# Patient Record
Sex: FEMALE | Race: BLACK OR AFRICAN AMERICAN | NOT HISPANIC OR LATINO
[De-identification: names, ages, dates, MRNs, and addresses within clinical notes are randomized per-mention and may not be internally consistent; named-entity substitution may affect disease eponyms.]

---

## 2023-01-01 ENCOUNTER — TRANSCRIPTION ENCOUNTER (OUTPATIENT)
Age: 48
End: 2023-01-01

## 2023-01-01 ENCOUNTER — INPATIENT (INPATIENT)
Facility: HOSPITAL | Age: 48
LOS: 40 days | Discharge: EXPIRED | End: 2023-05-04
Attending: SURGERY | Admitting: SURGERY
Payer: MEDICAID

## 2023-01-01 ENCOUNTER — INPATIENT (INPATIENT)
Facility: HOSPITAL | Age: 48
LOS: 1 days | Discharge: ANOTHER HOSPITAL | End: 2023-03-24
Attending: STUDENT IN AN ORGANIZED HEALTH CARE EDUCATION/TRAINING PROGRAM | Admitting: SURGERY
Payer: MEDICAID

## 2023-01-01 VITALS
WEIGHT: 270.07 LBS | HEART RATE: 115 BPM | DIASTOLIC BLOOD PRESSURE: 112 MMHG | OXYGEN SATURATION: 98 % | SYSTOLIC BLOOD PRESSURE: 178 MMHG | TEMPERATURE: 96 F | HEIGHT: 65 IN | RESPIRATION RATE: 20 BRPM

## 2023-01-01 VITALS — HEART RATE: 111 BPM | RESPIRATION RATE: 22 BRPM

## 2023-01-01 VITALS — WEIGHT: 269.4 LBS | TEMPERATURE: 96 F | HEIGHT: 64 IN | HEART RATE: 120 BPM

## 2023-01-01 DIAGNOSIS — E87.20 ACIDOSIS, UNSPECIFIED: ICD-10-CM

## 2023-01-01 DIAGNOSIS — R49.0 DYSPHONIA: ICD-10-CM

## 2023-01-01 DIAGNOSIS — I16.0 HYPERTENSIVE URGENCY: ICD-10-CM

## 2023-01-01 DIAGNOSIS — K85.90 ACUTE PANCREATITIS WITHOUT NECROSIS OR INFECTION, UNSPECIFIED: ICD-10-CM

## 2023-01-01 DIAGNOSIS — J34.89 OTHER SPECIFIED DISORDERS OF NOSE AND NASAL SINUSES: ICD-10-CM

## 2023-01-01 DIAGNOSIS — I10 ESSENTIAL (PRIMARY) HYPERTENSION: ICD-10-CM

## 2023-01-01 DIAGNOSIS — Z90.12 ACQUIRED ABSENCE OF LEFT BREAST AND NIPPLE: Chronic | ICD-10-CM

## 2023-01-01 DIAGNOSIS — A49.9 BACTERIAL INFECTION, UNSPECIFIED: ICD-10-CM

## 2023-01-01 DIAGNOSIS — N17.9 ACUTE KIDNEY FAILURE, UNSPECIFIED: ICD-10-CM

## 2023-01-01 DIAGNOSIS — E87.6 HYPOKALEMIA: ICD-10-CM

## 2023-01-01 DIAGNOSIS — Z98.891 HISTORY OF UTERINE SCAR FROM PREVIOUS SURGERY: Chronic | ICD-10-CM

## 2023-01-01 DIAGNOSIS — K85.91 ACUTE PANCREATITIS WITH UNINFECTED NECROSIS, UNSPECIFIED: ICD-10-CM

## 2023-01-01 DIAGNOSIS — E87.5 HYPERKALEMIA: ICD-10-CM

## 2023-01-01 DIAGNOSIS — D64.9 ANEMIA, UNSPECIFIED: ICD-10-CM

## 2023-01-01 LAB
-  AMIKACIN: SIGNIFICANT CHANGE UP
-  AMOXICILLIN/CLAVULANIC ACID: SIGNIFICANT CHANGE UP
-  AMPICILLIN/SULBACTAM: SIGNIFICANT CHANGE UP
-  AMPICILLIN/SULBACTAM: SIGNIFICANT CHANGE UP
-  AMPICILLIN: SIGNIFICANT CHANGE UP
-  AMPICILLIN: SIGNIFICANT CHANGE UP
-  AZTREONAM: SIGNIFICANT CHANGE UP
-  CEFAZOLIN: SIGNIFICANT CHANGE UP
-  CEFAZOLIN: SIGNIFICANT CHANGE UP
-  CEFEPIME: SIGNIFICANT CHANGE UP
-  CEFOXITIN: SIGNIFICANT CHANGE UP
-  CEFTRIAXONE: SIGNIFICANT CHANGE UP
-  CEFUROXIME: SIGNIFICANT CHANGE UP
-  CIPROFLOXACIN: SIGNIFICANT CHANGE UP
-  CLINDAMYCIN: SIGNIFICANT CHANGE UP
-  COAGULASE NEGATIVE STAPHYLOCOCCUS: SIGNIFICANT CHANGE UP
-  ERTAPENEM: SIGNIFICANT CHANGE UP
-  ERYTHROMYCIN: SIGNIFICANT CHANGE UP
-  GENTAMICIN: SIGNIFICANT CHANGE UP
-  GENTAMICIN: SIGNIFICANT CHANGE UP
-  IMIPENEM: SIGNIFICANT CHANGE UP
-  LEVOFLOXACIN: SIGNIFICANT CHANGE UP
-  MEROPENEM: SIGNIFICANT CHANGE UP
-  NITROFURANTOIN: SIGNIFICANT CHANGE UP
-  OXACILLIN: SIGNIFICANT CHANGE UP
-  PENICILLIN: SIGNIFICANT CHANGE UP
-  PIPERACILLIN/TAZOBACTAM: SIGNIFICANT CHANGE UP
-  RIFAMPIN: SIGNIFICANT CHANGE UP
-  TETRACYCLINE: SIGNIFICANT CHANGE UP
-  TETRACYCLINE: SIGNIFICANT CHANGE UP
-  TOBRAMYCIN: SIGNIFICANT CHANGE UP
-  TRIMETHOPRIM/SULFAMETHOXAZOLE: SIGNIFICANT CHANGE UP
-  TRIMETHOPRIM/SULFAMETHOXAZOLE: SIGNIFICANT CHANGE UP
-  VANCOMYCIN: SIGNIFICANT CHANGE UP
-  VANCOMYCIN: SIGNIFICANT CHANGE UP
A1C WITH ESTIMATED AVERAGE GLUCOSE RESULT: 5.9 % — HIGH (ref 4–5.6)
A1C WITH ESTIMATED AVERAGE GLUCOSE RESULT: 6.1 % — HIGH (ref 4–5.6)
ACANTHOCYTES BLD QL SMEAR: SLIGHT — SIGNIFICANT CHANGE UP
ALBUMIN SERPL ELPH-MCNC: 1.4 G/DL — LOW (ref 3.3–5)
ALBUMIN SERPL ELPH-MCNC: 2.1 G/DL — LOW (ref 3.3–5)
ALBUMIN SERPL ELPH-MCNC: 2.1 G/DL — LOW (ref 3.3–5)
ALBUMIN SERPL ELPH-MCNC: 2.2 G/DL — LOW (ref 3.3–5)
ALBUMIN SERPL ELPH-MCNC: 2.3 G/DL — LOW (ref 3.3–5)
ALBUMIN SERPL ELPH-MCNC: 2.4 G/DL — LOW (ref 3.3–5)
ALBUMIN SERPL ELPH-MCNC: 2.5 G/DL — LOW (ref 3.3–5)
ALBUMIN SERPL ELPH-MCNC: 2.6 G/DL — LOW (ref 3.3–5)
ALBUMIN SERPL ELPH-MCNC: 2.7 G/DL — LOW (ref 3.3–5)
ALBUMIN SERPL ELPH-MCNC: 2.8 G/DL — LOW (ref 3.3–5)
ALBUMIN SERPL ELPH-MCNC: 2.9 G/DL — LOW (ref 3.3–5)
ALBUMIN SERPL ELPH-MCNC: 2.9 G/DL — LOW (ref 3.3–5)
ALBUMIN SERPL ELPH-MCNC: 3 G/DL — LOW (ref 3.3–5)
ALBUMIN SERPL ELPH-MCNC: 3.1 G/DL — LOW (ref 3.3–5)
ALBUMIN SERPL ELPH-MCNC: 3.2 G/DL — LOW (ref 3.3–5)
ALBUMIN SERPL ELPH-MCNC: 3.3 G/DL — SIGNIFICANT CHANGE UP (ref 3.3–5)
ALBUMIN SERPL ELPH-MCNC: 3.3 G/DL — SIGNIFICANT CHANGE UP (ref 3.3–5)
ALBUMIN SERPL ELPH-MCNC: 3.5 G/DL — SIGNIFICANT CHANGE UP (ref 3.3–5)
ALBUMIN SERPL ELPH-MCNC: 3.7 G/DL — SIGNIFICANT CHANGE UP (ref 3.3–5)
ALBUMIN SERPL ELPH-MCNC: SIGNIFICANT CHANGE UP G/DL (ref 3.3–5)
ALP SERPL-CCNC: 100 U/L — SIGNIFICANT CHANGE UP (ref 40–120)
ALP SERPL-CCNC: 109 U/L — SIGNIFICANT CHANGE UP (ref 40–120)
ALP SERPL-CCNC: 112 U/L — SIGNIFICANT CHANGE UP (ref 40–120)
ALP SERPL-CCNC: 120 U/L — SIGNIFICANT CHANGE UP (ref 40–120)
ALP SERPL-CCNC: 121 U/L — HIGH (ref 40–120)
ALP SERPL-CCNC: 121 U/L — HIGH (ref 40–120)
ALP SERPL-CCNC: 132 U/L — HIGH (ref 40–120)
ALP SERPL-CCNC: 133 U/L — HIGH (ref 40–120)
ALP SERPL-CCNC: 133 U/L — HIGH (ref 40–120)
ALP SERPL-CCNC: 139 U/L — HIGH (ref 40–120)
ALP SERPL-CCNC: 139 U/L — HIGH (ref 40–120)
ALP SERPL-CCNC: 140 U/L — HIGH (ref 40–120)
ALP SERPL-CCNC: 146 U/L — HIGH (ref 40–120)
ALP SERPL-CCNC: 152 U/L — HIGH (ref 40–120)
ALP SERPL-CCNC: 153 U/L — HIGH (ref 40–120)
ALP SERPL-CCNC: 156 U/L — HIGH (ref 40–120)
ALP SERPL-CCNC: 158 U/L — HIGH (ref 40–120)
ALP SERPL-CCNC: 158 U/L — HIGH (ref 40–120)
ALP SERPL-CCNC: 160 U/L — HIGH (ref 40–120)
ALP SERPL-CCNC: 160 U/L — HIGH (ref 40–120)
ALP SERPL-CCNC: 163 U/L — HIGH (ref 40–120)
ALP SERPL-CCNC: 164 U/L — HIGH (ref 40–120)
ALP SERPL-CCNC: 170 U/L — HIGH (ref 40–120)
ALP SERPL-CCNC: 172 U/L — HIGH (ref 40–120)
ALP SERPL-CCNC: 172 U/L — HIGH (ref 40–120)
ALP SERPL-CCNC: 173 U/L — HIGH (ref 40–120)
ALP SERPL-CCNC: 175 U/L — HIGH (ref 40–120)
ALP SERPL-CCNC: 177 U/L — HIGH (ref 40–120)
ALP SERPL-CCNC: 178 U/L — HIGH (ref 40–120)
ALP SERPL-CCNC: 184 U/L — HIGH (ref 40–120)
ALP SERPL-CCNC: 185 U/L — HIGH (ref 40–120)
ALP SERPL-CCNC: 187 U/L — HIGH (ref 40–120)
ALP SERPL-CCNC: 194 U/L — HIGH (ref 40–120)
ALP SERPL-CCNC: 196 U/L — HIGH (ref 40–120)
ALP SERPL-CCNC: 198 U/L — HIGH (ref 40–120)
ALP SERPL-CCNC: 201 U/L — HIGH (ref 40–120)
ALP SERPL-CCNC: 201 U/L — HIGH (ref 40–120)
ALP SERPL-CCNC: 202 U/L — HIGH (ref 40–120)
ALP SERPL-CCNC: 202 U/L — HIGH (ref 40–120)
ALP SERPL-CCNC: 206 U/L — HIGH (ref 40–120)
ALP SERPL-CCNC: 208 U/L — HIGH (ref 40–120)
ALP SERPL-CCNC: 208 U/L — HIGH (ref 40–120)
ALP SERPL-CCNC: 210 U/L — HIGH (ref 40–120)
ALP SERPL-CCNC: 211 U/L — HIGH (ref 40–120)
ALP SERPL-CCNC: 212 U/L — HIGH (ref 40–120)
ALP SERPL-CCNC: 214 U/L — HIGH (ref 40–120)
ALP SERPL-CCNC: 215 U/L — HIGH (ref 40–120)
ALP SERPL-CCNC: 215 U/L — HIGH (ref 40–120)
ALP SERPL-CCNC: 216 U/L — HIGH (ref 40–120)
ALP SERPL-CCNC: 218 U/L — HIGH (ref 40–120)
ALP SERPL-CCNC: 219 U/L — HIGH (ref 40–120)
ALP SERPL-CCNC: 224 U/L — HIGH (ref 40–120)
ALP SERPL-CCNC: 224 U/L — HIGH (ref 40–120)
ALP SERPL-CCNC: 225 U/L — HIGH (ref 40–120)
ALP SERPL-CCNC: 232 U/L — HIGH (ref 40–120)
ALP SERPL-CCNC: 234 U/L — HIGH (ref 40–120)
ALP SERPL-CCNC: 235 U/L — HIGH (ref 40–120)
ALP SERPL-CCNC: 235 U/L — HIGH (ref 40–120)
ALP SERPL-CCNC: 238 U/L — HIGH (ref 40–120)
ALP SERPL-CCNC: 240 U/L — HIGH (ref 40–120)
ALP SERPL-CCNC: 242 U/L — HIGH (ref 40–120)
ALP SERPL-CCNC: 244 U/L — HIGH (ref 40–120)
ALP SERPL-CCNC: 246 U/L — HIGH (ref 40–120)
ALP SERPL-CCNC: 246 U/L — HIGH (ref 40–120)
ALP SERPL-CCNC: 249 U/L — HIGH (ref 40–120)
ALP SERPL-CCNC: 251 U/L — HIGH (ref 40–120)
ALP SERPL-CCNC: 262 U/L — HIGH (ref 40–120)
ALP SERPL-CCNC: 264 U/L — HIGH (ref 40–120)
ALP SERPL-CCNC: 273 U/L — HIGH (ref 40–120)
ALP SERPL-CCNC: 275 U/L — HIGH (ref 40–120)
ALP SERPL-CCNC: 279 U/L — HIGH (ref 40–120)
ALP SERPL-CCNC: 289 U/L — HIGH (ref 40–120)
ALP SERPL-CCNC: 311 U/L — HIGH (ref 40–120)
ALP SERPL-CCNC: 341 U/L — HIGH (ref 40–120)
ALP SERPL-CCNC: 356 U/L — HIGH (ref 40–120)
ALP SERPL-CCNC: 360 U/L — HIGH (ref 40–120)
ALP SERPL-CCNC: 363 U/L — HIGH (ref 40–120)
ALP SERPL-CCNC: 368 U/L — HIGH (ref 40–120)
ALP SERPL-CCNC: 404 U/L — HIGH (ref 40–120)
ALP SERPL-CCNC: 411 U/L — HIGH (ref 40–120)
ALP SERPL-CCNC: 46 U/L — SIGNIFICANT CHANGE UP (ref 40–120)
ALP SERPL-CCNC: 70 U/L — SIGNIFICANT CHANGE UP (ref 40–120)
ALP SERPL-CCNC: 75 U/L — SIGNIFICANT CHANGE UP (ref 40–120)
ALP SERPL-CCNC: 82 U/L — SIGNIFICANT CHANGE UP (ref 40–120)
ALP SERPL-CCNC: 84 U/L — SIGNIFICANT CHANGE UP (ref 40–120)
ALP SERPL-CCNC: 95 U/L — SIGNIFICANT CHANGE UP (ref 40–120)
ALP SERPL-CCNC: 97 U/L — SIGNIFICANT CHANGE UP (ref 40–120)
ALP SERPL-CCNC: SIGNIFICANT CHANGE UP U/L (ref 40–120)
ALT FLD-CCNC: 103 U/L — HIGH (ref 4–33)
ALT FLD-CCNC: 1058 U/L — HIGH (ref 4–33)
ALT FLD-CCNC: 1102 U/L — HIGH (ref 12–78)
ALT FLD-CCNC: 111 U/L — HIGH (ref 4–33)
ALT FLD-CCNC: 111 U/L — HIGH (ref 4–33)
ALT FLD-CCNC: 112 U/L — HIGH (ref 4–33)
ALT FLD-CCNC: 113 U/L — HIGH (ref 4–33)
ALT FLD-CCNC: 114 U/L — HIGH (ref 4–33)
ALT FLD-CCNC: 116 U/L — HIGH (ref 4–33)
ALT FLD-CCNC: 118 U/L — HIGH (ref 4–33)
ALT FLD-CCNC: 1189 U/L — HIGH (ref 4–33)
ALT FLD-CCNC: 122 U/L — HIGH (ref 4–33)
ALT FLD-CCNC: 124 U/L — HIGH (ref 4–33)
ALT FLD-CCNC: 126 U/L — HIGH (ref 4–33)
ALT FLD-CCNC: 126 U/L — HIGH (ref 4–33)
ALT FLD-CCNC: 127 U/L — HIGH (ref 4–33)
ALT FLD-CCNC: 127 U/L — HIGH (ref 4–33)
ALT FLD-CCNC: 128 U/L — HIGH (ref 4–33)
ALT FLD-CCNC: 130 U/L — HIGH (ref 4–33)
ALT FLD-CCNC: 1403 U/L — HIGH (ref 4–33)
ALT FLD-CCNC: 141 U/L — HIGH (ref 4–33)
ALT FLD-CCNC: 156 U/L — HIGH (ref 4–33)
ALT FLD-CCNC: 163 U/L — HIGH (ref 4–33)
ALT FLD-CCNC: 17 U/L — SIGNIFICANT CHANGE UP (ref 4–33)
ALT FLD-CCNC: 172 U/L — HIGH (ref 4–33)
ALT FLD-CCNC: 173 U/L — HIGH (ref 4–33)
ALT FLD-CCNC: 1847 U/L — HIGH (ref 4–33)
ALT FLD-CCNC: 187 U/L — HIGH (ref 4–33)
ALT FLD-CCNC: 19 U/L — SIGNIFICANT CHANGE UP (ref 4–33)
ALT FLD-CCNC: 19 U/L — SIGNIFICANT CHANGE UP (ref 4–33)
ALT FLD-CCNC: 194 U/L — HIGH (ref 4–33)
ALT FLD-CCNC: 2086 U/L — HIGH (ref 4–33)
ALT FLD-CCNC: 219 U/L — HIGH (ref 4–33)
ALT FLD-CCNC: 220 U/L — HIGH (ref 4–33)
ALT FLD-CCNC: 2286 U/L — HIGH (ref 4–33)
ALT FLD-CCNC: 23 U/L — SIGNIFICANT CHANGE UP (ref 4–33)
ALT FLD-CCNC: 272 U/L — HIGH (ref 4–33)
ALT FLD-CCNC: 2775 U/L — HIGH (ref 4–33)
ALT FLD-CCNC: 2794 U/L — HIGH (ref 4–33)
ALT FLD-CCNC: 305 U/L — HIGH (ref 4–33)
ALT FLD-CCNC: 32 U/L — SIGNIFICANT CHANGE UP (ref 12–78)
ALT FLD-CCNC: 345 U/L — HIGH (ref 4–33)
ALT FLD-CCNC: 3463 U/L — HIGH (ref 4–33)
ALT FLD-CCNC: 3786 U/L — HIGH (ref 4–33)
ALT FLD-CCNC: 381 U/L — HIGH (ref 4–33)
ALT FLD-CCNC: 392 U/L — HIGH (ref 4–33)
ALT FLD-CCNC: 40 U/L — HIGH (ref 4–33)
ALT FLD-CCNC: 4046 U/L — HIGH (ref 4–33)
ALT FLD-CCNC: 4160 U/L — HIGH (ref 4–33)
ALT FLD-CCNC: 4176 U/L — HIGH (ref 4–33)
ALT FLD-CCNC: 4368 U/L — HIGH (ref 4–33)
ALT FLD-CCNC: 438 U/L — HIGH (ref 4–33)
ALT FLD-CCNC: 4452 U/L — HIGH (ref 4–33)
ALT FLD-CCNC: 4453 U/L — HIGH (ref 4–33)
ALT FLD-CCNC: 4585 U/L — HIGH (ref 4–33)
ALT FLD-CCNC: 46 U/L — HIGH (ref 4–33)
ALT FLD-CCNC: 467 U/L — HIGH (ref 12–78)
ALT FLD-CCNC: 49 U/L — HIGH (ref 4–33)
ALT FLD-CCNC: 491 U/L — HIGH (ref 4–33)
ALT FLD-CCNC: 52 U/L — HIGH (ref 4–33)
ALT FLD-CCNC: 54 U/L — HIGH (ref 4–33)
ALT FLD-CCNC: 540 U/L — HIGH (ref 4–33)
ALT FLD-CCNC: 5534 U/L — HIGH (ref 4–33)
ALT FLD-CCNC: 56 U/L — HIGH (ref 4–33)
ALT FLD-CCNC: 58 U/L — HIGH (ref 4–33)
ALT FLD-CCNC: 592 U/L — HIGH (ref 4–33)
ALT FLD-CCNC: 60 U/L — HIGH (ref 4–33)
ALT FLD-CCNC: 635 U/L — HIGH (ref 4–33)
ALT FLD-CCNC: 64 U/L — HIGH (ref 4–33)
ALT FLD-CCNC: 67 U/L — HIGH (ref 4–33)
ALT FLD-CCNC: 69 U/L — HIGH (ref 4–33)
ALT FLD-CCNC: 71 U/L — HIGH (ref 4–33)
ALT FLD-CCNC: 72 U/L — HIGH (ref 4–33)
ALT FLD-CCNC: 72 U/L — HIGH (ref 4–33)
ALT FLD-CCNC: 73 U/L — HIGH (ref 4–33)
ALT FLD-CCNC: 73 U/L — HIGH (ref 4–33)
ALT FLD-CCNC: 745 U/L — HIGH (ref 12–78)
ALT FLD-CCNC: 746 U/L — HIGH (ref 12–78)
ALT FLD-CCNC: 76 U/L — HIGH (ref 4–33)
ALT FLD-CCNC: 76 U/L — HIGH (ref 4–33)
ALT FLD-CCNC: 778 U/L — HIGH (ref 4–33)
ALT FLD-CCNC: 87 U/L — HIGH (ref 4–33)
ALT FLD-CCNC: 892 U/L — HIGH (ref 4–33)
ALT FLD-CCNC: SIGNIFICANT CHANGE UP U/L (ref 4–33)
ANION GAP SERPL CALC-SCNC: 10 MMOL/L — SIGNIFICANT CHANGE UP (ref 7–14)
ANION GAP SERPL CALC-SCNC: 11 MMOL/L — SIGNIFICANT CHANGE UP (ref 7–14)
ANION GAP SERPL CALC-SCNC: 12 MMOL/L — SIGNIFICANT CHANGE UP (ref 5–17)
ANION GAP SERPL CALC-SCNC: 12 MMOL/L — SIGNIFICANT CHANGE UP (ref 7–14)
ANION GAP SERPL CALC-SCNC: 13 MMOL/L — SIGNIFICANT CHANGE UP (ref 7–14)
ANION GAP SERPL CALC-SCNC: 14 MMOL/L — SIGNIFICANT CHANGE UP (ref 7–14)
ANION GAP SERPL CALC-SCNC: 15 MMOL/L — HIGH (ref 7–14)
ANION GAP SERPL CALC-SCNC: 16 MMOL/L — HIGH (ref 7–14)
ANION GAP SERPL CALC-SCNC: 17 MMOL/L — HIGH (ref 7–14)
ANION GAP SERPL CALC-SCNC: 17 MMOL/L — SIGNIFICANT CHANGE UP (ref 5–17)
ANION GAP SERPL CALC-SCNC: 18 MMOL/L — HIGH (ref 7–14)
ANION GAP SERPL CALC-SCNC: 19 MMOL/L — HIGH (ref 7–14)
ANION GAP SERPL CALC-SCNC: 20 MMOL/L — HIGH (ref 7–14)
ANION GAP SERPL CALC-SCNC: 21 MMOL/L — HIGH (ref 7–14)
ANION GAP SERPL CALC-SCNC: 22 MMOL/L — HIGH (ref 7–14)
ANION GAP SERPL CALC-SCNC: 24 MMOL/L — HIGH (ref 7–14)
ANION GAP SERPL CALC-SCNC: 25 MMOL/L — HIGH (ref 7–14)
ANION GAP SERPL CALC-SCNC: 7 MMOL/L — SIGNIFICANT CHANGE UP (ref 5–17)
ANION GAP SERPL CALC-SCNC: 9 MMOL/L — SIGNIFICANT CHANGE UP (ref 5–17)
ANION GAP SERPL CALC-SCNC: 9 MMOL/L — SIGNIFICANT CHANGE UP (ref 7–14)
ANISOCYTOSIS BLD QL: SLIGHT — SIGNIFICANT CHANGE UP
APPEARANCE UR: CLEAR — SIGNIFICANT CHANGE UP
APPEARANCE UR: CLEAR — SIGNIFICANT CHANGE UP
APTT BLD: 162.9 SEC — CRITICAL HIGH (ref 27.5–35.5)
APTT BLD: 23.7 SEC — LOW (ref 27–36.3)
APTT BLD: 24 SEC — LOW (ref 27–36.3)
APTT BLD: 24.4 SEC — LOW (ref 27–36.3)
APTT BLD: 24.6 SEC — LOW (ref 27–36.3)
APTT BLD: 24.7 SEC — LOW (ref 27–36.3)
APTT BLD: 25.2 SEC — LOW (ref 27–36.3)
APTT BLD: 25.2 SEC — LOW (ref 27–36.3)
APTT BLD: 25.3 SEC — LOW (ref 27–36.3)
APTT BLD: 25.6 SEC — LOW (ref 27–36.3)
APTT BLD: 25.9 SEC — LOW (ref 27–36.3)
APTT BLD: 25.9 SEC — LOW (ref 27–36.3)
APTT BLD: 26 SEC — LOW (ref 27–36.3)
APTT BLD: 26.1 SEC — LOW (ref 27–36.3)
APTT BLD: 26.5 SEC — LOW (ref 27–36.3)
APTT BLD: 26.5 SEC — LOW (ref 27–36.3)
APTT BLD: 26.7 SEC — LOW (ref 27–36.3)
APTT BLD: 26.7 SEC — LOW (ref 27–36.3)
APTT BLD: 26.8 SEC — LOW (ref 27–36.3)
APTT BLD: 26.8 SEC — LOW (ref 27–36.3)
APTT BLD: 26.9 SEC — LOW (ref 27–36.3)
APTT BLD: 27 SEC — SIGNIFICANT CHANGE UP (ref 27–36.3)
APTT BLD: 27 SEC — SIGNIFICANT CHANGE UP (ref 27–36.3)
APTT BLD: 27.1 SEC — SIGNIFICANT CHANGE UP (ref 27–36.3)
APTT BLD: 27.5 SEC — SIGNIFICANT CHANGE UP (ref 27–36.3)
APTT BLD: 27.7 SEC — SIGNIFICANT CHANGE UP (ref 27–36.3)
APTT BLD: 28.1 SEC — SIGNIFICANT CHANGE UP (ref 27–36.3)
APTT BLD: 28.1 SEC — SIGNIFICANT CHANGE UP (ref 27–36.3)
APTT BLD: 28.2 SEC — SIGNIFICANT CHANGE UP (ref 27–36.3)
APTT BLD: 28.2 SEC — SIGNIFICANT CHANGE UP (ref 27–36.3)
APTT BLD: 28.3 SEC — SIGNIFICANT CHANGE UP (ref 27–36.3)
APTT BLD: 28.3 SEC — SIGNIFICANT CHANGE UP (ref 27–36.3)
APTT BLD: 28.4 SEC — SIGNIFICANT CHANGE UP (ref 27–36.3)
APTT BLD: 28.4 SEC — SIGNIFICANT CHANGE UP (ref 27–36.3)
APTT BLD: 28.5 SEC — SIGNIFICANT CHANGE UP (ref 27–36.3)
APTT BLD: 28.5 SEC — SIGNIFICANT CHANGE UP (ref 27–36.3)
APTT BLD: 28.6 SEC — SIGNIFICANT CHANGE UP (ref 27–36.3)
APTT BLD: 28.8 SEC — SIGNIFICANT CHANGE UP (ref 27–36.3)
APTT BLD: 28.9 SEC — SIGNIFICANT CHANGE UP (ref 27–36.3)
APTT BLD: 29 SEC — SIGNIFICANT CHANGE UP (ref 27–36.3)
APTT BLD: 29.1 SEC — SIGNIFICANT CHANGE UP (ref 27–36.3)
APTT BLD: 29.3 SEC — SIGNIFICANT CHANGE UP (ref 27–36.3)
APTT BLD: 29.5 SEC — SIGNIFICANT CHANGE UP (ref 27–36.3)
APTT BLD: 29.9 SEC — SIGNIFICANT CHANGE UP (ref 27–36.3)
APTT BLD: 30.5 SEC — SIGNIFICANT CHANGE UP (ref 27–36.3)
APTT BLD: 30.5 SEC — SIGNIFICANT CHANGE UP (ref 27–36.3)
APTT BLD: 30.7 SEC — SIGNIFICANT CHANGE UP (ref 27–36.3)
APTT BLD: 31.2 SEC — SIGNIFICANT CHANGE UP (ref 27–36.3)
APTT BLD: 31.3 SEC — SIGNIFICANT CHANGE UP (ref 27–36.3)
APTT BLD: 31.5 SEC — SIGNIFICANT CHANGE UP (ref 27–36.3)
APTT BLD: 31.5 SEC — SIGNIFICANT CHANGE UP (ref 27–36.3)
APTT BLD: 32.3 SEC — SIGNIFICANT CHANGE UP (ref 27–36.3)
APTT BLD: 32.3 SEC — SIGNIFICANT CHANGE UP (ref 27–36.3)
APTT BLD: 33.3 SEC — SIGNIFICANT CHANGE UP (ref 27–36.3)
APTT BLD: 34.8 SEC — SIGNIFICANT CHANGE UP (ref 27–36.3)
APTT BLD: 34.8 SEC — SIGNIFICANT CHANGE UP (ref 27–36.3)
APTT BLD: 35.4 SEC — SIGNIFICANT CHANGE UP (ref 27–36.3)
APTT BLD: 36.3 SEC — SIGNIFICANT CHANGE UP (ref 27–36.3)
APTT BLD: 38.2 SEC — SIGNIFICANT CHANGE UP (ref 27–36.3)
APTT BLD: 40.3 SEC — HIGH (ref 27–36.3)
APTT BLD: 41 SEC — HIGH (ref 27–36.3)
APTT BLD: 41.6 SEC — HIGH (ref 27–36.3)
APTT BLD: 45.3 SEC — HIGH (ref 27–36.3)
APTT BLD: 47.5 SEC — HIGH (ref 27–36.3)
APTT BLD: 50.8 SEC — HIGH (ref 27–36.3)
APTT BLD: 84.5 SEC — HIGH (ref 27–36.3)
APTT BLD: 89.1 SEC — HIGH (ref 27.5–35.5)
APTT BLD: >200 SEC — CRITICAL HIGH (ref 27.5–35.5)
APTT BLD: >200 SEC — CRITICAL HIGH (ref 27.5–35.5)
AST SERPL-CCNC: 110 U/L — HIGH (ref 4–32)
AST SERPL-CCNC: 116 U/L — HIGH (ref 4–32)
AST SERPL-CCNC: 117 U/L — HIGH (ref 4–32)
AST SERPL-CCNC: 118 U/L — HIGH (ref 4–32)
AST SERPL-CCNC: 118 U/L — HIGH (ref 4–32)
AST SERPL-CCNC: 1193 U/L — HIGH (ref 15–37)
AST SERPL-CCNC: 1196 U/L — HIGH (ref 15–37)
AST SERPL-CCNC: 121 U/L — HIGH (ref 4–32)
AST SERPL-CCNC: 122 U/L — HIGH (ref 4–32)
AST SERPL-CCNC: 123 U/L — HIGH (ref 4–32)
AST SERPL-CCNC: 124 U/L — HIGH (ref 4–32)
AST SERPL-CCNC: 127 U/L — HIGH (ref 4–32)
AST SERPL-CCNC: 128 U/L — HIGH (ref 4–32)
AST SERPL-CCNC: 129 U/L — HIGH (ref 4–32)
AST SERPL-CCNC: 130 U/L — HIGH (ref 4–32)
AST SERPL-CCNC: 131 U/L — HIGH (ref 4–32)
AST SERPL-CCNC: 132 U/L — HIGH (ref 4–32)
AST SERPL-CCNC: 133 U/L — HIGH (ref 4–32)
AST SERPL-CCNC: 134 U/L — HIGH (ref 4–32)
AST SERPL-CCNC: 135 U/L — HIGH (ref 4–32)
AST SERPL-CCNC: 137 U/L — HIGH (ref 4–32)
AST SERPL-CCNC: 138 U/L — HIGH (ref 4–32)
AST SERPL-CCNC: 138 U/L — HIGH (ref 4–32)
AST SERPL-CCNC: 139 U/L — HIGH (ref 4–32)
AST SERPL-CCNC: 140 U/L — HIGH (ref 4–32)
AST SERPL-CCNC: 141 U/L — HIGH (ref 4–32)
AST SERPL-CCNC: 141 U/L — HIGH (ref 4–32)
AST SERPL-CCNC: 142 U/L — HIGH (ref 4–32)
AST SERPL-CCNC: 142 U/L — HIGH (ref 4–32)
AST SERPL-CCNC: 147 U/L — HIGH (ref 4–32)
AST SERPL-CCNC: 163 U/L — HIGH (ref 4–32)
AST SERPL-CCNC: 163 U/L — HIGH (ref 4–32)
AST SERPL-CCNC: 1701 U/L — HIGH (ref 4–32)
AST SERPL-CCNC: 1737 U/L — HIGH (ref 15–37)
AST SERPL-CCNC: 183 U/L — HIGH (ref 4–32)
AST SERPL-CCNC: 196 U/L — HIGH (ref 4–32)
AST SERPL-CCNC: 225 U/L — HIGH (ref 4–32)
AST SERPL-CCNC: 2310 U/L — HIGH (ref 4–32)
AST SERPL-CCNC: 243 U/L — HIGH (ref 4–32)
AST SERPL-CCNC: 247 U/L — HIGH (ref 4–32)
AST SERPL-CCNC: 268 U/L — HIGH (ref 4–32)
AST SERPL-CCNC: 282 U/L — HIGH (ref 4–32)
AST SERPL-CCNC: 284 U/L — HIGH (ref 4–32)
AST SERPL-CCNC: 29 U/L — SIGNIFICANT CHANGE UP (ref 15–37)
AST SERPL-CCNC: 291 U/L — HIGH (ref 4–32)
AST SERPL-CCNC: 2935 U/L — HIGH (ref 4–32)
AST SERPL-CCNC: 337 U/L — HIGH (ref 4–32)
AST SERPL-CCNC: 34 U/L — HIGH (ref 4–32)
AST SERPL-CCNC: 36 U/L — HIGH (ref 4–32)
AST SERPL-CCNC: 39 U/L — HIGH (ref 4–32)
AST SERPL-CCNC: 3955 U/L — HIGH (ref 4–32)
AST SERPL-CCNC: 422 U/L — HIGH (ref 4–32)
AST SERPL-CCNC: 45 U/L — HIGH (ref 4–32)
AST SERPL-CCNC: 4730 U/L — HIGH (ref 4–32)
AST SERPL-CCNC: 49 U/L — HIGH (ref 4–32)
AST SERPL-CCNC: 5190 U/L — HIGH (ref 4–32)
AST SERPL-CCNC: 550 U/L — HIGH (ref 4–32)
AST SERPL-CCNC: 63 U/L — HIGH (ref 4–32)
AST SERPL-CCNC: 6371 U/L — HIGH (ref 4–32)
AST SERPL-CCNC: 67 U/L — HIGH (ref 4–32)
AST SERPL-CCNC: 67 U/L — HIGH (ref 4–32)
AST SERPL-CCNC: 68 U/L — HIGH (ref 4–32)
AST SERPL-CCNC: 684 U/L — HIGH (ref 4–32)
AST SERPL-CCNC: 690 U/L — HIGH (ref 15–37)
AST SERPL-CCNC: 75 U/L — HIGH (ref 4–32)
AST SERPL-CCNC: 79 U/L — HIGH (ref 4–32)
AST SERPL-CCNC: 79 U/L — HIGH (ref 4–32)
AST SERPL-CCNC: 81 U/L — HIGH (ref 4–32)
AST SERPL-CCNC: 84 U/L — HIGH (ref 4–32)
AST SERPL-CCNC: 85 U/L — HIGH (ref 4–32)
AST SERPL-CCNC: 88 U/L — HIGH (ref 4–32)
AST SERPL-CCNC: 89 U/L — HIGH (ref 4–32)
AST SERPL-CCNC: 91 U/L — HIGH (ref 4–32)
AST SERPL-CCNC: 92 U/L — HIGH (ref 4–32)
AST SERPL-CCNC: 928 U/L — HIGH (ref 4–32)
AST SERPL-CCNC: 94 U/L — HIGH (ref 4–32)
AST SERPL-CCNC: 95 U/L — HIGH (ref 4–32)
AST SERPL-CCNC: >7000 U/L — HIGH (ref 4–32)
AST SERPL-CCNC: SIGNIFICANT CHANGE UP U/L (ref 4–32)
BACTERIA # UR AUTO: ABNORMAL
BACTERIA # UR AUTO: ABNORMAL
BASE EXCESS BLDA CALC-SCNC: -3.1 MMOL/L — LOW (ref -2–3)
BASE EXCESS BLDA CALC-SCNC: -3.8 MMOL/L — LOW (ref -2–3)
BASE EXCESS BLDA CALC-SCNC: -4 MMOL/L — LOW (ref -2–3)
BASE EXCESS BLDA CALC-SCNC: -4.2 MMOL/L — LOW (ref -2–3)
BASE EXCESS BLDA CALC-SCNC: -6.6 MMOL/L — LOW (ref -2–3)
BASE EXCESS BLDA CALC-SCNC: -9 MMOL/L — LOW (ref -2–3)
BASOPHILS # BLD AUTO: 0 K/UL — SIGNIFICANT CHANGE UP (ref 0–0.2)
BASOPHILS # BLD AUTO: 0.03 K/UL — SIGNIFICANT CHANGE UP (ref 0–0.2)
BASOPHILS # BLD AUTO: 0.03 K/UL — SIGNIFICANT CHANGE UP (ref 0–0.2)
BASOPHILS # BLD AUTO: 0.06 K/UL — SIGNIFICANT CHANGE UP (ref 0–0.2)
BASOPHILS # BLD AUTO: 0.06 K/UL — SIGNIFICANT CHANGE UP (ref 0–0.2)
BASOPHILS NFR BLD AUTO: 0 % — SIGNIFICANT CHANGE UP (ref 0–2)
BASOPHILS NFR BLD AUTO: 0.2 % — SIGNIFICANT CHANGE UP (ref 0–2)
BASOPHILS NFR BLD AUTO: 0.2 % — SIGNIFICANT CHANGE UP (ref 0–2)
BASOPHILS NFR BLD AUTO: 0.3 % — SIGNIFICANT CHANGE UP (ref 0–2)
BASOPHILS NFR BLD AUTO: 0.3 % — SIGNIFICANT CHANGE UP (ref 0–2)
BILIRUB DIRECT SERPL-MCNC: 0.2 MG/DL — SIGNIFICANT CHANGE UP (ref 0–0.3)
BILIRUB DIRECT SERPL-MCNC: 0.2 MG/DL — SIGNIFICANT CHANGE UP (ref 0–0.3)
BILIRUB DIRECT SERPL-MCNC: 1.7 MG/DL — HIGH (ref 0–0.3)
BILIRUB DIRECT SERPL-MCNC: 4.3 MG/DL — HIGH (ref 0–0.3)
BILIRUB DIRECT SERPL-MCNC: 6.4 MG/DL — HIGH (ref 0–0.3)
BILIRUB INDIRECT FLD-MCNC: 0.1 MG/DL — LOW (ref 0.2–1)
BILIRUB INDIRECT FLD-MCNC: 0.6 MG/DL — SIGNIFICANT CHANGE UP (ref 0.2–1)
BILIRUB INDIRECT FLD-MCNC: 0.9 MG/DL — SIGNIFICANT CHANGE UP (ref 0–1)
BILIRUB INDIRECT FLD-MCNC: 1.1 MG/DL — HIGH (ref 0–1)
BILIRUB INDIRECT FLD-MCNC: 1.3 MG/DL — HIGH (ref 0–1)
BILIRUB SERPL-MCNC: 0.3 MG/DL — SIGNIFICANT CHANGE UP (ref 0.2–1.2)
BILIRUB SERPL-MCNC: 0.6 MG/DL — SIGNIFICANT CHANGE UP (ref 0.2–1.2)
BILIRUB SERPL-MCNC: 0.8 MG/DL — SIGNIFICANT CHANGE UP (ref 0.2–1.2)
BILIRUB SERPL-MCNC: 0.8 MG/DL — SIGNIFICANT CHANGE UP (ref 0.2–1.2)
BILIRUB SERPL-MCNC: 0.9 MG/DL — SIGNIFICANT CHANGE UP (ref 0.2–1.2)
BILIRUB SERPL-MCNC: 1.2 MG/DL — SIGNIFICANT CHANGE UP (ref 0.2–1.2)
BILIRUB SERPL-MCNC: 1.4 MG/DL — HIGH (ref 0.2–1.2)
BILIRUB SERPL-MCNC: 1.5 MG/DL — HIGH (ref 0.2–1.2)
BILIRUB SERPL-MCNC: 1.9 MG/DL — HIGH (ref 0.2–1.2)
BILIRUB SERPL-MCNC: 10 MG/DL — HIGH (ref 0.2–1.2)
BILIRUB SERPL-MCNC: 10.1 MG/DL — HIGH (ref 0.2–1.2)
BILIRUB SERPL-MCNC: 10.3 MG/DL — HIGH (ref 0.2–1.2)
BILIRUB SERPL-MCNC: 10.5 MG/DL — HIGH (ref 0.2–1.2)
BILIRUB SERPL-MCNC: 11.1 MG/DL — HIGH (ref 0.2–1.2)
BILIRUB SERPL-MCNC: 11.4 MG/DL — HIGH (ref 0.2–1.2)
BILIRUB SERPL-MCNC: 11.6 MG/DL — HIGH (ref 0.2–1.2)
BILIRUB SERPL-MCNC: 12 MG/DL — HIGH (ref 0.2–1.2)
BILIRUB SERPL-MCNC: 2 MG/DL — HIGH (ref 0.2–1.2)
BILIRUB SERPL-MCNC: 2.2 MG/DL — HIGH (ref 0.2–1.2)
BILIRUB SERPL-MCNC: 2.5 MG/DL — HIGH (ref 0.2–1.2)
BILIRUB SERPL-MCNC: 2.6 MG/DL — HIGH (ref 0.2–1.2)
BILIRUB SERPL-MCNC: 2.8 MG/DL — HIGH (ref 0.2–1.2)
BILIRUB SERPL-MCNC: 3 MG/DL — HIGH (ref 0.2–1.2)
BILIRUB SERPL-MCNC: 3 MG/DL — HIGH (ref 0.2–1.2)
BILIRUB SERPL-MCNC: 3.2 MG/DL — HIGH (ref 0.2–1.2)
BILIRUB SERPL-MCNC: 3.2 MG/DL — HIGH (ref 0.2–1.2)
BILIRUB SERPL-MCNC: 3.3 MG/DL — HIGH (ref 0.2–1.2)
BILIRUB SERPL-MCNC: 3.4 MG/DL — HIGH (ref 0.2–1.2)
BILIRUB SERPL-MCNC: 3.5 MG/DL — HIGH (ref 0.2–1.2)
BILIRUB SERPL-MCNC: 3.6 MG/DL — HIGH (ref 0.2–1.2)
BILIRUB SERPL-MCNC: 3.7 MG/DL — HIGH (ref 0.2–1.2)
BILIRUB SERPL-MCNC: 4.2 MG/DL — HIGH (ref 0.2–1.2)
BILIRUB SERPL-MCNC: 4.3 MG/DL — HIGH (ref 0.2–1.2)
BILIRUB SERPL-MCNC: 4.4 MG/DL — HIGH (ref 0.2–1.2)
BILIRUB SERPL-MCNC: 4.7 MG/DL — HIGH (ref 0.2–1.2)
BILIRUB SERPL-MCNC: 4.8 MG/DL — HIGH (ref 0.2–1.2)
BILIRUB SERPL-MCNC: 4.9 MG/DL — HIGH (ref 0.2–1.2)
BILIRUB SERPL-MCNC: 5 MG/DL — HIGH (ref 0.2–1.2)
BILIRUB SERPL-MCNC: 5.2 MG/DL — HIGH (ref 0.2–1.2)
BILIRUB SERPL-MCNC: 5.3 MG/DL — HIGH (ref 0.2–1.2)
BILIRUB SERPL-MCNC: 5.4 MG/DL — HIGH (ref 0.2–1.2)
BILIRUB SERPL-MCNC: 5.5 MG/DL — HIGH (ref 0.2–1.2)
BILIRUB SERPL-MCNC: 5.5 MG/DL — HIGH (ref 0.2–1.2)
BILIRUB SERPL-MCNC: 5.6 MG/DL — HIGH (ref 0.2–1.2)
BILIRUB SERPL-MCNC: 5.7 MG/DL — HIGH (ref 0.2–1.2)
BILIRUB SERPL-MCNC: 5.8 MG/DL — HIGH (ref 0.2–1.2)
BILIRUB SERPL-MCNC: 5.9 MG/DL — HIGH (ref 0.2–1.2)
BILIRUB SERPL-MCNC: 5.9 MG/DL — HIGH (ref 0.2–1.2)
BILIRUB SERPL-MCNC: 6 MG/DL — HIGH (ref 0.2–1.2)
BILIRUB SERPL-MCNC: 6 MG/DL — HIGH (ref 0.2–1.2)
BILIRUB SERPL-MCNC: 6.1 MG/DL — HIGH (ref 0.2–1.2)
BILIRUB SERPL-MCNC: 6.5 MG/DL — HIGH (ref 0.2–1.2)
BILIRUB SERPL-MCNC: 6.6 MG/DL — HIGH (ref 0.2–1.2)
BILIRUB SERPL-MCNC: 6.6 MG/DL — HIGH (ref 0.2–1.2)
BILIRUB SERPL-MCNC: 6.8 MG/DL — HIGH (ref 0.2–1.2)
BILIRUB SERPL-MCNC: 7.2 MG/DL — HIGH (ref 0.2–1.2)
BILIRUB SERPL-MCNC: 7.3 MG/DL — HIGH (ref 0.2–1.2)
BILIRUB SERPL-MCNC: 7.5 MG/DL — HIGH (ref 0.2–1.2)
BILIRUB SERPL-MCNC: 8.5 MG/DL — HIGH (ref 0.2–1.2)
BILIRUB SERPL-MCNC: 9 MG/DL — HIGH (ref 0.2–1.2)
BILIRUB SERPL-MCNC: 9.1 MG/DL — HIGH (ref 0.2–1.2)
BILIRUB SERPL-MCNC: 9.2 MG/DL — HIGH (ref 0.2–1.2)
BILIRUB SERPL-MCNC: 9.2 MG/DL — HIGH (ref 0.2–1.2)
BILIRUB SERPL-MCNC: 9.3 MG/DL — HIGH (ref 0.2–1.2)
BILIRUB SERPL-MCNC: 9.4 MG/DL — HIGH (ref 0.2–1.2)
BILIRUB SERPL-MCNC: 9.9 MG/DL — HIGH (ref 0.2–1.2)
BILIRUB SERPL-MCNC: SIGNIFICANT CHANGE UP MG/DL (ref 0.2–1.2)
BILIRUB UR-MCNC: NEGATIVE — SIGNIFICANT CHANGE UP
BILIRUB UR-MCNC: NEGATIVE — SIGNIFICANT CHANGE UP
BLD GP AB SCN SERPL QL: NEGATIVE — SIGNIFICANT CHANGE UP
BLD GP AB SCN SERPL QL: SIGNIFICANT CHANGE UP
BLOOD GAS ARTERIAL - LYTES,HGB,ICA,LACT RESULT: SIGNIFICANT CHANGE UP
BLOOD GAS ARTERIAL COMPREHENSIVE RESULT: SIGNIFICANT CHANGE UP
BUN SERPL-MCNC: 10 MG/DL — SIGNIFICANT CHANGE UP (ref 7–23)
BUN SERPL-MCNC: 11 MG/DL — SIGNIFICANT CHANGE UP (ref 7–23)
BUN SERPL-MCNC: 12 MG/DL — SIGNIFICANT CHANGE UP (ref 7–23)
BUN SERPL-MCNC: 13 MG/DL — SIGNIFICANT CHANGE UP (ref 7–23)
BUN SERPL-MCNC: 14 MG/DL — SIGNIFICANT CHANGE UP (ref 7–23)
BUN SERPL-MCNC: 14 MG/DL — SIGNIFICANT CHANGE UP (ref 7–23)
BUN SERPL-MCNC: 15 MG/DL — SIGNIFICANT CHANGE UP (ref 7–23)
BUN SERPL-MCNC: 16 MG/DL — SIGNIFICANT CHANGE UP (ref 7–23)
BUN SERPL-MCNC: 17 MG/DL — SIGNIFICANT CHANGE UP (ref 7–23)
BUN SERPL-MCNC: 18 MG/DL — SIGNIFICANT CHANGE UP (ref 7–23)
BUN SERPL-MCNC: 18 MG/DL — SIGNIFICANT CHANGE UP (ref 7–23)
BUN SERPL-MCNC: 19 MG/DL — SIGNIFICANT CHANGE UP (ref 7–23)
BUN SERPL-MCNC: 20 MG/DL — SIGNIFICANT CHANGE UP (ref 7–23)
BUN SERPL-MCNC: 21 MG/DL — SIGNIFICANT CHANGE UP (ref 7–23)
BUN SERPL-MCNC: 22 MG/DL — SIGNIFICANT CHANGE UP (ref 7–23)
BUN SERPL-MCNC: 23 MG/DL — SIGNIFICANT CHANGE UP (ref 7–23)
BUN SERPL-MCNC: 24 MG/DL — HIGH (ref 7–23)
BUN SERPL-MCNC: 24 MG/DL — HIGH (ref 7–23)
BUN SERPL-MCNC: 25 MG/DL — HIGH (ref 7–23)
BUN SERPL-MCNC: 26 MG/DL — HIGH (ref 7–23)
BUN SERPL-MCNC: 27 MG/DL — HIGH (ref 7–23)
BUN SERPL-MCNC: 28 MG/DL — HIGH (ref 7–23)
BUN SERPL-MCNC: 29 MG/DL — HIGH (ref 7–23)
BUN SERPL-MCNC: 29 MG/DL — HIGH (ref 7–23)
BUN SERPL-MCNC: 30 MG/DL — HIGH (ref 7–23)
BUN SERPL-MCNC: 31 MG/DL — HIGH (ref 7–23)
BUN SERPL-MCNC: 32 MG/DL — HIGH (ref 7–23)
BUN SERPL-MCNC: 33 MG/DL — HIGH (ref 7–23)
BUN SERPL-MCNC: 34 MG/DL — HIGH (ref 7–23)
BUN SERPL-MCNC: 35 MG/DL — HIGH (ref 7–23)
BUN SERPL-MCNC: 36 MG/DL — HIGH (ref 7–23)
BUN SERPL-MCNC: 37 MG/DL — HIGH (ref 7–23)
BUN SERPL-MCNC: 40 MG/DL — HIGH (ref 7–23)
BUN SERPL-MCNC: 40 MG/DL — HIGH (ref 7–23)
BUN SERPL-MCNC: 41 MG/DL — HIGH (ref 7–23)
BUN SERPL-MCNC: 43 MG/DL — HIGH (ref 7–23)
BUN SERPL-MCNC: 43 MG/DL — HIGH (ref 7–23)
BUN SERPL-MCNC: 47 MG/DL — HIGH (ref 7–23)
BUN SERPL-MCNC: 48 MG/DL — HIGH (ref 7–23)
BUN SERPL-MCNC: 5 MG/DL — LOW (ref 7–23)
BUN SERPL-MCNC: 50 MG/DL — HIGH (ref 7–23)
BUN SERPL-MCNC: 55 MG/DL — HIGH (ref 7–23)
BUN SERPL-MCNC: 56 MG/DL — HIGH (ref 7–23)
BUN SERPL-MCNC: 6 MG/DL — LOW (ref 7–23)
BUN SERPL-MCNC: 7 MG/DL — SIGNIFICANT CHANGE UP (ref 7–23)
BUN SERPL-MCNC: 8 MG/DL — SIGNIFICANT CHANGE UP (ref 7–23)
BUN SERPL-MCNC: 9 MG/DL — SIGNIFICANT CHANGE UP (ref 7–23)
BUN SERPL-MCNC: 9 MG/DL — SIGNIFICANT CHANGE UP (ref 7–23)
BURR CELLS BLD QL SMEAR: PRESENT — SIGNIFICANT CHANGE UP
C DIFF BY PCR RESULT: SIGNIFICANT CHANGE UP
CA-I BLD-SCNC: 0.9 MMOL/L — LOW (ref 1.15–1.29)
CA-I BLD-SCNC: 0.93 MMOL/L — LOW (ref 1.15–1.29)
CA-I BLD-SCNC: 1.05 MMOL/L — LOW (ref 1.15–1.29)
CA-I BLD-SCNC: 1.08 MMOL/L — LOW (ref 1.15–1.29)
CALCIUM SERPL-MCNC: 6.8 MG/DL — LOW (ref 8.4–10.5)
CALCIUM SERPL-MCNC: 6.8 MG/DL — LOW (ref 8.4–10.5)
CALCIUM SERPL-MCNC: 6.8 MG/DL — LOW (ref 8.5–10.1)
CALCIUM SERPL-MCNC: 6.9 MG/DL — LOW (ref 8.4–10.5)
CALCIUM SERPL-MCNC: 7 MG/DL — LOW (ref 8.4–10.5)
CALCIUM SERPL-MCNC: 7.1 MG/DL — LOW (ref 8.4–10.5)
CALCIUM SERPL-MCNC: 7.2 MG/DL — LOW (ref 8.4–10.5)
CALCIUM SERPL-MCNC: 7.3 MG/DL — LOW (ref 8.4–10.5)
CALCIUM SERPL-MCNC: 7.4 MG/DL — LOW (ref 8.4–10.5)
CALCIUM SERPL-MCNC: 7.5 MG/DL — LOW (ref 8.4–10.5)
CALCIUM SERPL-MCNC: 7.5 MG/DL — LOW (ref 8.4–10.5)
CALCIUM SERPL-MCNC: 7.6 MG/DL — LOW (ref 8.4–10.5)
CALCIUM SERPL-MCNC: 7.6 MG/DL — LOW (ref 8.4–10.5)
CALCIUM SERPL-MCNC: 7.7 MG/DL — LOW (ref 8.4–10.5)
CALCIUM SERPL-MCNC: 7.8 MG/DL — LOW (ref 8.4–10.5)
CALCIUM SERPL-MCNC: 7.8 MG/DL — LOW (ref 8.4–10.5)
CALCIUM SERPL-MCNC: 7.9 MG/DL — LOW (ref 8.4–10.5)
CALCIUM SERPL-MCNC: 7.9 MG/DL — LOW (ref 8.5–10.1)
CALCIUM SERPL-MCNC: 8 MG/DL — LOW (ref 8.4–10.5)
CALCIUM SERPL-MCNC: 8.1 MG/DL — LOW (ref 8.4–10.5)
CALCIUM SERPL-MCNC: 8.2 MG/DL — LOW (ref 8.4–10.5)
CALCIUM SERPL-MCNC: 8.2 MG/DL — LOW (ref 8.4–10.5)
CALCIUM SERPL-MCNC: 8.3 MG/DL — LOW (ref 8.4–10.5)
CALCIUM SERPL-MCNC: 8.4 MG/DL — SIGNIFICANT CHANGE UP (ref 8.4–10.5)
CALCIUM SERPL-MCNC: 8.5 MG/DL — SIGNIFICANT CHANGE UP (ref 8.4–10.5)
CALCIUM SERPL-MCNC: 8.6 MG/DL — SIGNIFICANT CHANGE UP (ref 8.4–10.5)
CALCIUM SERPL-MCNC: 8.7 MG/DL — SIGNIFICANT CHANGE UP (ref 8.4–10.5)
CALCIUM SERPL-MCNC: 8.7 MG/DL — SIGNIFICANT CHANGE UP (ref 8.4–10.5)
CALCIUM SERPL-MCNC: 8.8 MG/DL — SIGNIFICANT CHANGE UP (ref 8.4–10.5)
CALCIUM SERPL-MCNC: 8.8 MG/DL — SIGNIFICANT CHANGE UP (ref 8.5–10.1)
CALCIUM SERPL-MCNC: 8.9 MG/DL — SIGNIFICANT CHANGE UP (ref 8.4–10.5)
CALCIUM SERPL-MCNC: 9 MG/DL — SIGNIFICANT CHANGE UP (ref 8.4–10.5)
CALCIUM SERPL-MCNC: 9.3 MG/DL — SIGNIFICANT CHANGE UP (ref 8.4–10.5)
CALCIUM SERPL-MCNC: <3 MG/DL (ref 8.5–10.1)
CHLORIDE SERPL-SCNC: 100 MMOL/L — SIGNIFICANT CHANGE UP (ref 98–107)
CHLORIDE SERPL-SCNC: 101 MMOL/L — SIGNIFICANT CHANGE UP (ref 98–107)
CHLORIDE SERPL-SCNC: 102 MMOL/L — SIGNIFICANT CHANGE UP (ref 98–107)
CHLORIDE SERPL-SCNC: 103 MMOL/L — SIGNIFICANT CHANGE UP (ref 96–108)
CHLORIDE SERPL-SCNC: 103 MMOL/L — SIGNIFICANT CHANGE UP (ref 98–107)
CHLORIDE SERPL-SCNC: 104 MMOL/L — SIGNIFICANT CHANGE UP (ref 98–107)
CHLORIDE SERPL-SCNC: 105 MMOL/L — SIGNIFICANT CHANGE UP (ref 98–107)
CHLORIDE SERPL-SCNC: 106 MMOL/L — SIGNIFICANT CHANGE UP (ref 96–108)
CHLORIDE SERPL-SCNC: 106 MMOL/L — SIGNIFICANT CHANGE UP (ref 98–107)
CHLORIDE SERPL-SCNC: 108 MMOL/L — SIGNIFICANT CHANGE UP (ref 96–108)
CHLORIDE SERPL-SCNC: 122 MMOL/L — HIGH (ref 96–108)
CHLORIDE SERPL-SCNC: 98 MMOL/L — SIGNIFICANT CHANGE UP (ref 98–107)
CHLORIDE SERPL-SCNC: 99 MMOL/L — SIGNIFICANT CHANGE UP (ref 98–107)
CHOLEST SERPL-MCNC: 150 MG/DL — SIGNIFICANT CHANGE UP
CO2 BLDA-SCNC: 15 MMOL/L — LOW (ref 19–24)
CO2 BLDA-SCNC: 18 MMOL/L — LOW (ref 19–24)
CO2 BLDA-SCNC: 19 MMOL/L — SIGNIFICANT CHANGE UP (ref 19–24)
CO2 BLDA-SCNC: 21 MMOL/L — SIGNIFICANT CHANGE UP (ref 19–24)
CO2 BLDA-SCNC: 22 MMOL/L — SIGNIFICANT CHANGE UP (ref 19–24)
CO2 BLDA-SCNC: 22 MMOL/L — SIGNIFICANT CHANGE UP (ref 19–24)
CO2 SERPL-SCNC: 12 MMOL/L — LOW (ref 22–31)
CO2 SERPL-SCNC: 13 MMOL/L — LOW (ref 22–31)
CO2 SERPL-SCNC: 13 MMOL/L — LOW (ref 22–31)
CO2 SERPL-SCNC: 14 MMOL/L — LOW (ref 22–31)
CO2 SERPL-SCNC: 15 MMOL/L — LOW (ref 22–31)
CO2 SERPL-SCNC: 16 MMOL/L — LOW (ref 22–31)
CO2 SERPL-SCNC: 17 MMOL/L — LOW (ref 22–31)
CO2 SERPL-SCNC: 18 MMOL/L — LOW (ref 22–31)
CO2 SERPL-SCNC: 19 MMOL/L — LOW (ref 22–31)
CO2 SERPL-SCNC: 20 MMOL/L — LOW (ref 22–31)
CO2 SERPL-SCNC: 21 MMOL/L — LOW (ref 22–31)
CO2 SERPL-SCNC: 22 MMOL/L — SIGNIFICANT CHANGE UP (ref 22–31)
CO2 SERPL-SCNC: 23 MMOL/L — SIGNIFICANT CHANGE UP (ref 22–31)
CO2 SERPL-SCNC: 23 MMOL/L — SIGNIFICANT CHANGE UP (ref 22–31)
CO2 SERPL-SCNC: 26 MMOL/L — SIGNIFICANT CHANGE UP (ref 22–31)
CO2 SERPL-SCNC: 8 MMOL/L — CRITICAL LOW (ref 22–31)
COLOR SPEC: YELLOW — SIGNIFICANT CHANGE UP
COLOR SPEC: YELLOW — SIGNIFICANT CHANGE UP
CREAT SERPL-MCNC: 0.55 MG/DL — SIGNIFICANT CHANGE UP (ref 0.5–1.3)
CREAT SERPL-MCNC: 0.6 MG/DL — SIGNIFICANT CHANGE UP (ref 0.5–1.3)
CREAT SERPL-MCNC: 0.6 MG/DL — SIGNIFICANT CHANGE UP (ref 0.5–1.3)
CREAT SERPL-MCNC: 0.62 MG/DL — SIGNIFICANT CHANGE UP (ref 0.5–1.3)
CREAT SERPL-MCNC: 0.63 MG/DL — SIGNIFICANT CHANGE UP (ref 0.5–1.3)
CREAT SERPL-MCNC: 0.67 MG/DL — SIGNIFICANT CHANGE UP (ref 0.5–1.3)
CREAT SERPL-MCNC: 0.67 MG/DL — SIGNIFICANT CHANGE UP (ref 0.5–1.3)
CREAT SERPL-MCNC: 0.7 MG/DL — SIGNIFICANT CHANGE UP (ref 0.5–1.3)
CREAT SERPL-MCNC: 0.71 MG/DL — SIGNIFICANT CHANGE UP (ref 0.5–1.3)
CREAT SERPL-MCNC: 0.72 MG/DL — SIGNIFICANT CHANGE UP (ref 0.5–1.3)
CREAT SERPL-MCNC: 0.73 MG/DL — SIGNIFICANT CHANGE UP (ref 0.5–1.3)
CREAT SERPL-MCNC: 0.77 MG/DL — SIGNIFICANT CHANGE UP (ref 0.5–1.3)
CREAT SERPL-MCNC: 0.78 MG/DL — SIGNIFICANT CHANGE UP (ref 0.5–1.3)
CREAT SERPL-MCNC: 0.8 MG/DL — SIGNIFICANT CHANGE UP (ref 0.5–1.3)
CREAT SERPL-MCNC: 0.8 MG/DL — SIGNIFICANT CHANGE UP (ref 0.5–1.3)
CREAT SERPL-MCNC: 0.82 MG/DL — SIGNIFICANT CHANGE UP (ref 0.5–1.3)
CREAT SERPL-MCNC: 0.84 MG/DL — SIGNIFICANT CHANGE UP (ref 0.5–1.3)
CREAT SERPL-MCNC: 0.87 MG/DL — SIGNIFICANT CHANGE UP (ref 0.5–1.3)
CREAT SERPL-MCNC: 0.88 MG/DL — SIGNIFICANT CHANGE UP (ref 0.5–1.3)
CREAT SERPL-MCNC: 0.93 MG/DL — SIGNIFICANT CHANGE UP (ref 0.5–1.3)
CREAT SERPL-MCNC: 0.99 MG/DL — SIGNIFICANT CHANGE UP (ref 0.5–1.3)
CREAT SERPL-MCNC: 0.99 MG/DL — SIGNIFICANT CHANGE UP (ref 0.5–1.3)
CREAT SERPL-MCNC: 1.02 MG/DL — SIGNIFICANT CHANGE UP (ref 0.5–1.3)
CREAT SERPL-MCNC: 1.05 MG/DL — SIGNIFICANT CHANGE UP (ref 0.5–1.3)
CREAT SERPL-MCNC: 1.07 MG/DL — SIGNIFICANT CHANGE UP (ref 0.5–1.3)
CREAT SERPL-MCNC: 1.08 MG/DL — SIGNIFICANT CHANGE UP (ref 0.5–1.3)
CREAT SERPL-MCNC: 1.1 MG/DL — SIGNIFICANT CHANGE UP (ref 0.5–1.3)
CREAT SERPL-MCNC: 1.22 MG/DL — SIGNIFICANT CHANGE UP (ref 0.5–1.3)
CREAT SERPL-MCNC: 1.24 MG/DL — SIGNIFICANT CHANGE UP (ref 0.5–1.3)
CREAT SERPL-MCNC: 1.28 MG/DL — SIGNIFICANT CHANGE UP (ref 0.5–1.3)
CREAT SERPL-MCNC: 1.31 MG/DL — HIGH (ref 0.5–1.3)
CREAT SERPL-MCNC: 1.31 MG/DL — HIGH (ref 0.5–1.3)
CREAT SERPL-MCNC: 1.35 MG/DL — HIGH (ref 0.5–1.3)
CREAT SERPL-MCNC: 1.38 MG/DL — HIGH (ref 0.5–1.3)
CREAT SERPL-MCNC: 1.39 MG/DL — HIGH (ref 0.5–1.3)
CREAT SERPL-MCNC: 1.41 MG/DL — HIGH (ref 0.5–1.3)
CREAT SERPL-MCNC: 1.41 MG/DL — HIGH (ref 0.5–1.3)
CREAT SERPL-MCNC: 1.42 MG/DL — HIGH (ref 0.5–1.3)
CREAT SERPL-MCNC: 1.44 MG/DL — HIGH (ref 0.5–1.3)
CREAT SERPL-MCNC: 1.45 MG/DL — HIGH (ref 0.5–1.3)
CREAT SERPL-MCNC: 1.5 MG/DL — HIGH (ref 0.5–1.3)
CREAT SERPL-MCNC: 1.51 MG/DL — HIGH (ref 0.5–1.3)
CREAT SERPL-MCNC: 1.51 MG/DL — HIGH (ref 0.5–1.3)
CREAT SERPL-MCNC: 1.55 MG/DL — HIGH (ref 0.5–1.3)
CREAT SERPL-MCNC: 1.55 MG/DL — HIGH (ref 0.5–1.3)
CREAT SERPL-MCNC: 1.65 MG/DL — HIGH (ref 0.5–1.3)
CREAT SERPL-MCNC: 1.67 MG/DL — HIGH (ref 0.5–1.3)
CREAT SERPL-MCNC: 1.71 MG/DL — HIGH (ref 0.5–1.3)
CREAT SERPL-MCNC: 1.73 MG/DL — HIGH (ref 0.5–1.3)
CREAT SERPL-MCNC: 1.75 MG/DL — HIGH (ref 0.5–1.3)
CREAT SERPL-MCNC: 1.79 MG/DL — HIGH (ref 0.5–1.3)
CREAT SERPL-MCNC: 1.8 MG/DL — HIGH (ref 0.5–1.3)
CREAT SERPL-MCNC: 1.89 MG/DL — HIGH (ref 0.5–1.3)
CREAT SERPL-MCNC: 1.89 MG/DL — HIGH (ref 0.5–1.3)
CREAT SERPL-MCNC: 1.91 MG/DL — HIGH (ref 0.5–1.3)
CREAT SERPL-MCNC: 2 MG/DL — HIGH (ref 0.5–1.3)
CREAT SERPL-MCNC: 2.05 MG/DL — HIGH (ref 0.5–1.3)
CREAT SERPL-MCNC: 2.06 MG/DL — HIGH (ref 0.5–1.3)
CREAT SERPL-MCNC: 2.07 MG/DL — HIGH (ref 0.5–1.3)
CREAT SERPL-MCNC: 2.1 MG/DL — HIGH (ref 0.5–1.3)
CREAT SERPL-MCNC: 2.15 MG/DL — HIGH (ref 0.5–1.3)
CREAT SERPL-MCNC: 2.28 MG/DL — HIGH (ref 0.5–1.3)
CREAT SERPL-MCNC: 2.5 MG/DL — HIGH (ref 0.5–1.3)
CREAT SERPL-MCNC: 2.52 MG/DL — HIGH (ref 0.5–1.3)
CREAT SERPL-MCNC: 2.54 MG/DL — HIGH (ref 0.5–1.3)
CREAT SERPL-MCNC: 2.59 MG/DL — HIGH (ref 0.5–1.3)
CREAT SERPL-MCNC: 2.62 MG/DL — HIGH (ref 0.5–1.3)
CREAT SERPL-MCNC: 2.63 MG/DL — HIGH (ref 0.5–1.3)
CREAT SERPL-MCNC: 2.63 MG/DL — HIGH (ref 0.5–1.3)
CREAT SERPL-MCNC: 2.73 MG/DL — HIGH (ref 0.5–1.3)
CREAT SERPL-MCNC: 2.77 MG/DL — HIGH (ref 0.5–1.3)
CREAT SERPL-MCNC: 2.87 MG/DL — HIGH (ref 0.5–1.3)
CREAT SERPL-MCNC: 2.87 MG/DL — HIGH (ref 0.5–1.3)
CREAT SERPL-MCNC: 3 MG/DL — HIGH (ref 0.5–1.3)
CREAT SERPL-MCNC: 3.02 MG/DL — HIGH (ref 0.5–1.3)
CREAT SERPL-MCNC: 3.12 MG/DL — HIGH (ref 0.5–1.3)
CREAT SERPL-MCNC: 3.18 MG/DL — HIGH (ref 0.5–1.3)
CREAT SERPL-MCNC: 3.2 MG/DL — HIGH (ref 0.5–1.3)
CREAT SERPL-MCNC: 3.21 MG/DL — HIGH (ref 0.5–1.3)
CREAT SERPL-MCNC: 3.51 MG/DL — HIGH (ref 0.5–1.3)
CREAT SERPL-MCNC: 3.84 MG/DL — HIGH (ref 0.5–1.3)
CREAT SERPL-MCNC: 3.86 MG/DL — HIGH (ref 0.5–1.3)
CREAT SERPL-MCNC: 3.93 MG/DL — HIGH (ref 0.5–1.3)
CREAT SERPL-MCNC: 4.27 MG/DL — HIGH (ref 0.5–1.3)
CREAT SERPL-MCNC: 4.6 MG/DL — HIGH (ref 0.5–1.3)
CULTURE RESULTS: SIGNIFICANT CHANGE UP
DIALYSIS INSTRUMENT RESULT - HEPATITIS B SURFACE ANTIGEN: NEGATIVE — SIGNIFICANT CHANGE UP
DIFF PNL FLD: ABNORMAL
DIFF PNL FLD: NEGATIVE — SIGNIFICANT CHANGE UP
EGFR: 100 ML/MIN/1.73M2 — SIGNIFICANT CHANGE UP
EGFR: 102 ML/MIN/1.73M2 — SIGNIFICANT CHANGE UP
EGFR: 104 ML/MIN/1.73M2 — SIGNIFICANT CHANGE UP
EGFR: 105 ML/MIN/1.73M2 — SIGNIFICANT CHANGE UP
EGFR: 106 ML/MIN/1.73M2 — SIGNIFICANT CHANGE UP
EGFR: 106 ML/MIN/1.73M2 — SIGNIFICANT CHANGE UP
EGFR: 108 ML/MIN/1.73M2 — SIGNIFICANT CHANGE UP
EGFR: 108 ML/MIN/1.73M2 — SIGNIFICANT CHANGE UP
EGFR: 109 ML/MIN/1.73M2 — SIGNIFICANT CHANGE UP
EGFR: 109 ML/MIN/1.73M2 — SIGNIFICANT CHANGE UP
EGFR: 11 ML/MIN/1.73M2 — LOW
EGFR: 112 ML/MIN/1.73M2 — SIGNIFICANT CHANGE UP
EGFR: 12 ML/MIN/1.73M2 — LOW
EGFR: 13 ML/MIN/1.73M2 — LOW
EGFR: 14 ML/MIN/1.73M2 — LOW
EGFR: 14 ML/MIN/1.73M2 — LOW
EGFR: 15 ML/MIN/1.73M2 — LOW
EGFR: 17 ML/MIN/1.73M2 — LOW
EGFR: 18 ML/MIN/1.73M2 — LOW
EGFR: 19 ML/MIN/1.73M2 — LOW
EGFR: 19 ML/MIN/1.73M2 — LOW
EGFR: 20 ML/MIN/1.73M2 — LOW
EGFR: 21 ML/MIN/1.73M2 — LOW
EGFR: 22 ML/MIN/1.73M2 — LOW
EGFR: 23 ML/MIN/1.73M2 — LOW
EGFR: 23 ML/MIN/1.73M2 — LOW
EGFR: 26 ML/MIN/1.73M2 — LOW
EGFR: 27 ML/MIN/1.73M2 — LOW
EGFR: 28 ML/MIN/1.73M2 — LOW
EGFR: 29 ML/MIN/1.73M2 — LOW
EGFR: 30 ML/MIN/1.73M2 — LOW
EGFR: 32 ML/MIN/1.73M2 — LOW
EGFR: 34 ML/MIN/1.73M2 — LOW
EGFR: 34 ML/MIN/1.73M2 — LOW
EGFR: 35 ML/MIN/1.73M2 — LOW
EGFR: 36 ML/MIN/1.73M2 — LOW
EGFR: 36 ML/MIN/1.73M2 — LOW
EGFR: 37 ML/MIN/1.73M2 — LOW
EGFR: 38 ML/MIN/1.73M2 — LOW
EGFR: 41 ML/MIN/1.73M2 — LOW
EGFR: 41 ML/MIN/1.73M2 — LOW
EGFR: 42 ML/MIN/1.73M2 — LOW
EGFR: 44 ML/MIN/1.73M2 — LOW
EGFR: 44 ML/MIN/1.73M2 — LOW
EGFR: 45 ML/MIN/1.73M2 — LOW
EGFR: 46 ML/MIN/1.73M2 — LOW
EGFR: 47 ML/MIN/1.73M2 — LOW
EGFR: 48 ML/MIN/1.73M2 — LOW
EGFR: 50 ML/MIN/1.73M2 — LOW
EGFR: 50 ML/MIN/1.73M2 — LOW
EGFR: 51 ML/MIN/1.73M2 — LOW
EGFR: 53 ML/MIN/1.73M2 — LOW
EGFR: 54 ML/MIN/1.73M2 — LOW
EGFR: 61 ML/MIN/1.73M2 — SIGNIFICANT CHANGE UP
EGFR: 63 ML/MIN/1.73M2 — SIGNIFICANT CHANGE UP
EGFR: 63 ML/MIN/1.73M2 — SIGNIFICANT CHANGE UP
EGFR: 65 ML/MIN/1.73M2 — SIGNIFICANT CHANGE UP
EGFR: 67 ML/MIN/1.73M2 — SIGNIFICANT CHANGE UP
EGFR: 69 ML/MIN/1.73M2 — SIGNIFICANT CHANGE UP
EGFR: 69 ML/MIN/1.73M2 — SIGNIFICANT CHANGE UP
EGFR: 75 ML/MIN/1.73M2 — SIGNIFICANT CHANGE UP
EGFR: 80 ML/MIN/1.73M2 — SIGNIFICANT CHANGE UP
EGFR: 81 ML/MIN/1.73M2 — SIGNIFICANT CHANGE UP
EGFR: 85 ML/MIN/1.73M2 — SIGNIFICANT CHANGE UP
EGFR: 87 ML/MIN/1.73M2 — SIGNIFICANT CHANGE UP
EGFR: 90 ML/MIN/1.73M2 — SIGNIFICANT CHANGE UP
EGFR: 90 ML/MIN/1.73M2 — SIGNIFICANT CHANGE UP
EGFR: 92 ML/MIN/1.73M2 — SIGNIFICANT CHANGE UP
EGFR: 94 ML/MIN/1.73M2 — SIGNIFICANT CHANGE UP
EOSINOPHIL # BLD AUTO: 0 K/UL — SIGNIFICANT CHANGE UP (ref 0–0.5)
EOSINOPHIL # BLD AUTO: 0.01 K/UL — SIGNIFICANT CHANGE UP (ref 0–0.5)
EOSINOPHIL # BLD AUTO: 0.25 K/UL — SIGNIFICANT CHANGE UP (ref 0–0.5)
EOSINOPHIL NFR BLD AUTO: 0 % — SIGNIFICANT CHANGE UP (ref 0–6)
EOSINOPHIL NFR BLD AUTO: 0.1 % — SIGNIFICANT CHANGE UP (ref 0–6)
EOSINOPHIL NFR BLD AUTO: 1.8 % — SIGNIFICANT CHANGE UP (ref 0–6)
EPI CELLS # UR: ABNORMAL
EPI CELLS # UR: ABNORMAL
ESTIMATED AVERAGE GLUCOSE: 123 — SIGNIFICANT CHANGE UP
ESTIMATED AVERAGE GLUCOSE: 128 MG/DL — HIGH (ref 68–114)
ETHANOL SERPL-MCNC: <10 MG/DL — SIGNIFICANT CHANGE UP (ref 0–10)
FUNGITELL: SIGNIFICANT CHANGE UP PG/ML
GAS PNL BLDA: SIGNIFICANT CHANGE UP
GIANT PLATELETS BLD QL SMEAR: PRESENT — SIGNIFICANT CHANGE UP
GLUCOSE BLDC GLUCOMTR-MCNC: 101 MG/DL — HIGH (ref 70–99)
GLUCOSE BLDC GLUCOMTR-MCNC: 101 MG/DL — HIGH (ref 70–99)
GLUCOSE BLDC GLUCOMTR-MCNC: 103 MG/DL — HIGH (ref 70–99)
GLUCOSE BLDC GLUCOMTR-MCNC: 107 MG/DL — HIGH (ref 70–99)
GLUCOSE BLDC GLUCOMTR-MCNC: 108 MG/DL — HIGH (ref 70–99)
GLUCOSE BLDC GLUCOMTR-MCNC: 110 MG/DL — HIGH (ref 70–99)
GLUCOSE BLDC GLUCOMTR-MCNC: 110 MG/DL — HIGH (ref 70–99)
GLUCOSE BLDC GLUCOMTR-MCNC: 113 MG/DL — HIGH (ref 70–99)
GLUCOSE BLDC GLUCOMTR-MCNC: 114 MG/DL — HIGH (ref 70–99)
GLUCOSE BLDC GLUCOMTR-MCNC: 114 MG/DL — HIGH (ref 70–99)
GLUCOSE BLDC GLUCOMTR-MCNC: 116 MG/DL — HIGH (ref 70–99)
GLUCOSE BLDC GLUCOMTR-MCNC: 116 MG/DL — HIGH (ref 70–99)
GLUCOSE BLDC GLUCOMTR-MCNC: 117 MG/DL — HIGH (ref 70–99)
GLUCOSE BLDC GLUCOMTR-MCNC: 118 MG/DL — HIGH (ref 70–99)
GLUCOSE BLDC GLUCOMTR-MCNC: 118 MG/DL — HIGH (ref 70–99)
GLUCOSE BLDC GLUCOMTR-MCNC: 121 MG/DL — HIGH (ref 70–99)
GLUCOSE BLDC GLUCOMTR-MCNC: 121 MG/DL — HIGH (ref 70–99)
GLUCOSE BLDC GLUCOMTR-MCNC: 122 MG/DL — HIGH (ref 70–99)
GLUCOSE BLDC GLUCOMTR-MCNC: 123 MG/DL — HIGH (ref 70–99)
GLUCOSE BLDC GLUCOMTR-MCNC: 124 MG/DL — HIGH (ref 70–99)
GLUCOSE BLDC GLUCOMTR-MCNC: 124 MG/DL — HIGH (ref 70–99)
GLUCOSE BLDC GLUCOMTR-MCNC: 125 MG/DL — HIGH (ref 70–99)
GLUCOSE BLDC GLUCOMTR-MCNC: 127 MG/DL — HIGH (ref 70–99)
GLUCOSE BLDC GLUCOMTR-MCNC: 127 MG/DL — HIGH (ref 70–99)
GLUCOSE BLDC GLUCOMTR-MCNC: 129 MG/DL — HIGH (ref 70–99)
GLUCOSE BLDC GLUCOMTR-MCNC: 130 MG/DL — HIGH (ref 70–99)
GLUCOSE BLDC GLUCOMTR-MCNC: 131 MG/DL — HIGH (ref 70–99)
GLUCOSE BLDC GLUCOMTR-MCNC: 131 MG/DL — HIGH (ref 70–99)
GLUCOSE BLDC GLUCOMTR-MCNC: 132 MG/DL — HIGH (ref 70–99)
GLUCOSE BLDC GLUCOMTR-MCNC: 132 MG/DL — HIGH (ref 70–99)
GLUCOSE BLDC GLUCOMTR-MCNC: 133 MG/DL — HIGH (ref 70–99)
GLUCOSE BLDC GLUCOMTR-MCNC: 134 MG/DL — HIGH (ref 70–99)
GLUCOSE BLDC GLUCOMTR-MCNC: 134 MG/DL — HIGH (ref 70–99)
GLUCOSE BLDC GLUCOMTR-MCNC: 135 MG/DL — HIGH (ref 70–99)
GLUCOSE BLDC GLUCOMTR-MCNC: 136 MG/DL — HIGH (ref 70–99)
GLUCOSE BLDC GLUCOMTR-MCNC: 136 MG/DL — HIGH (ref 70–99)
GLUCOSE BLDC GLUCOMTR-MCNC: 137 MG/DL — HIGH (ref 70–99)
GLUCOSE BLDC GLUCOMTR-MCNC: 137 MG/DL — HIGH (ref 70–99)
GLUCOSE BLDC GLUCOMTR-MCNC: 139 MG/DL — HIGH (ref 70–99)
GLUCOSE BLDC GLUCOMTR-MCNC: 139 MG/DL — HIGH (ref 70–99)
GLUCOSE BLDC GLUCOMTR-MCNC: 140 MG/DL — HIGH (ref 70–99)
GLUCOSE BLDC GLUCOMTR-MCNC: 140 MG/DL — HIGH (ref 70–99)
GLUCOSE BLDC GLUCOMTR-MCNC: 141 MG/DL — HIGH (ref 70–99)
GLUCOSE BLDC GLUCOMTR-MCNC: 143 MG/DL — HIGH (ref 70–99)
GLUCOSE BLDC GLUCOMTR-MCNC: 143 MG/DL — HIGH (ref 70–99)
GLUCOSE BLDC GLUCOMTR-MCNC: 144 MG/DL — HIGH (ref 70–99)
GLUCOSE BLDC GLUCOMTR-MCNC: 145 MG/DL — HIGH (ref 70–99)
GLUCOSE BLDC GLUCOMTR-MCNC: 146 MG/DL — HIGH (ref 70–99)
GLUCOSE BLDC GLUCOMTR-MCNC: 147 MG/DL — HIGH (ref 70–99)
GLUCOSE BLDC GLUCOMTR-MCNC: 147 MG/DL — HIGH (ref 70–99)
GLUCOSE BLDC GLUCOMTR-MCNC: 149 MG/DL — HIGH (ref 70–99)
GLUCOSE BLDC GLUCOMTR-MCNC: 149 MG/DL — HIGH (ref 70–99)
GLUCOSE BLDC GLUCOMTR-MCNC: 150 MG/DL — HIGH (ref 70–99)
GLUCOSE BLDC GLUCOMTR-MCNC: 151 MG/DL — HIGH (ref 70–99)
GLUCOSE BLDC GLUCOMTR-MCNC: 152 MG/DL — HIGH (ref 70–99)
GLUCOSE BLDC GLUCOMTR-MCNC: 152 MG/DL — HIGH (ref 70–99)
GLUCOSE BLDC GLUCOMTR-MCNC: 153 MG/DL — HIGH (ref 70–99)
GLUCOSE BLDC GLUCOMTR-MCNC: 154 MG/DL — HIGH (ref 70–99)
GLUCOSE BLDC GLUCOMTR-MCNC: 155 MG/DL — HIGH (ref 70–99)
GLUCOSE BLDC GLUCOMTR-MCNC: 159 MG/DL — HIGH (ref 70–99)
GLUCOSE BLDC GLUCOMTR-MCNC: 159 MG/DL — HIGH (ref 70–99)
GLUCOSE BLDC GLUCOMTR-MCNC: 160 MG/DL — HIGH (ref 70–99)
GLUCOSE BLDC GLUCOMTR-MCNC: 160 MG/DL — HIGH (ref 70–99)
GLUCOSE BLDC GLUCOMTR-MCNC: 163 MG/DL — HIGH (ref 70–99)
GLUCOSE BLDC GLUCOMTR-MCNC: 163 MG/DL — HIGH (ref 70–99)
GLUCOSE BLDC GLUCOMTR-MCNC: 165 MG/DL — HIGH (ref 70–99)
GLUCOSE BLDC GLUCOMTR-MCNC: 167 MG/DL — HIGH (ref 70–99)
GLUCOSE BLDC GLUCOMTR-MCNC: 169 MG/DL — HIGH (ref 70–99)
GLUCOSE BLDC GLUCOMTR-MCNC: 170 MG/DL — HIGH (ref 70–99)
GLUCOSE BLDC GLUCOMTR-MCNC: 171 MG/DL — HIGH (ref 70–99)
GLUCOSE BLDC GLUCOMTR-MCNC: 172 MG/DL — HIGH (ref 70–99)
GLUCOSE BLDC GLUCOMTR-MCNC: 175 MG/DL — HIGH (ref 70–99)
GLUCOSE BLDC GLUCOMTR-MCNC: 176 MG/DL — HIGH (ref 70–99)
GLUCOSE BLDC GLUCOMTR-MCNC: 177 MG/DL — HIGH (ref 70–99)
GLUCOSE BLDC GLUCOMTR-MCNC: 178 MG/DL — HIGH (ref 70–99)
GLUCOSE BLDC GLUCOMTR-MCNC: 178 MG/DL — HIGH (ref 70–99)
GLUCOSE BLDC GLUCOMTR-MCNC: 182 MG/DL — HIGH (ref 70–99)
GLUCOSE BLDC GLUCOMTR-MCNC: 186 MG/DL — HIGH (ref 70–99)
GLUCOSE BLDC GLUCOMTR-MCNC: 187 MG/DL — HIGH (ref 70–99)
GLUCOSE BLDC GLUCOMTR-MCNC: 189 MG/DL — HIGH (ref 70–99)
GLUCOSE BLDC GLUCOMTR-MCNC: 190 MG/DL — HIGH (ref 70–99)
GLUCOSE BLDC GLUCOMTR-MCNC: 191 MG/DL — HIGH (ref 70–99)
GLUCOSE BLDC GLUCOMTR-MCNC: 196 MG/DL — HIGH (ref 70–99)
GLUCOSE BLDC GLUCOMTR-MCNC: 197 MG/DL — HIGH (ref 70–99)
GLUCOSE BLDC GLUCOMTR-MCNC: 200 MG/DL — HIGH (ref 70–99)
GLUCOSE BLDC GLUCOMTR-MCNC: 200 MG/DL — HIGH (ref 70–99)
GLUCOSE BLDC GLUCOMTR-MCNC: 201 MG/DL — HIGH (ref 70–99)
GLUCOSE BLDC GLUCOMTR-MCNC: 202 MG/DL — HIGH (ref 70–99)
GLUCOSE BLDC GLUCOMTR-MCNC: 203 MG/DL — HIGH (ref 70–99)
GLUCOSE BLDC GLUCOMTR-MCNC: 203 MG/DL — HIGH (ref 70–99)
GLUCOSE BLDC GLUCOMTR-MCNC: 206 MG/DL — HIGH (ref 70–99)
GLUCOSE BLDC GLUCOMTR-MCNC: 208 MG/DL — HIGH (ref 70–99)
GLUCOSE BLDC GLUCOMTR-MCNC: 212 MG/DL — HIGH (ref 70–99)
GLUCOSE BLDC GLUCOMTR-MCNC: 214 MG/DL — HIGH (ref 70–99)
GLUCOSE BLDC GLUCOMTR-MCNC: 216 MG/DL — HIGH (ref 70–99)
GLUCOSE BLDC GLUCOMTR-MCNC: 217 MG/DL — HIGH (ref 70–99)
GLUCOSE BLDC GLUCOMTR-MCNC: 219 MG/DL — HIGH (ref 70–99)
GLUCOSE BLDC GLUCOMTR-MCNC: 220 MG/DL — HIGH (ref 70–99)
GLUCOSE BLDC GLUCOMTR-MCNC: 222 MG/DL — HIGH (ref 70–99)
GLUCOSE BLDC GLUCOMTR-MCNC: 222 MG/DL — HIGH (ref 70–99)
GLUCOSE BLDC GLUCOMTR-MCNC: 226 MG/DL — HIGH (ref 70–99)
GLUCOSE BLDC GLUCOMTR-MCNC: 227 MG/DL — HIGH (ref 70–99)
GLUCOSE BLDC GLUCOMTR-MCNC: 231 MG/DL — HIGH (ref 70–99)
GLUCOSE BLDC GLUCOMTR-MCNC: 231 MG/DL — HIGH (ref 70–99)
GLUCOSE BLDC GLUCOMTR-MCNC: 236 MG/DL — HIGH (ref 70–99)
GLUCOSE BLDC GLUCOMTR-MCNC: 239 MG/DL — HIGH (ref 70–99)
GLUCOSE BLDC GLUCOMTR-MCNC: 245 MG/DL — HIGH (ref 70–99)
GLUCOSE BLDC GLUCOMTR-MCNC: 248 MG/DL — HIGH (ref 70–99)
GLUCOSE BLDC GLUCOMTR-MCNC: 250 MG/DL — HIGH (ref 70–99)
GLUCOSE BLDC GLUCOMTR-MCNC: 251 MG/DL — HIGH (ref 70–99)
GLUCOSE BLDC GLUCOMTR-MCNC: 257 MG/DL — HIGH (ref 70–99)
GLUCOSE BLDC GLUCOMTR-MCNC: 268 MG/DL — HIGH (ref 70–99)
GLUCOSE BLDC GLUCOMTR-MCNC: 275 MG/DL — HIGH (ref 70–99)
GLUCOSE BLDC GLUCOMTR-MCNC: 293 MG/DL — HIGH (ref 70–99)
GLUCOSE BLDC GLUCOMTR-MCNC: 308 MG/DL — HIGH (ref 70–99)
GLUCOSE BLDC GLUCOMTR-MCNC: 375 MG/DL — HIGH (ref 70–99)
GLUCOSE BLDC GLUCOMTR-MCNC: 384 MG/DL — HIGH (ref 70–99)
GLUCOSE BLDC GLUCOMTR-MCNC: 440 MG/DL — HIGH (ref 70–99)
GLUCOSE BLDC GLUCOMTR-MCNC: 46 MG/DL — CRITICAL LOW (ref 70–99)
GLUCOSE BLDC GLUCOMTR-MCNC: 55 MG/DL — LOW (ref 70–99)
GLUCOSE BLDC GLUCOMTR-MCNC: 56 MG/DL — LOW (ref 70–99)
GLUCOSE BLDC GLUCOMTR-MCNC: 64 MG/DL — LOW (ref 70–99)
GLUCOSE BLDC GLUCOMTR-MCNC: 66 MG/DL — LOW (ref 70–99)
GLUCOSE BLDC GLUCOMTR-MCNC: 68 MG/DL — LOW (ref 70–99)
GLUCOSE BLDC GLUCOMTR-MCNC: 75 MG/DL — SIGNIFICANT CHANGE UP (ref 70–99)
GLUCOSE BLDC GLUCOMTR-MCNC: 79 MG/DL — SIGNIFICANT CHANGE UP (ref 70–99)
GLUCOSE BLDC GLUCOMTR-MCNC: 81 MG/DL — SIGNIFICANT CHANGE UP (ref 70–99)
GLUCOSE BLDC GLUCOMTR-MCNC: 81 MG/DL — SIGNIFICANT CHANGE UP (ref 70–99)
GLUCOSE BLDC GLUCOMTR-MCNC: 84 MG/DL — SIGNIFICANT CHANGE UP (ref 70–99)
GLUCOSE BLDC GLUCOMTR-MCNC: 86 MG/DL — SIGNIFICANT CHANGE UP (ref 70–99)
GLUCOSE BLDC GLUCOMTR-MCNC: 88 MG/DL — SIGNIFICANT CHANGE UP (ref 70–99)
GLUCOSE BLDC GLUCOMTR-MCNC: 89 MG/DL — SIGNIFICANT CHANGE UP (ref 70–99)
GLUCOSE BLDC GLUCOMTR-MCNC: 93 MG/DL — SIGNIFICANT CHANGE UP (ref 70–99)
GLUCOSE BLDC GLUCOMTR-MCNC: 96 MG/DL — SIGNIFICANT CHANGE UP (ref 70–99)
GLUCOSE BLDC GLUCOMTR-MCNC: 97 MG/DL — SIGNIFICANT CHANGE UP (ref 70–99)
GLUCOSE BLDC GLUCOMTR-MCNC: 98 MG/DL — SIGNIFICANT CHANGE UP (ref 70–99)
GLUCOSE BLDC GLUCOMTR-MCNC: 99 MG/DL — SIGNIFICANT CHANGE UP (ref 70–99)
GLUCOSE FLD-MCNC: <5 MG/DL — SIGNIFICANT CHANGE UP
GLUCOSE SERPL-MCNC: 103 MG/DL — HIGH (ref 70–99)
GLUCOSE SERPL-MCNC: 104 MG/DL — HIGH (ref 70–99)
GLUCOSE SERPL-MCNC: 107 MG/DL — HIGH (ref 70–99)
GLUCOSE SERPL-MCNC: 110 MG/DL — HIGH (ref 70–99)
GLUCOSE SERPL-MCNC: 112 MG/DL — HIGH (ref 70–99)
GLUCOSE SERPL-MCNC: 115 MG/DL — HIGH (ref 70–99)
GLUCOSE SERPL-MCNC: 118 MG/DL — HIGH (ref 70–99)
GLUCOSE SERPL-MCNC: 118 MG/DL — HIGH (ref 70–99)
GLUCOSE SERPL-MCNC: 120 MG/DL — HIGH (ref 70–99)
GLUCOSE SERPL-MCNC: 121 MG/DL — HIGH (ref 70–99)
GLUCOSE SERPL-MCNC: 122 MG/DL — HIGH (ref 70–99)
GLUCOSE SERPL-MCNC: 124 MG/DL — HIGH (ref 70–99)
GLUCOSE SERPL-MCNC: 125 MG/DL — HIGH (ref 70–99)
GLUCOSE SERPL-MCNC: 126 MG/DL — HIGH (ref 70–99)
GLUCOSE SERPL-MCNC: 127 MG/DL — HIGH (ref 70–99)
GLUCOSE SERPL-MCNC: 128 MG/DL — HIGH (ref 70–99)
GLUCOSE SERPL-MCNC: 128 MG/DL — HIGH (ref 70–99)
GLUCOSE SERPL-MCNC: 129 MG/DL — HIGH (ref 70–99)
GLUCOSE SERPL-MCNC: 129 MG/DL — HIGH (ref 70–99)
GLUCOSE SERPL-MCNC: 131 MG/DL — HIGH (ref 70–99)
GLUCOSE SERPL-MCNC: 131 MG/DL — HIGH (ref 70–99)
GLUCOSE SERPL-MCNC: 133 MG/DL — HIGH (ref 70–99)
GLUCOSE SERPL-MCNC: 135 MG/DL — HIGH (ref 70–99)
GLUCOSE SERPL-MCNC: 135 MG/DL — HIGH (ref 70–99)
GLUCOSE SERPL-MCNC: 136 MG/DL — HIGH (ref 70–99)
GLUCOSE SERPL-MCNC: 141 MG/DL — HIGH (ref 70–99)
GLUCOSE SERPL-MCNC: 143 MG/DL — HIGH (ref 70–99)
GLUCOSE SERPL-MCNC: 145 MG/DL — HIGH (ref 70–99)
GLUCOSE SERPL-MCNC: 145 MG/DL — HIGH (ref 70–99)
GLUCOSE SERPL-MCNC: 148 MG/DL — HIGH (ref 70–99)
GLUCOSE SERPL-MCNC: 148 MG/DL — HIGH (ref 70–99)
GLUCOSE SERPL-MCNC: 149 MG/DL — HIGH (ref 70–99)
GLUCOSE SERPL-MCNC: 149 MG/DL — HIGH (ref 70–99)
GLUCOSE SERPL-MCNC: 151 MG/DL — HIGH (ref 70–99)
GLUCOSE SERPL-MCNC: 152 MG/DL — HIGH (ref 70–99)
GLUCOSE SERPL-MCNC: 152 MG/DL — HIGH (ref 70–99)
GLUCOSE SERPL-MCNC: 156 MG/DL — HIGH (ref 70–99)
GLUCOSE SERPL-MCNC: 156 MG/DL — HIGH (ref 70–99)
GLUCOSE SERPL-MCNC: 158 MG/DL — HIGH (ref 70–99)
GLUCOSE SERPL-MCNC: 158 MG/DL — HIGH (ref 70–99)
GLUCOSE SERPL-MCNC: 159 MG/DL — HIGH (ref 70–99)
GLUCOSE SERPL-MCNC: 160 MG/DL — HIGH (ref 70–99)
GLUCOSE SERPL-MCNC: 168 MG/DL — HIGH (ref 70–99)
GLUCOSE SERPL-MCNC: 168 MG/DL — HIGH (ref 70–99)
GLUCOSE SERPL-MCNC: 171 MG/DL — HIGH (ref 70–99)
GLUCOSE SERPL-MCNC: 171 MG/DL — HIGH (ref 70–99)
GLUCOSE SERPL-MCNC: 173 MG/DL — HIGH (ref 70–99)
GLUCOSE SERPL-MCNC: 173 MG/DL — HIGH (ref 70–99)
GLUCOSE SERPL-MCNC: 174 MG/DL — HIGH (ref 70–99)
GLUCOSE SERPL-MCNC: 175 MG/DL — HIGH (ref 70–99)
GLUCOSE SERPL-MCNC: 177 MG/DL — HIGH (ref 70–99)
GLUCOSE SERPL-MCNC: 179 MG/DL — HIGH (ref 70–99)
GLUCOSE SERPL-MCNC: 180 MG/DL — HIGH (ref 70–99)
GLUCOSE SERPL-MCNC: 182 MG/DL — HIGH (ref 70–99)
GLUCOSE SERPL-MCNC: 184 MG/DL — HIGH (ref 70–99)
GLUCOSE SERPL-MCNC: 185 MG/DL — HIGH (ref 70–99)
GLUCOSE SERPL-MCNC: 186 MG/DL — HIGH (ref 70–99)
GLUCOSE SERPL-MCNC: 187 MG/DL — HIGH (ref 70–99)
GLUCOSE SERPL-MCNC: 196 MG/DL — HIGH (ref 70–99)
GLUCOSE SERPL-MCNC: 196 MG/DL — HIGH (ref 70–99)
GLUCOSE SERPL-MCNC: 199 MG/DL — HIGH (ref 70–99)
GLUCOSE SERPL-MCNC: 200 MG/DL — HIGH (ref 70–99)
GLUCOSE SERPL-MCNC: 202 MG/DL — HIGH (ref 70–99)
GLUCOSE SERPL-MCNC: 203 MG/DL — HIGH (ref 70–99)
GLUCOSE SERPL-MCNC: 204 MG/DL — HIGH (ref 70–99)
GLUCOSE SERPL-MCNC: 207 MG/DL — HIGH (ref 70–99)
GLUCOSE SERPL-MCNC: 211 MG/DL — HIGH (ref 70–99)
GLUCOSE SERPL-MCNC: 211 MG/DL — HIGH (ref 70–99)
GLUCOSE SERPL-MCNC: 213 MG/DL — HIGH (ref 70–99)
GLUCOSE SERPL-MCNC: 216 MG/DL — HIGH (ref 70–99)
GLUCOSE SERPL-MCNC: 217 MG/DL — HIGH (ref 70–99)
GLUCOSE SERPL-MCNC: 217 MG/DL — HIGH (ref 70–99)
GLUCOSE SERPL-MCNC: 218 MG/DL — HIGH (ref 70–99)
GLUCOSE SERPL-MCNC: 219 MG/DL — HIGH (ref 70–99)
GLUCOSE SERPL-MCNC: 222 MG/DL — HIGH (ref 70–99)
GLUCOSE SERPL-MCNC: 235 MG/DL — HIGH (ref 70–99)
GLUCOSE SERPL-MCNC: 242 MG/DL — HIGH (ref 70–99)
GLUCOSE SERPL-MCNC: 244 MG/DL — HIGH (ref 70–99)
GLUCOSE SERPL-MCNC: 289 MG/DL — HIGH (ref 70–99)
GLUCOSE SERPL-MCNC: 291 MG/DL — HIGH (ref 70–99)
GLUCOSE SERPL-MCNC: 293 MG/DL — HIGH (ref 70–99)
GLUCOSE SERPL-MCNC: 375 MG/DL — HIGH (ref 70–99)
GLUCOSE SERPL-MCNC: 381 MG/DL — HIGH (ref 70–99)
GLUCOSE SERPL-MCNC: 404 MG/DL — HIGH (ref 70–99)
GLUCOSE SERPL-MCNC: 66 MG/DL — LOW (ref 70–99)
GLUCOSE SERPL-MCNC: 84 MG/DL — SIGNIFICANT CHANGE UP (ref 70–99)
GLUCOSE SERPL-MCNC: 85 MG/DL — SIGNIFICANT CHANGE UP (ref 70–99)
GLUCOSE SERPL-MCNC: 98 MG/DL — SIGNIFICANT CHANGE UP (ref 70–99)
GLUCOSE UR QL: 250 MG/DL
GLUCOSE UR QL: 250 MG/DL
GRAM STN FLD: SIGNIFICANT CHANGE UP
HAV IGM SER-ACNC: SIGNIFICANT CHANGE UP
HBV CORE AB SER-ACNC: SIGNIFICANT CHANGE UP
HBV CORE IGM SER-ACNC: SIGNIFICANT CHANGE UP
HBV SURFACE AB SER-ACNC: <3 MIU/ML — LOW
HBV SURFACE AG SER-ACNC: SIGNIFICANT CHANGE UP
HCG SERPL-ACNC: <1 MIU/ML — SIGNIFICANT CHANGE UP
HCO3 BLDA-SCNC: 14 MMOL/L — LOW (ref 21–28)
HCO3 BLDA-SCNC: 17 MMOL/L — LOW (ref 21–28)
HCO3 BLDA-SCNC: 18 MMOL/L — LOW (ref 21–28)
HCO3 BLDA-SCNC: 20 MMOL/L — LOW (ref 21–28)
HCO3 BLDA-SCNC: 21 MMOL/L — SIGNIFICANT CHANGE UP (ref 21–28)
HCO3 BLDA-SCNC: 21 MMOL/L — SIGNIFICANT CHANGE UP (ref 21–28)
HCT VFR BLD CALC: 19.4 % — CRITICAL LOW (ref 34.5–45)
HCT VFR BLD CALC: 20.5 % — CRITICAL LOW (ref 34.5–45)
HCT VFR BLD CALC: 20.8 % — CRITICAL LOW (ref 34.5–45)
HCT VFR BLD CALC: 20.8 % — CRITICAL LOW (ref 34.5–45)
HCT VFR BLD CALC: 20.9 % — CRITICAL LOW (ref 34.5–45)
HCT VFR BLD CALC: 20.9 % — CRITICAL LOW (ref 34.5–45)
HCT VFR BLD CALC: 21.1 % — LOW (ref 34.5–45)
HCT VFR BLD CALC: 21.1 % — LOW (ref 34.5–45)
HCT VFR BLD CALC: 21.3 % — LOW (ref 34.5–45)
HCT VFR BLD CALC: 21.4 % — LOW (ref 34.5–45)
HCT VFR BLD CALC: 21.4 % — LOW (ref 34.5–45)
HCT VFR BLD CALC: 21.6 % — LOW (ref 34.5–45)
HCT VFR BLD CALC: 22 % — LOW (ref 34.5–45)
HCT VFR BLD CALC: 22.1 % — LOW (ref 34.5–45)
HCT VFR BLD CALC: 22.3 % — LOW (ref 34.5–45)
HCT VFR BLD CALC: 22.4 % — LOW (ref 34.5–45)
HCT VFR BLD CALC: 22.6 % — LOW (ref 34.5–45)
HCT VFR BLD CALC: 22.6 % — LOW (ref 34.5–45)
HCT VFR BLD CALC: 22.7 % — LOW (ref 34.5–45)
HCT VFR BLD CALC: 22.8 % — LOW (ref 34.5–45)
HCT VFR BLD CALC: 23 % — LOW (ref 34.5–45)
HCT VFR BLD CALC: 23.1 % — LOW (ref 34.5–45)
HCT VFR BLD CALC: 23.2 % — LOW (ref 34.5–45)
HCT VFR BLD CALC: 23.4 % — LOW (ref 34.5–45)
HCT VFR BLD CALC: 23.5 % — LOW (ref 34.5–45)
HCT VFR BLD CALC: 23.5 % — LOW (ref 34.5–45)
HCT VFR BLD CALC: 23.6 % — LOW (ref 34.5–45)
HCT VFR BLD CALC: 23.7 % — LOW (ref 34.5–45)
HCT VFR BLD CALC: 23.8 % — LOW (ref 34.5–45)
HCT VFR BLD CALC: 23.9 % — LOW (ref 34.5–45)
HCT VFR BLD CALC: 24 % — LOW (ref 34.5–45)
HCT VFR BLD CALC: 24 % — LOW (ref 34.5–45)
HCT VFR BLD CALC: 24.1 % — LOW (ref 34.5–45)
HCT VFR BLD CALC: 24.3 % — LOW (ref 34.5–45)
HCT VFR BLD CALC: 24.5 % — LOW (ref 34.5–45)
HCT VFR BLD CALC: 24.7 % — LOW (ref 34.5–45)
HCT VFR BLD CALC: 24.7 % — LOW (ref 34.5–45)
HCT VFR BLD CALC: 24.8 % — LOW (ref 34.5–45)
HCT VFR BLD CALC: 25 % — LOW (ref 34.5–45)
HCT VFR BLD CALC: 25 % — LOW (ref 34.5–45)
HCT VFR BLD CALC: 25.1 % — LOW (ref 34.5–45)
HCT VFR BLD CALC: 25.1 % — LOW (ref 34.5–45)
HCT VFR BLD CALC: 25.2 % — LOW (ref 34.5–45)
HCT VFR BLD CALC: 25.7 % — LOW (ref 34.5–45)
HCT VFR BLD CALC: 25.8 % — LOW (ref 34.5–45)
HCT VFR BLD CALC: 25.8 % — LOW (ref 34.5–45)
HCT VFR BLD CALC: 25.9 % — LOW (ref 34.5–45)
HCT VFR BLD CALC: 26.2 % — LOW (ref 34.5–45)
HCT VFR BLD CALC: 26.3 % — LOW (ref 34.5–45)
HCT VFR BLD CALC: 26.4 % — LOW (ref 34.5–45)
HCT VFR BLD CALC: 26.5 % — LOW (ref 34.5–45)
HCT VFR BLD CALC: 26.7 % — LOW (ref 34.5–45)
HCT VFR BLD CALC: 26.8 % — LOW (ref 34.5–45)
HCT VFR BLD CALC: 27.1 % — LOW (ref 34.5–45)
HCT VFR BLD CALC: 27.2 % — LOW (ref 34.5–45)
HCT VFR BLD CALC: 27.3 % — LOW (ref 34.5–45)
HCT VFR BLD CALC: 27.4 % — LOW (ref 34.5–45)
HCT VFR BLD CALC: 27.7 % — LOW (ref 34.5–45)
HCT VFR BLD CALC: 28.1 % — LOW (ref 34.5–45)
HCT VFR BLD CALC: 29.3 % — LOW (ref 34.5–45)
HCT VFR BLD CALC: 29.5 % — LOW (ref 34.5–45)
HCT VFR BLD CALC: 29.6 % — LOW (ref 34.5–45)
HCT VFR BLD CALC: 31 % — LOW (ref 34.5–45)
HCT VFR BLD CALC: 31.1 % — LOW (ref 34.5–45)
HCT VFR BLD CALC: 33.9 % — LOW (ref 34.5–45)
HCT VFR BLD CALC: 50.4 % — HIGH (ref 34.5–45)
HCT VFR BLD CALC: 53 % — HIGH (ref 34.5–45)
HCV AB S/CO SERPL IA: 0.05 S/CO — SIGNIFICANT CHANGE UP (ref 0–0.99)
HCV AB S/CO SERPL IA: 0.22 S/CO — SIGNIFICANT CHANGE UP (ref 0–0.99)
HCV AB SERPL-IMP: SIGNIFICANT CHANGE UP
HCV AB SERPL-IMP: SIGNIFICANT CHANGE UP
HDLC SERPL-MCNC: 78 MG/DL — SIGNIFICANT CHANGE UP
HEPARIN-PF4 AB RESULT: 2.1 U/ML — HIGH (ref 0–0.9)
HEPARIN-PF4 AB RESULT: 2.9 U/ML — HIGH (ref 0–0.9)
HEPARIN-PF4 AB RESULT: 3 U/ML — HIGH (ref 0–0.9)
HGB BLD-MCNC: 10.7 G/DL — LOW (ref 11.5–15.5)
HGB BLD-MCNC: 16.3 G/DL — HIGH (ref 11.5–15.5)
HGB BLD-MCNC: 16.8 G/DL — HIGH (ref 11.5–15.5)
HGB BLD-MCNC: 6.2 G/DL — CRITICAL LOW (ref 11.5–15.5)
HGB BLD-MCNC: 6.5 G/DL — CRITICAL LOW (ref 11.5–15.5)
HGB BLD-MCNC: 6.8 G/DL — CRITICAL LOW (ref 11.5–15.5)
HGB BLD-MCNC: 6.9 G/DL — CRITICAL LOW (ref 11.5–15.5)
HGB BLD-MCNC: 7 G/DL — CRITICAL LOW (ref 11.5–15.5)
HGB BLD-MCNC: 7 G/DL — CRITICAL LOW (ref 11.5–15.5)
HGB BLD-MCNC: 7.1 G/DL — LOW (ref 11.5–15.5)
HGB BLD-MCNC: 7.2 G/DL — LOW (ref 11.5–15.5)
HGB BLD-MCNC: 7.3 G/DL — LOW (ref 11.5–15.5)
HGB BLD-MCNC: 7.4 G/DL — LOW (ref 11.5–15.5)
HGB BLD-MCNC: 7.4 G/DL — LOW (ref 11.5–15.5)
HGB BLD-MCNC: 7.5 G/DL — LOW (ref 11.5–15.5)
HGB BLD-MCNC: 7.6 G/DL — LOW (ref 11.5–15.5)
HGB BLD-MCNC: 7.7 G/DL — LOW (ref 11.5–15.5)
HGB BLD-MCNC: 7.8 G/DL — LOW (ref 11.5–15.5)
HGB BLD-MCNC: 7.9 G/DL — LOW (ref 11.5–15.5)
HGB BLD-MCNC: 8 G/DL — LOW (ref 11.5–15.5)
HGB BLD-MCNC: 8.1 G/DL — LOW (ref 11.5–15.5)
HGB BLD-MCNC: 8.2 G/DL — LOW (ref 11.5–15.5)
HGB BLD-MCNC: 8.3 G/DL — LOW (ref 11.5–15.5)
HGB BLD-MCNC: 8.3 G/DL — LOW (ref 11.5–15.5)
HGB BLD-MCNC: 8.4 G/DL — LOW (ref 11.5–15.5)
HGB BLD-MCNC: 8.4 G/DL — LOW (ref 11.5–15.5)
HGB BLD-MCNC: 8.5 G/DL — LOW (ref 11.5–15.5)
HGB BLD-MCNC: 8.6 G/DL — LOW (ref 11.5–15.5)
HGB BLD-MCNC: 8.7 G/DL — LOW (ref 11.5–15.5)
HGB BLD-MCNC: 8.9 G/DL — LOW (ref 11.5–15.5)
HGB BLD-MCNC: 9 G/DL — LOW (ref 11.5–15.5)
HGB BLD-MCNC: 9.1 G/DL — LOW (ref 11.5–15.5)
HGB BLD-MCNC: 9.2 G/DL — LOW (ref 11.5–15.5)
HGB BLD-MCNC: 9.4 G/DL — LOW (ref 11.5–15.5)
HGB BLD-MCNC: 9.6 G/DL — LOW (ref 11.5–15.5)
HGB BLD-MCNC: 9.8 G/DL — LOW (ref 11.5–15.5)
HGB BLD-MCNC: 9.9 G/DL — LOW (ref 11.5–15.5)
HIV 1+2 AB+HIV1 P24 AG SERPL QL IA: SIGNIFICANT CHANGE UP
HOROWITZ INDEX BLDA+IHG-RTO: 30 — SIGNIFICANT CHANGE UP
IANC: 10.67 K/UL — HIGH (ref 1.8–7.4)
IANC: 11.87 K/UL — HIGH (ref 1.8–7.4)
IANC: 11.99 K/UL — HIGH (ref 1.8–7.4)
IANC: 12.53 K/UL — HIGH (ref 1.8–7.4)
IANC: 13.39 K/UL — HIGH (ref 1.8–7.4)
IANC: 9.05 K/UL — HIGH (ref 1.8–7.4)
IANC: 9.06 K/UL — HIGH (ref 1.8–7.4)
IANC: 9.1 K/UL — HIGH (ref 1.8–7.4)
IGG SERPL-MCNC: 825 MG/DL — SIGNIFICANT CHANGE UP (ref 586–1602)
IGG1 SER-MCNC: 395 MG/DL — SIGNIFICANT CHANGE UP (ref 248–810)
IGG2 SER-MCNC: 283 MG/DL — SIGNIFICANT CHANGE UP (ref 130–555)
IGG3 SER-MCNC: 46 MG/DL — SIGNIFICANT CHANGE UP (ref 15–102)
IGG4 SER-MCNC: 21 MG/DL — SIGNIFICANT CHANGE UP (ref 2–96)
IMM GRANULOCYTES NFR BLD AUTO: 0.4 % — SIGNIFICANT CHANGE UP (ref 0–0.9)
IMM GRANULOCYTES NFR BLD AUTO: 1 % — HIGH (ref 0–0.9)
IMM GRANULOCYTES NFR BLD AUTO: 1.5 % — HIGH (ref 0–0.9)
IMM GRANULOCYTES NFR BLD AUTO: 1.9 % — HIGH (ref 0–0.9)
INR BLD: 1.11 RATIO — SIGNIFICANT CHANGE UP (ref 0.88–1.16)
INR BLD: 1.12 RATIO — SIGNIFICANT CHANGE UP (ref 0.88–1.16)
INR BLD: 1.13 RATIO — SIGNIFICANT CHANGE UP (ref 0.88–1.16)
INR BLD: 1.13 RATIO — SIGNIFICANT CHANGE UP (ref 0.88–1.16)
INR BLD: 1.2 RATIO — HIGH (ref 0.88–1.16)
INR BLD: 1.21 RATIO — HIGH (ref 0.88–1.16)
INR BLD: 1.24 RATIO — HIGH (ref 0.88–1.16)
INR BLD: 1.25 RATIO — HIGH (ref 0.88–1.16)
INR BLD: 1.26 RATIO — HIGH (ref 0.88–1.16)
INR BLD: 1.26 RATIO — HIGH (ref 0.88–1.16)
INR BLD: 1.27 RATIO — HIGH (ref 0.88–1.16)
INR BLD: 1.28 RATIO — HIGH (ref 0.88–1.16)
INR BLD: 1.28 RATIO — HIGH (ref 0.88–1.16)
INR BLD: 1.32 RATIO — HIGH (ref 0.88–1.16)
INR BLD: 1.33 RATIO — HIGH (ref 0.88–1.16)
INR BLD: 1.35 RATIO — HIGH (ref 0.88–1.16)
INR BLD: 1.35 RATIO — HIGH (ref 0.88–1.16)
INR BLD: 1.36 RATIO — HIGH (ref 0.88–1.16)
INR BLD: 1.36 RATIO — HIGH (ref 0.88–1.16)
INR BLD: 1.37 RATIO — HIGH (ref 0.88–1.16)
INR BLD: 1.41 RATIO — HIGH (ref 0.88–1.16)
INR BLD: 1.45 RATIO — HIGH (ref 0.88–1.16)
INR BLD: 1.47 RATIO — HIGH (ref 0.88–1.16)
INR BLD: 1.47 RATIO — HIGH (ref 0.88–1.16)
INR BLD: 1.48 RATIO — HIGH (ref 0.88–1.16)
INR BLD: 1.52 RATIO — HIGH (ref 0.88–1.16)
INR BLD: 1.57 RATIO — HIGH (ref 0.88–1.16)
INR BLD: 1.57 RATIO — HIGH (ref 0.88–1.16)
INR BLD: 1.59 RATIO — HIGH (ref 0.88–1.16)
INR BLD: 1.6 RATIO — HIGH (ref 0.88–1.16)
INR BLD: 1.62 RATIO — HIGH (ref 0.88–1.16)
INR BLD: 1.66 RATIO — HIGH (ref 0.88–1.16)
INR BLD: 1.71 RATIO — HIGH (ref 0.88–1.16)
INR BLD: 1.74 RATIO — HIGH (ref 0.88–1.16)
INR BLD: 1.75 RATIO — HIGH (ref 0.88–1.16)
INR BLD: 1.77 RATIO — HIGH (ref 0.88–1.16)
INR BLD: 1.8 RATIO — HIGH (ref 0.88–1.16)
INR BLD: 1.81 RATIO — HIGH (ref 0.88–1.16)
INR BLD: 1.83 RATIO — HIGH (ref 0.88–1.16)
INR BLD: 1.85 RATIO — HIGH (ref 0.88–1.16)
INR BLD: 2 RATIO — HIGH (ref 0.88–1.16)
INR BLD: 2.05 RATIO — HIGH (ref 0.88–1.16)
INR BLD: 2.07 RATIO — HIGH (ref 0.88–1.16)
INR BLD: 2.08 RATIO — SIGNIFICANT CHANGE UP (ref 0.88–1.16)
INR BLD: 2.11 RATIO — HIGH (ref 0.88–1.16)
INR BLD: 2.19 RATIO — HIGH (ref 0.88–1.16)
INR BLD: 2.22 RATIO — HIGH (ref 0.88–1.16)
INR BLD: 2.23 RATIO — HIGH (ref 0.88–1.16)
INR BLD: 2.43 RATIO — HIGH (ref 0.88–1.16)
INR BLD: 2.48 RATIO — HIGH (ref 0.88–1.16)
INR BLD: 2.57 RATIO — HIGH (ref 0.88–1.16)
INR BLD: 2.62 RATIO — HIGH (ref 0.88–1.16)
INR BLD: 2.67 RATIO — HIGH (ref 0.88–1.16)
INR BLD: 2.71 RATIO — HIGH (ref 0.88–1.16)
INR BLD: 2.73 RATIO — HIGH (ref 0.88–1.16)
INR BLD: 2.78 RATIO — HIGH (ref 0.88–1.16)
INR BLD: 2.82 RATIO — HIGH (ref 0.88–1.16)
INR BLD: 2.88 RATIO — HIGH (ref 0.88–1.16)
INR BLD: 2.97 RATIO — HIGH (ref 0.88–1.16)
INR BLD: 3.87 RATIO — HIGH (ref 0.88–1.16)
KETONES UR-MCNC: ABNORMAL
KETONES UR-MCNC: ABNORMAL
LACTATE BLDV-MCNC: 10.6 MMOL/L — CRITICAL HIGH (ref 0.5–2)
LACTATE SERPL-SCNC: 11.4 MMOL/L — CRITICAL HIGH (ref 0.7–2)
LACTATE SERPL-SCNC: 3.3 MMOL/L — HIGH (ref 0.7–2)
LACTATE SERPL-SCNC: 3.7 MMOL/L — HIGH (ref 0.7–2)
LACTATE SERPL-SCNC: 3.8 MMOL/L — HIGH (ref 0.7–2)
LACTATE SERPL-SCNC: 3.9 MMOL/L — HIGH (ref 0.5–2)
LACTATE SERPL-SCNC: 4.1 MMOL/L — CRITICAL HIGH (ref 0.7–2)
LACTATE SERPL-SCNC: 4.2 MMOL/L — CRITICAL HIGH (ref 0.7–2)
LACTATE SERPL-SCNC: 4.9 MMOL/L — CRITICAL HIGH (ref 0.5–2)
LACTATE SERPL-SCNC: 6.5 MMOL/L — CRITICAL HIGH (ref 0.5–2)
LACTATE SERPL-SCNC: 6.7 MMOL/L — CRITICAL HIGH (ref 0.7–2)
LACTATE SERPL-SCNC: 8.2 MMOL/L — CRITICAL HIGH (ref 0.7–2)
LACTATE SERPL-SCNC: 8.7 MMOL/L — CRITICAL HIGH (ref 0.7–2)
LEUKOCYTE ESTERASE UR-ACNC: ABNORMAL
LEUKOCYTE ESTERASE UR-ACNC: NEGATIVE — SIGNIFICANT CHANGE UP
LIDOCAIN IGE QN: 6749 U/L — HIGH (ref 73–393)
LIDOCAIN IGE QN: 6950 U/L — HIGH (ref 7–60)
LIDOCAIN IGE QN: HIGH U/L (ref 73–393)
LIDOCAIN IGE QN: HIGH U/L (ref 73–393)
LIPID PNL WITH DIRECT LDL SERPL: 56 MG/DL — SIGNIFICANT CHANGE UP
LMWH PPP CHRO-ACNC: 0.41 IU/ML — LOW (ref 0.5–1)
LYMPHOCYTES # BLD AUTO: 1.29 K/UL — SIGNIFICANT CHANGE UP (ref 1–3.3)
LYMPHOCYTES # BLD AUTO: 1.36 K/UL — SIGNIFICANT CHANGE UP (ref 1–3.3)
LYMPHOCYTES # BLD AUTO: 18.6 % — SIGNIFICANT CHANGE UP (ref 13–44)
LYMPHOCYTES # BLD AUTO: 20.1 % — SIGNIFICANT CHANGE UP (ref 13–44)
LYMPHOCYTES # BLD AUTO: 20.2 % — SIGNIFICANT CHANGE UP (ref 13–44)
LYMPHOCYTES # BLD AUTO: 22.6 % — SIGNIFICANT CHANGE UP (ref 13–44)
LYMPHOCYTES # BLD AUTO: 22.8 % — SIGNIFICANT CHANGE UP (ref 13–44)
LYMPHOCYTES # BLD AUTO: 28.4 % — SIGNIFICANT CHANGE UP (ref 13–44)
LYMPHOCYTES # BLD AUTO: 29.5 % — SIGNIFICANT CHANGE UP (ref 13–44)
LYMPHOCYTES # BLD AUTO: 3.45 K/UL — HIGH (ref 1–3.3)
LYMPHOCYTES # BLD AUTO: 3.66 K/UL — HIGH (ref 1–3.3)
LYMPHOCYTES # BLD AUTO: 3.71 K/UL — HIGH (ref 1–3.3)
LYMPHOCYTES # BLD AUTO: 3.93 K/UL — HIGH (ref 1–3.3)
LYMPHOCYTES # BLD AUTO: 3.93 K/UL — HIGH (ref 1–3.3)
LYMPHOCYTES # BLD AUTO: 4.12 K/UL — HIGH (ref 1–3.3)
LYMPHOCYTES # BLD AUTO: 4.77 K/UL — HIGH (ref 1–3.3)
LYMPHOCYTES # BLD AUTO: 9.1 % — LOW (ref 13–44)
LYMPHOCYTES # BLD AUTO: 9.3 % — LOW (ref 13–44)
MAGNESIUM SERPL-MCNC: 1.2 MG/DL — LOW (ref 1.6–2.6)
MAGNESIUM SERPL-MCNC: 1.7 MG/DL — SIGNIFICANT CHANGE UP (ref 1.6–2.6)
MAGNESIUM SERPL-MCNC: 1.8 MG/DL — SIGNIFICANT CHANGE UP (ref 1.6–2.6)
MAGNESIUM SERPL-MCNC: 1.9 MG/DL — SIGNIFICANT CHANGE UP (ref 1.6–2.6)
MAGNESIUM SERPL-MCNC: 2 MG/DL — SIGNIFICANT CHANGE UP (ref 1.6–2.6)
MAGNESIUM SERPL-MCNC: 2.1 MG/DL — SIGNIFICANT CHANGE UP (ref 1.6–2.6)
MAGNESIUM SERPL-MCNC: 2.2 MG/DL — SIGNIFICANT CHANGE UP (ref 1.6–2.6)
MAGNESIUM SERPL-MCNC: 2.2 MG/DL — SIGNIFICANT CHANGE UP (ref 1.6–2.6)
MAGNESIUM SERPL-MCNC: 2.3 MG/DL — SIGNIFICANT CHANGE UP (ref 1.6–2.6)
MAGNESIUM SERPL-MCNC: 2.4 MG/DL — SIGNIFICANT CHANGE UP (ref 1.6–2.6)
MAGNESIUM SERPL-MCNC: 2.5 MG/DL — SIGNIFICANT CHANGE UP (ref 1.6–2.6)
MAGNESIUM SERPL-MCNC: 2.6 MG/DL — SIGNIFICANT CHANGE UP (ref 1.6–2.6)
MAGNESIUM SERPL-MCNC: 2.7 MG/DL — HIGH (ref 1.6–2.6)
MAGNESIUM SERPL-MCNC: 2.8 MG/DL — HIGH (ref 1.6–2.6)
MAGNESIUM SERPL-MCNC: 2.9 MG/DL — HIGH (ref 1.6–2.6)
MANUAL SMEAR VERIFICATION: SIGNIFICANT CHANGE UP
MANUAL SMEAR VERIFICATION: SIGNIFICANT CHANGE UP
MCHC RBC-ENTMCNC: 25.8 PG — LOW (ref 27–34)
MCHC RBC-ENTMCNC: 26 PG — LOW (ref 27–34)
MCHC RBC-ENTMCNC: 26.2 PG — LOW (ref 27–34)
MCHC RBC-ENTMCNC: 26.3 PG — LOW (ref 27–34)
MCHC RBC-ENTMCNC: 26.5 PG — LOW (ref 27–34)
MCHC RBC-ENTMCNC: 26.6 PG — LOW (ref 27–34)
MCHC RBC-ENTMCNC: 26.6 PG — LOW (ref 27–34)
MCHC RBC-ENTMCNC: 26.7 PG — LOW (ref 27–34)
MCHC RBC-ENTMCNC: 26.7 PG — LOW (ref 27–34)
MCHC RBC-ENTMCNC: 26.8 PG — LOW (ref 27–34)
MCHC RBC-ENTMCNC: 26.9 PG — LOW (ref 27–34)
MCHC RBC-ENTMCNC: 27 PG — SIGNIFICANT CHANGE UP (ref 27–34)
MCHC RBC-ENTMCNC: 27 PG — SIGNIFICANT CHANGE UP (ref 27–34)
MCHC RBC-ENTMCNC: 27.3 PG — SIGNIFICANT CHANGE UP (ref 27–34)
MCHC RBC-ENTMCNC: 27.4 PG — SIGNIFICANT CHANGE UP (ref 27–34)
MCHC RBC-ENTMCNC: 27.4 PG — SIGNIFICANT CHANGE UP (ref 27–34)
MCHC RBC-ENTMCNC: 27.5 PG — SIGNIFICANT CHANGE UP (ref 27–34)
MCHC RBC-ENTMCNC: 27.5 PG — SIGNIFICANT CHANGE UP (ref 27–34)
MCHC RBC-ENTMCNC: 27.6 PG — SIGNIFICANT CHANGE UP (ref 27–34)
MCHC RBC-ENTMCNC: 27.7 PG — SIGNIFICANT CHANGE UP (ref 27–34)
MCHC RBC-ENTMCNC: 27.8 PG — SIGNIFICANT CHANGE UP (ref 27–34)
MCHC RBC-ENTMCNC: 27.8 PG — SIGNIFICANT CHANGE UP (ref 27–34)
MCHC RBC-ENTMCNC: 27.9 PG — SIGNIFICANT CHANGE UP (ref 27–34)
MCHC RBC-ENTMCNC: 28 PG — SIGNIFICANT CHANGE UP (ref 27–34)
MCHC RBC-ENTMCNC: 28 PG — SIGNIFICANT CHANGE UP (ref 27–34)
MCHC RBC-ENTMCNC: 28.1 PG — SIGNIFICANT CHANGE UP (ref 27–34)
MCHC RBC-ENTMCNC: 28.2 PG — SIGNIFICANT CHANGE UP (ref 27–34)
MCHC RBC-ENTMCNC: 28.2 PG — SIGNIFICANT CHANGE UP (ref 27–34)
MCHC RBC-ENTMCNC: 28.3 PG — SIGNIFICANT CHANGE UP (ref 27–34)
MCHC RBC-ENTMCNC: 28.4 PG — SIGNIFICANT CHANGE UP (ref 27–34)
MCHC RBC-ENTMCNC: 28.5 PG — SIGNIFICANT CHANGE UP (ref 27–34)
MCHC RBC-ENTMCNC: 28.6 PG — SIGNIFICANT CHANGE UP (ref 27–34)
MCHC RBC-ENTMCNC: 28.6 PG — SIGNIFICANT CHANGE UP (ref 27–34)
MCHC RBC-ENTMCNC: 28.7 PG — SIGNIFICANT CHANGE UP (ref 27–34)
MCHC RBC-ENTMCNC: 28.8 PG — SIGNIFICANT CHANGE UP (ref 27–34)
MCHC RBC-ENTMCNC: 28.8 PG — SIGNIFICANT CHANGE UP (ref 27–34)
MCHC RBC-ENTMCNC: 28.9 PG — SIGNIFICANT CHANGE UP (ref 27–34)
MCHC RBC-ENTMCNC: 29 PG — SIGNIFICANT CHANGE UP (ref 27–34)
MCHC RBC-ENTMCNC: 29 PG — SIGNIFICANT CHANGE UP (ref 27–34)
MCHC RBC-ENTMCNC: 29.1 PG — SIGNIFICANT CHANGE UP (ref 27–34)
MCHC RBC-ENTMCNC: 29.2 PG — SIGNIFICANT CHANGE UP (ref 27–34)
MCHC RBC-ENTMCNC: 29.3 PG — SIGNIFICANT CHANGE UP (ref 27–34)
MCHC RBC-ENTMCNC: 29.4 PG — SIGNIFICANT CHANGE UP (ref 27–34)
MCHC RBC-ENTMCNC: 29.4 PG — SIGNIFICANT CHANGE UP (ref 27–34)
MCHC RBC-ENTMCNC: 29.5 PG — SIGNIFICANT CHANGE UP (ref 27–34)
MCHC RBC-ENTMCNC: 29.5 PG — SIGNIFICANT CHANGE UP (ref 27–34)
MCHC RBC-ENTMCNC: 29.7 PG — SIGNIFICANT CHANGE UP (ref 27–34)
MCHC RBC-ENTMCNC: 29.8 PG — SIGNIFICANT CHANGE UP (ref 27–34)
MCHC RBC-ENTMCNC: 29.9 PG — SIGNIFICANT CHANGE UP (ref 27–34)
MCHC RBC-ENTMCNC: 30.6 GM/DL — LOW (ref 32–36)
MCHC RBC-ENTMCNC: 30.7 PG — SIGNIFICANT CHANGE UP (ref 27–34)
MCHC RBC-ENTMCNC: 31 GM/DL — LOW (ref 32–36)
MCHC RBC-ENTMCNC: 31 GM/DL — LOW (ref 32–36)
MCHC RBC-ENTMCNC: 31.1 GM/DL — LOW (ref 32–36)
MCHC RBC-ENTMCNC: 31.3 GM/DL — LOW (ref 32–36)
MCHC RBC-ENTMCNC: 31.3 GM/DL — LOW (ref 32–36)
MCHC RBC-ENTMCNC: 31.4 GM/DL — LOW (ref 32–36)
MCHC RBC-ENTMCNC: 31.4 GM/DL — LOW (ref 32–36)
MCHC RBC-ENTMCNC: 31.5 G/DL — LOW (ref 32–36)
MCHC RBC-ENTMCNC: 31.5 GM/DL — LOW (ref 32–36)
MCHC RBC-ENTMCNC: 31.5 GM/DL — LOW (ref 32–36)
MCHC RBC-ENTMCNC: 31.6 G/DL — LOW (ref 32–36)
MCHC RBC-ENTMCNC: 31.6 PG — SIGNIFICANT CHANGE UP (ref 27–34)
MCHC RBC-ENTMCNC: 31.7 G/DL — LOW (ref 32–36)
MCHC RBC-ENTMCNC: 31.7 GM/DL — LOW (ref 32–36)
MCHC RBC-ENTMCNC: 31.8 GM/DL — LOW (ref 32–36)
MCHC RBC-ENTMCNC: 31.9 GM/DL — LOW (ref 32–36)
MCHC RBC-ENTMCNC: 31.9 GM/DL — LOW (ref 32–36)
MCHC RBC-ENTMCNC: 32 GM/DL — SIGNIFICANT CHANGE UP (ref 32–36)
MCHC RBC-ENTMCNC: 32.1 GM/DL — SIGNIFICANT CHANGE UP (ref 32–36)
MCHC RBC-ENTMCNC: 32.1 PG — SIGNIFICANT CHANGE UP (ref 27–34)
MCHC RBC-ENTMCNC: 32.2 GM/DL — SIGNIFICANT CHANGE UP (ref 32–36)
MCHC RBC-ENTMCNC: 32.3 G/DL — SIGNIFICANT CHANGE UP (ref 32–36)
MCHC RBC-ENTMCNC: 32.4 G/DL — SIGNIFICANT CHANGE UP (ref 32–36)
MCHC RBC-ENTMCNC: 32.4 GM/DL — SIGNIFICANT CHANGE UP (ref 32–36)
MCHC RBC-ENTMCNC: 32.5 GM/DL — SIGNIFICANT CHANGE UP (ref 32–36)
MCHC RBC-ENTMCNC: 32.5 PG — SIGNIFICANT CHANGE UP (ref 27–34)
MCHC RBC-ENTMCNC: 32.6 GM/DL — SIGNIFICANT CHANGE UP (ref 32–36)
MCHC RBC-ENTMCNC: 32.7 GM/DL — SIGNIFICANT CHANGE UP (ref 32–36)
MCHC RBC-ENTMCNC: 32.9 GM/DL — SIGNIFICANT CHANGE UP (ref 32–36)
MCHC RBC-ENTMCNC: 33 GM/DL — SIGNIFICANT CHANGE UP (ref 32–36)
MCHC RBC-ENTMCNC: 33.1 GM/DL — SIGNIFICANT CHANGE UP (ref 32–36)
MCHC RBC-ENTMCNC: 33.2 GM/DL — SIGNIFICANT CHANGE UP (ref 32–36)
MCHC RBC-ENTMCNC: 33.3 GM/DL — SIGNIFICANT CHANGE UP (ref 32–36)
MCHC RBC-ENTMCNC: 33.5 GM/DL — SIGNIFICANT CHANGE UP (ref 32–36)
MCHC RBC-ENTMCNC: 33.6 GM/DL — SIGNIFICANT CHANGE UP (ref 32–36)
MCHC RBC-ENTMCNC: 33.6 PG — SIGNIFICANT CHANGE UP (ref 27–34)
MCHC RBC-ENTMCNC: 33.8 GM/DL — SIGNIFICANT CHANGE UP (ref 32–36)
MCHC RBC-ENTMCNC: 33.9 GM/DL — SIGNIFICANT CHANGE UP (ref 32–36)
MCHC RBC-ENTMCNC: 33.9 PG — SIGNIFICANT CHANGE UP (ref 27–34)
MCHC RBC-ENTMCNC: 34 GM/DL — SIGNIFICANT CHANGE UP (ref 32–36)
MCHC RBC-ENTMCNC: 34 PG — SIGNIFICANT CHANGE UP (ref 27–34)
MCHC RBC-ENTMCNC: 34.1 GM/DL — SIGNIFICANT CHANGE UP (ref 32–36)
MCHC RBC-ENTMCNC: 34.1 GM/DL — SIGNIFICANT CHANGE UP (ref 32–36)
MCHC RBC-ENTMCNC: 34.2 GM/DL — SIGNIFICANT CHANGE UP (ref 32–36)
MCHC RBC-ENTMCNC: 34.2 GM/DL — SIGNIFICANT CHANGE UP (ref 32–36)
MCHC RBC-ENTMCNC: 34.3 GM/DL — SIGNIFICANT CHANGE UP (ref 32–36)
MCHC RBC-ENTMCNC: 34.6 GM/DL — SIGNIFICANT CHANGE UP (ref 32–36)
MCV RBC AUTO: 100 FL — SIGNIFICANT CHANGE UP (ref 80–100)
MCV RBC AUTO: 100.9 FL — HIGH (ref 80–100)
MCV RBC AUTO: 101.6 FL — HIGH (ref 80–100)
MCV RBC AUTO: 80.1 FL — SIGNIFICANT CHANGE UP (ref 80–100)
MCV RBC AUTO: 80.4 FL — SIGNIFICANT CHANGE UP (ref 80–100)
MCV RBC AUTO: 80.7 FL — SIGNIFICANT CHANGE UP (ref 80–100)
MCV RBC AUTO: 81 FL — SIGNIFICANT CHANGE UP (ref 80–100)
MCV RBC AUTO: 81.2 FL — SIGNIFICANT CHANGE UP (ref 80–100)
MCV RBC AUTO: 81.3 FL — SIGNIFICANT CHANGE UP (ref 80–100)
MCV RBC AUTO: 81.3 FL — SIGNIFICANT CHANGE UP (ref 80–100)
MCV RBC AUTO: 81.4 FL — SIGNIFICANT CHANGE UP (ref 80–100)
MCV RBC AUTO: 81.5 FL — SIGNIFICANT CHANGE UP (ref 80–100)
MCV RBC AUTO: 81.8 FL — SIGNIFICANT CHANGE UP (ref 80–100)
MCV RBC AUTO: 81.8 FL — SIGNIFICANT CHANGE UP (ref 80–100)
MCV RBC AUTO: 82 FL — SIGNIFICANT CHANGE UP (ref 80–100)
MCV RBC AUTO: 82.1 FL — SIGNIFICANT CHANGE UP (ref 80–100)
MCV RBC AUTO: 82.2 FL — SIGNIFICANT CHANGE UP (ref 80–100)
MCV RBC AUTO: 82.2 FL — SIGNIFICANT CHANGE UP (ref 80–100)
MCV RBC AUTO: 82.3 FL — SIGNIFICANT CHANGE UP (ref 80–100)
MCV RBC AUTO: 82.6 FL — SIGNIFICANT CHANGE UP (ref 80–100)
MCV RBC AUTO: 82.6 FL — SIGNIFICANT CHANGE UP (ref 80–100)
MCV RBC AUTO: 82.7 FL — SIGNIFICANT CHANGE UP (ref 80–100)
MCV RBC AUTO: 82.7 FL — SIGNIFICANT CHANGE UP (ref 80–100)
MCV RBC AUTO: 83 FL — SIGNIFICANT CHANGE UP (ref 80–100)
MCV RBC AUTO: 83.2 FL — SIGNIFICANT CHANGE UP (ref 80–100)
MCV RBC AUTO: 83.4 FL — SIGNIFICANT CHANGE UP (ref 80–100)
MCV RBC AUTO: 83.4 FL — SIGNIFICANT CHANGE UP (ref 80–100)
MCV RBC AUTO: 83.6 FL — SIGNIFICANT CHANGE UP (ref 80–100)
MCV RBC AUTO: 83.7 FL — SIGNIFICANT CHANGE UP (ref 80–100)
MCV RBC AUTO: 83.8 FL — SIGNIFICANT CHANGE UP (ref 80–100)
MCV RBC AUTO: 84.1 FL — SIGNIFICANT CHANGE UP (ref 80–100)
MCV RBC AUTO: 84.1 FL — SIGNIFICANT CHANGE UP (ref 80–100)
MCV RBC AUTO: 84.3 FL — SIGNIFICANT CHANGE UP (ref 80–100)
MCV RBC AUTO: 84.5 FL — SIGNIFICANT CHANGE UP (ref 80–100)
MCV RBC AUTO: 84.6 FL — SIGNIFICANT CHANGE UP (ref 80–100)
MCV RBC AUTO: 84.7 FL — SIGNIFICANT CHANGE UP (ref 80–100)
MCV RBC AUTO: 84.7 FL — SIGNIFICANT CHANGE UP (ref 80–100)
MCV RBC AUTO: 85 FL — SIGNIFICANT CHANGE UP (ref 80–100)
MCV RBC AUTO: 85.1 FL — SIGNIFICANT CHANGE UP (ref 80–100)
MCV RBC AUTO: 85.2 FL — SIGNIFICANT CHANGE UP (ref 80–100)
MCV RBC AUTO: 85.3 FL — SIGNIFICANT CHANGE UP (ref 80–100)
MCV RBC AUTO: 85.4 FL — SIGNIFICANT CHANGE UP (ref 80–100)
MCV RBC AUTO: 85.5 FL — SIGNIFICANT CHANGE UP (ref 80–100)
MCV RBC AUTO: 85.7 FL — SIGNIFICANT CHANGE UP (ref 80–100)
MCV RBC AUTO: 85.8 FL — SIGNIFICANT CHANGE UP (ref 80–100)
MCV RBC AUTO: 85.9 FL — SIGNIFICANT CHANGE UP (ref 80–100)
MCV RBC AUTO: 86 FL — SIGNIFICANT CHANGE UP (ref 80–100)
MCV RBC AUTO: 86 FL — SIGNIFICANT CHANGE UP (ref 80–100)
MCV RBC AUTO: 86.3 FL — SIGNIFICANT CHANGE UP (ref 80–100)
MCV RBC AUTO: 87 FL — SIGNIFICANT CHANGE UP (ref 80–100)
MCV RBC AUTO: 87.1 FL — SIGNIFICANT CHANGE UP (ref 80–100)
MCV RBC AUTO: 87.4 FL — SIGNIFICANT CHANGE UP (ref 80–100)
MCV RBC AUTO: 87.4 FL — SIGNIFICANT CHANGE UP (ref 80–100)
MCV RBC AUTO: 87.5 FL — SIGNIFICANT CHANGE UP (ref 80–100)
MCV RBC AUTO: 87.6 FL — SIGNIFICANT CHANGE UP (ref 80–100)
MCV RBC AUTO: 88.2 FL — SIGNIFICANT CHANGE UP (ref 80–100)
MCV RBC AUTO: 88.3 FL — SIGNIFICANT CHANGE UP (ref 80–100)
MCV RBC AUTO: 88.8 FL — SIGNIFICANT CHANGE UP (ref 80–100)
MCV RBC AUTO: 89.1 FL — SIGNIFICANT CHANGE UP (ref 80–100)
MCV RBC AUTO: 89.2 FL — SIGNIFICANT CHANGE UP (ref 80–100)
MCV RBC AUTO: 89.4 FL — SIGNIFICANT CHANGE UP (ref 80–100)
MCV RBC AUTO: 89.5 FL — SIGNIFICANT CHANGE UP (ref 80–100)
MCV RBC AUTO: 89.5 FL — SIGNIFICANT CHANGE UP (ref 80–100)
MCV RBC AUTO: 89.6 FL — SIGNIFICANT CHANGE UP (ref 80–100)
MCV RBC AUTO: 89.7 FL — SIGNIFICANT CHANGE UP (ref 80–100)
MCV RBC AUTO: 89.7 FL — SIGNIFICANT CHANGE UP (ref 80–100)
MCV RBC AUTO: 89.8 FL — SIGNIFICANT CHANGE UP (ref 80–100)
MCV RBC AUTO: 89.9 FL — SIGNIFICANT CHANGE UP (ref 80–100)
MCV RBC AUTO: 90 FL — SIGNIFICANT CHANGE UP (ref 80–100)
MCV RBC AUTO: 90.3 FL — SIGNIFICANT CHANGE UP (ref 80–100)
MCV RBC AUTO: 90.4 FL — SIGNIFICANT CHANGE UP (ref 80–100)
MCV RBC AUTO: 90.6 FL — SIGNIFICANT CHANGE UP (ref 80–100)
MCV RBC AUTO: 90.8 FL — SIGNIFICANT CHANGE UP (ref 80–100)
MCV RBC AUTO: 90.8 FL — SIGNIFICANT CHANGE UP (ref 80–100)
MCV RBC AUTO: 91 FL — SIGNIFICANT CHANGE UP (ref 80–100)
MCV RBC AUTO: 91.2 FL — SIGNIFICANT CHANGE UP (ref 80–100)
MCV RBC AUTO: 91.3 FL — SIGNIFICANT CHANGE UP (ref 80–100)
MCV RBC AUTO: 91.5 FL — SIGNIFICANT CHANGE UP (ref 80–100)
MCV RBC AUTO: 91.8 FL — SIGNIFICANT CHANGE UP (ref 80–100)
MCV RBC AUTO: 92.4 FL — SIGNIFICANT CHANGE UP (ref 80–100)
MCV RBC AUTO: 92.6 FL — SIGNIFICANT CHANGE UP (ref 80–100)
MCV RBC AUTO: 94 FL — SIGNIFICANT CHANGE UP (ref 80–100)
MCV RBC AUTO: 95.9 FL — SIGNIFICANT CHANGE UP (ref 80–100)
MCV RBC AUTO: 96 FL — SIGNIFICANT CHANGE UP (ref 80–100)
MCV RBC AUTO: 97.9 FL — SIGNIFICANT CHANGE UP (ref 80–100)
MCV RBC AUTO: 97.9 FL — SIGNIFICANT CHANGE UP (ref 80–100)
METAMYELOCYTES # FLD: 0.9 % — SIGNIFICANT CHANGE UP (ref 0–1)
METAMYELOCYTES # FLD: 1 % — SIGNIFICANT CHANGE UP (ref 0–1)
METHOD TYPE: SIGNIFICANT CHANGE UP
MICROCYTES BLD QL: SLIGHT — SIGNIFICANT CHANGE UP
MONOCYTES # BLD AUTO: 0.47 K/UL — SIGNIFICANT CHANGE UP (ref 0–0.9)
MONOCYTES # BLD AUTO: 0.5 K/UL — SIGNIFICANT CHANGE UP (ref 0–0.9)
MONOCYTES # BLD AUTO: 0.76 K/UL — SIGNIFICANT CHANGE UP (ref 0–0.9)
MONOCYTES # BLD AUTO: 0.78 K/UL — SIGNIFICANT CHANGE UP (ref 0–0.9)
MONOCYTES # BLD AUTO: 0.96 K/UL — HIGH (ref 0–0.9)
MONOCYTES # BLD AUTO: 1.16 K/UL — HIGH (ref 0–0.9)
MONOCYTES # BLD AUTO: 1.21 K/UL — HIGH (ref 0–0.9)
MONOCYTES # BLD AUTO: 1.59 K/UL — HIGH (ref 0–0.9)
MONOCYTES # BLD AUTO: 1.79 K/UL — HIGH (ref 0–0.9)
MONOCYTES NFR BLD AUTO: 2.7 % — SIGNIFICANT CHANGE UP (ref 2–14)
MONOCYTES NFR BLD AUTO: 2.8 % — SIGNIFICANT CHANGE UP (ref 2–14)
MONOCYTES NFR BLD AUTO: 5.6 % — SIGNIFICANT CHANGE UP (ref 2–14)
MONOCYTES NFR BLD AUTO: 5.7 % — SIGNIFICANT CHANGE UP (ref 2–14)
MONOCYTES NFR BLD AUTO: 6.3 % — SIGNIFICANT CHANGE UP (ref 2–14)
MONOCYTES NFR BLD AUTO: 6.4 % — SIGNIFICANT CHANGE UP (ref 2–14)
MONOCYTES NFR BLD AUTO: 7 % — SIGNIFICANT CHANGE UP (ref 2–14)
MONOCYTES NFR BLD AUTO: 8.8 % — SIGNIFICANT CHANGE UP (ref 2–14)
MONOCYTES NFR BLD AUTO: 9.8 % — SIGNIFICANT CHANGE UP (ref 2–14)
MRSA PCR RESULT.: SIGNIFICANT CHANGE UP
MYELOCYTES NFR BLD: 2.8 % — HIGH (ref 0–0)
MYELOCYTES NFR BLD: 3.6 % — HIGH (ref 0–0)
NEUTROPHILS # BLD AUTO: 10.63 K/UL — HIGH (ref 1.8–7.4)
NEUTROPHILS # BLD AUTO: 10.9 K/UL — HIGH (ref 1.8–7.4)
NEUTROPHILS # BLD AUTO: 11.82 K/UL — HIGH (ref 1.8–7.4)
NEUTROPHILS # BLD AUTO: 11.87 K/UL — HIGH (ref 1.8–7.4)
NEUTROPHILS # BLD AUTO: 11.99 K/UL — HIGH (ref 1.8–7.4)
NEUTROPHILS # BLD AUTO: 12.53 K/UL — HIGH (ref 1.8–7.4)
NEUTROPHILS # BLD AUTO: 13.36 K/UL — HIGH (ref 1.8–7.4)
NEUTROPHILS # BLD AUTO: 13.62 K/UL — HIGH (ref 1.8–7.4)
NEUTROPHILS # BLD AUTO: 8.5 K/UL — HIGH (ref 1.8–7.4)
NEUTROPHILS NFR BLD AUTO: 38.5 % — LOW (ref 43–77)
NEUTROPHILS NFR BLD AUTO: 40.7 % — LOW (ref 43–77)
NEUTROPHILS NFR BLD AUTO: 54.3 % — SIGNIFICANT CHANGE UP (ref 43–77)
NEUTROPHILS NFR BLD AUTO: 63 % — SIGNIFICANT CHANGE UP (ref 43–77)
NEUTROPHILS NFR BLD AUTO: 65.7 % — SIGNIFICANT CHANGE UP (ref 43–77)
NEUTROPHILS NFR BLD AUTO: 66.4 % — SIGNIFICANT CHANGE UP (ref 43–77)
NEUTROPHILS NFR BLD AUTO: 68.9 % — SIGNIFICANT CHANGE UP (ref 43–77)
NEUTROPHILS NFR BLD AUTO: 68.9 % — SIGNIFICANT CHANGE UP (ref 43–77)
NEUTROPHILS NFR BLD AUTO: 72.7 % — SIGNIFICANT CHANGE UP (ref 43–77)
NEUTS BAND # BLD: 12.7 % — CRITICAL HIGH (ref 0–6)
NEUTS BAND # BLD: 15.7 % — CRITICAL HIGH (ref 0–6)
NEUTS BAND # BLD: 9.5 % — HIGH (ref 0–6)
NITRITE UR-MCNC: NEGATIVE — SIGNIFICANT CHANGE UP
NITRITE UR-MCNC: POSITIVE
NON HDL CHOLESTEROL: 71 MG/DL — SIGNIFICANT CHANGE UP
NRBC # BLD: 0 /100 WBCS — SIGNIFICANT CHANGE UP (ref 0–0)
NRBC # BLD: 1 /100 WBCS — HIGH (ref 0–0)
NRBC # BLD: 10 /100 WBCS — HIGH (ref 0–0)
NRBC # BLD: 11 /100 WBCS — HIGH (ref 0–0)
NRBC # BLD: 13 /100 — HIGH (ref 0–0)
NRBC # BLD: 17 /100 WBCS — HIGH (ref 0–0)
NRBC # BLD: 2 /100 WBCS — HIGH (ref 0–0)
NRBC # BLD: 3 /100 WBCS — HIGH (ref 0–0)
NRBC # BLD: 33 /100 WBCS — HIGH (ref 0–0)
NRBC # BLD: 4 /100 WBCS — HIGH (ref 0–0)
NRBC # BLD: 4 /100 — HIGH (ref 0–0)
NRBC # BLD: 5 /100 WBCS — HIGH (ref 0–0)
NRBC # BLD: 6 /100 WBCS — HIGH (ref 0–0)
NRBC # BLD: 7 /100 WBCS — HIGH (ref 0–0)
NRBC # BLD: 8 /100 WBCS — HIGH (ref 0–0)
NRBC # BLD: 9 /100 WBCS — HIGH (ref 0–0)
NRBC # FLD: 0 K/UL — SIGNIFICANT CHANGE UP (ref 0–0)
NRBC # FLD: 0.02 K/UL — HIGH (ref 0–0)
NRBC # FLD: 0.02 K/UL — HIGH (ref 0–0)
NRBC # FLD: 0.03 K/UL — HIGH (ref 0–0)
NRBC # FLD: 0.04 K/UL — HIGH (ref 0–0)
NRBC # FLD: 0.05 K/UL — HIGH (ref 0–0)
NRBC # FLD: 0.06 K/UL — HIGH (ref 0–0)
NRBC # FLD: 0.06 K/UL — HIGH (ref 0–0)
NRBC # FLD: 0.07 K/UL — HIGH (ref 0–0)
NRBC # FLD: 0.08 K/UL — HIGH (ref 0–0)
NRBC # FLD: 0.09 K/UL — HIGH (ref 0–0)
NRBC # FLD: 0.09 K/UL — HIGH (ref 0–0)
NRBC # FLD: 0.11 K/UL — HIGH (ref 0–0)
NRBC # FLD: 0.11 K/UL — HIGH (ref 0–0)
NRBC # FLD: 0.12 K/UL — HIGH (ref 0–0)
NRBC # FLD: 0.13 K/UL — HIGH (ref 0–0)
NRBC # FLD: 0.14 K/UL — HIGH (ref 0–0)
NRBC # FLD: 0.15 K/UL — HIGH (ref 0–0)
NRBC # FLD: 0.16 K/UL — HIGH (ref 0–0)
NRBC # FLD: 0.17 K/UL — HIGH (ref 0–0)
NRBC # FLD: 0.17 K/UL — HIGH (ref 0–0)
NRBC # FLD: 0.18 K/UL — HIGH (ref 0–0)
NRBC # FLD: 0.19 K/UL — HIGH (ref 0–0)
NRBC # FLD: 0.27 K/UL — HIGH (ref 0–0)
NRBC # FLD: 0.28 K/UL — HIGH (ref 0–0)
NRBC # FLD: 0.29 K/UL — HIGH (ref 0–0)
NRBC # FLD: 0.33 K/UL — HIGH (ref 0–0)
NRBC # FLD: 0.39 K/UL — HIGH (ref 0–0)
NRBC # FLD: 0.39 K/UL — HIGH (ref 0–0)
NRBC # FLD: 0.44 K/UL — HIGH (ref 0–0)
NRBC # FLD: 0.48 K/UL — HIGH (ref 0–0)
NRBC # FLD: 0.49 K/UL — HIGH (ref 0–0)
NRBC # FLD: 0.64 K/UL — HIGH (ref 0–0)
NRBC # FLD: 0.66 K/UL — HIGH (ref 0–0)
NRBC # FLD: 0.67 K/UL — HIGH (ref 0–0)
NRBC # FLD: 0.68 K/UL — HIGH (ref 0–0)
NRBC # FLD: 0.73 K/UL — HIGH (ref 0–0)
NRBC # FLD: 0.75 K/UL — HIGH (ref 0–0)
NRBC # FLD: 0.76 K/UL — HIGH (ref 0–0)
NRBC # FLD: 0.8 K/UL — HIGH (ref 0–0)
NRBC # FLD: 0.8 K/UL — HIGH (ref 0–0)
NRBC # FLD: 0.81 K/UL — HIGH (ref 0–0)
NRBC # FLD: 0.88 K/UL — HIGH (ref 0–0)
NRBC # FLD: 0.88 K/UL — HIGH (ref 0–0)
NRBC # FLD: 0.92 K/UL — HIGH (ref 0–0)
NRBC # FLD: 0.93 K/UL — HIGH (ref 0–0)
NRBC # FLD: 0.95 K/UL — HIGH (ref 0–0)
NRBC # FLD: 0.96 K/UL — HIGH (ref 0–0)
NRBC # FLD: 1 K/UL — HIGH (ref 0–0)
NRBC # FLD: 1 K/UL — HIGH (ref 0–0)
NRBC # FLD: 1.02 K/UL — HIGH (ref 0–0)
NRBC # FLD: 1.02 K/UL — HIGH (ref 0–0)
NRBC # FLD: 1.05 K/UL — HIGH (ref 0–0)
NRBC # FLD: 1.08 K/UL — HIGH (ref 0–0)
NRBC # FLD: 1.1 K/UL — HIGH (ref 0–0)
NRBC # FLD: 1.1 K/UL — HIGH (ref 0–0)
NRBC # FLD: 1.15 K/UL — HIGH (ref 0–0)
NRBC # FLD: 1.46 K/UL — HIGH (ref 0–0)
NRBC # FLD: 1.67 K/UL — HIGH (ref 0–0)
NRBC # FLD: 1.9 K/UL — HIGH (ref 0–0)
NRBC # FLD: 2 K/UL — HIGH (ref 0–0)
NRBC # FLD: 2.13 K/UL — HIGH (ref 0–0)
NRBC # FLD: 2.16 K/UL — HIGH (ref 0–0)
NRBC # FLD: 2.4 K/UL — HIGH (ref 0–0)
NRBC # FLD: 2.91 K/UL — HIGH (ref 0–0)
NRBC # FLD: 2.95 K/UL — HIGH (ref 0–0)
NRBC # FLD: 3.96 K/UL — HIGH (ref 0–0)
ORGANISM # SPEC MICROSCOPIC CNT: SIGNIFICANT CHANGE UP
OVALOCYTES BLD QL SMEAR: SLIGHT — SIGNIFICANT CHANGE UP
PCO2 BLDA: 25 MMHG — LOW (ref 32–45)
PCO2 BLDA: 26 MMHG — LOW (ref 32–45)
PCO2 BLDA: 29 MMHG — LOW (ref 32–45)
PCO2 BLDA: 32 MMHG — SIGNIFICANT CHANGE UP (ref 32–45)
PCO2 BLDA: 35 MMHG — SIGNIFICANT CHANGE UP (ref 32–45)
PCO2 BLDA: 38 MMHG — SIGNIFICANT CHANGE UP (ref 32–45)
PF4 HEPARIN CMPLX AB SER-ACNC: POSITIVE — SIGNIFICANT CHANGE UP
PH BLDA: 7.35 — SIGNIFICANT CHANGE UP (ref 7.35–7.45)
PH BLDA: 7.37 — SIGNIFICANT CHANGE UP (ref 7.35–7.45)
PH BLDA: 7.38 — SIGNIFICANT CHANGE UP (ref 7.35–7.45)
PH BLDA: 7.39 — SIGNIFICANT CHANGE UP (ref 7.35–7.45)
PH BLDA: 7.4 — SIGNIFICANT CHANGE UP (ref 7.35–7.45)
PH BLDA: 7.46 — HIGH (ref 7.35–7.45)
PH UR: 5 — SIGNIFICANT CHANGE UP (ref 5–8)
PH UR: 6 — SIGNIFICANT CHANGE UP (ref 5–8)
PHOSPHATE SERPL-MCNC: 1.9 MG/DL — LOW (ref 2.5–4.5)
PHOSPHATE SERPL-MCNC: 2.2 MG/DL — LOW (ref 2.5–4.5)
PHOSPHATE SERPL-MCNC: 2.3 MG/DL — LOW (ref 2.5–4.5)
PHOSPHATE SERPL-MCNC: 2.4 MG/DL — LOW (ref 2.5–4.5)
PHOSPHATE SERPL-MCNC: 2.5 MG/DL — SIGNIFICANT CHANGE UP (ref 2.5–4.5)
PHOSPHATE SERPL-MCNC: 2.6 MG/DL — SIGNIFICANT CHANGE UP (ref 2.5–4.5)
PHOSPHATE SERPL-MCNC: 2.7 MG/DL — SIGNIFICANT CHANGE UP (ref 2.5–4.5)
PHOSPHATE SERPL-MCNC: 2.8 MG/DL — SIGNIFICANT CHANGE UP (ref 2.5–4.5)
PHOSPHATE SERPL-MCNC: 2.9 MG/DL — SIGNIFICANT CHANGE UP (ref 2.5–4.5)
PHOSPHATE SERPL-MCNC: 3 MG/DL — SIGNIFICANT CHANGE UP (ref 2.5–4.5)
PHOSPHATE SERPL-MCNC: 3.1 MG/DL — SIGNIFICANT CHANGE UP (ref 2.5–4.5)
PHOSPHATE SERPL-MCNC: 3.2 MG/DL — SIGNIFICANT CHANGE UP (ref 2.5–4.5)
PHOSPHATE SERPL-MCNC: 3.3 MG/DL — SIGNIFICANT CHANGE UP (ref 2.5–4.5)
PHOSPHATE SERPL-MCNC: 3.4 MG/DL — SIGNIFICANT CHANGE UP (ref 2.5–4.5)
PHOSPHATE SERPL-MCNC: 3.5 MG/DL — SIGNIFICANT CHANGE UP (ref 2.5–4.5)
PHOSPHATE SERPL-MCNC: 3.6 MG/DL — SIGNIFICANT CHANGE UP (ref 2.5–4.5)
PHOSPHATE SERPL-MCNC: 3.8 MG/DL — SIGNIFICANT CHANGE UP (ref 2.5–4.5)
PHOSPHATE SERPL-MCNC: 3.9 MG/DL — SIGNIFICANT CHANGE UP (ref 2.5–4.5)
PHOSPHATE SERPL-MCNC: 4 MG/DL — SIGNIFICANT CHANGE UP (ref 2.5–4.5)
PHOSPHATE SERPL-MCNC: 4.1 MG/DL — SIGNIFICANT CHANGE UP (ref 2.5–4.5)
PHOSPHATE SERPL-MCNC: 4.1 MG/DL — SIGNIFICANT CHANGE UP (ref 2.5–4.5)
PHOSPHATE SERPL-MCNC: 4.2 MG/DL — SIGNIFICANT CHANGE UP (ref 2.5–4.5)
PHOSPHATE SERPL-MCNC: 4.3 MG/DL — SIGNIFICANT CHANGE UP (ref 2.5–4.5)
PHOSPHATE SERPL-MCNC: 4.3 MG/DL — SIGNIFICANT CHANGE UP (ref 2.5–4.5)
PHOSPHATE SERPL-MCNC: 4.6 MG/DL — HIGH (ref 2.5–4.5)
PHOSPHATE SERPL-MCNC: 4.7 MG/DL — HIGH (ref 2.5–4.5)
PHOSPHATE SERPL-MCNC: 4.9 MG/DL — HIGH (ref 2.5–4.5)
PHOSPHATE SERPL-MCNC: 5 MG/DL — HIGH (ref 2.5–4.5)
PHOSPHATE SERPL-MCNC: 5 MG/DL — HIGH (ref 2.5–4.5)
PHOSPHATE SERPL-MCNC: 5.2 MG/DL — HIGH (ref 2.5–4.5)
PHOSPHATE SERPL-MCNC: 5.7 MG/DL — HIGH (ref 2.5–4.5)
PLAT MORPH BLD: NORMAL — SIGNIFICANT CHANGE UP
PLATELET # BLD AUTO: 100 K/UL — LOW (ref 150–400)
PLATELET # BLD AUTO: 101 K/UL — LOW (ref 150–400)
PLATELET # BLD AUTO: 101 K/UL — LOW (ref 150–400)
PLATELET # BLD AUTO: 102 K/UL — LOW (ref 150–400)
PLATELET # BLD AUTO: 103 K/UL — LOW (ref 150–400)
PLATELET # BLD AUTO: 104 K/UL — LOW (ref 150–400)
PLATELET # BLD AUTO: 107 K/UL — LOW (ref 150–400)
PLATELET # BLD AUTO: 108 K/UL — LOW (ref 150–400)
PLATELET # BLD AUTO: 109 K/UL — LOW (ref 150–400)
PLATELET # BLD AUTO: 111 K/UL — LOW (ref 150–400)
PLATELET # BLD AUTO: 113 K/UL — LOW (ref 150–400)
PLATELET # BLD AUTO: 116 K/UL — LOW (ref 150–400)
PLATELET # BLD AUTO: 120 K/UL — LOW (ref 150–400)
PLATELET # BLD AUTO: 120 K/UL — LOW (ref 150–400)
PLATELET # BLD AUTO: 121 K/UL — LOW (ref 150–400)
PLATELET # BLD AUTO: 121 K/UL — LOW (ref 150–400)
PLATELET # BLD AUTO: 123 K/UL — LOW (ref 150–400)
PLATELET # BLD AUTO: 126 K/UL — LOW (ref 150–400)
PLATELET # BLD AUTO: 127 K/UL — LOW (ref 150–400)
PLATELET # BLD AUTO: 128 K/UL — LOW (ref 150–400)
PLATELET # BLD AUTO: 129 K/UL — LOW (ref 150–400)
PLATELET # BLD AUTO: 131 K/UL — LOW (ref 150–400)
PLATELET # BLD AUTO: 132 K/UL — LOW (ref 150–400)
PLATELET # BLD AUTO: 135 K/UL — LOW (ref 150–400)
PLATELET # BLD AUTO: 142 K/UL — LOW (ref 150–400)
PLATELET # BLD AUTO: 142 K/UL — LOW (ref 150–400)
PLATELET # BLD AUTO: 153 K/UL — SIGNIFICANT CHANGE UP (ref 150–400)
PLATELET # BLD AUTO: 153 K/UL — SIGNIFICANT CHANGE UP (ref 150–400)
PLATELET # BLD AUTO: 154 K/UL — SIGNIFICANT CHANGE UP (ref 150–400)
PLATELET # BLD AUTO: 155 K/UL — SIGNIFICANT CHANGE UP (ref 150–400)
PLATELET # BLD AUTO: 157 K/UL — SIGNIFICANT CHANGE UP (ref 150–400)
PLATELET # BLD AUTO: 159 K/UL — SIGNIFICANT CHANGE UP (ref 150–400)
PLATELET # BLD AUTO: 165 K/UL — SIGNIFICANT CHANGE UP (ref 150–400)
PLATELET # BLD AUTO: 168 K/UL — SIGNIFICANT CHANGE UP (ref 150–400)
PLATELET # BLD AUTO: 168 K/UL — SIGNIFICANT CHANGE UP (ref 150–400)
PLATELET # BLD AUTO: 182 K/UL — SIGNIFICANT CHANGE UP (ref 150–400)
PLATELET # BLD AUTO: 196 K/UL — SIGNIFICANT CHANGE UP (ref 150–400)
PLATELET # BLD AUTO: 197 K/UL — SIGNIFICANT CHANGE UP (ref 150–400)
PLATELET # BLD AUTO: 199 K/UL — SIGNIFICANT CHANGE UP (ref 150–400)
PLATELET # BLD AUTO: 200 K/UL — SIGNIFICANT CHANGE UP (ref 150–400)
PLATELET # BLD AUTO: 201 K/UL — SIGNIFICANT CHANGE UP (ref 150–400)
PLATELET # BLD AUTO: 207 K/UL — SIGNIFICANT CHANGE UP (ref 150–400)
PLATELET # BLD AUTO: 216 K/UL — SIGNIFICANT CHANGE UP (ref 150–400)
PLATELET # BLD AUTO: 217 K/UL — SIGNIFICANT CHANGE UP (ref 150–400)
PLATELET # BLD AUTO: 221 K/UL — SIGNIFICANT CHANGE UP (ref 150–400)
PLATELET # BLD AUTO: 226 K/UL — SIGNIFICANT CHANGE UP (ref 150–400)
PLATELET # BLD AUTO: 233 K/UL — SIGNIFICANT CHANGE UP (ref 150–400)
PLATELET # BLD AUTO: 235 K/UL — SIGNIFICANT CHANGE UP (ref 150–400)
PLATELET # BLD AUTO: 240 K/UL — SIGNIFICANT CHANGE UP (ref 150–400)
PLATELET # BLD AUTO: 243 K/UL — SIGNIFICANT CHANGE UP (ref 150–400)
PLATELET # BLD AUTO: 248 K/UL — SIGNIFICANT CHANGE UP (ref 150–400)
PLATELET # BLD AUTO: 251 K/UL — SIGNIFICANT CHANGE UP (ref 150–400)
PLATELET # BLD AUTO: 251 K/UL — SIGNIFICANT CHANGE UP (ref 150–400)
PLATELET # BLD AUTO: 259 K/UL — SIGNIFICANT CHANGE UP (ref 150–400)
PLATELET # BLD AUTO: 261 K/UL — SIGNIFICANT CHANGE UP (ref 150–400)
PLATELET # BLD AUTO: 262 K/UL — SIGNIFICANT CHANGE UP (ref 150–400)
PLATELET # BLD AUTO: 268 K/UL — SIGNIFICANT CHANGE UP (ref 150–400)
PLATELET # BLD AUTO: 270 K/UL — SIGNIFICANT CHANGE UP (ref 150–400)
PLATELET # BLD AUTO: 293 K/UL — SIGNIFICANT CHANGE UP (ref 150–400)
PLATELET # BLD AUTO: 294 K/UL — SIGNIFICANT CHANGE UP (ref 150–400)
PLATELET # BLD AUTO: 308 K/UL — SIGNIFICANT CHANGE UP (ref 150–400)
PLATELET # BLD AUTO: 314 K/UL — SIGNIFICANT CHANGE UP (ref 150–400)
PLATELET # BLD AUTO: 330 K/UL — SIGNIFICANT CHANGE UP (ref 150–400)
PLATELET # BLD AUTO: 333 K/UL — SIGNIFICANT CHANGE UP (ref 150–400)
PLATELET # BLD AUTO: 338 K/UL — SIGNIFICANT CHANGE UP (ref 150–400)
PLATELET # BLD AUTO: 352 K/UL — SIGNIFICANT CHANGE UP (ref 150–400)
PLATELET # BLD AUTO: 359 K/UL — SIGNIFICANT CHANGE UP (ref 150–400)
PLATELET # BLD AUTO: 369 K/UL — SIGNIFICANT CHANGE UP (ref 150–400)
PLATELET # BLD AUTO: 374 K/UL — SIGNIFICANT CHANGE UP (ref 150–400)
PLATELET # BLD AUTO: 398 K/UL — SIGNIFICANT CHANGE UP (ref 150–400)
PLATELET # BLD AUTO: 399 K/UL — SIGNIFICANT CHANGE UP (ref 150–400)
PLATELET # BLD AUTO: 400 K/UL — SIGNIFICANT CHANGE UP (ref 150–400)
PLATELET # BLD AUTO: 401 K/UL — HIGH (ref 150–400)
PLATELET # BLD AUTO: 409 K/UL — HIGH (ref 150–400)
PLATELET # BLD AUTO: 426 K/UL — HIGH (ref 150–400)
PLATELET # BLD AUTO: 429 K/UL — HIGH (ref 150–400)
PLATELET # BLD AUTO: 480 K/UL — HIGH (ref 150–400)
PLATELET # BLD AUTO: 74 K/UL — LOW (ref 150–400)
PLATELET # BLD AUTO: 83 K/UL — LOW (ref 150–400)
PLATELET # BLD AUTO: 86 K/UL — LOW (ref 150–400)
PLATELET # BLD AUTO: 86 K/UL — LOW (ref 150–400)
PLATELET # BLD AUTO: 90 K/UL — LOW (ref 150–400)
PLATELET # BLD AUTO: 91 K/UL — LOW (ref 150–400)
PLATELET # BLD AUTO: 93 K/UL — LOW (ref 150–400)
PLATELET # BLD AUTO: 93 K/UL — LOW (ref 150–400)
PLATELET # BLD AUTO: 96 K/UL — LOW (ref 150–400)
PLATELET # BLD AUTO: 97 K/UL — LOW (ref 150–400)
PLATELET # BLD AUTO: 97 K/UL — LOW (ref 150–400)
PLATELET # BLD AUTO: 98 K/UL — LOW (ref 150–400)
PLATELET # BLD AUTO: 99 K/UL — LOW (ref 150–400)
PLATELET COUNT - ESTIMATE: ABNORMAL
PLATELET COUNT - ESTIMATE: ABNORMAL
PLATELET COUNT - ESTIMATE: NORMAL — SIGNIFICANT CHANGE UP
PO2 BLDA: 100 MMHG — SIGNIFICANT CHANGE UP (ref 83–108)
PO2 BLDA: 157 MMHG — HIGH (ref 83–108)
PO2 BLDA: 191 MMHG — HIGH (ref 83–108)
PO2 BLDA: 76 MMHG — LOW (ref 83–108)
PO2 BLDA: 83 MMHG — SIGNIFICANT CHANGE UP (ref 83–108)
PO2 BLDA: 93 MMHG — SIGNIFICANT CHANGE UP (ref 83–108)
POIKILOCYTOSIS BLD QL AUTO: SIGNIFICANT CHANGE UP
POIKILOCYTOSIS BLD QL AUTO: SLIGHT — SIGNIFICANT CHANGE UP
POIKILOCYTOSIS BLD QL AUTO: SLIGHT — SIGNIFICANT CHANGE UP
POLYCHROMASIA BLD QL SMEAR: SLIGHT — SIGNIFICANT CHANGE UP
POTASSIUM SERPL-MCNC: 2.7 MMOL/L — CRITICAL LOW (ref 3.5–5.3)
POTASSIUM SERPL-MCNC: 3.1 MMOL/L — LOW (ref 3.5–5.3)
POTASSIUM SERPL-MCNC: 3.1 MMOL/L — LOW (ref 3.5–5.3)
POTASSIUM SERPL-MCNC: 3.2 MMOL/L — LOW (ref 3.5–5.3)
POTASSIUM SERPL-MCNC: 3.3 MMOL/L — LOW (ref 3.5–5.3)
POTASSIUM SERPL-MCNC: 3.4 MMOL/L — LOW (ref 3.5–5.3)
POTASSIUM SERPL-MCNC: 3.5 MMOL/L — SIGNIFICANT CHANGE UP (ref 3.5–5.3)
POTASSIUM SERPL-MCNC: 3.6 MMOL/L — SIGNIFICANT CHANGE UP (ref 3.5–5.3)
POTASSIUM SERPL-MCNC: 3.7 MMOL/L — SIGNIFICANT CHANGE UP (ref 3.5–5.3)
POTASSIUM SERPL-MCNC: 3.8 MMOL/L — SIGNIFICANT CHANGE UP (ref 3.5–5.3)
POTASSIUM SERPL-MCNC: 3.9 MMOL/L — SIGNIFICANT CHANGE UP (ref 3.5–5.3)
POTASSIUM SERPL-MCNC: 4 MMOL/L — SIGNIFICANT CHANGE UP (ref 3.5–5.3)
POTASSIUM SERPL-MCNC: 4.1 MMOL/L — SIGNIFICANT CHANGE UP (ref 3.5–5.3)
POTASSIUM SERPL-MCNC: 4.2 MMOL/L — SIGNIFICANT CHANGE UP (ref 3.5–5.3)
POTASSIUM SERPL-MCNC: 4.3 MMOL/L — SIGNIFICANT CHANGE UP (ref 3.5–5.3)
POTASSIUM SERPL-MCNC: 4.4 MMOL/L — SIGNIFICANT CHANGE UP (ref 3.5–5.3)
POTASSIUM SERPL-MCNC: 4.5 MMOL/L — SIGNIFICANT CHANGE UP (ref 3.5–5.3)
POTASSIUM SERPL-MCNC: 4.6 MMOL/L — SIGNIFICANT CHANGE UP (ref 3.5–5.3)
POTASSIUM SERPL-MCNC: 4.7 MMOL/L — SIGNIFICANT CHANGE UP (ref 3.5–5.3)
POTASSIUM SERPL-MCNC: 4.8 MMOL/L — SIGNIFICANT CHANGE UP (ref 3.5–5.3)
POTASSIUM SERPL-MCNC: 4.9 MMOL/L — SIGNIFICANT CHANGE UP (ref 3.5–5.3)
POTASSIUM SERPL-MCNC: 5 MMOL/L — SIGNIFICANT CHANGE UP (ref 3.5–5.3)
POTASSIUM SERPL-MCNC: 5.2 MMOL/L — SIGNIFICANT CHANGE UP (ref 3.5–5.3)
POTASSIUM SERPL-MCNC: 5.3 MMOL/L — SIGNIFICANT CHANGE UP (ref 3.5–5.3)
POTASSIUM SERPL-MCNC: 5.5 MMOL/L — HIGH (ref 3.5–5.3)
POTASSIUM SERPL-MCNC: 5.7 MMOL/L — HIGH (ref 3.5–5.3)
POTASSIUM SERPL-MCNC: 6 MMOL/L — HIGH (ref 3.5–5.3)
POTASSIUM SERPL-SCNC: 2.7 MMOL/L — CRITICAL LOW (ref 3.5–5.3)
POTASSIUM SERPL-SCNC: 3.1 MMOL/L — LOW (ref 3.5–5.3)
POTASSIUM SERPL-SCNC: 3.1 MMOL/L — LOW (ref 3.5–5.3)
POTASSIUM SERPL-SCNC: 3.2 MMOL/L — LOW (ref 3.5–5.3)
POTASSIUM SERPL-SCNC: 3.3 MMOL/L — LOW (ref 3.5–5.3)
POTASSIUM SERPL-SCNC: 3.4 MMOL/L — LOW (ref 3.5–5.3)
POTASSIUM SERPL-SCNC: 3.5 MMOL/L — SIGNIFICANT CHANGE UP (ref 3.5–5.3)
POTASSIUM SERPL-SCNC: 3.6 MMOL/L — SIGNIFICANT CHANGE UP (ref 3.5–5.3)
POTASSIUM SERPL-SCNC: 3.7 MMOL/L — SIGNIFICANT CHANGE UP (ref 3.5–5.3)
POTASSIUM SERPL-SCNC: 3.8 MMOL/L — SIGNIFICANT CHANGE UP (ref 3.5–5.3)
POTASSIUM SERPL-SCNC: 3.9 MMOL/L — SIGNIFICANT CHANGE UP (ref 3.5–5.3)
POTASSIUM SERPL-SCNC: 4 MMOL/L — SIGNIFICANT CHANGE UP (ref 3.5–5.3)
POTASSIUM SERPL-SCNC: 4.1 MMOL/L — SIGNIFICANT CHANGE UP (ref 3.5–5.3)
POTASSIUM SERPL-SCNC: 4.2 MMOL/L — SIGNIFICANT CHANGE UP (ref 3.5–5.3)
POTASSIUM SERPL-SCNC: 4.3 MMOL/L — SIGNIFICANT CHANGE UP (ref 3.5–5.3)
POTASSIUM SERPL-SCNC: 4.4 MMOL/L — SIGNIFICANT CHANGE UP (ref 3.5–5.3)
POTASSIUM SERPL-SCNC: 4.5 MMOL/L — SIGNIFICANT CHANGE UP (ref 3.5–5.3)
POTASSIUM SERPL-SCNC: 4.6 MMOL/L — SIGNIFICANT CHANGE UP (ref 3.5–5.3)
POTASSIUM SERPL-SCNC: 4.7 MMOL/L — SIGNIFICANT CHANGE UP (ref 3.5–5.3)
POTASSIUM SERPL-SCNC: 4.8 MMOL/L — SIGNIFICANT CHANGE UP (ref 3.5–5.3)
POTASSIUM SERPL-SCNC: 4.9 MMOL/L — SIGNIFICANT CHANGE UP (ref 3.5–5.3)
POTASSIUM SERPL-SCNC: 5 MMOL/L — SIGNIFICANT CHANGE UP (ref 3.5–5.3)
POTASSIUM SERPL-SCNC: 5.2 MMOL/L — SIGNIFICANT CHANGE UP (ref 3.5–5.3)
POTASSIUM SERPL-SCNC: 5.3 MMOL/L — SIGNIFICANT CHANGE UP (ref 3.5–5.3)
POTASSIUM SERPL-SCNC: 5.5 MMOL/L — HIGH (ref 3.5–5.3)
POTASSIUM SERPL-SCNC: 5.7 MMOL/L — HIGH (ref 3.5–5.3)
POTASSIUM SERPL-SCNC: 6 MMOL/L — HIGH (ref 3.5–5.3)
PROCALCITONIN SERPL-MCNC: 14.39 NG/ML — HIGH (ref 0.02–0.1)
PROCALCITONIN SERPL-MCNC: 17.25 NG/ML — HIGH (ref 0.02–0.1)
PROCALCITONIN SERPL-MCNC: 22.81 NG/ML — HIGH (ref 0.02–0.1)
PROCALCITONIN SERPL-MCNC: 7.65 NG/ML — HIGH (ref 0.02–0.1)
PROT SERPL-MCNC: 3.2 GM/DL — LOW (ref 6–8.3)
PROT SERPL-MCNC: 4.2 G/DL — LOW (ref 6–8.3)
PROT SERPL-MCNC: 4.4 G/DL — LOW (ref 6–8.3)
PROT SERPL-MCNC: 4.5 G/DL — LOW (ref 6–8.3)
PROT SERPL-MCNC: 4.6 G/DL — LOW (ref 6–8.3)
PROT SERPL-MCNC: 4.6 G/DL — LOW (ref 6–8.3)
PROT SERPL-MCNC: 4.7 G/DL — LOW (ref 6–8.3)
PROT SERPL-MCNC: 4.8 G/DL — LOW (ref 6–8.3)
PROT SERPL-MCNC: 4.9 G/DL — LOW (ref 6–8.3)
PROT SERPL-MCNC: 5 G/DL — LOW (ref 6–8.3)
PROT SERPL-MCNC: 5.1 G/DL — LOW (ref 6–8.3)
PROT SERPL-MCNC: 5.1 G/DL — LOW (ref 6–8.3)
PROT SERPL-MCNC: 5.2 G/DL — LOW (ref 6–8.3)
PROT SERPL-MCNC: 5.2 G/DL — LOW (ref 6–8.3)
PROT SERPL-MCNC: 5.3 G/DL — LOW (ref 6–8.3)
PROT SERPL-MCNC: 5.4 G/DL — LOW (ref 6–8.3)
PROT SERPL-MCNC: 5.4 GM/DL — LOW (ref 6–8.3)
PROT SERPL-MCNC: 5.4 GM/DL — LOW (ref 6–8.3)
PROT SERPL-MCNC: 5.5 G/DL — LOW (ref 6–8.3)
PROT SERPL-MCNC: 5.6 G/DL — LOW (ref 6–8.3)
PROT SERPL-MCNC: 5.6 G/DL — LOW (ref 6–8.3)
PROT SERPL-MCNC: 5.7 G/DL — LOW (ref 6–8.3)
PROT SERPL-MCNC: 5.7 G/DL — LOW (ref 6–8.3)
PROT SERPL-MCNC: 5.8 G/DL — LOW (ref 6–8.3)
PROT SERPL-MCNC: 5.9 G/DL — LOW (ref 6–8.3)
PROT SERPL-MCNC: 5.9 G/DL — LOW (ref 6–8.3)
PROT SERPL-MCNC: 6 G/DL — SIGNIFICANT CHANGE UP (ref 6–8.3)
PROT SERPL-MCNC: 6.3 G/DL — SIGNIFICANT CHANGE UP (ref 6–8.3)
PROT SERPL-MCNC: 6.5 G/DL — SIGNIFICANT CHANGE UP (ref 6–8.3)
PROT SERPL-MCNC: 6.5 GM/DL — SIGNIFICANT CHANGE UP (ref 6–8.3)
PROT SERPL-MCNC: 6.6 G/DL — SIGNIFICANT CHANGE UP (ref 6–8.3)
PROT SERPL-MCNC: 6.7 G/DL — SIGNIFICANT CHANGE UP (ref 6–8.3)
PROT SERPL-MCNC: 6.8 G/DL — SIGNIFICANT CHANGE UP (ref 6–8.3)
PROT SERPL-MCNC: 6.9 G/DL — SIGNIFICANT CHANGE UP (ref 6–8.3)
PROT SERPL-MCNC: 7 G/DL — SIGNIFICANT CHANGE UP (ref 6–8.3)
PROT SERPL-MCNC: 7.1 G/DL — SIGNIFICANT CHANGE UP (ref 6–8.3)
PROT SERPL-MCNC: 7.2 G/DL — SIGNIFICANT CHANGE UP (ref 6–8.3)
PROT SERPL-MCNC: 7.3 G/DL — SIGNIFICANT CHANGE UP (ref 6–8.3)
PROT SERPL-MCNC: 7.4 G/DL — SIGNIFICANT CHANGE UP (ref 6–8.3)
PROT SERPL-MCNC: 7.4 G/DL — SIGNIFICANT CHANGE UP (ref 6–8.3)
PROT SERPL-MCNC: 7.5 G/DL — SIGNIFICANT CHANGE UP (ref 6–8.3)
PROT SERPL-MCNC: 7.5 G/DL — SIGNIFICANT CHANGE UP (ref 6–8.3)
PROT SERPL-MCNC: 7.9 G/DL — SIGNIFICANT CHANGE UP (ref 6–8.3)
PROT SERPL-MCNC: 8.1 GM/DL — SIGNIFICANT CHANGE UP (ref 6–8.3)
PROT SERPL-MCNC: SIGNIFICANT CHANGE UP G/DL (ref 6–8.3)
PROT UR-MCNC: 100 MG/DL
PROT UR-MCNC: 30 MG/DL
PROTHROM AB SERPL-ACNC: 12.9 SEC — SIGNIFICANT CHANGE UP (ref 10.5–13.4)
PROTHROM AB SERPL-ACNC: 13 SEC — SIGNIFICANT CHANGE UP (ref 10.5–13.4)
PROTHROM AB SERPL-ACNC: 13.1 SEC — SIGNIFICANT CHANGE UP (ref 10.5–13.4)
PROTHROM AB SERPL-ACNC: 13.1 SEC — SIGNIFICANT CHANGE UP (ref 10.5–13.4)
PROTHROM AB SERPL-ACNC: 14 SEC — HIGH (ref 10.5–13.4)
PROTHROM AB SERPL-ACNC: 14.1 SEC — HIGH (ref 10.5–13.4)
PROTHROM AB SERPL-ACNC: 14.4 SEC — HIGH (ref 10.5–13.4)
PROTHROM AB SERPL-ACNC: 14.5 SEC — HIGH (ref 10.5–13.4)
PROTHROM AB SERPL-ACNC: 14.6 SEC — HIGH (ref 10.5–13.4)
PROTHROM AB SERPL-ACNC: 14.7 SEC — HIGH (ref 10.5–13.4)
PROTHROM AB SERPL-ACNC: 14.8 SEC — HIGH (ref 10.5–13.4)
PROTHROM AB SERPL-ACNC: 14.9 SEC — HIGH (ref 10.5–13.4)
PROTHROM AB SERPL-ACNC: 14.9 SEC — HIGH (ref 10.5–13.4)
PROTHROM AB SERPL-ACNC: 15.3 SEC — HIGH (ref 10.5–13.4)
PROTHROM AB SERPL-ACNC: 15.4 SEC — HIGH (ref 10.5–13.4)
PROTHROM AB SERPL-ACNC: 15.4 SEC — HIGH (ref 10.5–13.4)
PROTHROM AB SERPL-ACNC: 15.5 SEC — HIGH (ref 10.5–13.4)
PROTHROM AB SERPL-ACNC: 15.7 SEC — HIGH (ref 10.5–13.4)
PROTHROM AB SERPL-ACNC: 15.7 SEC — HIGH (ref 10.5–13.4)
PROTHROM AB SERPL-ACNC: 15.8 SEC — HIGH (ref 10.5–13.4)
PROTHROM AB SERPL-ACNC: 15.8 SEC — HIGH (ref 10.5–13.4)
PROTHROM AB SERPL-ACNC: 15.9 SEC — HIGH (ref 10.5–13.4)
PROTHROM AB SERPL-ACNC: 16 SEC — HIGH (ref 10.5–13.4)
PROTHROM AB SERPL-ACNC: 16 SEC — HIGH (ref 10.5–13.4)
PROTHROM AB SERPL-ACNC: 16.4 SEC — HIGH (ref 10.5–13.4)
PROTHROM AB SERPL-ACNC: 16.9 SEC — HIGH (ref 10.5–13.4)
PROTHROM AB SERPL-ACNC: 17.1 SEC — HIGH (ref 10.5–13.4)
PROTHROM AB SERPL-ACNC: 17.1 SEC — HIGH (ref 10.5–13.4)
PROTHROM AB SERPL-ACNC: 17.7 SEC — HIGH (ref 10.5–13.4)
PROTHROM AB SERPL-ACNC: 17.7 SEC — HIGH (ref 10.5–13.4)
PROTHROM AB SERPL-ACNC: 18.3 SEC — HIGH (ref 10.5–13.4)
PROTHROM AB SERPL-ACNC: 18.7 SEC — HIGH (ref 10.5–13.4)
PROTHROM AB SERPL-ACNC: 18.9 SEC — HIGH (ref 10.5–13.4)
PROTHROM AB SERPL-ACNC: 19.2 SEC — HIGH (ref 10.5–13.4)
PROTHROM AB SERPL-ACNC: 19.3 SEC — HIGH (ref 10.5–13.4)
PROTHROM AB SERPL-ACNC: 19.4 SEC — HIGH (ref 10.5–13.4)
PROTHROM AB SERPL-ACNC: 20 SEC — HIGH (ref 10.5–13.4)
PROTHROM AB SERPL-ACNC: 20.3 SEC — HIGH (ref 10.5–13.4)
PROTHROM AB SERPL-ACNC: 20.4 SEC — HIGH (ref 10.5–13.4)
PROTHROM AB SERPL-ACNC: 20.7 SEC — HIGH (ref 10.5–13.4)
PROTHROM AB SERPL-ACNC: 21 SEC — HIGH (ref 10.5–13.4)
PROTHROM AB SERPL-ACNC: 21.1 SEC — HIGH (ref 10.5–13.4)
PROTHROM AB SERPL-ACNC: 21.4 SEC — HIGH (ref 10.5–13.4)
PROTHROM AB SERPL-ACNC: 21.6 SEC — HIGH (ref 10.5–13.4)
PROTHROM AB SERPL-ACNC: 23.4 SEC — HIGH (ref 10.5–13.4)
PROTHROM AB SERPL-ACNC: 24 SEC — HIGH (ref 10.5–13.4)
PROTHROM AB SERPL-ACNC: 24.2 SEC — HIGH (ref 10.5–13.4)
PROTHROM AB SERPL-ACNC: 24.3 SEC — SIGNIFICANT CHANGE UP (ref 10.5–13.4)
PROTHROM AB SERPL-ACNC: 24.7 SEC — HIGH (ref 10.5–13.4)
PROTHROM AB SERPL-ACNC: 25.6 SEC — HIGH (ref 10.5–13.4)
PROTHROM AB SERPL-ACNC: 26 SEC — HIGH (ref 10.5–13.4)
PROTHROM AB SERPL-ACNC: 26.1 SEC — HIGH (ref 10.5–13.4)
PROTHROM AB SERPL-ACNC: 28.5 SEC — HIGH (ref 10.5–13.4)
PROTHROM AB SERPL-ACNC: 29 SEC — HIGH (ref 10.5–13.4)
PROTHROM AB SERPL-ACNC: 30.1 SEC — HIGH (ref 10.5–13.4)
PROTHROM AB SERPL-ACNC: 30.7 SEC — HIGH (ref 10.5–13.4)
PROTHROM AB SERPL-ACNC: 31.3 SEC — HIGH (ref 10.5–13.4)
PROTHROM AB SERPL-ACNC: 31.7 SEC — HIGH (ref 10.5–13.4)
PROTHROM AB SERPL-ACNC: 32 SEC — HIGH (ref 10.5–13.4)
PROTHROM AB SERPL-ACNC: 32.6 SEC — HIGH (ref 10.5–13.4)
PROTHROM AB SERPL-ACNC: 33.1 SEC — HIGH (ref 10.5–13.4)
PROTHROM AB SERPL-ACNC: 33.8 SEC — HIGH (ref 10.5–13.4)
PROTHROM AB SERPL-ACNC: 34.8 SEC — HIGH (ref 10.5–13.4)
PROTHROM AB SERPL-ACNC: 45.5 SEC — HIGH (ref 10.5–13.4)
RAPID RVP RESULT: SIGNIFICANT CHANGE UP
RBC # BLD: 1.91 M/UL — LOW (ref 3.8–5.2)
RBC # BLD: 2.09 M/UL — LOW (ref 3.8–5.2)
RBC # BLD: 2.25 M/UL — LOW (ref 3.8–5.2)
RBC # BLD: 2.31 M/UL — LOW (ref 3.8–5.2)
RBC # BLD: 2.35 M/UL — LOW (ref 3.8–5.2)
RBC # BLD: 2.37 M/UL — LOW (ref 3.8–5.2)
RBC # BLD: 2.38 M/UL — LOW (ref 3.8–5.2)
RBC # BLD: 2.41 M/UL — LOW (ref 3.8–5.2)
RBC # BLD: 2.42 M/UL — LOW (ref 3.8–5.2)
RBC # BLD: 2.42 M/UL — LOW (ref 3.8–5.2)
RBC # BLD: 2.47 M/UL — LOW (ref 3.8–5.2)
RBC # BLD: 2.49 M/UL — LOW (ref 3.8–5.2)
RBC # BLD: 2.49 M/UL — LOW (ref 3.8–5.2)
RBC # BLD: 2.53 M/UL — LOW (ref 3.8–5.2)
RBC # BLD: 2.54 M/UL — LOW (ref 3.8–5.2)
RBC # BLD: 2.56 M/UL — LOW (ref 3.8–5.2)
RBC # BLD: 2.56 M/UL — LOW (ref 3.8–5.2)
RBC # BLD: 2.57 M/UL — LOW (ref 3.8–5.2)
RBC # BLD: 2.57 M/UL — LOW (ref 3.8–5.2)
RBC # BLD: 2.59 M/UL — LOW (ref 3.8–5.2)
RBC # BLD: 2.6 M/UL — LOW (ref 3.8–5.2)
RBC # BLD: 2.62 M/UL — LOW (ref 3.8–5.2)
RBC # BLD: 2.62 M/UL — LOW (ref 3.8–5.2)
RBC # BLD: 2.64 M/UL — LOW (ref 3.8–5.2)
RBC # BLD: 2.64 M/UL — LOW (ref 3.8–5.2)
RBC # BLD: 2.65 M/UL — LOW (ref 3.8–5.2)
RBC # BLD: 2.65 M/UL — LOW (ref 3.8–5.2)
RBC # BLD: 2.66 M/UL — LOW (ref 3.8–5.2)
RBC # BLD: 2.67 M/UL — LOW (ref 3.8–5.2)
RBC # BLD: 2.69 M/UL — LOW (ref 3.8–5.2)
RBC # BLD: 2.7 M/UL — LOW (ref 3.8–5.2)
RBC # BLD: 2.71 M/UL — LOW (ref 3.8–5.2)
RBC # BLD: 2.71 M/UL — LOW (ref 3.8–5.2)
RBC # BLD: 2.72 M/UL — LOW (ref 3.8–5.2)
RBC # BLD: 2.75 M/UL — LOW (ref 3.8–5.2)
RBC # BLD: 2.78 M/UL — LOW (ref 3.8–5.2)
RBC # BLD: 2.79 M/UL — LOW (ref 3.8–5.2)
RBC # BLD: 2.79 M/UL — LOW (ref 3.8–5.2)
RBC # BLD: 2.8 M/UL — LOW (ref 3.8–5.2)
RBC # BLD: 2.82 M/UL — LOW (ref 3.8–5.2)
RBC # BLD: 2.83 M/UL — LOW (ref 3.8–5.2)
RBC # BLD: 2.83 M/UL — LOW (ref 3.8–5.2)
RBC # BLD: 2.84 M/UL — LOW (ref 3.8–5.2)
RBC # BLD: 2.84 M/UL — LOW (ref 3.8–5.2)
RBC # BLD: 2.88 M/UL — LOW (ref 3.8–5.2)
RBC # BLD: 2.9 M/UL — LOW (ref 3.8–5.2)
RBC # BLD: 2.91 M/UL — LOW (ref 3.8–5.2)
RBC # BLD: 2.91 M/UL — LOW (ref 3.8–5.2)
RBC # BLD: 2.93 M/UL — LOW (ref 3.8–5.2)
RBC # BLD: 2.95 M/UL — LOW (ref 3.8–5.2)
RBC # BLD: 2.96 M/UL — LOW (ref 3.8–5.2)
RBC # BLD: 2.96 M/UL — LOW (ref 3.8–5.2)
RBC # BLD: 2.99 M/UL — LOW (ref 3.8–5.2)
RBC # BLD: 3.05 M/UL — LOW (ref 3.8–5.2)
RBC # BLD: 3.06 M/UL — LOW (ref 3.8–5.2)
RBC # BLD: 3.07 M/UL — LOW (ref 3.8–5.2)
RBC # BLD: 3.08 M/UL — LOW (ref 3.8–5.2)
RBC # BLD: 3.08 M/UL — LOW (ref 3.8–5.2)
RBC # BLD: 3.1 M/UL — LOW (ref 3.8–5.2)
RBC # BLD: 3.19 M/UL — LOW (ref 3.8–5.2)
RBC # BLD: 3.24 M/UL — LOW (ref 3.8–5.2)
RBC # BLD: 3.25 M/UL — LOW (ref 3.8–5.2)
RBC # BLD: 3.26 M/UL — LOW (ref 3.8–5.2)
RBC # BLD: 3.28 M/UL — LOW (ref 3.8–5.2)
RBC # BLD: 3.29 M/UL — LOW (ref 3.8–5.2)
RBC # BLD: 3.31 M/UL — LOW (ref 3.8–5.2)
RBC # BLD: 3.37 M/UL — LOW (ref 3.8–5.2)
RBC # BLD: 3.37 M/UL — LOW (ref 3.8–5.2)
RBC # BLD: 3.57 M/UL — LOW (ref 3.8–5.2)
RBC # BLD: 3.58 M/UL — LOW (ref 3.8–5.2)
RBC # BLD: 3.67 M/UL — LOW (ref 3.8–5.2)
RBC # BLD: 3.7 M/UL — LOW (ref 3.8–5.2)
RBC # BLD: 4.03 M/UL — SIGNIFICANT CHANGE UP (ref 3.8–5.2)
RBC # BLD: 6.16 M/UL — HIGH (ref 3.8–5.2)
RBC # BLD: 6.45 M/UL — HIGH (ref 3.8–5.2)
RBC # FLD: 14.7 % — HIGH (ref 10.3–14.5)
RBC # FLD: 15.1 % — HIGH (ref 10.3–14.5)
RBC # FLD: 15.2 % — HIGH (ref 10.3–14.5)
RBC # FLD: 15.3 % — HIGH (ref 10.3–14.5)
RBC # FLD: 15.4 % — HIGH (ref 10.3–14.5)
RBC # FLD: 15.5 % — HIGH (ref 10.3–14.5)
RBC # FLD: 15.6 % — HIGH (ref 10.3–14.5)
RBC # FLD: 15.7 % — HIGH (ref 10.3–14.5)
RBC # FLD: 15.7 % — HIGH (ref 10.3–14.5)
RBC # FLD: 15.8 % — HIGH (ref 10.3–14.5)
RBC # FLD: 15.9 % — HIGH (ref 10.3–14.5)
RBC # FLD: 15.9 % — HIGH (ref 10.3–14.5)
RBC # FLD: 16.3 % — HIGH (ref 10.3–14.5)
RBC # FLD: 16.4 % — HIGH (ref 10.3–14.5)
RBC # FLD: 16.6 % — HIGH (ref 10.3–14.5)
RBC # FLD: 16.8 % — HIGH (ref 10.3–14.5)
RBC # FLD: 16.8 % — HIGH (ref 10.3–14.5)
RBC # FLD: 17 % — HIGH (ref 10.3–14.5)
RBC # FLD: 17.1 % — HIGH (ref 10.3–14.5)
RBC # FLD: 17.1 % — HIGH (ref 10.3–14.5)
RBC # FLD: 17.5 % — HIGH (ref 10.3–14.5)
RBC # FLD: 17.9 % — HIGH (ref 10.3–14.5)
RBC # FLD: 18.7 % — HIGH (ref 10.3–14.5)
RBC # FLD: 19.6 % — HIGH (ref 10.3–14.5)
RBC # FLD: 20.2 % — HIGH (ref 10.3–14.5)
RBC # FLD: 20.3 % — HIGH (ref 10.3–14.5)
RBC # FLD: 20.6 % — HIGH (ref 10.3–14.5)
RBC # FLD: 20.7 % — HIGH (ref 10.3–14.5)
RBC # FLD: 20.7 % — HIGH (ref 10.3–14.5)
RBC # FLD: 20.8 % — HIGH (ref 10.3–14.5)
RBC # FLD: 20.9 % — HIGH (ref 10.3–14.5)
RBC # FLD: 21 % — HIGH (ref 10.3–14.5)
RBC # FLD: 21.1 % — HIGH (ref 10.3–14.5)
RBC # FLD: 21.2 % — HIGH (ref 10.3–14.5)
RBC # FLD: 21.3 % — HIGH (ref 10.3–14.5)
RBC # FLD: 21.4 % — HIGH (ref 10.3–14.5)
RBC # FLD: 21.5 % — HIGH (ref 10.3–14.5)
RBC # FLD: 21.6 % — HIGH (ref 10.3–14.5)
RBC # FLD: 21.7 % — HIGH (ref 10.3–14.5)
RBC # FLD: 21.8 % — HIGH (ref 10.3–14.5)
RBC # FLD: 21.9 % — HIGH (ref 10.3–14.5)
RBC # FLD: 22 % — HIGH (ref 10.3–14.5)
RBC # FLD: 22 % — HIGH (ref 10.3–14.5)
RBC # FLD: 22.2 % — HIGH (ref 10.3–14.5)
RBC # FLD: 22.4 % — HIGH (ref 10.3–14.5)
RBC # FLD: 22.5 % — HIGH (ref 10.3–14.5)
RBC # FLD: 22.6 % — HIGH (ref 10.3–14.5)
RBC # FLD: 22.8 % — HIGH (ref 10.3–14.5)
RBC # FLD: 22.9 % — HIGH (ref 10.3–14.5)
RBC # FLD: 23 % — HIGH (ref 10.3–14.5)
RBC # FLD: 23.2 % — HIGH (ref 10.3–14.5)
RBC # FLD: 23.4 % — HIGH (ref 10.3–14.5)
RBC # FLD: 23.4 % — HIGH (ref 10.3–14.5)
RBC # FLD: 23.5 % — HIGH (ref 10.3–14.5)
RBC # FLD: 23.5 % — HIGH (ref 10.3–14.5)
RBC # FLD: 23.6 % — HIGH (ref 10.3–14.5)
RBC # FLD: 23.8 % — HIGH (ref 10.3–14.5)
RBC # FLD: 23.9 % — HIGH (ref 10.3–14.5)
RBC # FLD: 24.2 % — HIGH (ref 10.3–14.5)
RBC # FLD: 24.6 % — HIGH (ref 10.3–14.5)
RBC # FLD: 25.1 % — HIGH (ref 10.3–14.5)
RBC # FLD: 25.2 % — HIGH (ref 10.3–14.5)
RBC # FLD: 25.3 % — HIGH (ref 10.3–14.5)
RBC # FLD: 25.4 % — HIGH (ref 10.3–14.5)
RBC # FLD: 25.5 % — HIGH (ref 10.3–14.5)
RBC # FLD: 25.6 % — HIGH (ref 10.3–14.5)
RBC BLD AUTO: ABNORMAL
RBC CASTS # UR COMP ASSIST: SIGNIFICANT CHANGE UP /HPF (ref 0–4)
RBC CASTS # UR COMP ASSIST: SIGNIFICANT CHANGE UP /HPF (ref 0–4)
RH IG SCN BLD-IMP: POSITIVE — SIGNIFICANT CHANGE UP
S AUREUS DNA NOSE QL NAA+PROBE: SIGNIFICANT CHANGE UP
SAO2 % BLDA: 100 % — HIGH (ref 94–98)
SAO2 % BLDA: 97 % — SIGNIFICANT CHANGE UP (ref 94–98)
SAO2 % BLDA: 97.1 % — SIGNIFICANT CHANGE UP (ref 94–98)
SAO2 % BLDA: 98.3 % — HIGH (ref 94–98)
SAO2 % BLDA: 99.6 % — HIGH (ref 94–98)
SAO2 % BLDA: 99.7 % — HIGH (ref 94–98)
SARS-COV-2 RNA SPEC QL NAA+PROBE: SIGNIFICANT CHANGE UP
SARS-COV-2 RNA SPEC QL NAA+PROBE: SIGNIFICANT CHANGE UP
SMUDGE CELLS # BLD: PRESENT — SIGNIFICANT CHANGE UP
SODIUM SERPL-SCNC: 131 MMOL/L — LOW (ref 135–145)
SODIUM SERPL-SCNC: 133 MMOL/L — LOW (ref 135–145)
SODIUM SERPL-SCNC: 134 MMOL/L — LOW (ref 135–145)
SODIUM SERPL-SCNC: 135 MMOL/L — SIGNIFICANT CHANGE UP (ref 135–145)
SODIUM SERPL-SCNC: 136 MMOL/L — SIGNIFICANT CHANGE UP (ref 135–145)
SODIUM SERPL-SCNC: 137 MMOL/L — SIGNIFICANT CHANGE UP (ref 135–145)
SODIUM SERPL-SCNC: 138 MMOL/L — SIGNIFICANT CHANGE UP (ref 135–145)
SODIUM SERPL-SCNC: 139 MMOL/L — SIGNIFICANT CHANGE UP (ref 135–145)
SODIUM SERPL-SCNC: 140 MMOL/L — SIGNIFICANT CHANGE UP (ref 135–145)
SODIUM SERPL-SCNC: 141 MMOL/L — SIGNIFICANT CHANGE UP (ref 135–145)
SODIUM SERPL-SCNC: 142 MMOL/L — SIGNIFICANT CHANGE UP (ref 135–145)
SODIUM SERPL-SCNC: 142 MMOL/L — SIGNIFICANT CHANGE UP (ref 135–145)
SODIUM SERPL-SCNC: 147 MMOL/L — HIGH (ref 135–145)
SP GR SPEC: 1.01 — SIGNIFICANT CHANGE UP (ref 1.01–1.02)
SP GR SPEC: 1.02 — SIGNIFICANT CHANGE UP (ref 1.01–1.02)
SPECIMEN SOURCE: SIGNIFICANT CHANGE UP
TARGETS BLD QL SMEAR: SLIGHT — SIGNIFICANT CHANGE UP
TRIGL SERPL-MCNC: 74 MG/DL — SIGNIFICANT CHANGE UP
TRIGL SERPL-MCNC: 76 MG/DL — SIGNIFICANT CHANGE UP
TROPONIN I, HIGH SENSITIVITY RESULT: 14.2 NG/L — SIGNIFICANT CHANGE UP
TROPONIN I, HIGH SENSITIVITY RESULT: 22.9 NG/L — SIGNIFICANT CHANGE UP
UFH PPP CHRO-ACNC: 0.2 IU/ML — LOW (ref 0.3–0.7)
UFH PPP CHRO-ACNC: 0.31 IU/ML — SIGNIFICANT CHANGE UP (ref 0.3–0.7)
UFH PPP CHRO-ACNC: 0.36 IU/ML — SIGNIFICANT CHANGE UP (ref 0.3–0.7)
UFH PPP CHRO-ACNC: 0.38 IU/ML — SIGNIFICANT CHANGE UP (ref 0.3–0.7)
UNFRACTIONATED HEPARIN INTERPRETATION: SIGNIFICANT CHANGE UP
UNFRACTIONATED HEPARIN RESULT: NEGATIVE — SIGNIFICANT CHANGE UP
UNFRACTIONATED HEPARIN-HIGH DOSE: 0 % — SIGNIFICANT CHANGE UP
UNFRACTIONATED HEPARIN-LOW DOSE: 0 % — SIGNIFICANT CHANGE UP
UROBILINOGEN FLD QL: NEGATIVE MG/DL — SIGNIFICANT CHANGE UP
UROBILINOGEN FLD QL: NEGATIVE MG/DL — SIGNIFICANT CHANGE UP
VARIANT LYMPHS # BLD: 0.9 % — SIGNIFICANT CHANGE UP (ref 0–6)
VARIANT LYMPHS # BLD: 1.8 % — SIGNIFICANT CHANGE UP (ref 0–6)
WBC # BLD: 10.09 K/UL — SIGNIFICANT CHANGE UP (ref 3.8–10.5)
WBC # BLD: 10.59 K/UL — HIGH (ref 3.8–10.5)
WBC # BLD: 10.66 K/UL — HIGH (ref 3.8–10.5)
WBC # BLD: 10.77 K/UL — HIGH (ref 3.8–10.5)
WBC # BLD: 10.77 K/UL — HIGH (ref 3.8–10.5)
WBC # BLD: 10.93 K/UL — HIGH (ref 3.8–10.5)
WBC # BLD: 10.97 K/UL — HIGH (ref 3.8–10.5)
WBC # BLD: 11.12 K/UL — HIGH (ref 3.8–10.5)
WBC # BLD: 11.13 K/UL — HIGH (ref 3.8–10.5)
WBC # BLD: 11.24 K/UL — HIGH (ref 3.8–10.5)
WBC # BLD: 11.37 K/UL — HIGH (ref 3.8–10.5)
WBC # BLD: 11.53 K/UL — HIGH (ref 3.8–10.5)
WBC # BLD: 11.62 K/UL — HIGH (ref 3.8–10.5)
WBC # BLD: 11.69 K/UL — HIGH (ref 3.8–10.5)
WBC # BLD: 11.84 K/UL — HIGH (ref 3.8–10.5)
WBC # BLD: 11.96 K/UL — HIGH (ref 3.8–10.5)
WBC # BLD: 12.08 K/UL — HIGH (ref 3.8–10.5)
WBC # BLD: 12.1 K/UL — HIGH (ref 3.8–10.5)
WBC # BLD: 12.12 K/UL — HIGH (ref 3.8–10.5)
WBC # BLD: 12.21 K/UL — HIGH (ref 3.8–10.5)
WBC # BLD: 12.29 K/UL — HIGH (ref 3.8–10.5)
WBC # BLD: 12.56 K/UL — HIGH (ref 3.8–10.5)
WBC # BLD: 12.7 K/UL — HIGH (ref 3.8–10.5)
WBC # BLD: 12.74 K/UL — HIGH (ref 3.8–10.5)
WBC # BLD: 12.75 K/UL — HIGH (ref 3.8–10.5)
WBC # BLD: 12.81 K/UL — HIGH (ref 3.8–10.5)
WBC # BLD: 13.29 K/UL — HIGH (ref 3.8–10.5)
WBC # BLD: 13.31 K/UL — HIGH (ref 3.8–10.5)
WBC # BLD: 13.32 K/UL — HIGH (ref 3.8–10.5)
WBC # BLD: 13.45 K/UL — HIGH (ref 3.8–10.5)
WBC # BLD: 13.57 K/UL — HIGH (ref 3.8–10.5)
WBC # BLD: 13.8 K/UL — HIGH (ref 3.8–10.5)
WBC # BLD: 13.85 K/UL — HIGH (ref 3.8–10.5)
WBC # BLD: 13.96 K/UL — HIGH (ref 3.8–10.5)
WBC # BLD: 14.08 K/UL — HIGH (ref 3.8–10.5)
WBC # BLD: 14.08 K/UL — HIGH (ref 3.8–10.5)
WBC # BLD: 14.17 K/UL — HIGH (ref 3.8–10.5)
WBC # BLD: 14.24 K/UL — HIGH (ref 3.8–10.5)
WBC # BLD: 14.38 K/UL — HIGH (ref 3.8–10.5)
WBC # BLD: 14.52 K/UL — HIGH (ref 3.8–10.5)
WBC # BLD: 14.53 K/UL — HIGH (ref 3.8–10.5)
WBC # BLD: 14.56 K/UL — HIGH (ref 3.8–10.5)
WBC # BLD: 14.61 K/UL — HIGH (ref 3.8–10.5)
WBC # BLD: 14.76 K/UL — HIGH (ref 3.8–10.5)
WBC # BLD: 14.76 K/UL — HIGH (ref 3.8–10.5)
WBC # BLD: 14.78 K/UL — HIGH (ref 3.8–10.5)
WBC # BLD: 14.94 K/UL — HIGH (ref 3.8–10.5)
WBC # BLD: 14.94 K/UL — HIGH (ref 3.8–10.5)
WBC # BLD: 15.03 K/UL — HIGH (ref 3.8–10.5)
WBC # BLD: 15.14 K/UL — HIGH (ref 3.8–10.5)
WBC # BLD: 15.85 K/UL — HIGH (ref 3.8–10.5)
WBC # BLD: 16.35 K/UL — HIGH (ref 3.8–10.5)
WBC # BLD: 16.8 K/UL — HIGH (ref 3.8–10.5)
WBC # BLD: 17.18 K/UL — HIGH (ref 3.8–10.5)
WBC # BLD: 17.22 K/UL — HIGH (ref 3.8–10.5)
WBC # BLD: 17.22 K/UL — HIGH (ref 3.8–10.5)
WBC # BLD: 18.17 K/UL — HIGH (ref 3.8–10.5)
WBC # BLD: 18.23 K/UL — HIGH (ref 3.8–10.5)
WBC # BLD: 18.25 K/UL — HIGH (ref 3.8–10.5)
WBC # BLD: 18.35 K/UL — HIGH (ref 3.8–10.5)
WBC # BLD: 18.37 K/UL — HIGH (ref 3.8–10.5)
WBC # BLD: 18.46 K/UL — HIGH (ref 3.8–10.5)
WBC # BLD: 18.47 K/UL — HIGH (ref 3.8–10.5)
WBC # BLD: 18.54 K/UL — HIGH (ref 3.8–10.5)
WBC # BLD: 18.56 K/UL — HIGH (ref 3.8–10.5)
WBC # BLD: 18.91 K/UL — HIGH (ref 3.8–10.5)
WBC # BLD: 18.97 K/UL — HIGH (ref 3.8–10.5)
WBC # BLD: 22.29 K/UL — HIGH (ref 3.8–10.5)
WBC # BLD: 23.13 K/UL — HIGH (ref 3.8–10.5)
WBC # BLD: 24.7 K/UL — HIGH (ref 3.8–10.5)
WBC # BLD: 28.9 K/UL — HIGH (ref 3.8–10.5)
WBC # BLD: 29.16 K/UL — HIGH (ref 3.8–10.5)
WBC # BLD: 30.23 K/UL — HIGH (ref 3.8–10.5)
WBC # BLD: 31.53 K/UL — HIGH (ref 3.8–10.5)
WBC # BLD: 32.72 K/UL — HIGH (ref 3.8–10.5)
WBC # BLD: 33.33 K/UL — HIGH (ref 3.8–10.5)
WBC # BLD: 33.44 K/UL — HIGH (ref 3.8–10.5)
WBC # BLD: 34.35 K/UL — HIGH (ref 3.8–10.5)
WBC # BLD: 34.52 K/UL — HIGH (ref 3.8–10.5)
WBC # BLD: 35.49 K/UL — HIGH (ref 3.8–10.5)
WBC # BLD: 38.97 K/UL — HIGH (ref 3.8–10.5)
WBC # BLD: 39.33 K/UL — HIGH (ref 3.8–10.5)
WBC # BLD: 41.44 K/UL — CRITICAL HIGH (ref 3.8–10.5)
WBC # BLD: 41.63 K/UL — CRITICAL HIGH (ref 3.8–10.5)
WBC # BLD: 43.1 K/UL — CRITICAL HIGH (ref 3.8–10.5)
WBC # BLD: 44.57 K/UL — CRITICAL HIGH (ref 3.8–10.5)
WBC # BLD: 44.71 K/UL — CRITICAL HIGH (ref 3.8–10.5)
WBC # BLD: 45.31 K/UL — CRITICAL HIGH (ref 3.8–10.5)
WBC # BLD: 9.36 K/UL — SIGNIFICANT CHANGE UP (ref 3.8–10.5)
WBC # BLD: 9.5 K/UL — SIGNIFICANT CHANGE UP (ref 3.8–10.5)
WBC # BLD: 9.65 K/UL — SIGNIFICANT CHANGE UP (ref 3.8–10.5)
WBC # BLD: 9.96 K/UL — SIGNIFICANT CHANGE UP (ref 3.8–10.5)
WBC # FLD AUTO: 10.09 K/UL — SIGNIFICANT CHANGE UP (ref 3.8–10.5)
WBC # FLD AUTO: 10.59 K/UL — HIGH (ref 3.8–10.5)
WBC # FLD AUTO: 10.66 K/UL — HIGH (ref 3.8–10.5)
WBC # FLD AUTO: 10.77 K/UL — HIGH (ref 3.8–10.5)
WBC # FLD AUTO: 10.77 K/UL — HIGH (ref 3.8–10.5)
WBC # FLD AUTO: 10.93 K/UL — HIGH (ref 3.8–10.5)
WBC # FLD AUTO: 10.97 K/UL — HIGH (ref 3.8–10.5)
WBC # FLD AUTO: 11.12 K/UL — HIGH (ref 3.8–10.5)
WBC # FLD AUTO: 11.13 K/UL — HIGH (ref 3.8–10.5)
WBC # FLD AUTO: 11.24 K/UL — HIGH (ref 3.8–10.5)
WBC # FLD AUTO: 11.37 K/UL — HIGH (ref 3.8–10.5)
WBC # FLD AUTO: 11.53 K/UL — HIGH (ref 3.8–10.5)
WBC # FLD AUTO: 11.62 K/UL — HIGH (ref 3.8–10.5)
WBC # FLD AUTO: 11.69 K/UL — HIGH (ref 3.8–10.5)
WBC # FLD AUTO: 11.84 K/UL — HIGH (ref 3.8–10.5)
WBC # FLD AUTO: 11.96 K/UL — HIGH (ref 3.8–10.5)
WBC # FLD AUTO: 12.08 K/UL — HIGH (ref 3.8–10.5)
WBC # FLD AUTO: 12.1 K/UL — HIGH (ref 3.8–10.5)
WBC # FLD AUTO: 12.12 K/UL — HIGH (ref 3.8–10.5)
WBC # FLD AUTO: 12.21 K/UL — HIGH (ref 3.8–10.5)
WBC # FLD AUTO: 12.29 K/UL — HIGH (ref 3.8–10.5)
WBC # FLD AUTO: 12.56 K/UL — HIGH (ref 3.8–10.5)
WBC # FLD AUTO: 12.7 K/UL — HIGH (ref 3.8–10.5)
WBC # FLD AUTO: 12.74 K/UL — HIGH (ref 3.8–10.5)
WBC # FLD AUTO: 12.75 K/UL — HIGH (ref 3.8–10.5)
WBC # FLD AUTO: 12.81 K/UL — HIGH (ref 3.8–10.5)
WBC # FLD AUTO: 13.29 K/UL — HIGH (ref 3.8–10.5)
WBC # FLD AUTO: 13.31 K/UL — HIGH (ref 3.8–10.5)
WBC # FLD AUTO: 13.32 K/UL — HIGH (ref 3.8–10.5)
WBC # FLD AUTO: 13.45 K/UL — HIGH (ref 3.8–10.5)
WBC # FLD AUTO: 13.57 K/UL — HIGH (ref 3.8–10.5)
WBC # FLD AUTO: 13.8 K/UL — HIGH (ref 3.8–10.5)
WBC # FLD AUTO: 13.85 K/UL — HIGH (ref 3.8–10.5)
WBC # FLD AUTO: 13.96 K/UL — HIGH (ref 3.8–10.5)
WBC # FLD AUTO: 14.08 K/UL — HIGH (ref 3.8–10.5)
WBC # FLD AUTO: 14.08 K/UL — HIGH (ref 3.8–10.5)
WBC # FLD AUTO: 14.17 K/UL — HIGH (ref 3.8–10.5)
WBC # FLD AUTO: 14.24 K/UL — HIGH (ref 3.8–10.5)
WBC # FLD AUTO: 14.38 K/UL — HIGH (ref 3.8–10.5)
WBC # FLD AUTO: 14.52 K/UL — HIGH (ref 3.8–10.5)
WBC # FLD AUTO: 14.53 K/UL — HIGH (ref 3.8–10.5)
WBC # FLD AUTO: 14.56 K/UL — HIGH (ref 3.8–10.5)
WBC # FLD AUTO: 14.61 K/UL — HIGH (ref 3.8–10.5)
WBC # FLD AUTO: 14.76 K/UL — HIGH (ref 3.8–10.5)
WBC # FLD AUTO: 14.76 K/UL — HIGH (ref 3.8–10.5)
WBC # FLD AUTO: 14.78 K/UL — HIGH (ref 3.8–10.5)
WBC # FLD AUTO: 14.94 K/UL — HIGH (ref 3.8–10.5)
WBC # FLD AUTO: 14.94 K/UL — HIGH (ref 3.8–10.5)
WBC # FLD AUTO: 15.03 K/UL — HIGH (ref 3.8–10.5)
WBC # FLD AUTO: 15.14 K/UL — HIGH (ref 3.8–10.5)
WBC # FLD AUTO: 15.85 K/UL — HIGH (ref 3.8–10.5)
WBC # FLD AUTO: 16.35 K/UL — HIGH (ref 3.8–10.5)
WBC # FLD AUTO: 16.8 K/UL — HIGH (ref 3.8–10.5)
WBC # FLD AUTO: 17.18 K/UL — HIGH (ref 3.8–10.5)
WBC # FLD AUTO: 17.22 K/UL — HIGH (ref 3.8–10.5)
WBC # FLD AUTO: 17.22 K/UL — HIGH (ref 3.8–10.5)
WBC # FLD AUTO: 18.17 K/UL — HIGH (ref 3.8–10.5)
WBC # FLD AUTO: 18.23 K/UL — HIGH (ref 3.8–10.5)
WBC # FLD AUTO: 18.25 K/UL — HIGH (ref 3.8–10.5)
WBC # FLD AUTO: 18.35 K/UL — HIGH (ref 3.8–10.5)
WBC # FLD AUTO: 18.37 K/UL — HIGH (ref 3.8–10.5)
WBC # FLD AUTO: 18.46 K/UL — HIGH (ref 3.8–10.5)
WBC # FLD AUTO: 18.47 K/UL — HIGH (ref 3.8–10.5)
WBC # FLD AUTO: 18.54 K/UL — HIGH (ref 3.8–10.5)
WBC # FLD AUTO: 18.56 K/UL — HIGH (ref 3.8–10.5)
WBC # FLD AUTO: 18.91 K/UL — HIGH (ref 3.8–10.5)
WBC # FLD AUTO: 18.97 K/UL — HIGH (ref 3.8–10.5)
WBC # FLD AUTO: 22.29 K/UL — HIGH (ref 3.8–10.5)
WBC # FLD AUTO: 23.13 K/UL — HIGH (ref 3.8–10.5)
WBC # FLD AUTO: 24.7 K/UL — HIGH (ref 3.8–10.5)
WBC # FLD AUTO: 28.9 K/UL — HIGH (ref 3.8–10.5)
WBC # FLD AUTO: 29.16 K/UL — HIGH (ref 3.8–10.5)
WBC # FLD AUTO: 30.23 K/UL — HIGH (ref 3.8–10.5)
WBC # FLD AUTO: 31.53 K/UL — HIGH (ref 3.8–10.5)
WBC # FLD AUTO: 32.72 K/UL — HIGH (ref 3.8–10.5)
WBC # FLD AUTO: 33.33 K/UL — HIGH (ref 3.8–10.5)
WBC # FLD AUTO: 33.44 K/UL — HIGH (ref 3.8–10.5)
WBC # FLD AUTO: 34.35 K/UL — HIGH (ref 3.8–10.5)
WBC # FLD AUTO: 34.52 K/UL — HIGH (ref 3.8–10.5)
WBC # FLD AUTO: 35.49 K/UL — HIGH (ref 3.8–10.5)
WBC # FLD AUTO: 38.97 K/UL — HIGH (ref 3.8–10.5)
WBC # FLD AUTO: 39.33 K/UL — HIGH (ref 3.8–10.5)
WBC # FLD AUTO: 41.44 K/UL — CRITICAL HIGH (ref 3.8–10.5)
WBC # FLD AUTO: 41.63 K/UL — CRITICAL HIGH (ref 3.8–10.5)
WBC # FLD AUTO: 43.1 K/UL — CRITICAL HIGH (ref 3.8–10.5)
WBC # FLD AUTO: 44.57 K/UL — CRITICAL HIGH (ref 3.8–10.5)
WBC # FLD AUTO: 44.71 K/UL — CRITICAL HIGH (ref 3.8–10.5)
WBC # FLD AUTO: 45.31 K/UL — CRITICAL HIGH (ref 3.8–10.5)
WBC # FLD AUTO: 9.36 K/UL — SIGNIFICANT CHANGE UP (ref 3.8–10.5)
WBC # FLD AUTO: 9.5 K/UL — SIGNIFICANT CHANGE UP (ref 3.8–10.5)
WBC # FLD AUTO: 9.65 K/UL — SIGNIFICANT CHANGE UP (ref 3.8–10.5)
WBC # FLD AUTO: 9.96 K/UL — SIGNIFICANT CHANGE UP (ref 3.8–10.5)
WBC UR QL: ABNORMAL
WBC UR QL: SIGNIFICANT CHANGE UP

## 2023-01-01 PROCEDURE — 31575 DIAGNOSTIC LARYNGOSCOPY: CPT

## 2023-01-01 PROCEDURE — 74177 CT ABD & PELVIS W/CONTRAST: CPT | Mod: 26

## 2023-01-01 PROCEDURE — 99291 CRITICAL CARE FIRST HOUR: CPT

## 2023-01-01 PROCEDURE — 99291 CRITICAL CARE FIRST HOUR: CPT | Mod: GC

## 2023-01-01 PROCEDURE — 90945 DIALYSIS ONE EVALUATION: CPT | Mod: GC

## 2023-01-01 PROCEDURE — 36556 INSERT NON-TUNNEL CV CATH: CPT

## 2023-01-01 PROCEDURE — 76604 US EXAM CHEST: CPT | Mod: 26

## 2023-01-01 PROCEDURE — 99291 CRITICAL CARE FIRST HOUR: CPT | Mod: 24

## 2023-01-01 PROCEDURE — 99233 SBSQ HOSP IP/OBS HIGH 50: CPT

## 2023-01-01 PROCEDURE — 99292 CRITICAL CARE ADDL 30 MIN: CPT

## 2023-01-01 PROCEDURE — 99233 SBSQ HOSP IP/OBS HIGH 50: CPT | Mod: GC

## 2023-01-01 PROCEDURE — 71045 X-RAY EXAM CHEST 1 VIEW: CPT | Mod: 26

## 2023-01-01 PROCEDURE — 31600 PLANNED TRACHEOSTOMY: CPT

## 2023-01-01 PROCEDURE — 99231 SBSQ HOSP IP/OBS SF/LOW 25: CPT | Mod: 25,GC

## 2023-01-01 PROCEDURE — 99232 SBSQ HOSP IP/OBS MODERATE 35: CPT | Mod: GC

## 2023-01-01 PROCEDURE — 99291 CRITICAL CARE FIRST HOUR: CPT | Mod: 25

## 2023-01-01 PROCEDURE — 31500 INSERT EMERGENCY AIRWAY: CPT

## 2023-01-01 PROCEDURE — 71260 CT THORAX DX C+: CPT | Mod: 26

## 2023-01-01 PROCEDURE — 74177 CT ABD & PELVIS W/CONTRAST: CPT | Mod: 26,MA

## 2023-01-01 PROCEDURE — 99292 CRITICAL CARE ADDL 30 MIN: CPT | Mod: 25

## 2023-01-01 PROCEDURE — 76705 ECHO EXAM OF ABDOMEN: CPT | Mod: 26

## 2023-01-01 PROCEDURE — 76700 US EXAM ABDOM COMPLETE: CPT | Mod: 26

## 2023-01-01 PROCEDURE — 71045 X-RAY EXAM CHEST 1 VIEW: CPT | Mod: 26,77,76

## 2023-01-01 PROCEDURE — 99223 1ST HOSP IP/OBS HIGH 75: CPT

## 2023-01-01 PROCEDURE — 71045 X-RAY EXAM CHEST 1 VIEW: CPT | Mod: 26,76

## 2023-01-01 PROCEDURE — 36556 INSERT NON-TUNNEL CV CATH: CPT | Mod: GC

## 2023-01-01 PROCEDURE — 93010 ELECTROCARDIOGRAM REPORT: CPT

## 2023-01-01 PROCEDURE — 93970 EXTREMITY STUDY: CPT | Mod: 26

## 2023-01-01 PROCEDURE — 74019 RADEX ABDOMEN 2 VIEWS: CPT | Mod: 26

## 2023-01-01 PROCEDURE — 93010 ELECTROCARDIOGRAM REPORT: CPT | Mod: 76

## 2023-01-01 PROCEDURE — 71045 X-RAY EXAM CHEST 1 VIEW: CPT | Mod: 26,77

## 2023-01-01 PROCEDURE — 36558 INSERT TUNNELED CV CATH: CPT

## 2023-01-01 PROCEDURE — 99292 CRITICAL CARE ADDL 30 MIN: CPT | Mod: GC

## 2023-01-01 PROCEDURE — 99223 1ST HOSP IP/OBS HIGH 75: CPT | Mod: GC

## 2023-01-01 PROCEDURE — 99223 1ST HOSP IP/OBS HIGH 75: CPT | Mod: 25,GC

## 2023-01-01 PROCEDURE — 74018 RADEX ABDOMEN 1 VIEW: CPT | Mod: 26

## 2023-01-01 PROCEDURE — 70450 CT HEAD/BRAIN W/O DYE: CPT | Mod: 26

## 2023-01-01 PROCEDURE — 36620 INSERTION CATHETER ARTERY: CPT

## 2023-01-01 PROCEDURE — 99254 IP/OBS CNSLTJ NEW/EST MOD 60: CPT | Mod: 25

## 2023-01-01 PROCEDURE — 76937 US GUIDE VASCULAR ACCESS: CPT | Mod: 26

## 2023-01-01 PROCEDURE — 92950 HEART/LUNG RESUSCITATION CPR: CPT | Mod: GC

## 2023-01-01 PROCEDURE — 76937 US GUIDE VASCULAR ACCESS: CPT | Mod: 26,59

## 2023-01-01 PROCEDURE — 49418 INSERT TUN IP CATH PERC: CPT

## 2023-01-01 PROCEDURE — 99254 IP/OBS CNSLTJ NEW/EST MOD 60: CPT | Mod: GC

## 2023-01-01 PROCEDURE — 74181 MRI ABDOMEN W/O CONTRAST: CPT | Mod: 26

## 2023-01-01 PROCEDURE — 99291 CRITICAL CARE FIRST HOUR: CPT | Mod: 57

## 2023-01-01 PROCEDURE — 36556 INSERT NON-TUNNEL CV CATH: CPT | Mod: 78

## 2023-01-01 PROCEDURE — 49406 IMAGE CATH FLUID PERI/RETRO: CPT

## 2023-01-01 PROCEDURE — 93308 TTE F-UP OR LMTD: CPT | Mod: 26

## 2023-01-01 PROCEDURE — 43752 NASAL/OROGASTRIC W/TUBE PLMT: CPT | Mod: 59

## 2023-01-01 PROCEDURE — 74018 RADEX ABDOMEN 1 VIEW: CPT | Mod: 26,76

## 2023-01-01 DEVICE — SET PERC TRACH 7.5 FLEX BLU RHINO: Type: IMPLANTABLE DEVICE | Status: FUNCTIONAL

## 2023-01-01 DEVICE — TUBE TRACH SZ 6 CUFF FLEX DISP: Type: IMPLANTABLE DEVICE | Status: FUNCTIONAL

## 2023-01-01 RX ORDER — PIPERACILLIN AND TAZOBACTAM 4; .5 G/20ML; G/20ML
3.38 INJECTION, POWDER, LYOPHILIZED, FOR SOLUTION INTRAVENOUS ONCE
Refills: 0 | Status: COMPLETED | OUTPATIENT
Start: 2023-01-01 | End: 2023-01-01

## 2023-01-01 RX ORDER — INSULIN GLARGINE 100 [IU]/ML
9 INJECTION, SOLUTION SUBCUTANEOUS AT BEDTIME
Refills: 0 | Status: DISCONTINUED | OUTPATIENT
Start: 2023-01-01 | End: 2023-01-01

## 2023-01-01 RX ORDER — CHLORHEXIDINE GLUCONATE 213 G/1000ML
15 SOLUTION TOPICAL EVERY 12 HOURS
Refills: 0 | Status: DISCONTINUED | OUTPATIENT
Start: 2023-01-01 | End: 2023-01-01

## 2023-01-01 RX ORDER — HYDROMORPHONE HYDROCHLORIDE 2 MG/ML
0.5 INJECTION INTRAMUSCULAR; INTRAVENOUS; SUBCUTANEOUS ONCE
Refills: 0 | Status: DISCONTINUED | OUTPATIENT
Start: 2023-01-01 | End: 2023-01-01

## 2023-01-01 RX ORDER — DEXTROSE 50 % IN WATER 50 %
25 SYRINGE (ML) INTRAVENOUS ONCE
Refills: 0 | Status: DISCONTINUED | OUTPATIENT
Start: 2023-01-01 | End: 2023-01-01

## 2023-01-01 RX ORDER — SODIUM CHLORIDE 9 MG/ML
1000 INJECTION, SOLUTION INTRAVENOUS
Refills: 0 | Status: DISCONTINUED | OUTPATIENT
Start: 2023-01-01 | End: 2023-01-01

## 2023-01-01 RX ORDER — METOCLOPRAMIDE HCL 10 MG
10 TABLET ORAL EVERY 8 HOURS
Refills: 0 | Status: DISCONTINUED | OUTPATIENT
Start: 2023-01-01 | End: 2023-01-01

## 2023-01-01 RX ORDER — QUETIAPINE FUMARATE 200 MG/1
50 TABLET, FILM COATED ORAL AT BEDTIME
Refills: 0 | Status: DISCONTINUED | OUTPATIENT
Start: 2023-01-01 | End: 2023-01-01

## 2023-01-01 RX ORDER — MORPHINE SULFATE 50 MG/1
2 CAPSULE, EXTENDED RELEASE ORAL EVERY 4 HOURS
Refills: 0 | Status: DISCONTINUED | OUTPATIENT
Start: 2023-01-01 | End: 2023-01-01

## 2023-01-01 RX ORDER — ALBUMIN HUMAN 25 %
250 VIAL (ML) INTRAVENOUS ONCE
Refills: 0 | Status: COMPLETED | OUTPATIENT
Start: 2023-01-01 | End: 2023-01-01

## 2023-01-01 RX ORDER — DEXTROSE 50 % IN WATER 50 %
15 SYRINGE (ML) INTRAVENOUS ONCE
Refills: 0 | Status: DISCONTINUED | OUTPATIENT
Start: 2023-01-01 | End: 2023-01-01

## 2023-01-01 RX ORDER — GLUCAGON INJECTION, SOLUTION 0.5 MG/.1ML
1 INJECTION, SOLUTION SUBCUTANEOUS ONCE
Refills: 0 | Status: DISCONTINUED | OUTPATIENT
Start: 2023-01-01 | End: 2023-01-01

## 2023-01-01 RX ORDER — INSULIN LISPRO 100/ML
VIAL (ML) SUBCUTANEOUS EVERY 6 HOURS
Refills: 0 | Status: DISCONTINUED | OUTPATIENT
Start: 2023-01-01 | End: 2023-01-01

## 2023-01-01 RX ORDER — CASPOFUNGIN ACETATE 7 MG/ML
50 INJECTION, POWDER, LYOPHILIZED, FOR SOLUTION INTRAVENOUS EVERY 24 HOURS
Refills: 0 | Status: DISCONTINUED | OUTPATIENT
Start: 2023-01-01 | End: 2023-01-01

## 2023-01-01 RX ORDER — ETOMIDATE 2 MG/ML
10 INJECTION INTRAVENOUS ONCE
Refills: 0 | Status: COMPLETED | OUTPATIENT
Start: 2023-01-01 | End: 2023-01-01

## 2023-01-01 RX ORDER — HYDROMORPHONE HYDROCHLORIDE 2 MG/ML
0.5 INJECTION INTRAMUSCULAR; INTRAVENOUS; SUBCUTANEOUS EVERY 4 HOURS
Refills: 0 | Status: DISCONTINUED | OUTPATIENT
Start: 2023-01-01 | End: 2023-01-01

## 2023-01-01 RX ORDER — ACETAMINOPHEN 500 MG
1000 TABLET ORAL EVERY 6 HOURS
Refills: 0 | Status: COMPLETED | OUTPATIENT
Start: 2023-01-01 | End: 2023-01-01

## 2023-01-01 RX ORDER — POTASSIUM CHLORIDE 20 MEQ
20 PACKET (EA) ORAL
Refills: 0 | Status: COMPLETED | OUTPATIENT
Start: 2023-01-01 | End: 2023-01-01

## 2023-01-01 RX ORDER — HYDROCORTISONE 20 MG
150 TABLET ORAL ONCE
Refills: 0 | Status: COMPLETED | OUTPATIENT
Start: 2023-01-01 | End: 2023-01-01

## 2023-01-01 RX ORDER — OCTREOTIDE ACETATE 200 UG/ML
50 INJECTION, SOLUTION INTRAVENOUS; SUBCUTANEOUS
Qty: 500 | Refills: 0 | Status: DISCONTINUED | OUTPATIENT
Start: 2023-01-01 | End: 2023-01-01

## 2023-01-01 RX ORDER — MIDODRINE HYDROCHLORIDE 2.5 MG/1
10 TABLET ORAL EVERY 8 HOURS
Refills: 0 | Status: DISCONTINUED | OUTPATIENT
Start: 2023-01-01 | End: 2023-01-01

## 2023-01-01 RX ORDER — CASPOFUNGIN ACETATE 7 MG/ML
70 INJECTION, POWDER, LYOPHILIZED, FOR SOLUTION INTRAVENOUS ONCE
Refills: 0 | Status: COMPLETED | OUTPATIENT
Start: 2023-01-01 | End: 2023-01-01

## 2023-01-01 RX ORDER — OXYCODONE HYDROCHLORIDE 5 MG/1
10 TABLET ORAL EVERY 4 HOURS
Refills: 0 | Status: DISCONTINUED | OUTPATIENT
Start: 2023-01-01 | End: 2023-01-01

## 2023-01-01 RX ORDER — DEXMEDETOMIDINE HYDROCHLORIDE IN 0.9% SODIUM CHLORIDE 4 UG/ML
0.1 INJECTION INTRAVENOUS
Qty: 400 | Refills: 0 | Status: DISCONTINUED | OUTPATIENT
Start: 2023-01-01 | End: 2023-01-01

## 2023-01-01 RX ORDER — FONDAPARINUX SODIUM 2.5 MG/.5ML
1.25 INJECTION, SOLUTION SUBCUTANEOUS DAILY
Refills: 0 | Status: DISCONTINUED | OUTPATIENT
Start: 2023-01-01 | End: 2023-01-01

## 2023-01-01 RX ORDER — KETAMINE HYDROCHLORIDE 100 MG/ML
0 INJECTION INTRAMUSCULAR; INTRAVENOUS
Qty: 0 | Refills: 0 | DISCHARGE
Start: 2023-01-01

## 2023-01-01 RX ORDER — ALBUMIN HUMAN 25 %
500 VIAL (ML) INTRAVENOUS ONCE
Refills: 0 | Status: COMPLETED | OUTPATIENT
Start: 2023-01-01 | End: 2023-01-01

## 2023-01-01 RX ORDER — DEXMEDETOMIDINE HYDROCHLORIDE IN 0.9% SODIUM CHLORIDE 4 UG/ML
0.3 INJECTION INTRAVENOUS
Qty: 400 | Refills: 0 | Status: DISCONTINUED | OUTPATIENT
Start: 2023-01-01 | End: 2023-01-01

## 2023-01-01 RX ORDER — NOREPINEPHRINE BITARTRATE/D5W 8 MG/250ML
0.04 PLASTIC BAG, INJECTION (ML) INTRAVENOUS
Qty: 8 | Refills: 0 | Status: DISCONTINUED | OUTPATIENT
Start: 2023-01-01 | End: 2023-01-01

## 2023-01-01 RX ORDER — HEPARIN SODIUM 5000 [USP'U]/ML
INJECTION INTRAVENOUS; SUBCUTANEOUS
Qty: 25000 | Refills: 0 | Status: DISCONTINUED | OUTPATIENT
Start: 2023-01-01 | End: 2023-01-01

## 2023-01-01 RX ORDER — HYDROMORPHONE HYDROCHLORIDE 2 MG/ML
1 INJECTION INTRAMUSCULAR; INTRAVENOUS; SUBCUTANEOUS ONCE
Refills: 0 | Status: DISCONTINUED | OUTPATIENT
Start: 2023-01-01 | End: 2023-01-01

## 2023-01-01 RX ORDER — CALCIUM GLUCONATE 100 MG/ML
1 VIAL (ML) INTRAVENOUS ONCE
Refills: 0 | Status: COMPLETED | OUTPATIENT
Start: 2023-01-01 | End: 2023-01-01

## 2023-01-01 RX ORDER — ACETAMINOPHEN 500 MG
500 TABLET ORAL EVERY 6 HOURS
Refills: 0 | Status: COMPLETED | OUTPATIENT
Start: 2023-01-01 | End: 2024-03-08

## 2023-01-01 RX ORDER — FENTANYL CITRATE 50 UG/ML
50 INJECTION INTRAVENOUS ONCE
Refills: 0 | Status: DISCONTINUED | OUTPATIENT
Start: 2023-01-01 | End: 2023-01-01

## 2023-01-01 RX ORDER — DEXTROSE 50 % IN WATER 50 %
50 SYRINGE (ML) INTRAVENOUS
Refills: 0 | Status: DISCONTINUED | OUTPATIENT
Start: 2023-01-01 | End: 2023-01-01

## 2023-01-01 RX ORDER — HYDROCORTISONE 20 MG
25 TABLET ORAL EVERY 24 HOURS
Refills: 0 | Status: COMPLETED | OUTPATIENT
Start: 2023-01-01 | End: 2023-01-01

## 2023-01-01 RX ORDER — SODIUM ZIRCONIUM CYCLOSILICATE 10 G/10G
10 POWDER, FOR SUSPENSION ORAL ONCE
Refills: 0 | Status: COMPLETED | OUTPATIENT
Start: 2023-01-01 | End: 2023-01-01

## 2023-01-01 RX ORDER — FENTANYL CITRATE 50 UG/ML
0.34 INJECTION INTRAVENOUS
Qty: 2500 | Refills: 0 | Status: DISCONTINUED | OUTPATIENT
Start: 2023-01-01 | End: 2023-01-01

## 2023-01-01 RX ORDER — MEROPENEM 1 G/30ML
INJECTION INTRAVENOUS
Refills: 0 | Status: COMPLETED | OUTPATIENT
Start: 2023-01-01 | End: 2023-01-01

## 2023-01-01 RX ORDER — MORPHINE SULFATE 50 MG/1
4 CAPSULE, EXTENDED RELEASE ORAL ONCE
Refills: 0 | Status: DISCONTINUED | OUTPATIENT
Start: 2023-01-01 | End: 2023-01-01

## 2023-01-01 RX ORDER — ALBUMIN HUMAN 25 %
50 VIAL (ML) INTRAVENOUS ONCE
Refills: 0 | Status: COMPLETED | OUTPATIENT
Start: 2023-01-01 | End: 2023-01-01

## 2023-01-01 RX ORDER — SODIUM CHLORIDE 9 MG/ML
1000 INJECTION, SOLUTION INTRAVENOUS ONCE
Refills: 0 | Status: COMPLETED | OUTPATIENT
Start: 2023-01-01 | End: 2023-01-01

## 2023-01-01 RX ORDER — SODIUM CHLORIDE 9 MG/ML
2000 INJECTION, SOLUTION INTRAVENOUS ONCE
Refills: 0 | Status: COMPLETED | OUTPATIENT
Start: 2023-01-01 | End: 2023-01-01

## 2023-01-01 RX ORDER — HYDROMORPHONE HYDROCHLORIDE 2 MG/ML
1 INJECTION INTRAMUSCULAR; INTRAVENOUS; SUBCUTANEOUS EVERY 4 HOURS
Refills: 0 | Status: DISCONTINUED | OUTPATIENT
Start: 2023-01-01 | End: 2023-01-01

## 2023-01-01 RX ORDER — POTASSIUM CHLORIDE 20 MEQ
20 PACKET (EA) ORAL ONCE
Refills: 0 | Status: COMPLETED | OUTPATIENT
Start: 2023-01-01 | End: 2023-01-01

## 2023-01-01 RX ORDER — PHENYLEPHRINE HYDROCHLORIDE 10 MG/ML
0.4 INJECTION INTRAVENOUS
Qty: 160 | Refills: 0 | Status: DISCONTINUED | OUTPATIENT
Start: 2023-01-01 | End: 2023-01-01

## 2023-01-01 RX ORDER — LIDOCAINE 4 G/100G
1 CREAM TOPICAL ONCE
Refills: 0 | Status: COMPLETED | OUTPATIENT
Start: 2023-01-01 | End: 2023-01-01

## 2023-01-01 RX ORDER — CALCIUM GLUCONATE 100 MG/ML
2 VIAL (ML) INTRAVENOUS
Refills: 0 | Status: COMPLETED | OUTPATIENT
Start: 2023-01-01 | End: 2023-01-01

## 2023-01-01 RX ORDER — INSULIN GLARGINE 100 [IU]/ML
14 INJECTION, SOLUTION SUBCUTANEOUS AT BEDTIME
Refills: 0 | Status: DISCONTINUED | OUTPATIENT
Start: 2023-01-01 | End: 2023-01-01

## 2023-01-01 RX ORDER — OXYCODONE HYDROCHLORIDE 5 MG/1
5 TABLET ORAL EVERY 4 HOURS
Refills: 0 | Status: DISCONTINUED | OUTPATIENT
Start: 2023-01-01 | End: 2023-01-01

## 2023-01-01 RX ORDER — ALBUTEROL 90 UG/1
2.5 AEROSOL, METERED ORAL ONCE
Refills: 0 | Status: COMPLETED | OUTPATIENT
Start: 2023-01-01 | End: 2023-01-01

## 2023-01-01 RX ORDER — CHLORHEXIDINE GLUCONATE 213 G/1000ML
1 SOLUTION TOPICAL DAILY
Refills: 0 | Status: DISCONTINUED | OUTPATIENT
Start: 2023-01-01 | End: 2023-01-01

## 2023-01-01 RX ORDER — POTASSIUM CHLORIDE 20 MEQ
10 PACKET (EA) ORAL
Refills: 0 | Status: DISCONTINUED | OUTPATIENT
Start: 2023-01-01 | End: 2023-01-01

## 2023-01-01 RX ORDER — ACETAMINOPHEN 500 MG
650 TABLET ORAL EVERY 6 HOURS
Refills: 0 | Status: DISCONTINUED | OUTPATIENT
Start: 2023-01-01 | End: 2023-01-01

## 2023-01-01 RX ORDER — MIDODRINE HYDROCHLORIDE 2.5 MG/1
30 TABLET ORAL EVERY 8 HOURS
Refills: 0 | Status: DISCONTINUED | OUTPATIENT
Start: 2023-01-01 | End: 2023-01-01

## 2023-01-01 RX ORDER — FENTANYL CITRATE 50 UG/ML
0.5 INJECTION INTRAVENOUS
Qty: 2500 | Refills: 0 | Status: DISCONTINUED | OUTPATIENT
Start: 2023-01-01 | End: 2023-01-01

## 2023-01-01 RX ORDER — ADENOSINE 3 MG/ML
6 INJECTION INTRAVENOUS ONCE
Refills: 0 | Status: COMPLETED | OUTPATIENT
Start: 2023-01-01 | End: 2023-01-01

## 2023-01-01 RX ORDER — NOREPINEPHRINE BITARTRATE/D5W 8 MG/250ML
0.05 PLASTIC BAG, INJECTION (ML) INTRAVENOUS
Qty: 16 | Refills: 0 | Status: DISCONTINUED | OUTPATIENT
Start: 2023-01-01 | End: 2023-01-01

## 2023-01-01 RX ORDER — DEXTROSE 50 % IN WATER 50 %
12.5 SYRINGE (ML) INTRAVENOUS ONCE
Refills: 0 | Status: DISCONTINUED | OUTPATIENT
Start: 2023-01-01 | End: 2023-01-01

## 2023-01-01 RX ORDER — CALCIUM GLUCONATE 100 MG/ML
2 VIAL (ML) INTRAVENOUS ONCE
Refills: 0 | Status: COMPLETED | OUTPATIENT
Start: 2023-01-01 | End: 2023-01-01

## 2023-01-01 RX ORDER — VASOPRESSIN 20 [USP'U]/ML
0.03 INJECTION INTRAVENOUS
Qty: 40 | Refills: 0 | Status: DISCONTINUED | OUTPATIENT
Start: 2023-01-01 | End: 2023-01-01

## 2023-01-01 RX ORDER — PHENYLEPHRINE HYDROCHLORIDE 10 MG/ML
0.5 INJECTION INTRAVENOUS
Qty: 40 | Refills: 0 | Status: DISCONTINUED | OUTPATIENT
Start: 2023-01-01 | End: 2023-01-01

## 2023-01-01 RX ORDER — METOCLOPRAMIDE HCL 10 MG
10 TABLET ORAL EVERY 8 HOURS
Refills: 0 | Status: COMPLETED | OUTPATIENT
Start: 2023-01-01 | End: 2023-01-01

## 2023-01-01 RX ORDER — LABETALOL HCL 100 MG
20 TABLET ORAL ONCE
Refills: 0 | Status: COMPLETED | OUTPATIENT
Start: 2023-01-01 | End: 2023-01-01

## 2023-01-01 RX ORDER — ACETAMINOPHEN 500 MG
975 TABLET ORAL EVERY 6 HOURS
Refills: 0 | Status: DISCONTINUED | OUTPATIENT
Start: 2023-01-01 | End: 2023-01-01

## 2023-01-01 RX ORDER — PANTOPRAZOLE SODIUM 20 MG/1
40 TABLET, DELAYED RELEASE ORAL DAILY
Refills: 0 | Status: DISCONTINUED | OUTPATIENT
Start: 2023-01-01 | End: 2023-01-01

## 2023-01-01 RX ORDER — METRONIDAZOLE 500 MG
500 TABLET ORAL EVERY 8 HOURS
Refills: 0 | Status: DISCONTINUED | OUTPATIENT
Start: 2023-01-01 | End: 2023-01-01

## 2023-01-01 RX ORDER — ROCURONIUM BROMIDE 10 MG/ML
50 VIAL (ML) INTRAVENOUS ONCE
Refills: 0 | Status: COMPLETED | OUTPATIENT
Start: 2023-01-01 | End: 2023-01-01

## 2023-01-01 RX ORDER — SODIUM CHLORIDE 9 MG/ML
1000 INJECTION INTRAMUSCULAR; INTRAVENOUS; SUBCUTANEOUS ONCE
Refills: 0 | Status: COMPLETED | OUTPATIENT
Start: 2023-01-01 | End: 2023-01-01

## 2023-01-01 RX ORDER — SODIUM BICARBONATE 1 MEQ/ML
0.2 SYRINGE (ML) INTRAVENOUS
Qty: 150 | Refills: 0 | Status: DISCONTINUED | OUTPATIENT
Start: 2023-01-01 | End: 2023-01-01

## 2023-01-01 RX ORDER — MEROPENEM 1 G/30ML
1000 INJECTION INTRAVENOUS ONCE
Refills: 0 | Status: COMPLETED | OUTPATIENT
Start: 2023-01-01 | End: 2023-01-01

## 2023-01-01 RX ORDER — FENTANYL CITRATE 50 UG/ML
100 INJECTION INTRAVENOUS ONCE
Refills: 0 | Status: DISCONTINUED | OUTPATIENT
Start: 2023-01-01 | End: 2023-01-01

## 2023-01-01 RX ORDER — PHENYLEPHRINE HYDROCHLORIDE 10 MG/ML
0.5 INJECTION INTRAVENOUS
Qty: 160 | Refills: 0 | Status: DISCONTINUED | OUTPATIENT
Start: 2023-01-01 | End: 2023-01-01

## 2023-01-01 RX ORDER — NOREPINEPHRINE BITARTRATE/D5W 8 MG/250ML
0.05 PLASTIC BAG, INJECTION (ML) INTRAVENOUS
Qty: 8 | Refills: 0 | Status: DISCONTINUED | OUTPATIENT
Start: 2023-01-01 | End: 2023-01-01

## 2023-01-01 RX ORDER — MEROPENEM 1 G/30ML
500 INJECTION INTRAVENOUS ONCE
Refills: 0 | Status: DISCONTINUED | OUTPATIENT
Start: 2023-01-01 | End: 2023-01-01

## 2023-01-01 RX ORDER — CHLORHEXIDINE GLUCONATE 213 G/1000ML
1 SOLUTION TOPICAL
Refills: 0 | Status: DISCONTINUED | OUTPATIENT
Start: 2023-01-01 | End: 2023-01-01

## 2023-01-01 RX ORDER — LABETALOL HCL 100 MG
10 TABLET ORAL ONCE
Refills: 0 | Status: COMPLETED | OUTPATIENT
Start: 2023-01-01 | End: 2023-01-01

## 2023-01-01 RX ORDER — VASOPRESSIN 20 [USP'U]/ML
0.04 INJECTION INTRAVENOUS
Qty: 40 | Refills: 0 | Status: DISCONTINUED | OUTPATIENT
Start: 2023-01-01 | End: 2023-01-01

## 2023-01-01 RX ORDER — ACETAMINOPHEN 500 MG
500 TABLET ORAL EVERY 6 HOURS
Refills: 0 | Status: DISCONTINUED | OUTPATIENT
Start: 2023-01-01 | End: 2023-01-01

## 2023-01-01 RX ORDER — FLUTICASONE PROPIONATE 50 MCG
1 SPRAY, SUSPENSION NASAL
Refills: 0 | Status: DISCONTINUED | OUTPATIENT
Start: 2023-01-01 | End: 2023-01-01

## 2023-01-01 RX ORDER — MORPHINE SULFATE 50 MG/1
4 CAPSULE, EXTENDED RELEASE ORAL EVERY 4 HOURS
Refills: 0 | Status: DISCONTINUED | OUTPATIENT
Start: 2023-01-01 | End: 2023-01-01

## 2023-01-01 RX ORDER — ETOMIDATE 2 MG/ML
20 INJECTION INTRAVENOUS ONCE
Refills: 0 | Status: DISCONTINUED | OUTPATIENT
Start: 2023-01-01 | End: 2023-01-01

## 2023-01-01 RX ORDER — FENTANYL CITRATE 50 UG/ML
0 INJECTION INTRAVENOUS
Qty: 0 | Refills: 0 | DISCHARGE
Start: 2023-01-01

## 2023-01-01 RX ORDER — HYDRALAZINE HCL 50 MG
10 TABLET ORAL EVERY 8 HOURS
Refills: 0 | Status: DISCONTINUED | OUTPATIENT
Start: 2023-01-01 | End: 2023-01-01

## 2023-01-01 RX ORDER — MIDAZOLAM HYDROCHLORIDE 1 MG/ML
2 INJECTION, SOLUTION INTRAMUSCULAR; INTRAVENOUS ONCE
Refills: 0 | Status: DISCONTINUED | OUTPATIENT
Start: 2023-01-01 | End: 2023-01-01

## 2023-01-01 RX ORDER — FUROSEMIDE 40 MG
80 TABLET ORAL ONCE
Refills: 0 | Status: COMPLETED | OUTPATIENT
Start: 2023-01-01 | End: 2023-01-01

## 2023-01-01 RX ORDER — POTASSIUM CHLORIDE 20 MEQ
10 PACKET (EA) ORAL
Refills: 0 | Status: COMPLETED | OUTPATIENT
Start: 2023-01-01 | End: 2023-01-01

## 2023-01-01 RX ORDER — ONDANSETRON 8 MG/1
4 TABLET, FILM COATED ORAL ONCE
Refills: 0 | Status: COMPLETED | OUTPATIENT
Start: 2023-01-01 | End: 2023-01-01

## 2023-01-01 RX ORDER — HALOPERIDOL DECANOATE 100 MG/ML
5 INJECTION INTRAMUSCULAR ONCE
Refills: 0 | Status: COMPLETED | OUTPATIENT
Start: 2023-01-01 | End: 2023-01-01

## 2023-01-01 RX ORDER — MEROPENEM 1 G/30ML
1000 INJECTION INTRAVENOUS EVERY 12 HOURS
Refills: 0 | Status: COMPLETED | OUTPATIENT
Start: 2023-01-01 | End: 2023-01-01

## 2023-01-01 RX ORDER — LISINOPRIL 2.5 MG/1
25 TABLET ORAL
Qty: 0 | Refills: 0 | DISCHARGE

## 2023-01-01 RX ORDER — FUROSEMIDE 40 MG
60 TABLET ORAL ONCE
Refills: 0 | Status: COMPLETED | OUTPATIENT
Start: 2023-01-01 | End: 2023-01-01

## 2023-01-01 RX ORDER — HEPARIN SODIUM 5000 [USP'U]/ML
5000 INJECTION INTRAVENOUS; SUBCUTANEOUS EVERY 6 HOURS
Refills: 0 | Status: DISCONTINUED | OUTPATIENT
Start: 2023-01-01 | End: 2023-01-01

## 2023-01-01 RX ORDER — INSULIN GLARGINE 100 [IU]/ML
10 INJECTION, SOLUTION SUBCUTANEOUS AT BEDTIME
Refills: 0 | Status: COMPLETED | OUTPATIENT
Start: 2023-01-01 | End: 2023-01-01

## 2023-01-01 RX ORDER — DEXTROSE 50 % IN WATER 50 %
50 SYRINGE (ML) INTRAVENOUS ONCE
Refills: 0 | Status: COMPLETED | OUTPATIENT
Start: 2023-01-01 | End: 2023-01-01

## 2023-01-01 RX ORDER — DEXMEDETOMIDINE HYDROCHLORIDE IN 0.9% SODIUM CHLORIDE 4 UG/ML
0.2 INJECTION INTRAVENOUS
Qty: 400 | Refills: 0 | Status: DISCONTINUED | OUTPATIENT
Start: 2023-01-01 | End: 2023-01-01

## 2023-01-01 RX ORDER — ONDANSETRON 8 MG/1
4 TABLET, FILM COATED ORAL EVERY 6 HOURS
Refills: 0 | Status: DISCONTINUED | OUTPATIENT
Start: 2023-01-01 | End: 2023-01-01

## 2023-01-01 RX ORDER — INSULIN HUMAN 100 [IU]/ML
5 INJECTION, SOLUTION SUBCUTANEOUS ONCE
Refills: 0 | Status: COMPLETED | OUTPATIENT
Start: 2023-01-01 | End: 2023-01-01

## 2023-01-01 RX ORDER — LEVOTHYROXINE SODIUM 125 MCG
25 TABLET ORAL
Qty: 30 | Refills: 0
Start: 2023-01-01

## 2023-01-01 RX ORDER — ALTEPLASE 100 MG
2 KIT INTRAVENOUS ONCE
Refills: 0 | Status: COMPLETED | OUTPATIENT
Start: 2023-01-01 | End: 2023-01-01

## 2023-01-01 RX ORDER — HEPARIN SODIUM 5000 [USP'U]/ML
1000 INJECTION INTRAVENOUS; SUBCUTANEOUS
Qty: 25000 | Refills: 0 | Status: DISCONTINUED | OUTPATIENT
Start: 2023-01-01 | End: 2023-01-01

## 2023-01-01 RX ORDER — ROCURONIUM BROMIDE 10 MG/ML
5 VIAL (ML) INTRAVENOUS ONCE
Refills: 0 | Status: DISCONTINUED | OUTPATIENT
Start: 2023-01-01 | End: 2023-01-01

## 2023-01-01 RX ORDER — DEXMEDETOMIDINE HYDROCHLORIDE IN 0.9% SODIUM CHLORIDE 4 UG/ML
0.5 INJECTION INTRAVENOUS
Qty: 200 | Refills: 0 | Status: DISCONTINUED | OUTPATIENT
Start: 2023-01-01 | End: 2023-01-01

## 2023-01-01 RX ORDER — QUETIAPINE FUMARATE 200 MG/1
25 TABLET, FILM COATED ORAL AT BEDTIME
Refills: 0 | Status: DISCONTINUED | OUTPATIENT
Start: 2023-01-01 | End: 2023-01-01

## 2023-01-01 RX ORDER — MIDODRINE HYDROCHLORIDE 2.5 MG/1
40 TABLET ORAL EVERY 8 HOURS
Refills: 0 | Status: DISCONTINUED | OUTPATIENT
Start: 2023-01-01 | End: 2023-01-01

## 2023-01-01 RX ORDER — ARGATROBAN 50 MG/50ML
0.2 INJECTION, SOLUTION INTRAVENOUS
Qty: 50 | Refills: 0 | Status: DISCONTINUED | OUTPATIENT
Start: 2023-01-01 | End: 2023-01-01

## 2023-01-01 RX ORDER — KETAMINE HYDROCHLORIDE 100 MG/ML
0.25 INJECTION INTRAMUSCULAR; INTRAVENOUS
Qty: 1000 | Refills: 0 | Status: DISCONTINUED | OUTPATIENT
Start: 2023-01-01 | End: 2023-01-01

## 2023-01-01 RX ORDER — PSYLLIUM SEED (WITH DEXTROSE)
1 POWDER (GRAM) ORAL DAILY
Refills: 0 | Status: DISCONTINUED | OUTPATIENT
Start: 2023-01-01 | End: 2023-01-01

## 2023-01-01 RX ORDER — LEVOTHYROXINE SODIUM 125 MCG
20 TABLET ORAL AT BEDTIME
Refills: 0 | Status: DISCONTINUED | OUTPATIENT
Start: 2023-01-01 | End: 2023-01-01

## 2023-01-01 RX ORDER — SUCCINYLCHOLINE CHLORIDE 100 MG/5ML
100 SYRINGE (ML) INTRAVENOUS ONCE
Refills: 0 | Status: DISCONTINUED | OUTPATIENT
Start: 2023-01-01 | End: 2023-01-01

## 2023-01-01 RX ORDER — ALBUMIN HUMAN 25 %
250 VIAL (ML) INTRAVENOUS ONCE
Refills: 0 | Status: DISCONTINUED | OUTPATIENT
Start: 2023-01-01 | End: 2023-01-01

## 2023-01-01 RX ORDER — SODIUM BICARBONATE 1 MEQ/ML
50 SYRINGE (ML) INTRAVENOUS ONCE
Refills: 0 | Status: COMPLETED | OUTPATIENT
Start: 2023-01-01 | End: 2023-01-01

## 2023-01-01 RX ORDER — FUROSEMIDE 40 MG
80 TABLET ORAL
Refills: 0 | Status: COMPLETED | OUTPATIENT
Start: 2023-01-01 | End: 2023-01-01

## 2023-01-01 RX ORDER — IPRATROPIUM/ALBUTEROL SULFATE 18-103MCG
3 AEROSOL WITH ADAPTER (GRAM) INHALATION ONCE
Refills: 0 | Status: DISCONTINUED | OUTPATIENT
Start: 2023-01-01 | End: 2023-01-01

## 2023-01-01 RX ORDER — DEXMEDETOMIDINE HYDROCHLORIDE IN 0.9% SODIUM CHLORIDE 4 UG/ML
0.5 INJECTION INTRAVENOUS
Qty: 400 | Refills: 0 | Status: DISCONTINUED | OUTPATIENT
Start: 2023-01-01 | End: 2023-01-01

## 2023-01-01 RX ORDER — MEROPENEM 1 G/30ML
INJECTION INTRAVENOUS
Refills: 0 | Status: DISCONTINUED | OUTPATIENT
Start: 2023-01-01 | End: 2023-01-01

## 2023-01-01 RX ORDER — PHENYLEPHRINE HYDROCHLORIDE 10 MG/ML
40 INJECTION INTRAVENOUS
Qty: 0 | Refills: 0 | DISCHARGE
Start: 2023-01-01

## 2023-01-01 RX ORDER — INSULIN LISPRO 100/ML
2 VIAL (ML) SUBCUTANEOUS EVERY 6 HOURS
Refills: 0 | Status: DISCONTINUED | OUTPATIENT
Start: 2023-01-01 | End: 2023-01-01

## 2023-01-01 RX ORDER — FONDAPARINUX SODIUM 2.5 MG/.5ML
1.5 INJECTION, SOLUTION SUBCUTANEOUS DAILY
Refills: 0 | Status: DISCONTINUED | OUTPATIENT
Start: 2023-01-01 | End: 2023-01-01

## 2023-01-01 RX ORDER — ERTAPENEM SODIUM 1 G/1
1000 INJECTION, POWDER, LYOPHILIZED, FOR SOLUTION INTRAMUSCULAR; INTRAVENOUS EVERY 24 HOURS
Refills: 0 | Status: DISCONTINUED | OUTPATIENT
Start: 2023-01-01 | End: 2023-01-01

## 2023-01-01 RX ORDER — MIDAZOLAM HYDROCHLORIDE 1 MG/ML
4 INJECTION, SOLUTION INTRAMUSCULAR; INTRAVENOUS ONCE
Refills: 0 | Status: DISCONTINUED | OUTPATIENT
Start: 2023-01-01 | End: 2023-01-01

## 2023-01-01 RX ORDER — GABAPENTIN 400 MG/1
100 CAPSULE ORAL THREE TIMES A DAY
Refills: 0 | Status: DISCONTINUED | OUTPATIENT
Start: 2023-01-01 | End: 2023-01-01

## 2023-01-01 RX ORDER — SODIUM CHLORIDE 9 MG/ML
500 INJECTION, SOLUTION INTRAVENOUS ONCE
Refills: 0 | Status: COMPLETED | OUTPATIENT
Start: 2023-01-01 | End: 2023-01-01

## 2023-01-01 RX ORDER — OXANDROLONE 10 MG/1
5 TABLET ORAL THREE TIMES A DAY
Refills: 0 | Status: DISCONTINUED | OUTPATIENT
Start: 2023-01-01 | End: 2023-01-01

## 2023-01-01 RX ORDER — HEPARIN SODIUM 5000 [USP'U]/ML
5000 INJECTION INTRAVENOUS; SUBCUTANEOUS EVERY 8 HOURS
Refills: 0 | Status: DISCONTINUED | OUTPATIENT
Start: 2023-01-01 | End: 2023-01-01

## 2023-01-01 RX ORDER — NOREPINEPHRINE BITARTRATE/D5W 8 MG/250ML
16 PLASTIC BAG, INJECTION (ML) INTRAVENOUS
Qty: 0 | Refills: 0 | DISCHARGE
Start: 2023-01-01

## 2023-01-01 RX ORDER — CASPOFUNGIN ACETATE 7 MG/ML
INJECTION, POWDER, LYOPHILIZED, FOR SOLUTION INTRAVENOUS
Refills: 0 | Status: DISCONTINUED | OUTPATIENT
Start: 2023-01-01 | End: 2023-01-01

## 2023-01-01 RX ORDER — HYDRALAZINE HCL 50 MG
10 TABLET ORAL ONCE
Refills: 0 | Status: COMPLETED | OUTPATIENT
Start: 2023-01-01 | End: 2023-01-01

## 2023-01-01 RX ORDER — POTASSIUM PHOSPHATE, MONOBASIC POTASSIUM PHOSPHATE, DIBASIC 236; 224 MG/ML; MG/ML
15 INJECTION, SOLUTION INTRAVENOUS ONCE
Refills: 0 | Status: DISCONTINUED | OUTPATIENT
Start: 2023-01-01 | End: 2023-01-01

## 2023-01-01 RX ORDER — HYDROMORPHONE HYDROCHLORIDE 2 MG/ML
0.5 INJECTION INTRAMUSCULAR; INTRAVENOUS; SUBCUTANEOUS
Refills: 0 | Status: DISCONTINUED | OUTPATIENT
Start: 2023-01-01 | End: 2023-01-01

## 2023-01-01 RX ORDER — PANTOPRAZOLE SODIUM 20 MG/1
8 TABLET, DELAYED RELEASE ORAL
Qty: 80 | Refills: 0 | Status: DISCONTINUED | OUTPATIENT
Start: 2023-01-01 | End: 2023-01-01

## 2023-01-01 RX ORDER — LOPERAMIDE HCL 2 MG
2 TABLET ORAL EVERY 6 HOURS
Refills: 0 | Status: COMPLETED | OUTPATIENT
Start: 2023-01-01 | End: 2023-01-01

## 2023-01-01 RX ORDER — POLYETHYLENE GLYCOL 3350 17 G/17G
17 POWDER, FOR SOLUTION ORAL DAILY
Refills: 0 | Status: DISCONTINUED | OUTPATIENT
Start: 2023-01-01 | End: 2023-01-01

## 2023-01-01 RX ORDER — MEROPENEM 1 G/30ML
1000 INJECTION INTRAVENOUS EVERY 8 HOURS
Refills: 0 | Status: DISCONTINUED | OUTPATIENT
Start: 2023-01-01 | End: 2023-01-01

## 2023-01-01 RX ORDER — HYDROCORTISONE 20 MG
50 TABLET ORAL EVERY 12 HOURS
Refills: 0 | Status: DISCONTINUED | OUTPATIENT
Start: 2023-01-01 | End: 2023-01-01

## 2023-01-01 RX ORDER — INSULIN LISPRO 100/ML
3 VIAL (ML) SUBCUTANEOUS EVERY 6 HOURS
Refills: 0 | Status: DISCONTINUED | OUTPATIENT
Start: 2023-01-01 | End: 2023-01-01

## 2023-01-01 RX ORDER — POLYETHYLENE GLYCOL 3350 17 G/17G
17 POWDER, FOR SOLUTION ORAL AT BEDTIME
Refills: 0 | Status: DISCONTINUED | OUTPATIENT
Start: 2023-01-01 | End: 2023-01-01

## 2023-01-01 RX ORDER — SODIUM BICARBONATE 1 MEQ/ML
150 SYRINGE (ML) INTRAVENOUS
Qty: 0 | Refills: 0 | DISCHARGE
Start: 2023-01-01

## 2023-01-01 RX ORDER — SODIUM CHLORIDE 9 MG/ML
2000 INJECTION INTRAMUSCULAR; INTRAVENOUS; SUBCUTANEOUS ONCE
Refills: 0 | Status: COMPLETED | OUTPATIENT
Start: 2023-01-01 | End: 2023-01-01

## 2023-01-01 RX ORDER — INSULIN HUMAN 100 [IU]/ML
4 INJECTION, SOLUTION SUBCUTANEOUS
Qty: 100 | Refills: 0 | Status: DISCONTINUED | OUTPATIENT
Start: 2023-01-01 | End: 2023-01-01

## 2023-01-01 RX ORDER — HYDROMORPHONE HYDROCHLORIDE 2 MG/ML
1 INJECTION INTRAMUSCULAR; INTRAVENOUS; SUBCUTANEOUS
Refills: 0 | Status: DISCONTINUED | OUTPATIENT
Start: 2023-01-01 | End: 2023-01-01

## 2023-01-01 RX ORDER — ERTAPENEM SODIUM 1 G/1
1000 INJECTION, POWDER, LYOPHILIZED, FOR SOLUTION INTRAMUSCULAR; INTRAVENOUS EVERY 24 HOURS
Refills: 0 | Status: COMPLETED | OUTPATIENT
Start: 2023-01-01 | End: 2023-01-01

## 2023-01-01 RX ORDER — ACETAMINOPHEN 500 MG
650 TABLET ORAL ONCE
Refills: 0 | Status: COMPLETED | OUTPATIENT
Start: 2023-01-01 | End: 2023-01-01

## 2023-01-01 RX ORDER — LEVOTHYROXINE SODIUM 125 MCG
1 TABLET ORAL
Qty: 0 | Refills: 0 | DISCHARGE

## 2023-01-01 RX ORDER — PHENYLEPHRINE HYDROCHLORIDE 10 MG/ML
0.3 INJECTION INTRAVENOUS
Qty: 40 | Refills: 0 | Status: DISCONTINUED | OUTPATIENT
Start: 2023-01-01 | End: 2023-01-01

## 2023-01-01 RX ORDER — LEVOTHYROXINE SODIUM 125 MCG
12.5 TABLET ORAL AT BEDTIME
Refills: 0 | Status: DISCONTINUED | OUTPATIENT
Start: 2023-01-01 | End: 2023-01-01

## 2023-01-01 RX ORDER — PHYTONADIONE (VIT K1) 5 MG
10 TABLET ORAL EVERY 24 HOURS
Refills: 0 | Status: COMPLETED | OUTPATIENT
Start: 2023-01-01 | End: 2023-01-01

## 2023-01-01 RX ORDER — HYDROCORTISONE 20 MG
50 TABLET ORAL EVERY 6 HOURS
Refills: 0 | Status: DISCONTINUED | OUTPATIENT
Start: 2023-01-01 | End: 2023-01-01

## 2023-01-01 RX ORDER — ACETAMINOPHEN 500 MG
500 TABLET ORAL EVERY 6 HOURS
Refills: 0 | Status: COMPLETED | OUTPATIENT
Start: 2023-01-01 | End: 2023-01-01

## 2023-01-01 RX ORDER — PHENYLEPHRINE HYDROCHLORIDE 10 MG/ML
0.1 INJECTION INTRAVENOUS
Qty: 160 | Refills: 0 | Status: DISCONTINUED | OUTPATIENT
Start: 2023-01-01 | End: 2023-01-01

## 2023-01-01 RX ORDER — PANTOPRAZOLE SODIUM 20 MG/1
40 TABLET, DELAYED RELEASE ORAL EVERY 12 HOURS
Refills: 0 | Status: DISCONTINUED | OUTPATIENT
Start: 2023-01-01 | End: 2023-01-01

## 2023-01-01 RX ORDER — INSULIN HUMAN 100 [IU]/ML
3 INJECTION, SOLUTION SUBCUTANEOUS
Qty: 100 | Refills: 0 | Status: DISCONTINUED | OUTPATIENT
Start: 2023-01-01 | End: 2023-01-01

## 2023-01-01 RX ORDER — HALOPERIDOL DECANOATE 100 MG/ML
1 INJECTION INTRAMUSCULAR ONCE
Refills: 0 | Status: COMPLETED | OUTPATIENT
Start: 2023-01-01 | End: 2023-01-01

## 2023-01-01 RX ORDER — PIPERACILLIN AND TAZOBACTAM 4; .5 G/20ML; G/20ML
3.38 INJECTION, POWDER, LYOPHILIZED, FOR SOLUTION INTRAVENOUS EVERY 12 HOURS
Refills: 0 | Status: DISCONTINUED | OUTPATIENT
Start: 2023-01-01 | End: 2023-01-01

## 2023-01-01 RX ORDER — ERTAPENEM SODIUM 1 G/1
500 INJECTION, POWDER, LYOPHILIZED, FOR SOLUTION INTRAMUSCULAR; INTRAVENOUS EVERY 24 HOURS
Refills: 0 | Status: DISCONTINUED | OUTPATIENT
Start: 2023-01-01 | End: 2023-01-01

## 2023-01-01 RX ORDER — POTASSIUM CHLORIDE 20 MEQ
20 PACKET (EA) ORAL ONCE
Refills: 0 | Status: DISCONTINUED | OUTPATIENT
Start: 2023-01-01 | End: 2023-01-01

## 2023-01-01 RX ORDER — HYDROCORTISONE 20 MG
50 TABLET ORAL EVERY 24 HOURS
Refills: 0 | Status: DISCONTINUED | OUTPATIENT
Start: 2023-01-01 | End: 2023-01-01

## 2023-01-01 RX ORDER — PIPERACILLIN AND TAZOBACTAM 4; .5 G/20ML; G/20ML
3.38 INJECTION, POWDER, LYOPHILIZED, FOR SOLUTION INTRAVENOUS
Qty: 0 | Refills: 0 | DISCHARGE
Start: 2023-01-01

## 2023-01-01 RX ORDER — DEXMEDETOMIDINE HYDROCHLORIDE IN 0.9% SODIUM CHLORIDE 4 UG/ML
0.21 INJECTION INTRAVENOUS
Qty: 400 | Refills: 0 | Status: DISCONTINUED | OUTPATIENT
Start: 2023-01-01 | End: 2023-01-01

## 2023-01-01 RX ORDER — NITROGLYCERIN 6.5 MG
0.5 CAPSULE, EXTENDED RELEASE ORAL ONCE
Refills: 0 | Status: COMPLETED | OUTPATIENT
Start: 2023-01-01 | End: 2023-01-01

## 2023-01-01 RX ORDER — LIDOCAINE HCL 20 MG/ML
20 VIAL (ML) INJECTION ONCE
Refills: 0 | Status: COMPLETED | OUTPATIENT
Start: 2023-01-01 | End: 2023-01-01

## 2023-01-01 RX ORDER — INSULIN LISPRO 100/ML
VIAL (ML) SUBCUTANEOUS EVERY 4 HOURS
Refills: 0 | Status: DISCONTINUED | OUTPATIENT
Start: 2023-01-01 | End: 2023-01-01

## 2023-01-01 RX ORDER — CALCIUM GLUCONATE 100 MG/ML
2 VIAL (ML) INTRAVENOUS ONCE
Refills: 0 | Status: DISCONTINUED | OUTPATIENT
Start: 2023-01-01 | End: 2023-01-01

## 2023-01-01 RX ORDER — MULTIVIT-MIN/FERROUS GLUCONATE 9 MG/15 ML
15 LIQUID (ML) ORAL DAILY
Refills: 0 | Status: DISCONTINUED | OUTPATIENT
Start: 2023-01-01 | End: 2023-01-01

## 2023-01-01 RX ORDER — NOREPINEPHRINE BITARTRATE/D5W 8 MG/250ML
0.05 PLASTIC BAG, INJECTION (ML) INTRAVENOUS
Qty: 16 | Refills: 0 | Status: ACTIVE | OUTPATIENT
Start: 2023-01-01 | End: 2024-03-15

## 2023-01-01 RX ORDER — POTASSIUM CHLORIDE 20 MEQ
40 PACKET (EA) ORAL ONCE
Refills: 0 | Status: COMPLETED | OUTPATIENT
Start: 2023-01-01 | End: 2023-01-01

## 2023-01-01 RX ORDER — FUROSEMIDE 40 MG
80 TABLET ORAL
Refills: 0 | Status: ACTIVE | OUTPATIENT
Start: 2023-01-01 | End: 2024-03-15

## 2023-01-01 RX ORDER — ACETAMINOPHEN 500 MG
650 TABLET ORAL EVERY 8 HOURS
Refills: 0 | Status: DISCONTINUED | OUTPATIENT
Start: 2023-01-01 | End: 2023-01-01

## 2023-01-01 RX ORDER — LISINOPRIL 2.5 MG/1
20 TABLET ORAL DAILY
Refills: 0 | Status: DISCONTINUED | OUTPATIENT
Start: 2023-01-01 | End: 2023-01-01

## 2023-01-01 RX ORDER — HALOPERIDOL DECANOATE 100 MG/ML
2.5 INJECTION INTRAMUSCULAR ONCE
Refills: 0 | Status: DISCONTINUED | OUTPATIENT
Start: 2023-01-01 | End: 2023-01-01

## 2023-01-01 RX ORDER — LIPASE/PROTEASE/AMYLASE 16-48-48K
1 CAPSULE,DELAYED RELEASE (ENTERIC COATED) ORAL
Refills: 0 | Status: DISCONTINUED | OUTPATIENT
Start: 2023-01-01 | End: 2023-01-01

## 2023-01-01 RX ORDER — INSULIN GLARGINE 100 [IU]/ML
8 INJECTION, SOLUTION SUBCUTANEOUS ONCE
Refills: 0 | Status: COMPLETED | OUTPATIENT
Start: 2023-01-01 | End: 2023-01-01

## 2023-01-01 RX ORDER — HEPARIN SODIUM 5000 [USP'U]/ML
10000 INJECTION INTRAVENOUS; SUBCUTANEOUS ONCE
Refills: 0 | Status: COMPLETED | OUTPATIENT
Start: 2023-01-01 | End: 2023-01-01

## 2023-01-01 RX ORDER — HEPARIN SODIUM 5000 [USP'U]/ML
10 INJECTION INTRAVENOUS; SUBCUTANEOUS
Qty: 0 | Refills: 0 | DISCHARGE
Start: 2023-01-01

## 2023-01-01 RX ORDER — ACETAMINOPHEN 500 MG
1000 TABLET ORAL ONCE
Refills: 0 | Status: COMPLETED | OUTPATIENT
Start: 2023-01-01 | End: 2023-01-01

## 2023-01-01 RX ORDER — KETAMINE HYDROCHLORIDE 100 MG/ML
20 INJECTION INTRAMUSCULAR; INTRAVENOUS ONCE
Refills: 0 | Status: DISCONTINUED | OUTPATIENT
Start: 2023-01-01 | End: 2023-01-01

## 2023-01-01 RX ORDER — HYDROCORTISONE 20 MG
50 TABLET ORAL EVERY 8 HOURS
Refills: 0 | Status: DISCONTINUED | OUTPATIENT
Start: 2023-01-01 | End: 2023-01-01

## 2023-01-01 RX ORDER — PHYTONADIONE (VIT K1) 5 MG
10 TABLET ORAL EVERY 24 HOURS
Refills: 0 | Status: DISCONTINUED | OUTPATIENT
Start: 2023-01-01 | End: 2023-01-01

## 2023-01-01 RX ADMIN — KETAMINE HYDROCHLORIDE 2.82 MG/KG/HR: 100 INJECTION INTRAMUSCULAR; INTRAVENOUS at 13:26

## 2023-01-01 RX ADMIN — CHLORHEXIDINE GLUCONATE 1 APPLICATION(S): 213 SOLUTION TOPICAL at 11:24

## 2023-01-01 RX ADMIN — MIDODRINE HYDROCHLORIDE 40 MILLIGRAM(S): 2.5 TABLET ORAL at 21:31

## 2023-01-01 RX ADMIN — HEPARIN SODIUM 5000 UNIT(S): 5000 INJECTION INTRAVENOUS; SUBCUTANEOUS at 13:39

## 2023-01-01 RX ADMIN — OXYCODONE HYDROCHLORIDE 5 MILLIGRAM(S): 5 TABLET ORAL at 21:32

## 2023-01-01 RX ADMIN — Medication 200 MILLIGRAM(S): at 04:01

## 2023-01-01 RX ADMIN — SODIUM CHLORIDE 170 MILLILITER(S): 9 INJECTION, SOLUTION INTRAVENOUS at 07:35

## 2023-01-01 RX ADMIN — INSULIN GLARGINE 9 UNIT(S): 100 INJECTION, SOLUTION SUBCUTANEOUS at 23:15

## 2023-01-01 RX ADMIN — MIDODRINE HYDROCHLORIDE 30 MILLIGRAM(S): 2.5 TABLET ORAL at 05:47

## 2023-01-01 RX ADMIN — Medication 10 MILLIGRAM(S): at 06:28

## 2023-01-01 RX ADMIN — Medication 1 CAPSULE(S): at 22:58

## 2023-01-01 RX ADMIN — Medication 2: at 05:45

## 2023-01-01 RX ADMIN — Medication 20 MICROGRAM(S): at 21:05

## 2023-01-01 RX ADMIN — SODIUM CHLORIDE 1000 MILLILITER(S): 9 INJECTION, SOLUTION INTRAVENOUS at 23:45

## 2023-01-01 RX ADMIN — Medication 10 MILLIGRAM(S): at 06:45

## 2023-01-01 RX ADMIN — HYDROMORPHONE HYDROCHLORIDE 1 MILLIGRAM(S): 2 INJECTION INTRAMUSCULAR; INTRAVENOUS; SUBCUTANEOUS at 06:04

## 2023-01-01 RX ADMIN — Medication 2 UNIT(S): at 17:15

## 2023-01-01 RX ADMIN — MEROPENEM 100 MILLIGRAM(S): 1 INJECTION INTRAVENOUS at 06:39

## 2023-01-01 RX ADMIN — GABAPENTIN 100 MILLIGRAM(S): 400 CAPSULE ORAL at 05:49

## 2023-01-01 RX ADMIN — INSULIN GLARGINE 9 UNIT(S): 100 INJECTION, SOLUTION SUBCUTANEOUS at 23:36

## 2023-01-01 RX ADMIN — Medication 5.73 MICROGRAM(S)/KG/MIN: at 19:29

## 2023-01-01 RX ADMIN — Medication 50 MILLIEQUIVALENT(S): at 10:35

## 2023-01-01 RX ADMIN — Medication 20 MICROGRAM(S): at 23:23

## 2023-01-01 RX ADMIN — OXYCODONE HYDROCHLORIDE 5 MILLIGRAM(S): 5 TABLET ORAL at 19:15

## 2023-01-01 RX ADMIN — Medication 1 CAPSULE(S): at 23:15

## 2023-01-01 RX ADMIN — Medication 10 MILLIGRAM(S): at 15:10

## 2023-01-01 RX ADMIN — Medication 1 CAPSULE(S): at 14:51

## 2023-01-01 RX ADMIN — Medication 20 MICROGRAM(S): at 00:09

## 2023-01-01 RX ADMIN — Medication 2 UNIT(S): at 23:22

## 2023-01-01 RX ADMIN — Medication 20 MICROGRAM(S): at 23:22

## 2023-01-01 RX ADMIN — Medication 1 CAPSULE(S): at 10:00

## 2023-01-01 RX ADMIN — CHLORHEXIDINE GLUCONATE 1 APPLICATION(S): 213 SOLUTION TOPICAL at 06:00

## 2023-01-01 RX ADMIN — CHLORHEXIDINE GLUCONATE 1 APPLICATION(S): 213 SOLUTION TOPICAL at 11:56

## 2023-01-01 RX ADMIN — SODIUM CHLORIDE 150 MILLILITER(S): 9 INJECTION, SOLUTION INTRAVENOUS at 18:37

## 2023-01-01 RX ADMIN — Medication 125 MILLILITER(S): at 13:00

## 2023-01-01 RX ADMIN — Medication 100 MILLIGRAM(S): at 21:01

## 2023-01-01 RX ADMIN — Medication 1 CAPSULE(S): at 05:50

## 2023-01-01 RX ADMIN — POLYETHYLENE GLYCOL 3350 17 GRAM(S): 17 POWDER, FOR SOLUTION ORAL at 21:07

## 2023-01-01 RX ADMIN — HYDROMORPHONE HYDROCHLORIDE 1 MILLIGRAM(S): 2 INJECTION INTRAMUSCULAR; INTRAVENOUS; SUBCUTANEOUS at 05:44

## 2023-01-01 RX ADMIN — MEROPENEM 100 MILLIGRAM(S): 1 INJECTION INTRAVENOUS at 05:13

## 2023-01-01 RX ADMIN — MIDODRINE HYDROCHLORIDE 40 MILLIGRAM(S): 2.5 TABLET ORAL at 14:02

## 2023-01-01 RX ADMIN — Medication 4: at 17:22

## 2023-01-01 RX ADMIN — Medication 2 UNIT(S): at 18:19

## 2023-01-01 RX ADMIN — PANTOPRAZOLE SODIUM 40 MILLIGRAM(S): 20 TABLET, DELAYED RELEASE ORAL at 11:35

## 2023-01-01 RX ADMIN — HYDROMORPHONE HYDROCHLORIDE 0.5 MILLIGRAM(S): 2 INJECTION INTRAMUSCULAR; INTRAVENOUS; SUBCUTANEOUS at 04:47

## 2023-01-01 RX ADMIN — DEXMEDETOMIDINE HYDROCHLORIDE IN 0.9% SODIUM CHLORIDE 6.11 MICROGRAM(S)/KG/HR: 4 INJECTION INTRAVENOUS at 20:00

## 2023-01-01 RX ADMIN — Medication 10 MILLIGRAM(S): at 17:12

## 2023-01-01 RX ADMIN — MIDODRINE HYDROCHLORIDE 40 MILLIGRAM(S): 2.5 TABLET ORAL at 14:13

## 2023-01-01 RX ADMIN — FENTANYL CITRATE 5.9 MICROGRAM(S)/KG/HR: 50 INJECTION INTRAVENOUS at 09:07

## 2023-01-01 RX ADMIN — Medication 500 MILLIGRAM(S): at 01:05

## 2023-01-01 RX ADMIN — OXYCODONE HYDROCHLORIDE 5 MILLIGRAM(S): 5 TABLET ORAL at 00:26

## 2023-01-01 RX ADMIN — PANTOPRAZOLE SODIUM 40 MILLIGRAM(S): 20 TABLET, DELAYED RELEASE ORAL at 12:11

## 2023-01-01 RX ADMIN — Medication 2 UNIT(S): at 18:18

## 2023-01-01 RX ADMIN — Medication 2 UNIT(S): at 17:59

## 2023-01-01 RX ADMIN — Medication 1 APPLICATION(S): at 06:27

## 2023-01-01 RX ADMIN — Medication 15 MILLILITER(S): at 11:37

## 2023-01-01 RX ADMIN — GABAPENTIN 100 MILLIGRAM(S): 400 CAPSULE ORAL at 06:27

## 2023-01-01 RX ADMIN — CHLORHEXIDINE GLUCONATE 1 APPLICATION(S): 213 SOLUTION TOPICAL at 11:31

## 2023-01-01 RX ADMIN — Medication 2 UNIT(S): at 00:10

## 2023-01-01 RX ADMIN — Medication 11.1 MICROGRAM(S)/KG/MIN: at 00:04

## 2023-01-01 RX ADMIN — Medication 1 CAPSULE(S): at 10:59

## 2023-01-01 RX ADMIN — MIDODRINE HYDROCHLORIDE 40 MILLIGRAM(S): 2.5 TABLET ORAL at 13:39

## 2023-01-01 RX ADMIN — Medication 10 MILLIGRAM(S): at 17:07

## 2023-01-01 RX ADMIN — DEXMEDETOMIDINE HYDROCHLORIDE IN 0.9% SODIUM CHLORIDE 2.95 MICROGRAM(S)/KG/HR: 4 INJECTION INTRAVENOUS at 05:56

## 2023-01-01 RX ADMIN — Medication 10 MILLIGRAM(S): at 11:24

## 2023-01-01 RX ADMIN — PANTOPRAZOLE SODIUM 40 MILLIGRAM(S): 20 TABLET, DELAYED RELEASE ORAL at 11:29

## 2023-01-01 RX ADMIN — Medication 5.53 MICROGRAM(S)/KG/MIN: at 19:31

## 2023-01-01 RX ADMIN — DEXMEDETOMIDINE HYDROCHLORIDE IN 0.9% SODIUM CHLORIDE 2.95 MICROGRAM(S)/KG/HR: 4 INJECTION INTRAVENOUS at 08:14

## 2023-01-01 RX ADMIN — Medication 2 UNIT(S): at 12:00

## 2023-01-01 RX ADMIN — HYDROMORPHONE HYDROCHLORIDE 0.5 MILLIGRAM(S): 2 INJECTION INTRAMUSCULAR; INTRAVENOUS; SUBCUTANEOUS at 12:15

## 2023-01-01 RX ADMIN — Medication 10 MILLIGRAM(S): at 05:19

## 2023-01-01 RX ADMIN — Medication 80 MILLIGRAM(S): at 10:17

## 2023-01-01 RX ADMIN — Medication 500 MILLIGRAM(S): at 00:00

## 2023-01-01 RX ADMIN — Medication 10 MILLIGRAM(S): at 23:07

## 2023-01-01 RX ADMIN — SODIUM CHLORIDE 50 MILLILITER(S): 9 INJECTION, SOLUTION INTRAVENOUS at 19:12

## 2023-01-01 RX ADMIN — PANTOPRAZOLE SODIUM 40 MILLIGRAM(S): 20 TABLET, DELAYED RELEASE ORAL at 11:38

## 2023-01-01 RX ADMIN — HYDROMORPHONE HYDROCHLORIDE 1 MILLIGRAM(S): 2 INJECTION INTRAMUSCULAR; INTRAVENOUS; SUBCUTANEOUS at 13:25

## 2023-01-01 RX ADMIN — VASOPRESSIN 4.5 UNIT(S)/MIN: 20 INJECTION INTRAVENOUS at 19:28

## 2023-01-01 RX ADMIN — FONDAPARINUX SODIUM 1.5 MILLIGRAM(S): 2.5 INJECTION, SOLUTION SUBCUTANEOUS at 11:27

## 2023-01-01 RX ADMIN — Medication 500 MILLIGRAM(S): at 12:00

## 2023-01-01 RX ADMIN — FONDAPARINUX SODIUM 1.5 MILLIGRAM(S): 2.5 INJECTION, SOLUTION SUBCUTANEOUS at 11:55

## 2023-01-01 RX ADMIN — GABAPENTIN 100 MILLIGRAM(S): 400 CAPSULE ORAL at 14:02

## 2023-01-01 RX ADMIN — PANTOPRAZOLE SODIUM 40 MILLIGRAM(S): 20 TABLET, DELAYED RELEASE ORAL at 11:08

## 2023-01-01 RX ADMIN — Medication 3 UNIT(S): at 05:53

## 2023-01-01 RX ADMIN — MIDODRINE HYDROCHLORIDE 10 MILLIGRAM(S): 2.5 TABLET ORAL at 05:43

## 2023-01-01 RX ADMIN — SODIUM CHLORIDE 150 MILLILITER(S): 9 INJECTION, SOLUTION INTRAVENOUS at 21:58

## 2023-01-01 RX ADMIN — Medication 1 CAPSULE(S): at 02:34

## 2023-01-01 RX ADMIN — Medication 1 CAPSULE(S): at 10:02

## 2023-01-01 RX ADMIN — VASOPRESSIN 4.5 UNIT(S)/MIN: 20 INJECTION INTRAVENOUS at 19:33

## 2023-01-01 RX ADMIN — HYDROMORPHONE HYDROCHLORIDE 1 MILLIGRAM(S): 2 INJECTION INTRAMUSCULAR; INTRAVENOUS; SUBCUTANEOUS at 06:14

## 2023-01-01 RX ADMIN — HYDROMORPHONE HYDROCHLORIDE 0.5 MILLIGRAM(S): 2 INJECTION INTRAMUSCULAR; INTRAVENOUS; SUBCUTANEOUS at 20:15

## 2023-01-01 RX ADMIN — Medication 975 MILLIGRAM(S): at 23:35

## 2023-01-01 RX ADMIN — Medication 10 MILLIGRAM(S): at 14:13

## 2023-01-01 RX ADMIN — Medication 2 UNIT(S): at 11:34

## 2023-01-01 RX ADMIN — Medication 1 APPLICATION(S): at 17:48

## 2023-01-01 RX ADMIN — OXYCODONE HYDROCHLORIDE 10 MILLIGRAM(S): 5 TABLET ORAL at 01:04

## 2023-01-01 RX ADMIN — QUETIAPINE FUMARATE 50 MILLIGRAM(S): 200 TABLET, FILM COATED ORAL at 21:37

## 2023-01-01 RX ADMIN — CHLORHEXIDINE GLUCONATE 15 MILLILITER(S): 213 SOLUTION TOPICAL at 18:09

## 2023-01-01 RX ADMIN — Medication 2: at 18:04

## 2023-01-01 RX ADMIN — CHLORHEXIDINE GLUCONATE 15 MILLILITER(S): 213 SOLUTION TOPICAL at 17:14

## 2023-01-01 RX ADMIN — CHLORHEXIDINE GLUCONATE 15 MILLILITER(S): 213 SOLUTION TOPICAL at 05:15

## 2023-01-01 RX ADMIN — Medication 1 CAPSULE(S): at 05:48

## 2023-01-01 RX ADMIN — HEPARIN SODIUM 5000 UNIT(S): 5000 INJECTION INTRAVENOUS; SUBCUTANEOUS at 21:28

## 2023-01-01 RX ADMIN — Medication 10 MILLIGRAM(S): at 05:13

## 2023-01-01 RX ADMIN — CHLORHEXIDINE GLUCONATE 15 MILLILITER(S): 213 SOLUTION TOPICAL at 05:49

## 2023-01-01 RX ADMIN — CHLORHEXIDINE GLUCONATE 1 APPLICATION(S): 213 SOLUTION TOPICAL at 05:35

## 2023-01-01 RX ADMIN — Medication 125 MILLILITER(S): at 18:52

## 2023-01-01 RX ADMIN — Medication 200 GRAM(S): at 08:15

## 2023-01-01 RX ADMIN — Medication 1 PACKET(S): at 11:53

## 2023-01-01 RX ADMIN — CHLORHEXIDINE GLUCONATE 15 MILLILITER(S): 213 SOLUTION TOPICAL at 18:30

## 2023-01-01 RX ADMIN — LISINOPRIL 20 MILLIGRAM(S): 2.5 TABLET ORAL at 06:21

## 2023-01-01 RX ADMIN — Medication 50 MILLIGRAM(S): at 05:50

## 2023-01-01 RX ADMIN — MORPHINE SULFATE 4 MILLIGRAM(S): 50 CAPSULE, EXTENDED RELEASE ORAL at 06:43

## 2023-01-01 RX ADMIN — HYDROMORPHONE HYDROCHLORIDE 1 MILLIGRAM(S): 2 INJECTION INTRAMUSCULAR; INTRAVENOUS; SUBCUTANEOUS at 16:15

## 2023-01-01 RX ADMIN — Medication 500 MILLIGRAM(S): at 09:27

## 2023-01-01 RX ADMIN — Medication 1 APPLICATION(S): at 05:11

## 2023-01-01 RX ADMIN — Medication 1 CAPSULE(S): at 17:23

## 2023-01-01 RX ADMIN — Medication 1 APPLICATION(S): at 17:24

## 2023-01-01 RX ADMIN — Medication 10 MILLIGRAM(S): at 18:08

## 2023-01-01 RX ADMIN — Medication 5 MILLIGRAM(S): at 06:27

## 2023-01-01 RX ADMIN — FENTANYL CITRATE 5.9 MICROGRAM(S)/KG/HR: 50 INJECTION INTRAVENOUS at 17:48

## 2023-01-01 RX ADMIN — Medication 1 CAPSULE(S): at 18:05

## 2023-01-01 RX ADMIN — Medication 20 MICROGRAM(S): at 21:57

## 2023-01-01 RX ADMIN — Medication 1 CAPSULE(S): at 09:34

## 2023-01-01 RX ADMIN — GABAPENTIN 100 MILLIGRAM(S): 400 CAPSULE ORAL at 21:48

## 2023-01-01 RX ADMIN — Medication 2: at 17:25

## 2023-01-01 RX ADMIN — Medication 10 MILLIGRAM(S): at 06:29

## 2023-01-01 RX ADMIN — HYDROMORPHONE HYDROCHLORIDE 1 MILLIGRAM(S): 2 INJECTION INTRAMUSCULAR; INTRAVENOUS; SUBCUTANEOUS at 10:30

## 2023-01-01 RX ADMIN — CHLORHEXIDINE GLUCONATE 1 APPLICATION(S): 213 SOLUTION TOPICAL at 11:33

## 2023-01-01 RX ADMIN — Medication 500 MILLIGRAM(S): at 12:49

## 2023-01-01 RX ADMIN — Medication 20 MICROGRAM(S): at 22:05

## 2023-01-01 RX ADMIN — HYDROMORPHONE HYDROCHLORIDE 1 MILLIGRAM(S): 2 INJECTION INTRAMUSCULAR; INTRAVENOUS; SUBCUTANEOUS at 02:30

## 2023-01-01 RX ADMIN — MEROPENEM 100 MILLIGRAM(S): 1 INJECTION INTRAVENOUS at 17:22

## 2023-01-01 RX ADMIN — Medication 11.1 MICROGRAM(S)/KG/MIN: at 19:19

## 2023-01-01 RX ADMIN — POLYETHYLENE GLYCOL 3350 17 GRAM(S): 17 POWDER, FOR SOLUTION ORAL at 12:11

## 2023-01-01 RX ADMIN — QUETIAPINE FUMARATE 25 MILLIGRAM(S): 200 TABLET, FILM COATED ORAL at 22:05

## 2023-01-01 RX ADMIN — MIDODRINE HYDROCHLORIDE 40 MILLIGRAM(S): 2.5 TABLET ORAL at 13:08

## 2023-01-01 RX ADMIN — VASOPRESSIN 4.5 UNIT(S)/MIN: 20 INJECTION INTRAVENOUS at 12:05

## 2023-01-01 RX ADMIN — HYDROMORPHONE HYDROCHLORIDE 0.5 MILLIGRAM(S): 2 INJECTION INTRAMUSCULAR; INTRAVENOUS; SUBCUTANEOUS at 15:56

## 2023-01-01 RX ADMIN — HEPARIN SODIUM 5000 UNIT(S): 5000 INJECTION INTRAVENOUS; SUBCUTANEOUS at 05:33

## 2023-01-01 RX ADMIN — GABAPENTIN 100 MILLIGRAM(S): 400 CAPSULE ORAL at 14:16

## 2023-01-01 RX ADMIN — FENTANYL CITRATE 5.9 MICROGRAM(S)/KG/HR: 50 INJECTION INTRAVENOUS at 23:59

## 2023-01-01 RX ADMIN — Medication 2: at 05:41

## 2023-01-01 RX ADMIN — Medication 1 CAPSULE(S): at 05:15

## 2023-01-01 RX ADMIN — FENTANYL CITRATE 100 MICROGRAM(S): 50 INJECTION INTRAVENOUS at 13:05

## 2023-01-01 RX ADMIN — Medication 10 MILLIGRAM(S): at 06:04

## 2023-01-01 RX ADMIN — Medication 1 CAPSULE(S): at 02:25

## 2023-01-01 RX ADMIN — Medication 1 APPLICATION(S): at 17:12

## 2023-01-01 RX ADMIN — PANTOPRAZOLE SODIUM 40 MILLIGRAM(S): 20 TABLET, DELAYED RELEASE ORAL at 12:10

## 2023-01-01 RX ADMIN — GABAPENTIN 100 MILLIGRAM(S): 400 CAPSULE ORAL at 06:05

## 2023-01-01 RX ADMIN — Medication 975 MILLIGRAM(S): at 05:46

## 2023-01-01 RX ADMIN — HEPARIN SODIUM 5000 UNIT(S): 5000 INJECTION INTRAVENOUS; SUBCUTANEOUS at 21:12

## 2023-01-01 RX ADMIN — Medication 2: at 18:17

## 2023-01-01 RX ADMIN — Medication 5 MILLIGRAM(S): at 08:55

## 2023-01-01 RX ADMIN — Medication 2 UNIT(S): at 05:29

## 2023-01-01 RX ADMIN — Medication 1 CAPSULE(S): at 03:05

## 2023-01-01 RX ADMIN — SODIUM CHLORIDE 2000 MILLILITER(S): 9 INJECTION, SOLUTION INTRAVENOUS at 14:59

## 2023-01-01 RX ADMIN — CHLORHEXIDINE GLUCONATE 15 MILLILITER(S): 213 SOLUTION TOPICAL at 17:28

## 2023-01-01 RX ADMIN — POLYETHYLENE GLYCOL 3350 17 GRAM(S): 17 POWDER, FOR SOLUTION ORAL at 12:01

## 2023-01-01 RX ADMIN — DEXMEDETOMIDINE HYDROCHLORIDE IN 0.9% SODIUM CHLORIDE 2.95 MICROGRAM(S)/KG/HR: 4 INJECTION INTRAVENOUS at 23:07

## 2023-01-01 RX ADMIN — DEXMEDETOMIDINE HYDROCHLORIDE IN 0.9% SODIUM CHLORIDE 6.11 MICROGRAM(S)/KG/HR: 4 INJECTION INTRAVENOUS at 19:12

## 2023-01-01 RX ADMIN — Medication 10 MILLIGRAM(S): at 11:35

## 2023-01-01 RX ADMIN — MIDODRINE HYDROCHLORIDE 30 MILLIGRAM(S): 2.5 TABLET ORAL at 21:59

## 2023-01-01 RX ADMIN — DEXMEDETOMIDINE HYDROCHLORIDE IN 0.9% SODIUM CHLORIDE 6.11 MICROGRAM(S)/KG/HR: 4 INJECTION INTRAVENOUS at 19:50

## 2023-01-01 RX ADMIN — Medication 10 MILLIGRAM(S): at 18:30

## 2023-01-01 RX ADMIN — VASOPRESSIN 4.5 UNIT(S)/MIN: 20 INJECTION INTRAVENOUS at 20:21

## 2023-01-01 RX ADMIN — Medication 50 MILLIGRAM(S): at 17:53

## 2023-01-01 RX ADMIN — FONDAPARINUX SODIUM 1.5 MILLIGRAM(S): 2.5 INJECTION, SOLUTION SUBCUTANEOUS at 19:02

## 2023-01-01 RX ADMIN — MEROPENEM 100 MILLIGRAM(S): 1 INJECTION INTRAVENOUS at 18:14

## 2023-01-01 RX ADMIN — HEPARIN SODIUM 10000 UNIT(S): 5000 INJECTION INTRAVENOUS; SUBCUTANEOUS at 23:38

## 2023-01-01 RX ADMIN — Medication 1 CAPSULE(S): at 15:26

## 2023-01-01 RX ADMIN — SODIUM ZIRCONIUM CYCLOSILICATE 10 GRAM(S): 10 POWDER, FOR SUSPENSION ORAL at 10:36

## 2023-01-01 RX ADMIN — Medication 1 CAPSULE(S): at 02:41

## 2023-01-01 RX ADMIN — Medication 20 MILLIGRAM(S): at 06:05

## 2023-01-01 RX ADMIN — Medication 1 APPLICATION(S): at 05:47

## 2023-01-01 RX ADMIN — FENTANYL CITRATE 5.9 MICROGRAM(S)/KG/HR: 50 INJECTION INTRAVENOUS at 08:15

## 2023-01-01 RX ADMIN — Medication 100 MILLIGRAM(S): at 05:50

## 2023-01-01 RX ADMIN — MEROPENEM 100 MILLIGRAM(S): 1 INJECTION INTRAVENOUS at 06:23

## 2023-01-01 RX ADMIN — Medication 1 APPLICATION(S): at 17:58

## 2023-01-01 RX ADMIN — CHLORHEXIDINE GLUCONATE 1 APPLICATION(S): 213 SOLUTION TOPICAL at 11:27

## 2023-01-01 RX ADMIN — OXYCODONE HYDROCHLORIDE 5 MILLIGRAM(S): 5 TABLET ORAL at 23:35

## 2023-01-01 RX ADMIN — FONDAPARINUX SODIUM 1.5 MILLIGRAM(S): 2.5 INJECTION, SOLUTION SUBCUTANEOUS at 12:07

## 2023-01-01 RX ADMIN — Medication 1 CAPSULE(S): at 18:17

## 2023-01-01 RX ADMIN — ONDANSETRON 4 MILLIGRAM(S): 8 TABLET, FILM COATED ORAL at 03:42

## 2023-01-01 RX ADMIN — Medication 200 GRAM(S): at 01:50

## 2023-01-01 RX ADMIN — Medication 10 MILLIGRAM(S): at 07:53

## 2023-01-01 RX ADMIN — Medication 975 MILLIGRAM(S): at 06:30

## 2023-01-01 RX ADMIN — Medication 2 UNIT(S): at 00:39

## 2023-01-01 RX ADMIN — OXANDROLONE 5 MILLIGRAM(S): 10 TABLET ORAL at 15:00

## 2023-01-01 RX ADMIN — Medication 975 MILLIGRAM(S): at 18:04

## 2023-01-01 RX ADMIN — Medication 1 PACKET(S): at 12:50

## 2023-01-01 RX ADMIN — Medication 975 MILLIGRAM(S): at 23:15

## 2023-01-01 RX ADMIN — Medication 2: at 05:48

## 2023-01-01 RX ADMIN — Medication 3 UNIT(S): at 18:00

## 2023-01-01 RX ADMIN — Medication 2: at 23:07

## 2023-01-01 RX ADMIN — INSULIN GLARGINE 9 UNIT(S): 100 INJECTION, SOLUTION SUBCUTANEOUS at 22:09

## 2023-01-01 RX ADMIN — HYDROMORPHONE HYDROCHLORIDE 1 MILLIGRAM(S): 2 INJECTION INTRAMUSCULAR; INTRAVENOUS; SUBCUTANEOUS at 11:30

## 2023-01-01 RX ADMIN — Medication 10 MILLIGRAM(S): at 17:32

## 2023-01-01 RX ADMIN — Medication 2 UNIT(S): at 23:25

## 2023-01-01 RX ADMIN — Medication 3 UNIT(S): at 18:02

## 2023-01-01 RX ADMIN — Medication 10 MILLIGRAM(S): at 11:31

## 2023-01-01 RX ADMIN — Medication 2 UNIT(S): at 17:22

## 2023-01-01 RX ADMIN — HYDROMORPHONE HYDROCHLORIDE 1 MILLIGRAM(S): 2 INJECTION INTRAMUSCULAR; INTRAVENOUS; SUBCUTANEOUS at 11:15

## 2023-01-01 RX ADMIN — Medication 2 UNIT(S): at 00:07

## 2023-01-01 RX ADMIN — VASOPRESSIN 4.5 UNIT(S)/MIN: 20 INJECTION INTRAVENOUS at 20:51

## 2023-01-01 RX ADMIN — Medication 50 MILLIGRAM(S): at 05:10

## 2023-01-01 RX ADMIN — HYDROMORPHONE HYDROCHLORIDE 1 MILLIGRAM(S): 2 INJECTION INTRAMUSCULAR; INTRAVENOUS; SUBCUTANEOUS at 22:35

## 2023-01-01 RX ADMIN — OXANDROLONE 5 MILLIGRAM(S): 10 TABLET ORAL at 05:47

## 2023-01-01 RX ADMIN — ERTAPENEM SODIUM 120 MILLIGRAM(S): 1 INJECTION, POWDER, LYOPHILIZED, FOR SOLUTION INTRAMUSCULAR; INTRAVENOUS at 12:10

## 2023-01-01 RX ADMIN — HEPARIN SODIUM 5000 UNIT(S): 5000 INJECTION INTRAVENOUS; SUBCUTANEOUS at 21:22

## 2023-01-01 RX ADMIN — Medication 20 MICROGRAM(S): at 22:41

## 2023-01-01 RX ADMIN — ERTAPENEM SODIUM 120 MILLIGRAM(S): 1 INJECTION, POWDER, LYOPHILIZED, FOR SOLUTION INTRAMUSCULAR; INTRAVENOUS at 11:25

## 2023-01-01 RX ADMIN — Medication 15 MILLILITER(S): at 12:04

## 2023-01-01 RX ADMIN — QUETIAPINE FUMARATE 25 MILLIGRAM(S): 200 TABLET, FILM COATED ORAL at 23:01

## 2023-01-01 RX ADMIN — GABAPENTIN 100 MILLIGRAM(S): 400 CAPSULE ORAL at 22:39

## 2023-01-01 RX ADMIN — Medication 1 CAPSULE(S): at 09:20

## 2023-01-01 RX ADMIN — PANTOPRAZOLE SODIUM 40 MILLIGRAM(S): 20 TABLET, DELAYED RELEASE ORAL at 11:20

## 2023-01-01 RX ADMIN — Medication 2 UNIT(S): at 18:29

## 2023-01-01 RX ADMIN — INSULIN HUMAN 4 UNIT(S)/HR: 100 INJECTION, SOLUTION SUBCUTANEOUS at 19:50

## 2023-01-01 RX ADMIN — HYDROMORPHONE HYDROCHLORIDE 1 MILLIGRAM(S): 2 INJECTION INTRAMUSCULAR; INTRAVENOUS; SUBCUTANEOUS at 06:15

## 2023-01-01 RX ADMIN — DEXMEDETOMIDINE HYDROCHLORIDE IN 0.9% SODIUM CHLORIDE 6.11 MICROGRAM(S)/KG/HR: 4 INJECTION INTRAVENOUS at 19:29

## 2023-01-01 RX ADMIN — Medication 10 MILLIGRAM(S): at 23:00

## 2023-01-01 RX ADMIN — DEXMEDETOMIDINE HYDROCHLORIDE IN 0.9% SODIUM CHLORIDE 2.95 MICROGRAM(S)/KG/HR: 4 INJECTION INTRAVENOUS at 13:45

## 2023-01-01 RX ADMIN — Medication 650 MILLIGRAM(S): at 20:00

## 2023-01-01 RX ADMIN — MEROPENEM 100 MILLIGRAM(S): 1 INJECTION INTRAVENOUS at 06:09

## 2023-01-01 RX ADMIN — GABAPENTIN 100 MILLIGRAM(S): 400 CAPSULE ORAL at 14:04

## 2023-01-01 RX ADMIN — Medication 10 MILLIGRAM(S): at 14:41

## 2023-01-01 RX ADMIN — VASOPRESSIN 4.5 UNIT(S)/MIN: 20 INJECTION INTRAVENOUS at 13:58

## 2023-01-01 RX ADMIN — FENTANYL CITRATE 5.64 MICROGRAM(S)/KG/HR: 50 INJECTION INTRAVENOUS at 13:05

## 2023-01-01 RX ADMIN — GABAPENTIN 100 MILLIGRAM(S): 400 CAPSULE ORAL at 22:20

## 2023-01-01 RX ADMIN — OXYCODONE HYDROCHLORIDE 5 MILLIGRAM(S): 5 TABLET ORAL at 19:59

## 2023-01-01 RX ADMIN — MORPHINE SULFATE 2 MILLIGRAM(S): 50 CAPSULE, EXTENDED RELEASE ORAL at 05:25

## 2023-01-01 RX ADMIN — PANTOPRAZOLE SODIUM 40 MILLIGRAM(S): 20 TABLET, DELAYED RELEASE ORAL at 11:21

## 2023-01-01 RX ADMIN — Medication 10 MILLIGRAM(S): at 17:56

## 2023-01-01 RX ADMIN — SODIUM CHLORIDE 1000 MILLILITER(S): 9 INJECTION INTRAMUSCULAR; INTRAVENOUS; SUBCUTANEOUS at 20:39

## 2023-01-01 RX ADMIN — DEXMEDETOMIDINE HYDROCHLORIDE IN 0.9% SODIUM CHLORIDE 6.11 MICROGRAM(S)/KG/HR: 4 INJECTION INTRAVENOUS at 20:19

## 2023-01-01 RX ADMIN — Medication 10 MILLIGRAM(S): at 14:20

## 2023-01-01 RX ADMIN — Medication 2 UNIT(S): at 06:29

## 2023-01-01 RX ADMIN — CHLORHEXIDINE GLUCONATE 1 APPLICATION(S): 213 SOLUTION TOPICAL at 11:47

## 2023-01-01 RX ADMIN — ERTAPENEM SODIUM 120 MILLIGRAM(S): 1 INJECTION, POWDER, LYOPHILIZED, FOR SOLUTION INTRAMUSCULAR; INTRAVENOUS at 12:17

## 2023-01-01 RX ADMIN — CHLORHEXIDINE GLUCONATE 15 MILLILITER(S): 213 SOLUTION TOPICAL at 05:11

## 2023-01-01 RX ADMIN — FONDAPARINUX SODIUM 1.5 MILLIGRAM(S): 2.5 INJECTION, SOLUTION SUBCUTANEOUS at 11:47

## 2023-01-01 RX ADMIN — CHLORHEXIDINE GLUCONATE 15 MILLILITER(S): 213 SOLUTION TOPICAL at 18:01

## 2023-01-01 RX ADMIN — MORPHINE SULFATE 4 MILLIGRAM(S): 50 CAPSULE, EXTENDED RELEASE ORAL at 16:23

## 2023-01-01 RX ADMIN — FENTANYL CITRATE 5.9 MICROGRAM(S)/KG/HR: 50 INJECTION INTRAVENOUS at 13:36

## 2023-01-01 RX ADMIN — Medication 50 MILLILITER(S): at 17:04

## 2023-01-01 RX ADMIN — GABAPENTIN 100 MILLIGRAM(S): 400 CAPSULE ORAL at 21:22

## 2023-01-01 RX ADMIN — Medication 200 GRAM(S): at 14:47

## 2023-01-01 RX ADMIN — Medication 500 MILLIGRAM(S): at 06:28

## 2023-01-01 RX ADMIN — OXYCODONE HYDROCHLORIDE 5 MILLIGRAM(S): 5 TABLET ORAL at 01:05

## 2023-01-01 RX ADMIN — QUETIAPINE FUMARATE 25 MILLIGRAM(S): 200 TABLET, FILM COATED ORAL at 21:48

## 2023-01-01 RX ADMIN — Medication 2 UNIT(S): at 23:56

## 2023-01-01 RX ADMIN — Medication 10 MILLIGRAM(S): at 11:21

## 2023-01-01 RX ADMIN — HYDROMORPHONE HYDROCHLORIDE 1 MILLIGRAM(S): 2 INJECTION INTRAMUSCULAR; INTRAVENOUS; SUBCUTANEOUS at 10:15

## 2023-01-01 RX ADMIN — HEPARIN SODIUM 5000 UNIT(S): 5000 INJECTION INTRAVENOUS; SUBCUTANEOUS at 13:58

## 2023-01-01 RX ADMIN — Medication 4: at 23:55

## 2023-01-01 RX ADMIN — PANTOPRAZOLE SODIUM 40 MILLIGRAM(S): 20 TABLET, DELAYED RELEASE ORAL at 12:17

## 2023-01-01 RX ADMIN — MEROPENEM 100 MILLIGRAM(S): 1 INJECTION INTRAVENOUS at 17:08

## 2023-01-01 RX ADMIN — OXYCODONE HYDROCHLORIDE 5 MILLIGRAM(S): 5 TABLET ORAL at 17:45

## 2023-01-01 RX ADMIN — Medication 100 GRAM(S): at 15:48

## 2023-01-01 RX ADMIN — Medication 2 UNIT(S): at 00:53

## 2023-01-01 RX ADMIN — MORPHINE SULFATE 2 MILLIGRAM(S): 50 CAPSULE, EXTENDED RELEASE ORAL at 10:01

## 2023-01-01 RX ADMIN — FENTANYL CITRATE 50 MICROGRAM(S): 50 INJECTION INTRAVENOUS at 04:39

## 2023-01-01 RX ADMIN — Medication 2: at 00:38

## 2023-01-01 RX ADMIN — CHLORHEXIDINE GLUCONATE 15 MILLILITER(S): 213 SOLUTION TOPICAL at 17:56

## 2023-01-01 RX ADMIN — Medication 1 CAPSULE(S): at 18:44

## 2023-01-01 RX ADMIN — CHLORHEXIDINE GLUCONATE 15 MILLILITER(S): 213 SOLUTION TOPICAL at 17:36

## 2023-01-01 RX ADMIN — CHLORHEXIDINE GLUCONATE 1 APPLICATION(S): 213 SOLUTION TOPICAL at 11:21

## 2023-01-01 RX ADMIN — INSULIN GLARGINE 9 UNIT(S): 100 INJECTION, SOLUTION SUBCUTANEOUS at 23:57

## 2023-01-01 RX ADMIN — Medication 2 UNIT(S): at 05:43

## 2023-01-01 RX ADMIN — GABAPENTIN 100 MILLIGRAM(S): 400 CAPSULE ORAL at 21:12

## 2023-01-01 RX ADMIN — Medication 20 MICROGRAM(S): at 22:20

## 2023-01-01 RX ADMIN — GABAPENTIN 100 MILLIGRAM(S): 400 CAPSULE ORAL at 14:42

## 2023-01-01 RX ADMIN — Medication 20 MICROGRAM(S): at 21:41

## 2023-01-01 RX ADMIN — POLYETHYLENE GLYCOL 3350 17 GRAM(S): 17 POWDER, FOR SOLUTION ORAL at 22:28

## 2023-01-01 RX ADMIN — OXYCODONE HYDROCHLORIDE 10 MILLIGRAM(S): 5 TABLET ORAL at 13:25

## 2023-01-01 RX ADMIN — INSULIN GLARGINE 9 UNIT(S): 100 INJECTION, SOLUTION SUBCUTANEOUS at 23:58

## 2023-01-01 RX ADMIN — DEXMEDETOMIDINE HYDROCHLORIDE IN 0.9% SODIUM CHLORIDE 2.95 MICROGRAM(S)/KG/HR: 4 INJECTION INTRAVENOUS at 07:53

## 2023-01-01 RX ADMIN — DEXMEDETOMIDINE HYDROCHLORIDE IN 0.9% SODIUM CHLORIDE 2.95 MICROGRAM(S)/KG/HR: 4 INJECTION INTRAVENOUS at 19:48

## 2023-01-01 RX ADMIN — FENTANYL CITRATE 4 MICROGRAM(S)/KG/HR: 50 INJECTION INTRAVENOUS at 20:00

## 2023-01-01 RX ADMIN — Medication 2 UNIT(S): at 05:15

## 2023-01-01 RX ADMIN — Medication 2 UNIT(S): at 11:54

## 2023-01-01 RX ADMIN — HYDROMORPHONE HYDROCHLORIDE 0.5 MILLIGRAM(S): 2 INJECTION INTRAMUSCULAR; INTRAVENOUS; SUBCUTANEOUS at 14:05

## 2023-01-01 RX ADMIN — Medication 1 CAPSULE(S): at 21:42

## 2023-01-01 RX ADMIN — INSULIN GLARGINE 9 UNIT(S): 100 INJECTION, SOLUTION SUBCUTANEOUS at 22:02

## 2023-01-01 RX ADMIN — MEROPENEM 100 MILLIGRAM(S): 1 INJECTION INTRAVENOUS at 05:12

## 2023-01-01 RX ADMIN — MEROPENEM 100 MILLIGRAM(S): 1 INJECTION INTRAVENOUS at 17:23

## 2023-01-01 RX ADMIN — HYDROMORPHONE HYDROCHLORIDE 0.5 MILLIGRAM(S): 2 INJECTION INTRAMUSCULAR; INTRAVENOUS; SUBCUTANEOUS at 19:49

## 2023-01-01 RX ADMIN — Medication 100 GRAM(S): at 15:52

## 2023-01-01 RX ADMIN — Medication 10 MILLIGRAM(S): at 22:43

## 2023-01-01 RX ADMIN — MEROPENEM 100 MILLIGRAM(S): 1 INJECTION INTRAVENOUS at 05:28

## 2023-01-01 RX ADMIN — Medication 15 MILLILITER(S): at 12:10

## 2023-01-01 RX ADMIN — Medication 2 UNIT(S): at 00:37

## 2023-01-01 RX ADMIN — Medication 15 MILLILITER(S): at 11:28

## 2023-01-01 RX ADMIN — Medication 1 APPLICATION(S): at 18:10

## 2023-01-01 RX ADMIN — POLYETHYLENE GLYCOL 3350 17 GRAM(S): 17 POWDER, FOR SOLUTION ORAL at 22:35

## 2023-01-01 RX ADMIN — Medication 3 UNIT(S): at 17:54

## 2023-01-01 RX ADMIN — ALTEPLASE 2 MILLIGRAM(S): KIT at 14:15

## 2023-01-01 RX ADMIN — Medication 10 MILLIGRAM(S): at 05:23

## 2023-01-01 RX ADMIN — Medication 20 MICROGRAM(S): at 21:50

## 2023-01-01 RX ADMIN — HYDROMORPHONE HYDROCHLORIDE 0.5 MILLIGRAM(S): 2 INJECTION INTRAMUSCULAR; INTRAVENOUS; SUBCUTANEOUS at 00:25

## 2023-01-01 RX ADMIN — HEPARIN SODIUM 5000 UNIT(S): 5000 INJECTION INTRAVENOUS; SUBCUTANEOUS at 14:33

## 2023-01-01 RX ADMIN — GABAPENTIN 100 MILLIGRAM(S): 400 CAPSULE ORAL at 21:43

## 2023-01-01 RX ADMIN — MIDODRINE HYDROCHLORIDE 30 MILLIGRAM(S): 2.5 TABLET ORAL at 22:56

## 2023-01-01 RX ADMIN — MIDODRINE HYDROCHLORIDE 30 MILLIGRAM(S): 2.5 TABLET ORAL at 14:46

## 2023-01-01 RX ADMIN — PANTOPRAZOLE SODIUM 40 MILLIGRAM(S): 20 TABLET, DELAYED RELEASE ORAL at 12:22

## 2023-01-01 RX ADMIN — HEPARIN SODIUM 5000 UNIT(S): 5000 INJECTION INTRAVENOUS; SUBCUTANEOUS at 13:06

## 2023-01-01 RX ADMIN — CHLORHEXIDINE GLUCONATE 15 MILLILITER(S): 213 SOLUTION TOPICAL at 05:07

## 2023-01-01 RX ADMIN — Medication 2 UNIT(S): at 11:17

## 2023-01-01 RX ADMIN — Medication 4: at 23:24

## 2023-01-01 RX ADMIN — Medication 10 MILLIGRAM(S): at 23:08

## 2023-01-01 RX ADMIN — Medication 3 UNIT(S): at 05:49

## 2023-01-01 RX ADMIN — Medication 0.5 INCH(S): at 08:48

## 2023-01-01 RX ADMIN — Medication 1 CAPSULE(S): at 17:36

## 2023-01-01 RX ADMIN — Medication 10 MILLIGRAM(S): at 14:00

## 2023-01-01 RX ADMIN — CHLORHEXIDINE GLUCONATE 1 APPLICATION(S): 213 SOLUTION TOPICAL at 11:15

## 2023-01-01 RX ADMIN — Medication 2 UNIT(S): at 11:56

## 2023-01-01 RX ADMIN — CHLORHEXIDINE GLUCONATE 15 MILLILITER(S): 213 SOLUTION TOPICAL at 17:17

## 2023-01-01 RX ADMIN — Medication 10 MILLIGRAM(S): at 22:08

## 2023-01-01 RX ADMIN — VASOPRESSIN 4.5 UNIT(S)/MIN: 20 INJECTION INTRAVENOUS at 19:12

## 2023-01-01 RX ADMIN — HEPARIN SODIUM 1400 UNIT(S)/HR: 5000 INJECTION INTRAVENOUS; SUBCUTANEOUS at 21:56

## 2023-01-01 RX ADMIN — Medication 1 CAPSULE(S): at 12:10

## 2023-01-01 RX ADMIN — Medication 10 MILLIGRAM(S): at 17:10

## 2023-01-01 RX ADMIN — Medication 11.1 MICROGRAM(S)/KG/MIN: at 05:56

## 2023-01-01 RX ADMIN — Medication 3 UNIT(S): at 17:44

## 2023-01-01 RX ADMIN — OXYCODONE HYDROCHLORIDE 10 MILLIGRAM(S): 5 TABLET ORAL at 20:20

## 2023-01-01 RX ADMIN — Medication 1 CAPSULE(S): at 13:06

## 2023-01-01 RX ADMIN — Medication 10 MILLIGRAM(S): at 18:57

## 2023-01-01 RX ADMIN — DEXMEDETOMIDINE HYDROCHLORIDE IN 0.9% SODIUM CHLORIDE 6.11 MICROGRAM(S)/KG/HR: 4 INJECTION INTRAVENOUS at 13:24

## 2023-01-01 RX ADMIN — CHLORHEXIDINE GLUCONATE 15 MILLILITER(S): 213 SOLUTION TOPICAL at 05:31

## 2023-01-01 RX ADMIN — Medication 40 MILLIEQUIVALENT(S): at 05:45

## 2023-01-01 RX ADMIN — Medication 1 CAPSULE(S): at 02:09

## 2023-01-01 RX ADMIN — Medication 500 MILLILITER(S): at 12:10

## 2023-01-01 RX ADMIN — HYDROMORPHONE HYDROCHLORIDE 1 MILLIGRAM(S): 2 INJECTION INTRAMUSCULAR; INTRAVENOUS; SUBCUTANEOUS at 01:17

## 2023-01-01 RX ADMIN — MEROPENEM 100 MILLIGRAM(S): 1 INJECTION INTRAVENOUS at 05:08

## 2023-01-01 RX ADMIN — ALBUTEROL 2.5 MILLIGRAM(S): 90 AEROSOL, METERED ORAL at 10:33

## 2023-01-01 RX ADMIN — Medication 2 UNIT(S): at 23:58

## 2023-01-01 RX ADMIN — MIDODRINE HYDROCHLORIDE 40 MILLIGRAM(S): 2.5 TABLET ORAL at 14:22

## 2023-01-01 RX ADMIN — HYDROMORPHONE HYDROCHLORIDE 1 MILLIGRAM(S): 2 INJECTION INTRAMUSCULAR; INTRAVENOUS; SUBCUTANEOUS at 09:33

## 2023-01-01 RX ADMIN — CHLORHEXIDINE GLUCONATE 1 APPLICATION(S): 213 SOLUTION TOPICAL at 12:11

## 2023-01-01 RX ADMIN — CHLORHEXIDINE GLUCONATE 15 MILLILITER(S): 213 SOLUTION TOPICAL at 05:50

## 2023-01-01 RX ADMIN — HEPARIN SODIUM 5000 UNIT(S): 5000 INJECTION INTRAVENOUS; SUBCUTANEOUS at 05:07

## 2023-01-01 RX ADMIN — FONDAPARINUX SODIUM 1.5 MILLIGRAM(S): 2.5 INJECTION, SOLUTION SUBCUTANEOUS at 12:00

## 2023-01-01 RX ADMIN — Medication 10 MILLIGRAM(S): at 23:16

## 2023-01-01 RX ADMIN — DEXMEDETOMIDINE HYDROCHLORIDE IN 0.9% SODIUM CHLORIDE 6.11 MICROGRAM(S)/KG/HR: 4 INJECTION INTRAVENOUS at 14:19

## 2023-01-01 RX ADMIN — MIDODRINE HYDROCHLORIDE 40 MILLIGRAM(S): 2.5 TABLET ORAL at 22:01

## 2023-01-01 RX ADMIN — Medication 5.73 MICROGRAM(S)/KG/MIN: at 19:51

## 2023-01-01 RX ADMIN — HEPARIN SODIUM 5000 UNIT(S): 5000 INJECTION INTRAVENOUS; SUBCUTANEOUS at 22:05

## 2023-01-01 RX ADMIN — FENTANYL CITRATE 5.9 MICROGRAM(S)/KG/HR: 50 INJECTION INTRAVENOUS at 19:30

## 2023-01-01 RX ADMIN — Medication 5.53 MICROGRAM(S)/KG/MIN: at 19:52

## 2023-01-01 RX ADMIN — Medication 2 UNIT(S): at 23:37

## 2023-01-01 RX ADMIN — PANTOPRAZOLE SODIUM 40 MILLIGRAM(S): 20 TABLET, DELAYED RELEASE ORAL at 12:04

## 2023-01-01 RX ADMIN — Medication 1.25 MILLIGRAM(S): at 17:30

## 2023-01-01 RX ADMIN — GABAPENTIN 100 MILLIGRAM(S): 400 CAPSULE ORAL at 22:19

## 2023-01-01 RX ADMIN — Medication 3 UNIT(S): at 11:22

## 2023-01-01 RX ADMIN — Medication 1 CAPSULE(S): at 02:51

## 2023-01-01 RX ADMIN — SODIUM CHLORIDE 1000 MILLILITER(S): 9 INJECTION, SOLUTION INTRAVENOUS at 09:13

## 2023-01-01 RX ADMIN — Medication 20 MICROGRAM(S): at 21:49

## 2023-01-01 RX ADMIN — LIDOCAINE 1 PATCH: 4 CREAM TOPICAL at 01:51

## 2023-01-01 RX ADMIN — Medication 1 CAPSULE(S): at 13:39

## 2023-01-01 RX ADMIN — PANTOPRAZOLE SODIUM 40 MILLIGRAM(S): 20 TABLET, DELAYED RELEASE ORAL at 11:27

## 2023-01-01 RX ADMIN — OXYCODONE HYDROCHLORIDE 5 MILLIGRAM(S): 5 TABLET ORAL at 00:05

## 2023-01-01 RX ADMIN — MIDODRINE HYDROCHLORIDE 40 MILLIGRAM(S): 2.5 TABLET ORAL at 21:49

## 2023-01-01 RX ADMIN — Medication 1 CAPSULE(S): at 05:43

## 2023-01-01 RX ADMIN — CHLORHEXIDINE GLUCONATE 15 MILLILITER(S): 213 SOLUTION TOPICAL at 05:18

## 2023-01-01 RX ADMIN — Medication 1 CAPSULE(S): at 10:16

## 2023-01-01 RX ADMIN — PHENYLEPHRINE HYDROCHLORIDE 23 MICROGRAM(S)/KG/MIN: 10 INJECTION INTRAVENOUS at 07:42

## 2023-01-01 RX ADMIN — INSULIN GLARGINE 9 UNIT(S): 100 INJECTION, SOLUTION SUBCUTANEOUS at 22:59

## 2023-01-01 RX ADMIN — FENTANYL CITRATE 5.9 MICROGRAM(S)/KG/HR: 50 INJECTION INTRAVENOUS at 07:40

## 2023-01-01 RX ADMIN — Medication 1 APPLICATION(S): at 05:07

## 2023-01-01 RX ADMIN — CHLORHEXIDINE GLUCONATE 15 MILLILITER(S): 213 SOLUTION TOPICAL at 06:08

## 2023-01-01 RX ADMIN — Medication 1 APPLICATION(S): at 17:25

## 2023-01-01 RX ADMIN — Medication 975 MILLIGRAM(S): at 11:37

## 2023-01-01 RX ADMIN — CHLORHEXIDINE GLUCONATE 1 APPLICATION(S): 213 SOLUTION TOPICAL at 18:14

## 2023-01-01 RX ADMIN — Medication 975 MILLIGRAM(S): at 21:09

## 2023-01-01 RX ADMIN — Medication 20 MICROGRAM(S): at 21:59

## 2023-01-01 RX ADMIN — HYDROMORPHONE HYDROCHLORIDE 0.5 MILLIGRAM(S): 2 INJECTION INTRAMUSCULAR; INTRAVENOUS; SUBCUTANEOUS at 08:06

## 2023-01-01 RX ADMIN — ERTAPENEM SODIUM 100 MILLIGRAM(S): 1 INJECTION, POWDER, LYOPHILIZED, FOR SOLUTION INTRAMUSCULAR; INTRAVENOUS at 12:10

## 2023-01-01 RX ADMIN — SODIUM CHLORIDE 170 MILLILITER(S): 9 INJECTION, SOLUTION INTRAVENOUS at 19:49

## 2023-01-01 RX ADMIN — Medication 3: at 18:14

## 2023-01-01 RX ADMIN — DEXMEDETOMIDINE HYDROCHLORIDE IN 0.9% SODIUM CHLORIDE 2.95 MICROGRAM(S)/KG/HR: 4 INJECTION INTRAVENOUS at 17:03

## 2023-01-01 RX ADMIN — ALBUTEROL 2.5 MILLIGRAM(S): 90 AEROSOL, METERED ORAL at 10:29

## 2023-01-01 RX ADMIN — Medication 1 APPLICATION(S): at 18:30

## 2023-01-01 RX ADMIN — SODIUM CHLORIDE 200 MILLILITER(S): 9 INJECTION, SOLUTION INTRAVENOUS at 06:08

## 2023-01-01 RX ADMIN — Medication 5 MILLIGRAM(S): at 18:21

## 2023-01-01 RX ADMIN — MIDODRINE HYDROCHLORIDE 30 MILLIGRAM(S): 2.5 TABLET ORAL at 05:44

## 2023-01-01 RX ADMIN — Medication 1 CAPSULE(S): at 21:26

## 2023-01-01 RX ADMIN — Medication 15 MILLILITER(S): at 11:55

## 2023-01-01 RX ADMIN — GABAPENTIN 100 MILLIGRAM(S): 400 CAPSULE ORAL at 13:15

## 2023-01-01 RX ADMIN — OXYCODONE HYDROCHLORIDE 5 MILLIGRAM(S): 5 TABLET ORAL at 00:56

## 2023-01-01 RX ADMIN — Medication 10 MILLIGRAM(S): at 21:41

## 2023-01-01 RX ADMIN — CHLORHEXIDINE GLUCONATE 1 APPLICATION(S): 213 SOLUTION TOPICAL at 14:46

## 2023-01-01 RX ADMIN — HYDROMORPHONE HYDROCHLORIDE 1 MILLIGRAM(S): 2 INJECTION INTRAMUSCULAR; INTRAVENOUS; SUBCUTANEOUS at 04:00

## 2023-01-01 RX ADMIN — CHLORHEXIDINE GLUCONATE 1 APPLICATION(S): 213 SOLUTION TOPICAL at 11:32

## 2023-01-01 RX ADMIN — Medication 1 CAPSULE(S): at 13:38

## 2023-01-01 RX ADMIN — Medication 20 MICROGRAM(S): at 21:13

## 2023-01-01 RX ADMIN — HEPARIN SODIUM 1400 UNIT(S)/HR: 5000 INJECTION INTRAVENOUS; SUBCUTANEOUS at 06:03

## 2023-01-01 RX ADMIN — Medication 400 MILLIGRAM(S): at 23:21

## 2023-01-01 RX ADMIN — Medication 5.73 MICROGRAM(S)/KG/MIN: at 20:31

## 2023-01-01 RX ADMIN — Medication 20 MICROGRAM(S): at 21:47

## 2023-01-01 RX ADMIN — Medication 10 MILLIGRAM(S): at 05:14

## 2023-01-01 RX ADMIN — HYDROMORPHONE HYDROCHLORIDE 0.5 MILLIGRAM(S): 2 INJECTION INTRAMUSCULAR; INTRAVENOUS; SUBCUTANEOUS at 12:56

## 2023-01-01 RX ADMIN — CHLORHEXIDINE GLUCONATE 1 APPLICATION(S): 213 SOLUTION TOPICAL at 12:10

## 2023-01-01 RX ADMIN — Medication 4: at 11:54

## 2023-01-01 RX ADMIN — HEPARIN SODIUM 5000 UNIT(S): 5000 INJECTION INTRAVENOUS; SUBCUTANEOUS at 13:55

## 2023-01-01 RX ADMIN — Medication 2: at 17:58

## 2023-01-01 RX ADMIN — Medication 200 GRAM(S): at 07:18

## 2023-01-01 RX ADMIN — Medication 50 MILLIGRAM(S): at 23:41

## 2023-01-01 RX ADMIN — Medication 5.73 MICROGRAM(S)/KG/MIN: at 20:00

## 2023-01-01 RX ADMIN — Medication 2 UNIT(S): at 11:55

## 2023-01-01 RX ADMIN — MIDODRINE HYDROCHLORIDE 40 MILLIGRAM(S): 2.5 TABLET ORAL at 22:57

## 2023-01-01 RX ADMIN — Medication 10 MILLIGRAM(S): at 14:04

## 2023-01-01 RX ADMIN — MORPHINE SULFATE 4 MILLIGRAM(S): 50 CAPSULE, EXTENDED RELEASE ORAL at 12:46

## 2023-01-01 RX ADMIN — HYDROMORPHONE HYDROCHLORIDE 0.5 MILLIGRAM(S): 2 INJECTION INTRAMUSCULAR; INTRAVENOUS; SUBCUTANEOUS at 23:55

## 2023-01-01 RX ADMIN — Medication 500 MILLIGRAM(S): at 23:58

## 2023-01-01 RX ADMIN — Medication 100 MILLIGRAM(S): at 13:49

## 2023-01-01 RX ADMIN — Medication 1 CAPSULE(S): at 13:58

## 2023-01-01 RX ADMIN — MIDODRINE HYDROCHLORIDE 30 MILLIGRAM(S): 2.5 TABLET ORAL at 05:16

## 2023-01-01 RX ADMIN — Medication 200 MILLIGRAM(S): at 15:48

## 2023-01-01 RX ADMIN — Medication 1.25 MILLIGRAM(S): at 11:42

## 2023-01-01 RX ADMIN — Medication 1: at 23:54

## 2023-01-01 RX ADMIN — Medication 2: at 06:22

## 2023-01-01 RX ADMIN — HEPARIN SODIUM 5000 UNIT(S): 5000 INJECTION INTRAVENOUS; SUBCUTANEOUS at 23:00

## 2023-01-01 RX ADMIN — INSULIN GLARGINE 9 UNIT(S): 100 INJECTION, SOLUTION SUBCUTANEOUS at 21:07

## 2023-01-01 RX ADMIN — GABAPENTIN 100 MILLIGRAM(S): 400 CAPSULE ORAL at 06:29

## 2023-01-01 RX ADMIN — Medication 1 CAPSULE(S): at 05:32

## 2023-01-01 RX ADMIN — HEPARIN SODIUM 5000 UNIT(S): 5000 INJECTION INTRAVENOUS; SUBCUTANEOUS at 05:56

## 2023-01-01 RX ADMIN — Medication 125 MILLILITER(S): at 22:40

## 2023-01-01 RX ADMIN — Medication 10 MILLIGRAM(S): at 11:48

## 2023-01-01 RX ADMIN — Medication 1 CAPSULE(S): at 06:22

## 2023-01-01 RX ADMIN — Medication 975 MILLIGRAM(S): at 12:51

## 2023-01-01 RX ADMIN — PANTOPRAZOLE SODIUM 40 MILLIGRAM(S): 20 TABLET, DELAYED RELEASE ORAL at 11:41

## 2023-01-01 RX ADMIN — Medication 400 MILLIGRAM(S): at 12:11

## 2023-01-01 RX ADMIN — Medication 500 MILLIGRAM(S): at 17:55

## 2023-01-01 RX ADMIN — INSULIN GLARGINE 9 UNIT(S): 100 INJECTION, SOLUTION SUBCUTANEOUS at 23:03

## 2023-01-01 RX ADMIN — Medication 500 MILLIGRAM(S): at 07:00

## 2023-01-01 RX ADMIN — MIDODRINE HYDROCHLORIDE 40 MILLIGRAM(S): 2.5 TABLET ORAL at 14:42

## 2023-01-01 RX ADMIN — Medication 50 MILLIGRAM(S): at 05:49

## 2023-01-01 RX ADMIN — Medication 1 CAPSULE(S): at 09:29

## 2023-01-01 RX ADMIN — Medication 50 MILLILITER(S): at 10:17

## 2023-01-01 RX ADMIN — Medication 500 MILLIGRAM(S): at 00:25

## 2023-01-01 RX ADMIN — Medication 10 MILLIGRAM(S): at 23:14

## 2023-01-01 RX ADMIN — MIDODRINE HYDROCHLORIDE 10 MILLIGRAM(S): 2.5 TABLET ORAL at 14:04

## 2023-01-01 RX ADMIN — CHLORHEXIDINE GLUCONATE 1 APPLICATION(S): 213 SOLUTION TOPICAL at 11:39

## 2023-01-01 RX ADMIN — GABAPENTIN 100 MILLIGRAM(S): 400 CAPSULE ORAL at 13:39

## 2023-01-01 RX ADMIN — Medication 2 UNIT(S): at 12:13

## 2023-01-01 RX ADMIN — Medication 50 MILLIGRAM(S): at 17:37

## 2023-01-01 RX ADMIN — MEROPENEM 100 MILLIGRAM(S): 1 INJECTION INTRAVENOUS at 18:06

## 2023-01-01 RX ADMIN — HYDROMORPHONE HYDROCHLORIDE 1 MILLIGRAM(S): 2 INJECTION INTRAMUSCULAR; INTRAVENOUS; SUBCUTANEOUS at 18:17

## 2023-01-01 RX ADMIN — Medication 4: at 11:50

## 2023-01-01 RX ADMIN — Medication 50 MILLIGRAM(S): at 00:23

## 2023-01-01 RX ADMIN — Medication 10 MILLIGRAM(S): at 05:42

## 2023-01-01 RX ADMIN — Medication 102 MILLIGRAM(S): at 10:15

## 2023-01-01 RX ADMIN — FENTANYL CITRATE 50 MICROGRAM(S): 50 INJECTION INTRAVENOUS at 05:20

## 2023-01-01 RX ADMIN — SODIUM CHLORIDE 150 MILLILITER(S): 9 INJECTION, SOLUTION INTRAVENOUS at 20:00

## 2023-01-01 RX ADMIN — HYDROMORPHONE HYDROCHLORIDE 1 MILLIGRAM(S): 2 INJECTION INTRAMUSCULAR; INTRAVENOUS; SUBCUTANEOUS at 13:10

## 2023-01-01 RX ADMIN — SODIUM CHLORIDE 50 MILLILITER(S): 9 INJECTION, SOLUTION INTRAVENOUS at 07:41

## 2023-01-01 RX ADMIN — MIDODRINE HYDROCHLORIDE 30 MILLIGRAM(S): 2.5 TABLET ORAL at 11:55

## 2023-01-01 RX ADMIN — POLYETHYLENE GLYCOL 3350 17 GRAM(S): 17 POWDER, FOR SOLUTION ORAL at 23:21

## 2023-01-01 RX ADMIN — Medication 80 MILLIGRAM(S): at 17:05

## 2023-01-01 RX ADMIN — Medication 500 MILLIGRAM(S): at 17:19

## 2023-01-01 RX ADMIN — Medication 50 MILLIGRAM(S): at 05:33

## 2023-01-01 RX ADMIN — QUETIAPINE FUMARATE 25 MILLIGRAM(S): 200 TABLET, FILM COATED ORAL at 21:12

## 2023-01-01 RX ADMIN — Medication 125 MILLILITER(S): at 16:10

## 2023-01-01 RX ADMIN — Medication 1 APPLICATION(S): at 17:31

## 2023-01-01 RX ADMIN — CHLORHEXIDINE GLUCONATE 15 MILLILITER(S): 213 SOLUTION TOPICAL at 05:47

## 2023-01-01 RX ADMIN — Medication 11.1 MICROGRAM(S)/KG/MIN: at 20:21

## 2023-01-01 RX ADMIN — CHLORHEXIDINE GLUCONATE 15 MILLILITER(S): 213 SOLUTION TOPICAL at 17:53

## 2023-01-01 RX ADMIN — HEPARIN SODIUM 5000 UNIT(S): 5000 INJECTION INTRAVENOUS; SUBCUTANEOUS at 05:42

## 2023-01-01 RX ADMIN — Medication 1 CAPSULE(S): at 18:00

## 2023-01-01 RX ADMIN — Medication 1 CAPSULE(S): at 21:12

## 2023-01-01 RX ADMIN — Medication 2: at 17:13

## 2023-01-01 RX ADMIN — VASOPRESSIN 4.5 UNIT(S)/MIN: 20 INJECTION INTRAVENOUS at 19:21

## 2023-01-01 RX ADMIN — OXYCODONE HYDROCHLORIDE 5 MILLIGRAM(S): 5 TABLET ORAL at 18:17

## 2023-01-01 RX ADMIN — PANTOPRAZOLE SODIUM 40 MILLIGRAM(S): 20 TABLET, DELAYED RELEASE ORAL at 11:53

## 2023-01-01 RX ADMIN — HEPARIN SODIUM 2200 UNIT(S)/HR: 5000 INJECTION INTRAVENOUS; SUBCUTANEOUS at 09:56

## 2023-01-01 RX ADMIN — Medication 100 GRAM(S): at 10:35

## 2023-01-01 RX ADMIN — INSULIN GLARGINE 9 UNIT(S): 100 INJECTION, SOLUTION SUBCUTANEOUS at 22:42

## 2023-01-01 RX ADMIN — Medication 5.73 MICROGRAM(S)/KG/MIN: at 19:21

## 2023-01-01 RX ADMIN — PANTOPRAZOLE SODIUM 40 MILLIGRAM(S): 20 TABLET, DELAYED RELEASE ORAL at 11:24

## 2023-01-01 RX ADMIN — INSULIN HUMAN 5 UNIT(S): 100 INJECTION, SOLUTION SUBCUTANEOUS at 10:53

## 2023-01-01 RX ADMIN — Medication 2: at 12:34

## 2023-01-01 RX ADMIN — PANTOPRAZOLE SODIUM 40 MILLIGRAM(S): 20 TABLET, DELAYED RELEASE ORAL at 11:12

## 2023-01-01 RX ADMIN — HYDROMORPHONE HYDROCHLORIDE 1 MILLIGRAM(S): 2 INJECTION INTRAMUSCULAR; INTRAVENOUS; SUBCUTANEOUS at 11:34

## 2023-01-01 RX ADMIN — CHLORHEXIDINE GLUCONATE 1 APPLICATION(S): 213 SOLUTION TOPICAL at 11:45

## 2023-01-01 RX ADMIN — Medication 2 UNIT(S): at 17:13

## 2023-01-01 RX ADMIN — HYDROMORPHONE HYDROCHLORIDE 1 MILLIGRAM(S): 2 INJECTION INTRAMUSCULAR; INTRAVENOUS; SUBCUTANEOUS at 09:23

## 2023-01-01 RX ADMIN — SODIUM CHLORIDE 200 MILLILITER(S): 9 INJECTION, SOLUTION INTRAVENOUS at 01:47

## 2023-01-01 RX ADMIN — MIDODRINE HYDROCHLORIDE 40 MILLIGRAM(S): 2.5 TABLET ORAL at 13:32

## 2023-01-01 RX ADMIN — Medication 1 APPLICATION(S): at 05:31

## 2023-01-01 RX ADMIN — MIDODRINE HYDROCHLORIDE 40 MILLIGRAM(S): 2.5 TABLET ORAL at 23:23

## 2023-01-01 RX ADMIN — Medication 4: at 23:56

## 2023-01-01 RX ADMIN — CHLORHEXIDINE GLUCONATE 15 MILLILITER(S): 213 SOLUTION TOPICAL at 18:07

## 2023-01-01 RX ADMIN — HYDROMORPHONE HYDROCHLORIDE 1 MILLIGRAM(S): 2 INJECTION INTRAMUSCULAR; INTRAVENOUS; SUBCUTANEOUS at 14:34

## 2023-01-01 RX ADMIN — Medication 20 MICROGRAM(S): at 22:14

## 2023-01-01 RX ADMIN — Medication 2: at 18:06

## 2023-01-01 RX ADMIN — Medication 25 MILLIGRAM(S): at 12:11

## 2023-01-01 RX ADMIN — SODIUM CHLORIDE 1000 MILLILITER(S): 9 INJECTION INTRAMUSCULAR; INTRAVENOUS; SUBCUTANEOUS at 12:58

## 2023-01-01 RX ADMIN — HEPARIN SODIUM 5000 UNIT(S): 5000 INJECTION INTRAVENOUS; SUBCUTANEOUS at 21:17

## 2023-01-01 RX ADMIN — MIDODRINE HYDROCHLORIDE 30 MILLIGRAM(S): 2.5 TABLET ORAL at 15:00

## 2023-01-01 RX ADMIN — Medication 1 APPLICATION(S): at 17:45

## 2023-01-01 RX ADMIN — OXYCODONE HYDROCHLORIDE 5 MILLIGRAM(S): 5 TABLET ORAL at 10:30

## 2023-01-01 RX ADMIN — OXANDROLONE 5 MILLIGRAM(S): 10 TABLET ORAL at 13:39

## 2023-01-01 RX ADMIN — OXANDROLONE 5 MILLIGRAM(S): 10 TABLET ORAL at 22:13

## 2023-01-01 RX ADMIN — Medication 1 CAPSULE(S): at 03:03

## 2023-01-01 RX ADMIN — Medication 1 CAPSULE(S): at 05:30

## 2023-01-01 RX ADMIN — CHLORHEXIDINE GLUCONATE 15 MILLILITER(S): 213 SOLUTION TOPICAL at 17:37

## 2023-01-01 RX ADMIN — FONDAPARINUX SODIUM 1.5 MILLIGRAM(S): 2.5 INJECTION, SOLUTION SUBCUTANEOUS at 12:05

## 2023-01-01 RX ADMIN — Medication 500 MILLIGRAM(S): at 19:00

## 2023-01-01 RX ADMIN — Medication 10 MILLIGRAM(S): at 18:14

## 2023-01-01 RX ADMIN — Medication 3 UNIT(S): at 05:31

## 2023-01-01 RX ADMIN — HYDROMORPHONE HYDROCHLORIDE 1 MILLIGRAM(S): 2 INJECTION INTRAMUSCULAR; INTRAVENOUS; SUBCUTANEOUS at 00:30

## 2023-01-01 RX ADMIN — HYDROMORPHONE HYDROCHLORIDE 1 MILLIGRAM(S): 2 INJECTION INTRAMUSCULAR; INTRAVENOUS; SUBCUTANEOUS at 09:08

## 2023-01-01 RX ADMIN — Medication 1 CAPSULE(S): at 02:06

## 2023-01-01 RX ADMIN — Medication 10 MILLIGRAM(S): at 00:36

## 2023-01-01 RX ADMIN — Medication 5.73 MICROGRAM(S)/KG/MIN: at 07:40

## 2023-01-01 RX ADMIN — Medication 1 CAPSULE(S): at 02:58

## 2023-01-01 RX ADMIN — Medication 50 MILLIGRAM(S): at 06:31

## 2023-01-01 RX ADMIN — Medication 10 MILLIGRAM(S): at 17:31

## 2023-01-01 RX ADMIN — MEROPENEM 100 MILLIGRAM(S): 1 INJECTION INTRAVENOUS at 21:13

## 2023-01-01 RX ADMIN — PANTOPRAZOLE SODIUM 40 MILLIGRAM(S): 20 TABLET, DELAYED RELEASE ORAL at 11:57

## 2023-01-01 RX ADMIN — HEPARIN SODIUM 5000 UNIT(S): 5000 INJECTION INTRAVENOUS; SUBCUTANEOUS at 14:42

## 2023-01-01 RX ADMIN — Medication 5.73 MICROGRAM(S)/KG/MIN: at 09:07

## 2023-01-01 RX ADMIN — Medication 20 MICROGRAM(S): at 23:07

## 2023-01-01 RX ADMIN — DEXMEDETOMIDINE HYDROCHLORIDE IN 0.9% SODIUM CHLORIDE 5.9 MICROGRAM(S)/KG/HR: 4 INJECTION INTRAVENOUS at 14:00

## 2023-01-01 RX ADMIN — INSULIN GLARGINE 9 UNIT(S): 100 INJECTION, SOLUTION SUBCUTANEOUS at 00:37

## 2023-01-01 RX ADMIN — Medication 10 MILLIGRAM(S): at 17:24

## 2023-01-01 RX ADMIN — HYDROMORPHONE HYDROCHLORIDE 0.5 MILLIGRAM(S): 2 INJECTION INTRAMUSCULAR; INTRAVENOUS; SUBCUTANEOUS at 20:00

## 2023-01-01 RX ADMIN — PANTOPRAZOLE SODIUM 40 MILLIGRAM(S): 20 TABLET, DELAYED RELEASE ORAL at 11:31

## 2023-01-01 RX ADMIN — POLYETHYLENE GLYCOL 3350 17 GRAM(S): 17 POWDER, FOR SOLUTION ORAL at 21:40

## 2023-01-01 RX ADMIN — SODIUM CHLORIDE 40 MILLILITER(S): 9 INJECTION, SOLUTION INTRAVENOUS at 08:16

## 2023-01-01 RX ADMIN — Medication 20 MICROGRAM(S): at 22:28

## 2023-01-01 RX ADMIN — QUETIAPINE FUMARATE 25 MILLIGRAM(S): 200 TABLET, FILM COATED ORAL at 23:00

## 2023-01-01 RX ADMIN — CHLORHEXIDINE GLUCONATE 1 APPLICATION(S): 213 SOLUTION TOPICAL at 05:23

## 2023-01-01 RX ADMIN — GABAPENTIN 100 MILLIGRAM(S): 400 CAPSULE ORAL at 05:05

## 2023-01-01 RX ADMIN — GABAPENTIN 100 MILLIGRAM(S): 400 CAPSULE ORAL at 05:30

## 2023-01-01 RX ADMIN — Medication 10 MILLIGRAM(S): at 11:42

## 2023-01-01 RX ADMIN — HYDROMORPHONE HYDROCHLORIDE 0.5 MILLIGRAM(S): 2 INJECTION INTRAMUSCULAR; INTRAVENOUS; SUBCUTANEOUS at 12:01

## 2023-01-01 RX ADMIN — Medication 975 MILLIGRAM(S): at 11:27

## 2023-01-01 RX ADMIN — GABAPENTIN 100 MILLIGRAM(S): 400 CAPSULE ORAL at 14:52

## 2023-01-01 RX ADMIN — Medication 975 MILLIGRAM(S): at 17:13

## 2023-01-01 RX ADMIN — MEROPENEM 100 MILLIGRAM(S): 1 INJECTION INTRAVENOUS at 05:15

## 2023-01-01 RX ADMIN — Medication 1 CAPSULE(S): at 17:39

## 2023-01-01 RX ADMIN — Medication 10 MILLIGRAM(S): at 05:31

## 2023-01-01 RX ADMIN — OXYCODONE HYDROCHLORIDE 5 MILLIGRAM(S): 5 TABLET ORAL at 09:03

## 2023-01-01 RX ADMIN — PANTOPRAZOLE SODIUM 40 MILLIGRAM(S): 20 TABLET, DELAYED RELEASE ORAL at 11:49

## 2023-01-01 RX ADMIN — GABAPENTIN 100 MILLIGRAM(S): 400 CAPSULE ORAL at 22:59

## 2023-01-01 RX ADMIN — Medication 3 UNIT(S): at 17:26

## 2023-01-01 RX ADMIN — DEXMEDETOMIDINE HYDROCHLORIDE IN 0.9% SODIUM CHLORIDE 6.11 MICROGRAM(S)/KG/HR: 4 INJECTION INTRAVENOUS at 12:05

## 2023-01-01 RX ADMIN — HEPARIN SODIUM 5000 UNIT(S): 5000 INJECTION INTRAVENOUS; SUBCUTANEOUS at 05:31

## 2023-01-01 RX ADMIN — INSULIN GLARGINE 9 UNIT(S): 100 INJECTION, SOLUTION SUBCUTANEOUS at 00:23

## 2023-01-01 RX ADMIN — Medication 500 MILLIGRAM(S): at 17:45

## 2023-01-01 RX ADMIN — GABAPENTIN 100 MILLIGRAM(S): 400 CAPSULE ORAL at 05:34

## 2023-01-01 RX ADMIN — Medication 10 MILLIGRAM(S): at 13:55

## 2023-01-01 RX ADMIN — LIDOCAINE 1 PATCH: 4 CREAM TOPICAL at 11:30

## 2023-01-01 RX ADMIN — Medication 12.5 MICROGRAM(S): at 21:52

## 2023-01-01 RX ADMIN — HYDROMORPHONE HYDROCHLORIDE 1 MILLIGRAM(S): 2 INJECTION INTRAMUSCULAR; INTRAVENOUS; SUBCUTANEOUS at 20:00

## 2023-01-01 RX ADMIN — INSULIN GLARGINE 9 UNIT(S): 100 INJECTION, SOLUTION SUBCUTANEOUS at 00:30

## 2023-01-01 RX ADMIN — CHLORHEXIDINE GLUCONATE 1 APPLICATION(S): 213 SOLUTION TOPICAL at 11:42

## 2023-01-01 RX ADMIN — DEXMEDETOMIDINE HYDROCHLORIDE IN 0.9% SODIUM CHLORIDE 6.11 MICROGRAM(S)/KG/HR: 4 INJECTION INTRAVENOUS at 17:31

## 2023-01-01 RX ADMIN — Medication 2 UNIT(S): at 17:45

## 2023-01-01 RX ADMIN — Medication 1 APPLICATION(S): at 17:10

## 2023-01-01 RX ADMIN — Medication 10 MILLIGRAM(S): at 06:39

## 2023-01-01 RX ADMIN — FENTANYL CITRATE 5.9 MICROGRAM(S)/KG/HR: 50 INJECTION INTRAVENOUS at 12:22

## 2023-01-01 RX ADMIN — HYDROMORPHONE HYDROCHLORIDE 1 MILLIGRAM(S): 2 INJECTION INTRAMUSCULAR; INTRAVENOUS; SUBCUTANEOUS at 20:05

## 2023-01-01 RX ADMIN — Medication 11.1 MICROGRAM(S)/KG/MIN: at 07:09

## 2023-01-01 RX ADMIN — POLYETHYLENE GLYCOL 3350 17 GRAM(S): 17 POWDER, FOR SOLUTION ORAL at 12:04

## 2023-01-01 RX ADMIN — Medication 975 MILLIGRAM(S): at 18:16

## 2023-01-01 RX ADMIN — Medication 10 MILLIGRAM(S): at 05:09

## 2023-01-01 RX ADMIN — HEPARIN SODIUM 5000 UNIT(S): 5000 INJECTION INTRAVENOUS; SUBCUTANEOUS at 05:16

## 2023-01-01 RX ADMIN — Medication 1 CAPSULE(S): at 10:11

## 2023-01-01 RX ADMIN — Medication 11.1 MICROGRAM(S)/KG/MIN: at 19:32

## 2023-01-01 RX ADMIN — HEPARIN SODIUM 5000 UNIT(S): 5000 INJECTION INTRAVENOUS; SUBCUTANEOUS at 05:48

## 2023-01-01 RX ADMIN — SODIUM CHLORIDE 1000 MILLILITER(S): 9 INJECTION, SOLUTION INTRAVENOUS at 21:52

## 2023-01-01 RX ADMIN — HYDROMORPHONE HYDROCHLORIDE 1 MILLIGRAM(S): 2 INJECTION INTRAMUSCULAR; INTRAVENOUS; SUBCUTANEOUS at 16:00

## 2023-01-01 RX ADMIN — POLYETHYLENE GLYCOL 3350 17 GRAM(S): 17 POWDER, FOR SOLUTION ORAL at 23:09

## 2023-01-01 RX ADMIN — Medication 1 CAPSULE(S): at 02:23

## 2023-01-01 RX ADMIN — Medication 50 MILLIGRAM(S): at 06:04

## 2023-01-01 RX ADMIN — HYDROMORPHONE HYDROCHLORIDE 0.5 MILLIGRAM(S): 2 INJECTION INTRAMUSCULAR; INTRAVENOUS; SUBCUTANEOUS at 21:33

## 2023-01-01 RX ADMIN — CHLORHEXIDINE GLUCONATE 15 MILLILITER(S): 213 SOLUTION TOPICAL at 17:12

## 2023-01-01 RX ADMIN — Medication 1 CAPSULE(S): at 01:05

## 2023-01-01 RX ADMIN — Medication 1 CAPSULE(S): at 18:02

## 2023-01-01 RX ADMIN — DEXMEDETOMIDINE HYDROCHLORIDE IN 0.9% SODIUM CHLORIDE 14.7 MICROGRAM(S)/KG/HR: 4 INJECTION INTRAVENOUS at 19:34

## 2023-01-01 RX ADMIN — Medication 250 MILLILITER(S): at 16:45

## 2023-01-01 RX ADMIN — Medication 100 MILLIEQUIVALENT(S): at 03:26

## 2023-01-01 RX ADMIN — OXYCODONE HYDROCHLORIDE 10 MILLIGRAM(S): 5 TABLET ORAL at 21:00

## 2023-01-01 RX ADMIN — GABAPENTIN 100 MILLIGRAM(S): 400 CAPSULE ORAL at 05:18

## 2023-01-01 RX ADMIN — Medication 1 CAPSULE(S): at 06:03

## 2023-01-01 RX ADMIN — INSULIN GLARGINE 10 UNIT(S): 100 INJECTION, SOLUTION SUBCUTANEOUS at 01:14

## 2023-01-01 RX ADMIN — Medication 2 UNIT(S): at 00:28

## 2023-01-01 RX ADMIN — HYDROMORPHONE HYDROCHLORIDE 1 MILLIGRAM(S): 2 INJECTION INTRAMUSCULAR; INTRAVENOUS; SUBCUTANEOUS at 06:30

## 2023-01-01 RX ADMIN — PANTOPRAZOLE SODIUM 40 MILLIGRAM(S): 20 TABLET, DELAYED RELEASE ORAL at 05:22

## 2023-01-01 RX ADMIN — Medication 10 MILLIGRAM(S): at 13:45

## 2023-01-01 RX ADMIN — INSULIN GLARGINE 9 UNIT(S): 100 INJECTION, SOLUTION SUBCUTANEOUS at 23:12

## 2023-01-01 RX ADMIN — Medication 10 MILLIGRAM(S): at 22:28

## 2023-01-01 RX ADMIN — CHLORHEXIDINE GLUCONATE 15 MILLILITER(S): 213 SOLUTION TOPICAL at 05:54

## 2023-01-01 RX ADMIN — Medication 975 MILLIGRAM(S): at 19:14

## 2023-01-01 RX ADMIN — INSULIN GLARGINE 14 UNIT(S): 100 INJECTION, SOLUTION SUBCUTANEOUS at 23:22

## 2023-01-01 RX ADMIN — Medication 1 CAPSULE(S): at 02:36

## 2023-01-01 RX ADMIN — CHLORHEXIDINE GLUCONATE 1 APPLICATION(S): 213 SOLUTION TOPICAL at 12:12

## 2023-01-01 RX ADMIN — CHLORHEXIDINE GLUCONATE 1 APPLICATION(S): 213 SOLUTION TOPICAL at 17:54

## 2023-01-01 RX ADMIN — ERTAPENEM SODIUM 120 MILLIGRAM(S): 1 INJECTION, POWDER, LYOPHILIZED, FOR SOLUTION INTRAMUSCULAR; INTRAVENOUS at 11:09

## 2023-01-01 RX ADMIN — FONDAPARINUX SODIUM 1.5 MILLIGRAM(S): 2.5 INJECTION, SOLUTION SUBCUTANEOUS at 11:41

## 2023-01-01 RX ADMIN — CHLORHEXIDINE GLUCONATE 15 MILLILITER(S): 213 SOLUTION TOPICAL at 05:22

## 2023-01-01 RX ADMIN — Medication 20 MICROGRAM(S): at 03:57

## 2023-01-01 RX ADMIN — GABAPENTIN 100 MILLIGRAM(S): 400 CAPSULE ORAL at 21:41

## 2023-01-01 RX ADMIN — Medication 10 MILLIGRAM(S): at 22:25

## 2023-01-01 RX ADMIN — INSULIN GLARGINE 14 UNIT(S): 100 INJECTION, SOLUTION SUBCUTANEOUS at 22:39

## 2023-01-01 RX ADMIN — PHENYLEPHRINE HYDROCHLORIDE 22.1 MICROGRAM(S)/KG/MIN: 10 INJECTION INTRAVENOUS at 19:51

## 2023-01-01 RX ADMIN — GABAPENTIN 100 MILLIGRAM(S): 400 CAPSULE ORAL at 05:47

## 2023-01-01 RX ADMIN — FENTANYL CITRATE 5.9 MICROGRAM(S)/KG/HR: 50 INJECTION INTRAVENOUS at 19:12

## 2023-01-01 RX ADMIN — CHLORHEXIDINE GLUCONATE 1 APPLICATION(S): 213 SOLUTION TOPICAL at 11:28

## 2023-01-01 RX ADMIN — VASOPRESSIN 6 UNIT(S)/MIN: 20 INJECTION INTRAVENOUS at 12:21

## 2023-01-01 RX ADMIN — QUETIAPINE FUMARATE 25 MILLIGRAM(S): 200 TABLET, FILM COATED ORAL at 22:21

## 2023-01-01 RX ADMIN — Medication 50 MILLIGRAM(S): at 12:20

## 2023-01-01 RX ADMIN — Medication 500 MILLILITER(S): at 12:41

## 2023-01-01 RX ADMIN — Medication 20 MICROGRAM(S): at 21:38

## 2023-01-01 RX ADMIN — Medication 100 GRAM(S): at 05:13

## 2023-01-01 RX ADMIN — Medication 975 MILLIGRAM(S): at 06:19

## 2023-01-01 RX ADMIN — MIDODRINE HYDROCHLORIDE 40 MILLIGRAM(S): 2.5 TABLET ORAL at 05:34

## 2023-01-01 RX ADMIN — Medication 2 UNIT(S): at 00:16

## 2023-01-01 RX ADMIN — Medication 2: at 17:44

## 2023-01-01 RX ADMIN — HEPARIN SODIUM 5000 UNIT(S): 5000 INJECTION INTRAVENOUS; SUBCUTANEOUS at 15:00

## 2023-01-01 RX ADMIN — HEPARIN SODIUM 2200 UNIT(S)/HR: 5000 INJECTION INTRAVENOUS; SUBCUTANEOUS at 23:39

## 2023-01-01 RX ADMIN — INSULIN GLARGINE 8 UNIT(S): 100 INJECTION, SOLUTION SUBCUTANEOUS at 23:46

## 2023-01-01 RX ADMIN — GABAPENTIN 100 MILLIGRAM(S): 400 CAPSULE ORAL at 23:00

## 2023-01-01 RX ADMIN — OXYCODONE HYDROCHLORIDE 5 MILLIGRAM(S): 5 TABLET ORAL at 20:30

## 2023-01-01 RX ADMIN — MEROPENEM 100 MILLIGRAM(S): 1 INJECTION INTRAVENOUS at 22:02

## 2023-01-01 RX ADMIN — Medication 100 GRAM(S): at 22:15

## 2023-01-01 RX ADMIN — Medication 125 MILLILITER(S): at 20:04

## 2023-01-01 RX ADMIN — Medication 125 MILLILITER(S): at 02:32

## 2023-01-01 RX ADMIN — CHLORHEXIDINE GLUCONATE 15 MILLILITER(S): 213 SOLUTION TOPICAL at 05:32

## 2023-01-01 RX ADMIN — MIDODRINE HYDROCHLORIDE 40 MILLIGRAM(S): 2.5 TABLET ORAL at 13:05

## 2023-01-01 RX ADMIN — HALOPERIDOL DECANOATE 1 MILLIGRAM(S): 100 INJECTION INTRAMUSCULAR at 22:13

## 2023-01-01 RX ADMIN — Medication 5.53 MICROGRAM(S)/KG/MIN: at 00:55

## 2023-01-01 RX ADMIN — Medication 2 UNIT(S): at 11:29

## 2023-01-01 RX ADMIN — HEPARIN SODIUM 5000 UNIT(S): 5000 INJECTION INTRAVENOUS; SUBCUTANEOUS at 05:14

## 2023-01-01 RX ADMIN — Medication 10 MILLIGRAM(S): at 22:19

## 2023-01-01 RX ADMIN — SODIUM CHLORIDE 150 MILLILITER(S): 9 INJECTION, SOLUTION INTRAVENOUS at 19:52

## 2023-01-01 RX ADMIN — CASPOFUNGIN ACETATE 70 MILLIGRAM(S): 7 INJECTION, POWDER, LYOPHILIZED, FOR SOLUTION INTRAVENOUS at 10:02

## 2023-01-01 RX ADMIN — PANTOPRAZOLE SODIUM 40 MILLIGRAM(S): 20 TABLET, DELAYED RELEASE ORAL at 11:58

## 2023-01-01 RX ADMIN — FONDAPARINUX SODIUM 1.5 MILLIGRAM(S): 2.5 INJECTION, SOLUTION SUBCUTANEOUS at 12:10

## 2023-01-01 RX ADMIN — Medication 400 MILLIGRAM(S): at 05:33

## 2023-01-01 RX ADMIN — HYDROMORPHONE HYDROCHLORIDE 1 MILLIGRAM(S): 2 INJECTION INTRAMUSCULAR; INTRAVENOUS; SUBCUTANEOUS at 09:48

## 2023-01-01 RX ADMIN — HEPARIN SODIUM 5000 UNIT(S): 5000 INJECTION INTRAVENOUS; SUBCUTANEOUS at 14:53

## 2023-01-01 RX ADMIN — FONDAPARINUX SODIUM 1.5 MILLIGRAM(S): 2.5 INJECTION, SOLUTION SUBCUTANEOUS at 11:31

## 2023-01-01 RX ADMIN — Medication 1 CAPSULE(S): at 02:15

## 2023-01-01 RX ADMIN — POLYETHYLENE GLYCOL 3350 17 GRAM(S): 17 POWDER, FOR SOLUTION ORAL at 23:13

## 2023-01-01 RX ADMIN — Medication 10 MILLIGRAM(S): at 05:30

## 2023-01-01 RX ADMIN — Medication 15 MILLILITER(S): at 12:20

## 2023-01-01 RX ADMIN — Medication 1 PACKET(S): at 18:05

## 2023-01-01 RX ADMIN — DEXMEDETOMIDINE HYDROCHLORIDE IN 0.9% SODIUM CHLORIDE 2.95 MICROGRAM(S)/KG/HR: 4 INJECTION INTRAVENOUS at 08:25

## 2023-01-01 RX ADMIN — Medication 100 MILLIEQUIVALENT(S): at 04:24

## 2023-01-01 RX ADMIN — Medication 10 MILLIGRAM(S): at 22:20

## 2023-01-01 RX ADMIN — MIDODRINE HYDROCHLORIDE 30 MILLIGRAM(S): 2.5 TABLET ORAL at 21:28

## 2023-01-01 RX ADMIN — Medication 5.53 MICROGRAM(S)/KG/MIN: at 10:27

## 2023-01-01 RX ADMIN — Medication 975 MILLIGRAM(S): at 05:49

## 2023-01-01 RX ADMIN — DEXMEDETOMIDINE HYDROCHLORIDE IN 0.9% SODIUM CHLORIDE 6.11 MICROGRAM(S)/KG/HR: 4 INJECTION INTRAVENOUS at 07:41

## 2023-01-01 RX ADMIN — HEPARIN SODIUM 5000 UNIT(S): 5000 INJECTION INTRAVENOUS; SUBCUTANEOUS at 05:46

## 2023-01-01 RX ADMIN — Medication 500 MILLIGRAM(S): at 04:30

## 2023-01-01 RX ADMIN — DEXMEDETOMIDINE HYDROCHLORIDE IN 0.9% SODIUM CHLORIDE 2.95 MICROGRAM(S)/KG/HR: 4 INJECTION INTRAVENOUS at 22:29

## 2023-01-01 RX ADMIN — HYDROMORPHONE HYDROCHLORIDE 0.5 MILLIGRAM(S): 2 INJECTION INTRAMUSCULAR; INTRAVENOUS; SUBCUTANEOUS at 07:51

## 2023-01-01 RX ADMIN — CHLORHEXIDINE GLUCONATE 15 MILLILITER(S): 213 SOLUTION TOPICAL at 18:14

## 2023-01-01 RX ADMIN — Medication 2 UNIT(S): at 05:51

## 2023-01-01 RX ADMIN — SODIUM CHLORIDE 1000 MILLILITER(S): 9 INJECTION, SOLUTION INTRAVENOUS at 01:07

## 2023-01-01 RX ADMIN — Medication 1 CAPSULE(S): at 17:14

## 2023-01-01 RX ADMIN — CHLORHEXIDINE GLUCONATE 15 MILLILITER(S): 213 SOLUTION TOPICAL at 06:32

## 2023-01-01 RX ADMIN — Medication 5.53 MICROGRAM(S)/KG/MIN: at 01:52

## 2023-01-01 RX ADMIN — PANTOPRAZOLE SODIUM 40 MILLIGRAM(S): 20 TABLET, DELAYED RELEASE ORAL at 12:06

## 2023-01-01 RX ADMIN — GABAPENTIN 100 MILLIGRAM(S): 400 CAPSULE ORAL at 14:00

## 2023-01-01 RX ADMIN — Medication 1 CAPSULE(S): at 18:01

## 2023-01-01 RX ADMIN — Medication 10 MILLIGRAM(S): at 18:04

## 2023-01-01 RX ADMIN — Medication 5.53 MICROGRAM(S)/KG/MIN: at 00:04

## 2023-01-01 RX ADMIN — HEPARIN SODIUM 5000 UNIT(S): 5000 INJECTION INTRAVENOUS; SUBCUTANEOUS at 21:48

## 2023-01-01 RX ADMIN — MIDODRINE HYDROCHLORIDE 40 MILLIGRAM(S): 2.5 TABLET ORAL at 21:44

## 2023-01-01 RX ADMIN — Medication 2 UNIT(S): at 17:20

## 2023-01-01 RX ADMIN — Medication 10 MILLIGRAM(S): at 05:54

## 2023-01-01 RX ADMIN — HYDROMORPHONE HYDROCHLORIDE 1 MILLIGRAM(S): 2 INJECTION INTRAMUSCULAR; INTRAVENOUS; SUBCUTANEOUS at 20:16

## 2023-01-01 RX ADMIN — PANTOPRAZOLE SODIUM 40 MILLIGRAM(S): 20 TABLET, DELAYED RELEASE ORAL at 12:00

## 2023-01-01 RX ADMIN — Medication 10 MILLIGRAM(S): at 05:22

## 2023-01-01 RX ADMIN — DEXMEDETOMIDINE HYDROCHLORIDE IN 0.9% SODIUM CHLORIDE 6.11 MICROGRAM(S)/KG/HR: 4 INJECTION INTRAVENOUS at 19:21

## 2023-01-01 RX ADMIN — Medication 50 MILLIGRAM(S): at 17:24

## 2023-01-01 RX ADMIN — Medication 15 MILLILITER(S): at 11:21

## 2023-01-01 RX ADMIN — Medication 1 CAPSULE(S): at 22:16

## 2023-01-01 RX ADMIN — ALBUTEROL 2.5 MILLIGRAM(S): 90 AEROSOL, METERED ORAL at 10:28

## 2023-01-01 RX ADMIN — Medication 2 UNIT(S): at 02:43

## 2023-01-01 RX ADMIN — GABAPENTIN 100 MILLIGRAM(S): 400 CAPSULE ORAL at 22:05

## 2023-01-01 RX ADMIN — MIDODRINE HYDROCHLORIDE 40 MILLIGRAM(S): 2.5 TABLET ORAL at 13:38

## 2023-01-01 RX ADMIN — Medication 2: at 11:44

## 2023-01-01 RX ADMIN — Medication 2 MILLIGRAM(S): at 11:13

## 2023-01-01 RX ADMIN — Medication 1000 MILLIGRAM(S): at 18:22

## 2023-01-01 RX ADMIN — Medication 4: at 05:29

## 2023-01-01 RX ADMIN — Medication 5.73 MICROGRAM(S)/KG/MIN: at 19:12

## 2023-01-01 RX ADMIN — MEROPENEM 100 MILLIGRAM(S): 1 INJECTION INTRAVENOUS at 22:21

## 2023-01-01 RX ADMIN — HYDROMORPHONE HYDROCHLORIDE 0.5 MILLIGRAM(S): 2 INJECTION INTRAMUSCULAR; INTRAVENOUS; SUBCUTANEOUS at 12:41

## 2023-01-01 RX ADMIN — Medication 4: at 11:31

## 2023-01-01 RX ADMIN — HEPARIN SODIUM 5000 UNIT(S): 5000 INJECTION INTRAVENOUS; SUBCUTANEOUS at 22:40

## 2023-01-01 RX ADMIN — Medication 80 MILLIGRAM(S): at 11:20

## 2023-01-01 RX ADMIN — FENTANYL CITRATE 4 MICROGRAM(S)/KG/HR: 50 INJECTION INTRAVENOUS at 04:00

## 2023-01-01 RX ADMIN — HYDROMORPHONE HYDROCHLORIDE 0.5 MILLIGRAM(S): 2 INJECTION INTRAMUSCULAR; INTRAVENOUS; SUBCUTANEOUS at 01:39

## 2023-01-01 RX ADMIN — Medication 10 MILLIGRAM(S): at 23:30

## 2023-01-01 RX ADMIN — HYDROMORPHONE HYDROCHLORIDE 0.5 MILLIGRAM(S): 2 INJECTION INTRAMUSCULAR; INTRAVENOUS; SUBCUTANEOUS at 20:01

## 2023-01-01 RX ADMIN — GABAPENTIN 100 MILLIGRAM(S): 400 CAPSULE ORAL at 05:43

## 2023-01-01 RX ADMIN — OXYCODONE HYDROCHLORIDE 5 MILLIGRAM(S): 5 TABLET ORAL at 00:36

## 2023-01-01 RX ADMIN — Medication 11.1 MICROGRAM(S)/KG/MIN: at 07:22

## 2023-01-01 RX ADMIN — POLYETHYLENE GLYCOL 3350 17 GRAM(S): 17 POWDER, FOR SOLUTION ORAL at 11:38

## 2023-01-01 RX ADMIN — Medication 2: at 12:00

## 2023-01-01 RX ADMIN — Medication 15 MILLILITER(S): at 11:52

## 2023-01-01 RX ADMIN — HEPARIN SODIUM 5000 UNIT(S): 5000 INJECTION INTRAVENOUS; SUBCUTANEOUS at 21:00

## 2023-01-01 RX ADMIN — MIDODRINE HYDROCHLORIDE 40 MILLIGRAM(S): 2.5 TABLET ORAL at 05:10

## 2023-01-01 RX ADMIN — Medication 80 MILLIGRAM(S): at 11:22

## 2023-01-01 RX ADMIN — Medication 3 UNIT(S): at 12:19

## 2023-01-01 RX ADMIN — Medication 1 CAPSULE(S): at 17:52

## 2023-01-01 RX ADMIN — CHLORHEXIDINE GLUCONATE 15 MILLILITER(S): 213 SOLUTION TOPICAL at 05:42

## 2023-01-01 RX ADMIN — MIDODRINE HYDROCHLORIDE 40 MILLIGRAM(S): 2.5 TABLET ORAL at 22:39

## 2023-01-01 RX ADMIN — LIDOCAINE 1 PATCH: 4 CREAM TOPICAL at 21:32

## 2023-01-01 RX ADMIN — Medication 1 CAPSULE(S): at 13:03

## 2023-01-01 RX ADMIN — VASOPRESSIN 4.5 UNIT(S)/MIN: 20 INJECTION INTRAVENOUS at 19:50

## 2023-01-01 RX ADMIN — MEROPENEM 100 MILLIGRAM(S): 1 INJECTION INTRAVENOUS at 05:50

## 2023-01-01 RX ADMIN — Medication 20 MICROGRAM(S): at 22:19

## 2023-01-01 RX ADMIN — Medication 1 PACKET(S): at 11:59

## 2023-01-01 RX ADMIN — HYDROMORPHONE HYDROCHLORIDE 1 MILLIGRAM(S): 2 INJECTION INTRAMUSCULAR; INTRAVENOUS; SUBCUTANEOUS at 04:30

## 2023-01-01 RX ADMIN — Medication 500 MILLIGRAM(S): at 23:25

## 2023-01-01 RX ADMIN — HEPARIN SODIUM 5000 UNIT(S): 5000 INJECTION INTRAVENOUS; SUBCUTANEOUS at 05:50

## 2023-01-01 RX ADMIN — SODIUM CHLORIDE 4000 MILLILITER(S): 9 INJECTION, SOLUTION INTRAVENOUS at 07:28

## 2023-01-01 RX ADMIN — Medication 5.53 MICROGRAM(S)/KG/MIN: at 08:14

## 2023-01-01 RX ADMIN — OXANDROLONE 5 MILLIGRAM(S): 10 TABLET ORAL at 14:29

## 2023-01-01 RX ADMIN — HEPARIN SODIUM 5000 UNIT(S): 5000 INJECTION INTRAVENOUS; SUBCUTANEOUS at 06:00

## 2023-01-01 RX ADMIN — HEPARIN SODIUM 5000 UNIT(S): 5000 INJECTION INTRAVENOUS; SUBCUTANEOUS at 05:35

## 2023-01-01 RX ADMIN — Medication 20 MICROGRAM(S): at 22:29

## 2023-01-01 RX ADMIN — Medication 200 GRAM(S): at 02:51

## 2023-01-01 RX ADMIN — INSULIN GLARGINE 9 UNIT(S): 100 INJECTION, SOLUTION SUBCUTANEOUS at 00:10

## 2023-01-01 RX ADMIN — GABAPENTIN 100 MILLIGRAM(S): 400 CAPSULE ORAL at 05:16

## 2023-01-01 RX ADMIN — Medication 100 MILLIEQUIVALENT(S): at 20:38

## 2023-01-01 RX ADMIN — Medication 2 UNIT(S): at 06:43

## 2023-01-01 RX ADMIN — HYDROMORPHONE HYDROCHLORIDE 0.5 MILLIGRAM(S): 2 INJECTION INTRAMUSCULAR; INTRAVENOUS; SUBCUTANEOUS at 10:16

## 2023-01-01 RX ADMIN — Medication 50 MILLIGRAM(S): at 00:31

## 2023-01-01 RX ADMIN — CHLORHEXIDINE GLUCONATE 1 APPLICATION(S): 213 SOLUTION TOPICAL at 11:34

## 2023-01-01 RX ADMIN — INSULIN GLARGINE 9 UNIT(S): 100 INJECTION, SOLUTION SUBCUTANEOUS at 23:35

## 2023-01-01 RX ADMIN — MIDODRINE HYDROCHLORIDE 40 MILLIGRAM(S): 2.5 TABLET ORAL at 06:29

## 2023-01-01 RX ADMIN — Medication 1 APPLICATION(S): at 17:57

## 2023-01-01 RX ADMIN — MORPHINE SULFATE 4 MILLIGRAM(S): 50 CAPSULE, EXTENDED RELEASE ORAL at 02:01

## 2023-01-01 RX ADMIN — MIDODRINE HYDROCHLORIDE 30 MILLIGRAM(S): 2.5 TABLET ORAL at 05:34

## 2023-01-01 RX ADMIN — Medication 100 MILLIEQUIVALENT(S): at 23:37

## 2023-01-01 RX ADMIN — HEPARIN SODIUM 5000 UNIT(S): 5000 INJECTION INTRAVENOUS; SUBCUTANEOUS at 21:44

## 2023-01-01 RX ADMIN — GABAPENTIN 100 MILLIGRAM(S): 400 CAPSULE ORAL at 21:50

## 2023-01-01 RX ADMIN — Medication 20 MICROGRAM(S): at 22:57

## 2023-01-01 RX ADMIN — Medication 4: at 18:01

## 2023-01-01 RX ADMIN — OXYCODONE HYDROCHLORIDE 10 MILLIGRAM(S): 5 TABLET ORAL at 02:25

## 2023-01-01 RX ADMIN — DEXMEDETOMIDINE HYDROCHLORIDE IN 0.9% SODIUM CHLORIDE 2.95 MICROGRAM(S)/KG/HR: 4 INJECTION INTRAVENOUS at 19:10

## 2023-01-01 RX ADMIN — Medication 2: at 06:11

## 2023-01-01 RX ADMIN — Medication 10 MILLIGRAM(S): at 11:27

## 2023-01-01 RX ADMIN — CHLORHEXIDINE GLUCONATE 1 APPLICATION(S): 213 SOLUTION TOPICAL at 12:01

## 2023-01-01 RX ADMIN — Medication 4: at 06:42

## 2023-01-01 RX ADMIN — Medication 500 MILLIGRAM(S): at 01:36

## 2023-01-01 RX ADMIN — HEPARIN SODIUM 5000 UNIT(S): 5000 INJECTION INTRAVENOUS; SUBCUTANEOUS at 23:15

## 2023-01-01 RX ADMIN — Medication 5.73 MICROGRAM(S)/KG/MIN: at 21:58

## 2023-01-01 RX ADMIN — Medication 250 MILLILITER(S): at 08:31

## 2023-01-01 RX ADMIN — MEROPENEM 100 MILLIGRAM(S): 1 INJECTION INTRAVENOUS at 13:15

## 2023-01-01 RX ADMIN — HYDROMORPHONE HYDROCHLORIDE 1 MILLIGRAM(S): 2 INJECTION INTRAMUSCULAR; INTRAVENOUS; SUBCUTANEOUS at 18:32

## 2023-01-01 RX ADMIN — HYDROMORPHONE HYDROCHLORIDE 1 MILLIGRAM(S): 2 INJECTION INTRAMUSCULAR; INTRAVENOUS; SUBCUTANEOUS at 14:19

## 2023-01-01 RX ADMIN — Medication 2: at 05:46

## 2023-01-01 RX ADMIN — Medication 1 CAPSULE(S): at 18:36

## 2023-01-01 RX ADMIN — Medication 3 UNIT(S): at 05:16

## 2023-01-01 RX ADMIN — Medication 5.73 MICROGRAM(S)/KG/MIN: at 07:09

## 2023-01-01 RX ADMIN — Medication 2: at 11:35

## 2023-01-01 RX ADMIN — Medication 50 MILLILITER(S): at 12:07

## 2023-01-01 RX ADMIN — Medication 975 MILLIGRAM(S): at 23:47

## 2023-01-01 RX ADMIN — PHENYLEPHRINE HYDROCHLORIDE 11.1 MICROGRAM(S)/KG/MIN: 10 INJECTION INTRAVENOUS at 19:33

## 2023-01-01 RX ADMIN — GABAPENTIN 100 MILLIGRAM(S): 400 CAPSULE ORAL at 21:31

## 2023-01-01 RX ADMIN — Medication 500 MILLIGRAM(S): at 16:18

## 2023-01-01 RX ADMIN — Medication 2: at 00:23

## 2023-01-01 RX ADMIN — Medication 20 MILLIEQUIVALENT(S): at 13:16

## 2023-01-01 RX ADMIN — Medication 1 CAPSULE(S): at 09:58

## 2023-01-01 RX ADMIN — Medication 2: at 11:22

## 2023-01-01 RX ADMIN — HYDROMORPHONE HYDROCHLORIDE 0.5 MILLIGRAM(S): 2 INJECTION INTRAMUSCULAR; INTRAVENOUS; SUBCUTANEOUS at 20:04

## 2023-01-01 RX ADMIN — HEPARIN SODIUM 5000 UNIT(S): 5000 INJECTION INTRAVENOUS; SUBCUTANEOUS at 22:12

## 2023-01-01 RX ADMIN — Medication 12: at 07:26

## 2023-01-01 RX ADMIN — MEROPENEM 100 MILLIGRAM(S): 1 INJECTION INTRAVENOUS at 21:59

## 2023-01-01 RX ADMIN — Medication 10 MILLIGRAM(S): at 05:46

## 2023-01-01 RX ADMIN — Medication 1 CAPSULE(S): at 14:29

## 2023-01-01 RX ADMIN — Medication 10 MILLIGRAM(S): at 14:01

## 2023-01-01 RX ADMIN — HEPARIN SODIUM 5000 UNIT(S): 5000 INJECTION INTRAVENOUS; SUBCUTANEOUS at 22:20

## 2023-01-01 RX ADMIN — Medication 1 CAPSULE(S): at 14:12

## 2023-01-01 RX ADMIN — Medication 2 UNIT(S): at 05:31

## 2023-01-01 RX ADMIN — HYDROMORPHONE HYDROCHLORIDE 1 MILLIGRAM(S): 2 INJECTION INTRAMUSCULAR; INTRAVENOUS; SUBCUTANEOUS at 01:07

## 2023-01-01 RX ADMIN — Medication 10 MILLIGRAM(S): at 22:03

## 2023-01-01 RX ADMIN — ERTAPENEM SODIUM 100 MILLIGRAM(S): 1 INJECTION, POWDER, LYOPHILIZED, FOR SOLUTION INTRAMUSCULAR; INTRAVENOUS at 12:27

## 2023-01-01 RX ADMIN — HYDROMORPHONE HYDROCHLORIDE 0.5 MILLIGRAM(S): 2 INJECTION INTRAMUSCULAR; INTRAVENOUS; SUBCUTANEOUS at 09:31

## 2023-01-01 RX ADMIN — HYDROMORPHONE HYDROCHLORIDE 0.5 MILLIGRAM(S): 2 INJECTION INTRAMUSCULAR; INTRAVENOUS; SUBCUTANEOUS at 04:17

## 2023-01-01 RX ADMIN — INSULIN GLARGINE 9 UNIT(S): 100 INJECTION, SOLUTION SUBCUTANEOUS at 23:23

## 2023-01-01 RX ADMIN — Medication 60 MILLIGRAM(S): at 11:45

## 2023-01-01 RX ADMIN — MEROPENEM 100 MILLIGRAM(S): 1 INJECTION INTRAVENOUS at 21:28

## 2023-01-01 RX ADMIN — Medication 1 CAPSULE(S): at 05:34

## 2023-01-01 RX ADMIN — Medication 6: at 13:07

## 2023-01-01 RX ADMIN — HYDROMORPHONE HYDROCHLORIDE 1 MILLIGRAM(S): 2 INJECTION INTRAMUSCULAR; INTRAVENOUS; SUBCUTANEOUS at 22:02

## 2023-01-01 RX ADMIN — Medication 20 MICROGRAM(S): at 21:22

## 2023-01-01 RX ADMIN — Medication 1 CAPSULE(S): at 21:44

## 2023-01-01 RX ADMIN — HEPARIN SODIUM 5000 UNIT(S): 5000 INJECTION INTRAVENOUS; SUBCUTANEOUS at 05:51

## 2023-01-01 RX ADMIN — DEXMEDETOMIDINE HYDROCHLORIDE IN 0.9% SODIUM CHLORIDE 6.11 MICROGRAM(S)/KG/HR: 4 INJECTION INTRAVENOUS at 20:31

## 2023-01-01 RX ADMIN — Medication 1 CAPSULE(S): at 10:23

## 2023-01-01 RX ADMIN — Medication 1 APPLICATION(S): at 18:14

## 2023-01-01 RX ADMIN — Medication 650 MILLIGRAM(S): at 12:00

## 2023-01-01 RX ADMIN — CHLORHEXIDINE GLUCONATE 1 APPLICATION(S): 213 SOLUTION TOPICAL at 11:17

## 2023-01-01 RX ADMIN — CHLORHEXIDINE GLUCONATE 15 MILLILITER(S): 213 SOLUTION TOPICAL at 05:09

## 2023-01-01 RX ADMIN — Medication 5.73 MICROGRAM(S)/KG/MIN: at 10:00

## 2023-01-01 RX ADMIN — Medication 100 GRAM(S): at 21:18

## 2023-01-01 RX ADMIN — GABAPENTIN 100 MILLIGRAM(S): 400 CAPSULE ORAL at 14:22

## 2023-01-01 RX ADMIN — HYDROMORPHONE HYDROCHLORIDE 1 MILLIGRAM(S): 2 INJECTION INTRAMUSCULAR; INTRAVENOUS; SUBCUTANEOUS at 14:00

## 2023-01-01 RX ADMIN — HYDROMORPHONE HYDROCHLORIDE 1 MILLIGRAM(S): 2 INJECTION INTRAMUSCULAR; INTRAVENOUS; SUBCUTANEOUS at 05:00

## 2023-01-01 RX ADMIN — HEPARIN SODIUM 5000 UNIT(S): 5000 INJECTION INTRAVENOUS; SUBCUTANEOUS at 05:44

## 2023-01-01 RX ADMIN — Medication 80 MILLIGRAM(S): at 11:58

## 2023-01-01 RX ADMIN — Medication 10 MILLIGRAM(S): at 21:37

## 2023-01-01 RX ADMIN — Medication 2 UNIT(S): at 06:17

## 2023-01-01 RX ADMIN — HYDROMORPHONE HYDROCHLORIDE 1 MILLIGRAM(S): 2 INJECTION INTRAMUSCULAR; INTRAVENOUS; SUBCUTANEOUS at 14:15

## 2023-01-01 RX ADMIN — SODIUM CHLORIDE 1000 MILLILITER(S): 9 INJECTION INTRAMUSCULAR; INTRAVENOUS; SUBCUTANEOUS at 18:38

## 2023-01-01 RX ADMIN — MIDODRINE HYDROCHLORIDE 30 MILLIGRAM(S): 2.5 TABLET ORAL at 21:12

## 2023-01-01 RX ADMIN — MIDODRINE HYDROCHLORIDE 30 MILLIGRAM(S): 2.5 TABLET ORAL at 06:22

## 2023-01-01 RX ADMIN — HYDROMORPHONE HYDROCHLORIDE 1 MILLIGRAM(S): 2 INJECTION INTRAMUSCULAR; INTRAVENOUS; SUBCUTANEOUS at 12:05

## 2023-01-01 RX ADMIN — Medication 1 CAPSULE(S): at 23:16

## 2023-01-01 RX ADMIN — Medication 2 UNIT(S): at 06:24

## 2023-01-01 RX ADMIN — MORPHINE SULFATE 2 MILLIGRAM(S): 50 CAPSULE, EXTENDED RELEASE ORAL at 06:02

## 2023-01-01 RX ADMIN — DEXMEDETOMIDINE HYDROCHLORIDE IN 0.9% SODIUM CHLORIDE 8.84 MICROGRAM(S)/KG/HR: 4 INJECTION INTRAVENOUS at 08:02

## 2023-01-01 RX ADMIN — Medication 1 PACKET(S): at 11:26

## 2023-01-01 RX ADMIN — Medication 2 UNIT(S): at 23:44

## 2023-01-01 RX ADMIN — MORPHINE SULFATE 4 MILLIGRAM(S): 50 CAPSULE, EXTENDED RELEASE ORAL at 15:49

## 2023-01-01 RX ADMIN — GABAPENTIN 100 MILLIGRAM(S): 400 CAPSULE ORAL at 14:28

## 2023-01-01 RX ADMIN — HYDROMORPHONE HYDROCHLORIDE 1 MILLIGRAM(S): 2 INJECTION INTRAMUSCULAR; INTRAVENOUS; SUBCUTANEOUS at 14:12

## 2023-01-01 RX ADMIN — Medication 500 MILLIGRAM(S): at 18:45

## 2023-01-01 RX ADMIN — POLYETHYLENE GLYCOL 3350 17 GRAM(S): 17 POWDER, FOR SOLUTION ORAL at 11:31

## 2023-01-01 RX ADMIN — GABAPENTIN 100 MILLIGRAM(S): 400 CAPSULE ORAL at 13:07

## 2023-01-01 RX ADMIN — PANTOPRAZOLE SODIUM 40 MILLIGRAM(S): 20 TABLET, DELAYED RELEASE ORAL at 11:47

## 2023-01-01 RX ADMIN — Medication 2 UNIT(S): at 11:38

## 2023-01-01 RX ADMIN — OXYCODONE HYDROCHLORIDE 10 MILLIGRAM(S): 5 TABLET ORAL at 17:19

## 2023-01-01 RX ADMIN — INSULIN GLARGINE 9 UNIT(S): 100 INJECTION, SOLUTION SUBCUTANEOUS at 23:06

## 2023-01-01 RX ADMIN — CHLORHEXIDINE GLUCONATE 15 MILLILITER(S): 213 SOLUTION TOPICAL at 17:44

## 2023-01-01 RX ADMIN — INSULIN HUMAN 3 UNIT(S)/HR: 100 INJECTION, SOLUTION SUBCUTANEOUS at 21:16

## 2023-01-01 RX ADMIN — VASOPRESSIN 4.5 UNIT(S)/MIN: 20 INJECTION INTRAVENOUS at 07:09

## 2023-01-01 RX ADMIN — Medication 10 MILLIGRAM(S): at 00:52

## 2023-01-01 RX ADMIN — Medication 2 MILLIGRAM(S): at 17:25

## 2023-01-01 RX ADMIN — GABAPENTIN 100 MILLIGRAM(S): 400 CAPSULE ORAL at 16:39

## 2023-01-01 RX ADMIN — Medication 2 UNIT(S): at 18:04

## 2023-01-01 RX ADMIN — ERTAPENEM SODIUM 120 MILLIGRAM(S): 1 INJECTION, POWDER, LYOPHILIZED, FOR SOLUTION INTRAMUSCULAR; INTRAVENOUS at 12:04

## 2023-01-01 RX ADMIN — OXYCODONE HYDROCHLORIDE 5 MILLIGRAM(S): 5 TABLET ORAL at 14:51

## 2023-01-01 RX ADMIN — Medication 5.73 MICROGRAM(S)/KG/MIN: at 13:23

## 2023-01-01 RX ADMIN — Medication 10 MILLIGRAM(S): at 06:08

## 2023-01-01 RX ADMIN — FENTANYL CITRATE 5.9 MICROGRAM(S)/KG/HR: 50 INJECTION INTRAVENOUS at 19:21

## 2023-01-01 RX ADMIN — DEXMEDETOMIDINE HYDROCHLORIDE IN 0.9% SODIUM CHLORIDE 2.95 MICROGRAM(S)/KG/HR: 4 INJECTION INTRAVENOUS at 07:09

## 2023-01-01 RX ADMIN — Medication 6: at 11:12

## 2023-01-01 RX ADMIN — OXYCODONE HYDROCHLORIDE 5 MILLIGRAM(S): 5 TABLET ORAL at 17:15

## 2023-01-01 RX ADMIN — Medication 3 UNIT(S): at 23:17

## 2023-01-01 RX ADMIN — MIDODRINE HYDROCHLORIDE 30 MILLIGRAM(S): 2.5 TABLET ORAL at 13:16

## 2023-01-01 RX ADMIN — INSULIN GLARGINE 9 UNIT(S): 100 INJECTION, SOLUTION SUBCUTANEOUS at 00:29

## 2023-01-01 RX ADMIN — Medication 10 MILLIGRAM(S): at 11:46

## 2023-01-01 RX ADMIN — Medication 80 MILLIGRAM(S): at 19:08

## 2023-01-01 RX ADMIN — Medication 4: at 18:18

## 2023-01-01 RX ADMIN — MIDODRINE HYDROCHLORIDE 40 MILLIGRAM(S): 2.5 TABLET ORAL at 05:42

## 2023-01-01 RX ADMIN — Medication 20 MICROGRAM(S): at 23:40

## 2023-01-01 RX ADMIN — Medication 500 MILLIGRAM(S): at 11:12

## 2023-01-01 RX ADMIN — Medication 400 MILLIGRAM(S): at 12:10

## 2023-01-01 RX ADMIN — CHLORHEXIDINE GLUCONATE 1 APPLICATION(S): 213 SOLUTION TOPICAL at 11:37

## 2023-01-01 RX ADMIN — Medication 1 CAPSULE(S): at 17:25

## 2023-01-01 RX ADMIN — CHLORHEXIDINE GLUCONATE 15 MILLILITER(S): 213 SOLUTION TOPICAL at 17:23

## 2023-01-01 RX ADMIN — Medication 2: at 23:45

## 2023-01-01 RX ADMIN — Medication 5.73 MICROGRAM(S)/KG/MIN: at 19:08

## 2023-01-01 RX ADMIN — HYDROMORPHONE HYDROCHLORIDE 1 MILLIGRAM(S): 2 INJECTION INTRAMUSCULAR; INTRAVENOUS; SUBCUTANEOUS at 23:47

## 2023-01-01 RX ADMIN — CHLORHEXIDINE GLUCONATE 15 MILLILITER(S): 213 SOLUTION TOPICAL at 05:51

## 2023-01-01 RX ADMIN — Medication 2: at 05:12

## 2023-01-01 RX ADMIN — Medication 3 UNIT(S): at 23:00

## 2023-01-01 RX ADMIN — Medication 975 MILLIGRAM(S): at 11:57

## 2023-01-01 RX ADMIN — OXYCODONE HYDROCHLORIDE 5 MILLIGRAM(S): 5 TABLET ORAL at 21:30

## 2023-01-01 RX ADMIN — Medication 125 MILLILITER(S): at 00:29

## 2023-01-01 RX ADMIN — CHLORHEXIDINE GLUCONATE 1 APPLICATION(S): 213 SOLUTION TOPICAL at 17:40

## 2023-01-01 RX ADMIN — Medication 10 MILLIGRAM(S): at 21:07

## 2023-01-01 RX ADMIN — Medication 20 MICROGRAM(S): at 21:31

## 2023-01-01 RX ADMIN — Medication 2: at 05:57

## 2023-01-01 RX ADMIN — FENTANYL CITRATE 5.9 MICROGRAM(S)/KG/HR: 50 INJECTION INTRAVENOUS at 00:31

## 2023-01-01 RX ADMIN — MORPHINE SULFATE 2 MILLIGRAM(S): 50 CAPSULE, EXTENDED RELEASE ORAL at 09:25

## 2023-01-01 RX ADMIN — CHLORHEXIDINE GLUCONATE 1 APPLICATION(S): 213 SOLUTION TOPICAL at 12:03

## 2023-01-01 RX ADMIN — Medication 125 MILLILITER(S): at 07:54

## 2023-01-01 RX ADMIN — CHLORHEXIDINE GLUCONATE 15 MILLILITER(S): 213 SOLUTION TOPICAL at 06:04

## 2023-01-01 RX ADMIN — FONDAPARINUX SODIUM 1.5 MILLIGRAM(S): 2.5 INJECTION, SOLUTION SUBCUTANEOUS at 11:21

## 2023-01-01 RX ADMIN — MORPHINE SULFATE 4 MILLIGRAM(S): 50 CAPSULE, EXTENDED RELEASE ORAL at 23:47

## 2023-01-01 RX ADMIN — MORPHINE SULFATE 2 MILLIGRAM(S): 50 CAPSULE, EXTENDED RELEASE ORAL at 09:02

## 2023-01-01 RX ADMIN — Medication 1 APPLICATION(S): at 05:10

## 2023-01-01 RX ADMIN — Medication 20 MICROGRAM(S): at 22:10

## 2023-01-01 RX ADMIN — GABAPENTIN 100 MILLIGRAM(S): 400 CAPSULE ORAL at 05:52

## 2023-01-01 RX ADMIN — SODIUM CHLORIDE 2000 MILLILITER(S): 9 INJECTION, SOLUTION INTRAVENOUS at 19:41

## 2023-01-01 RX ADMIN — Medication 975 MILLIGRAM(S): at 17:15

## 2023-01-01 RX ADMIN — HYDROMORPHONE HYDROCHLORIDE 0.5 MILLIGRAM(S): 2 INJECTION INTRAMUSCULAR; INTRAVENOUS; SUBCUTANEOUS at 00:15

## 2023-01-01 RX ADMIN — Medication 975 MILLIGRAM(S): at 05:29

## 2023-01-01 RX ADMIN — PANTOPRAZOLE SODIUM 40 MILLIGRAM(S): 20 TABLET, DELAYED RELEASE ORAL at 12:12

## 2023-01-01 RX ADMIN — Medication 15 MILLILITER(S): at 12:11

## 2023-01-01 RX ADMIN — MEROPENEM 100 MILLIGRAM(S): 1 INJECTION INTRAVENOUS at 18:09

## 2023-01-01 RX ADMIN — HEPARIN SODIUM 5000 UNIT(S): 5000 INJECTION INTRAVENOUS; SUBCUTANEOUS at 22:06

## 2023-01-01 RX ADMIN — Medication 10 MILLIGRAM(S): at 23:17

## 2023-01-01 RX ADMIN — MIDODRINE HYDROCHLORIDE 40 MILLIGRAM(S): 2.5 TABLET ORAL at 05:50

## 2023-01-01 RX ADMIN — HEPARIN SODIUM 5000 UNIT(S): 5000 INJECTION INTRAVENOUS; SUBCUTANEOUS at 06:04

## 2023-01-01 RX ADMIN — GABAPENTIN 100 MILLIGRAM(S): 400 CAPSULE ORAL at 21:59

## 2023-01-01 RX ADMIN — Medication 11.1 MICROGRAM(S)/KG/MIN: at 19:34

## 2023-01-01 RX ADMIN — Medication 1 CAPSULE(S): at 05:05

## 2023-01-01 RX ADMIN — MIDODRINE HYDROCHLORIDE 40 MILLIGRAM(S): 2.5 TABLET ORAL at 23:15

## 2023-01-01 RX ADMIN — Medication 10 MILLIGRAM(S): at 12:02

## 2023-01-01 RX ADMIN — MEROPENEM 100 MILLIGRAM(S): 1 INJECTION INTRAVENOUS at 14:17

## 2023-01-01 RX ADMIN — Medication 1 CAPSULE(S): at 21:38

## 2023-01-01 RX ADMIN — HEPARIN SODIUM 5000 UNIT(S): 5000 INJECTION INTRAVENOUS; SUBCUTANEOUS at 22:59

## 2023-01-01 RX ADMIN — GABAPENTIN 100 MILLIGRAM(S): 400 CAPSULE ORAL at 15:00

## 2023-01-01 RX ADMIN — OXYCODONE HYDROCHLORIDE 10 MILLIGRAM(S): 5 TABLET ORAL at 17:49

## 2023-01-01 RX ADMIN — HEPARIN SODIUM 5000 UNIT(S): 5000 INJECTION INTRAVENOUS; SUBCUTANEOUS at 21:04

## 2023-01-01 RX ADMIN — SODIUM CHLORIDE 500 MILLILITER(S): 9 INJECTION, SOLUTION INTRAVENOUS at 05:25

## 2023-01-01 RX ADMIN — Medication 100 MILLIEQUIVALENT(S): at 13:40

## 2023-01-01 RX ADMIN — HEPARIN SODIUM 5000 UNIT(S): 5000 INJECTION INTRAVENOUS; SUBCUTANEOUS at 23:09

## 2023-01-01 RX ADMIN — Medication 500 MILLIGRAM(S): at 17:25

## 2023-01-01 RX ADMIN — Medication 2: at 23:57

## 2023-01-01 RX ADMIN — Medication 3 UNIT(S): at 12:50

## 2023-01-01 RX ADMIN — HEPARIN SODIUM 5000 UNIT(S): 5000 INJECTION INTRAVENOUS; SUBCUTANEOUS at 14:02

## 2023-01-01 RX ADMIN — CHLORHEXIDINE GLUCONATE 15 MILLILITER(S): 213 SOLUTION TOPICAL at 05:23

## 2023-01-01 RX ADMIN — DEXMEDETOMIDINE HYDROCHLORIDE IN 0.9% SODIUM CHLORIDE 8.84 MICROGRAM(S)/KG/HR: 4 INJECTION INTRAVENOUS at 19:21

## 2023-01-01 RX ADMIN — Medication 2: at 18:19

## 2023-01-01 RX ADMIN — OXYCODONE HYDROCHLORIDE 5 MILLIGRAM(S): 5 TABLET ORAL at 08:10

## 2023-01-01 RX ADMIN — Medication 975 MILLIGRAM(S): at 00:17

## 2023-01-01 RX ADMIN — DEXMEDETOMIDINE HYDROCHLORIDE IN 0.9% SODIUM CHLORIDE 6.11 MICROGRAM(S)/KG/HR: 4 INJECTION INTRAVENOUS at 08:16

## 2023-01-01 RX ADMIN — CHLORHEXIDINE GLUCONATE 15 MILLILITER(S): 213 SOLUTION TOPICAL at 05:33

## 2023-01-01 RX ADMIN — Medication 2 UNIT(S): at 06:11

## 2023-01-01 RX ADMIN — MEROPENEM 100 MILLIGRAM(S): 1 INJECTION INTRAVENOUS at 06:29

## 2023-01-01 RX ADMIN — HEPARIN SODIUM 2200 UNIT(S)/HR: 5000 INJECTION INTRAVENOUS; SUBCUTANEOUS at 07:11

## 2023-01-01 RX ADMIN — Medication 150 MILLIGRAM(S): at 10:45

## 2023-01-01 RX ADMIN — Medication 1 APPLICATION(S): at 05:46

## 2023-01-01 RX ADMIN — HYDROMORPHONE HYDROCHLORIDE 1 MILLIGRAM(S): 2 INJECTION INTRAMUSCULAR; INTRAVENOUS; SUBCUTANEOUS at 14:27

## 2023-01-01 RX ADMIN — MIDODRINE HYDROCHLORIDE 30 MILLIGRAM(S): 2.5 TABLET ORAL at 14:28

## 2023-01-01 RX ADMIN — Medication 500 MILLIGRAM(S): at 11:42

## 2023-01-01 RX ADMIN — Medication 5.73 MICROGRAM(S)/KG/MIN: at 21:32

## 2023-01-01 RX ADMIN — Medication 2: at 23:17

## 2023-01-01 RX ADMIN — Medication 1 CAPSULE(S): at 02:01

## 2023-01-01 RX ADMIN — HYDROMORPHONE HYDROCHLORIDE 0.5 MILLIGRAM(S): 2 INJECTION INTRAMUSCULAR; INTRAVENOUS; SUBCUTANEOUS at 15:41

## 2023-01-01 RX ADMIN — HEPARIN SODIUM 5000 UNIT(S): 5000 INJECTION INTRAVENOUS; SUBCUTANEOUS at 21:42

## 2023-01-01 RX ADMIN — MEROPENEM 100 MILLIGRAM(S): 1 INJECTION INTRAVENOUS at 06:27

## 2023-01-01 RX ADMIN — Medication 1 CAPSULE(S): at 22:40

## 2023-01-01 RX ADMIN — HYDROMORPHONE HYDROCHLORIDE 1 MILLIGRAM(S): 2 INJECTION INTRAMUSCULAR; INTRAVENOUS; SUBCUTANEOUS at 22:20

## 2023-01-01 RX ADMIN — Medication 975 MILLIGRAM(S): at 11:44

## 2023-01-01 RX ADMIN — MEROPENEM 100 MILLIGRAM(S): 1 INJECTION INTRAVENOUS at 05:45

## 2023-01-01 RX ADMIN — INSULIN GLARGINE 9 UNIT(S): 100 INJECTION, SOLUTION SUBCUTANEOUS at 23:24

## 2023-01-01 RX ADMIN — Medication 5.73 MICROGRAM(S)/KG/MIN: at 20:51

## 2023-01-01 RX ADMIN — MIDODRINE HYDROCHLORIDE 30 MILLIGRAM(S): 2.5 TABLET ORAL at 05:29

## 2023-01-01 RX ADMIN — Medication 10 MILLIGRAM(S): at 05:55

## 2023-01-01 RX ADMIN — Medication 8: at 00:32

## 2023-01-01 RX ADMIN — Medication 50 MILLIGRAM(S): at 13:42

## 2023-01-01 RX ADMIN — Medication 15 MILLILITER(S): at 09:30

## 2023-01-01 RX ADMIN — Medication 1 CAPSULE(S): at 21:53

## 2023-01-01 RX ADMIN — POLYETHYLENE GLYCOL 3350 17 GRAM(S): 17 POWDER, FOR SOLUTION ORAL at 21:38

## 2023-01-01 RX ADMIN — Medication 10 MILLIGRAM(S): at 11:32

## 2023-01-01 RX ADMIN — HEPARIN SODIUM 5000 UNIT(S): 5000 INJECTION INTRAVENOUS; SUBCUTANEOUS at 13:40

## 2023-01-01 RX ADMIN — MIDAZOLAM HYDROCHLORIDE 2 MILLIGRAM(S): 1 INJECTION, SOLUTION INTRAMUSCULAR; INTRAVENOUS at 20:45

## 2023-01-01 RX ADMIN — HEPARIN SODIUM 0 UNIT(S)/HR: 5000 INJECTION INTRAVENOUS; SUBCUTANEOUS at 12:44

## 2023-01-01 RX ADMIN — HEPARIN SODIUM 1800 UNIT(S)/HR: 5000 INJECTION INTRAVENOUS; SUBCUTANEOUS at 13:10

## 2023-01-01 RX ADMIN — Medication 2: at 17:23

## 2023-01-01 RX ADMIN — Medication 20 MICROGRAM(S): at 22:40

## 2023-01-01 RX ADMIN — MEROPENEM 100 MILLIGRAM(S): 1 INJECTION INTRAVENOUS at 17:12

## 2023-01-01 RX ADMIN — Medication 10 MILLIGRAM(S): at 02:42

## 2023-01-01 RX ADMIN — MIDODRINE HYDROCHLORIDE 10 MILLIGRAM(S): 2.5 TABLET ORAL at 21:41

## 2023-01-01 RX ADMIN — Medication 1 CAPSULE(S): at 02:28

## 2023-01-01 RX ADMIN — MIDODRINE HYDROCHLORIDE 40 MILLIGRAM(S): 2.5 TABLET ORAL at 05:32

## 2023-01-01 RX ADMIN — SODIUM CHLORIDE 40 MILLILITER(S): 9 INJECTION, SOLUTION INTRAVENOUS at 20:52

## 2023-01-01 RX ADMIN — HALOPERIDOL DECANOATE 5 MILLIGRAM(S): 100 INJECTION INTRAMUSCULAR at 18:41

## 2023-01-01 RX ADMIN — PANTOPRAZOLE SODIUM 40 MILLIGRAM(S): 20 TABLET, DELAYED RELEASE ORAL at 11:55

## 2023-01-01 RX ADMIN — Medication 3 UNIT(S): at 12:35

## 2023-01-01 RX ADMIN — DEXMEDETOMIDINE HYDROCHLORIDE IN 0.9% SODIUM CHLORIDE 6.11 MICROGRAM(S)/KG/HR: 4 INJECTION INTRAVENOUS at 17:48

## 2023-01-01 RX ADMIN — ERTAPENEM SODIUM 100 MILLIGRAM(S): 1 INJECTION, POWDER, LYOPHILIZED, FOR SOLUTION INTRAMUSCULAR; INTRAVENOUS at 12:22

## 2023-01-01 RX ADMIN — Medication 50 MILLILITER(S): at 11:30

## 2023-01-01 RX ADMIN — Medication 1 CAPSULE(S): at 06:27

## 2023-01-01 RX ADMIN — Medication 1 CAPSULE(S): at 14:42

## 2023-01-01 RX ADMIN — Medication 1 CAPSULE(S): at 14:28

## 2023-01-01 RX ADMIN — Medication 1 CAPSULE(S): at 05:17

## 2023-01-01 RX ADMIN — Medication 1 CAPSULE(S): at 23:01

## 2023-01-01 RX ADMIN — Medication 10 MILLIGRAM(S): at 05:06

## 2023-01-01 RX ADMIN — Medication 1 CAPSULE(S): at 21:23

## 2023-01-01 RX ADMIN — MIDODRINE HYDROCHLORIDE 30 MILLIGRAM(S): 2.5 TABLET ORAL at 13:03

## 2023-01-01 RX ADMIN — DEXMEDETOMIDINE HYDROCHLORIDE IN 0.9% SODIUM CHLORIDE 2.95 MICROGRAM(S)/KG/HR: 4 INJECTION INTRAVENOUS at 21:19

## 2023-01-01 RX ADMIN — MEROPENEM 100 MILLIGRAM(S): 1 INJECTION INTRAVENOUS at 05:34

## 2023-01-01 RX ADMIN — MIDODRINE HYDROCHLORIDE 30 MILLIGRAM(S): 2.5 TABLET ORAL at 21:48

## 2023-01-01 RX ADMIN — HEPARIN SODIUM 5000 UNIT(S): 5000 INJECTION INTRAVENOUS; SUBCUTANEOUS at 05:23

## 2023-01-01 RX ADMIN — MEROPENEM 100 MILLIGRAM(S): 1 INJECTION INTRAVENOUS at 18:30

## 2023-01-01 RX ADMIN — MEROPENEM 100 MILLIGRAM(S): 1 INJECTION INTRAVENOUS at 14:28

## 2023-01-01 RX ADMIN — MIDODRINE HYDROCHLORIDE 40 MILLIGRAM(S): 2.5 TABLET ORAL at 22:19

## 2023-01-01 RX ADMIN — POLYETHYLENE GLYCOL 3350 17 GRAM(S): 17 POWDER, FOR SOLUTION ORAL at 22:10

## 2023-01-01 RX ADMIN — Medication 200 MILLIGRAM(S): at 06:16

## 2023-01-01 RX ADMIN — Medication 1 CAPSULE(S): at 22:20

## 2023-01-01 RX ADMIN — Medication 15 MILLILITER(S): at 11:58

## 2023-01-01 RX ADMIN — Medication 2 UNIT(S): at 05:42

## 2023-01-01 RX ADMIN — Medication 1 CAPSULE(S): at 17:53

## 2023-01-01 RX ADMIN — MIDODRINE HYDROCHLORIDE 30 MILLIGRAM(S): 2.5 TABLET ORAL at 23:00

## 2023-01-01 RX ADMIN — ERTAPENEM SODIUM 120 MILLIGRAM(S): 1 INJECTION, POWDER, LYOPHILIZED, FOR SOLUTION INTRAMUSCULAR; INTRAVENOUS at 12:00

## 2023-01-01 RX ADMIN — MIDAZOLAM HYDROCHLORIDE 2 MILLIGRAM(S): 1 INJECTION, SOLUTION INTRAMUSCULAR; INTRAVENOUS at 21:00

## 2023-01-01 RX ADMIN — DEXMEDETOMIDINE HYDROCHLORIDE IN 0.9% SODIUM CHLORIDE 6.11 MICROGRAM(S)/KG/HR: 4 INJECTION INTRAVENOUS at 14:00

## 2023-01-01 RX ADMIN — DEXMEDETOMIDINE HYDROCHLORIDE IN 0.9% SODIUM CHLORIDE 14.7 MICROGRAM(S)/KG/HR: 4 INJECTION INTRAVENOUS at 04:50

## 2023-01-01 RX ADMIN — Medication 3 UNIT(S): at 11:13

## 2023-01-01 RX ADMIN — Medication 1 APPLICATION(S): at 06:59

## 2023-01-01 RX ADMIN — DEXMEDETOMIDINE HYDROCHLORIDE IN 0.9% SODIUM CHLORIDE 6.11 MICROGRAM(S)/KG/HR: 4 INJECTION INTRAVENOUS at 19:07

## 2023-01-01 RX ADMIN — HEPARIN SODIUM 5000 UNIT(S): 5000 INJECTION INTRAVENOUS; SUBCUTANEOUS at 22:21

## 2023-01-01 RX ADMIN — Medication 1 CAPSULE(S): at 22:23

## 2023-01-01 RX ADMIN — Medication 4: at 06:08

## 2023-01-01 RX ADMIN — Medication 2: at 12:48

## 2023-01-01 RX ADMIN — HYDROMORPHONE HYDROCHLORIDE 0.5 MILLIGRAM(S): 2 INJECTION INTRAMUSCULAR; INTRAVENOUS; SUBCUTANEOUS at 09:16

## 2023-01-01 RX ADMIN — Medication 500 MILLIGRAM(S): at 06:15

## 2023-01-01 RX ADMIN — MIDODRINE HYDROCHLORIDE 30 MILLIGRAM(S): 2.5 TABLET ORAL at 14:01

## 2023-01-01 RX ADMIN — Medication 10 MILLIGRAM(S): at 23:12

## 2023-01-01 RX ADMIN — GABAPENTIN 100 MILLIGRAM(S): 400 CAPSULE ORAL at 00:03

## 2023-01-01 RX ADMIN — CHLORHEXIDINE GLUCONATE 15 MILLILITER(S): 213 SOLUTION TOPICAL at 19:03

## 2023-01-01 RX ADMIN — Medication 125 MILLILITER(S): at 14:57

## 2023-01-01 RX ADMIN — PANTOPRAZOLE SODIUM 40 MILLIGRAM(S): 20 TABLET, DELAYED RELEASE ORAL at 09:29

## 2023-01-01 RX ADMIN — Medication 2: at 11:29

## 2023-01-01 RX ADMIN — Medication 20 MICROGRAM(S): at 22:43

## 2023-01-01 RX ADMIN — HEPARIN SODIUM 5000 UNIT(S): 5000 INJECTION INTRAVENOUS; SUBCUTANEOUS at 05:18

## 2023-01-01 RX ADMIN — HYDROMORPHONE HYDROCHLORIDE 1 MILLIGRAM(S): 2 INJECTION INTRAMUSCULAR; INTRAVENOUS; SUBCUTANEOUS at 11:55

## 2023-01-01 RX ADMIN — Medication 1 APPLICATION(S): at 18:07

## 2023-01-01 RX ADMIN — Medication 15 MILLILITER(S): at 12:52

## 2023-01-01 RX ADMIN — Medication 1 CAPSULE(S): at 17:12

## 2023-01-01 RX ADMIN — Medication 100 MILLIGRAM(S): at 13:07

## 2023-01-01 RX ADMIN — Medication 1000 MILLIGRAM(S): at 23:51

## 2023-01-01 RX ADMIN — Medication 11.1 MICROGRAM(S)/KG/MIN: at 08:25

## 2023-01-01 RX ADMIN — CHLORHEXIDINE GLUCONATE 15 MILLILITER(S): 213 SOLUTION TOPICAL at 18:26

## 2023-01-01 RX ADMIN — GABAPENTIN 100 MILLIGRAM(S): 400 CAPSULE ORAL at 05:50

## 2023-01-01 RX ADMIN — HEPARIN SODIUM 5000 UNIT(S): 5000 INJECTION INTRAVENOUS; SUBCUTANEOUS at 14:12

## 2023-01-01 RX ADMIN — SODIUM CHLORIDE 170 MILLILITER(S): 9 INJECTION, SOLUTION INTRAVENOUS at 06:32

## 2023-01-01 RX ADMIN — CHLORHEXIDINE GLUCONATE 15 MILLILITER(S): 213 SOLUTION TOPICAL at 17:40

## 2023-01-01 RX ADMIN — Medication 1 CAPSULE(S): at 17:28

## 2023-01-01 RX ADMIN — HEPARIN SODIUM 5000 UNIT(S): 5000 INJECTION INTRAVENOUS; SUBCUTANEOUS at 14:29

## 2023-01-01 RX ADMIN — OXANDROLONE 5 MILLIGRAM(S): 10 TABLET ORAL at 05:26

## 2023-01-01 RX ADMIN — DEXMEDETOMIDINE HYDROCHLORIDE IN 0.9% SODIUM CHLORIDE 6.11 MICROGRAM(S)/KG/HR: 4 INJECTION INTRAVENOUS at 07:10

## 2023-01-01 RX ADMIN — GABAPENTIN 100 MILLIGRAM(S): 400 CAPSULE ORAL at 05:32

## 2023-01-01 RX ADMIN — OXYCODONE HYDROCHLORIDE 10 MILLIGRAM(S): 5 TABLET ORAL at 03:00

## 2023-01-01 RX ADMIN — MEROPENEM 100 MILLIGRAM(S): 1 INJECTION INTRAVENOUS at 17:48

## 2023-01-01 RX ADMIN — Medication 50 MILLIGRAM(S): at 05:51

## 2023-01-01 RX ADMIN — GABAPENTIN 100 MILLIGRAM(S): 400 CAPSULE ORAL at 23:15

## 2023-01-01 RX ADMIN — Medication 1 APPLICATION(S): at 06:09

## 2023-01-01 RX ADMIN — PANTOPRAZOLE SODIUM 40 MILLIGRAM(S): 20 TABLET, DELAYED RELEASE ORAL at 11:44

## 2023-01-01 RX ADMIN — Medication 2: at 23:36

## 2023-01-01 RX ADMIN — Medication 1 APPLICATION(S): at 17:20

## 2023-01-01 RX ADMIN — CHLORHEXIDINE GLUCONATE 15 MILLILITER(S): 213 SOLUTION TOPICAL at 06:31

## 2023-01-01 RX ADMIN — MIDODRINE HYDROCHLORIDE 40 MILLIGRAM(S): 2.5 TABLET ORAL at 06:04

## 2023-01-01 RX ADMIN — HEPARIN SODIUM 5000 UNIT(S): 5000 INJECTION INTRAVENOUS; SUBCUTANEOUS at 05:49

## 2023-01-01 RX ADMIN — Medication 3 UNIT(S): at 23:56

## 2023-01-01 RX ADMIN — CHLORHEXIDINE GLUCONATE 1 APPLICATION(S): 213 SOLUTION TOPICAL at 11:55

## 2023-01-01 RX ADMIN — Medication 1 APPLICATION(S): at 05:14

## 2023-01-01 RX ADMIN — Medication 150 MILLIGRAM(S): at 11:18

## 2023-01-01 RX ADMIN — HEPARIN SODIUM 5000 UNIT(S): 5000 INJECTION INTRAVENOUS; SUBCUTANEOUS at 13:49

## 2023-01-01 RX ADMIN — HYDROMORPHONE HYDROCHLORIDE 0.5 MILLIGRAM(S): 2 INJECTION INTRAMUSCULAR; INTRAVENOUS; SUBCUTANEOUS at 01:24

## 2023-01-01 RX ADMIN — MIDODRINE HYDROCHLORIDE 40 MILLIGRAM(S): 2.5 TABLET ORAL at 05:16

## 2023-01-01 RX ADMIN — OXYCODONE HYDROCHLORIDE 10 MILLIGRAM(S): 5 TABLET ORAL at 01:25

## 2023-01-01 RX ADMIN — CHLORHEXIDINE GLUCONATE 15 MILLILITER(S): 213 SOLUTION TOPICAL at 17:47

## 2023-01-01 RX ADMIN — INSULIN GLARGINE 14 UNIT(S): 100 INJECTION, SOLUTION SUBCUTANEOUS at 23:16

## 2023-01-01 RX ADMIN — Medication 1 APPLICATION(S): at 10:53

## 2023-01-01 RX ADMIN — Medication 1 CAPSULE(S): at 02:47

## 2023-01-01 RX ADMIN — Medication 1 CAPSULE(S): at 05:19

## 2023-01-01 RX ADMIN — Medication 10 MILLIGRAM(S): at 05:11

## 2023-01-01 RX ADMIN — MEROPENEM 100 MILLIGRAM(S): 1 INJECTION INTRAVENOUS at 05:31

## 2023-01-01 RX ADMIN — MORPHINE SULFATE 4 MILLIGRAM(S): 50 CAPSULE, EXTENDED RELEASE ORAL at 12:12

## 2023-01-01 RX ADMIN — HYDROMORPHONE HYDROCHLORIDE 1 MILLIGRAM(S): 2 INJECTION INTRAMUSCULAR; INTRAVENOUS; SUBCUTANEOUS at 22:15

## 2023-01-01 RX ADMIN — Medication 15 MILLILITER(S): at 11:29

## 2023-01-01 RX ADMIN — Medication 2 UNIT(S): at 05:57

## 2023-01-01 RX ADMIN — Medication 10 MILLIGRAM(S): at 14:19

## 2023-01-01 RX ADMIN — Medication 1 CAPSULE(S): at 14:33

## 2023-01-01 RX ADMIN — Medication 20 MICROGRAM(S): at 22:59

## 2023-01-01 RX ADMIN — MEROPENEM 100 MILLIGRAM(S): 1 INJECTION INTRAVENOUS at 05:32

## 2023-01-01 RX ADMIN — CHLORHEXIDINE GLUCONATE 15 MILLILITER(S): 213 SOLUTION TOPICAL at 18:00

## 2023-01-01 RX ADMIN — Medication 1 CAPSULE(S): at 11:37

## 2023-01-01 RX ADMIN — Medication 50 MILLIGRAM(S): at 11:29

## 2023-01-01 RX ADMIN — Medication 11.1 MICROGRAM(S)/KG/MIN: at 22:17

## 2023-01-01 RX ADMIN — Medication 10 MILLIGRAM(S): at 03:37

## 2023-01-01 RX ADMIN — Medication 10 MILLIGRAM(S): at 11:41

## 2023-01-01 RX ADMIN — MEROPENEM 100 MILLIGRAM(S): 1 INJECTION INTRAVENOUS at 05:47

## 2023-01-01 RX ADMIN — HEPARIN SODIUM 5000 UNIT(S): 5000 INJECTION INTRAVENOUS; SUBCUTANEOUS at 13:16

## 2023-01-01 RX ADMIN — HYDROMORPHONE HYDROCHLORIDE 1 MILLIGRAM(S): 2 INJECTION INTRAMUSCULAR; INTRAVENOUS; SUBCUTANEOUS at 00:15

## 2023-01-01 RX ADMIN — Medication 4: at 11:21

## 2023-01-01 RX ADMIN — GABAPENTIN 100 MILLIGRAM(S): 400 CAPSULE ORAL at 23:26

## 2023-01-01 RX ADMIN — HYDROMORPHONE HYDROCHLORIDE 1 MILLIGRAM(S): 2 INJECTION INTRAMUSCULAR; INTRAVENOUS; SUBCUTANEOUS at 15:45

## 2023-01-01 RX ADMIN — Medication 975 MILLIGRAM(S): at 00:00

## 2023-01-01 RX ADMIN — Medication 2: at 12:18

## 2023-01-01 RX ADMIN — Medication 400 MILLIGRAM(S): at 17:40

## 2023-01-01 RX ADMIN — FENTANYL CITRATE 5.9 MICROGRAM(S)/KG/HR: 50 INJECTION INTRAVENOUS at 13:23

## 2023-01-01 RX ADMIN — CHLORHEXIDINE GLUCONATE 15 MILLILITER(S): 213 SOLUTION TOPICAL at 06:41

## 2023-01-01 RX ADMIN — Medication 10 MILLIGRAM(S): at 23:13

## 2023-01-01 RX ADMIN — INSULIN GLARGINE 9 UNIT(S): 100 INJECTION, SOLUTION SUBCUTANEOUS at 22:44

## 2023-01-01 RX ADMIN — Medication 1 CAPSULE(S): at 05:31

## 2023-01-01 RX ADMIN — Medication 11.1 MICROGRAM(S)/KG/MIN: at 19:49

## 2023-01-01 RX ADMIN — HYDROMORPHONE HYDROCHLORIDE 1 MILLIGRAM(S): 2 INJECTION INTRAMUSCULAR; INTRAVENOUS; SUBCUTANEOUS at 19:30

## 2023-01-01 RX ADMIN — Medication 500 MILLIGRAM(S): at 11:30

## 2023-01-01 RX ADMIN — Medication 5.73 MICROGRAM(S)/KG/MIN: at 23:06

## 2023-01-01 RX ADMIN — Medication 650 MILLIGRAM(S): at 12:30

## 2023-01-01 RX ADMIN — PIPERACILLIN AND TAZOBACTAM 25 GRAM(S): 4; .5 INJECTION, POWDER, LYOPHILIZED, FOR SOLUTION INTRAVENOUS at 09:12

## 2023-01-01 RX ADMIN — Medication 102 MILLIGRAM(S): at 08:02

## 2023-01-01 RX ADMIN — Medication 1 CAPSULE(S): at 14:02

## 2023-01-01 RX ADMIN — Medication 15 MILLILITER(S): at 12:01

## 2023-01-01 RX ADMIN — Medication 20 MICROGRAM(S): at 01:23

## 2023-01-01 RX ADMIN — HEPARIN SODIUM 1000 UNIT(S)/HR: 5000 INJECTION INTRAVENOUS; SUBCUTANEOUS at 07:57

## 2023-01-01 RX ADMIN — Medication 2 UNIT(S): at 17:47

## 2023-01-01 RX ADMIN — GABAPENTIN 100 MILLIGRAM(S): 400 CAPSULE ORAL at 05:44

## 2023-01-01 RX ADMIN — ONDANSETRON 4 MILLIGRAM(S): 8 TABLET, FILM COATED ORAL at 18:36

## 2023-01-01 RX ADMIN — VASOPRESSIN 4.5 UNIT(S)/MIN: 20 INJECTION INTRAVENOUS at 07:41

## 2023-01-01 RX ADMIN — Medication 1 CAPSULE(S): at 22:19

## 2023-01-01 RX ADMIN — HYDROMORPHONE HYDROCHLORIDE 0.5 MILLIGRAM(S): 2 INJECTION INTRAMUSCULAR; INTRAVENOUS; SUBCUTANEOUS at 00:00

## 2023-01-01 RX ADMIN — VASOPRESSIN 4.5 UNIT(S)/MIN: 20 INJECTION INTRAVENOUS at 20:00

## 2023-01-01 RX ADMIN — MIDODRINE HYDROCHLORIDE 40 MILLIGRAM(S): 2.5 TABLET ORAL at 21:42

## 2023-01-01 RX ADMIN — Medication 2 UNIT(S): at 05:13

## 2023-01-01 RX ADMIN — CHLORHEXIDINE GLUCONATE 15 MILLILITER(S): 213 SOLUTION TOPICAL at 15:22

## 2023-01-01 RX ADMIN — GABAPENTIN 100 MILLIGRAM(S): 400 CAPSULE ORAL at 13:04

## 2023-01-01 RX ADMIN — HEPARIN SODIUM 5000 UNIT(S): 5000 INJECTION INTRAVENOUS; SUBCUTANEOUS at 23:39

## 2023-01-01 RX ADMIN — OXYCODONE HYDROCHLORIDE 5 MILLIGRAM(S): 5 TABLET ORAL at 15:31

## 2023-01-01 RX ADMIN — OXYCODONE HYDROCHLORIDE 5 MILLIGRAM(S): 5 TABLET ORAL at 15:51

## 2023-01-01 RX ADMIN — HEPARIN SODIUM 0 UNIT(S)/HR: 5000 INJECTION INTRAVENOUS; SUBCUTANEOUS at 20:50

## 2023-01-01 RX ADMIN — Medication 15 MILLILITER(S): at 11:06

## 2023-01-01 RX ADMIN — HEPARIN SODIUM 5000 UNIT(S): 5000 INJECTION INTRAVENOUS; SUBCUTANEOUS at 14:28

## 2023-01-01 RX ADMIN — INSULIN HUMAN 5 UNIT(S)/HR: 100 INJECTION, SOLUTION SUBCUTANEOUS at 20:18

## 2023-01-01 RX ADMIN — Medication 10 MILLIGRAM(S): at 00:34

## 2023-01-01 RX ADMIN — Medication 1 APPLICATION(S): at 06:44

## 2023-01-01 RX ADMIN — Medication 11.1 MICROGRAM(S)/KG/MIN: at 22:03

## 2023-01-01 RX ADMIN — HEPARIN SODIUM 5000 UNIT(S): 5000 INJECTION INTRAVENOUS; SUBCUTANEOUS at 14:22

## 2023-01-01 RX ADMIN — HEPARIN SODIUM 5000 UNIT(S): 5000 INJECTION INTRAVENOUS; SUBCUTANEOUS at 14:00

## 2023-01-01 RX ADMIN — GABAPENTIN 100 MILLIGRAM(S): 400 CAPSULE ORAL at 21:28

## 2023-01-01 RX ADMIN — Medication 10 MILLIGRAM(S): at 13:08

## 2023-01-01 RX ADMIN — Medication 1 APPLICATION(S): at 05:51

## 2023-01-01 RX ADMIN — Medication 15 MILLILITER(S): at 11:12

## 2023-01-01 RX ADMIN — Medication 15 MILLILITER(S): at 11:44

## 2023-01-01 RX ADMIN — FENTANYL CITRATE 5.9 MICROGRAM(S)/KG/HR: 50 INJECTION INTRAVENOUS at 20:51

## 2023-01-01 RX ADMIN — Medication 500 MILLIGRAM(S): at 05:42

## 2023-01-01 RX ADMIN — MORPHINE SULFATE 4 MILLIGRAM(S): 50 CAPSULE, EXTENDED RELEASE ORAL at 19:42

## 2023-01-01 RX ADMIN — MIDODRINE HYDROCHLORIDE 40 MILLIGRAM(S): 2.5 TABLET ORAL at 21:22

## 2023-01-01 RX ADMIN — CHLORHEXIDINE GLUCONATE 1 APPLICATION(S): 213 SOLUTION TOPICAL at 12:04

## 2023-01-01 RX ADMIN — CASPOFUNGIN ACETATE 260 MILLIGRAM(S): 7 INJECTION, POWDER, LYOPHILIZED, FOR SOLUTION INTRAVENOUS at 09:56

## 2023-01-01 RX ADMIN — PANTOPRAZOLE SODIUM 40 MILLIGRAM(S): 20 TABLET, DELAYED RELEASE ORAL at 12:50

## 2023-01-01 RX ADMIN — MEROPENEM 100 MILLIGRAM(S): 1 INJECTION INTRAVENOUS at 13:58

## 2023-01-01 RX ADMIN — CHLORHEXIDINE GLUCONATE 1 APPLICATION(S): 213 SOLUTION TOPICAL at 11:48

## 2023-01-01 RX ADMIN — DEXMEDETOMIDINE HYDROCHLORIDE IN 0.9% SODIUM CHLORIDE 14.7 MICROGRAM(S)/KG/HR: 4 INJECTION INTRAVENOUS at 10:17

## 2023-01-01 RX ADMIN — DEXMEDETOMIDINE HYDROCHLORIDE IN 0.9% SODIUM CHLORIDE 5.9 MICROGRAM(S)/KG/HR: 4 INJECTION INTRAVENOUS at 20:46

## 2023-01-01 RX ADMIN — MEROPENEM 100 MILLIGRAM(S): 1 INJECTION INTRAVENOUS at 06:04

## 2023-01-01 RX ADMIN — Medication 15 MILLILITER(S): at 12:17

## 2023-01-01 RX ADMIN — Medication 100 MILLIEQUIVALENT(S): at 22:23

## 2023-01-01 RX ADMIN — MORPHINE SULFATE 2 MILLIGRAM(S): 50 CAPSULE, EXTENDED RELEASE ORAL at 09:20

## 2023-01-01 RX ADMIN — Medication 5.29 MICROGRAM(S)/KG/MIN: at 13:04

## 2023-01-01 RX ADMIN — Medication 1 CAPSULE(S): at 17:44

## 2023-01-01 RX ADMIN — MEROPENEM 100 MILLIGRAM(S): 1 INJECTION INTRAVENOUS at 05:55

## 2023-01-01 RX ADMIN — CHLORHEXIDINE GLUCONATE 15 MILLILITER(S): 213 SOLUTION TOPICAL at 18:16

## 2023-01-01 RX ADMIN — MIDAZOLAM HYDROCHLORIDE 4 MILLIGRAM(S): 1 INJECTION, SOLUTION INTRAMUSCULAR; INTRAVENOUS at 13:04

## 2023-01-01 RX ADMIN — HEPARIN SODIUM 5000 UNIT(S): 5000 INJECTION INTRAVENOUS; SUBCUTANEOUS at 05:15

## 2023-01-01 RX ADMIN — MEROPENEM 100 MILLIGRAM(S): 1 INJECTION INTRAVENOUS at 14:46

## 2023-01-01 RX ADMIN — Medication 975 MILLIGRAM(S): at 11:52

## 2023-01-01 RX ADMIN — Medication 20 MICROGRAM(S): at 23:09

## 2023-01-01 RX ADMIN — Medication 1 CAPSULE(S): at 22:57

## 2023-01-01 RX ADMIN — Medication 250 MILLILITER(S): at 13:48

## 2023-01-01 RX ADMIN — DEXMEDETOMIDINE HYDROCHLORIDE IN 0.9% SODIUM CHLORIDE 2.95 MICROGRAM(S)/KG/HR: 4 INJECTION INTRAVENOUS at 19:22

## 2023-01-01 RX ADMIN — FENTANYL CITRATE 5.9 MICROGRAM(S)/KG/HR: 50 INJECTION INTRAVENOUS at 20:19

## 2023-01-01 RX ADMIN — MEROPENEM 100 MILLIGRAM(S): 1 INJECTION INTRAVENOUS at 17:10

## 2023-01-01 RX ADMIN — Medication 150 MEQ/KG/HR: at 13:54

## 2023-01-01 RX ADMIN — Medication 5.73 MICROGRAM(S)/KG/MIN: at 08:16

## 2023-01-01 RX ADMIN — CHLORHEXIDINE GLUCONATE 1 APPLICATION(S): 213 SOLUTION TOPICAL at 12:37

## 2023-01-01 RX ADMIN — FENTANYL CITRATE 4 MICROGRAM(S)/KG/HR: 50 INJECTION INTRAVENOUS at 14:02

## 2023-01-01 RX ADMIN — Medication 1 CAPSULE(S): at 14:00

## 2023-01-01 RX ADMIN — INSULIN GLARGINE 9 UNIT(S): 100 INJECTION, SOLUTION SUBCUTANEOUS at 00:24

## 2023-01-01 RX ADMIN — Medication 2: at 11:24

## 2023-01-01 RX ADMIN — SODIUM CHLORIDE 1000 MILLILITER(S): 9 INJECTION, SOLUTION INTRAVENOUS at 03:30

## 2023-01-01 RX ADMIN — INSULIN GLARGINE 14 UNIT(S): 100 INJECTION, SOLUTION SUBCUTANEOUS at 23:56

## 2023-01-01 RX ADMIN — GABAPENTIN 100 MILLIGRAM(S): 400 CAPSULE ORAL at 13:03

## 2023-01-01 RX ADMIN — Medication 10 MILLIGRAM(S): at 17:47

## 2023-01-01 RX ADMIN — Medication 1 CAPSULE(S): at 14:22

## 2023-01-01 RX ADMIN — HYDROMORPHONE HYDROCHLORIDE 0.5 MILLIGRAM(S): 2 INJECTION INTRAMUSCULAR; INTRAVENOUS; SUBCUTANEOUS at 23:39

## 2023-01-01 RX ADMIN — MEROPENEM 100 MILLIGRAM(S): 1 INJECTION INTRAVENOUS at 10:46

## 2023-01-01 RX ADMIN — Medication 2: at 18:29

## 2023-01-01 RX ADMIN — Medication 10 MILLIGRAM(S): at 20:38

## 2023-01-01 RX ADMIN — Medication 100 MILLIGRAM(S): at 05:15

## 2023-01-01 RX ADMIN — Medication 975 MILLIGRAM(S): at 00:25

## 2023-01-01 RX ADMIN — Medication 5.73 MICROGRAM(S)/KG/MIN: at 20:17

## 2023-01-01 RX ADMIN — DEXMEDETOMIDINE HYDROCHLORIDE IN 0.9% SODIUM CHLORIDE 6.11 MICROGRAM(S)/KG/HR: 4 INJECTION INTRAVENOUS at 21:05

## 2023-01-01 RX ADMIN — Medication 500 MILLIGRAM(S): at 11:54

## 2023-01-01 RX ADMIN — Medication 50 MILLIGRAM(S): at 10:36

## 2023-01-01 RX ADMIN — Medication 1 CAPSULE(S): at 21:48

## 2023-01-01 RX ADMIN — Medication 20 MICROGRAM(S): at 21:07

## 2023-01-01 RX ADMIN — FENTANYL CITRATE 4 MICROGRAM(S)/KG/HR: 50 INJECTION INTRAVENOUS at 19:50

## 2023-01-01 RX ADMIN — GABAPENTIN 100 MILLIGRAM(S): 400 CAPSULE ORAL at 14:12

## 2023-01-01 RX ADMIN — MIDODRINE HYDROCHLORIDE 30 MILLIGRAM(S): 2.5 TABLET ORAL at 22:20

## 2023-01-01 RX ADMIN — Medication 10 MILLIGRAM(S): at 14:48

## 2023-01-01 RX ADMIN — DEXMEDETOMIDINE HYDROCHLORIDE IN 0.9% SODIUM CHLORIDE 6.11 MICROGRAM(S)/KG/HR: 4 INJECTION INTRAVENOUS at 19:34

## 2023-01-01 RX ADMIN — Medication 1 CAPSULE(S): at 14:45

## 2023-01-01 RX ADMIN — VASOPRESSIN 4.5 UNIT(S)/MIN: 20 INJECTION INTRAVENOUS at 08:16

## 2023-01-01 RX ADMIN — Medication 3 UNIT(S): at 12:12

## 2023-01-01 RX ADMIN — MIDODRINE HYDROCHLORIDE 40 MILLIGRAM(S): 2.5 TABLET ORAL at 22:20

## 2023-01-01 RX ADMIN — Medication 20 MILLILITER(S): at 14:48

## 2023-01-01 RX ADMIN — FENTANYL CITRATE 5.9 MICROGRAM(S)/KG/HR: 50 INJECTION INTRAVENOUS at 07:10

## 2023-01-01 RX ADMIN — DEXMEDETOMIDINE HYDROCHLORIDE IN 0.9% SODIUM CHLORIDE 6.11 MICROGRAM(S)/KG/HR: 4 INJECTION INTRAVENOUS at 09:07

## 2023-01-01 RX ADMIN — Medication 1 CAPSULE(S): at 02:07

## 2023-01-01 RX ADMIN — Medication 2 UNIT(S): at 05:28

## 2023-01-01 RX ADMIN — Medication 10 MILLIGRAM(S): at 22:15

## 2023-01-01 RX ADMIN — Medication 50 MILLIEQUIVALENT(S): at 13:34

## 2023-01-01 RX ADMIN — OXANDROLONE 5 MILLIGRAM(S): 10 TABLET ORAL at 22:58

## 2023-01-01 RX ADMIN — OXANDROLONE 5 MILLIGRAM(S): 10 TABLET ORAL at 22:00

## 2023-01-01 RX ADMIN — PHENYLEPHRINE HYDROCHLORIDE 13.3 MICROGRAM(S)/KG/MIN: 10 INJECTION INTRAVENOUS at 22:46

## 2023-01-01 RX ADMIN — Medication 1 CAPSULE(S): at 11:21

## 2023-01-01 RX ADMIN — MORPHINE SULFATE 4 MILLIGRAM(S): 50 CAPSULE, EXTENDED RELEASE ORAL at 01:46

## 2023-01-01 RX ADMIN — MEROPENEM 100 MILLIGRAM(S): 1 INJECTION INTRAVENOUS at 14:52

## 2023-01-01 RX ADMIN — GABAPENTIN 100 MILLIGRAM(S): 400 CAPSULE ORAL at 06:23

## 2023-01-01 RX ADMIN — MIDODRINE HYDROCHLORIDE 40 MILLIGRAM(S): 2.5 TABLET ORAL at 14:29

## 2023-01-01 RX ADMIN — GABAPENTIN 100 MILLIGRAM(S): 400 CAPSULE ORAL at 06:11

## 2023-01-01 RX ADMIN — MEROPENEM 100 MILLIGRAM(S): 1 INJECTION INTRAVENOUS at 17:32

## 2023-01-01 RX ADMIN — Medication 100 MILLIEQUIVALENT(S): at 15:29

## 2023-01-01 RX ADMIN — MEROPENEM 100 MILLIGRAM(S): 1 INJECTION INTRAVENOUS at 17:31

## 2023-01-01 RX ADMIN — Medication 200 MILLIGRAM(S): at 08:57

## 2023-01-01 RX ADMIN — Medication 2 UNIT(S): at 18:45

## 2023-01-01 RX ADMIN — HYDROMORPHONE HYDROCHLORIDE 1 MILLIGRAM(S): 2 INJECTION INTRAMUSCULAR; INTRAVENOUS; SUBCUTANEOUS at 20:31

## 2023-01-01 RX ADMIN — CHLORHEXIDINE GLUCONATE 1 APPLICATION(S): 213 SOLUTION TOPICAL at 14:02

## 2023-01-01 RX ADMIN — HYDROMORPHONE HYDROCHLORIDE 0.5 MILLIGRAM(S): 2 INJECTION INTRAMUSCULAR; INTRAVENOUS; SUBCUTANEOUS at 13:51

## 2023-01-01 RX ADMIN — MIDODRINE HYDROCHLORIDE 40 MILLIGRAM(S): 2.5 TABLET ORAL at 16:00

## 2023-01-01 RX ADMIN — Medication 1 APPLICATION(S): at 05:30

## 2023-01-01 RX ADMIN — Medication 1 CAPSULE(S): at 21:31

## 2023-01-01 RX ADMIN — CHLORHEXIDINE GLUCONATE 15 MILLILITER(S): 213 SOLUTION TOPICAL at 17:09

## 2023-01-01 RX ADMIN — MEROPENEM 100 MILLIGRAM(S): 1 INJECTION INTRAVENOUS at 06:11

## 2023-01-01 RX ADMIN — GABAPENTIN 100 MILLIGRAM(S): 400 CAPSULE ORAL at 21:37

## 2023-01-01 RX ADMIN — Medication 20 MICROGRAM(S): at 23:12

## 2023-01-01 RX ADMIN — Medication 1 CAPSULE(S): at 13:16

## 2023-01-01 RX ADMIN — HEPARIN SODIUM 5000 UNIT(S): 5000 INJECTION INTRAVENOUS; SUBCUTANEOUS at 13:07

## 2023-01-01 RX ADMIN — Medication 2: at 01:14

## 2023-01-01 RX ADMIN — Medication 10 MILLIGRAM(S): at 14:12

## 2023-01-01 RX ADMIN — Medication 80 MILLIGRAM(S): at 18:16

## 2023-01-01 RX ADMIN — ADENOSINE 6 MILLIGRAM(S): 3 INJECTION INTRAVENOUS at 21:15

## 2023-01-01 RX ADMIN — Medication 20 MICROGRAM(S): at 22:03

## 2023-01-01 RX ADMIN — DEXMEDETOMIDINE HYDROCHLORIDE IN 0.9% SODIUM CHLORIDE 6.11 MICROGRAM(S)/KG/HR: 4 INJECTION INTRAVENOUS at 20:52

## 2023-01-01 RX ADMIN — Medication 5.73 MICROGRAM(S)/KG/MIN: at 12:05

## 2023-01-01 RX ADMIN — Medication 975 MILLIGRAM(S): at 00:04

## 2023-01-01 RX ADMIN — Medication 11.1 MICROGRAM(S)/KG/MIN: at 22:29

## 2023-01-01 RX ADMIN — HEPARIN SODIUM 5000 UNIT(S): 5000 INJECTION INTRAVENOUS; SUBCUTANEOUS at 22:19

## 2023-01-01 RX ADMIN — FENTANYL CITRATE 5.9 MICROGRAM(S)/KG/HR: 50 INJECTION INTRAVENOUS at 12:05

## 2023-01-01 RX ADMIN — HEPARIN SODIUM 5000 UNIT(S): 5000 INJECTION INTRAVENOUS; SUBCUTANEOUS at 06:30

## 2023-01-01 RX ADMIN — OXYCODONE HYDROCHLORIDE 5 MILLIGRAM(S): 5 TABLET ORAL at 13:38

## 2023-01-01 RX ADMIN — Medication 2 UNIT(S): at 05:56

## 2023-01-01 RX ADMIN — Medication 3 UNIT(S): at 17:42

## 2023-01-01 RX ADMIN — HYDROMORPHONE HYDROCHLORIDE 1 MILLIGRAM(S): 2 INJECTION INTRAMUSCULAR; INTRAVENOUS; SUBCUTANEOUS at 01:32

## 2023-01-01 RX ADMIN — Medication 2 UNIT(S): at 18:17

## 2023-01-01 RX ADMIN — Medication 10 MILLIGRAM(S): at 06:47

## 2023-01-01 RX ADMIN — CHLORHEXIDINE GLUCONATE 15 MILLILITER(S): 213 SOLUTION TOPICAL at 18:04

## 2023-01-01 RX ADMIN — Medication 3 UNIT(S): at 23:08

## 2023-01-01 RX ADMIN — PANTOPRAZOLE SODIUM 40 MILLIGRAM(S): 20 TABLET, DELAYED RELEASE ORAL at 11:33

## 2023-01-01 RX ADMIN — Medication 975 MILLIGRAM(S): at 18:44

## 2023-01-01 RX ADMIN — Medication 1 CAPSULE(S): at 10:49

## 2023-01-01 RX ADMIN — HYDROMORPHONE HYDROCHLORIDE 0.5 MILLIGRAM(S): 2 INJECTION INTRAMUSCULAR; INTRAVENOUS; SUBCUTANEOUS at 21:18

## 2023-01-01 RX ADMIN — Medication 10 MILLIGRAM(S): at 06:03

## 2023-01-01 RX ADMIN — Medication 125 MILLILITER(S): at 19:38

## 2023-01-01 RX ADMIN — HYDROMORPHONE HYDROCHLORIDE 1 MILLIGRAM(S): 2 INJECTION INTRAMUSCULAR; INTRAVENOUS; SUBCUTANEOUS at 01:24

## 2023-01-01 RX ADMIN — MIDODRINE HYDROCHLORIDE 40 MILLIGRAM(S): 2.5 TABLET ORAL at 05:48

## 2023-01-01 RX ADMIN — DEXMEDETOMIDINE HYDROCHLORIDE IN 0.9% SODIUM CHLORIDE 2.95 MICROGRAM(S)/KG/HR: 4 INJECTION INTRAVENOUS at 07:22

## 2023-01-01 RX ADMIN — Medication 2: at 11:55

## 2023-01-01 RX ADMIN — Medication 1 CAPSULE(S): at 18:18

## 2023-01-01 RX ADMIN — ERTAPENEM SODIUM 120 MILLIGRAM(S): 1 INJECTION, POWDER, LYOPHILIZED, FOR SOLUTION INTRAMUSCULAR; INTRAVENOUS at 11:17

## 2023-01-01 RX ADMIN — GABAPENTIN 100 MILLIGRAM(S): 400 CAPSULE ORAL at 14:32

## 2023-01-01 RX ADMIN — Medication 10 MILLIGRAM(S): at 22:33

## 2023-01-01 RX ADMIN — HEPARIN SODIUM 5000 UNIT(S): 5000 INJECTION INTRAVENOUS; SUBCUTANEOUS at 13:36

## 2023-01-01 RX ADMIN — Medication 1 CAPSULE(S): at 05:45

## 2023-01-01 RX ADMIN — MIDODRINE HYDROCHLORIDE 40 MILLIGRAM(S): 2.5 TABLET ORAL at 05:19

## 2023-01-01 RX ADMIN — QUETIAPINE FUMARATE 25 MILLIGRAM(S): 200 TABLET, FILM COATED ORAL at 21:28

## 2023-01-01 RX ADMIN — MEROPENEM 100 MILLIGRAM(S): 1 INJECTION INTRAVENOUS at 05:07

## 2023-01-01 RX ADMIN — DEXMEDETOMIDINE HYDROCHLORIDE IN 0.9% SODIUM CHLORIDE 2.95 MICROGRAM(S)/KG/HR: 4 INJECTION INTRAVENOUS at 20:16

## 2023-01-01 RX ADMIN — HYDROMORPHONE HYDROCHLORIDE 1 MILLIGRAM(S): 2 INJECTION INTRAMUSCULAR; INTRAVENOUS; SUBCUTANEOUS at 06:01

## 2023-01-01 RX ADMIN — INSULIN GLARGINE 14 UNIT(S): 100 INJECTION, SOLUTION SUBCUTANEOUS at 22:58

## 2023-01-01 RX ADMIN — Medication 2 UNIT(S): at 18:05

## 2023-01-01 RX ADMIN — Medication 200 GRAM(S): at 14:15

## 2023-01-01 RX ADMIN — Medication 50 MILLIGRAM(S): at 00:04

## 2023-01-01 RX ADMIN — QUETIAPINE FUMARATE 25 MILLIGRAM(S): 200 TABLET, FILM COATED ORAL at 21:40

## 2023-01-01 RX ADMIN — Medication 50 MILLILITER(S): at 12:00

## 2023-01-01 RX ADMIN — Medication 2: at 00:16

## 2023-01-01 RX ADMIN — Medication 11.1 MICROGRAM(S)/KG/MIN: at 18:35

## 2023-01-01 RX ADMIN — DEXMEDETOMIDINE HYDROCHLORIDE IN 0.9% SODIUM CHLORIDE 8.84 MICROGRAM(S)/KG/HR: 4 INJECTION INTRAVENOUS at 21:43

## 2023-01-01 RX ADMIN — CHLORHEXIDINE GLUCONATE 1 APPLICATION(S): 213 SOLUTION TOPICAL at 11:57

## 2023-01-01 RX ADMIN — CHLORHEXIDINE GLUCONATE 1 APPLICATION(S): 213 SOLUTION TOPICAL at 12:54

## 2023-01-01 RX ADMIN — GABAPENTIN 100 MILLIGRAM(S): 400 CAPSULE ORAL at 13:40

## 2023-01-01 RX ADMIN — FENTANYL CITRATE 5.9 MICROGRAM(S)/KG/HR: 50 INJECTION INTRAVENOUS at 10:29

## 2023-01-01 RX ADMIN — DEXMEDETOMIDINE HYDROCHLORIDE IN 0.9% SODIUM CHLORIDE 6.11 MICROGRAM(S)/KG/HR: 4 INJECTION INTRAVENOUS at 22:40

## 2023-01-01 RX ADMIN — Medication 1 APPLICATION(S): at 05:55

## 2023-01-01 RX ADMIN — HYDROMORPHONE HYDROCHLORIDE 1 MILLIGRAM(S): 2 INJECTION INTRAMUSCULAR; INTRAVENOUS; SUBCUTANEOUS at 02:13

## 2023-01-01 RX ADMIN — MIDODRINE HYDROCHLORIDE 30 MILLIGRAM(S): 2.5 TABLET ORAL at 06:10

## 2023-01-01 RX ADMIN — Medication 15 MILLILITER(S): at 11:54

## 2023-01-01 RX ADMIN — Medication 1 CAPSULE(S): at 15:05

## 2023-01-01 RX ADMIN — HEPARIN SODIUM 5000 UNIT(S): 5000 INJECTION INTRAVENOUS; SUBCUTANEOUS at 14:20

## 2023-01-01 RX ADMIN — KETAMINE HYDROCHLORIDE 20 MILLIGRAM(S): 100 INJECTION INTRAMUSCULAR; INTRAVENOUS at 13:04

## 2023-01-01 RX ADMIN — PHENYLEPHRINE HYDROCHLORIDE 22.1 MICROGRAM(S)/KG/MIN: 10 INJECTION INTRAVENOUS at 08:00

## 2023-01-01 RX ADMIN — Medication 200 GRAM(S): at 02:15

## 2023-01-01 RX ADMIN — MEROPENEM 100 MILLIGRAM(S): 1 INJECTION INTRAVENOUS at 18:13

## 2023-01-01 RX ADMIN — Medication 100 MILLIGRAM(S): at 22:59

## 2023-01-01 RX ADMIN — MIDODRINE HYDROCHLORIDE 40 MILLIGRAM(S): 2.5 TABLET ORAL at 22:14

## 2023-01-01 RX ADMIN — HYDROMORPHONE HYDROCHLORIDE 1 MILLIGRAM(S): 2 INJECTION INTRAMUSCULAR; INTRAVENOUS; SUBCUTANEOUS at 05:29

## 2023-01-01 RX ADMIN — HEPARIN SODIUM 0 UNIT(S)/HR: 5000 INJECTION INTRAVENOUS; SUBCUTANEOUS at 06:46

## 2023-01-01 RX ADMIN — CHLORHEXIDINE GLUCONATE 15 MILLILITER(S): 213 SOLUTION TOPICAL at 05:13

## 2023-01-01 RX ADMIN — GABAPENTIN 100 MILLIGRAM(S): 400 CAPSULE ORAL at 22:56

## 2023-01-01 RX ADMIN — MIDODRINE HYDROCHLORIDE 30 MILLIGRAM(S): 2.5 TABLET ORAL at 14:52

## 2023-01-01 RX ADMIN — HEPARIN SODIUM 1800 UNIT(S)/HR: 5000 INJECTION INTRAVENOUS; SUBCUTANEOUS at 19:33

## 2023-01-01 RX ADMIN — OXYCODONE HYDROCHLORIDE 5 MILLIGRAM(S): 5 TABLET ORAL at 09:30

## 2023-01-01 RX ADMIN — HEPARIN SODIUM 5000 UNIT(S): 5000 INJECTION INTRAVENOUS; SUBCUTANEOUS at 13:10

## 2023-01-01 RX ADMIN — Medication 975 MILLIGRAM(S): at 17:52

## 2023-01-01 RX ADMIN — Medication 10 MILLIGRAM(S): at 23:11

## 2023-01-01 RX ADMIN — Medication 20 MICROGRAM(S): at 00:03

## 2023-01-01 RX ADMIN — SODIUM CHLORIDE 1000 MILLILITER(S): 9 INJECTION, SOLUTION INTRAVENOUS at 07:35

## 2023-01-01 RX ADMIN — HYDROMORPHONE HYDROCHLORIDE 0.5 MILLIGRAM(S): 2 INJECTION INTRAMUSCULAR; INTRAVENOUS; SUBCUTANEOUS at 11:07

## 2023-01-01 RX ADMIN — Medication 1 CAPSULE(S): at 09:02

## 2023-01-01 RX ADMIN — Medication 1 CAPSULE(S): at 11:32

## 2023-01-01 RX ADMIN — Medication 1 APPLICATION(S): at 06:00

## 2023-01-01 RX ADMIN — HYDROMORPHONE HYDROCHLORIDE 1 MILLIGRAM(S): 2 INJECTION INTRAMUSCULAR; INTRAVENOUS; SUBCUTANEOUS at 03:44

## 2023-01-01 RX ADMIN — PIPERACILLIN AND TAZOBACTAM 200 GRAM(S): 4; .5 INJECTION, POWDER, LYOPHILIZED, FOR SOLUTION INTRAVENOUS at 22:24

## 2023-01-01 RX ADMIN — CHLORHEXIDINE GLUCONATE 15 MILLILITER(S): 213 SOLUTION TOPICAL at 18:37

## 2023-01-01 RX ADMIN — Medication 975 MILLIGRAM(S): at 05:16

## 2023-01-01 RX ADMIN — GABAPENTIN 100 MILLIGRAM(S): 400 CAPSULE ORAL at 14:46

## 2023-01-01 RX ADMIN — MIDODRINE HYDROCHLORIDE 40 MILLIGRAM(S): 2.5 TABLET ORAL at 14:43

## 2023-01-01 RX ADMIN — CHLORHEXIDINE GLUCONATE 1 APPLICATION(S): 213 SOLUTION TOPICAL at 11:38

## 2023-01-01 RX ADMIN — SODIUM CHLORIDE 50 MILLILITER(S): 9 INJECTION, SOLUTION INTRAVENOUS at 12:11

## 2023-01-01 RX ADMIN — OXYCODONE HYDROCHLORIDE 10 MILLIGRAM(S): 5 TABLET ORAL at 13:55

## 2023-01-01 RX ADMIN — Medication 10 MILLIGRAM(S): at 12:06

## 2023-01-01 RX ADMIN — Medication 10 MILLIGRAM(S): at 06:40

## 2023-01-01 RX ADMIN — INSULIN GLARGINE 9 UNIT(S): 100 INJECTION, SOLUTION SUBCUTANEOUS at 00:14

## 2023-01-01 RX ADMIN — Medication 1 CAPSULE(S): at 22:21

## 2023-01-01 RX ADMIN — Medication 2 UNIT(S): at 11:39

## 2023-01-01 RX ADMIN — CHLORHEXIDINE GLUCONATE 15 MILLILITER(S): 213 SOLUTION TOPICAL at 05:20

## 2023-01-01 RX ADMIN — Medication 100 MILLIEQUIVALENT(S): at 02:25

## 2023-01-01 RX ADMIN — Medication 1 CAPSULE(S): at 13:07

## 2023-01-01 RX ADMIN — ETOMIDATE 10 MILLIGRAM(S): 2 INJECTION INTRAVENOUS at 10:35

## 2023-01-01 RX ADMIN — Medication 10 MILLIGRAM(S): at 00:02

## 2023-01-01 RX ADMIN — Medication 80 MILLIGRAM(S): at 16:47

## 2023-01-01 RX ADMIN — HEPARIN SODIUM 5000 UNIT(S): 5000 INJECTION INTRAVENOUS; SUBCUTANEOUS at 21:49

## 2023-01-01 RX ADMIN — Medication 2 UNIT(S): at 11:45

## 2023-01-01 RX ADMIN — PANTOPRAZOLE SODIUM 40 MILLIGRAM(S): 20 TABLET, DELAYED RELEASE ORAL at 11:23

## 2023-01-01 RX ADMIN — Medication 102 MILLIGRAM(S): at 10:16

## 2023-01-01 RX ADMIN — Medication 4: at 06:03

## 2023-01-01 RX ADMIN — Medication 5.73 MICROGRAM(S)/KG/MIN: at 19:34

## 2023-01-01 RX ADMIN — HYDROMORPHONE HYDROCHLORIDE 1 MILLIGRAM(S): 2 INJECTION INTRAMUSCULAR; INTRAVENOUS; SUBCUTANEOUS at 22:23

## 2023-01-01 RX ADMIN — Medication 60 MILLIGRAM(S): at 20:56

## 2023-01-01 RX ADMIN — HYDROMORPHONE HYDROCHLORIDE 1 MILLIGRAM(S): 2 INJECTION INTRAMUSCULAR; INTRAVENOUS; SUBCUTANEOUS at 19:50

## 2023-01-01 RX ADMIN — Medication 10 MILLIGRAM(S): at 13:51

## 2023-01-01 RX ADMIN — Medication 20 MICROGRAM(S): at 00:26

## 2023-01-01 RX ADMIN — HYDROMORPHONE HYDROCHLORIDE 1 MILLIGRAM(S): 2 INJECTION INTRAMUSCULAR; INTRAVENOUS; SUBCUTANEOUS at 02:12

## 2023-01-01 RX ADMIN — MIDODRINE HYDROCHLORIDE 40 MILLIGRAM(S): 2.5 TABLET ORAL at 05:53

## 2023-01-01 RX ADMIN — Medication 10 MILLIGRAM(S): at 14:47

## 2023-01-01 RX ADMIN — OXANDROLONE 5 MILLIGRAM(S): 10 TABLET ORAL at 05:53

## 2023-01-01 RX ADMIN — MEROPENEM 100 MILLIGRAM(S): 1 INJECTION INTRAVENOUS at 18:00

## 2023-01-01 RX ADMIN — Medication 650 MILLIGRAM(S): at 19:21

## 2023-01-01 RX ADMIN — GABAPENTIN 100 MILLIGRAM(S): 400 CAPSULE ORAL at 21:42

## 2023-01-01 RX ADMIN — Medication 2: at 05:34

## 2023-01-01 RX ADMIN — HEPARIN SODIUM 5000 UNIT(S): 5000 INJECTION INTRAVENOUS; SUBCUTANEOUS at 21:31

## 2023-03-22 NOTE — ED PROVIDER NOTE - PHYSICAL EXAMINATION
VITAL SIGNS: I have reviewed nursing notes and confirm.  CONSTITUTIONAL: well-appearing, non-toxic  SKIN: Warm dry, normal skin turgor  HEAD: NCAT  EYES: EOMI, PERRLA, no scleral icterus  ENT: Moist mucous membranes, normal pharynx with no erythema or exudates  NECK: Supple; non tender. Full ROM. No cervical LAD  CARD: RRR, no murmurs, rubs or gallops  RESP: clear to ausculation b/l.  No rales, rhonchi, or wheezing.  ABD: soft, + BS, non-tender, non-distended, no rebound or guarding. No CVA tenderness  EXT: Full ROM, no bony tenderness, no pedal edema, no calf tenderness  PSYCH: Cooperative, appropriate.

## 2023-03-22 NOTE — ED ADULT NURSE NOTE - ED STAT RN HANDOFF DETAILS
Report given to Maame on 2E, nurse made aware of heparin drip infusing at 2200 units/hr, no results noted for PTT for extended period of time, Franny RN  made aware and lab called to redraw sample, Maame RN made aware of elevated lactate levels x 2 in ED and lab to redraw

## 2023-03-22 NOTE — ED PROVIDER NOTE - DISPOSITION TYPE
ADMIT Odomzo Counseling- I discussed with the patient the risks of Odomzo including but not limited to nausea, vomiting, diarrhea, constipation, weight loss, changes in the sense of taste, decreased appetite, muscle spasms, and hair loss.  The patient verbalized understanding of the proper use and possible adverse effects of Odomzo.  All of the patient's questions and concerns were addressed.

## 2023-03-22 NOTE — ED PROVIDER NOTE - PROGRESS NOTE DETAILS
pt signed out to me by Dr. turcios. still complainig of pain, dilaudid given. pending ICU and surgery consult    per ICU, pt can go to medicine or surg with tele, no indication for ICU  per surgery, can continue heparin and admit to their service

## 2023-03-22 NOTE — ED PROVIDER NOTE - ATTENDING CONTRIBUTION TO CARE
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  Patient noted to be hypertensive with severe pancreatitis with downtrending lactate, will place ICU consult pending ICU evaluation, for further disposition

## 2023-03-22 NOTE — PHARMACOTHERAPY INTERVENTION NOTE - COMMENTS
Spoke to MD and suggested to reduce IV dose to 2.5mg IV for cyclic vomiting. MD wants to continue with 5mg IVP of haldol. No EKG changes per MD.

## 2023-03-22 NOTE — ED PROVIDER NOTE - CLINICAL SUMMARY MEDICAL DECISION MAKING FREE TEXT BOX
Possible cannabis hyperemesis syndrome, abdomen soft nondistended benign exam nonlocalizing, will send screening abdominal labs IV fluids pain control and reassess Possible cannabis hyperemesis syndrome, abdomen soft nondistended benign exam nonlocalizing, will send screening abdominal labs IV fluids pain control and reassess    Lipase significantly elevated given patient's body habitus possible gallstone pancreatitis we will obtain ultrasound of right upper quadrant to rule out choledocholithiasis versus cholecystitis gallstone pancreatitis, CT abdomen pelvis for further characterization of pancreatitis and to be admitted pending imaging surgical consult as indicated Possible cannabis hyperemesis syndrome, abdomen soft nondistended benign exam nonlocalizing, will send screening abdominal labs IV fluids pain control and reassess    Lipase significantly elevated given patient's body habitus possible gallstone pancreatitis we will obtain ultrasound of right upper quadrant to rule out choledocholithiasis versus cholecystitis gallstone pancreatitis, CT abdomen pelvis for further characterization of pancreatitis and to be admitted pending imaging surgical consult as indicated    Patient noted with acute necrotizing pancreatitis with 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  Patient noted to be hypertensive with severe pancreatitis with downtrending lactate, will place ICU consult pending ICU evaluation, for further disposition

## 2023-03-22 NOTE — ED ADULT NURSE NOTE - OBJECTIVE STATEMENT
48 yr old female complaining of abdominal pain for 3 hours after eating pizza. Accompanied with N/V/D. Accompanied by son.

## 2023-03-22 NOTE — ED PROVIDER NOTE - OBJECTIVE STATEMENT
48-year-old female with history of hypertension presenting to the ED with complaints of diffuse abdominal pain after eating pizza at 3 hours ago accompanied with nonbloody nonbilious emesis with nausea without associated diarrhea.  Patient denies any fever chills cough congestion chest pain shortness of breath or other complaints.  History of previous hysterectomy patient also admits to smoking marijuana.

## 2023-03-23 NOTE — CONSULT NOTE ADULT - SUBJECTIVE AND OBJECTIVE BOX
48y  Female  No Known Allergies    Patient is a 48y old  Female who presents with a chief complaint of   HPI:    PAST MEDICAL & SURGICAL HISTORY:  Hypertension  Hypothyroidism    Vital Signs Last 24 Hrs  T(C): 36.6 (22 Mar 2023 20:40), Max: 36.6 (22 Mar 2023 20:40)  T(F): 97.8 (22 Mar 2023 20:40), Max: 97.8 (22 Mar 2023 20:40)  HR: 100 (22 Mar 2023 22:46) (100 - 115)  BP: 144/83 (22 Mar 2023 22:46) (144/83 - 216/133)  BP(mean): --  RR: 16 (22 Mar 2023 20:40) (16 - 20)  SpO2: 100% (22 Mar 2023 22:46) (98% - 100%)    Parameters below as of 22 Mar 2023 22:46  Patient On (Oxygen Delivery Method): room air    LABS      137  |  101  |  12  ----------------------------<  204<H>  3.0<L>   |  22  |  1.25  Ca    9.7      22 Mar 2023 18:27  TPro  8.8<H>  /  Alb  4.0  /  TBili  0.4  /  DBili  x   /  AST  34  /  ALT  37  /  AlkPhos  131<H>                          16.3   18.37 )-----------( 369      ( 22 Mar 2023 18:27 )             50.4     LIVER FUNCTIONS - ( 22 Mar 2023 18:27 )  Alb: 4.0 g/dL / Pro: 8.8 gm/dL / ALK PHOS: 131 U/L / ALT: 37 U/L / AST: 34 U/L / GGT: x           Urinalysis Basic - ( 22 Mar 2023 23:00 )  Color: Yellow / Appearance: Clear / S.015 / pH: x  Gluc: x / Ketone: Small  / Bili: Negative / Urobili: Negative mg/dL   Blood: x / Protein: 30 mg/dL / Nitrite: Positive   Leuk Esterase: Negative / RBC: 0-2 /HPF / WBC 0-2   Sq Epi: x / Non Sq Epi: Moderate / Bacteria: Few    MEDICATIONS  (STANDING):  heparin  Infusion.  Unit(s)/Hr (22 mL/Hr) IV Continuous <Continuous>  lactated ringers Bolus 1000 milliLiter(s) IV Bolus once      REVIEW OF SYSTEMS:    CONSTITUTIONAL: No fever, weight loss, or fatigue  EYES: No eye pain, visual disturbances, or discharge  ENMT:  No difficulty hearing, tinnitus, vertigo; No sinus or throat pain  NECK: No pain or stiffness  BREASTS: No pain, masses, or nipple discharge  RESPIRATORY: No cough, wheezing, chills or hemoptysis; No shortness of breath  CARDIOVASCULAR: No chest pain, palpitations, dizziness, or leg swelling  GASTROINTESTINAL: No abdominal or epigastric pain. No nausea, vomiting, or hematemesis; No diarrhea or constipation. No melena or hematochezia.  GENITOURINARY: No dysuria, frequency, hematuria, or incontinence  NEUROLOGICAL: No headaches, memory loss, loss of strength, numbness, or tremors  SKIN: No itching, burning, rashes, or lesions   LYMPH NODES: No enlarged glands  ENDOCRINE: No heat or cold intolerance; No hair loss  MUSCULOSKELETAL: No joint pain or swelling; No muscle, back, or extremity pain  PSYCHIATRIC: No depression, anxiety, mood swings, or difficulty sleeping  HEME/LYMPH: No easy bruising, or bleeding gums  ALLERY AND IMMUNOLOGIC: No hives or eczema      Physicial Exam:     Constitutional: NAD, well-groomed, well-developed  HEENT: PERRLA, EOMI, no drainage or redness  Neck: No bruits; no thyromegaly or nodules,  No JVD  Back: Normal spine flexure, No CVA tenderness, No deformity or limitation of movement  Respiratory: Breath Sounds equal & clear to percussion & auscultation, no accessory muscle use  Cardiovascular: Regular rate & rhythm, normal S1, S2; no murmurs, gallops or rubs; no S3, S4  Gastrointestinal: Soft, non-tender, non distended no hepatosplenomegaly, normal bowel sounds  Extremities: No peripheral edema, No cyanosis, clubbing   Vascular: Equal and normal pulses: 2+ peripheral pulses throughout  Neurological: GCS:    A&O x 3; no sensory, motor  deficits, normal reflexes  Psychiatric: Normal mood, normal affect  Musculoskeletal: No joint pain, swelling or deformity; no limitation of movement  Skin: No rashes             48y  Female  No Known Allergies    CC; Patient is a 48y old  Female who presents with a chief complaint of abdominal pain     HPI: 48 year-old female with history of HTN hypothyroidism, breast ca s/p left mastectomy and chemo 2008  who presented to ER complaining of diffuse abdominal pain after eating pizza at 3 hours ago accompanied with nonbloody vomiting  Patient denies any fever chills cough congestion chest pain shortness of breath or other complaints. Ct results found acute necrotizing pancreatitis, Focal filling defect in the splenic vein at the level of the body of the pancreas , underlying thrombus is suspected.        PAST MEDICAL & SURGICAL HISTORY:  Hypertension  Hypothyroidism    Vital Signs Last 24 Hrs  T(C): 36.6 (22 Mar 2023 20:40), Max: 36.6 (22 Mar 2023 20:40)  T(F): 97.8 (22 Mar 2023 20:40), Max: 97.8 (22 Mar 2023 20:40)  HR: 100 (22 Mar 2023 22:46) (100 - 115)  BP: 144/83 (22 Mar 2023 22:46) (144/83 - 216/133)  BP(mean): --  RR: 16 (22 Mar 2023 20:40) (16 - 20)  SpO2: 100% (22 Mar 2023 22:46) (98% - 100%)    Parameters below as of 22 Mar 2023 22:46  Patient On (Oxygen Delivery Method): room air    LABS      137  |  101  |  12  ----------------------------<  204<H>  3.0<L>   |    |  1.25  Ca    9.7      22 Mar 2023 18:27  TPro  8.8<H>  /  Alb  4.0  /  TBili  0.4  /  DBili  x   /  AST  34  /  ALT  37  /  AlkPhos  131<H>                          16.3   18.37 )-----------( 369      ( 22 Mar 2023 18:27 )             50.4     LIVER FUNCTIONS - ( 22 Mar 2023 18:27 )  Alb: 4.0 g/dL / Pro: 8.8 gm/dL / ALK PHOS: 131 U/L / ALT: 37 U/L / AST: 34 U/L / GGT: x           Urinalysis Basic - ( 22 Mar 2023 23:00 )  Color: Yellow / Appearance: Clear / S.015 / pH: x  Gluc: x / Ketone: Small  / Bili: Negative / Urobili: Negative mg/dL   Blood: x / Protein: 30 mg/dL / Nitrite: Positive   Leuk Esterase: Negative / RBC: 0-2 /HPF / WBC 0-2   Sq Epi: x / Non Sq Epi: Moderate / Bacteria: Few    MEDICATIONS  (STANDING):  heparin  Infusion.  Unit(s)/Hr (22 mL/Hr) IV Continuous <Continuous>  lactated ringers Bolus 1000 milliLiter(s) IV Bolus once      REVIEW OF SYSTEMS:    CONSTITUTIONAL: No fever, weight loss, or fatigue  EYES: No eye pain, visual disturbances, or discharge  ENMT:  No difficulty hearing, tinnitus, vertigo; No sinus or throat pain  NECK: No pain or stiffness  BREASTS: No pain, masses, or nipple discharge  RESPIRATORY: No cough, wheezing, chills or hemoptysis; No shortness of breath  CARDIOVASCULAR: No chest pain, palpitations, dizziness, or leg swelling  GASTROINTESTINAL: No abdominal or epigastric pain. No nausea, vomiting, or hematemesis; No diarrhea or constipation. No melena or hematochezia.  GENITOURINARY: No dysuria, frequency, hematuria, or incontinence  NEUROLOGICAL: No headaches, memory loss, loss of strength, numbness, or tremors  SKIN: No itching, burning, rashes, or lesions   LYMPH NODES: No enlarged glands  ENDOCRINE: No heat or cold intolerance; No hair loss  MUSCULOSKELETAL: No joint pain or swelling; No muscle, back, or extremity pain  PSYCHIATRIC: No depression, anxiety, mood swings, or difficulty sleeping  HEME/LYMPH: No easy bruising, or bleeding gums  ALLERY AND IMMUNOLOGIC: No hives or eczema      Physicial Exam:     Constitutional: NAD, well-groomed, well-developed  HEENT: PERRLA, EOMI, no drainage or redness  Neck: No bruits; no thyromegaly or nodules,  No JVD  Back: Normal spine flexure, No CVA tenderness, No deformity or limitation of movement  Respiratory: Breath Sounds equal & clear to percussion & auscultation, no accessory muscle use  Cardiovascular: Regular rate & rhythm, normal S1, S2; no murmurs, gallops or rubs; no S3, S4  Gastrointestinal: Soft, non-tender, non distended no hepatosplenomegaly, normal bowel sounds  Extremities: No peripheral edema, No cyanosis, clubbing   Vascular: Equal and normal pulses: 2+ peripheral pulses throughout  Neurological: GCS:    A&O x 3; no sensory, motor  deficits, normal reflexes  Psychiatric: Normal mood, normal affect  Musculoskeletal: No joint pain, swelling or deformity; no limitation of movement  Skin: No rashes

## 2023-03-23 NOTE — H&P ADULT - NSHPREVIEWOFSYSTEMS_GEN_ALL_CORE
REVIEW OF SYSTEMS:  CONSTITUTIONAL: No fever, weight loss, or fatigue  EYES: No eye pain, visual disturbances, discharge  ENMT:  No difficulty hearing, tinnitus, vertigo; No sinus or throat pain  NECK: No pain or stiffness  BREASTS: No pain, masses, or nipple discharge  RESPIRATORY: No cough, wheezing, chills or hemoptysis; No shortness of breath  CARDIOVASCULAR: No chest pain, palpitations, dizziness, or leg swelling  GASTROINTESTINAL: +abdominal pain, nausea, vomiting. No hematemesis; No diarrhea or constipation. No melena or hematochezia.  GENITOURINARY: No dysuria, frequency, hematuria, or incontinence  NEUROLOGICAL: No headaches, memory loss, loss of strength, numbness, or tremors  SKIN: No itching, burning, rashes, or lesions   LYMPH NODES: No enlarged glands  ENDOCRINE: No heat or cold intolerance; No hair loss  MUSCULOSKELETAL: No joint pain or swelling; No muscle, back, or extremity pain  PSYCHIATRIC: No depression, anxiety, mood swings, or difficulty sleeping  HEME/LYMPH: No easy bruising, or bleeding gums  ALLERY AND IMMUNOLOGIC: No hives or eczema

## 2023-03-23 NOTE — H&P ADULT - PROBLEM SELECTOR PLAN 2
Medicine consulted by ER Medicine consulted by ER.    Addendum: Hypertensive urgency discussed with medicine who recommended Labetalol 20 IV push and ICU consult for possible drip. Discussed with ICU who will re-eval the patient.

## 2023-03-23 NOTE — H&P ADULT - NSHPPHYSICALEXAM_GEN_ALL_CORE
PHYSICAL EXAM:  CONSTITUTIONAL: NAD, well-developed  HEAD:  Atraumatic, Normocephalic  EYES: Conjunctiva and sclera clear  ENMT: No tonsillar erythema, exudates, or enlargement; Moist mucous membranes, No lesions  NECK: Supple, No JVD, Normal thyroid  NERVOUS SYSTEM:  Alert & Oriented X3  RESPIRATORY: Clear to auscultation bilaterally; No rales, rhonchi, wheezing  CARDIOVASCULAR: Regular rate and rhythm. S1S2  GASTROINTESTINAL: Nondistended, +BS, soft, upper abdominal tenderness, no guarding, no rigidity   MUSCULOSKELETAL: 2+ Peripheral Pulses, No clubbing, cyanosis, or edema

## 2023-03-23 NOTE — PROVIDER CONTACT NOTE (CRITICAL VALUE NOTIFICATION) - ACTION/TREATMENT ORDERED:
lactic treading down pt already being treated
Stop Heparin,  next PTT in 6 hours, to follow heparin protocol

## 2023-03-23 NOTE — PATIENT PROFILE ADULT - DO YOU FEEL THREATENED BY OTHERS?
Patient should continue monitoring blood sugar and bring results to follow-up appointment on 03/09/2017.  Patient should call office sooner with any questions or concerns.    no

## 2023-03-23 NOTE — H&P ADULT - NS ATTEND AMEND GEN_ALL_CORE FT
Patient seen and examined with PA  Labs and imaging reviewed  Necrotizing pancreatitis, etiology likely cholelithiasis    Admit, IV fluids resuscitation, bowel rest, pain control  Discussed with patient regarding management plan for pancreatitis  Will benefit from eventual cholecystectomy, however, given amount of necrosis, will likely not be done this admission Patient seen and examined with PA  Labs and imaging reviewed  Necrotizing pancreatitis, etiology likely cholelithiasis    Admit, IV fluids resuscitation, bowel rest, pain control.  Hep gtt for thrombus  Discussed with patient regarding management plan for pancreatitis  Will benefit from eventual cholecystectomy, however, given amount of necrosis, will likely not be done this admission

## 2023-03-23 NOTE — H&P ADULT - HISTORY OF PRESENT ILLNESS
49 y/o female PMHx BMI:44.9, HTN, hypothyroidism,  30 years ago, breast ca s/p left mastectomy and chemo  presents c/o epigastric/LUQ pain a few hours after eating pizza yesterday. Had multiple episodes of NBNB emesis. Patient denies fever, chills, constipation, diarrhea, melena, hematochezia, dysuria, hematuria, chest pain, shortness of breath, dizziness, cough. Pt seen and examined with Dr. Andrade.

## 2023-03-23 NOTE — H&P ADULT - PROBLEM SELECTOR PLAN 1
-Admit  -NPO, IV hydration  -No need for abx  -No cholecystectomy planning this admission  -F/u AM labs  -Per ICU consult: No need for ICU management at this time.  -Discussed with Dr. Andrade.

## 2023-03-23 NOTE — CHART NOTE - NSCHARTNOTEFT_GEN_A_CORE
Notified by RN Maame of critical aPTT >200, patient on heparin gtt for splenic vein thrombosis.  No reports of bleed.  RN aware to hold/adjust rate per normogram protocol.  Surgery notified and in agreement.

## 2023-03-23 NOTE — CONSULT NOTE ADULT - SUBJECTIVE AND OBJECTIVE BOX
HPI:  47 y/o female PMHx BMI:44.9, HTN, hypothyroidism,  30 years ago, breast ca s/p left mastectomy and chemo  presents c/o epigastric/LUQ pain a few hours after eating pizza yesterday. Had multiple episodes of NBNB emesis. Patient denies fever, chills, constipation, diarrhea, melena, hematochezia, dysuria, hematuria, chest pain, shortness of breath, dizziness, cough. CT scan shows acute necrotizing pancreatitis         PAST MEDICAL & SURGICAL HISTORY:  Hypertension      Hypothyroidism      Breast cancer      S/P       H/O left mastectomy          REVIEW OF SYSTEMS:    CONSTITUTIONAL: No fever, weight loss, or fatigue  EYES: No eye pain, visual disturbances, or discharge  ENMT:  No difficulty hearing, tinnitus, vertigo; No sinus or throat pain  NECK: No pain or stiffness  BREASTS: No pain, masses, or nipple discharge  RESPIRATORY: No cough, wheezing, chills or hemoptysis; No shortness of breath  CARDIOVASCULAR: No chest pain, palpitations, dizziness, or leg swelling  GASTROINTESTINAL:No diarrhea or constipation. No melena or hematochezia.  GENITOURINARY: No dysuria, frequency, hematuria, or incontinence  NEUROLOGICAL: No headaches, memory loss, loss of strength, numbness, or tremors  SKIN: No itching, burning, rashes, or lesions   LYMPH NODES: No enlarged glands  ENDOCRINE: No heat or cold intolerance; No hair loss  MUSCULOSKELETAL: No joint pain or swelling; No muscle, back, or extremity pain  PSYCHIATRIC: No depression, anxiety, mood swings, or difficulty sleeping  HEME/LYMPH: No easy bruising, or bleeding gums  ALLERGY AND IMMUNOLOGIC: No hives or eczema      MEDICATIONS  (STANDING):  heparin  Infusion.  Unit(s)/Hr (22 mL/Hr) IV Continuous <Continuous>  lactated ringers. 1000 milliLiter(s) (170 mL/Hr) IV Continuous <Continuous>  levothyroxine Injectable 12.5 MICROGram(s) IV Push at bedtime  lisinopril 20 milliGRAM(s) Oral daily    MEDICATIONS  (PRN):  heparin   Injectable 5000 Unit(s) IV Push every 6 hours PRN For aPTT between 40 - 57  HYDROmorphone  Injectable 1 milliGRAM(s) IV Push once PRN breakthrough pain  morphine  - Injectable 4 milliGRAM(s) IV Push every 4 hours PRN Severe Pain (7 - 10)  ondansetron Injectable 4 milliGRAM(s) IV Push every 6 hours PRN Nausea      Allergies    No Known Allergies    Intolerances        SOCIAL HISTORY:    FAMILY HISTORY:  FH: myocardial infarction        Vital Signs Last 24 Hrs  T(C): 36.3 (23 Mar 2023 11:42), Max: 36.7 (23 Mar 2023 03:45)  T(F): 97.4 (23 Mar 2023 11:42), Max: 98 (23 Mar 2023 03:45)  HR: 110 (23 Mar 2023 11:42) (96 - 123)  BP: 131/95 (23 Mar 2023 11:42) (130/100 - 226/135)  BP(mean): --  RR: 17 (23 Mar 2023 11:42) (12 - 44)  SpO2: 99% (23 Mar 2023 11:42) (98% - 100%)    Parameters below as of 23 Mar 2023 11:42  Patient On (Oxygen Delivery Method): nasal cannula  O2 Flow (L/min): 2      PHYSICAL EXAM:    GENERAL: NAD, well-groomed, well-developed  HEAD:  Atraumatic, Normocephalic  EYES: EOMI, PERRLA, conjunctiva and sclera clear  ENMT: No tonsillar erythema, exudates, or enlargement; Moist mucous membranes, Good dentition, No lesions  NECK: Supple, No JVD, Normal thyroid  NERVOUS SYSTEM:  Alert & Oriented X3, Good concentration; Motor Strength 5/5 B/L upper and lower extremities; DTRs 2+ intact and symmetric  CHEST/LUNG: Clear to percussion bilaterally; No rales, rhonchi, wheezing, or rubs  HEART: Regular rate and rhythm; No murmurs, rubs, or gallops  ABDOMEN: Soft, epigastric tenderness, Bowel sounds present  EXTREMITIES:  2+ Peripheral Pulses, No clubbing, cyanosis, or edema  SKIN: No rashes or lesions      LABS:                        16.8   11.53 )-----------( 308      ( 23 Mar 2023 05:55 )             53.0         136  |  103  |  12  ----------------------------<  291<H>  5.3   |  26  |  1.24    Ca    8.8      23 Mar 2023 05:55  Phos  3.6       Mg     1.7         TPro  8.1  /  Alb  3.7  /  TBili  0.3  /  DBili  0.2  /  AST  29  /  ALT  32  /  AlkPhos  121<H>      PT/INR - ( 23 Mar 2023 11:07 )   PT: 18.9 sec;   INR: 1.57 ratio         PTT - ( 23 Mar 2023 11:07 )  PTT:>200.0 sec  Urinalysis Basic - ( 22 Mar 2023 23:00 )    Color: Yellow / Appearance: Clear / S.015 / pH: x  Gluc: x / Ketone: Small  / Bili: Negative / Urobili: Negative mg/dL   Blood: x / Protein: 30 mg/dL / Nitrite: Positive   Leuk Esterase: Negative / RBC: 0-2 /HPF / WBC 0-2   Sq Epi: x / Non Sq Epi: Moderate / Bacteria: Few        RADIOLOGY & ADDITIONAL STUDIES:

## 2023-03-23 NOTE — PATIENT PROFILE ADULT - FALL HARM RISK - HARM RISK INTERVENTIONS

## 2023-03-23 NOTE — ED ADULT NURSE REASSESSMENT NOTE - NS ED NURSE REASSESS COMMENT FT1
PEPE Jackson aware of Vital signs, will reassess ICU admission and order medication for BP.
Patient remains alert and oriented /115. Denies headache, blurry vision, CP or dizziness at this time. MD made aware
aPTT unable to be performed as per Lab, Endorsing to oncoming RN.
Lab at bedside drawing lactate and PTT. Patient A&Ox4, made aware of plan of care and NPO status.

## 2023-03-23 NOTE — H&P ADULT - NSHPLABSRESULTS_GEN_ALL_CORE
16.3   18.37 )-----------( 369      ( 22 Mar 2023 18:27 )             50.4   03-22    137  |  101  |  12  ----------------------------<  204<H>  3.0<L>   |  22  |  1.25    Ca    9.7      22 Mar 2023 18:27    TPro  8.8<H>  /  Alb  4.0  /  TBili  0.4  /  DBili  x   /  AST  34  /  ALT  37  /  AlkPhos  131<H>  03-22    < from: CT Abdomen and Pelvis w/ IV Cont (03.22.23 @ 21:20) >    FINDINGS:  LOWER CHEST: Right basilar subsegmental atelectasis. Partially visualized   left breast implant.    LIVER: Focal fat adjacent to the falciform ligament.  BILE DUCTS: Normal caliber.  GALLBLADDER: Within normal limits.  SPLEEN: Within normal limits.  PANCREAS: Edema, enlargement and poor enhancement of nearly the entire   pancreas with some preservation of the distal tail and pancreatic head   and with peripancreatic inflammatory change and fluid which extends into   the mesentery and inferiorly along the retroperitoneum into the lower   abdomen compatible with an acute necrotizing pancreatitis. No walled off   collection.  ADRENALS: Within normal limits.  KIDNEYS/URETERS: Within normal limits.    BLADDER: Within normal limits.  REPRODUCTIVE ORGANS: Uterus and ovaries within normal limits.    BOWEL: No bowel obstruction or inflammation. Scattered colonic   diverticulosis without diverticulitis. Appendix is normal.  PERITONEUM: Small volume abdominal and pelvic free fluid.  VESSELS: Focal filling defect in the splenic vein at the level of the   body of the pancreas for which an underlying thrombus is suspected. Focal   dilatation of a vein in the region of the neck/body of the pancreas.  RETROPERITONEUM/LYMPH NODES: No lymphadenopathy.  ABDOMINAL WALL: Within normal limits.  BONES: Within normal limits.    IMPRESSION:  1. Acute necrotizing pancreatitis involving greater than 50% of the   pancreas.  2. Focal filling defect in the splenic vein at the level of the body of   the pancreas for which an underlying thrombus is suspected.    < end of copied text >    < from: US Abdomen Upper Quadrant Right (03.22.23 @ 20:14) >    FINDINGS:  Liver: The liver is normal in size. It is mildly increased in   echogenicity consistent with mild hepatic steatosis. No focal hepatic   lesions are identified.  Bile ducts: Normal caliber. Common bile duct measures 3 mm.  Gallbladder: There is 2 cm gallstone. There is no gallbladder wall   thickening. There is no sonographic Sahni sign.  Pancreas: Poorly visualized.  Right kidney: 9.8 cm. No hydronephrosis.  Ascites: Trace amount of fluid in Aponte's pouch.  IVC: Visualized portions are within normal limits.    IMPRESSION:  Cholelithiasis. No ultrasound evidence of acute cholecystitis.    Mild hepatic steatosis    Trace amount of fluid in Morison's pouch.    Limited visualization of the pancreas.    < end of copied text >

## 2023-03-23 NOTE — H&P ADULT - ASSESSMENT
47 y/o female PMHx BMI:44.9, HTN, hypothyroidism,  30 years ago, breast ca s/p left mastectomy and chemo  presents c/o epigastric/LUQ pain a few hours after eating pizza yesterday. Admitted with Acute necrotizing pancreatitis involving greater than 50% of the pancreas, Focal filling defect in the splenic vein at the level of the body of the pancreas for which an underlying thrombus is suspected.

## 2023-03-23 NOTE — ED ADULT NURSE REASSESSMENT NOTE - COMFORT CARE
darkened lights/plan of care explained/repositioned/treatment delay explained/wait time explained
plan of care explained
plan of care explained

## 2023-03-23 NOTE — ED ADULT NURSE REASSESSMENT NOTE - SYMPTOMS
Called patient for follow up phone call per request on letter. Patient denies having questions or concerns at this time.   
abdominal pain
abdominal pain

## 2023-03-24 PROBLEM — Z00.00 ENCOUNTER FOR PREVENTIVE HEALTH EXAMINATION: Status: ACTIVE | Noted: 2023-01-01

## 2023-03-24 NOTE — PROCEDURE NOTE - NSPROCNAME_GEN_A_CORE
Feeding Tube Placement
Peripheral Line Insertion
Tracheal Intubation
Peripheral Line Insertion
Arterial Puncture/Cannulation
Central Line Insertion

## 2023-03-24 NOTE — H&P ADULT - NSHPLABSRESULTS_GEN_ALL_CORE
9.9    18.56 )-----------( 217      ( 24 Mar 2023 16:31 )             31.0   03-24    139  |  102  |  40<H>  ----------------------------<  293<H>  5.5<H>   |  12<L>  |  4.60<H>    Ca    7.2<L>      24 Mar 2023 16:31  Phos  5.7     03-24  Mg     1.80     03-24    TPro  5.2<L>  /  Alb  3.0<L>  /  TBili  1.2  /  DBili  x   /  AST  4730<H>  /  ALT  2794<H>  /  AlkPhos  84  03-24  ABG - ( 24 Mar 2023 13:54 )  pH, Arterial: 7.27  pH, Blood: x     /  pCO2: 35    /  pO2: 264   / HCO3: 16    / Base Excess: -9.9  /  SaO2: 100.0

## 2023-03-24 NOTE — PROCEDURE NOTE - PRACTITIONER PERFORMING THE TIME OUT
Myself with nursing staff

## 2023-03-24 NOTE — CONSULT NOTE ADULT - ASSESSMENT
47 y/o female PMHx BMI:44.9, HTN, hypothyroidism,  30 years ago, breast ca s/p left mastectomy and chemo  presents with acute necrotizing pancreatitis and splenic vein thrombosis     sepsis poa 2/2 necrotizing pancreatitis   - care per surgery   - no walled off collection seen on imaging. abx not started     htn   - will dc lisinopril as its a known cause of pancreatitis and could be contributing although less likely   - start hydralazine prn   - pain control     hypothyroid   - c/w home dose     splenic thrombosis   - heparin drip     
48 year old female with acute necrotizing pancreatitis, and splenic vein  thrombus is suspected.     At this time she is hemodynamically stable and does not require admission to ICU  recommend admission to telemetry floor with aggressive IV fluid hydration and pain control   Heparin  infusion for suspected splenic venous thrombosis   Above discussed with Dr Aaron who was at bedside and aggress with home ment and recommendations     Time spent included assessed presenting problems of acute illness that poses probability  end organ damage/dysfunction.  Medical decision making inculding plan of daily care, reviewing data, reviewing radiology, discussing with multidisciplinary team, non inclusive of procedures, discussing goals of care with patient/family 
49 yo f pmhx HTN, obesity, hypothyroidism, breast ca s/p L mastectomy and chemo (2008) admitted to surgical service with acute necrotizing pancreatitis, hypertensive urgency (?pain related), and suspected splenic vein thrombosis.      RECOMMENDATIONS:  -acute nec pancreatitis, ?cause   -suggest pain control, added morphine 2mg IVP q4hrs for moderate pain to current regimen  -crystalloid ivf as tolerated  -consider core temp   -trend labs- am labs pending  -no lipid panel/triglyceride levels on admission; added to am labs   -patient on telemetry  -suggest strict I&Os     Patient seen and examined at bedside, vitals/chart/meds reviewed, case discussed with eICU attending Dr. Wilde; at this time patient does not require ICU admission.  Recommendations as above, please reconsult as needed.

## 2023-03-24 NOTE — PROCEDURE NOTE - NSINDICATIONS_GEN_A_CORE
metabolic and respiratory acidosis/respiratory distress metabolic acidosis/critical patient/respiratory distress

## 2023-03-24 NOTE — H&P ADULT - HISTORY OF PRESENT ILLNESS
Patient is a 48 year old female with PMHx significant for HTN, hypothyroidism,  30 years ago, breast cancer s/p left mastectomy and chemo in  presenting with epigastric pain and LUQ pain. Found to have necrotizing pancreatitis. Transferred from OSH ([facility]) for hypotension and tachypnea requiring emergent intubation and multiple pressors. SICU consulted for hemodynamic monitoring and respiratory management.

## 2023-03-24 NOTE — PROVIDER CONTACT NOTE (EICU) - SITUATION
49 yo f pmhx HTN, obesity, hypothyroidism, breast ca s/p L mastectomy and chemo (2008) admitted to surgical service with acute necrotizing pancreatitis, hypertensive urgency (?pain related), and suspected splenic vein thrombosis.   Case discussed with the ICU PA.

## 2023-03-24 NOTE — CONSULT NOTE ADULT - SUBJECTIVE AND OBJECTIVE BOX
[facility] DIVISION OF KIDNEY DISEASES AND HYPERTENSION -- [phone]  -- INITIAL CONSULT NOTE  --------------------------------------------------------------------------------  Due to patient's current medical condition, HPI obtained via chart review.     HPI: 49 yo F with a PMH of HTN, Hypothyroidism, Breast CA s/p L mastectomy and chemotherapy who presented initially to [facility] due to epigastric pain, nausea, vomiting after eating pizza. She was found to have necrotizing pancreatitis. She was found to oliguric acute renal failure with acidosis and required intubation with mechanical ventilation. Seen by Nephrology at [facility], recommended transfer to [facility] for CRRT. Nephrology consulted for oliguric renal failure requiring renal replacement therapy.     Pt seen and examined in the SICU. She appears critically ill, she is intubated and sedated. Family is at bedside. Unable to obtain further ROS.     PAST HISTORY  --------------------------------------------------------------------------------  PAST MEDICAL & SURGICAL HISTORY:  Hypertension  Hypothyroidism  Breast cancer  S/P   H/O left mastectomy    FAMILY HISTORY:  FH: myocardial infarction    PAST SOCIAL HISTORY: Non smoker     ALLERGIES & MEDICATIONS  --------------------------------------------------------------------------------  Allergies  No Known Allergies    Standing Inpatient Medications  chlorhexidine 0.12% Liquid 15 milliLiter(s) Oral Mucosa every 12 hours  chlorhexidine 4% Liquid 1 Application(s) Topical <User Schedule>  CRRT Treatment    <Continuous>  dextrose 50% Injectable 50 milliLiter(s) IV Push every 15 minutes  fentaNYL   Infusion. 0.5 MICROgram(s)/kG/Hr IV Continuous <Continuous>  heparin   Injectable 5000 Unit(s) SubCutaneous every 8 hours  insulin regular Infusion 1 Unit(s)/Hr IV Continuous <Continuous>  ketamine Infusion. 0.25 mG/kG/Hr IV Continuous <Continuous>  norepinephrine Infusion 0.04 MICROgram(s)/kG/Min IV Continuous <Continuous>  pantoprazole  Injectable 40 milliGRAM(s) IV Push daily  phenylephrine    Infusion 0.5 MICROgram(s)/kG/Min IV Continuous <Continuous>  PrismaSATE Dialysate BGK 4 / 2.5 5000 milliLiter(s) CRRT <Continuous>  PrismaSOL Filtration BGK 4 / 2.5 5000 milliLiter(s) CRRT <Continuous>  PrismaSOL Filtration BGK 4 / 2.5 5000 milliLiter(s) CRRT <Continuous>  vasopressin Infusion 0.04 Unit(s)/Min IV Continuous <Continuous>    PRN Inpatient Medications    REVIEW OF SYSTEMS  --------------------------------------------------------------------------------  Unable to obtain ROS     VITALS/PHYSICAL EXAM  --------------------------------------------------------------------------------  T(C): 35.8 (23 @ 15:10), Max: 37.8 (23 @ 07:00)  HR: 117 (23 @ 16:30) (110 - 138)  BP: 94/80 (23 @ 16:30) (71/38 - 143/101)  RR: 22 (23 @ 16:30) (18 - 78)  SpO2: 98% (23 @ 16:00) (86% - 100%)  Wt(kg): --  Height (cm): 162.6 (23 @ 15:10)  Weight (kg): 122.2 (23 @ 15:10)  BMI (kg/m2): 46.2 (23 @ 15:10)  BSA (m2): 2.22 (23 @ 15:10)    Physical Exam:  	Gen: Ill appearing, intubated, sedated   	HEENT: +ETT  	Pulm: Mechanical breath sounds  	CV: Tachycardia  	Abd: Obese, distended  	Ext: Trace LE edema B/L  	Neuro: Sedated              : Babin+ with no urine in the bag  	Skin: Warm and dry    LABS/STUDIES  --------------------------------------------------------------------------------              9.1    18.54 >-----------<  200      [23 @ 13:50]              28.1     147  |  122  |  28  ----------------------------<  200      [23 13:00]  3.5   |  8   |  2.87        Ca     <3.0     [23 13:00]      Mg     1.2     [23:00]      Phos  3.6     [23 13:00]    TPro  3.2  /  Alb  1.4  /  TBili  0.6  /  DBili  x   /  AST  1737  /  ALT  1102  /  AlkPhos  46  [23 13:00]    PT/INR: PT 19.2 , INR 1.59       [23 @ 05:30]  PTT: 89.1       [23 13:50]    Creatinine Trend:  SCr 2.87 [:00]  SCr 4.27 [ 08:47]  SCr 3.86 [ 05:30]  SCr 1.24 [ @ 05:55]  SCr 1.25 [ @ 18:27]    Urinalysis - [23 @ 05:30]      Color Yellow / Appearance Clear / SG 1.020 / pH 5.0      Gluc 250 / Ketone Trace  / Bili Negative / Urobili Negative       Blood Moderate / Protein 100 / Leuk Est Trace / Nitrite Negative      RBC 0-2 / WBC 6-10 / Hyaline  / Gran  / Sq Epi  / Non Sq Epi Moderate / Bacteria Many

## 2023-03-24 NOTE — PROCEDURE NOTE - NSPOSTCAREGUIDE_GEN_A_CORE
Keep the cast/splint/dressing clean and dry/Care for catheter as per unit/ICU protocols
Verbal/written post procedure instructions were given to patient/caregiver/Keep the cast/splint/dressing clean and dry
Verbal/written post procedure instructions were given to patient/caregiver/Keep the cast/splint/dressing clean and dry/Care for catheter as per unit/ICU protocols

## 2023-03-24 NOTE — H&P ADULT - NSHPPHYSICALEXAM_GEN_ALL_CORE
PHYSICAL EXAMINATION:  General - Critically ill  Neuro - Sedated  HEENT - R IJ CVC in place, NGT in place  Lungs - Mechanical ventilation via ETT  Heart - on 2 pressors  Abdomen - Soft, mildly distended  Extremities - Warm  Skin - Perfused

## 2023-03-24 NOTE — DISCHARGE NOTE PROVIDER - CARE PROVIDER_API CALL
Brian Young)  Critical Care Medicine  [phone] [place] [address]  Phone: [phone]  Fax: [phone]  Follow Up Time:

## 2023-03-24 NOTE — PATIENT PROFILE ADULT - FALL HARM RISK - HARM RISK INTERVENTIONS

## 2023-03-24 NOTE — CHART NOTE - NSCHARTNOTEFT_GEN_A_CORE
Patient with tachycardia in the 120s. Patient seen and examined at bedside. Reports pain is the same. Denies nausea, vomiting, cp, sob, dizziness.    PHYSICAL EXAM:  CONSTITUTIONAL: NAD  HEENT:  Atraumatic, Normocephalic  RESPIRATORY: Clear to ausculation, bilaterally   CARDIOVASCULAR: RRR S1S2  GASTROINTESTINAL: non distended, soft, upper abdominal tenderness  MUSCULOSKELETAL: calf soft, non tender b/l      49 y/o female PMHx BMI:44.9, HTN, hypothyroidism,  30 years ago, breast ca s/p left mastectomy and chemo  presents c/o epigastric/LUQ pain a few hours after eating pizza yesterday. Admitted with Acute necrotizing pancreatitis involving greater than 50% of the pancreas, Focal filling defect in the splenic vein at the level of the body of the pancreas for which an underlying thrombus is suspected.  Patient tachycardic.    -ICU consulted, no need for ICU care per ICU so patient upgraded to telemetry.   -Stat labs  -Babin placed  -NPO, aggressive IV hydration  -Heparin gtt  -Discussed with Dr. Lutz Patient with tachycardia in the 120s. Patient seen and examined at bedside. Reports pain is the same. Denies nausea, vomiting, cp, sob, dizziness.    PHYSICAL EXAM:  CONSTITUTIONAL: NAD  HEENT:  Atraumatic, Normocephalic  RESPIRATORY: Clear to ausculation, bilaterally   CARDIOVASCULAR: RRR S1S2  GASTROINTESTINAL: non distended, soft, upper abdominal tenderness  MUSCULOSKELETAL: calf soft, non tender b/l      49 y/o female PMHx BMI:44.9, HTN, hypothyroidism,  30 years ago, breast ca s/p left mastectomy and chemo  presents c/o epigastric/LUQ pain a few hours after eating pizza yesterday. Admitted with Acute necrotizing pancreatitis involving greater than 50% of the pancreas, Focal filling defect in the splenic vein at the level of the body of the pancreas for which an underlying thrombus is suspected.  Patient tachycardic.    -ICU consulted, no need for ICU care per ICU so patient upgraded to telemetry.   -Stat labs  -Babin placed  -NPO, aggressive IV hydration  -Heparin gtt  -EKG  -Discussed with Dr. Lutz

## 2023-03-24 NOTE — CONSULT NOTE ADULT - ASSESSMENT
ASSESSMENT:  48 year old female with necrotizing pancreatitis. Intubated and hemodynamically unstable requiring pressors.      PLAN:    Neuro:  - Sedation while intubated w/ precedex  - Multimodal pain control  - Fentanyl for pain control and sedation      Resp:  - Mechanical ventilation - AC settings      CV:  - Requiring 3 pressors on arrival (levo, venkata, and vaso)  - Will wean vasopressor support w/ goal MAP > 65 mmHg      GI:   - NPO  - NGT in place to LCWS  - Bowel regimen with senna & Miralax  - Protonix for stress ulcer prophylaxis      Renal:  - Babin catheter in place  - Nephrology consult - patient will likely require CRRT      Heme:  - Monitor CBC and coags  - SQH for VTE prophylaxis      ID:   - Monitor for clinical evidence of active infection  - Monitor WBC and temperature  - Will evaluate need for abx      Endo:   - Monitor glucose with FS  - Start on insulin gtt      Code Status: Full code    Disposition: SICU

## 2023-03-24 NOTE — PROCEDURAL SAFETY CHECKLIST WITH OR WITHOUT SEDATION - NSTIMEOUTDATE_GEN_ALL_CORE
Cefzil, antibiotic for sinusitis  If symptoms of sinus pressure and cough or not improving in 3 to 4 days-please call me we will add course of prednisone  Please gargle with baking soda for the next few weeks  Mycelex-medication for thrush, use it for 7 to 10 days  Blood work, mammogram, bone density scan (check with insurance)
24-Mar-2023 09:05
24-Mar-2023 08:23

## 2023-03-24 NOTE — CONSULT NOTE ADULT - CONSULT REQUESTED BY NAME
REJI JUAREZ
[place]
Surgical PA
Dr. Oglesby
Patient/Caregiver provided printed discharge information.

## 2023-03-24 NOTE — DISCHARGE NOTE PROVIDER - NSDCMRMEDTOKEN_GEN_ALL_CORE_FT
fentaNYL: intravenous every minute  heparin 100 units/mL-D5% intravenous solution: 10 milliliter(s) intravenous every minute  ketamine 10 mg/mL injectable solution: intravenous every minute  norepinephrine: 16 milligram(s) intravenous every minute  phenylephrine: 40 milligram(s) intravenous every minute  piperacillin-tazobactam: 3.375 gram(s) intravenous every minute  sodium bicarbonate: 150 drop(s) intravenous every minute

## 2023-03-24 NOTE — DISCHARGE NOTE PROVIDER - NSDCCPCAREPLAN_GEN_ALL_CORE_FT
PRINCIPAL DISCHARGE DIAGNOSIS  Diagnosis: Necrotizing pancreatitis  Assessment and Plan of Treatment:       SECONDARY DISCHARGE DIAGNOSES  Diagnosis: Septic shock  Assessment and Plan of Treatment:     Diagnosis: Metabolic acidosis  Assessment and Plan of Treatment:     Diagnosis: CHATO (acute kidney injury)  Assessment and Plan of Treatment:     Diagnosis: Splenic vein thrombosis  Assessment and Plan of Treatment:     Diagnosis: Hyperkalemia  Assessment and Plan of Treatment:

## 2023-03-24 NOTE — CONSULT NOTE ADULT - SUBJECTIVE AND OBJECTIVE BOX
Patient is a 48y old  Female who presents with a chief complaint of Necrotizing pancreatitis (23 Mar 2023 15:52)      BRIEF HOSPITAL COURSE:   47 yo f pmhx HTN, obesity, hypothyroidism, breast ca s/p L mastectomy and chemo () admitted to surgical service with acute necrotizing pancreatitis, hypertensive urgency (?pain related), and suspected splenic vein thrombosis.      3/22: 3L crystalloid  3/23: 2L crystalloid + 200cc/hr (about 2L)    Events last 24 hours:   Called this evening for consult / patient tachycardic to the 120s and lactate elevation.  Patient ordered for additional 500cc bolus by primary team. Patient seen at bedside, lying in bed, NAD but endorsing abdominal pain.        PAST MEDICAL & SURGICAL HISTORY:  Hypertension  Hypothyroidism  Breast cancer  S/P   H/O left mastectomy        Allergies  No Known Allergies      FAMILY HISTORY:  FH: myocardial infarction      Social History:   From home       Review of Systems:  +abdominal pain, epigastric and suprapubic are most significant       Physical Examination:    General: Adult obese female, lying in bed, nad    HEENT: NC/AT, NC in place    PULM: +splinting, grossly cta b/l    CVS: Sinus tach    ABD: Soft, mildly distended over epigastric region, +tender to palpation, +bs    EXT: No edema, nontender    SKIN: Warm    NEURO: Alert, oriented, interactive      Medications:  hydrALAZINE Injectable 10 milliGRAM(s) IV Push every 8 hours PRN  morphine  - Injectable 2 milliGRAM(s) IV Push every 4 hours PRN  morphine  - Injectable 4 milliGRAM(s) IV Push every 4 hours PRN  ondansetron Injectable 4 milliGRAM(s) IV Push every 6 hours PRN  heparin   Injectable 5000 Unit(s) IV Push every 6 hours PRN  heparin  Infusion.  Unit(s)/Hr IV Continuous <Continuous>  levothyroxine Injectable 12.5 MICROGram(s) IV Push at bedtime  lactated ringers Bolus 500 milliLiter(s) IV Bolus once  lactated ringers. 1000 milliLiter(s) IV Continuous <Continuous>      ICU Vital Signs Last 24 Hrs  T(C): 36.6 (24 Mar 2023 02:39), Max: 36.6 (23 Mar 2023 06:21)  T(F): 97.8 (24 Mar 2023 02:39), Max: 97.9 (23 Mar 2023 18:15)  HR: 129 (24 Mar 2023 02:39) (96 - 129)  BP: 120/77 (24 Mar 2023 02:39) (120/77 - 226/135)  BP(mean): --  ABP: --  ABP(mean): --  RR: 18 (24 Mar 2023 02:39) (12 - 26)  SpO2: 99% (24 Mar 2023 02:39) (99% - 100%)    O2 Parameters below as of 24 Mar 2023 02:39  Patient On (Oxygen Delivery Method): room air    Vital Signs Last 24 Hrs  T(C): 36.6 (24 Mar 2023 02:39), Max: 36.6 (23 Mar 2023 06:21)  T(F): 97.8 (24 Mar 2023 02:39), Max: 97.9 (23 Mar 2023 18:15)  HR: 129 (24 Mar 2023 02:39) (96 - 129)  BP: 120/77 (24 Mar 2023 02:39) (120/77 - 226/135)  BP(mean): --  RR: 18 (24 Mar 2023 02:39) (12 - )  SpO2: 99% (24 Mar 2023 02:39) (99% - 100%)  Parameters below as of 24 Mar 2023 02:39  Patient On (Oxygen Delivery Method): room air        I&O's Detail    23 Mar 2023 07:01  -  24 Mar 2023 03:48  --------------------------------------------------------  IN:    Heparin Infusion: 192 mL    Lactated Ringers: 2100 mL    Lactated Ringers Bolus: 1000 mL  Total IN: 3292 mL    OUT:  Total OUT: 0 mL  Total NET: 3292 mL      LABS:                        16.8   11.53 )-----------( 308      ( 23 Mar 2023 05:55 )             53.0         136  |  103  |  12  ----------------------------<  291<H>  5.3   |  26  |  1.24    Ca    8.8      23 Mar 2023 05:55  Phos  3.6       Mg     1.7         TPro  8.1  /  Alb  3.7  /  TBili  0.3  /  DBili  0.2  /  AST  29  /  ALT  32  /  AlkPhos  121<H>        CAPILLARY BLOOD GLUCOSE    PT/INR - ( 23 Mar 2023 11:07 )   PT: 18.9 sec;   INR: 1.57 ratio         PTT - ( 23 Mar 2023 20:10 )  PTT:>200.0 sec  Urinalysis Basic - ( 22 Mar 2023 23:00 )    Color: Yellow / Appearance: Clear / S.015 / pH: x  Gluc: x / Ketone: Small  / Bili: Negative / Urobili: Negative mg/dL   Blood: x / Protein: 30 mg/dL / Nitrite: Positive   Leuk Esterase: Negative / RBC: 0-2 /HPF / WBC 0-2   Sq Epi: x / Non Sq Epi: Moderate / Bacteria: Few      CULTURES:  pending      RADIOLOGY:   < from: Xray Chest 1 View- PORTABLE-Urgent (Xray Chest 1 View- PORTABLE-Urgent .) (23 @ 22:56) >  ACC: [id number] EXAM:  XR CHEST PORTABLE URGENT 1V   ORDERED BY: BRENDEN LEUNG     PROCEDURE DATE:  2023      INTERPRETATION:  Portable chest radiograph    CLINICAL INFORMATION: Screening chest radiograph    TECHNIQUE:  Portable  AP chest radiograph.    COMPARISON: None. .    FINDINGS:  CATHETERS AND TUBES: None    PULMONARY: The visualized lungs are clear of airspace consolidations or   pleural effusions.   No pneumothorax.    HEART/VASCULAR: The heart and mediastinum size and configuration are   within normal limits.    BONES: Visualized osseous thorax intact.    IMPRESSION:   No radiographic evidence of active chest disease.    --- End of Report ---    JOE ROBLES MD; Attending Radiologist  This document has beenelectronically signed. Mar 23 2023  9:08AM    < end of copied text >    < from: CT Abdomen and Pelvis w/ IV Cont (23 @ 21:20) >  ACC: [id number] EXAM:  CT ABDOMEN AND PELVIS IC   ORDERED BY: BRENDEN LEUNG     PROCEDURE DATE:  2023      INTERPRETATION:  CLINICAL INFORMATION: Abdominal pain and pancreatitis.    COMPARISON: None.    CONTRAST/COMPLICATIONS:  IV Contrast: Omnipaque 350  99 cc administered   1 cc discarded  Oral Contrast: NONE  Complications: None reported at time of study completion    PROCEDURE:  CT of the Abdomen and Pelvis was performed.  Sagittal and coronal reformats were performed.    FINDINGS:  LOWER CHEST: Right basilar subsegmental atelectasis. Partially visualized   left breast implant.    LIVER: Focal fat adjacent to the falciform ligament.  BILE DUCTS: Normal caliber.  GALLBLADDER: Within normal limits.  SPLEEN: Within normal limits.  PANCREAS: Edema, enlargement and poor enhancement of nearly the entire   pancreas with some preservation of the distal tail and pancreatic head   and with peripancreatic inflammatory change and fluid which extends into   the mesentery and inferiorly along the retroperitoneum into the lower   abdomen compatible with an acute necrotizing pancreatitis. No walled off   collection.  ADRENALS: Within normal limits.  KIDNEYS/URETERS: Within normal limits.    BLADDER: Within normal limits.  REPRODUCTIVE ORGANS: Uterus and ovaries within normal limits.    BOWEL: No bowel obstruction or inflammation. Scattered colonic   diverticulosis without diverticulitis. Appendix is normal.  PERITONEUM: Small volume abdominal and pelvic free fluid.  VESSELS: Focal filling defect in the splenic vein at the level of the   body of the pancreas for which an underlying thrombus is suspected. Focal   dilatation of a vein in the region of the neck/body of the pancreas.  RETROPERITONEUM/LYMPH NODES: No lymphadenopathy.  ABDOMINAL WALL: Within normal limits.  BONES: Within normal limits.    IMPRESSION:  1. Acute necrotizing pancreatitis involving greater than 50% of the   pancreas.  2. Focal filling defect in the splenic vein at the level of the body of   the pancreas for which an underlying thrombus is suspected.    --- End of Report ---      ANNA OLIVARES MD; Attending Radiologist  This document has been electronically signed. Mar 22 2023  9:50PM    < end of copied text >    < from: US Abdomen Upper Quadrant Right (23 @ 20:14) >  ACC: [id number] EXAM:  US ABDOMEN RT UPR QUADRANT   ORDERED BY: BRENDEN LEUNG     PROCEDURE DATE:  2023      INTERPRETATION:  CLINICAL INFORMATION: Elbow pain.    COMPARISON: None available.    TECHNIQUE: Sonography of the right upper quadrant.    FINDINGS:  Liver: The liver is normal in size. It is mildly increased in   echogenicity consistent with mild hepatic steatosis. No focal hepatic   lesions are identified.  Bile ducts: Normal caliber. Common bile duct measures 3 mm.  Gallbladder: There is 2 cm gallstone. There is no gallbladder wall   thickening. There is no sonographic Sahni sign.  Pancreas: Poorly visualized.  Right kidney: 9.8 cm. No hydronephrosis.  Ascites: Trace amount of fluid in Aponte's pouch.  IVC: Visualized portions are within normal limits.    IMPRESSION:  Cholelithiasis. No ultrasound evidence of acute cholecystitis.    Mild hepatic steatosis    Trace amount of fluid in Morison's pouch.    Limited visualization of the pancreas.    --- End of Report ---    JONAH NIELSEN MD; Attending Radiologist  This document has been electronically signed. Mar 22 2023  8:19PM    < end of copied text >

## 2023-03-24 NOTE — PROGRESS NOTE ADULT - ASSESSMENT
49 yo f pmhx HTN, obesity, hypothyroidism, breast ca s/p L mastectomy and chemo (2008) admitted to surgical service with acute necrotizing pancreatitis, hypertensive urgency (?pain related), and suspected splenic vein thrombosis.      NEURO: Pain control as needed.    CV: Tachycardic, supportive care.   RESP: NC for spo2 >92%, currently on 1-2L satting well, mildly tachypneic 2/2 splinting/MA compensation  RENAL: CHATO, avoid nephrotoxic meds, renally dose meds, trend urine output, bun/cr and electrolytes. viera in place.  nephrology consult this am  GI: NPO. Acute pancreatitis, lipase now uptrending, surgical team aware, following   ENDO: hyperglycemic, ordered for high dose sliding scale  ID: Cultured overnight.  Zosyn ordered for coverage  HEME: Heparin gtt for suspected venous vein thrombosis  DISPO: Full code.        49 yo f pmhx HTN, obesity, hypothyroidism, breast ca s/p L mastectomy and chemo (2008) admitted to surgical service with acute necrotizing pancreatitis, hypertensive urgency (?pain related), and suspected splenic vein thrombosis.      NEURO: Pain control as needed.    CV: tachycardic, bp borderline  RESP: NC for spo2 >92%, currently on 1-2L satting well, mildly tachypneic 2/2 splinting/MA compensation  RENAL: CHATO, avoid nephrotoxic meds, renally dose meds, trend urine output, bun/cr and electrolytes. viera in place.  nephrology consult this am  GI: NPO. Acute pancreatitis, lipase now uptrending, surgical team aware, following   ENDO: hyperglycemic, ordered for high dose sliding scale  ID: Cultured overnight.  Zosyn ordered for coverage  HEME: Heparin gtt for suspected venous vein thrombosis  DISPO: Full code.      Critical Care time: 40 mins assessing presenting problems of acute illness that poses high probability of life threatening deterioration or end organ damage/dysfunction.  Medical decision making including Initiating plan of care, reviewing data, reviewing radiology, discussing with multidisciplinary team, non inclusive of procedures, discussing goals of care with patient/family     Addendum: upon arrival unit patient became hypotensive into the 70s, ordered for 1L crystalloid, and venkata for bp support.  labs reviewed, stat labs ordered, vbg ordered.  Additional IV access.    Additional Critical Care time: 35 mins assessing presenting problems of acute illness that poses high probability of life threatening deterioration or end organ damage/dysfunction.  Medical decision making inculding Initiating plan of care, reviewing data, reviewing radiology, discussing with multidisciplinary team, non inclusive of procedures, discussing goals of care with patient/family

## 2023-03-24 NOTE — PROCEDURE NOTE - ADDITIONAL PROCEDURE DETAILS
US used for guidance
PIV placed in setting of shock, acute pancreatitis   Not included in cc time  tourniquet applied, vessel identified under ultrasound guidance, site cleaned/draped, IV placed, brisk blood return/flushes well, site dressed and secured with tegaderm/tape.
PIV placed in setting of shock, acute pancreatitis   Not included in cc time  tourniquet applied, vessel identified under ultrasound guidance, site cleaned/draped, IV placed, brisk blood return/flushes well, site dressed and secured with tegaderm/tape.

## 2023-03-24 NOTE — PROCEDURE NOTE - NSPOSTPRCRAD_GEN_A_CORE
chest radiograph/feeding tube in correct gastric position/post-procedure radiography performed
central line located in the/central line located in the superior vena cava/post-procedure radiography performed

## 2023-03-24 NOTE — DISCHARGE NOTE PROVIDER - HOSPITAL COURSE
49 yo f pmhx HTN, obesity, hypothyroidism, breast ca s/p L mastectomy and chemo (2008) admitted to surgical service with acute necrotizing pancreatitis, hypertensive urgency (?pain related), and suspected splenic vein thrombosis.  pt received 7 L IVF, remained on maintenance fluids and started on heparin gtt for splenic vein thrombosis.   __ pt decompensated last night and was transferred to MICU. Pt was started on Neosynephrine and Vasopressin was added in the  setting of further decompensation,   -- Levophed was held originally  in the setting of tachycardia,   NPO  -- ARF with oliguria,   -- pt with worsening metabolic acidosis and work of breathing, and was electively intubated,   __ pt with ABG - ( 24 Mar 2023 10:04 )  pH, Arterial: 7.25  pH, Blood: x     /  pCO2: 27    /  pO2: 84    / HCO3: 12    / Base Excess: -14.0 /  SaO2: 96.0      __ pt's HR improved and pt was started on Levophed   -- Lipase 2310, uptrending, and TGL 26, and Creatinine is trending up,   -- Pt is sedated with Fentanyl, Added Ketamine gtt for better acidosis control, and 2 amps of Sodium bicarb,  and started on sodium bicarb gtt   -- on Zosyn for coverage  -- repeat labs are pending   HEME: Heparin gtt for suspected venous vein thrombosis  Full code.      acute illness that poses high probability of life threatening deterioration or end organ damage/dysfunction.  Medical decision making including Initiating plan of care, reviewing data, reviewing radiology, discussing with multidisciplinary tm, non inclusive of procedures, discussing goals of care with patient/family   -- The decision was made to transfer pt t [facility] and pt was accepted to SICU by Dr Young and dr Yip     47 yo f pmhx HTN, obesity, hypothyroidism, breast ca s/p L mastectomy and chemo (2008) admitted to surgical service with acute necrotizing pancreatitis, hypertensive urgency (?pain related), and suspected splenic vein thrombosis.  pt received 7 L IVF, remained on maintenance fluids and started on heparin gtt for splenic vein thrombosis.   __ pt decompensated last night and was transferred to MICU. Pt was started on Neosynephrine and Vasopressin was added in the  setting of further decompensation,   -- Levophed was held originally  in the setting of tachycardia,   NPO  -- ARF with oliguria,   -- pt with worsening metabolic acidosis and respiratory acidosis, and increased  work of breathing, and was electively intubated,   __ pt with ABG - ( 24 Mar 2023 10:04 )  pH, Arterial: 7.25  pH, Blood: x     /  pCO2: 27    /  pO2: 84    / HCO3: 12    / Base Excess: -14.0 /  SaO2: 96.0      __ pt's HR improved and pt was started on Levophed   -- Lipase 2310, uptrending, and TGL 26, and Creatinine is trending up,   -- Pt is sedated with Fentanyl, Added Ketamine gtt for better acidosis control, and 2 amps of Sodium bicarb,  and started on sodium bicarb gtt   -- on Zosyn for coverage  -- repeat labs are pending   HEME: Heparin gtt for suspected venous vein thrombosis  Full code.      acute illness that poses high probability of life threatening deterioration or end organ damage/dysfunction.  Medical decision making including Initiating plan of care, reviewing data, reviewing radiology, discussing with multidisciplinary tm, non inclusive of procedures, discussing goals of care with patient/family   -- The decision was made to transfer pt t [facility] and pt was accepted to SICU by Dr Young and dr Yip     49 yo f pmhx HTN, obesity, hypothyroidism, breast ca s/p L mastectomy and chemo (2008) admitted to surgical service with acute necrotizing pancreatitis, hypertensive urgency (?pain related), and suspected splenic vein thrombosis.  pt received 7 L IVF, remained on maintenance fluids and started on heparin gtt for splenic vein thrombosis.   __ pt decompensated last night and was transferred to MICU. Pt was started on Neosynephrine and Vasopressin was added in the  setting of further decompensation,   -- Levophed was held originally  in the setting of tachycardia,   NPO  -- ARF with anuria,   -- pt with worsening metabolic acidosis with increased  work of breathing, and was electively intubated,   __ pt with ABG - ( 24 Mar 2023 10:04 )  pH, Arterial: 7.25  pH, Blood: x     /  pCO2: 27    /  pO2: 84    / HCO3: 12    / Base Excess: -14.0 /  SaO2: 96.0      __ pt's HR improved and pt was started on Levophed   -- Lipase 2310, uptrending, and TGL 26, and Creatinine is trending up,   -- Pt is sedated with Fentanyl, Added Ketamine gtt for sedation in setting of profound shock state; for acidosis total of 3 amps of Sodium bicarb,  and started on sodium bicarb gtt   -- on Zosyn for empiric coverage  -- repeat labs are pending   HEME: Heparin gtt for suspected splenic venous vein thrombosis  Full code.  D/W patient, sister Flor, and son Pavel Mynor    acute illness that poses high probability of life threatening deterioration or end organ damage/dysfunction.  Medical decision making including Initiating plan of care, reviewing data, reviewing radiology, discussing with multidisciplinary tm, non inclusive of procedures, discussing goals of care with patient/family   -- The decision was made to transfer pt to [facility] and pt was accepted to SICU by Dr Young and dr Yip

## 2023-03-24 NOTE — PROCEDURE NOTE - ATTENDING PROVIDER
"Social Work: Assessment with Discharge Plan    Patient Name:  Jennifer Bob  :  1933  Age:  85 year old  MRN:  [id number]  Risk/Complexity Score:  Filed Complexity Screen Score: 6  Completed assessment with:  Pt, daughter Pushpa    Presenting Information   Reason for Referral:  Discharge plan  Date of Intake:  2019  Referral Source:  Chart Review  Decision Maker:  Pt when able  Alternate Decision Maker:  Pushpa is agent/surrogate  Health Care Directive:  Pt given a new copy of a HCD.  Notary was not available late this afternoon.  Pt given education on using two witnesses to complete the document if a notary is not available.  Living Situation:  House  Previous Functional Status:  Independent  Patient and family understanding of hospitalization: Pt reports she wants to enroll in hospice after this admission.  Pt states she has given it \"much thought\" and feels this is the right way to go.  Pt reports she just wants to be comfortable, not go back to doctors or the hospital and wants to live what life she has in her daughter's home. Pt on assessment appears to have a good understanding of hospice and is adjusting appropriately to her decision.  Daughter Pushpa also in support of her decision.  Cultural/Language/Spiritual Considerations:  Pt is of the [facility]lee.  Adjustment to Illness:  Pt appears to have given this decision much thought and is appropriate for hospice per medical team.    Physical Health  Reason for Admission:    1. Constipation, unspecified constipation type    2. Acute systolic congestive heart failure (H)    3. Atrial fibrillation with RVR (H)    4. Essential hypertension    5. Long term (current) use of anticoagulants    6. Chronic obstructive pulmonary disease, unspecified COPD type (H)    7. Seasonal allergies    8. Spinal stenosis, lumbar region, without neurogenic claudication    9. Lumbar radicular pain      Services " Needed/Recommended:  Hospice    Mental Health/Chemical Dependency  Diagnosis:  None per pt  Support/Services in Place:  None  Services Needed/Recommended:  None    Support System  Significant relationship at present time:  Daughter Pushpa  Family of origin is available for support:  Yes - Pushpa is aware that she will be the full time caregiver when hospice is not present.  Provided education that hospice is not a 24/7 service and caregiving is primarily done by family.  Other support available:  Yes  Gaps in support system:  None  Patient is caregiver to:  None     Provider Information   Primary Care Physician:  Torri Fisher   [phone]   Clinic:  [address] [place] [id number]      :  None    Financial   Income Source:  Retired  Financial Concerns:  None  Insurance:    Payor/Plan Subscriber Name Rel Member # Group #   UCARE - UC[employer] MEDICAR* TRACY BARBER*  [id number] [facility] [id number]       Discharge Plan   Patient and family discharge goal:  Hospice at daughter's home - [address] [facility]  Provided education on discharge plan:  YES  Patient agreeable to discharge plan:  YES  A list of Medicare Certified Facilities was provided to the patient and/or family to encourage patient choice. Patient's choices for facility are:    [facility] - appointment scheduled for Sunday at 1:00 pm at daughter's home.  Equipment (hospital bed, table and commode will be delivered on Saturday between 12 and 4 pm.  Daughter or other family will be at home to receive the equipment.  PH: [phone]    Will NH provide Skilled rehabilitation or complex medical:  NA - pt does not want to be in a nursing home or residential hospice  General information regarding anticipated insurance coverage and possible out of pocket cost was discussed. Patient and patient's family are aware patient may incur the cost of transportation to the facility, pending insurance payment:  YES  Barriers to discharge:  Medical stability    Discharge Recommendations   Anticipated Disposition:  Home with services  Transportation Needs:  Family:  Daughter to transport at discharge.  Name of Transportation Company and Phone:  Daughter Pushpa    Additional comments   SW met with pt and family to introduce self and role in consult.  Daughter and pt are clear on pt's wishes to go to daughter's home on hospice.  Pt was set up for Sunday at 1:00 pm with  Hospice.  Pt will discharge Sunday morning around 0900.    KRISTA Lew, [facility]  6[facility] Unit   Phone: [phone]  Pager: [phone]  Unit: [phone]         Dr Stein Dr [place]

## 2023-03-24 NOTE — PROCEDURE NOTE - NSPROCDETAILS_GEN_ALL_CORE
guidewire recovered/lumen(s) aspirated and flushed/sterile dressing applied/sterile technique, catheter placed/ultrasound guidance with use of sterile gel and probe cove US guidance/guidewire recovered/lumen(s) aspirated and flushed/sterile dressing applied/sterile technique, catheter placed/ultrasound guidance with use of sterile gel and probe cove

## 2023-03-24 NOTE — CHART NOTE - NSCHARTNOTEFT_GEN_A_CORE
HPI: 49 y/o female PMHx BMI:44.9, HTN, hypothyroidism,  30 years ago, breast ca s/p left mastectomy and chemo  presents c/o epigastric/LUQ pain a few hours after eating pizza yesterday. Admitted with Acute necrotizing pancreatitis involving greater than 50% of the pancreas, Focal filling defect in the splenic vein at the level of the body of the pancreas for which an underlying thrombus is suspected.    Events: Called to bedside this AM for hypotension and tachypnea. Pt lethargic but oriented and following commands. Patient started on Phenylephrine for hypotension, which subsequently got infiltrated in PIV. Phentolamine and nitro paste placed on right upper extremity by provider. Actively decompensated and decision was made to put central and arterial line in- all lines confirmed via US and CXR this AM. Labs showed worsening renal failure (Cr. 4.27), lacticemia (8.5), lipase (76430), shock liver, and metabolic abnormalities. Surgery advised for transfer to [facility] for higher level of care. Patient continued to decompensate requiring urgent intubation by ICU team. eICU contacted transfer center to organize transfer. Report given to RN Tawanna at [facility] SICU (bed 9). Family at bedside updated on case.     N: fentanyl and ketamine gtt for sedaton   R: intubated 7.5 24 @ lip on , 22, 100, 5   C: Gordon and Vaso, transition to Levo. Added bicarb gtt and s/p 2 amps   GI: NPO ngt placed to intermittent suction. nectroizing panreatitis > 50%   : viera placed. ARF. will most likely need CRRT. acidoic on bicarb gtt requring multiple pushes. hyperkalemia cocktail given x 1    ID: RVP negative. empirically on zosyn, cultures pending in lab   Endo: FSq4hr with moderate ISS will most likely need insulin gtt   Heme: + thrombus, heparin gtt infusing   Lines: Right IJ TLC, Right axillary juventino, 2 PIVs, viera, ETT, NGT HPI: 49 y/o female PMHx BMI:44.9, HTN, hypothyroidism,  30 years ago, breast ca s/p left mastectomy and chemo  presents c/o epigastric/LUQ pain a few hours after eating pizza yesterday. Admitted with Acute necrotizing pancreatitis involving greater than 50% of the pancreas, Focal filling defect in the splenic vein at the level of the body of the pancreas for which an underlying thrombus is suspected.    Events: Called to bedside this AM for hypotension and tachypnea. Pt lethargic but oriented and following commands. Patient started on Phenylephrine for hypotension, which subsequently got infiltrated in PIV. Phentolamine and nitro paste placed on right upper extremity by provider. Actively decompensated and decision was made to put central and arterial line in- all lines confirmed via US and CXR this AM. Labs showed worsening renal failure (Cr. 4.27), lacticemia (8.5), lipase (43598), shock liver, and metabolic abnormalities. Surgery advised for transfer to [facility] for higher level of care. Patient continued to decompensate requiring urgent intubation by ICU team. eICU contacted transfer center to organize transfer. Report given to RN Tawanna at [facility] SICU (bed 9). Family at bedside updated on case.     N: fentanyl and ketamine gtt for sedaton   R: intubated 7.5 24 @ lip on , 22, 100, 5   C: Gordon and Vaso, transition to Levo. Added bicarb gtt and s/p 2 amps   GI: NPO ngt placed to intermittent suction. nectroizing panreatitis > 50%   : viera placed. ARF. will most likely need CRRT. acidoic on bicarb gtt requring multiple pushes. hyperkalemia cocktail given x 1    ID: RVP negative. empirically on zosyn, cultures pending in lab   Endo: FSq4hr with moderate ISS will most likely need insulin gtt, home synthroid IV converson   Heme: + thrombus, heparin gtt infusing   Lines: Right IJ TLC, Right axillary juventino, 2 PIVs, viera, ETT, NGT HPI: 49 y/o female PMHx BMI:44.9, HTN, hypothyroidism,  30 years ago, breast ca s/p left mastectomy and chemo  presents c/o epigastric/LUQ pain a few hours after eating pizza yesterday. Admitted with Acute necrotizing pancreatitis involving greater than 50% of the pancreas, Focal filling defect in the splenic vein at the level of the body of the pancreas for which an underlying thrombus is suspected.    Events: Called to bedside this AM for hypotension and tachypnea. Pt lethargic but oriented and following commands. Patient started on Phenylephrine for hypotension, which subsequently got infiltrated in PIV. Phentolamine and nitro paste placed on right upper extremity by provider. Actively decompensated and decision was made to put central and arterial line in- all lines confirmed via US and CXR this AM. Labs showed worsening renal failure (Cr. 4.27), lacticemia (8.5), lipase (97809), shock liver, and metabolic abnormalities. Surgery advised for transfer to [facility] for higher level of care. Patient continued to decompensate requiring urgent intubation by ICU team. eICU contacted transfer center to organize transfer. Report given to RN Tawanna at [facility] SICU (bed 9). Family at bedside updated on case.     N: fentanyl and ketamine gtt for sedaton   R: intubated 7.5 24 @ lip on , 22, 100, 5   C: Gordon and Vaso, transition to Levo. Added bicarb gtt and s/p 2 amps   GI: NPO ngt placed to intermittent suction. nectroizing panreatitis > 50%   : viera placed. ARF. will most likely need CRRT. acidoic on bicarb gtt requring multiple pushes. hyperkalemia cocktail given x 1. CMP sent upon transfer, please follow up with critical values upon arrival to outside hospital    ID: RVP negative. empirically on zosyn, cultures pending in lab   Endo: FSq4hr with moderate ISS will most likely need insulin gtt, home synthroid IV converson   Heme: + thrombus, heparin gtt infusing   Lines: Right IJ TLC, Right axillary juventino, 2 PIVs, viera, ETT, NGT

## 2023-03-24 NOTE — CONSULT NOTE ADULT - PROBLEM SELECTOR RECOMMENDATION 3
Pt with hyperkalemia in setting of acute renal failure. Serum potassium of 6.0. Improved to 3.5. Initiate CRRT as above. Continue to monitor serum potassium.

## 2023-03-24 NOTE — H&P ADULT - ATTENDING COMMENTS
I have reviewed the history, pertinent labs and imaging, and discussed the care with the consult resident.  More than 50% of this 80  minute encounter including face to face with the patient was spent in coordination of care on sequelae of gallstone pancreatitis with necrosis and SIRS.  Time included review of vitals, labs, imaging, discussion with the interhospital transfer center/[facility] critical care team.    Reason for encounter:  need for tertiary medical care of necrotizing pancreatitis with acute anuric renal failure    The active issues are:  1. mixed hypoxemic/ventilatory failure requiring intubation and mechanical ventilation, adjust ETT, ARDS protocol  2. hypovolemia, hypotension, shock  3. severe metabolic acidosis and need for RRT, initiate bicarb infusion pending CVVH  3. malnutrition risk, start insulin infusion    The Acute Care Surgery (B Team) Practice:    urgent issues - spectra [phone]  nonurgent issues - [phone]  patient appointments or afterhours - [phone] I have reviewed the history, pertinent labs and imaging, and discussed the care with the consult resident.  More than 50% of this 80  minute encounter including face to face with the patient was spent in coordination of care on sequelae of gallstone pancreatitis with necrosis and SIRS.  Time included review of vitals, labs, imaging, discussion with the interhospital transfer center/[facility] critical care team.    Reason for encounter:  need for tertiary medical care of necrotizing pancreatitis with acute anuric renal failure    The active issues are:  1. mixed hypoxemic/ventilatory failure requiring intubation and mechanical ventilation, adjust ETT, ARDS protocol  2. hypovolemia, hypotension, shock  3. severe metabolic acidosis and need for RRT, initiate bicarb infusion pending CVVH  3. malnutrition risk, start insulin infusion    Patient is at high risk for developing compartment syndrome so will need judicious titration of fluids and pressors.  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  Cholecystectomy not an urgent issue right now as no signs of inflammation seen on CT scan.    The Acute Care Surgery (B Team) Practice:    urgent issues - spectra [phone]  nonurgent issues - [phone]  patient appointments or afterhours - [phone]

## 2023-03-24 NOTE — PROVIDER CONTACT NOTE (CRITICAL VALUE NOTIFICATION) - SITUATION
rapid response team at bedside, critical lab result relayed to Primary Nurse Eleazar who notified the hospitalist and PA who are at bedside.
Lactate resulted 4.1    APTT resulted greater than 200, surgery PEPE Marti notified, hold heparin for one hour and decrease by 400units
PTT is greater than 200

## 2023-03-24 NOTE — H&P ADULT - ASSESSMENT
48 year old female with necrotizing pancreatitis. Intubated and hemodynamically unstable requiring pressors.    PLAN:  - c/w sedation  - c/w pressors  - c/w ngt  - remainder per sicu    B team  [id number]

## 2023-03-24 NOTE — PROCEDURE NOTE - PROCEDURE DATE TIME, MLM
24-Mar-2023 12:32
24-Mar-2023 07:58
24-Mar-2023 07:57
24-Mar-2023 12:25
24-Mar-2023 08:15
24-Mar-2023 13:01

## 2023-03-24 NOTE — CONSULT NOTE ADULT - SUBJECTIVE AND OBJECTIVE BOX
SICU Consult Note    HISTORY OF PRESENT ILLNESS:  Patient is a 48 year old female with PMHx significant for HTN, hypothyroidism,  30 years ago, breast cancer s/p left mastectomy and chemo in  presenting with epigastric pain and LUQ pain. Found to have necrotizing pancreatitis. Transferred from [facility] ([place]) for hypotension and tachypnea requiring emergent intubation and multiple pressors.        PAST MEDICAL HISTORY: Hypertension    Hypothyroidism    Breast cancer      PAST SURGICAL HISTORY: S/P     H/O left mastectomy      HOME MEDICATIONS: Home Medications:  fentaNYL: intravenous every minute (24 Mar 2023 13:48)  heparin 100 units/mL-D5% intravenous solution: 10 milliliter(s) intravenous every minute (24 Mar 2023 13:48)  ketamine 10 mg/mL injectable solution: intravenous every minute (24 Mar 2023 13:48)  norepinephrine: 16 milligram(s) intravenous every minute (24 Mar 2023 13:48)  phenylephrine: 40 milligram(s) intravenous every minute (24 Mar 2023 13:48)  piperacillin-tazobactam: 3.375 gram(s) intravenous every minute (24 Mar 2023 13:48)  sodium bicarbonate: 150 drop(s) intravenous every minute (24 Mar 2023 13:48)    ALLERGIES: No Known Allergies    FAMILY HISTORY: No pertinent family history in first degree relatives    FH: myocardial infarction      SOCIAL HISTORY:  REVIEW OF SYSTEMS:  VITAL SIGNS:  ICU Vital Signs Last 24 Hrs  T(C): 36.9 (24 Mar 2023 11:30), Max: 37.8 (24 Mar 2023 07:00)  T(F): 98.5 (24 Mar 2023 11:30), Max: 100 (24 Mar 2023 07:00)  HR: 111 (24 Mar 2023 14:00) (111 - 138)  BP: 94/32 (24 Mar 2023 09:00) (71/38 - 143/101)  BP(mean): 46 (24 Mar 2023 09:00) (29 - 109)  ABP: 105/69 (24 Mar 2023 14:00) (81/52 - 117/81)  ABP(mean): 85 (24 Mar 2023 14:00) (60 - 96)  RR: 22 (24 Mar 2023 14:00) (18 - 78)  SpO2: 100% (24 Mar 2023 13:30) (86% - 100%)      PHYSICAL EXAMINATION:  General -   Neuro -   HEENT -   Lungs -   Heart -   Abdomen -   Extremities -   Skin -   LABS:                        9.1    18.54 )-----------( 200      ( 24 Mar 2023 13:50 )             28.1     03-24    147<H>  |  122<H>  |  28<H>  ----------------------------<  200<H>  3.5   |  8<LL>  |  2.87<H>    Ca    <3.0<!!>      24 Mar 2023 13:00  Phos  3.6     03-24  Mg     1.2     03-24    TPro  3.2<L>  /  Alb  1.4<L>  /  TBili  0.6  /  DBili  x   /  AST  1737<H>  /  ALT  1102<H>  /  AlkPhos  46  03-24    PT/INR - ( 24 Mar 2023 05:30 )   PT: 19.2 sec;   INR: 1.59 ratio         PTT - ( 24 Mar 2023 13:50 )  PTT:89.1 sec  ABG - ( 24 Mar 2023 13:54 )  pH: 7.27  /  pCO2: 35    /  pO2: 264   / HCO3: 16    / Base Excess: -9.9  /  SaO2: 100.0   Lactate: x                  RECENT CULTURES:    CAPILLARY BLOOD GLUCOSE      POCT Blood Glucose.: 293 mg/dL (24 Mar 2023 13:01)  POCT Blood Glucose.: 375 mg/dL (24 Mar 2023 10:31)  POCT Blood Glucose.: 440 mg/dL (24 Mar 2023 07:23)  POCT Blood Glucose.: 384 mg/dL (24 Mar 2023 06:35)    IMAGING STUDIES: SICU Consult Note    HISTORY OF PRESENT ILLNESS:  Patient is a 48 year old female with PMHx significant for HTN, hypothyroidism,  30 years ago, breast cancer s/p left mastectomy and chemo in  presenting with epigastric pain and LUQ pain. Found to have necrotizing pancreatitis. Transferred from [facility] ([place]) for hypotension and tachypnea requiring emergent intubation and multiple pressors. SICU consulted for hemodynamic monitoring and respiratory management.        PAST MEDICAL HISTORY: Hypertension    Hypothyroidism    Breast cancer      PAST SURGICAL HISTORY: S/P     H/O left mastectomy      HOME MEDICATIONS: Home Medications:  fentaNYL: intravenous every minute (24 Mar 2023 13:48)  heparin 100 units/mL-D5% intravenous solution: 10 milliliter(s) intravenous every minute (24 Mar 2023 13:48)  ketamine 10 mg/mL injectable solution: intravenous every minute (24 Mar 2023 13:48)  norepinephrine: 16 milligram(s) intravenous every minute (24 Mar 2023 13:48)  phenylephrine: 40 milligram(s) intravenous every minute (24 Mar 2023 13:48)  piperacillin-tazobactam: 3.375 gram(s) intravenous every minute (24 Mar 2023 13:48)  sodium bicarbonate: 150 drop(s) intravenous every minute (24 Mar 2023 13:48)    ALLERGIES: No Known Allergies    FAMILY HISTORY: No pertinent family history in first degree relatives    FH: myocardial infarction      SOCIAL HISTORY:  REVIEW OF SYSTEMS:  VITAL SIGNS:  ICU Vital Signs Last 24 Hrs  T(C): 36.9 (24 Mar 2023 11:30), Max: 37.8 (24 Mar 2023 07:00)  T(F): 98.5 (24 Mar 2023 11:30), Max: 100 (24 Mar 2023 07:00)  HR: 111 (24 Mar 2023 14:00) (111 - 138)  BP: 94/32 (24 Mar 2023 09:00) (71/38 - 143/101)  BP(mean): 46 (24 Mar 2023 09:00) (29 - 109)  ABP: 105/69 (24 Mar 2023 14:00) (81/52 - 117/81)  ABP(mean): 85 (24 Mar 2023 14:00) (60 - 96)  RR: 22 (24 Mar 2023 14:00) (18 - 78)  SpO2: 100% (24 Mar 2023 13:30) (86% - 100%)      PHYSICAL EXAMINATION:  General - Critically ill  Neuro - Sedated  HEENT - R IJ CVC in place, NGT in place  Lungs - Mechanical ventilation via ETT  Heart - on 2 pressors  Abdomen - Soft, mildly distended  Extremities - Warm  Skin - Perfused      LABS:                        9.1    18.54 )-----------( 200      ( 24 Mar 2023 13:50 )             28.1     03-24    147<H>  |  122<H>  |  28<H>  ----------------------------<  200<H>  3.5   |  8<LL>  |  2.87<H>    Ca    <3.0<!!>      24 Mar 2023 13:00  Phos  3.6     03-24  Mg     1.2     -24    TPro  3.2<L>  /  Alb  1.4<L>  /  TBili  0.6  /  DBili  x   /  AST  1737<H>  /  ALT  1102<H>  /  AlkPhos  46  03-24    PT/INR - ( 24 Mar 2023 05:30 )   PT: 19.2 sec;   INR: 1.59 ratio         PTT - ( 24 Mar 2023 13:50 )  PTT:89.1 sec  ABG - ( 24 Mar 2023 13:54 )  pH: 7.27  /  pCO2: 35    /  pO2: 264   / HCO3: 16    / Base Excess: -9.9  /  SaO2: 100.0   Lactate: x                  RECENT CULTURES:    CAPILLARY BLOOD GLUCOSE      POCT Blood Glucose.: 293 mg/dL (24 Mar 2023 13:01)  POCT Blood Glucose.: 375 mg/dL (24 Mar 2023 10:31)  POCT Blood Glucose.: 440 mg/dL (24 Mar 2023 07:23)  POCT Blood Glucose.: 384 mg/dL (24 Mar 2023 06:35)    IMAGING STUDIES:  < from: CT Abdomen and Pelvis w/ IV Cont (23 @ 21:20) >    ACC: [id number] EXAM:  CT ABDOMEN AND PELVIS IC   ORDERED BY: BRENDEN LEUNG     PROCEDURE DATE:  2023          INTERPRETATION:  CLINICAL INFORMATION: Abdominal pain and pancreatitis.    COMPARISON: None.    CONTRAST/COMPLICATIONS:  IV Contrast: Omnipaque 350  99 cc administered   1 cc discarded  Oral Contrast: NONE  Complications: None reported at time of study completion    PROCEDURE:  CT of the Abdomen and Pelvis was performed.  Sagittal and coronal reformats were performed.    FINDINGS:  LOWER CHEST: Right basilar subsegmental atelectasis. Partially visualized   left breast implant.    LIVER: Focal fat adjacent to the falciform ligament.  BILE DUCTS: Normal caliber.  GALLBLADDER: Within normal limits.  SPLEEN: Within normal limits.  PANCREAS: Edema, enlargement and poor enhancement of nearly the entire   pancreas with some preservation of the distal tail and pancreatic head   and with peripancreatic inflammatory change and fluid which extends into   the mesentery and inferiorly along the retroperitoneum into the lower   abdomen compatible with an acute necrotizing pancreatitis. No walled off   collection.  ADRENALS: Within normal limits.  KIDNEYS/URETERS: Within normal limits.    BLADDER: Within normal limits.  REPRODUCTIVE ORGANS: Uterus and ovaries within normal limits.    BOWEL: No bowel obstruction or inflammation. Scattered colonic   diverticulosis without diverticulitis. Appendix is normal.  PERITONEUM: Small volume abdominal and pelvic free fluid.  VESSELS: Focal filling defect in the splenic vein at the level of the   body of the pancreas for which an underlying thrombus is suspected. Focal   dilatation of a vein in the region of the neck/body of the pancreas.  RETROPERITONEUM/LYMPH NODES: No lymphadenopathy.  ABDOMINAL WALL: Within normal limits.  BONES: Within normal limits.    IMPRESSION:  1. Acute necrotizing pancreatitis involving greater than 50% of the   pancreas.  2. Focal filling defect in the splenic vein at the level of the body of   the pancreas for which an underlying thrombus is suspected.        --- End of Report ---            ANNA OLIVARES MD; Attending Radiologist  This document has been electronically signed. Mar 22 2023  9:50PM    < end of copied text >

## 2023-03-24 NOTE — PROGRESS NOTE ADULT - SUBJECTIVE AND OBJECTIVE BOX
SURGERY PROGRESS HPI:  Pt seen and examined at bedside multiple times overnight. Patient reports pain is the same. Pt denies nausea and vomiting. Pt denies chest pain, SOB, dizziness, fever, chills.     Vital Signs Last 24 Hrs  T(C): 37.8 (24 Mar 2023 07:00), Max: 37.8 (24 Mar 2023 07:00)  T(F): 100 (24 Mar 2023 07:00), Max: 100 (24 Mar 2023 07:00)  HR: 123 (24 Mar 2023 07:42) (105 - 138)  BP: 103/54 (24 Mar 2023 07:42) (71/38 - 140/116)  BP(mean): 62 (24 Mar 2023 07:42) (29 - 62)  RR: 24 (24 Mar 2023 07:42) (17 - 78)  SpO2: 98% (24 Mar 2023 07:42) (95% - 100%)    Parameters below as of 24 Mar 2023 06:40  Patient On (Oxygen Delivery Method): room air      PHYSICAL EXAM:  CONSTITUTIONAL: NAD  HEENT:  Atraumatic, Normocephalic  RESPIRATORY: Clear to ausculation, bilaterally   CARDIOVASCULAR: RRR S1S2  GASTROINTESTINAL: non distended, soft, upper abdominal tenderness  : Babin indwelling with minimal clear yellow urine  MUSCULOSKELETAL: calf soft, non tender b/l    I&O's Detail    23 Mar 2023 07:01  -  24 Mar 2023 07:00  --------------------------------------------------------  IN:    Heparin Infusion: 192 mL    Lactated Ringers: 2700 mL    Lactated Ringers Bolus: 1500 mL  Total IN: 4392 mL    OUT:  Total OUT: 0 mL    Total NET: 4392 mL    LABS:                        16.8   11.53 )-----------( 308      ( 23 Mar 2023 05:55 )             53.0     03-24    134<L>  |  106  |  33<H>  ----------------------------<  404<H>  5.7<H>   |  19<L>  |  3.86<H>    Ca    7.9<L>      24 Mar 2023 05:30  Phos  3.9     03-24  Mg     1.7     03-24    TPro  6.5  /  Alb  2.6<L>  /  TBili  0.9  /  DBili  x   /  AST  690<H>  /  ALT  467<H>  /  AlkPhos  82  03-24    PT/INR - ( 24 Mar 2023 05:30 )   PT: 19.2 sec;   INR: 1.59 ratio      PTT - ( 24 Mar 2023 05:30 )  PTT:162.9 sec    Urinalysis Basic - ( 24 Mar 2023 05:30 )    Color: Yellow / Appearance: Clear / S.020 / pH: x  Gluc: x / Ketone: Trace  / Bili: Negative / Urobili: Negative mg/dL   Blood: x / Protein: 100 mg/dL / Nitrite: Negative   Leuk Esterase: Trace / RBC: 0-2 /HPF / WBC 6-10   Sq Epi: x / Non Sq Epi: Moderate / Bacteria: Many      47 y/o female PMHx BMI:44.9, HTN, hypothyroidism,  30 years ago, breast ca s/p left mastectomy and chemo  presents c/o epigastric/LUQ pain a few hours after eating pizza yesterday. Admitted with Acute necrotizing pancreatitis involving greater than 50% of the pancreas, Focal filling defect in the splenic vein at the level of the body of the pancreas for which an underlying thrombus is suspected.  Patient tachycardic overnight, ICU consulted, no need for ICU care per ICU so patient upgraded to telemetry. Babin placed. IV hydration.  RRT this morning for persistent tachycardia and diaphoresis. Pt accepted to ICU. Worsening lipase, hyperkalemia, CHATO.    -NPO, aggressive IV hydration  -No cholecystectomy planning this admission  -Heparin gtt  -ICU management  -Discussed with Dr. Lutz overnight. Discussed with Dr. Lutz and Dr. Andrade this AM.   
24 hr events:  admitted to surgical service for acute pancreatitis  was hypertensive yesterday and overnight with SBP 220s/130s s/p hydralazine and labetalol pushes   given morphine and dilaudid for pain control  s/p 3L IVF bolus 3/22  s/p 2L IVF bolus 3/23  s/p 2.5L IVF bolus this morning 3/24  on maintenance IVF LR 200cc/hr  RRT this AM for hypotension 70s/30s and tachypnea RR 60s  transferred to ICU on peripheral phenylephrine which infiltrated through R peripheral IV: s/p phentolamine and nitro paste to affected area without noted necrosis or skin changes  emergent central line placed for access  on heparin drip for noted splenic vein thrombosis on CT abdomen  labs sent with noted new renal insufficiency Cr 1.24 -> 3.86 -> 4.27  viera inserted with minimal urine output  hyperkalemic on lab K 6.0 s/p lokelma, albuterol neb, insulin, bicarb  blood sugar 300-400s given 12 units admelog and 5 units regular insulin IVP with blood sugar 290s post treatment  pt progressively more hypotensive throughout the morning requiring increasing pressors started on levo and vasopressin for SBP 80s on phenylephrine  HR 130s  tachypneic to 60s with transient episodes of desaturations to the upper 80s and low 90s on nasal cannula  serial ABG with worsening metabolic acidosis/lactic acidosis: pH 7.17, lactate 3.8-> 9.7  s/p 3 amps bicarb push, started on bicarb drip  LFTs worsening now in shock liver AST 1100s ALT 700s  lipase up trending 6749 -> 16714  bicarb 11  pain medication changed from morphine to Dilaudid due to worsening renal function   with persistent tachypnea, worsening acidosis, and multiorgan failure, and increasing hemodynamic instability pt intubated after discussion with patient herself and family (sister and son)  renal consulted for likely need for renal replacement therapy with metabolic acidosis and hyperkalemia  d/w surgical team, requesting transfer of pt to tertiary center  asked EICU to help with initiation of transfer        ## ROS:  [x ] limited due to tachypnea  no chills  denies SOB  no cough  no chest pain  + abdominal pain  no nausea      ## Labs:  CBC:                                   9.8    18.46 )-----------( 207                   31.1            134         |  108      | 36<H>  -----------------------------------<  375<H>  6.0<H>   |  14<L>  |  4.27<H>        147         |  122     | 28  -----------------------------------<  200<H>  3.5          |  8<L>  |  2.87<H>       Calcium, Total Serum: 6.8 mg/dL  Magnesium, Serum: 1.7 mg/dL  Phosphorus Level, Serum: 4.1 mg/dL    Blood Gas Arterial, Lactate (03.24.23 @ 13:23)    Blood Gas Arterial, Lactate: 9.70 mmol/L      Lipid Profile (03.24.23 @ 05:30)    Triglycerides, Serum: 76 mg/dL        ## Imaging:  CT abdomen/pelvis < from: CT Abdomen and Pelvis w/ IV Cont (03.22.23 @ 21:20) >  LOWER CHEST: Right basilar subsegmental atelectasis. Partially visualized   left breast implant.    LIVER: Focal fat adjacent to the falciform ligament.  BILE DUCTS: Normal caliber.  GALLBLADDER: Within normal limits.  SPLEEN: Within normal limits.  PANCREAS: Edema, enlargement and poor enhancement of nearly the entire   pancreas with some preservation of the distal tail and pancreatic head   and with peripancreatic inflammatory change and fluid which extends into   the mesentery and inferiorly along the retroperitoneum into the lower   abdomen compatible with an acute necrotizing pancreatitis. No walled off   collection.  ADRENALS: Within normal limits.  KIDNEYS/URETERS: Within normal limits.    BLADDER: Within normal limits.  REPRODUCTIVE ORGANS: Uterus and ovaries within normal limits.    BOWEL: No bowel obstruction or inflammation. Scattered colonic   diverticulosis without diverticulitis. Appendix is normal.  PERITONEUM: Small volume abdominal and pelvic free fluid.  VESSELS: Focal filling defect in the splenic vein at the level of the   body of the pancreas for which an underlying thrombus is suspected. Focal   dilatation of a vein in the region of the neck/body of the pancreas.  RETROPERITONEUM/LYMPH NODES: No lymphadenopathy.  ABDOMINAL WALL: Within normal limits.  BONES: Within normal limits.    IMPRESSION:  1. Acute necrotizing pancreatitis involving greater than 50% of the pancreas.  2. Focal filling defect in the splenic vein at the level of the body of the pancreas for which an underlying thrombus is suspected.          ## Medications:  piperacillin/tazobactam IVPB.. 3.375 Gram(s) IV Intermittent every 12 hours    norepinephrine Infusion 0.05 MICROgram(s)/kG/Min IV Continuous <Continuous>  phenylephrine    Infusion 0.5 MICROgram(s)/kG/Min IV Continuous <Continuous>      insulin lispro (ADMELOG) corrective regimen sliding scale   SubCutaneous every 4 hours  levothyroxine Injectable 12.5 MICROGram(s) IV Push at bedtime  vasopressin Infusion 0.04 Unit(s)/Min IV Continuous <Continuous>    heparin  Infusion. 1000 Unit(s)/Hr IV Continuous <Continuous>      fentaNYL   Infusion. 0.5 MICROgram(s)/kG/Hr IV Continuous <Continuous>  ketamine Infusion. 0.25 mG/kG/Hr IV Continuous <Continuous>  ondansetron Injectable 4 milliGRAM(s) IV Push every 6 hours PRN      ## Vitals:  T(C): 36.9 (24 Mar 2023 11:30), Max: 37.8 (24 Mar 2023 07:00)  T(F): 98.5 (24 Mar 2023 11:30), Max: 100 (24 Mar 2023 07:00)  HR: 112 (24 Mar 2023 13:00) (112 - 138)  BP: 94/32 (24 Mar 2023 09:00) (71/38 - 143/101)  BP(mean): 46 (24 Mar 2023 09:00) (29 - 109)  RR: 34 (24 Mar 2023 13:00) (18 - 78)  SpO2: 100% (24 Mar 2023 13:00) (86% - 100%)    Vent: Mode: AC/ CMV (Assist Control/ Continuous Mandatory Ventilation), RR (machine): 22, RR (patient): 26, TV (machine): 450, FiO2: 100, PEEP: 5, PIP: 39    ABG: ABG - ( 24 Mar 2023 13:54 )  pH, Arterial: 7.27   /  pCO2: 35    /  pO2: 264   / HCO3: 16    / Base Excess: -9.9  /  SaO2: 100.0         03-23 @ 07:01  -  03-24 @ 07:00  --------------------------------------------------------  IN: 4392 mL / OUT: 0 mL / NET: 4392 mL          ## P/E:  Gen: obese young female, tachypneic, in bed   HEENT: PERRL, EOMI, sclera white  Resp: CTA B/L , no wheeze, no rales, no rhonchi  CVS: tachycardic  Abd: soft + tenderness, no rebound, hypoactive bowel sounds, mildly distended  Ext: mild bilateral UE and LE edema, no cyanosis, no clubbing   Neuro: A&Ox3    CENTRAL LINE: [x ] YES [ ] NO  LOCATION: R IJ   DATE INSERTED: 3/24x  REMOVE: [ ] YES [x ] NO      VIERA: [x ] YES [ ] NO    DATE INSERTED: 3/24  REMOVE:  [ ] YES [x ] NO      A-LINE:  [x ] YES [ ] NO  LOCATION: R axillary  DATE INSERTED: 3/24  REMOVE:  [ ] YES [x ] NO      GLOBAL ISSUE/BEST PRACTICE:  Analgesia: Dilaudid, fentanyl drip with intubation  Sedation: fentanyl, ketamine  HOB elevation: yes  Stress ulcer prophylaxis: protonix  VTE prophylaxis: heparin drip  Oral Care: chlorhexidine   Glycemic control: q4 sliding scale coverage  Nutrition: npo    CODE STATUS: [x ] full code  [ ] DNR  [ ] DNI  [ ] MOLST  Goals of care discussion: [x] yes   - spoke to patient prior to intubation and she wants Flor sister and Pavel son to be her HCP  - family at bedside and aware    
Patient is a 48y old  Female who presents with a chief complaint of Necrotizing pancreatitis (24 Mar 2023 03:47)      BRIEF HOSPITAL COURSE:   47 yo f pmhx HTN, obesity, hypothyroidism, breast ca s/p L mastectomy and chemo () admitted to surgical service with acute necrotizing pancreatitis, hypertensive urgency (?pain related), and suspected splenic vein thrombosis.      3/22: 3L crystalloid  3/23: 2L crystalloid + 200cc/hr (about 2L)    Events last 24 hours:   RRT called this am 2/ difficulty obtaining BP, patient alert and oriented, able to obtain on Zoll at bedside BP similar to BPs throughout the night.  Patient endorsing abd pain, remains tachycardic, now diaphoretic, oral temp unchanged, rectal temp pending.  AM labs worsening BUN/Cr 12.24 --> 33/3.86, AST/ALT / --> 690/467, Lipase 6749 --> 86362.  Lactate and CBC not drawn/canceled by lab stat ordered placed      PAST MEDICAL & SURGICAL HISTORY:  Hypertension      Hypothyroidism      Breast cancer      S/P       H/O left mastectomy        Allergies    No Known Allergies    Intolerances      FAMILY HISTORY:  FH: myocardial infarction        Social History:   from home       Review of Systems:  +abd pain      Physical Examination:    General: No acute distress.      HEENT: Pupils equal, reactive to light.  Symmetric.    PULM: Clear to auscultation bilaterally, no significant sputum production    CVS: Regular rate and rhythm, no murmurs, rubs, or gallops    ABD: Soft, nondistended, nontender, normoactive bowel sounds, no masses    EXT: No edema, nontender    SKIN: Warm and well perfused, no rashes noted.    NEURO: Alert, oriented, interactive, nonfocal      Medications:    hydrALAZINE Injectable 10 milliGRAM(s) IV Push every 8 hours PRN      morphine  - Injectable 2 milliGRAM(s) IV Push every 4 hours PRN  morphine  - Injectable 4 milliGRAM(s) IV Push every 4 hours PRN  ondansetron Injectable 4 milliGRAM(s) IV Push every 6 hours PRN      heparin   Injectable 5000 Unit(s) IV Push every 6 hours PRN  heparin  Infusion.  Unit(s)/Hr IV Continuous <Continuous>        levothyroxine Injectable 12.5 MICROGram(s) IV Push at bedtime    lactated ringers. 1000 milliLiter(s) IV Continuous <Continuous>                ICU Vital Signs Last 24 Hrs  T(C): 36.4 (24 Mar 2023 06:40), Max: 36.6 (23 Mar 2023 07:15)  T(F): 97.6 (24 Mar 2023 06:40), Max: 97.9 (23 Mar 2023 18:15)  HR: 132 (24 Mar 2023 06:40) (99 - 133)  BP: 117/90 (24 Mar 2023 06:40) (117/90 - 176/113)  BP(mean): --  ABP: --  ABP(mean): --  RR: 24 (24 Mar 2023 06:40) (12 - 24)  SpO2: 96% (24 Mar 2023 06:40) (95% - 100%)    O2 Parameters below as of 24 Mar 2023 06:40  Patient On (Oxygen Delivery Method): room air          Vital Signs Last 24 Hrs  T(C): 36.4 (24 Mar 2023 06:40), Max: 36.6 (23 Mar 2023 07:15)  T(F): 97.6 (24 Mar 2023 06:40), Max: 97.9 (23 Mar 2023 18:15)  HR: 132 (24 Mar 2023 06:40) (99 - 133)  BP: 117/90 (24 Mar 2023 06:40) (117/90 - 176/113)  BP(mean): --  RR: 24 (24 Mar 2023 06:40) (12 - 24)  SpO2: 96% (24 Mar 2023 06:40) (95% - 100%)    Parameters below as of 24 Mar 2023 06:40  Patient On (Oxygen Delivery Method): room air            I&O's Detail    23 Mar 2023 07:01  -  24 Mar 2023 06:50  --------------------------------------------------------  IN:    Heparin Infusion: 192 mL    Lactated Ringers: 2700 mL    Lactated Ringers Bolus: 1500 mL  Total IN: 4392 mL    OUT:  Total OUT: 0 mL    Total NET: 4392 mL            LABS:                        16.8   11.53 )-----------( 308      ( 23 Mar 2023 05:55 )             53.0     03-24    134<L>  |  106  |  33<H>  ----------------------------<  404<H>  5.7<H>   |  19<L>  |  3.86<H>    Ca    7.9<L>      24 Mar 2023 05:30  Phos  3.9     03-24  Mg     1.7     03-24    TPro  6.5  /  Alb  2.6<L>  /  TBili  0.9  /  DBili  x   /  AST  690<H>  /  ALT  467<H>  /  AlkPhos  82  03-24          CAPILLARY BLOOD GLUCOSE      POCT Blood Glucose.: 384 mg/dL (24 Mar 2023 06:35)    PT/INR - ( 24 Mar 2023 05:30 )   PT: 19.2 sec;   INR: 1.59 ratio         PTT - ( 24 Mar 2023 05:30 )  PTT:162.9 sec  Urinalysis Basic - ( 22 Mar 2023 23:00 )    Color: Yellow / Appearance: Clear / S.015 / pH: x  Gluc: x / Ketone: Small  / Bili: Negative / Urobili: Negative mg/dL   Blood: x / Protein: 30 mg/dL / Nitrite: Positive   Leuk Esterase: Negative / RBC: 0-2 /HPF / WBC 0-2   Sq Epi: x / Non Sq Epi: Moderate / Bacteria: Few      CULTURES:        RADIOLOGY: ***      SUPPLEMENTAL O2:   LINES:  IVF:  BROWN:   PPx:   CONTACT:

## 2023-03-24 NOTE — CONSULT NOTE ADULT - ASSESSMENT
Pt with metabolic acidosis in setting of oliguric renal failure requiring renal replacement therapy

## 2023-03-24 NOTE — PROGRESS NOTE ADULT - ASSESSMENT
48F PMH HTN, obesity, hypothyroidism, L breast CA s/p mastectomy and chemo 2008 presents to ED 3/22 for acute onset of abdominal pain after eating pizza. Found to have acute necrotizing pancreatitis involving > 50% pancreas along with splenic vein thrombosis. Course with worsening hemodynamics, escalating pressor requirements, no urine output with increasing Cr and liver enzymes along with increasing lactate. Intubated today.     DX: severe sepsis, septic shock due to acute necrotizing pancreatitis with ARF, hyperkalemia, worsening lactic acidosis, shock liver, acute hypoxic respiratory failure, respiratory failure requiring intubation     POCUS lung: increasing B lines noted on exam this afternoon  POCUS echo: limited due to poor cardiac windows, pt also tachycardic making LV function difficult to assess but appears to be within normal limits    NEURO  sedated on fentanyl and ketamine     CV  shock state  now on triple pressors levo, vaso, venkata  maintain MAP > 65    PULM  intubated  mechanical vent support    RENAL  may need renal replacement therapy  CRRT with hypotension  hyperkalemia s/p albuterol neb, lokelma, insulin, bicarb  worsening metabolic acidosis: start bicarb drip (s/p 3 amps bicarb push today)  monitor electrolytes and Cr  no urine output with viera insertion    GI  NPO  lipase on the rise  abdominal US without CBD obstruction, + 2cm gallstone noted in gallbladder, no GB wall edema  triglycerides not elevated  unclear cause of acute pancreatitis  no prior hx  social alcohol intake: had 1 cocktail 2 weeks ago  shock liver: supportive care, maintain perfusion  monitor for worsening abdominal distension / pressure  check bladder pressure as needed    ID  on empiric zosyn  follow up blood cx, u cx    HEME  splenic vein thrombosis: cont on heparin drip  monitor PTT  would repeat and check coags in setting of worsening liver failure    GEN  critically ill  progression to multiorgan failure  frequent bedside visits to stabilize critical condition  full code  DVT ppx: on heparin drip  son Pavel and sister Flor updated at bedside  [place] on diversion  accepted to [facility] SICU for higher level of care  d/w family, eicu, surgical team, ICU team

## 2023-03-24 NOTE — CONSULT NOTE ADULT - SUBJECTIVE AND OBJECTIVE BOX
Patient chart reviewed, full consult to follow.     Thank you for the courtesy of this consultation. [facility] NEPHROLOGY SERVICES, [facility]  NEPHROLOGY AND HYPERTENSION  [address]  [address]  [place] [address]  [phone]    MD DEVON QUINN MD YELENA ROSENBERG, MD BINNY KOSHY, MD CHRISTOPHER CAPUTO, MD EDWARD BOVER, MD      Information from chart:  "Patient is a 48y old  Female who presents with a chief complaint of Necrotizing pancreatitis (24 Mar 2023 10:05)    HPI:  49 y/o female PMHx BMI:44.9, HTN, hypothyroidism,  30 years ago, breast ca s/p left mastectomy and chemo  presents c/o epigastric/LUQ pain a few hours after eating pizza yesterday. Had multiple episodes of NBNB emesis. Patient denies fever, chills, constipation, diarrhea, melena, hematochezia, dysuria, hematuria, chest pain, shortness of breath, dizziness, cough. Pt seen and examined with Dr. Andrade.  (23 Mar 2023 02:27)   "    Patient awake and alert with moderate respiratory distress;   Tachypnea and tachycardia;   CHATO noted ;minimal UO      PAST MEDICAL & SURGICAL HISTORY:  Hypertension      Hypothyroidism      Breast cancer      S/P       H/O left mastectomy        FAMILY HISTORY:  FH: myocardial infarction      Allergies    No Known Allergies    Intolerances      Home Medications:  fentaNYL: intravenous every minute (24 Mar 2023 13:48)  heparin 100 units/mL-D5% intravenous solution: 10 milliliter(s) intravenous every minute (24 Mar 2023 13:48)  ketamine 10 mg/mL injectable solution: intravenous every minute (24 Mar 2023 13:48)  norepinephrine: 16 milligram(s) intravenous every minute (24 Mar 2023 13:48)  phenylephrine: 40 milligram(s) intravenous every minute (24 Mar 2023 13:48)  piperacillin-tazobactam: 3.375 gram(s) intravenous every minute (24 Mar 2023 13:48)  sodium bicarbonate: 150 drop(s) intravenous every minute (24 Mar 2023 13:48)    MEDICATIONS  (STANDING):  chlorhexidine 0.12% Liquid 15 milliLiter(s) Oral Mucosa every 12 hours  chlorhexidine 2% Cloths 1 Application(s) Topical daily  fentaNYL   Infusion. 0.5 MICROgram(s)/kG/Hr (5.64 mL/Hr) IV Continuous <Continuous>  heparin  Infusion. 1000 Unit(s)/Hr (10 mL/Hr) IV Continuous <Continuous>  insulin lispro (ADMELOG) corrective regimen sliding scale   SubCutaneous every 4 hours  ketamine Infusion. 0.25 mG/kG/Hr (2.82 mL/Hr) IV Continuous <Continuous>  levothyroxine Injectable 12.5 MICROGram(s) IV Push at bedtime  norepinephrine Infusion 0.05 MICROgram(s)/kG/Min (5.29 mL/Hr) IV Continuous <Continuous>  phenylephrine    Infusion 0.5 MICROgram(s)/kG/Min (23 mL/Hr) IV Continuous <Continuous>  piperacillin/tazobactam IVPB.. 3.375 Gram(s) IV Intermittent every 12 hours  sodium bicarbonate  Infusion 0.199 mEq/kG/Hr (150 mL/Hr) IV Continuous <Continuous>  vasopressin Infusion 0.04 Unit(s)/Min (6 mL/Hr) IV Continuous <Continuous>    MEDICATIONS  (PRN):  ondansetron Injectable 4 milliGRAM(s) IV Push every 6 hours PRN Nausea    Vital Signs Last 24 Hrs  T(C): 36.9 (24 Mar 2023 11:30), Max: 37.8 (24 Mar 2023 07:00)  T(F): 98.5 (24 Mar 2023 11:30), Max: 100 (24 Mar 2023 07:00)  HR: 112 (24 Mar 2023 13:00) (112 - 138)  BP: 94/32 (24 Mar 2023 09:00) (71/38 - 143/101)  BP(mean): 46 (24 Mar 2023 09:00) (29 - 109)  RR: 34 (24 Mar 2023 13:00) (18 - 78)  SpO2: 100% (24 Mar 2023 13:00) (86% - 100%)    Parameters below as of 24 Mar 2023 10:30  Patient On (Oxygen Delivery Method): nasal cannula  O2 Flow (L/min): 3        Daily Height in cm: 154.94 (24 Mar 2023 07:15)    Daily Weight in k.8 (24 Mar 2023 07:15)    23 @ 07:01  -  23 @ 07:00  --------------------------------------------------------  IN: 4392 mL / OUT: 0 mL / NET: 4392 mL    23 @ 07:01  -  23 @ 13:56  --------------------------------------------------------  IN: 1802.2 mL / OUT: 0 mL / NET: 1802.2 mL      CAPILLARY BLOOD GLUCOSE      POCT Blood Glucose.: 293 mg/dL (24 Mar 2023 13:01)  POCT Blood Glucose.: 375 mg/dL (24 Mar 2023 10:31)  POCT Blood Glucose.: 440 mg/dL (24 Mar 2023 07:23)  POCT Blood Glucose.: 384 mg/dL (24 Mar 2023 06:35)    PHYSICAL EXAM:      T(C): 36.9 (23 @ 11:30), Max: 37.8 (23 @ 07:00)  HR: 112 (23 @ 13:00) (112 - 138)  BP: 94/32 (23 @ 09:00) (71/38 - 143/101)  RR: 34 (23 @ 13:00) (18 - 78)  SpO2: 100% (23 @ 13:00) (86% - 100%)  Wt(kg): --  Lungs decreased BS at bases   Heart S1S2  Abd distended diffuse tenderness   Extremities:   tr edema                  134<L>  |  108  |  36<H>  ----------------------------<  381<H>  6.0<H>   |  14<L>  |  4.27<H>    Ca    6.8<L>      24 Mar 2023 08:47  Phos  4.1       Mg     1.7         TPro  5.4<L>  /  Alb  2.2<L>  /  TBili  0.8  /  DBili  0.2  /  AST  1196<H>  /  ALT  745<H>  /  AlkPhos  75                            9.8    18.46 )-----------( 207      ( 24 Mar 2023 13:00 )             31.1     Creatinine Trend: 4.27<--, 3.86<--, 1.24<--, 1.25<--  Urinalysis Basic - ( 24 Mar 2023 05:30 )    Color: Yellow / Appearance: Clear / S.020 / pH: x  Gluc: x / Ketone: Trace  / Bili: Negative / Urobili: Negative mg/dL   Blood: x / Protein: 100 mg/dL / Nitrite: Negative   Leuk Esterase: Trace / RBC: 0-2 /HPF / WBC 6-10   Sq Epi: x / Non Sq Epi: Moderate / Bacteria: Many      ABG - ( 24 Mar 2023 13:23 )  pH, Arterial: 7.17  pH, Blood: x     /  pCO2: 31    /  pO2: 322   / HCO3: 11    / Base Excess: -16.0 /  SaO2: 99.6                  Assessment   CHATO severe pancreatitis; septic shock, ischemic ATN  Oliguric, metabolic acidosis and hyperkalemia, short term recovery low    Plan  Would recommend initiation of HD if hemodynamics allow;   Will require daily, low efficiency protocol;   Prognosis is guarded.     aMrcello Rodriguez MD        Patient chart reviewed, full consult to follow.     Thank you for the courtesy of this consultation. [facility] NEPHROLOGY SERVICES, [facility]  NEPHROLOGY AND HYPERTENSION  [address]  [address]  [place] [address]  [phone]    MD DEVON QUINN MD YELENA ROSENBERG, MD BINNY KOSHY, MD CHRISTOPHER CAPUTO, MD EDWARD BOVER, MD      Information from chart:  "Patient is a 48y old  Female who presents with a chief complaint of Necrotizing pancreatitis (24 Mar 2023 10:05)    HPI:  47 y/o female PMHx BMI:44.9, HTN, hypothyroidism,  30 years ago, breast ca s/p left mastectomy and chemo  presents c/o epigastric/LUQ pain a few hours after eating pizza yesterday. Had multiple episodes of NBNB emesis. Patient denies fever, chills, constipation, diarrhea, melena, hematochezia, dysuria, hematuria, chest pain, shortness of breath, dizziness, cough. Pt seen and examined with Dr. Andrade.  (23 Mar 2023 02:27)   "    Patient awake and alert with moderate respiratory distress;   Tachypnea and tachycardia;   CHATO noted ;minimal UO      PAST MEDICAL & SURGICAL HISTORY:  Hypertension      Hypothyroidism      Breast cancer      S/P       H/O left mastectomy        FAMILY HISTORY:  FH: myocardial infarction      Allergies    No Known Allergies    Intolerances      Home Medications:  fentaNYL: intravenous every minute (24 Mar 2023 13:48)  heparin 100 units/mL-D5% intravenous solution: 10 milliliter(s) intravenous every minute (24 Mar 2023 13:48)  ketamine 10 mg/mL injectable solution: intravenous every minute (24 Mar 2023 13:48)  norepinephrine: 16 milligram(s) intravenous every minute (24 Mar 2023 13:48)  phenylephrine: 40 milligram(s) intravenous every minute (24 Mar 2023 13:48)  piperacillin-tazobactam: 3.375 gram(s) intravenous every minute (24 Mar 2023 13:48)  sodium bicarbonate: 150 drop(s) intravenous every minute (24 Mar 2023 13:48)    MEDICATIONS  (STANDING):  chlorhexidine 0.12% Liquid 15 milliLiter(s) Oral Mucosa every 12 hours  chlorhexidine 2% Cloths 1 Application(s) Topical daily  fentaNYL   Infusion. 0.5 MICROgram(s)/kG/Hr (5.64 mL/Hr) IV Continuous <Continuous>  heparin  Infusion. 1000 Unit(s)/Hr (10 mL/Hr) IV Continuous <Continuous>  insulin lispro (ADMELOG) corrective regimen sliding scale   SubCutaneous every 4 hours  ketamine Infusion. 0.25 mG/kG/Hr (2.82 mL/Hr) IV Continuous <Continuous>  levothyroxine Injectable 12.5 MICROGram(s) IV Push at bedtime  norepinephrine Infusion 0.05 MICROgram(s)/kG/Min (5.29 mL/Hr) IV Continuous <Continuous>  phenylephrine    Infusion 0.5 MICROgram(s)/kG/Min (23 mL/Hr) IV Continuous <Continuous>  piperacillin/tazobactam IVPB.. 3.375 Gram(s) IV Intermittent every 12 hours  sodium bicarbonate  Infusion 0.199 mEq/kG/Hr (150 mL/Hr) IV Continuous <Continuous>  vasopressin Infusion 0.04 Unit(s)/Min (6 mL/Hr) IV Continuous <Continuous>    MEDICATIONS  (PRN):  ondansetron Injectable 4 milliGRAM(s) IV Push every 6 hours PRN Nausea    Vital Signs Last 24 Hrs  T(C): 36.9 (24 Mar 2023 11:30), Max: 37.8 (24 Mar 2023 07:00)  T(F): 98.5 (24 Mar 2023 11:30), Max: 100 (24 Mar 2023 07:00)  HR: 112 (24 Mar 2023 13:00) (112 - 138)  BP: 94/32 (24 Mar 2023 09:00) (71/38 - 143/101)  BP(mean): 46 (24 Mar 2023 09:00) (29 - 109)  RR: 34 (24 Mar 2023 13:00) (18 - 78)  SpO2: 100% (24 Mar 2023 13:00) (86% - 100%)    Parameters below as of 24 Mar 2023 10:30  Patient On (Oxygen Delivery Method): nasal cannula  O2 Flow (L/min): 3        Daily Height in cm: 154.94 (24 Mar 2023 07:15)    Daily Weight in k.8 (24 Mar 2023 07:15)    23 @ 07:01  -  23 @ 07:00  --------------------------------------------------------  IN: 4392 mL / OUT: 0 mL / NET: 4392 mL    23 @ 07:01  -  23 @ 13:56  --------------------------------------------------------  IN: 1802.2 mL / OUT: 0 mL / NET: 1802.2 mL      CAPILLARY BLOOD GLUCOSE      POCT Blood Glucose.: 293 mg/dL (24 Mar 2023 13:01)  POCT Blood Glucose.: 375 mg/dL (24 Mar 2023 10:31)  POCT Blood Glucose.: 440 mg/dL (24 Mar 2023 07:23)  POCT Blood Glucose.: 384 mg/dL (24 Mar 2023 06:35)    PHYSICAL EXAM:      T(C): 36.9 (23 @ 11:30), Max: 37.8 (23 @ 07:00)  HR: 112 (23 @ 13:00) (112 - 138)  BP: 94/32 (23 @ 09:00) (71/38 - 143/101)  RR: 34 (23 @ 13:00) (18 - 78)  SpO2: 100% (23 @ 13:00) (86% - 100%)  Wt(kg): --  Lungs decreased BS at bases   Heart S1S2  Abd distended diffuse tenderness   Extremities:   tr edema                  134<L>  |  108  |  36<H>  ----------------------------<  381<H>  6.0<H>   |  14<L>  |  4.27<H>    Ca    6.8<L>      24 Mar 2023 08:47  Phos  4.1       Mg     1.7         TPro  5.4<L>  /  Alb  2.2<L>  /  TBili  0.8  /  DBili  0.2  /  AST  1196<H>  /  ALT  745<H>  /  AlkPhos  75                            9.8    18.46 )-----------( 207      ( 24 Mar 2023 13:00 )             31.1     Creatinine Trend: 4.27<--, 3.86<--, 1.24<--, 1.25<--  Urinalysis Basic - ( 24 Mar 2023 05:30 )    Color: Yellow / Appearance: Clear / S.020 / pH: x  Gluc: x / Ketone: Trace  / Bili: Negative / Urobili: Negative mg/dL   Blood: x / Protein: 100 mg/dL / Nitrite: Negative   Leuk Esterase: Trace / RBC: 0-2 /HPF / WBC 6-10   Sq Epi: x / Non Sq Epi: Moderate / Bacteria: Many      ABG - ( 24 Mar 2023 13:23 )  pH, Arterial: 7.17  pH, Blood: x     /  pCO2: 31    /  pO2: 322   / HCO3: 11    / Base Excess: -16.0 /  SaO2: 99.6                  Assessment   CHATO severe pancreatitis; septic shock, ischemic ATN  Oliguric, metabolic acidosis and hyperkalemia with probability of short term recovery low.  For transfer to tertiary care center     Plan  Would recommend initiation of CRRT upon transfer.    IV bicarbonate, medical rx potassium.  Prognosis is guarded.     Marcello Rodriguez MD

## 2023-03-25 NOTE — PROGRESS NOTE ADULT - ATTENDING COMMENTS
Patient examined and ROS reviewed. A case of oliguric CHATO in the setting of necrotizing pancreatitis  with shock on vasopressors support. Patient is undergoing CRRT. Patient is tolerating CRRT. Advised to continue CRRT.

## 2023-03-25 NOTE — DOWNTIME INTERRUPTION NOTE - WHICH MANUAL FORMS INITIATED?
Due to the [employer] bundled upgrade / Downtime 03/24-03/25/2023 all the notes and flow sheets related to Respiratory Care Services provided to this patient related to mechanical ventilation, Noninvasive Ventilation (NIV) and High Flow Nasal Cannula (HFNC) during this [facility] downtime are filed in patient’s paper chart.

## 2023-03-25 NOTE — PROGRESS NOTE ADULT - ATTENDING COMMENTS
The patient was seen and examined, chart and notes reviewed.  The current diagnosis, plan of care and alternatives have been discussed with the patient.  All questions have been answered and updates have been discussed.  The case was discussed with B/SICU team residents/PA's and medical students at morning B/SICU surgical rounds and throughout the course of the day.    Septic shock secondary to necrotizing pancreatitis  a.  Remains on multi-vasopressor support  b.  On meropenem, flagyl, vncomycin  c.  With guarded prognosis    CHATO  a.  On CVVH  b,. Keep net 0-100+/hr  c.  Continue IVF resuscitation  d.  Monitor strict I and Os  e.  Wean Bicarb gtt    Malnutrition  a.  NPO  b.  NGT in place    Respiratory failure  a.  Remains mechanical ventilator dependent  b.  With adequate gas exchange  c.  Obtain daily ABG/CXR

## 2023-03-25 NOTE — PROGRESS NOTE ADULT - SUBJECTIVE AND OBJECTIVE BOX
Surgery Progress Note   Interval Events:   - on CRRT   - continuing to require vaso, levo, venkata  - uptrending lactate  - 4L LR given   - 250cc albumin   - bedside POCUS performed, difficult to visualize IVC  - Gave 6gm Ca gluc, bicarb amp x 2    Subjective/24hour Events: Patient seen and examined at the bedside this morning.     Vital Signs:  Vital Signs Last 24 Hrs  T(C): 36.7 (25 Mar 2023 16:00), Max: 37.1 (25 Mar 2023 12:00)  T(F): 98.1 (25 Mar 2023 16:00), Max: 98.8 (25 Mar 2023 12:00)  HR: 104 (25 Mar 2023 18:00) (94 - 112)  BP: --  BP(mean): --  RR: 22 (25 Mar 2023 18:00) (20 - 22)  SpO2: 97% (25 Mar 2023 18:00) (95% - 100%)    Parameters below as of 24 Mar 2023 22:00  Patient On (Oxygen Delivery Method): ventilator    O2 Concentration (%): 50        I&O's Detail    24 Mar 2023 07:01  -  25 Mar 2023 07:00  --------------------------------------------------------  IN:    Dexmedetomidine: 102.9 mL    FentaNYL: 93.9 mL    Insulin: 5 mL    Insulin: 4 mL    Insulin: 5 mL    Insulin: 6 mL    IV PiggyBack: 100 mL    Lactated Ringers: 850 mL    Lactated Ringers Bolus: 1000 mL    Norepinephrine: 137.7 mL    Phenylephrine: 154.7 mL    Phenylephrine: 221 mL    Vasopressin: 18 mL    Vasopressin: 18 mL  Total IN: 2716.2 mL    OUT:    Indwelling Catheter - Urethral (mL): 0 mL    Nasogastric/Oral tube (mL): 100 mL    Other (mL): 190 mL  Total OUT: 290 mL    Total NET: 2426.2 mL      25 Mar 2023 07:01  -  25 Mar 2023 18:07  --------------------------------------------------------  IN:    Dexmedetomidine: 294 mL    dextrose 5% + lactated ringers: 1100 mL    FentaNYL: 354 mL    Norepinephrine: 475.5 mL    Phenylephrine: 601.3 mL    Vasopressin: 90 mL  Total IN: 2914.8 mL    OUT:    Indwelling Catheter - Urethral (mL): 0 mL    Nasogastric/Oral tube (mL): 100 mL    Other (mL): 0 mL  Total OUT: 100 mL    Total NET: 2814.8 mL            Physical Exam:  Gen: sedated and intubated   CV: on pressor support  Lungs: intubated. Mechanical ventilation  Ab: Soft, nontender, distended    Labs:    03-25    141  |  105  |  22  ----------------------------<  158<H>  5.0   |  19<L>  |  2.77<H>    Ca    7.2<L>      25 Mar 2023 16:20  Phos  2.8     03-25  Mg     1.90     03-25    TPro  4.7<L>  /  Alb  2.8<L>  /  TBili  2.2<H>  /  DBili  x   /  AST  x   /  ALT  x   /  AlkPhos  109  03-25    CAPILLARY BLOOD GLUCOSE      POCT Blood Glucose.: 130 mg/dL (25 Mar 2023 12:27)  POCT Blood Glucose.: 121 mg/dL (25 Mar 2023 08:22)  POCT Blood Glucose.: 212 mg/dL (25 Mar 2023 06:05)  POCT Blood Glucose.: 84 mg/dL (25 Mar 2023 05:48)  POCT Blood Glucose.: 129 mg/dL (25 Mar 2023 03:58)  POCT Blood Glucose.: 146 mg/dL (25 Mar 2023 03:35)  POCT Blood Glucose.: 56 mg/dL (25 Mar 2023 03:08)  POCT Blood Glucose.: 68 mg/dL (25 Mar 2023 03:06)  POCT Blood Glucose.: 103 mg/dL (25 Mar 2023 01:06)  POCT Blood Glucose.: 101 mg/dL (24 Mar 2023 23:56)  POCT Blood Glucose.: 121 mg/dL (24 Mar 2023 23:00)  POCT Blood Glucose.: 137 mg/dL (24 Mar 2023 22:02)  POCT Blood Glucose.: 159 mg/dL (24 Mar 2023 21:06)  POCT Blood Glucose.: 217 mg/dL (24 Mar 2023 20:05)  POCT Blood Glucose.: 250 mg/dL (24 Mar 2023 19:01)    LIVER FUNCTIONS - ( 25 Mar 2023 16:20 )  Alb: 2.8 g/dL / Pro: 4.7 g/dL / ALK PHOS: 109 U/L / ALT: x     / AST: x     / GGT: x                                 6.8    14.78 )-----------( 126      ( 25 Mar 2023 16:20 )             20.5     PT/INR - ( 25 Mar 2023 16:00 )   PT: 32.6 sec;   INR: 2.78 ratio         PTT - ( 25 Mar 2023 16:00 )  PTT:28.6 sec

## 2023-03-25 NOTE — PROGRESS NOTE ADULT - ATTENDING COMMENTS
48 year old female with necrotizing pancreatitis in respiratory failure and hemodynamically unstable requiring vasopressors in SICU.  EXAM  NEUROLOGY  Exam: Sedated, arousable  RESPIRATORY  Exam: Lungs clear   Mechanical Ventilation: AC: 450/5/22/50  CARDIOVASCULAR  Exam.  Regular rate   GI/NUTRITION  Exam: Abdomen moderately distended.    PLAN:  Neuro:  - precedex  - Fentanyl   Resp:  - Mechanical ventilation - AC settings  CV:  - wean levo, venkata, and vaso  - goal MAP > 65 mmHg  GI:   - NPO  - Protonix  Renal:  -CVVH  Heme:  - Monitor CBC and coags  - SQH for VTE prophylaxis  ID:   - Monitor WBC and temperature  - f/u blood cultures  Endo:   - Cont to monitor FS  - insulin gtt d/c'd  Code Status: Full code    Disposition: SICU

## 2023-03-25 NOTE — PROGRESS NOTE ADULT - SUBJECTIVE AND OBJECTIVE BOX
[facility] DIVISION OF KIDNEY DISEASES AND HYPERTENSION   FOLLOW UP NOTE  --------------------------------------------------------------------------------  Chief Complaint: Acute renal failure, acidosis, hyperkalemia requiring RRT    24 hour events/subjective: Pt. was seen and examined in the SICU. She remains intubated, sedated, on IV vasopressors. She remains on CRRT but also remains anuric. Unable to obtain further ROS.     PAST HISTORY  --------------------------------------------------------------------------------  No significant changes to PMH, PSH, FHx, SHx, unless otherwise noted    ALLERGIES & MEDICATIONS  --------------------------------------------------------------------------------  Allergies  No Known Allergies    Standing Inpatient Medications  albumin human  5% IVPB 500 milliLiter(s) IV Intermittent once  chlorhexidine 0.12% Liquid 15 milliLiter(s) Oral Mucosa every 12 hours  chlorhexidine 4% Liquid 1 Application(s) Topical <User Schedule>  CRRT Treatment    <Continuous>  dexMEDEtomidine Infusion 0.2 MICROgram(s)/kG/Hr IV Continuous <Continuous>  fentaNYL   Infusion. 0.5 MICROgram(s)/kG/Hr IV Continuous <Continuous>  heparin   Injectable 5000 Unit(s) SubCutaneous every 8 hours  lactated ringers Bolus 1000 milliLiter(s) IV Bolus once  lactated ringers Bolus 2000 milliLiter(s) IV Bolus once  lactated ringers. 1000 milliLiter(s) IV Continuous <Continuous>  norepinephrine Infusion 0.05 MICROgram(s)/kG/Min IV Continuous <Continuous>  pantoprazole  Injectable 40 milliGRAM(s) IV Push daily  phenylephrine    Infusion 0.5 MICROgram(s)/kG/Min IV Continuous <Continuous>  PrismaSATE Dialysate BGK 4 / 2.5 5000 milliLiter(s) CRRT <Continuous>  PrismaSOL Filtration BGK 4 / 2.5 5000 milliLiter(s) CRRT <Continuous>  PrismaSOL Filtration BGK 4 / 2.5 5000 milliLiter(s) CRRT <Continuous>  vasopressin Infusion 0.03 Unit(s)/Min IV Continuous <Continuous>    PRN Inpatient Medications    REVIEW OF SYSTEMS  --------------------------------------------------------------------------------  Unable to obtain ROS    VITALS/PHYSICAL EXAM  --------------------------------------------------------------------------------  T(C): 37.1 (03-25-23 @ 12:00), Max: 37.1 (03-25-23 @ 12:00)  HR: 108 (03-25-23 @ 12:00) (94 - 122)  BP: 92/80 (03-24-23 @ 17:30) (77/67 - 94/80)  RR: 22 (03-25-23 @ 12:00) (15 - 29)  SpO2: 97% (03-25-23 @ 12:00) (93% - 100%)  Wt(kg): --  Height (cm): 162.6 (03-24-23 @ 15:10)  Weight (kg): 117.9 (03-24-23 @ 15:10)  BMI (kg/m2): 44.6 (03-24-23 @ 15:10)  BSA (m2): 2.19 (03-24-23 @ 15:10)      03-24-23 @ 07:01  -  03-25-23 @ 07:00  --------------------------------------------------------  IN: 2716.2 mL / OUT: 290 mL / NET: 2426.2 mL    03-25-23 @ 07:01  -  03-25-23 @ 13:44  --------------------------------------------------------  IN: 572.4 mL / OUT: 0 mL / NET: 572.4 mL    Physical Exam:  	Gen: Ill appearing, intubated, sedated   	HEENT: +ETT  	Pulm: Mechanical breath sounds  	CV: Tachycardia  	Abd: Obese, distended  	Ext: Trace LE edema B/L  	Neuro: Sedated              : Babin+ with no urine in the bag  	Skin: Warm and dry    LABS/STUDIES  --------------------------------------------------------------------------------              6.8    14.38 >-----------<  129      [03-25-23 @ 12:30]              21.1     141  |  103  |  22  ----------------------------<  135      [03-25-23 @ 12:30]  5.0   |  16  |  2.87        Ca     7.4     [03-25-23 @ 12:30]      iCa    1.08     [03-25 @ 04:30]      Mg     1.80     [03-25-23 @ 12:30]      Phos  3.1     [03-25-23 @ 12:30]    TPro  4.5  /  Alb  2.8  /  TBili  2.0  /  DBili  x   /  AST  x   /  ALT  x   /  AlkPhos  100  [03-25-23 @ 12:30]    PT/INR: PT 30.1 , INR 2.57       [03-25-23 @ 08:25]  PTT: 28.2       [03-25-23 @ 08:25]    Creatinine Trend:  SCr 2.87 [03-25 @ 12:30]  SCr 3.12 [03-25 @ 08:25]  SCr 3.21 [03-25 @ 04:30]  SCr 3.51 [03-25 @ 01:00]  SCr 4.60 [03-24 @ 16:31]

## 2023-03-25 NOTE — PROGRESS NOTE ADULT - ASSESSMENT
48 year old female with necrotizing pancreatitis. Intubated and hemodynamically unstable requiring pressors.    PLAN:  -SICU Labs  - SQH  - wean pressors as tolerated  - NPO  - wean vent as tolerated  - appreciate SICU care    B team  [id number]

## 2023-03-25 NOTE — PROGRESS NOTE ADULT - PROBLEM SELECTOR PLAN 1
Pt with oliguric CHATO in the setting of necrotizing pancreatitis leading to shock, requiring vasopressors support. Scr was 1.24 on 3/23/23 and increased to 4.27 on 3/24/23. This is likely ATN. UA as above. Check spot urine TP/CR. Babin catheter in place. Initiated on CRRT (3/24) with net even balance, can adjust accordingly per SICU management needs as well. Continue CRRT. Monitor labs and urine output. Avoid NSAIDs, ACEI/ARBS, RCA and nephrotoxins. Dose medications as per eGFR.

## 2023-03-25 NOTE — PROGRESS NOTE ADULT - ASSESSMENT
48 year old female with necrotizing pancreatitis. Intubated and hemodynamically unstable requiring pressors.      PLAN:    Neuro:  - Sedation while intubated w/ precedex  - Multimodal pain control  - Fentanyl for pain control and sedation      Resp:  - Mechanical ventilation - AC settings      CV:  - Requiring 3 pressors on arrival (levo, venkata, and vaso)  - Will wean vasopressor support w/ goal MAP > 65 mmHg      GI:   - NPO  - NGT in place to LCWS  - Bowel regimen with senna & Miralax  - Protonix for stress ulcer prophylaxis      Renal:  - Babin catheter in place  - Cont CRRT  - monitor Is and Os, strict      Heme:  - Monitor CBC and coags  - SQH for VTE prophylaxis      ID:   - Monitor for clinical evidence of active infection  - Monitor WBC and temperature  - f/u blood cultures      Endo:   - Cont to monitor FS  - insulin gtt d/c'd      Code Status: Full code    Disposition: SICU

## 2023-03-25 NOTE — PROGRESS NOTE ADULT - PROBLEM SELECTOR PLAN 3
Pt with hyperkalemia in setting of acute renal failure. Serum potassium of 5.0. Continue CRRT as above. Continue to monitor serum potassium.

## 2023-03-26 PROBLEM — C50.919 MALIGNANT NEOPLASM OF UNSPECIFIED SITE OF UNSPECIFIED FEMALE BREAST: Chronic | Status: ACTIVE | Noted: 2023-01-01

## 2023-03-26 PROBLEM — E03.9 HYPOTHYROIDISM, UNSPECIFIED: Chronic | Status: ACTIVE | Noted: 2023-01-01

## 2023-03-26 PROBLEM — I10 ESSENTIAL (PRIMARY) HYPERTENSION: Chronic | Status: ACTIVE | Noted: 2023-01-01

## 2023-03-26 NOTE — PROGRESS NOTE ADULT - ATTENDING COMMENTS
I agree with the detailed interval history, physical, and plan, which I have reviewed and edited where appropriate'; also agree with notes/assessment with my team on service.  I have personally examined the patient.  I was physically present for the key portions of the evaluation and management (E/M) service provided.  I reviewed all the pertinent data.  The patient is a critical care patient with life threatening hemodynamic and metabolic instability in SICU.  The SICU team has a constant risk benefit analyzes discussion and coordinating care with the primary team and all consultants.   The patient is in SICU with the chief complaint and diagnosis mentioned in the note.   The plan will be specified in the note.  48 year old female with necrotizing pancreatitis in SICU in respiratory failure.  EXAM  NEUROLOGY  Exam: Sedated  RESPIRATORY  Exam: Lungs clear   Mechanical Ventilation: AC: 450/5/22/50  CARDIOVASCULAR  Exam.  Regular rate   GI/NUTRITION  Exam: Abdomen moderately distended.    PLAN:  Neuro:  - precedex  -Fentanyl   Resp:  -AC settings 450/22/5/40  CV:  - levo, and vaso  GI:   - NPO  - Protonix for stress ulcer prophylaxis  Renal:  - Cont CRRT, net negative 50   Heme:  - SQH for VTE prophylaxis  ID:   - meropenem and flagyl  Endo:   - Cont to monitor FS  Code Status: Full code  Disposition: SICU

## 2023-03-26 NOTE — PROGRESS NOTE ADULT - SUBJECTIVE AND OBJECTIVE BOX
Team B Surgery Daily Progress Note    Subjective:   Patient seen at bedside this AM with surgical team.    24h Events:   - please see SICU note    Objective:  Vital Signs  T(C): 38.2 (03-26 @ 08:00), Max: 38.2 (03-26 @ 08:00)  HR: 98 (03-26 @ 08:00) (96 - 112)  BP: --  RR: 22 (03-26 @ 08:00) (20 - 22)  SpO2: 100% (03-26 @ 07:54) (96% - 100%)  03-25-23 @ 07:01  -  03-26-23 @ 07:00  --------------------------------------------------------  IN:  Total IN: 0 mL    OUT:    Indwelling Catheter - Urethral (mL): 0 mL    Nasogastric/Oral tube (mL): 200 mL  Total OUT: 200 mL    Total NET: -200 mL      03-26-23 @ 07:01  -  03-26-23 @ 08:28  --------------------------------------------------------  IN:  Total IN: 0 mL    OUT:    Voided (mL): 5 mL  Total OUT: 5 mL    Total NET: -5 mL          Physical Exam:  GEN: resting in bed comfortably in NAD  RESP: no increased WOB  ABD: soft, non-distended, non-tender without rebound tenderness or guarding  EXTR: warm, well-perfused without gross deformities; spontaneous movement in b/l U/L extrem  NEURO: AAOx4    Labs:                        7.7    14.53 )-----------( 120      ( 26 Mar 2023 04:20 )             22.6   03-26    137  |  103  |  20  ----------------------------<  151<H>  4.9   |  19<L>  |  2.59<H>    Ca    7.2<L>      26 Mar 2023 04:20  Phos  2.7     03-26  Mg     2.00     03-26    TPro  4.5<L>  /  Alb  2.6<L>  /  TBili  3.2<H>  /  DBili  x   /  AST  6371<H>  /  ALT  3786<H>  /  AlkPhos  140<H>  03-26    CAPILLARY BLOOD GLUCOSE      POCT Blood Glucose.: 130 mg/dL (25 Mar 2023 12:27)      Medications:   MEDICATIONS  (STANDING):  albumin human  5% IVPB 250 milliLiter(s) IV Intermittent once  albumin human  5% IVPB 250 milliLiter(s) IV Intermittent once  chlorhexidine 0.12% Liquid 15 milliLiter(s) Oral Mucosa every 12 hours  chlorhexidine 4% Liquid 1 Application(s) Topical <User Schedule>  CRRT Treatment    <Continuous>  dexMEDEtomidine Infusion 0.2 MICROgram(s)/kG/Hr (6.11 mL/Hr) IV Continuous <Continuous>  fentaNYL   Infusion 0.5 MICROgram(s)/kG/Hr (5.9 mL/Hr) IV Continuous <Continuous>  fentaNYL   Infusion. 0.5 MICROgram(s)/kG/Hr (4 mL/Hr) IV Continuous <Continuous>  heparin   Injectable 5000 Unit(s) SubCutaneous every 8 hours  lactated ringers. 1000 milliLiter(s) (150 mL/Hr) IV Continuous <Continuous>  meropenem  IVPB      metroNIDAZOLE  IVPB 500 milliGRAM(s) IV Intermittent every 8 hours  norepinephrine Infusion 0.05 MICROgram(s)/kG/Min (5.73 mL/Hr) IV Continuous <Continuous>  pantoprazole  Injectable 40 milliGRAM(s) IV Push daily  phenylephrine    Infusion 0.5 MICROgram(s)/kG/Min (22.1 mL/Hr) IV Continuous <Continuous>  Phoxillum Filtration BK 4 / 2.5 5000 milliLiter(s) (1500 mL/Hr) CRRT <Continuous>  Phoxillum Filtration BK 4 / 2.5 5000 milliLiter(s) (400 mL/Hr) CRRT <Continuous>  PrismaSATE Dialysate BGK 4 / 2.5 5000 milliLiter(s) (2000 mL/Hr) CRRT <Continuous>  vasopressin Infusion 0.03 Unit(s)/Min (4.5 mL/Hr) IV Continuous <Continuous>    MEDICATIONS  (PRN):      Imaging:       Team B Surgery Daily Progress Note    Subjective:   Patient seen at bedside this AM with surgical team.    24h Events:   - please see SICU note    Objective:  Vital Signs  T(C): 38.2 (03-26 @ 08:00), Max: 38.2 (03-26 @ 08:00)  HR: 98 (03-26 @ 08:00) (96 - 112)  BP: --  RR: 22 (03-26 @ 08:00) (20 - 22)  SpO2: 100% (03-26 @ 07:54) (96% - 100%)  03-25-23 @ 07:01  -  03-26-23 @ 07:00  --------------------------------------------------------  IN:  Total IN: 0 mL    OUT:    Indwelling Catheter - Urethral (mL): 0 mL    Nasogastric/Oral tube (mL): 200 mL  Total OUT: 200 mL    Total NET: -200 mL      03-26-23 @ 07:01  -  03-26-23 @ 08:28  --------------------------------------------------------  IN:  Total IN: 0 mL    OUT:    Voided (mL): 5 mL  Total OUT: 5 mL    Total NET: -5 mL    Physical Exam:  Gen: sedated and intubated   CV: on pressor support  Lungs: intubated. Mechanical ventilation  Ab: Soft, nontender, distended    Labs:                        7.7    14.53 )-----------( 120      ( 26 Mar 2023 04:20 )             22.6   03-26    137  |  103  |  20  ----------------------------<  151<H>  4.9   |  19<L>  |  2.59<H>    Ca    7.2<L>      26 Mar 2023 04:20  Phos  2.7     03-26  Mg     2.00     03-26    TPro  4.5<L>  /  Alb  2.6<L>  /  TBili  3.2<H>  /  DBili  x   /  AST  6371<H>  /  ALT  3786<H>  /  AlkPhos  140<H>  03-26    CAPILLARY BLOOD GLUCOSE      POCT Blood Glucose.: 130 mg/dL (25 Mar 2023 12:27)      Medications:   MEDICATIONS  (STANDING):  albumin human  5% IVPB 250 milliLiter(s) IV Intermittent once  albumin human  5% IVPB 250 milliLiter(s) IV Intermittent once  chlorhexidine 0.12% Liquid 15 milliLiter(s) Oral Mucosa every 12 hours  chlorhexidine 4% Liquid 1 Application(s) Topical <User Schedule>  CRRT Treatment    <Continuous>  dexMEDEtomidine Infusion 0.2 MICROgram(s)/kG/Hr (6.11 mL/Hr) IV Continuous <Continuous>  fentaNYL   Infusion 0.5 MICROgram(s)/kG/Hr (5.9 mL/Hr) IV Continuous <Continuous>  fentaNYL   Infusion. 0.5 MICROgram(s)/kG/Hr (4 mL/Hr) IV Continuous <Continuous>  heparin   Injectable 5000 Unit(s) SubCutaneous every 8 hours  lactated ringers. 1000 milliLiter(s) (150 mL/Hr) IV Continuous <Continuous>  meropenem  IVPB      metroNIDAZOLE  IVPB 500 milliGRAM(s) IV Intermittent every 8 hours  norepinephrine Infusion 0.05 MICROgram(s)/kG/Min (5.73 mL/Hr) IV Continuous <Continuous>  pantoprazole  Injectable 40 milliGRAM(s) IV Push daily  phenylephrine    Infusion 0.5 MICROgram(s)/kG/Min (22.1 mL/Hr) IV Continuous <Continuous>  Phoxillum Filtration BK 4 / 2.5 5000 milliLiter(s) (1500 mL/Hr) CRRT <Continuous>  Phoxillum Filtration BK 4 / 2.5 5000 milliLiter(s) (400 mL/Hr) CRRT <Continuous>  PrismaSATE Dialysate BGK 4 / 2.5 5000 milliLiter(s) (2000 mL/Hr) CRRT <Continuous>  vasopressin Infusion 0.03 Unit(s)/Min (4.5 mL/Hr) IV Continuous <Continuous>    MEDICATIONS  (PRN):      Imaging:

## 2023-03-26 NOTE — PROGRESS NOTE ADULT - SUBJECTIVE AND OBJECTIVE BOX
[facility] DIVISION OF KIDNEY DISEASES AND HYPERTENSION   FOLLOW UP NOTE  --------------------------------------------------------------------------------  Chief Complaint: Acute renal failure, acidosis, hyperkalemia requiring RRT    24 hour events/subjective: Pt. was seen and examined in the SICU. She remains intubated, sedated, on IV vasopressors. She remains on CRRT but also remains anuric. CRRT clotted overnight, filter has been changed, no issues with clotting thus far this morning. Unable to obtain further ROS.     PAST HISTORY  --------------------------------------------------------------------------------  No significant changes to PMH, PSH, FHx, SHx, unless otherwise noted    ALLERGIES & MEDICATIONS  --------------------------------------------------------------------------------  Allergies  No Known Allergies    Standing Inpatient Medications  chlorhexidine 0.12% Liquid 15 milliLiter(s) Oral Mucosa every 12 hours  chlorhexidine 4% Liquid 1 Application(s) Topical <User Schedule>  CRRT Treatment    <Continuous>  dexMEDEtomidine Infusion 0.2 MICROgram(s)/kG/Hr IV Continuous <Continuous>  fentaNYL   Infusion 0.5 MICROgram(s)/kG/Hr IV Continuous <Continuous>  fentaNYL   Infusion. 0.5 MICROgram(s)/kG/Hr IV Continuous <Continuous>  heparin   Injectable 5000 Unit(s) SubCutaneous every 8 hours  lactated ringers. 1000 milliLiter(s) IV Continuous <Continuous>  meropenem  IVPB      metroNIDAZOLE  IVPB 500 milliGRAM(s) IV Intermittent every 8 hours  norepinephrine Infusion 0.05 MICROgram(s)/kG/Min IV Continuous <Continuous>  pantoprazole  Injectable 40 milliGRAM(s) IV Push daily  phenylephrine    Infusion 0.5 MICROgram(s)/kG/Min IV Continuous <Continuous>  PrismaSATE Dialysate BGK 4 / 2.5 5000 milliLiter(s) CRRT <Continuous>  PrismaSOL Filtration BGK 4 / 2.5 5000 milliLiter(s) CRRT <Continuous>  PrismaSOL Filtration BGK 4 / 2.5 5000 milliLiter(s) CRRT <Continuous>  vasopressin Infusion 0.03 Unit(s)/Min IV Continuous <Continuous>    PRN Inpatient Medications    REVIEW OF SYSTEMS  --------------------------------------------------------------------------------  Unable to obtain ROS     VITALS/PHYSICAL EXAM  --------------------------------------------------------------------------------  T(C): 38.1 (03-26-23 @ 04:00), Max: 38.1 (03-26-23 @ 04:00)  HR: 97 (03-26-23 @ 07:00) (96 - 112)  BP: --  RR: 22 (03-26-23 @ 07:00) (20 - 22)  SpO2: 100% (03-26-23 @ 07:00) (96% - 100%)  Wt(kg): --  Height (cm): 162.6 (03-24-23 @ 15:10)  Weight (kg): 117.9 (03-24-23 @ 15:10)  BMI (kg/m2): 44.6 (03-24-23 @ 15:10)  BSA (m2): 2.19 (03-24-23 @ 15:10)    03-25-23 @ 07:01  -  03-26-23 @ 07:00  --------------------------------------------------------  IN: 5834 mL / OUT: 200 mL / NET: 5634 mL    Physical Exam:  	Gen: Ill appearing, intubated, sedated   	HEENT: +ETT  	Pulm: Mechanical breath sounds  	CV: RRR  	Abd: Obese, distended  	Ext: + LE edema B/L  	Neuro: Sedated              : Babin+ with no urine in the bag  	Skin: Warm and dry    LABS/STUDIES  --------------------------------------------------------------------------------              7.7    14.53 >-----------<  120      [03-26-23 @ 04:20]              22.6     137  |  103  |  20  ----------------------------<  151      [03-26-23 @ 04:20]  4.9   |  19  |  2.59        Ca     7.2     [03-26-23 @ 04:20]      iCa    1.08     [03-25 @ 04:30]      Mg     2.00     [03-26-23 @ 04:20]      Phos  2.7     [03-26-23 @ 04:20]    TPro  4.5  /  Alb  2.6  /  TBili  3.2  /  DBili  x   /  AST  6371  /  ALT  3786  /  AlkPhos  140  [03-26-23 @ 04:20]    PT/INR: PT 31.3 , INR 2.67       [03-26-23 @ 04:20]  PTT: 28.1       [03-26-23 @ 04:20]    Creatinine Trend:  SCr 2.59 [03-26 @ 04:20]  SCr 2.52 [03-26 @ 00:40]  SCr 2.63 [03-25 @ 20:20]  SCr 2.77 [03-25 @ 16:20]  SCr 2.87 [03-25 @ 12:30] [facility] DIVISION OF KIDNEY DISEASES AND HYPERTENSION   FOLLOW UP NOTE  --------------------------------------------------------------------------------  Chief Complaint: Acute renal failure, acidosis, hyperkalemia requiring RRT    24 hour events/subjective: Pt. was seen and examined in the SICU. She remains intubated, sedated, on IV vasopressors. She remains on CRRT but also remains anuric. CRRT clotted overnight, filter has been changed, no issues with clotting thus far this morning. Unable to obtain further ROS.     PAST HISTORY  --------------------------------------------------------------------------------  No significant changes to PMH, PSH, FHx, SHx, unless otherwise noted    ALLERGIES & MEDICATIONS  --------------------------------------------------------------------------------  Allergies  No Known Allergies    Standing Inpatient Medications  chlorhexidine 0.12% Liquid 15 milliLiter(s) Oral Mucosa every 12 hours  chlorhexidine 4% Liquid 1 Application(s) Topical <User Schedule>  CRRT Treatment    <Continuous>  dexMEDEtomidine Infusion 0.2 MICROgram(s)/kG/Hr IV Continuous <Continuous>  fentaNYL   Infusion 0.5 MICROgram(s)/kG/Hr IV Continuous <Continuous>  fentaNYL   Infusion. 0.5 MICROgram(s)/kG/Hr IV Continuous <Continuous>  heparin   Injectable 5000 Unit(s) SubCutaneous every 8 hours  lactated ringers. 1000 milliLiter(s) IV Continuous <Continuous>  meropenem  IVPB      metroNIDAZOLE  IVPB 500 milliGRAM(s) IV Intermittent every 8 hours  norepinephrine Infusion 0.05 MICROgram(s)/kG/Min IV Continuous <Continuous>  pantoprazole  Injectable 40 milliGRAM(s) IV Push daily  phenylephrine    Infusion 0.5 MICROgram(s)/kG/Min IV Continuous <Continuous>  PrismaSATE Dialysate BGK 4 / 2.5 5000 milliLiter(s) CRRT <Continuous>  PrismaSOL Filtration BGK 4 / 2.5 5000 milliLiter(s) CRRT <Continuous>  PrismaSOL Filtration BGK 4 / 2.5 5000 milliLiter(s) CRRT <Continuous>  vasopressin Infusion 0.03 Unit(s)/Min IV Continuous <Continuous>    PRN Inpatient Medications    REVIEW OF SYSTEMS  --------------------------------------------------------------------------------  Unable to obtain ROS     VITALS/PHYSICAL EXAM  --------------------------------------------------------------------------------  T(C): 38.1 (03-26-23 @ 04:00), Max: 38.1 (03-26-23 @ 04:00)  HR: 97 (03-26-23 @ 07:00) (96 - 112)  BP: --  RR: 22 (03-26-23 @ 07:00) (20 - 22)  SpO2: 100% (03-26-23 @ 07:00) (96% - 100%)  Wt(kg): --  Height (cm): 162.6 (03-24-23 @ 15:10)  Weight (kg): 117.9 (03-24-23 @ 15:10)  BMI (kg/m2): 44.6 (03-24-23 @ 15:10)  BSA (m2): 2.19 (03-24-23 @ 15:10)    03-25-23 @ 07:01  -  03-26-23 @ 07:00  --------------------------------------------------------  IN: 5834 mL / OUT: 200 mL / NET: 5634 mL    Physical Exam:  	Gen: Ill appearing, intubated, sedated   	HEENT: +ETT  	Pulm: Mechanical breath sounds  	CV: RRR  	Abd: Obese, distended  	Ext: + LE edema B/L  	Neuro: Sedated              : Babin+ with no urine in the bag  	Skin: Warm and dry              Dialysis access: L IJ non tunneled HD catheter     LABS/STUDIES  --------------------------------------------------------------------------------              7.7    14.53 >-----------<  120      [03-26-23 @ 04:20]              22.6     137  |  103  |  20  ----------------------------<  151      [03-26-23 @ 04:20]  4.9   |  19  |  2.59        Ca     7.2     [03-26-23 @ 04:20]      iCa    1.08     [03-25 @ 04:30]      Mg     2.00     [03-26-23 @ 04:20]      Phos  2.7     [03-26-23 @ 04:20]    TPro  4.5  /  Alb  2.6  /  TBili  3.2  /  DBili  x   /  AST  6371  /  ALT  3786  /  AlkPhos  140  [03-26-23 @ 04:20]    PT/INR: PT 31.3 , INR 2.67       [03-26-23 @ 04:20]  PTT: 28.1       [03-26-23 @ 04:20]    Creatinine Trend:  SCr 2.59 [03-26 @ 04:20]  SCr 2.52 [03-26 @ 00:40]  SCr 2.63 [03-25 @ 20:20]  SCr 2.77 [03-25 @ 16:20]  SCr 2.87 [03-25 @ 12:30]

## 2023-03-26 NOTE — PROGRESS NOTE ADULT - PROBLEM SELECTOR PLAN 1
Pt with oliguric CHATO in the setting of necrotizing pancreatitis leading to shock, requiring vasopressors support. Scr was 1.24 on 3/23/23 and increased to 4.27 on 3/24/23. This is likely ATN. UA as above. Check spot urine TP/CR. Babin catheter in place. Initiated on CRRT (3/24) with net even balance, can adjust accordingly per SICU management needs as well. Continue CRRT. CT abd/pel on 3/22 showed "kidney/ureters within normal limits". Consider Renal US. Monitor labs and urine output. Avoid NSAIDs, ACEI/ARBS, RCA and nephrotoxins. Dose medications as per eGFR.

## 2023-03-26 NOTE — PROGRESS NOTE ADULT - ATTENDING COMMENTS
oliguric CHATO in the setting of necrotizing pancreatitis  with shock on vasopressors support   Patient is undergoing CRRT. Patient is tolerating CRRT. continue  can attempt to keep even to negative fluid balance

## 2023-03-26 NOTE — PROGRESS NOTE ADULT - SUBJECTIVE AND OBJECTIVE BOX
SICU Daily Progress Note  =====================================================  Interval Events:       - downtrending lactate  - continue on CRRT  - on levo, vaso. Start steroids if can't wean  - total 11L fluids, net pos, 1upRBC s/p t&s   - CRRT 50ccs negative   - bcx ngtd  - 101.2 febrile, aamir and flagyl started  - NGT placed post-pyloric, started nepro feeds   - LR from 150 to 50cc/hr          HPI:  48 year old female with PMHx significant for HTN, hypothyroidism,  30 years ago, breast cancer s/p left mastectomy and chemo in  presenting with epigastric pain and LUQ pain. Found to have necrotizing pancreatitis. Transferred from [facility] ([place] for hypotension and tachypnea requiring emergent intubation and multiple pressors. SICU consulted for hemodynamic monitoring and respiratory management        Allergies: No Known Allergies      MEDICATIONS:   --------------------------------------------------------------------------------------  Neurologic Medications  dexMEDEtomidine Infusion 0.2 MICROgram(s)/kG/Hr IV Continuous <Continuous>  fentaNYL   Infusion 0.5 MICROgram(s)/kG/Hr IV Continuous <Continuous>    Respiratory Medications    Cardiovascular Medications  norepinephrine Infusion 0.05 MICROgram(s)/kG/Min IV Continuous <Continuous>    Gastrointestinal Medications  calcium gluconate IVPB 2 Gram(s) IV Intermittent once  lactated ringers. 1000 milliLiter(s) IV Continuous <Continuous>  pantoprazole  Injectable 40 milliGRAM(s) IV Push daily    Genitourinary Medications    Hematologic/Oncologic Medications  heparin   Injectable 5000 Unit(s) SubCutaneous every 8 hours    Antimicrobial/Immunologic Medications  meropenem  IVPB      meropenem  IVPB 1000 milliGRAM(s) IV Intermittent every 12 hours  metroNIDAZOLE  IVPB 500 milliGRAM(s) IV Intermittent every 8 hours    Endocrine/Metabolic Medications  vasopressin Infusion 0.03 Unit(s)/Min IV Continuous <Continuous>    Topical/Other Medications  chlorhexidine 0.12% Liquid 15 milliLiter(s) Oral Mucosa every 12 hours  chlorhexidine 4% Liquid 1 Application(s) Topical <User Schedule>  CRRT Treatment    <Continuous>  Phoxillum Filtration BK 4 / 2.5 5000 milliLiter(s) CRRT <Continuous>  Phoxillum Filtration BK 4 / 2.5 5000 milliLiter(s) CRRT <Continuous>  PrismaSATE Dialysate BGK 4 / 2.5 5000 milliLiter(s) CRRT <Continuous>    --------------------------------------------------------------------------------------    VITAL SIGNS, INS/OUTS (last 24 hours):  --------------------------------------------------------------------------------------  ICU Vital Signs Last 24 Hrs  T(C): 37.9 (26 Mar 2023 16:00), Max: 38.2 (26 Mar 2023 08:00)  T(F): 100.2 (26 Mar 2023 16:00), Max: 100.8 (26 Mar 2023 08:00)  HR: 96 (26 Mar 2023 16:00) (87 - 106)  BP: --  BP(mean): --  ABP: 107/48 (26 Mar 2023 16:00) (88/43 - 126/60)  ABP(mean): 70 (26 Mar 2023 16:00) (59 - 84)  RR: 22 (26 Mar 2023 16:00) (22 - 22)  SpO2: 99% (26 Mar 2023 14:37) (96% - 100%)    O2 Parameters below as of 26 Mar 2023 07:00  Patient On (Oxygen Delivery Method): ventilator    O2 Concentration (%): 50    I&O's Detail    25 Mar 2023 07:  -  26 Mar 2023 07:00  --------------------------------------------------------  IN:    Dexmedetomidine: 485.1 mL    dextrose 5% + lactated ringers: 1550 mL    FentaNYL: 613.6 mL    IV PiggyBack: 50 mL    Lactated Ringers: 1500 mL    Norepinephrine: 802.6 mL    Phenylephrine: 601.3 mL    PRBCs (Packed Red Blood Cells): 300 mL    Vasopressin: 148.5 mL  Total IN: 6051.1 mL    OUT:    Indwelling Catheter - Urethral (mL): 0 mL    Nasogastric/Oral tube (mL): 200 mL    Other (mL): 0 mL  Total OUT: 200 mL    Total NET: 5851.1 mL      26 Mar 2023 07:01  -  26 Mar 2023 17:10  --------------------------------------------------------  IN:    Albumin 5%  - 250 mL: 500 mL    Dexmedetomidine: 147 mL    FentaNYL: 94.4 mL    FentaNYL: 141.6 mL    IV PiggyBack: 50 mL    Lactated Ringers: 1300 mL    Norepinephrine: 151.3 mL    Vasopressin: 45 mL  Total IN: 2429.3 mL    OUT:    Indwelling Catheter - Urethral (mL): 0 mL    Other (mL): 0 mL    Phenylephrine: 0 mL    Voided (mL): 5 mL  Total OUT: 5 mL    Total NET: 2424.3 mL        --------------------------------------------------------------------------------------    EXAM  NEUROLOGY  Exam: Sedated, arousable  RESPIRATORY  Exam: Lungs clear   Mechanical Ventilation: AC: 450/5//50  CARDIOVASCULAR  Exam.  Regular rate   GI/NUTRITION  Exam: Abdomen moderately distended.        METABOLIC/FLUIDS/ELECTROLYTES  calcium gluconate IVPB 2 Gram(s) IV Intermittent once  lactated ringers. 1000 milliLiter(s) IV Continuous <Continuous>      HEMATOLOGIC  [x] VTE Prophylaxis: heparin   Injectable 5000 Unit(s) SubCutaneous every 8 hours    Transfusions:	[] PRBC	[] Platelets		[] FFP	[] Cryoprecipitate    INFECTIOUS DISEASE  Antimicrobials/Immunologic Medications:  meropenem  IVPB      meropenem  IVPB 1000 milliGRAM(s) IV Intermittent every 12 hours  metroNIDAZOLE  IVPB 500 milliGRAM(s) IV Intermittent every 8 hours    Day #      of     ***    Tubes/Lines/Drains  ***  [x] Peripheral IV  [] Central Venous Line     	[] R	[] L	[] IJ	[] Fem	[] SC	Date Placed:   [] Arterial Line		[] R	[] L	[] Fem	[] Rad	[] Ax	Date Placed:   [] PICC		[] Midline		[] Mediport  [] Urinary Catheter		Date Placed:   [x] Necessity of urinary, arterial, and venous catheters discussed    LABS  --------------------------------------------------------------------------------------    LABS:                        8.0    14.52 )-----------( 121      ( 26 Mar 2023 12:11 )             23.4     26 Mar 2023 12:11    136    |  103    |  18     ----------------------------<  126    5.0     |  19     |  2.28     Ca    6.8        26 Mar 2023 12:11  Phos  2.7       26 Mar 2023 04:20  Mg     2.20      26 Mar 2023 12:11    TPro  4.4    /  Alb  2.5    /  TBili  3.7    /  DBili  x      /  AST  5190   /  ALT  3463   /  AlkPhos  160    26 Mar 2023 12:11    PT/INR - ( 26 Mar 2023 12:11 )   PT: 28.5 sec;   INR: 2.43 ratio         PTT - ( 26 Mar 2023 12:11 )  PTT:29.9 sec  CAPILLARY BLOOD GLUCOSE        BLOOD CULTURE   @ 20:45   No growth to date.  --  --   @ 19:30   No growth to date.  --  --   @ 05:30   No growth to date.  --  --   @ 23:00   >100,000 CFU/ml Escherichia coli  --  Escherichia coli    RADIOLOGY & ADDITIONAL TESTS:    Imaging Personally Reviewed:  [ ] YES     --------------------------------------------------------------------------------------    OTHER LABORATORY:     IMAGING STUDIES:   CXR:     ASSESSMENT:  48 year old female with necrotizing pancreatitis. Intubated and hemodynamically unstable requiring pressors.      PLAN:  Neuro:  - Sedation while intubated w/ precedex  - Multimodal pain control  - Fentanyl for pain control and sedation    Resp:  - Mechanical ventilation - AC settings 450/22/5/40    CV:  - Requiring 3 pressors on arrival (levo, and vaso)  - Will wean vasopressor support w/ goal MAP > 65 mmHg    GI:   - NPO  - NGT in place to LCWS  - put postpyloric and start feeds afterwards (nepro 20kcal/kg goal @30ccs)   - Bowel regimen with senna & Miralax   - Protonix for stress ulcer prophylaxis  - LFT now downtrending      Renal:  - Babin catheter in place  - Cont CRRT, net negative 50   - monitor Is and Os, strict  - remains anuric     Heme:  - Monitor CBC and coags  - SQH for VTE prophylaxis    ID:   - Monitor for clinical evidence of active infection  - Monitor WBC and temperature  - blood cultures, ngtd   - spiked fever 3/26AM, start meropenem and flagyl (3/26 - )    Endo:   - Cont to monitor FS  - insulin gtt d/c'd    Code Status: Full code    Disposition: SICU      Critical Care Diagnoses:

## 2023-03-26 NOTE — PROGRESS NOTE ADULT - ATTENDING COMMENTS
I agree with the detailed interval history, physical, and plan, which I have reviewed and edited where appropriate'; also agree with notes/assessment with my team on service.  I have personally examined the patient.  I was physically present for the key portions of the evaluation and management (E/M) service provided.  I reviewed all the pertinent data.  The patient is a critical care patient with life threatening hemodynamic and metabolic instability in SICU.  The SICU team has a constant risk benefit analyzes discussion and coordinating care with the primary team and all consultants.   The patient is in SICU with the chief complaint and diagnosis mentioned in the note.   The plan will be specified in the note.  48 year old female with necrotizing pancreatitis in SICU Intubated and hemodynamically unstable requiring pressors in [facility].  EXAM  NEUROLOGY  Exam: Sedated,   RESPIRATORY  Exam: Lungs clear   Mechanical Ventilation: AC: 450/5/22/50  CARDIOVASCULAR  Exam.  Regular rate   GI/NUTRITION  Exam: Abdomen moderately distended.      PLAN:  Neuro:  - precedex  - Fentanyl   Resp:  - Mechanical ventilation - AC settings 450/22/5/40  CV:  - levo, and vaso  - Will wean vasopressor   GI:   - NPO  - NGT   Renal:  - Cont CRRT, net negative 50   Heme:  - SQH for VTE prophylaxis  ID:   - blood cultures,  - meropenem and flagyl (3/26 - )  Endo:   - Cont to monitor FS  Code Status: Full code  Disposition: SICU

## 2023-03-26 NOTE — PROGRESS NOTE ADULT - ATTENDING COMMENTS
Respiratory failure  a.  Remains mechanical ventilated  b,.  With adequate gas exchange  c,  Obtain ABG/CXR  d.  SBT as tolerated    Necrotizing pancreatitis with MOSF  a  Decreasing vasopressor requirement  b.  Continue IVF resuscitation  c.  Maintain on IV abx  d.  CT reviewed    CHATO  a.  On CVVH with - 50/hr as tolerated  b.  Trend creatinine/BUN    Malnutrition  a.  NPO  b.  NGT in place

## 2023-03-26 NOTE — PROGRESS NOTE ADULT - PROBLEM SELECTOR PLAN 3
Pt with hyperkalemia in setting of acute renal failure. Serum potassium of 4.9. Continue CRRT as above. Continue to monitor serum potassium.

## 2023-03-26 NOTE — PROGRESS NOTE ADULT - ASSESSMENT
48 year old female with necrotizing pancreatitis. Intubated and hemodynamically unstable requiring pressors.    PLAN:  - NPO/IVF  - wean pressors as tolerated  - wean vent as tolerated  - wean sedation as tolerated  - monitor vital signs  - trend SICU labs  - SQH  - appreciate SICU care    B team  [id number]

## 2023-03-26 NOTE — PROGRESS NOTE ADULT - SUBJECTIVE AND OBJECTIVE BOX
SICU Daily Progress Note  =====================================================  Interval/Overnight Events:       - on CRRT   - continuing to require vaso, levo  - downtrending lactate      HPI:  48 year old female with PMHx significant for HTN, hypothyroidism,  30 years ago, breast cancer s/p left mastectomy and chemo in  presenting with epigastric pain and LUQ pain. Found to have necrotizing pancreatitis. Transferred from [facility] ([place]) for hypotension and tachypnea requiring emergent intubation and multiple pressors. SICU consulted for hemodynamic monitoring and respiratory management      Allergies: No Known Allergies      MEDICATIONS:   --------------------------------------------------------------------------------------  Neurologic Medications  dexMEDEtomidine Infusion 0.2 MICROgram(s)/kG/Hr IV Continuous <Continuous>  fentaNYL   Infusion. 0.5 MICROgram(s)/kG/Hr IV Continuous <Continuous>    Respiratory Medications    Cardiovascular Medications  norepinephrine Infusion 0.05 MICROgram(s)/kG/Min IV Continuous <Continuous>  phenylephrine    Infusion 0.5 MICROgram(s)/kG/Min IV Continuous <Continuous>    Gastrointestinal Medications  lactated ringers. 1000 milliLiter(s) IV Continuous <Continuous>  pantoprazole  Injectable 40 milliGRAM(s) IV Push daily    Genitourinary Medications    Hematologic/Oncologic Medications  heparin   Injectable 5000 Unit(s) SubCutaneous every 8 hours    Antimicrobial/Immunologic Medications    Endocrine/Metabolic Medications  vasopressin Infusion 0.03 Unit(s)/Min IV Continuous <Continuous>    Topical/Other Medications  chlorhexidine 0.12% Liquid 15 milliLiter(s) Oral Mucosa every 12 hours  chlorhexidine 4% Liquid 1 Application(s) Topical <User Schedule>  CRRT Treatment    <Continuous>  PrismaSATE Dialysate BGK 4 / 2.5 5000 milliLiter(s) CRRT <Continuous>  PrismaSOL Filtration BGK 4 / 2.5 5000 milliLiter(s) CRRT <Continuous>  PrismaSOL Filtration BGK 4 / 2.5 5000 milliLiter(s) CRRT <Continuous>    --------------------------------------------------------------------------------------    VITAL SIGNS, INS/OUTS (last 24 hours):  --------------------------------------------------------------------------------------  ICU Vital Signs Last 24 Hrs  T(C): 37.9 (26 Mar 2023 00:00), Max: 37.9 (25 Mar 2023 20:00)  T(F): 100.2 (26 Mar 2023 00:00), Max: 100.2 (25 Mar 2023 20:00)  HR: 98 (26 Mar 2023 00:00) (98 - 112)  BP: --  BP(mean): --  ABP: 99/52 (26 Mar 2023 00:00) (95/48 - 113/58)  ABP(mean): 69 (26 Mar 2023 00:00) (62 - 80)  RR: 22 (26 Mar 2023 00:00) (20 - 22)  SpO2: 97% (26 Mar 2023 00:00) (95% - 100%)    O2 Parameters below as of 26 Mar 2023 00:  Patient On (Oxygen Delivery Method): ventilator    O2 Concentration (%): 50      --------------------------------------------------------------------------------------  EXAM  NEUROLOGY  Exam: Sedated, arousable  RESPIRATORY  Exam: Lungs clear   Mechanical Ventilation: AC: 450/5/22/50  CARDIOVASCULAR  Exam.  Regular rate   GI/NUTRITION  Exam: Abdomen moderately distended.          I&O's Detail    24 Mar 2023 07:  -  25 Mar 2023 07:00  --------------------------------------------------------  IN:    Dexmedetomidine: 102.9 mL    FentaNYL: 93.9 mL    Insulin: 5 mL    Insulin: 4 mL    Insulin: 5 mL    Insulin: 6 mL    IV PiggyBack: 100 mL    Lactated Ringers: 850 mL    Lactated Ringers Bolus: 1000 mL    Norepinephrine: 137.7 mL    Phenylephrine: 154.7 mL    Phenylephrine: 221 mL    Vasopressin: 18 mL    Vasopressin: 18 mL  Total IN: 2716.2 mL    OUT:    Indwelling Catheter - Urethral (mL): 0 mL    Nasogastric/Oral tube (mL): 100 mL    Other (mL): 190 mL  Total OUT: 290 mL    Total NET: 2426.2 mL      25 Mar 2023 07:01  -  26 Mar 2023 00:42  --------------------------------------------------------  IN:    Dexmedetomidine: 382.2 mL    dextrose 5% + lactated ringers: 1550 mL    FentaNYL: 460.2 mL    IV PiggyBack: 50 mL    Lactated Ringers: 450 mL    Norepinephrine: 632.5 mL    Phenylephrine: 601.3 mL    PRBCs (Packed Red Blood Cells): 300 mL    Vasopressin: 117 mL  Total IN: 4543.2 mL    OUT:    Indwelling Catheter - Urethral (mL): 0 mL    Nasogastric/Oral tube (mL): 150 mL    Other (mL): 0 mL  Total OUT: 150 mL    Total NET: 4393.2 mL          METABOLIC/FLUIDS/ELECTROLYTES  lactated ringers. 1000 milliLiter(s) IV Continuous <Continuous>      HEMATOLOGIC  [x] VTE Prophylaxis: heparin   Injectable 5000 Unit(s) SubCutaneous every 8 hours    Transfusions:	[] PRBC	[] Platelets		[] FFP	[] Cryoprecipitate    INFECTIOUS DISEASE  Antimicrobials/Immunologic Medications:    Day #      of     ***    Tubes/Lines/Drains  ***  [x] Peripheral IV  [] Central Venous Line     	[] R	[] L	[] IJ	[] Fem	[] SC	Date Placed:   [] Arterial Line		[] R	[] L	[] Fem	[] Rad	[] Ax	Date Placed:   [] PICC		[] Midline		[] Mediport  [] Urinary Catheter		Date Placed:   [x] Necessity of urinary, arterial, and venous catheters discussed    LABS  --------------------------------------------------------------------------------------    LABS:                        8.0    14.94 )-----------( 126      ( 25 Mar 2023 20:20 )             23.8     25 Mar 2023 20:20    139    |  104    |  21     ----------------------------<  196    5.0     |  18     |  2.63     Ca    7.1        25 Mar 2023 20:20  Phos  2.7       25 Mar 2023 20:20  Mg     1.90      25 Mar 2023 20:20    TPro  4.7    /  Alb  2.8    /  TBili  2.6    /  DBili  x      /  AST  >7000  /  ALT  4176   /  AlkPhos  121    25 Mar 2023 20:20    PT/INR - ( 25 Mar 2023 20:20 )   PT: 31.7 sec;   INR: 2.71 ratio         PTT - ( 25 Mar 2023 20:20 )  PTT:28.4 sec  CAPILLARY BLOOD GLUCOSE      POCT Blood Glucose.: 130 mg/dL (25 Mar 2023 12:27)  POCT Blood Glucose.: 121 mg/dL (25 Mar 2023 08:22)  POCT Blood Glucose.: 212 mg/dL (25 Mar 2023 06:05)  POCT Blood Glucose.: 84 mg/dL (25 Mar 2023 05:48)  POCT Blood Glucose.: 129 mg/dL (25 Mar 2023 03:58)  POCT Blood Glucose.: 146 mg/dL (25 Mar 2023 03:35)  POCT Blood Glucose.: 56 mg/dL (25 Mar 2023 03:08)  POCT Blood Glucose.: 68 mg/dL (25 Mar 2023 03:06)  POCT Blood Glucose.: 103 mg/dL (25 Mar 2023 01:06)    BLOOD CULTURE   @ 05:30   No growth to date.  --  --   @ 23:00   >100,000 CFU/ml Escherichia coli  --  --    RADIOLOGY & ADDITIONAL TESTS:    Imaging Personally Reviewed:  [ ] YES     --------------------------------------------------------------------------------------    OTHER LABORATORY:     IMAGING STUDIES:   CXR:     ASSESSMENT:  48 year old female with necrotizing pancreatitis. Intubated and hemodynamically unstable requiring pressors.      PLAN:    Neuro:  - Sedation while intubated w/ precedex  - Multimodal pain control  - Fentanyl for pain control and sedation      Resp:  - Mechanical ventilation - AC settings      CV:  - Requiring 3 pressors on arrival (levo, venkata, and vaso)  - Will wean vasopressor support w/ goal MAP > 65 mmHg      GI:   - NPO  - NGT in place to LCWS  - Bowel regimen with senna & Miralax  - Protonix for stress ulcer prophylaxis      Renal:  - Babin catheter in place  - Cont CRRT  - monitor Is and Os, strict      Heme:  - Monitor CBC and coags  - SQH for VTE prophylaxis      ID:   - Monitor for clinical evidence of active infection  - Monitor WBC and temperature  - f/u blood cultures      Endo:   - Cont to monitor FS  - insulin gtt d/c'd      Code Status: Full code    Disposition: SICU      Critical Care Diagnoses: SICU Daily Progress Note  =====================================================  Interval/Overnight Events:       - on CRRT   - continuing to require vaso, levo  - downtrending lactate  - requiring fluids      HPI:  48 year old female with PMHx significant for HTN, hypothyroidism,  30 years ago, breast cancer s/p left mastectomy and chemo in  presenting with epigastric pain and LUQ pain. Found to have necrotizing pancreatitis. Transferred from [facility] ([place]) for hypotension and tachypnea requiring emergent intubation and multiple pressors. SICU consulted for hemodynamic monitoring and respiratory management      Allergies: No Known Allergies      MEDICATIONS:   --------------------------------------------------------------------------------------  Neurologic Medications  dexMEDEtomidine Infusion 0.2 MICROgram(s)/kG/Hr IV Continuous <Continuous>  fentaNYL   Infusion. 0.5 MICROgram(s)/kG/Hr IV Continuous <Continuous>    Respiratory Medications    Cardiovascular Medications  norepinephrine Infusion 0.05 MICROgram(s)/kG/Min IV Continuous <Continuous>  phenylephrine    Infusion 0.5 MICROgram(s)/kG/Min IV Continuous <Continuous>    Gastrointestinal Medications  lactated ringers. 1000 milliLiter(s) IV Continuous <Continuous>  pantoprazole  Injectable 40 milliGRAM(s) IV Push daily    Genitourinary Medications    Hematologic/Oncologic Medications  heparin   Injectable 5000 Unit(s) SubCutaneous every 8 hours    Antimicrobial/Immunologic Medications    Endocrine/Metabolic Medications  vasopressin Infusion 0.03 Unit(s)/Min IV Continuous <Continuous>    Topical/Other Medications  chlorhexidine 0.12% Liquid 15 milliLiter(s) Oral Mucosa every 12 hours  chlorhexidine 4% Liquid 1 Application(s) Topical <User Schedule>  CRRT Treatment    <Continuous>  PrismaSATE Dialysate BGK 4 / 2.5 5000 milliLiter(s) CRRT <Continuous>  PrismaSOL Filtration BGK 4 / 2.5 5000 milliLiter(s) CRRT <Continuous>  PrismaSOL Filtration BGK 4 / 2.5 5000 milliLiter(s) CRRT <Continuous>    --------------------------------------------------------------------------------------    VITAL SIGNS, INS/OUTS (last 24 hours):  --------------------------------------------------------------------------------------  ICU Vital Signs Last 24 Hrs  T(C): 37.9 (26 Mar 2023 00:00), Max: 37.9 (25 Mar 2023 20:00)  T(F): 100.2 (26 Mar 2023 00:00), Max: 100.2 (25 Mar 2023 20:00)  HR: 98 (26 Mar 2023 00:00) (98 - 112)  BP: --  BP(mean): --  ABP: 99/52 (26 Mar 2023 00:00) (95/48 - 113/58)  ABP(mean): 69 (26 Mar 2023 00:) (62 - 80)  RR: 22 (26 Mar 2023 00:) (20 - 22)  SpO2: 97% (26 Mar 2023 00:00) (95% - 100%)    O2 Parameters below as of 26 Mar 2023 00:  Patient On (Oxygen Delivery Method): ventilator    O2 Concentration (%): 50      --------------------------------------------------------------------------------------  EXAM  NEUROLOGY  Exam: Sedated, arousable  RESPIRATORY  Exam: Lungs clear   Mechanical Ventilation: AC: 450/5/22/50  CARDIOVASCULAR  Exam.  Regular rate   GI/NUTRITION  Exam: Abdomen moderately distended.          I&O's Detail    24 Mar 2023 07:  -  25 Mar 2023 07:00  --------------------------------------------------------  IN:    Dexmedetomidine: 102.9 mL    FentaNYL: 93.9 mL    Insulin: 5 mL    Insulin: 4 mL    Insulin: 5 mL    Insulin: 6 mL    IV PiggyBack: 100 mL    Lactated Ringers: 850 mL    Lactated Ringers Bolus: 1000 mL    Norepinephrine: 137.7 mL    Phenylephrine: 154.7 mL    Phenylephrine: 221 mL    Vasopressin: 18 mL    Vasopressin: 18 mL  Total IN: 2716.2 mL    OUT:    Indwelling Catheter - Urethral (mL): 0 mL    Nasogastric/Oral tube (mL): 100 mL    Other (mL): 190 mL  Total OUT: 290 mL    Total NET: 2426.2 mL      25 Mar 2023 07:01  -  26 Mar 2023 00:42  --------------------------------------------------------  IN:    Dexmedetomidine: 382.2 mL    dextrose 5% + lactated ringers: 1550 mL    FentaNYL: 460.2 mL    IV PiggyBack: 50 mL    Lactated Ringers: 450 mL    Norepinephrine: 632.5 mL    Phenylephrine: 601.3 mL    PRBCs (Packed Red Blood Cells): 300 mL    Vasopressin: 117 mL  Total IN: 4543.2 mL    OUT:    Indwelling Catheter - Urethral (mL): 0 mL    Nasogastric/Oral tube (mL): 150 mL    Other (mL): 0 mL  Total OUT: 150 mL    Total NET: 4393.2 mL          METABOLIC/FLUIDS/ELECTROLYTES  lactated ringers. 1000 milliLiter(s) IV Continuous <Continuous>      HEMATOLOGIC  [x] VTE Prophylaxis: heparin   Injectable 5000 Unit(s) SubCutaneous every 8 hours    Transfusions:	[] PRBC	[] Platelets		[] FFP	[] Cryoprecipitate    INFECTIOUS DISEASE  Antimicrobials/Immunologic Medications:    Day #      of     ***    Tubes/Lines/Drains  ***  [x] Peripheral IV  [] Central Venous Line     	[] R	[] L	[] IJ	[] Fem	[] SC	Date Placed:   [] Arterial Line		[] R	[] L	[] Fem	[] Rad	[] Ax	Date Placed:   [] PICC		[] Midline		[] Mediport  [] Urinary Catheter		Date Placed:   [x] Necessity of urinary, arterial, and venous catheters discussed    LABS  --------------------------------------------------------------------------------------    LABS:                        8.0    14.94 )-----------( 126      ( 25 Mar 2023 20:20 )             23.8     25 Mar 2023 20:20    139    |  104    |  21     ----------------------------<  196    5.0     |  18     |  2.63     Ca    7.1        25 Mar 2023 20:20  Phos  2.7       25 Mar 2023 20:20  Mg     1.90      25 Mar 2023 20:20    TPro  4.7    /  Alb  2.8    /  TBili  2.6    /  DBili  x      /  AST  >7000  /  ALT  4176   /  AlkPhos  121    25 Mar 2023 20:20    PT/INR - ( 25 Mar 2023 20:20 )   PT: 31.7 sec;   INR: 2.71 ratio         PTT - ( 25 Mar 2023 20:20 )  PTT:28.4 sec  CAPILLARY BLOOD GLUCOSE      POCT Blood Glucose.: 130 mg/dL (25 Mar 2023 12:27)  POCT Blood Glucose.: 121 mg/dL (25 Mar 2023 08:22)  POCT Blood Glucose.: 212 mg/dL (25 Mar 2023 06:05)  POCT Blood Glucose.: 84 mg/dL (25 Mar 2023 05:48)  POCT Blood Glucose.: 129 mg/dL (25 Mar 2023 03:58)  POCT Blood Glucose.: 146 mg/dL (25 Mar 2023 03:35)  POCT Blood Glucose.: 56 mg/dL (25 Mar 2023 03:08)  POCT Blood Glucose.: 68 mg/dL (25 Mar 2023 03:06)  POCT Blood Glucose.: 103 mg/dL (25 Mar 2023 01:06)    BLOOD CULTURE   @ 05:30   No growth to date.  --  --   @ 23:00   >100,000 CFU/ml Escherichia coli  --  --    RADIOLOGY & ADDITIONAL TESTS:    Imaging Personally Reviewed:  [ ] YES     --------------------------------------------------------------------------------------    OTHER LABORATORY:     IMAGING STUDIES:   CXR:     ASSESSMENT:  48 year old female with necrotizing pancreatitis. Intubated and hemodynamically unstable requiring pressors.      PLAN:    Neuro:  - Sedation while intubated w/ precedex  - Multimodal pain control  - Fentanyl for pain control and sedation      Resp:  - Mechanical ventilation - AC settings      CV:  - Requiring 3 pressors on arrival (levo, venkata, and vaso)  - Will wean vasopressor support w/ goal MAP > 65 mmHg      GI:   - NPO  - NGT in place to LCWS  - Bowel regimen with senna & Miralax  - Protonix for stress ulcer prophylaxis  - LFTs increasing       Renal:  - Babin catheter in place  - Cont CRRT  - monitor Is and Os, strict      Heme:  - Monitor CBC and coags  - SQH for VTE prophylaxis      ID:   - Monitor for clinical evidence of active infection  - Monitor WBC and temperature  - f/u blood cultures      Endo:   - Cont to monitor FS  - insulin gtt d/c'd      Code Status: Full code    Disposition: SICU      Critical Care Diagnoses: SICU Daily Progress Note  =====================================================  Interval/Overnight Events:       - on CRRT   - continuing to require vaso, levo  - downtrending lactate  - requiring fluids  - 500 albumin this AM to help titrate pressors      HPI:  48 year old female with PMHx significant for HTN, hypothyroidism,  30 years ago, breast cancer s/p left mastectomy and chemo in  presenting with epigastric pain and LUQ pain. Found to have necrotizing pancreatitis. Transferred from [facility] ([place]) for hypotension and tachypnea requiring emergent intubation and multiple pressors. SICU consulted for hemodynamic monitoring and respiratory management      Allergies: No Known Allergies      MEDICATIONS:   --------------------------------------------------------------------------------------  Neurologic Medications  dexMEDEtomidine Infusion 0.2 MICROgram(s)/kG/Hr IV Continuous <Continuous>  fentaNYL   Infusion. 0.5 MICROgram(s)/kG/Hr IV Continuous <Continuous>    Respiratory Medications    Cardiovascular Medications  norepinephrine Infusion 0.05 MICROgram(s)/kG/Min IV Continuous <Continuous>  phenylephrine    Infusion 0.5 MICROgram(s)/kG/Min IV Continuous <Continuous>    Gastrointestinal Medications  lactated ringers. 1000 milliLiter(s) IV Continuous <Continuous>  pantoprazole  Injectable 40 milliGRAM(s) IV Push daily    Genitourinary Medications    Hematologic/Oncologic Medications  heparin   Injectable 5000 Unit(s) SubCutaneous every 8 hours    Antimicrobial/Immunologic Medications    Endocrine/Metabolic Medications  vasopressin Infusion 0.03 Unit(s)/Min IV Continuous <Continuous>    Topical/Other Medications  chlorhexidine 0.12% Liquid 15 milliLiter(s) Oral Mucosa every 12 hours  chlorhexidine 4% Liquid 1 Application(s) Topical <User Schedule>  CRRT Treatment    <Continuous>  PrismaSATE Dialysate BGK 4 / 2.5 5000 milliLiter(s) CRRT <Continuous>  PrismaSOL Filtration BGK 4 / 2.5 5000 milliLiter(s) CRRT <Continuous>  PrismaSOL Filtration BGK 4 / 2.5 5000 milliLiter(s) CRRT <Continuous>    --------------------------------------------------------------------------------------    VITAL SIGNS, INS/OUTS (last 24 hours):  --------------------------------------------------------------------------------------  ICU Vital Signs Last 24 Hrs  T(C): 37.9 (26 Mar 2023 00:00), Max: 37.9 (25 Mar 2023 20:00)  T(F): 100.2 (26 Mar 2023 00:00), Max: 100.2 (25 Mar 2023 20:00)  HR: 98 (26 Mar 2023 00:00) (98 - 112)  BP: --  BP(mean): --  ABP: 99/52 (26 Mar 2023 00:00) (95/48 - 113/58)  ABP(mean): 69 (26 Mar 2023 00:00) (62 - 80)  RR: 22 (26 Mar 2023 00:00) (20 - 22)  SpO2: 97% (26 Mar 2023 00:00) (95% - 100%)    O2 Parameters below as of 26 Mar 2023 00:00  Patient On (Oxygen Delivery Method): ventilator    O2 Concentration (%): 50      --------------------------------------------------------------------------------------  EXAM  NEUROLOGY  Exam: Sedated, arousable  RESPIRATORY  Exam: Lungs clear   Mechanical Ventilation: AC: 450/5/22/50  CARDIOVASCULAR  Exam.  Regular rate   GI/NUTRITION  Exam: Abdomen moderately distended.          I&O's Detail    24 Mar 2023 07:01  -  25 Mar 2023 07:00  --------------------------------------------------------  IN:    Dexmedetomidine: 102.9 mL    FentaNYL: 93.9 mL    Insulin: 5 mL    Insulin: 4 mL    Insulin: 5 mL    Insulin: 6 mL    IV PiggyBack: 100 mL    Lactated Ringers: 850 mL    Lactated Ringers Bolus: 1000 mL    Norepinephrine: 137.7 mL    Phenylephrine: 154.7 mL    Phenylephrine: 221 mL    Vasopressin: 18 mL    Vasopressin: 18 mL  Total IN: 2716.2 mL    OUT:    Indwelling Catheter - Urethral (mL): 0 mL    Nasogastric/Oral tube (mL): 100 mL    Other (mL): 190 mL  Total OUT: 290 mL    Total NET: 2426.2 mL      25 Mar 2023 07:01  -  26 Mar 2023 00:42  --------------------------------------------------------  IN:    Dexmedetomidine: 382.2 mL    dextrose 5% + lactated ringers: 1550 mL    FentaNYL: 460.2 mL    IV PiggyBack: 50 mL    Lactated Ringers: 450 mL    Norepinephrine: 632.5 mL    Phenylephrine: 601.3 mL    PRBCs (Packed Red Blood Cells): 300 mL    Vasopressin: 117 mL  Total IN: 4543.2 mL    OUT:    Indwelling Catheter - Urethral (mL): 0 mL    Nasogastric/Oral tube (mL): 150 mL    Other (mL): 0 mL  Total OUT: 150 mL    Total NET: 4393.2 mL          METABOLIC/FLUIDS/ELECTROLYTES  lactated ringers. 1000 milliLiter(s) IV Continuous <Continuous>      HEMATOLOGIC  [x] VTE Prophylaxis: heparin   Injectable 5000 Unit(s) SubCutaneous every 8 hours    Transfusions:	[] PRBC	[] Platelets		[] FFP	[] Cryoprecipitate    INFECTIOUS DISEASE  Antimicrobials/Immunologic Medications:    Day #      of     ***    Tubes/Lines/Drains  ***  [x] Peripheral IV  [] Central Venous Line     	[] R	[] L	[] IJ	[] Fem	[] SC	Date Placed:   [] Arterial Line		[] R	[] L	[] Fem	[] Rad	[] Ax	Date Placed:   [] PICC		[] Midline		[] Mediport  [] Urinary Catheter		Date Placed:   [x] Necessity of urinary, arterial, and venous catheters discussed    LABS  --------------------------------------------------------------------------------------    LABS:                        8.0    14.94 )-----------( 126      ( 25 Mar 2023 20:20 )             23.8     25 Mar 2023 20:20    139    |  104    |  21     ----------------------------<  196    5.0     |  18     |  2.63     Ca    7.1        25 Mar 2023 20:20  Phos  2.7       25 Mar 2023 20:20  Mg     1.90      25 Mar 2023 20:20    TPro  4.7    /  Alb  2.8    /  TBili  2.6    /  DBili  x      /  AST  >7000  /  ALT  4176   /  AlkPhos  121    25 Mar 2023 20:20    PT/INR - ( 25 Mar 2023 20:20 )   PT: 31.7 sec;   INR: 2.71 ratio         PTT - ( 25 Mar 2023 20:20 )  PTT:28.4 sec  CAPILLARY BLOOD GLUCOSE      POCT Blood Glucose.: 130 mg/dL (25 Mar 2023 12:27)  POCT Blood Glucose.: 121 mg/dL (25 Mar 2023 08:22)  POCT Blood Glucose.: 212 mg/dL (25 Mar 2023 06:05)  POCT Blood Glucose.: 84 mg/dL (25 Mar 2023 05:48)  POCT Blood Glucose.: 129 mg/dL (25 Mar 2023 03:58)  POCT Blood Glucose.: 146 mg/dL (25 Mar 2023 03:35)  POCT Blood Glucose.: 56 mg/dL (25 Mar 2023 03:08)  POCT Blood Glucose.: 68 mg/dL (25 Mar 2023 03:06)  POCT Blood Glucose.: 103 mg/dL (25 Mar 2023 01:06)    BLOOD CULTURE   @ 05:30   No growth to date.  --  --   @ 23:00   >100,000 CFU/ml Escherichia coli  --  --    RADIOLOGY & ADDITIONAL TESTS:    Imaging Personally Reviewed:  [ ] YES     --------------------------------------------------------------------------------------    OTHER LABORATORY:     IMAGING STUDIES:   CXR:     ASSESSMENT:  48 year old female with necrotizing pancreatitis. Intubated and hemodynamically unstable requiring pressors.      PLAN:  Neuro:  - Sedation while intubated w/ precedex  - Multimodal pain control  - Fentanyl for pain control and sedation    Resp:  - Mechanical ventilation - AC settings 450/22/5/40    CV:  - Requiring 3 pressors on arrival (levo, and vaso)  - Will wean vasopressor support w/ goal MAP > 65 mmHg    GI:   - NPO  - NGT in place to LCWS  - put postpyloric and start feeds afterwards (nepro 20kcal/kg goal @30ccs)   - Bowel regimen with senna & Miralax   - Protonix for stress ulcer prophylaxis  - LFT now downtrending      Renal:  - Babin catheter in place  - Cont CRRT, net negative 50   - monitor Is and Os, strict  - remains anuric     Heme:  - Monitor CBC and coags  - SQH for VTE prophylaxis    ID:   - Monitor for clinical evidence of active infection  - Monitor WBC and temperature  - blood cultures, ngtd   - spiked fever 3/26AM, start meropenem and flagyl (3/26 - )    Endo:   - Cont to monitor FS  - insulin gtt d/c'd    Code Status: Full code    Disposition: SICU      Critical Care Diagnoses:

## 2023-03-27 NOTE — PROGRESS NOTE ADULT - ASSESSMENT
ASSESSMENT:  48 year old female with necrotizing pancreatitis c/b septic shock vs     PLAN:  Neuro  Intubated and sedated for clinical course; No acute neurological issues   - Sedated w/ precedex and fentanyl  - Fentanyl for pain control     Resp:  AHRF w/ b/l pleff 2/2 necrotizing pancreatitis   -Volume control 22/450/5/50    CV  Mixed shock state 2/2 septic shock w/ E. Coli in urine vs hypovolemic shock now s/p 11L resus    - c/w levo and vaso; wean as tolerated as below   - Treat ID as below     GI:   #Necrotizing pancreatitis of unknown etiology  Diagnostics  CT 3/27: >50%of the pancreatic head  extends into the mesentery and inferiorly along the retroperitoneum into the lower abdomen  RUQ US: Chololithiasis w/ no acute frannie     Therapeutics:   -Plan for interval CT 3/28 or 3/29   - Continue TF (Nepro) at rate of 30cc/hr  - GI ppx w/ protonix      #Shocked Liver  Likely 2/2 to shocked state w/ LFTs now trending down w/ bili following protracted course as expected but yet to peak  -Monitor LFTs     Renal:  ARF 2/2 shock state resulting in ATN requiring CRRT    - Continue CRRT  - Babin catheter in place    Heme:   Anemia likely 2/2 disease course vs suppression; No active bleeding reported   Diagnostics:   -CBC q8     Therapeutics:   -received 1U PRBC 3/27 v  -SQH for VTE prophylaxis    #Splenic Vein Thrombosis  Diagnostics:  3/22 CT Abd w/ contrast: Focal filling defect in the splenic vein at the level of the body of   the pancreas for which an underlying thrombus is suspected    Therapeutics:   -Will hold therapeutic AC given risks outweigh benefits      ID:   Intermittently febrile w/ leukocytosis c/f septic shock 2/2 E coli UTI  Diagnostics:  -3/22 Ucx: E coli  -3/24 Bcx: negative     Therapeutics:   -Meropenem/Flagyl (3/26-); Will DC flagyl tomorrow     Endo:   -Monitor glucose with metabolic panel    Code Status: Full code    Disposition: SICU ASSESSMENT:  48 year old female with necrotizing pancreatitis c/b septic shock 2/2 UTI vs hypovolemic shock requiring pressor support     PLAN:  Neuro  Intubated and sedated for clinical course; No acute neurological issues   - Sedated w/ precedex and fentanyl  - Fentanyl for pain control     Resp:  AHRF w/ b/l pleff 2/2 necrotizing pancreatitis   -Volume control 22/450/5/50    CV  Mixed shock state 2/2 septic shock w/ E. Coli in urine vs hypovolemic shock now s/p 11L resus    - c/w levo and vaso; wean as tolerated as below   - Treat ID as below     GI:   #Necrotizing pancreatitis of unknown etiology  Diagnostics  CT 3/27: >50%of the pancreatic head  extends into the mesentery and inferiorly along the retroperitoneum into the lower abdomen  RUQ US: Chololithiasis w/ no acute frannie     Therapeutics:   -Plan for interval CT 3/28 or 3/29   - Continue TF (Nepro) at rate of 30cc/hr  - GI ppx w/ protonix      #Shocked Liver  Likely 2/2 to shocked state w/ LFTs now trending down w/ bili following protracted course as expected but yet to peak  -Monitor LFTs     Renal:  ARF 2/2 shock state resulting in ATN requiring CRRT    - Continue CRRT  - Babin catheter in place    Heme:   Anemia likely 2/2 disease course vs suppression; No active bleeding reported   Diagnostics:   -CBC q8     Therapeutics:   -received 1U PRBC 3/27 v  -SQH for VTE prophylaxis    #Splenic Vein Thrombosis  Diagnostics:  3/22 CT Abd w/ contrast: Focal filling defect in the splenic vein at the level of the body of   the pancreas for which an underlying thrombus is suspected    Therapeutics:   -Will hold therapeutic AC given risks outweigh benefits      ID:   Intermittently febrile w/ leukocytosis c/f septic shock 2/2 E coli UTI  Diagnostics:  -3/22 Ucx: E coli  -3/24 Bcx: negative     Therapeutics:   -Meropenem/Flagyl (3/26-); Will DC flagyl tomorrow     Endo:   -restarted synthroid 20 IV (home med)   -Monitor glucose with metabolic panel    Code Status: Full code    Disposition: SICU

## 2023-03-27 NOTE — PROGRESS NOTE ADULT - SUBJECTIVE AND OBJECTIVE BOX
[facility] DIVISION OF KIDNEY DISEASES AND HYPERTENSION -- FOLLOW UP NOTE  --------------------------------------------------------------------------------  Chief Complaint: Acute renal failure, acidosis, hyperkalemia requiring RRT    24 hour events/subjective: Pt. was seen and examined in the SICU. She remains intubated, sedated, on IV vasopressors. No issues with CRRT overnight. Unable to obtain further ROS.       PAST HISTORY  --------------------------------------------------------------------------------  No significant changes to PMH, PSH, FHx, SHx, unless otherwise noted    ALLERGIES & MEDICATIONS  --------------------------------------------------------------------------------  Allergies    No Known Allergies    Intolerances      Standing Inpatient Medications  chlorhexidine 0.12% Liquid 15 milliLiter(s) Oral Mucosa every 12 hours  chlorhexidine 4% Liquid 1 Application(s) Topical <User Schedule>  CRRT Treatment    <Continuous>  dexMEDEtomidine Infusion 0.2 MICROgram(s)/kG/Hr IV Continuous <Continuous>  fentaNYL   Infusion 0.5 MICROgram(s)/kG/Hr IV Continuous <Continuous>  heparin   Injectable 5000 Unit(s) SubCutaneous every 8 hours  lactated ringers. 1000 milliLiter(s) IV Continuous <Continuous>  meropenem  IVPB 1000 milliGRAM(s) IV Intermittent every 12 hours  meropenem  IVPB      metroNIDAZOLE  IVPB 500 milliGRAM(s) IV Intermittent every 8 hours  norepinephrine Infusion 0.05 MICROgram(s)/kG/Min IV Continuous <Continuous>  pantoprazole  Injectable 40 milliGRAM(s) IV Push daily  Phoxillum Filtration BK 4 / 2.5 5000 milliLiter(s) CRRT <Continuous>  Phoxillum Filtration BK 4 / 2.5 5000 milliLiter(s) CRRT <Continuous>  PrismaSOL Filtration BGK 4 / 2.5 5000 milliLiter(s) CRRT <Continuous>  vasopressin Infusion 0.03 Unit(s)/Min IV Continuous <Continuous>    PRN Inpatient Medications      REVIEW OF SYSTEMS  Unable to obtain.    VITALS/PHYSICAL EXAM  --------------------------------------------------------------------------------  T(C): 38.8 (03-27-23 @ 04:00), Max: 38.8 (03-27-23 @ 04:00)  HR: 88 (03-27-23 @ 06:15) (87 - 100)  BP: --  RR: 22 (03-27-23 @ 06:15) (22 - 22)  SpO2: 100% (03-27-23 @ 06:15) (95% - 100%)  Wt(kg): --        03-25-23 @ 07:01  -  03-26-23 @ 07:00  --------------------------------------------------------  IN: 6051.1 mL / OUT: 200 mL / NET: 5851.1 mL    03-26-23 @ 07:01  -  03-27-23 @ 06:55  --------------------------------------------------------  IN: 5011.2 mL / OUT: 25 mL / NET: 4986.2 mL        Physical Exam:  	Gen: Ill appearing, intubated, sedated   	HEENT: +ETT  	Pulm: Mechanical breath sounds  	CV: RRR  	Abd: Obese, distended  	Ext: + LE edema B/L  	Neuro: Sedated              : Babin+ with no urine in the bag  	Skin: Warm and dry              Dialysis access: L IJ non tunneled HD catheter       LABS/STUDIES  --------------------------------------------------------------------------------              6.9    15.85 >-----------<  98       [03-27-23 @ 04:10]              20.9     136  |  104  |  16  ----------------------------<  122      [03-27-23 @ 04:10]  4.6   |  21  |  2.07        Ca     7.1     [03-27-23 @ 04:10]      iCa    0.90     [03-27 @ 00:35]      Mg     2.30     [03-27-23 @ 04:10]      Phos  2.6     [03-27-23 @ 04:10]    TPro  4.2  /  Alb  2.5  /  TBili  5.0  /  DBili  x   /  AST  2935  /  ALT  2286  /  AlkPhos  158  [03-27-23 @ 04:10]    PT/INR: PT 23.4 , INR 2.00       [03-27-23 @ 04:10]  PTT: 32.3       [03-27-23 @ 04:10]      Creatinine Trend:  SCr 2.07 [03-27 @ 04:10]  SCr 2.15 [03-26 @ 19:55]  SCr 2.28 [03-26 @ 12:11]  SCr 2.59 [03-26 @ 04:20]  SCr 2.52 [03-26 @ 00:40]    Urinalysis - [03-24-23 @ 05:30]      Color Yellow / Appearance Clear / SG 1.020 / pH 5.0      Gluc 250 / Ketone Trace  / Bili Negative / Urobili Negative       Blood Moderate / Protein 100 / Leuk Est Trace / Nitrite Negative      RBC 0-2 / WBC 6-10 / Hyaline  / Gran  / Sq Epi  / Non Sq Epi Moderate / Bacteria Many      Lipid: chol --, TG 74, HDL --, LDL --      [03-24-23 @ 08:47]

## 2023-03-27 NOTE — DIETITIAN INITIAL EVALUATION ADULT - OTHER INFO
47 y/o female with necrotizing pancreatitis. Pt is intubated and sedated on precedex and fentanyl. Receiving and tolerating Nepro TF Pt without any noted/reported intolerance this time (no nausea/vomiting or abdominal distention); pt with hypoactive bowel sounds - consider bowel regimen as appropriate. Pt receiving CRRT management as per Nephrology due to oliguric CHATO. CRRT confers a higher nutrition need. Suggest increasing rate of TF to 37 mL/hr x 24 hrs which will offer pt 1598 kcals, 72 gms protein, 645 mL free H2O in 888 mL total volume. To meet this higher protein need for CRRT as well as for healing of advanced stage skin injury, suggest adding No Carb Prosource x 5/day (75 gms protein) which will provide a daily protein amount of 147 gms. Enteral suggestion will give pt 14 kcals/kg and 1.3 gms protein/kg of dosing wt. Please provide Nephro-Dennis multivitamin to assist with wound healing and ensure complete micronutrient coverage. Education not warranted at this time. RDN services to remain available as needed.

## 2023-03-27 NOTE — PROGRESS NOTE ADULT - ASSESSMENT
ASSESSMENT:  48 year old female with necrotizing pancreatitis. Intubated and hemodynamically unstable requiring pressors.      PLAN:  Neuro:  - Sedation while intubated w/ precedex  - Multimodal pain control  - Fentanyl for pain control and sedation    Resp:  - Mechanical ventilation - AC settings  - Maintain SpO2 > 92%    CV:  - Remains on 2 pressors (levo and vaso)  - Will wean vasopressor support w/ goal MAP > 65 mmHg    GI:   - NPO  - NGT in place  - Continue TF (Nepro) at rate of 30cc/hr  - Trend LFTs    Renal:  - Babin catheter in place  - Continue CRRT  - Monitor I&Os, UOP  - Replete lytes with CRRT    Heme:  - Monitor CBC and coags  - SQH for VTE prophylaxis    ID:   - Monitor for clinical evidence of active infection  - Monitor WBC and temperature  - Continue meropenem and flagyl    Endo:   - Monitor glucose with metabolic panel    Code Status: Full code    Disposition: SICU ASSESSMENT:  48 year old female with necrotizing pancreatitis. Intubated and hemodynamically unstable requiring pressors.      PLAN:  Neuro:  - Sedation while intubated w/ precedex  - Multimodal pain control  - Fentanyl for pain control and sedation    Resp:  - Mechanical ventilation - AC settings  - Maintain SpO2 > 92%    CV:  - Remains on 2 pressors (levo and vaso)  - Will wean vasopressor support w/ goal MAP > 65 mmHg    GI:   - NPO  - NGT in place  - Continue TF (Nepro) at rate of 30cc/hr  - Trend LFTs  - CT chest/abdomen/pelvis 3/28 or 3/29    Renal/:  - Babin catheter in place  - Bladder pressure 16  - Continue CRRT, offload 50 cc  - Monitor I&Os, UOP  - Replete lytes with CRRT    Heme:  - Monitor CBC and coags  - SQH for VTE prophylaxis    ID:   - Monitor for clinical evidence of active infection  - Monitor WBC and temperature  - Continue meropenem and flagyl. Tomorrow will dc flagyl    Endo:   - Monitor glucose with metabolic panel  - Solu-cortef 150 once, then 50 q6h    Code Status: Full code    Disposition: SICU ASSESSMENT:  48 year old female with necrotizing pancreatitis. Intubated and hemodynamically unstable requiring pressors.      PLAN:  Neuro:  - Sedation while intubated w/ precedex  - Multimodal pain control  - Fentanyl for pain control and sedation    Resp:  - Mechanical ventilation - AC settings  - Maintain SpO2 > 92%    CV:  - Remains on 2 pressors (levo and vaso)  - Will wean vasopressor support w/ goal MAP > 65 mmHg    GI:   - NPO  - NGT in place  - Continue TF (Nepro) at rate of 30cc/hr  - Trend LFTs  - CT chest/abdomen/pelvis 3/28 or 3/29    Renal/:  - Babin catheter in place  - Bladder pressure 16  - Continue CRRT, offload 50 cc  - Monitor I&Os, UOP  - Replete lytes with CRRT    Heme:  - Monitor CBC and coags  - SQH for VTE prophylaxis    ID:   - Monitor for clinical evidence of active infection  - Monitor WBC and temperature  - Continue meropenem and flagyl. 3/28 will dc flagyl    Endo:   - Monitor glucose with metabolic panel  - Solu-cortef 150 once, then 50 q6h    Code Status: Full code    Disposition: SICU

## 2023-03-27 NOTE — PROGRESS NOTE ADULT - SUBJECTIVE AND OBJECTIVE BOX
PROGRESS NOTE:     Patient is a 48y old  Female who presents with a chief complaint of Nec pancreatitis (27 Mar 2023 10:09)      SUBJECTIVE / OVERNIGHT EVENTS:        MEDICATIONS  (STANDING):  chlorhexidine 0.12% Liquid 15 milliLiter(s) Oral Mucosa every 12 hours  chlorhexidine 2% Cloths 1 Application(s) Topical daily  CRRT Treatment    <Continuous>  dexMEDEtomidine Infusion 0.2 MICROgram(s)/kG/Hr (6.11 mL/Hr) IV Continuous <Continuous>  fentaNYL   Infusion 0.5 MICROgram(s)/kG/Hr (5.9 mL/Hr) IV Continuous <Continuous>  heparin   Injectable 5000 Unit(s) SubCutaneous every 8 hours  hydrocortisone sodium succinate Injectable 50 milliGRAM(s) IV Push every 6 hours  meropenem  IVPB 1000 milliGRAM(s) IV Intermittent every 12 hours  meropenem  IVPB      metroNIDAZOLE  IVPB 500 milliGRAM(s) IV Intermittent every 8 hours  norepinephrine Infusion 0.05 MICROgram(s)/kG/Min (5.73 mL/Hr) IV Continuous <Continuous>  pantoprazole  Injectable 40 milliGRAM(s) IV Push daily  Phoxillum Filtration BK 4 / 2.5 5000 milliLiter(s) (1500 mL/Hr) CRRT <Continuous>  Phoxillum Filtration BK 4 / 2.5 5000 milliLiter(s) (400 mL/Hr) CRRT <Continuous>  PrismaSOL Filtration BGK 4 / 2.5 5000 milliLiter(s) (2000 mL/Hr) CRRT <Continuous>  sodium chloride 0.9% 1000 milliLiter(s) (40 mL/Hr) IV Continuous <Continuous>  vasopressin Infusion 0.03 Unit(s)/Min (4.5 mL/Hr) IV Continuous <Continuous>    MEDICATIONS  (PRN):      CAPILLARY BLOOD GLUCOSE        I&O's Summary    26 Mar 2023 07:01  -  27 Mar 2023 07:00  --------------------------------------------------------  IN: 5140.6 mL / OUT: 25 mL / NET: 5115.6 mL    27 Mar 2023 07:01  -  27 Mar 2023 12:52  --------------------------------------------------------  IN: 623.7 mL / OUT: 20 mL / NET: 603.7 mL        PHYSICAL EXAM:  Vital Signs Last 24 Hrs  T(C): 36.6 (27 Mar 2023 12:00), Max: 38.8 (27 Mar 2023 04:00)  T(F): 97.8 (27 Mar 2023 12:00), Max: 101.8 (27 Mar 2023 04:00)  HR: 74 (27 Mar 2023 12:13) (72 - 100)  BP: --  BP(mean): --  RR: 22 (27 Mar 2023 12:00) (22 - 22)  SpO2: 100% (27 Mar 2023 12:13) (95% - 100%)    Parameters below as of 27 Mar 2023 12:00  Patient On (Oxygen Delivery Method): ventilator    O2 Concentration (%): 50    General: intubated and sedated   HEENT: neck supple, anicteric sclera  Cardiovascular: Normal s1, s2, RRR  Respiratory: CTA b/l   Abdominal: Soft, ntnd  Extremities: No swelling in LEs  Neurologic: Non focal  Psych: sedated       LABS:                        6.9    15.85 )-----------( 98       ( 27 Mar 2023 04:10 )             20.9     03-27    136  |  104  |  16  ----------------------------<  122<H>  4.6   |  21<L>  |  2.07<H>    Ca    7.1<L>      27 Mar 2023 04:10  Phos  2.6     03-27  Mg     2.30     03-27    TPro  4.2<L>  /  Alb  2.5<L>  /  TBili  5.0<H>  /  DBili  x   /  AST  2935<H>  /  ALT  2286<H>  /  AlkPhos  158<H>  03-27    PT/INR - ( 27 Mar 2023 04:10 )   PT: 23.4 sec;   INR: 2.00 ratio         PTT - ( 27 Mar 2023 04:10 )  PTT:32.3 sec          Culture - Blood (collected 24 Mar 2023 20:45)  Source: .Blood Blood-Peripheral  Preliminary Report (26 Mar 2023 01:02):    No growth to date.    Culture - Blood (collected 24 Mar 2023 19:30)  Source: .Blood Blood-Peripheral  Preliminary Report (26 Mar 2023 01:02):    No growth to date.        RADIOLOGY & ADDITIONAL TESTS:  Results Reviewed:   Imaging Personally Reviewed:  Electrocardiogram Personally Reviewed:    COORDINATION OF CARE:  Care Discussed with Consultants/Other Providers [Y/N]:  Prior or Outpatient Records Reviewed [Y/N]:

## 2023-03-27 NOTE — DIETITIAN INITIAL EVALUATION ADULT - NSFNSGIIOFT_GEN_A_CORE
03-26-23 @ 07:01  -  03-27-23 @ 07:00  --------------------------------------------------------  OUT:  Total OUT: 0 mL    Total NET: 390 mL      03-27-23 @ 07:01  -  03-27-23 @ 12:59  --------------------------------------------------------  OUT:  Total OUT: 0 mL    Total NET: 150 mL

## 2023-03-27 NOTE — DIETITIAN INITIAL EVALUATION ADULT - NSFNSPHYEXAMSKINFT_GEN_A_CORE
Pressure Injury 1: right nare, Suspected deep tissue injury  Pressure Injury 2: none, none  Pressure Injury 3: none, none  Pressure Injury 4: none, none  Pressure Injury 5: none, none  Pressure Injury 6: none, none  Pressure Injury 7: none, none  Pressure Injury 8: none, none  Pressure Injury 9: none, none  Pressure Injury 10: none, none  Pressure Injury 11: none, none

## 2023-03-27 NOTE — DIETITIAN INITIAL EVALUATION ADULT - NS FNS DIET ORDER
Diet, NPO with Tube Feed:   Tube Feeding Modality: Nasojejunal  Nepro with Carb Steady (NEPRORTH)  Total Volume for 24 Hours (mL): 720  Continuous  Starting Tube Feed Rate {mL per Hour}: 30  Increase Tube Feed Rate by (mL): 0  Until Goal Tube Feed Rate (mL per Hour): 30  Tube Feed Duration (in Hours): 24  Tube Feed Start Time: 16:30 (03-26-23 @ 16:02)

## 2023-03-27 NOTE — DIETITIAN INITIAL EVALUATION ADULT - PERTINENT LABORATORY DATA
03-27    136  |  104  |  16  ----------------------------<  122<H>  4.6   |  21<L>  |  2.07<H>    Ca    7.1<L>      27 Mar 2023 04:10  Phos  2.6     03-27  Mg     2.30     03-27    TPro  4.2<L>  /  Alb  2.5<L>  /  TBili  5.0<H>  /  DBili  x   /  AST  2935<H>  /  ALT  2286<H>  /  AlkPhos  158<H>  03-27  A1C with Estimated Average Glucose Result: 6.1 % (03-24-23 @ 08:47)

## 2023-03-27 NOTE — DIETITIAN INITIAL EVALUATION ADULT - ENTERAL
Nepro with Carb Steady @ goal rate of 37 mL/hr x 24 hrs with No Carb Prosource x5/day  Please order a Nephro-Dennis multivitamin for full micronutrient coverage

## 2023-03-27 NOTE — PROGRESS NOTE ADULT - SUBJECTIVE AND OBJECTIVE BOX
SICU Daily Progress Note  =====================================================    24 HR Events:  - Hepatitis panel sent  - Peripheral smear added to AM CBC  - Continuing CRRT, no fluid offloaded  - Continuing meropenem and Flagyl  - Will consider stress steroids in AM if unable to wean off pressors        MEDICATIONS:   --------------------------------------------------------------------------------------  Neurologic Medications  dexMEDEtomidine Infusion 0.2 MICROgram(s)/kG/Hr IV Continuous <Continuous>  fentaNYL   Infusion 0.5 MICROgram(s)/kG/Hr IV Continuous <Continuous>    Respiratory Medications    Cardiovascular Medications  norepinephrine Infusion 0.05 MICROgram(s)/kG/Min IV Continuous <Continuous>    Gastrointestinal Medications  lactated ringers. 1000 milliLiter(s) IV Continuous <Continuous>  pantoprazole  Injectable 40 milliGRAM(s) IV Push daily    Genitourinary Medications    Hematologic/Oncologic Medications  heparin   Injectable 5000 Unit(s) SubCutaneous every 8 hours    Antimicrobial/Immunologic Medications  meropenem  IVPB 1000 milliGRAM(s) IV Intermittent every 12 hours  meropenem  IVPB      metroNIDAZOLE  IVPB 500 milliGRAM(s) IV Intermittent every 8 hours    Endocrine/Metabolic Medications  vasopressin Infusion 0.03 Unit(s)/Min IV Continuous <Continuous>    Topical/Other Medications  chlorhexidine 0.12% Liquid 15 milliLiter(s) Oral Mucosa every 12 hours  chlorhexidine 4% Liquid 1 Application(s) Topical <User Schedule>  CRRT Treatment    <Continuous>  Phoxillum Filtration BK 4 / 2.5 5000 milliLiter(s) CRRT <Continuous>  Phoxillum Filtration BK 4 / 2.5 5000 milliLiter(s) CRRT <Continuous>  PrismaSOL Filtration BGK 4 / 2.5 5000 milliLiter(s) CRRT <Continuous>    --------------------------------------------------------------------------------------    VITAL SIGNS, INS/OUTS (last 24 hours):  --------------------------------------------------------------------------------------    03-25-23 @ 07:01  -  03-26-23 @ 07:00  --------------------------------------------------------  IN: 6051.1 mL / OUT: 200 mL / NET: 5851.1 mL    03-26-23 @ 07:01  -  03-27-23 @ 03:22  --------------------------------------------------------  IN: 4164.1 mL / OUT: 5 mL / NET: 4159.1 mL      --------------------------------------------------------------------------------------    EXAM  NEUROLOGY  Exam: Sedated    HEENT  Exam: NGT and ETT in place    RESPIRATORY  Exam: Intubated  Mechanical Ventilation:   Mode: AC/ CMV (Assist Control/ Continuous Mandatory Ventilation)  RR (machine): 22  TV (machine): 450  FiO2: 50  PEEP: 5  ITime: 0.9  MAP: 17  PIP: 38      CARDIOVASCULAR  Exam: Regular rate and rhythm     GI/NUTRITION  Exam: Abdomen soft, moderately distended    VASCULAR  Exam: Extremities warm, pink, well-perfused.    SKIN  Exam: Good skin turgor, no skin breakdown.     METABOLIC/FLUIDS/ELECTROLYTES  lactated ringers. 1000 milliLiter(s) IV Continuous <Continuous>      HEMATOLOGIC  [x] VTE Prophylaxis: heparin   Injectable 5000 Unit(s) SubCutaneous every 8 hours        LABS                          7.1    14.94 )-----------( 101      ( 27 Mar 2023 00:35 )             21.4     03-26    139  |  105  |  16  ----------------------------<  120<H>  4.9   |  21<L>  |  2.15<H>    Ca    6.8<L>      26 Mar 2023 19:55  Phos  2.7     03-26  Mg     2.20     03-26    TPro  4.5<L>  /  Alb  2.8<L>  /  TBili  4.4<H>  /  DBili  x   /  AST  3955<H>  /  ALT  2775<H>  /  AlkPhos  163<H>  03-26    PT/INR - ( 26 Mar 2023 19:55 )   PT: 26.1 sec;   INR: 2.23 ratio         PTT - ( 26 Mar 2023 19:55 )  PTT:30.7 sec      --------------------------------------------------------------------------------------    T(C): 38.3 (03-27-23 @ 00:00), Max: 38.6 (03-26-23 @ 20:00)  HR: 93 (03-27-23 @ 03:00) (87 - 100)  BP: --  RR: 22 (03-27-23 @ 03:00) (22 - 22)  SpO2: 99% (03-27-23 @ 03:00) (95% - 100%)    --------------------------------------------------------------------------------------   SICU Daily Progress Note  =====================================================    24 HR Events:  - Hepatitis panel sent  - Peripheral smear added to AM CBC  - Continuing CRRT, -50 cc  - Continuing meropenem and Flagyl  - Stress steroids started  - Continuous banana bag        MEDICATIONS:   --------------------------------------------------------------------------------------  Neurologic Medications  dexMEDEtomidine Infusion 0.2 MICROgram(s)/kG/Hr IV Continuous <Continuous>  fentaNYL   Infusion 0.5 MICROgram(s)/kG/Hr IV Continuous <Continuous>    Respiratory Medications    Cardiovascular Medications  norepinephrine Infusion 0.05 MICROgram(s)/kG/Min IV Continuous <Continuous>    Gastrointestinal Medications  lactated ringers. 1000 milliLiter(s) IV Continuous <Continuous>  pantoprazole  Injectable 40 milliGRAM(s) IV Push daily    Genitourinary Medications    Hematologic/Oncologic Medications  heparin   Injectable 5000 Unit(s) SubCutaneous every 8 hours    Antimicrobial/Immunologic Medications  meropenem  IVPB 1000 milliGRAM(s) IV Intermittent every 12 hours  meropenem  IVPB      metroNIDAZOLE  IVPB 500 milliGRAM(s) IV Intermittent every 8 hours    Endocrine/Metabolic Medications  vasopressin Infusion 0.03 Unit(s)/Min IV Continuous <Continuous>    Topical/Other Medications  chlorhexidine 0.12% Liquid 15 milliLiter(s) Oral Mucosa every 12 hours  chlorhexidine 4% Liquid 1 Application(s) Topical <User Schedule>  CRRT Treatment    <Continuous>  Phoxillum Filtration BK 4 / 2.5 5000 milliLiter(s) CRRT <Continuous>  Phoxillum Filtration BK 4 / 2.5 5000 milliLiter(s) CRRT <Continuous>  PrismaSOL Filtration BGK 4 / 2.5 5000 milliLiter(s) CRRT <Continuous>    --------------------------------------------------------------------------------------    VITAL SIGNS, INS/OUTS (last 24 hours):  --------------------------------------------------------------------------------------    03-25-23 @ 07:01  -  03-26-23 @ 07:00  --------------------------------------------------------  IN: 6051.1 mL / OUT: 200 mL / NET: 5851.1 mL    03-26-23 @ 07:01  -  03-27-23 @ 03:22  --------------------------------------------------------  IN: 4164.1 mL / OUT: 5 mL / NET: 4159.1 mL      --------------------------------------------------------------------------------------    EXAM  NEUROLOGY  Exam: Sedated    HEENT  Exam: NGT and ETT in place    RESPIRATORY  Exam: Intubated  Mechanical Ventilation:   Mode: AC/ CMV (Assist Control/ Continuous Mandatory Ventilation)  RR (machine): 22  TV (machine): 450  FiO2: 50  PEEP: 5  ITime: 0.9  MAP: 17  PIP: 38      CARDIOVASCULAR  Exam: Regular rate and rhythm     GI/NUTRITION  Exam: Abdomen soft, moderately distended    VASCULAR  Exam: Extremities warm, pink, well-perfused.    SKIN  Exam: Good skin turgor, no skin breakdown.     METABOLIC/FLUIDS/ELECTROLYTES  Sodium chloride 0.9% 1000 milliLiter(s) with folic acid 1mg, multivitamin 10 mL, and thiamine 100 mg IV Continuous <Continuous>      HEMATOLOGIC  [x] VTE Prophylaxis: heparin   Injectable 5000 Unit(s) SubCutaneous every 8 hours        LABS                          7.1    14.94 )-----------( 101      ( 27 Mar 2023 00:35 )             21.4     03-26    139  |  105  |  16  ----------------------------<  120<H>  4.9   |  21<L>  |  2.15<H>    Ca    6.8<L>      26 Mar 2023 19:55  Phos  2.7     03-26  Mg     2.20     03-26    TPro  4.5<L>  /  Alb  2.8<L>  /  TBili  4.4<H>  /  DBili  x   /  AST  3955<H>  /  ALT  2775<H>  /  AlkPhos  163<H>  03-26    PT/INR - ( 26 Mar 2023 19:55 )   PT: 26.1 sec;   INR: 2.23 ratio         PTT - ( 26 Mar 2023 19:55 )  PTT:30.7 sec      --------------------------------------------------------------------------------------    T(C): 38.3 (03-27-23 @ 00:00), Max: 38.6 (03-26-23 @ 20:00)  HR: 93 (03-27-23 @ 03:00) (87 - 100)  BP: --  RR: 22 (03-27-23 @ 03:00) (22 - 22)  SpO2: 99% (03-27-23 @ 03:00) (95% - 100%)    --------------------------------------------------------------------------------------   SICU Daily Progress Note  =====================================================    24 HR Events:  - Hepatitis panel sent  - Peripheral smear added to AM CBC  - Continuing CRRT, offload 50 cc  - Bladder pressure 16  - Continuing meropenem and Flagyl  - Stress steroids started  - Continuous banana bag    HPI:  48 year old female with PMHx significant for HTN, hypothyroidism,  30 years ago, breast cancer s/p left mastectomy and chemo in  presenting with epigastric pain and LUQ pain. Found to have necrotizing pancreatitis. Transferred from [facility] ([place]) for hypotension and tachypnea requiring emergent intubation and multiple pressors. SICU consulted for hemodynamic monitoring and respiratory management.    MEDICATIONS:   --------------------------------------------------------------------------------------  Neurologic Medications  dexMEDEtomidine Infusion 0.2 MICROgram(s)/kG/Hr IV Continuous <Continuous>  fentaNYL   Infusion 0.5 MICROgram(s)/kG/Hr IV Continuous <Continuous>    Respiratory Medications    Cardiovascular Medications  norepinephrine Infusion 0.05 MICROgram(s)/kG/Min IV Continuous <Continuous>    Gastrointestinal Medications  lactated ringers. 1000 milliLiter(s) IV Continuous <Continuous>  pantoprazole  Injectable 40 milliGRAM(s) IV Push daily    Genitourinary Medications    Hematologic/Oncologic Medications  heparin   Injectable 5000 Unit(s) SubCutaneous every 8 hours    Antimicrobial/Immunologic Medications  meropenem  IVPB 1000 milliGRAM(s) IV Intermittent every 12 hours  meropenem  IVPB      metroNIDAZOLE  IVPB 500 milliGRAM(s) IV Intermittent every 8 hours    Endocrine/Metabolic Medications  vasopressin Infusion 0.03 Unit(s)/Min IV Continuous <Continuous>    Topical/Other Medications  chlorhexidine 0.12% Liquid 15 milliLiter(s) Oral Mucosa every 12 hours  chlorhexidine 4% Liquid 1 Application(s) Topical <User Schedule>  CRRT Treatment    <Continuous>  Phoxillum Filtration BK 4 / 2.5 5000 milliLiter(s) CRRT <Continuous>  Phoxillum Filtration BK 4 / 2.5 5000 milliLiter(s) CRRT <Continuous>  PrismaSOL Filtration BGK 4 / 2.5 5000 milliLiter(s) CRRT <Continuous>    --------------------------------------------------------------------------------------    VITAL SIGNS, INS/OUTS (last 24 hours):  --------------------------------------------------------------------------------------    23 @ 07:  -  23 @ 07:00  --------------------------------------------------------  IN: 6051.1 mL / OUT: 200 mL / NET: 5851.1 mL    23 @ 07:01  -  23 @ 03:22  --------------------------------------------------------  IN: 4164.1 mL / OUT: 5 mL / NET: 4159.1 mL      --------------------------------------------------------------------------------------    EXAM  NEUROLOGY  Exam: Sedated    HEENT  Exam: NGT and ETT in place    RESPIRATORY  Exam: Intubated  Mechanical Ventilation:   Mode: AC/ CMV (Assist Control/ Continuous Mandatory Ventilation)  RR (machine): 22  TV (machine): 450  FiO2: 50  PEEP: 5  ITime: 0.9  MAP: 17  PIP: 38      CARDIOVASCULAR  Exam: Regular rate and rhythm     GI/NUTRITION  Exam: Abdomen soft, moderately distended    VASCULAR  Exam: Extremities warm, pink, well-perfused.    SKIN  Exam: Good skin turgor, no skin breakdown.     METABOLIC/FLUIDS/ELECTROLYTES  Sodium chloride 0.9% 1000 milliLiter(s) with folic acid 1mg, multivitamin 10 mL, and thiamine 100 mg IV Continuous <Continuous>      HEMATOLOGIC  [x] VTE Prophylaxis: heparin   Injectable 5000 Unit(s) SubCutaneous every 8 hours        LABS                          7.1    14.94 )-----------( 101      ( 27 Mar 2023 00:35 )             21.4         139  |  105  |  16  ----------------------------<  120<H>  4.9   |  21<L>  |  2.15<H>    Ca    6.8<L>      26 Mar 2023 19:55  Phos  2.7       Mg     2.20         TPro  4.5<L>  /  Alb  2.8<L>  /  TBili  4.4<H>  /  DBili  x   /  AST  3955<H>  /  ALT  2775<H>  /  AlkPhos  163<H>      PT/INR - ( 26 Mar 2023 19:55 )   PT: 26.1 sec;   INR: 2.23 ratio         PTT - ( 26 Mar 2023 19:55 )  PTT:30.7 sec      --------------------------------------------------------------------------------------    T(C): 38.3 (23 @ 00:00), Max: 38.6 (23 @ 20:00)  HR: 93 (23 @ 03:00) (87 - 100)  BP: --  RR: 22 (23 @ 03:00) (22 - 22)  SpO2: 99% (23 @ 03:00) (95% - 100%)    --------------------------------------------------------------------------------------   SICU Daily Progress Note  =====================================================    24 HR Events:  - Hepatitis panel sent  - Peripheral smear showed slight acanthocytes. Edin cells, smudge cells, and giant platelets present  - Continuing CRRT, offload 50 cc  - Bladder pressure 16  - Continuing meropenem and Flagyl  - Stress steroids started  - Continuous banana bag    HPI:  48 year old female with PMHx significant for HTN, hypothyroidism,  30 years ago, breast cancer s/p left mastectomy and chemo in  presenting with epigastric pain and LUQ pain. Found to have necrotizing pancreatitis. Transferred from [facility] ([place]) for hypotension and tachypnea requiring emergent intubation and multiple pressors. SICU consulted for hemodynamic monitoring and respiratory management.    MEDICATIONS:   --------------------------------------------------------------------------------------  Neurologic Medications  dexMEDEtomidine Infusion 0.2 MICROgram(s)/kG/Hr IV Continuous <Continuous>  fentaNYL   Infusion 0.5 MICROgram(s)/kG/Hr IV Continuous <Continuous>    Respiratory Medications    Cardiovascular Medications  norepinephrine Infusion 0.05 MICROgram(s)/kG/Min IV Continuous <Continuous>    Gastrointestinal Medications  lactated ringers. 1000 milliLiter(s) IV Continuous <Continuous>  pantoprazole  Injectable 40 milliGRAM(s) IV Push daily    Genitourinary Medications    Hematologic/Oncologic Medications  heparin   Injectable 5000 Unit(s) SubCutaneous every 8 hours    Antimicrobial/Immunologic Medications  meropenem  IVPB 1000 milliGRAM(s) IV Intermittent every 12 hours  meropenem  IVPB      metroNIDAZOLE  IVPB 500 milliGRAM(s) IV Intermittent every 8 hours    Endocrine/Metabolic Medications  vasopressin Infusion 0.03 Unit(s)/Min IV Continuous <Continuous>    Topical/Other Medications  chlorhexidine 0.12% Liquid 15 milliLiter(s) Oral Mucosa every 12 hours  chlorhexidine 4% Liquid 1 Application(s) Topical <User Schedule>  CRRT Treatment    <Continuous>  Phoxillum Filtration BK 4 / 2.5 5000 milliLiter(s) CRRT <Continuous>  Phoxillum Filtration BK 4 / 2.5 5000 milliLiter(s) CRRT <Continuous>  PrismaSOL Filtration BGK 4 / 2.5 5000 milliLiter(s) CRRT <Continuous>    --------------------------------------------------------------------------------------    VITAL SIGNS, INS/OUTS (last 24 hours):  --------------------------------------------------------------------------------------    23 @ 07:  -  23 @ 07:00  --------------------------------------------------------  IN: 6051.1 mL / OUT: 200 mL / NET: 5851.1 mL    23 @ 07:  -  23 @ 03:22  --------------------------------------------------------  IN: 4164.1 mL / OUT: 5 mL / NET: 4159.1 mL      --------------------------------------------------------------------------------------    EXAM  NEUROLOGY  Exam: Sedated    HEENT  Exam: NGT and ETT in place    RESPIRATORY  Exam: Intubated  Mechanical Ventilation:   Mode: AC/ CMV (Assist Control/ Continuous Mandatory Ventilation)  RR (machine): 22  TV (machine): 450  FiO2: 50  PEEP: 5  ITime: 0.9  MAP: 17  PIP: 38      CARDIOVASCULAR  Exam: Regular rate and rhythm     GI/NUTRITION  Exam: Abdomen soft, moderately distended    VASCULAR  Exam: Extremities warm, pink, well-perfused.    SKIN  Exam: Good skin turgor, no skin breakdown.     METABOLIC/FLUIDS/ELECTROLYTES  Sodium chloride 0.9% 1000 milliLiter(s) with folic acid 1mg, multivitamin 10 mL, and thiamine 100 mg IV Continuous <Continuous>      HEMATOLOGIC  [x] VTE Prophylaxis: heparin   Injectable 5000 Unit(s) SubCutaneous every 8 hours        LABS                          7.1    14.94 )-----------( 101      ( 27 Mar 2023 00:35 )             21.4         139  |  105  |  16  ----------------------------<  120<H>  4.9   |  21<L>  |  2.15<H>    Ca    6.8<L>      26 Mar 2023 19:55  Phos  2.7       Mg     2.20         TPro  4.5<L>  /  Alb  2.8<L>  /  TBili  4.4<H>  /  DBili  x   /  AST  3955<H>  /  ALT  2775<H>  /  AlkPhos  163<H>      PT/INR - ( 26 Mar 2023 19:55 )   PT: 26.1 sec;   INR: 2.23 ratio         PTT - ( 26 Mar 2023 19:55 )  PTT:30.7 sec      --------------------------------------------------------------------------------------    T(C): 38.3 (23 @ 00:00), Max: 38.6 (23 @ 20:00)  HR: 93 (23 @ 03:00) (87 - 100)  BP: --  RR: 22 (23 @ 03:00) (22 - 22)  SpO2: 99% (23 @ 03:00) (95% - 100%)    --------------------------------------------------------------------------------------

## 2023-03-27 NOTE — PROGRESS NOTE ADULT - ATTENDING COMMENTS
Patient with necrotizing pancreatitis and anuric renal failure requiring crrt  plan  supportive care per sicu  enteral feeding      I have personally interviewed and examined this patient, reviewed pertinent labs and imaging, and discussed the case with colleagues, residents, and physician assistants on B Team rounds.    The active care issues are:  1. vent dependent  2. necrotizing pancreatitis  3. multiorgan dysfunction    The Acute Care Surgery (B Team) Attending Group Practice:  Dr. Ayden Yip, Dr. Adonis Botello, Dr. Jam Nj, Dr. Sathya Andrade,     urgent issues - spectra [phone]  nonurgent issues - [phone]  patient appointments or afterhours - [phone]

## 2023-03-27 NOTE — PROGRESS NOTE ADULT - ASSESSMENT
48 year old female with necrotizing pancreatitis. Intubated and hemodynamically unstable requiring pressors.    PLAN:  - NPO/IVF/TF  - wean pressors as tolerated  - wean vent as tolerated  - wean sedation as tolerated  - continue IV abx  - monitor vital signs  - trend SICU labs  - SQH  - appreciate SICU care    B Team Surgery  [id number]

## 2023-03-27 NOTE — PROGRESS NOTE ADULT - SUBJECTIVE AND OBJECTIVE BOX
Surgery Progress Note     Overnight Events: see SICU note    Subjective:  Patient seen and examined, she is intubated and sedated.      Vital Signs:  Vital Signs Last 24 Hrs  T(C): 38 (27 Mar 2023 08:00), Max: 38.8 (27 Mar 2023 04:00)  T(F): 100.4 (27 Mar 2023 08:00), Max: 101.8 (27 Mar 2023 04:00)  HR: 77 (27 Mar 2023 09:30) (77 - 100)  BP: --  BP(mean): --  RR: 22 (27 Mar 2023 09:30) (22 - 22)  SpO2: 100% (27 Mar 2023 09:00) (95% - 100%)    Parameters below as of 27 Mar 2023 09:00  Patient On (Oxygen Delivery Method): ventilator    O2 Concentration (%): 50    CAPILLARY BLOOD GLUCOSE          I&O's Detail    26 Mar 2023 07:01  -  27 Mar 2023 07:00  --------------------------------------------------------  IN:    Albumin 5%  - 250 mL: 500 mL    Dexmedetomidine: 352.8 mL    FentaNYL: 94.4 mL    FentaNYL: 472 mL    IV PiggyBack: 500 mL    Lactated Ringers: 2000 mL    Nepro with Carb Steady: 390 mL    Norepinephrine: 423.4 mL    PRBCs (Packed Red Blood Cells): 300 mL    Vasopressin: 108 mL  Total IN: 5140.6 mL    OUT:    Indwelling Catheter - Urethral (mL): 20 mL    Other (mL): 0 mL    Phenylephrine: 0 mL    Voided (mL): 5 mL  Total OUT: 25 mL    Total NET: 5115.6 mL      27 Mar 2023 07:01  -  27 Mar 2023 10:11  --------------------------------------------------------  IN:    Dexmedetomidine: 29.4 mL    FentaNYL: 47.2 mL    Lactated Ringers: 100 mL    Nepro with Carb Steady: 60 mL    Norepinephrine: 15.4 mL    Vasopressin: 9 mL  Total IN: 261 mL    OUT:    Indwelling Catheter - Urethral (mL): 15 mL  Total OUT: 15 mL    Total NET: 246 mL            Physical Exam:  General: sedated  HEENT: NGT to LCWS  Respiratory: intubated on vent  Cardio: tachycardic, on pressors  Abdomen: soft, distended      Labs:    03-27    136  |  104  |  16  ----------------------------<  122<H>  4.6   |  21<L>  |  2.07<H>    Ca    7.1<L>      27 Mar 2023 04:10  Phos  2.6     03-27  Mg     2.30     03-27    TPro  4.2<L>  /  Alb  2.5<L>  /  TBili  5.0<H>  /  DBili  x   /  AST  2935<H>  /  ALT  2286<H>  /  AlkPhos  158<H>  03-27    LIVER FUNCTIONS - ( 27 Mar 2023 04:10 )  Alb: 2.5 g/dL / Pro: 4.2 g/dL / ALK PHOS: 158 U/L / ALT: 2286 U/L / AST: 2935 U/L / GGT: x                                 6.9    15.85 )-----------( 98       ( 27 Mar 2023 04:10 )             20.9     PT/INR - ( 27 Mar 2023 04:10 )   PT: 23.4 sec;   INR: 2.00 ratio         PTT - ( 27 Mar 2023 04:10 )  PTT:32.3 sec

## 2023-03-27 NOTE — PROGRESS NOTE ADULT - ATTENDING COMMENTS
I agree with the detailed interval history, physical, and plan, which I have reviewed and edited where appropriate'; also agree with notes/assessment with my team on service.  I have personally examined the patient.  I was physically present for the key portions of the evaluation and management (E/M) service provided.  I reviewed all the pertinent data.  The patient is a critical care patient with life threatening hemodynamic and metabolic instability in SICU.  The SICU team has a constant risk benefit analyzes discussion and coordinating care with the primary team and all consultants.   The patient is in SICU with the chief complaint and diagnosis mentioned in the note.   The plan will be specified in the note.  48 year old female with necrotizing pancreatitis in SICU in respiratory failure, CHATO and septic shock.  EXAM  NEUROLOGY  Exam: Sedated  RESPIRATORY  Exam: Coarse BS  CARDIOVASCULAR  Exam: Regular rate   GI/NUTRITION  Exam: Abdomen soft    PLAN:  Neuro  -precedex and fentanyl  Resp:  -Volume control 22/450/5/50  CV  - c/w levo and vaso; wean as tolerated as below   GI:   -enteral feeds  - GI ppx w/ protonix    -Monitor LFTs   Renal:  - Continue CRRT  Heme:   -CBC q8   Therapeutics:   -SQH for VTE prophylaxis  ID:   -Meropenem/Flagyl   Endo:   -Monitor glucose   Code Status: Full code    Disposition: SICU

## 2023-03-27 NOTE — PROGRESS NOTE ADULT - ATTENDING COMMENTS
I agree with the detailed interval history, physical, and plan, which I have reviewed and edited where appropriate'; also agree with notes/assessment with my team on service.  I have personally examined the patient.  I was physically present for the key portions of the evaluation and management (E/M) service provided.  I reviewed all the pertinent data.  The patient is a critical care patient with life threatening hemodynamic and metabolic instability in SICU.  The SICU team has a constant risk benefit analyzes discussion and coordinating care with the primary team and all consultants.   The patient is in SICU with the chief complaint and diagnosis mentioned in the note.   The plan will be specified in the note.  48 year old female with necrotizing pancreatitis in SICU in respiratory failure, CHATO and septic shock.  EXAM  NEUROLOGY  Exam: Sedated  RESPIRATORY  Exam: coarse BS  Mechanical Ventilation:   Mode: AC/ CMV   CARDIOVASCULAR  Exam: Regular rate   GI/NUTRITION  Exam: Abdomen soft  VASCULAR  Exam: Extremities warm    PLAN:  Neuro:  -  precedex  - Multimodal pain control  - Fentanyl  Resp:  - Mechanical ventilation - AC settings  - Maintain SpO2 > 92%  CV:  - levo and vaso  - Will wean vasopressors  GI:   - NPO  - NGT in place  - Continue TF (Nepro) at rate of 30cc/hr  - Renal/:  -- Continue CRRT, offload 50 cc  Heme:  - SQH for VTE prophylaxis  ID:   meropenem and flagyl  Endo:   - Monitor glucose   - Solu-cortef 150 once, then 50 q6h  Code Status: Full code    Disposition: SICU

## 2023-03-27 NOTE — DIETITIAN NUTRITION RISK NOTIFICATION - TREATMENT: THE FOLLOWING DIET HAS BEEN RECOMMENDED
Diet, NPO with Tube Feed:   Tube Feeding Modality: Nasojejunal  Nepro with Carb Steady (NEPRORTH)  Total Volume for 24 Hours (mL): 720  Continuous  Starting Tube Feed Rate {mL per Hour}: 30  Increase Tube Feed Rate by (mL): 0  Until Goal Tube Feed Rate (mL per Hour): 30  Tube Feed Duration (in Hours): 24  Tube Feed Start Time: 16:30 (03-26-23 @ 16:02) [Active]

## 2023-03-27 NOTE — DIETITIAN INITIAL EVALUATION ADULT - PERTINENT MEDS FT
MEDICATIONS  (STANDING):  chlorhexidine 0.12% Liquid 15 milliLiter(s) Oral Mucosa every 12 hours  chlorhexidine 2% Cloths 1 Application(s) Topical daily  CRRT Treatment    <Continuous>  dexMEDEtomidine Infusion 0.2 MICROgram(s)/kG/Hr (6.11 mL/Hr) IV Continuous <Continuous>  fentaNYL   Infusion 0.5 MICROgram(s)/kG/Hr (5.9 mL/Hr) IV Continuous <Continuous>  heparin   Injectable 5000 Unit(s) SubCutaneous every 8 hours  hydrocortisone sodium succinate Injectable 50 milliGRAM(s) IV Push every 6 hours  meropenem  IVPB 1000 milliGRAM(s) IV Intermittent every 12 hours  meropenem  IVPB      metroNIDAZOLE  IVPB 500 milliGRAM(s) IV Intermittent every 8 hours  norepinephrine Infusion 0.05 MICROgram(s)/kG/Min (5.73 mL/Hr) IV Continuous <Continuous>  pantoprazole  Injectable 40 milliGRAM(s) IV Push daily  Phoxillum Filtration BK 4 / 2.5 5000 milliLiter(s) (1500 mL/Hr) CRRT <Continuous>  Phoxillum Filtration BK 4 / 2.5 5000 milliLiter(s) (400 mL/Hr) CRRT <Continuous>  PrismaSOL Filtration BGK 4 / 2.5 5000 milliLiter(s) (2000 mL/Hr) CRRT <Continuous>  sodium chloride 0.9% 1000 milliLiter(s) (40 mL/Hr) IV Continuous <Continuous>  vasopressin Infusion 0.03 Unit(s)/Min (4.5 mL/Hr) IV Continuous <Continuous>    MEDICATIONS  (PRN):

## 2023-03-27 NOTE — PROGRESS NOTE ADULT - PROBLEM SELECTOR PLAN 1
Pt with oliguric CHATO in the setting of necrotizing pancreatitis leading to shock, requiring vasopressors support. Scr was 1.24 on 3/23/23 and increased to 4.27 on 3/24/23. This is likely ATN. UA as above. Babin catheter in place. Initiated on CRRT (3/24) with net even balance, can adjust accordingly per SICU management needs as well. Continue CRRT. CT abd/pel on 3/22 showed "kidney/ureters within normal limits". Consider Renal US when more stable. Monitor labs and urine output. Avoid nephrotoxins. Dose medications as per eGFR.

## 2023-03-27 NOTE — PROGRESS NOTE ADULT - PROBLEM SELECTOR PLAN 3
Pt with hyperkalemia in setting of acute renal failure. Serum potassium of 4.6. Continue CRRT as above. Continue to monitor serum potassium.    If any questions, please feel free to contact me     Leopoldo Weir  Nephrology Fellow  [facility] Pager: [phone]

## 2023-03-28 NOTE — PROGRESS NOTE ADULT - ASSESSMENT
ASSESSMENT:  48 year old female with PMHx HTN, hypothyroidism, breast CA found to have necrotizing pancreatitis. Patient intubated, requiring pressors, and CVVH.       PLAN:  Neuro:  - Sedation with precedex  - Multimodal pain control  - Fentanyl for pain control and sedation    Resp:  - intubated, currently on AC   - Maintain SpO2 > 92%  - monitor ABG  - daily CXR     CV: hx of HTN  - remains on pressors, wean as tolerated   - MAP >65   - monitor hemodynamics     GI:   - Diet: NPO with TFs   - Trend LFTs  - Repeat CT chest/abdomen/pelvis today - f/u results     Renal/:  - indwelling viera catheter in place  - Continue CRRT, -50cc/hr  - Monitor I&Os, UOP  - Replete lytes with CRRT    Heme:  - Monitor CBC and coags  - monitor H/H, transfuse for Hgb <7  - SQH for VTE prophylaxis    ID:   - Monitor for clinical evidence of active infection  - Continue meropenem    Endo:   - Monitor glucose with metabolic panel  - continue levothyroxine   - Continue Solu-cortef 50 q6h, will plan to taper     Code Status: Full code  Dispo: SICU

## 2023-03-28 NOTE — PROGRESS NOTE ADULT - PROBLEM SELECTOR PLAN 1
Pt with anuric CHATO in the setting of necrotizing pancreatitis leading to shock, requiring vasopressors support and renal replacement therapy. Scr was 1.24 on 3/23/23 and increased to 4.27 on 3/24/23. This is likely ATN. Initiated on CRRT (3/24), now tolerating net negative balance (-50cc/hr). Urine output of 55cc in past 24 hours. Plan to continue CRRT. Consider Renal US when more stable (no hydronephrosis noted on initial CT abd/pel). Monitor labs and urine output. Avoid nephrotoxins. Dose medications as per eGFR.

## 2023-03-28 NOTE — PROGRESS NOTE ADULT - ASSESSMENT
ASSESSMENT:  48 year old female with necrotizing pancreatitis. Intubated and hemodynamically unstable requiring pressors.      PLAN:  Neuro:  - Sedation while intubated w/ precedex  - Multimodal pain control  - Fentanyl for pain control and sedation    Resp:  - Mechanical ventilation - AC settings  - Maintain SpO2 > 92%    CV:  - Remains on 2 pressors (levo and vaso)  - Will wean vasopressor support w/ goal MAP > 65 mmHg    GI:   - NPO  - NGT in place  - TF held overnight, discuss resuming  - Trend LFTs  - Repeat CT chest/abdomen/pelvis today vs tomorrow    Renal/:  - Babin catheter in place  - Continue CRRT, -50cc/hr  - Monitor I&Os, UOP  - Replete lytes with CRRT    Heme:  - Monitor CBC and coags  - SQH for VTE prophylaxis    ID:   - Monitor for clinical evidence of active infection  - Monitor WBC and temperature  - Continue meropenem, plan to dc flagyl today    Endo:   - Monitor glucose with metabolic panel  - Continue Solu-cortef 50 q6h    Code Status: Full code    Disposition: SICU ASSESSMENT:  48 year old female with necrotizing pancreatitis. Intubated and hemodynamically unstable requiring pressors.      PLAN:  Neuro:  - Sedation while intubated w/ precedex  - Multimodal pain control  - Fentanyl for pain control and sedation    Resp:  - Mechanical ventilation - AC settings 450/15/5/40%  - Maintain SpO2 > 92%    CV:  - Remains on 2 pressors (levo and vaso)  - Will wean vasopressor support w/ goal MAP > 65 mmHg    GI:   - NPO  - NGT in place  - TF held overnight, discuss resuming  - Trend LFTs  - Repeat CT chest/abdomen/pelvis today vs tomorrow    Renal/:  - Babin catheter in place  - Continue CRRT, -50cc/hr  - Monitor I&Os, UOP  - Replete lytes with CRRT    Heme:  - Monitor CBC and coags  - SQH for VTE prophylaxis    ID:   - Monitor for clinical evidence of active infection  - Monitor WBC and temperature  - Continue meropenem, plan to dc flagyl today    Endo:   - Monitor glucose with metabolic panel  - Continue Solu-cortef 50 q6h    Code Status: Full code    Disposition: SICU ASSESSMENT:  48 year old female with PMHx HTN, hypothyroidism, breast CA found to have necrotizing pancreatitis. Patient intubated, requiring pressors, and CVVH.       PLAN:  Neuro:  - Sedation with precedex  - Multimodal pain control  - Fentanyl for pain control and sedation    Resp:  - intubated on Mechanical ventilation - currently on AC   - Maintain SpO2 > 92%  - monitor ABG  - daily CXR     CV: hx of HTN  - remains of levo and vaso, will wean as tolerable  - MAP >65   - monitor hemodynamics     GI:   - Diet: NPO with TFs   - NGT in place, post-pyloric   - Trend LFTs  - Repeat CT chest/abdomen/pelvis today- f/u results     Renal/:  - indwelling viera catheter in place  - Continue CRRT, -50cc/hr  - Monitor I&Os, UOP  - Replete lytes with CRRT    Heme:  - Monitor CBC and coags  - monitor H/H, transfuse for Hgb <7  - SQH for VTE prophylaxis    ID:   - Monitor for clinical evidence of active infection  - Monitor WBC and temperature  - Continue meropenem  - d/c flagyl today    Endo:   - Monitor glucose with metabolic panel  - continue levothyroxine   - Continue Solu-cortef 50 q6h    Code Status: Full code  Disposition: SICU ASSESSMENT:  48 year old female with PMHx HTN, hypothyroidism, breast CA found to have necrotizing pancreatitis. Patient intubated, requiring pressors, and CVVH.       PLAN:  Neuro:  - Sedation with precedex  - Multimodal pain control  - Fentanyl for pain control and sedation    Resp:  - intubated on Mechanical ventilation - currently on AC   - Maintain SpO2 > 92%  - monitor ABG  - daily CXR     CV: hx of HTN  - remains on levo and vaso, will wean as tolerable  - MAP >65   - monitor hemodynamics     GI:   - Diet: NPO with TFs   - NGT in place, post-pyloric   - Trend LFTs  - Repeat CT chest/abdomen/pelvis today - f/u results     Renal/:  - indwelling viera catheter in place  - Continue CRRT, -50cc/hr  - Monitor I&Os, UOP  - Replete lytes with CRRT    Heme:  - Monitor CBC and coags  - monitor H/H, transfuse for Hgb <7  - SQH for VTE prophylaxis    ID:   - Monitor for clinical evidence of active infection  - Monitor WBC and temperature  - Continue meropenem  - d/c flagyl today    Endo:   - Monitor glucose with metabolic panel  - continue levothyroxine   - Continue Solu-cortef 50 q6h    Code Status: Full code  Disposition: SICU

## 2023-03-28 NOTE — PROGRESS NOTE ADULT - SUBJECTIVE AND OBJECTIVE BOX
[facility] DIVISION OF KIDNEY DISEASES AND HYPERTENSION   FOLLOW UP NOTE  --------------------------------------------------------------------------------  Chief Complaint: Anuric CHATO requiring renal replacement therapy     24 hour events/subjective: Pt. was seen and examined in the SICU. She remains intubated, sedated and requiring IV vasopressors. She remains on CRRT with net negative balance. Unable to obtain further ROS.     PAST HISTORY  --------------------------------------------------------------------------------  No significant changes to PMH, PSH, FHx, SHx, unless otherwise noted    ALLERGIES & MEDICATIONS  --------------------------------------------------------------------------------  Allergies  No Known Allergies    Standing Inpatient Medications  chlorhexidine 0.12% Liquid 15 milliLiter(s) Oral Mucosa every 12 hours  chlorhexidine 2% Cloths 1 Application(s) Topical daily  CRRT Treatment    <Continuous>  dexMEDEtomidine Infusion 0.2 MICROgram(s)/kG/Hr IV Continuous <Continuous>  fentaNYL   Infusion 0.5 MICROgram(s)/kG/Hr IV Continuous <Continuous>  heparin   Injectable 5000 Unit(s) SubCutaneous every 8 hours  hydrocortisone sodium succinate Injectable 50 milliGRAM(s) IV Push every 6 hours  levothyroxine Injectable 20 MICROGram(s) IV Push at bedtime  meropenem  IVPB 1000 milliGRAM(s) IV Intermittent every 12 hours  meropenem  IVPB      metroNIDAZOLE  IVPB 500 milliGRAM(s) IV Intermittent every 8 hours  norepinephrine Infusion 0.05 MICROgram(s)/kG/Min IV Continuous <Continuous>  pantoprazole  Injectable 40 milliGRAM(s) IV Push daily  petrolatum Ophthalmic Ointment 1 Application(s) Both EYES two times a day  Phoxillum Filtration BK 4 / 2.5 5000 milliLiter(s) CRRT <Continuous>  Phoxillum Filtration BK 4 / 2.5 5000 milliLiter(s) CRRT <Continuous>  PrismaSOL Filtration BGK 4 / 2.5 5000 milliLiter(s) CRRT <Continuous>  sodium chloride 0.9% 1000 milliLiter(s) IV Continuous <Continuous>  vasopressin Infusion 0.03 Unit(s)/Min IV Continuous <Continuous>    PRN Inpatient Medications    REVIEW OF SYSTEMS  --------------------------------------------------------------------------------  Unable to obtain ROS     VITALS/PHYSICAL EXAM  --------------------------------------------------------------------------------  T(C): 37.2 (03-28-23 @ 08:00), Max: 37.5 (03-27-23 @ 20:00)  HR: 78 (03-28-23 @ 09:00) (70 - 84)  BP: --  RR: 22 (03-28-23 @ 09:00) (22 - 22)  SpO2: 99% (03-28-23 @ 09:00) (97% - 100%)  Wt(kg): --    03-27-23 @ 07:01  -  03-28-23 @ 07:00  --------------------------------------------------------  IN: 3066.9 mL / OUT: 2472 mL / NET: 594.9 mL    03-28-23 @ 07:01  -  03-28-23 @ 09:11  --------------------------------------------------------  IN: 252.3 mL / OUT: 0 mL / NET: 252.3 mL    Physical Exam:  	Gen: Ill appearing, intubated, sedated   	HEENT: +ETT  	Pulm: Mechanical breath sounds  	CV: RRR  	Abd: Obese, distended  	Ext: + LE edema B/L  	Neuro: Sedated, not following commands               : Babin+ with no urine in the bag  	Skin: Warm and dry              Dialysis access: L IJ non tunneled HD catheter     LABS/STUDIES  --------------------------------------------------------------------------------              9.1    29.16 >-----------<  93       [03-28-23 @ 03:07]              26.7     138  |  105  |  16  ----------------------------<  141      [03-27-23 @ 19:55]  4.8   |  18  |  1.75        Ca     7.2     [03-27-23 @ 19:55]      iCa    0.93     [03-28 @ 03:07]      Mg     2.50     [03-28-23 @ 03:07]      Phos  3.5     [03-28-23 @ 03:07]    TPro  4.6  /  Alb  2.6  /  TBili  6.6  /  DBili  x   /  AST  1701  /  ALT  1847  /  AlkPhos  194  [03-27-23 @ 19:55]    PT/INR: PT 16.9 , INR 1.45       [03-28-23 @ 03:07]  PTT: 30.5       [03-28-23 @ 03:07]    Creatinine Trend:  SCr 1.75 [03-27 @ 19:55]  SCr 1.91 [03-27 @ 11:50]  SCr 2.07 [03-27 @ 04:10]  SCr 2.15 [03-26 @ 19:55]  SCr 2.28 [03-26 @ 12:11]

## 2023-03-28 NOTE — PROGRESS NOTE ADULT - ATTENDING COMMENTS
I agree with the detailed interval history, physical, and plan, which I have reviewed and edited where appropriate'; also agree with notes/assessment with my team on service.  I have personally examined the patient.  I was physically present for the key portions of the evaluation and management (E/M) service provided.  I reviewed all the pertinent data.  The patient is a critical care patient with life threatening hemodynamic and metabolic instability in SICU.  The SICU team has a constant risk benefit analyzes discussion and coordinating care with the primary team and all consultants.   The patient is in SICU with the chief complaint and diagnosis mentioned in the note.   The plan will be specified in the note.  48 year old female with necrotizing pancreatitis on vasopressors, and CVVH in SICU.  EXAM  NEUROLOGY  Exam: Sedated.   RESPIRATORY  Exam: Normal expansion/effort. Intubated on AC   Coarse BS  CARDIOVASCULAR  Exam: Regular rate   GI/NUTRITION  Exam: Abdomen slightly firm. distended.   PLAN:  Neuro:  - precedex  - Fentanyl   Resp:  - intubated  AC   - Maintain SpO2 > 92%  CV:  - vasopressors, wean as tolerated   GI:   - Diet: NPO with TFs   Renal/:  -FU electrolytes with CRRT  Heme:  - SQH for VTE prophylaxis  ID:   - meropenem  Endo:   - FU glucose   - Continue Solu-cortef 50 q6h, will plan to taper   Code Status: Full code  Dispo: SICU

## 2023-03-28 NOTE — PROGRESS NOTE ADULT - ATTENDING COMMENTS
Patient with necrotizing pancreatitis and anuric renal failure requiring crrt  plan  supportive care per sicu  enteral feeding      I have personally interviewed and examined this patient, reviewed pertinent labs and imaging, and discussed the case with colleagues, residents, and physician assistants on B Team rounds.    The active care issues are:  1. vent dependent  2. necrotizing pancreatitis  3. multiorgan dysfunction    The Acute Care Surgery (B Team) Attending Group Practice:  Dr. Ayden Yip, Dr. Adonis Botello, Dr. Jam Nj, Dr. Sathya Andrade,     urgent issues - spectra [id number]  nonurgent issues - [phone]  patient appointments or afterhours - [phone] .

## 2023-03-28 NOTE — PROGRESS NOTE ADULT - ASSESSMENT
48 year old female with necrotizing pancreatitis. Intubated and hemodynamically unstable requiring pressors.    PLAN:  - NPO/IVF/TF  - wean pressors as tolerated  - wean vent as tolerated  - wean sedation as tolerated  - CVVHD  - continue IV abx  - monitor vital signs  - trend SICU labs  - SQH  - appreciate SICU care    B Team Surgery  [id number]

## 2023-03-28 NOTE — PROGRESS NOTE ADULT - ATTENDING COMMENTS
I agree with the detailed interval history, physical, and plan, which I have reviewed and edited where appropriate'; also agree with notes/assessment with my team on service.  I have personally examined the patient.  I was physically present for the key portions of the evaluation and management (E/M) service provided.  I reviewed all the pertinent data.  The patient is a critical care patient with life threatening hemodynamic and metabolic instability in SICU.  The SICU team has a constant risk benefit analyzes discussion and coordinating care with the primary team and all consultants.   The patient is in SICU with the chief complaint and diagnosis mentioned in the note.   The plan will be specified in the note.  48 year old female with necrotizing pancreatitis, in respiratory failure on CVVH in SICU.  EXAM  NEUROLOGY  Exam: Sedated  RESPIRATORY  Exam: Intubated on AC  Coarse BS  CARDIOVASCULAR  Exam:  Regular rate  GI/NUTRITION  Exam: Abdomen slightly firm  VASCULAR  Exam: Extremities warm    PLAN:  Neuro:  - precedex  - Fentanyl   Resp:  - Mechanical ventilation - currently on AC   - Maintain SpO2 > 92%  CV:  -levo  -vaso  GI:   - Diet: NPO with TFs   - NGT in place, post-pyloric   Renal/:  - Continue CRRT, -50cc/hr  Heme:  - SQH for VTE prophylaxis  ID:   - meropenem  - d/c flagyl today  Endo:   - Monitor glucose  - Continue Solu-cortef 50 q6h    Code Status: Full code  Disposition: SICU

## 2023-03-28 NOTE — PROGRESS NOTE ADULT - SUBJECTIVE AND OBJECTIVE BOX
SICU Daily Progress Note  =====================================================    24 HR Events:  - Held TF overnight due to output from mouth      MEDICATIONS:   --------------------------------------------------------------------------------------  Neurologic Medications  dexMEDEtomidine Infusion 0.2 MICROgram(s)/kG/Hr IV Continuous <Continuous>  fentaNYL   Infusion 0.5 MICROgram(s)/kG/Hr IV Continuous <Continuous>    Respiratory Medications    Cardiovascular Medications  norepinephrine Infusion 0.05 MICROgram(s)/kG/Min IV Continuous <Continuous>    Gastrointestinal Medications  pantoprazole  Injectable 40 milliGRAM(s) IV Push daily  sodium chloride 0.9% 1000 milliLiter(s) IV Continuous <Continuous>    Genitourinary Medications    Hematologic/Oncologic Medications  heparin   Injectable 5000 Unit(s) SubCutaneous every 8 hours    Antimicrobial/Immunologic Medications  meropenem  IVPB 1000 milliGRAM(s) IV Intermittent every 12 hours  meropenem  IVPB      metroNIDAZOLE  IVPB 500 milliGRAM(s) IV Intermittent every 8 hours    Endocrine/Metabolic Medications  hydrocortisone sodium succinate Injectable 50 milliGRAM(s) IV Push every 6 hours  levothyroxine Injectable 20 MICROGram(s) IV Push at bedtime  vasopressin Infusion 0.03 Unit(s)/Min IV Continuous <Continuous>    Topical/Other Medications  chlorhexidine 0.12% Liquid 15 milliLiter(s) Oral Mucosa every 12 hours  chlorhexidine 2% Cloths 1 Application(s) Topical daily  CRRT Treatment    <Continuous>  petrolatum Ophthalmic Ointment 1 Application(s) Both EYES two times a day  Phoxillum Filtration BK 4 / 2.5 5000 milliLiter(s) CRRT <Continuous>  Phoxillum Filtration BK 4 / 2.5 5000 milliLiter(s) CRRT <Continuous>  PrismaSOL Filtration BGK 4 / 2.5 5000 milliLiter(s) CRRT <Continuous>    --------------------------------------------------------------------------------------    VITAL SIGNS, INS/OUTS (last 24 hours):  --------------------------------------------------------------------------------------    03-26-23 @ 07:01  -  03-27-23 @ 07:00  --------------------------------------------------------  IN: 5140.6 mL / OUT: 25 mL / NET: 5115.6 mL    03-27-23 @ 07:01  -  03-28-23 @ 02:23  --------------------------------------------------------  IN: 2411.6 mL / OUT: 1875 mL / NET: 536.6 mL      --------------------------------------------------------------------------------------    EXAM  NEUROLOGY  Exam: Sedated    HEENT  Exam: NGT and ETT in place    RESPIRATORY  Exam: Intubated  Mechanical Ventilation:   Mode: AC/ CMV (Assist Control/ Continuous Mandatory Ventilation)  RR (machine): 22  TV (machine): 450  FiO2: 30  PEEP: 5  ITime: 0.78  MAP: 15  PC: 89  PIP: 35    CARDIOVASCULAR  Exam: Regular rate and rhythm     GI/NUTRITION  Exam: Abdomen soft, moderately distended    VASCULAR  Exam: Extremities warm, pink, well-perfused.    SKIN  Exam: Good skin turgor, no skin breakdown.     METABOLIC/FLUIDS/ELECTROLYTES  sodium chloride 0.9% 1000 milliLiter(s) IV Continuous <Continuous>      HEMATOLOGIC  [x] VTE Prophylaxis: heparin   Injectable 5000 Unit(s) SubCutaneous every 8 hours        LABS  --------------------------------------------------------------------------------------    T(C): 37.2 (03-28-23 @ 00:00), Max: 38.8 (03-27-23 @ 04:00)  HR: 78 (03-28-23 @ 01:00) (72 - 93)  BP: --  RR: 22 (03-28-23 @ 01:00) (22 - 22)  SpO2: 98% (03-28-23 @ 00:31) (97% - 100%)    --------------------------------------------------------------------------------------   SICU Daily Progress Note  =====================================================    24 HR Events:  - Held TF overnight due to output from mouth  - repeat Abdominal XR for NGT placement    MEDICATIONS:   --------------------------------------------------------------------------------------  Neurologic Medications  dexMEDEtomidine Infusion 0.2 MICROgram(s)/kG/Hr IV Continuous <Continuous>  fentaNYL   Infusion 0.5 MICROgram(s)/kG/Hr IV Continuous <Continuous>    Respiratory Medications    Cardiovascular Medications  norepinephrine Infusion 0.05 MICROgram(s)/kG/Min IV Continuous <Continuous>    Gastrointestinal Medications  pantoprazole  Injectable 40 milliGRAM(s) IV Push daily  sodium chloride 0.9% 1000 milliLiter(s) IV Continuous <Continuous>    Genitourinary Medications    Hematologic/Oncologic Medications  heparin   Injectable 5000 Unit(s) SubCutaneous every 8 hours    Antimicrobial/Immunologic Medications  meropenem  IVPB 1000 milliGRAM(s) IV Intermittent every 12 hours  meropenem  IVPB      metroNIDAZOLE  IVPB 500 milliGRAM(s) IV Intermittent every 8 hours    Endocrine/Metabolic Medications  hydrocortisone sodium succinate Injectable 50 milliGRAM(s) IV Push every 6 hours  levothyroxine Injectable 20 MICROGram(s) IV Push at bedtime  vasopressin Infusion 0.03 Unit(s)/Min IV Continuous <Continuous>    Topical/Other Medications  chlorhexidine 0.12% Liquid 15 milliLiter(s) Oral Mucosa every 12 hours  chlorhexidine 2% Cloths 1 Application(s) Topical daily  CRRT Treatment    <Continuous>  petrolatum Ophthalmic Ointment 1 Application(s) Both EYES two times a day  Phoxillum Filtration BK 4 / 2.5 5000 milliLiter(s) CRRT <Continuous>  Phoxillum Filtration BK 4 / 2.5 5000 milliLiter(s) CRRT <Continuous>  PrismaSOL Filtration BGK 4 / 2.5 5000 milliLiter(s) CRRT <Continuous>    --------------------------------------------------------------------------------------    VITAL SIGNS, INS/OUTS (last 24 hours):  --------------------------------------------------------------------------------------    03-26-23 @ 07:01  -  03-27-23 @ 07:00  --------------------------------------------------------  IN: 5140.6 mL / OUT: 25 mL / NET: 5115.6 mL    03-27-23 @ 07:01 - 03-28-23 @ 02:23  --------------------------------------------------------  IN: 2411.6 mL / OUT: 1875 mL / NET: 536.6 mL      --------------------------------------------------------------------------------------    EXAM  NEUROLOGY  Exam: Sedated    HEENT  Exam: NGT and ETT in place    RESPIRATORY  Exam: Intubated  Mechanical Ventilation:   Mode: AC/ CMV (Assist Control/ Continuous Mandatory Ventilation)  RR (machine): 22  TV (machine): 450  FiO2: 30  PEEP: 5  ITime: 0.78  MAP: 15  PC: 89  PIP: 35    CARDIOVASCULAR  Exam: Regular rate and rhythm     GI/NUTRITION  Exam: Abdomen soft, moderately distended    VASCULAR  Exam: Extremities warm, pink, well-perfused.    SKIN  Exam: Good skin turgor, no skin breakdown.     METABOLIC/FLUIDS/ELECTROLYTES  sodium chloride 0.9% 1000 milliLiter(s) IV Continuous <Continuous>      HEMATOLOGIC  [x] VTE Prophylaxis: heparin   Injectable 5000 Unit(s) SubCutaneous every 8 hours        LABS  --------------------------------------------------------------------------------------    T(C): 37.2 (03-28-23 @ 00:00), Max: 38.8 (03-27-23 @ 04:00)  HR: 78 (03-28-23 @ 01:00) (72 - 93)  BP: --  RR: 22 (03-28-23 @ 01:00) (22 - 22)  SpO2: 98% (03-28-23 @ 00:31) (97% - 100%)    --------------------------------------------------------------------------------------   SICU Daily Progress Note  =====================================================    24 HR Events:  - Held TF overnight due to output from mouth  - repeat Abdominal XR for NGT placement   - If placed correctly, start Nepro goal @ 30    MEDICATIONS:   --------------------------------------------------------------------------------------  Neurologic Medications  dexMEDEtomidine Infusion 0.2 MICROgram(s)/kG/Hr IV Continuous <Continuous>  fentaNYL   Infusion 0.5 MICROgram(s)/kG/Hr IV Continuous <Continuous>    Respiratory Medications    Cardiovascular Medications  norepinephrine Infusion 0.05 MICROgram(s)/kG/Min IV Continuous <Continuous>    Gastrointestinal Medications  pantoprazole  Injectable 40 milliGRAM(s) IV Push daily  sodium chloride 0.9% 1000 milliLiter(s) IV Continuous <Continuous>    Genitourinary Medications    Hematologic/Oncologic Medications  heparin   Injectable 5000 Unit(s) SubCutaneous every 8 hours    Antimicrobial/Immunologic Medications  meropenem  IVPB 1000 milliGRAM(s) IV Intermittent every 12 hours  meropenem  IVPB      metroNIDAZOLE  IVPB 500 milliGRAM(s) IV Intermittent every 8 hours    Endocrine/Metabolic Medications  hydrocortisone sodium succinate Injectable 50 milliGRAM(s) IV Push every 6 hours  levothyroxine Injectable 20 MICROGram(s) IV Push at bedtime  vasopressin Infusion 0.03 Unit(s)/Min IV Continuous <Continuous>    Topical/Other Medications  chlorhexidine 0.12% Liquid 15 milliLiter(s) Oral Mucosa every 12 hours  chlorhexidine 2% Cloths 1 Application(s) Topical daily  CRRT Treatment    <Continuous>  petrolatum Ophthalmic Ointment 1 Application(s) Both EYES two times a day  Phoxillum Filtration BK 4 / 2.5 5000 milliLiter(s) CRRT <Continuous>  Phoxillum Filtration BK 4 / 2.5 5000 milliLiter(s) CRRT <Continuous>  PrismaSOL Filtration BGK 4 / 2.5 5000 milliLiter(s) CRRT <Continuous>    --------------------------------------------------------------------------------------    VITAL SIGNS, INS/OUTS (last 24 hours):  --------------------------------------------------------------------------------------    03-26-23 @ 07:01 - 03-27-23 @ 07:00  --------------------------------------------------------  IN: 5140.6 mL / OUT: 25 mL / NET: 5115.6 mL    03-27-23 @ 07:01 - 03-28-23 @ 02:23  --------------------------------------------------------  IN: 2411.6 mL / OUT: 1875 mL / NET: 536.6 mL      --------------------------------------------------------------------------------------    EXAM  NEUROLOGY  Exam: Sedated    HEENT  Exam: NGT and ETT in place    RESPIRATORY  Exam: Intubated  Mechanical Ventilation:   Mode: AC/ CMV (Assist Control/ Continuous Mandatory Ventilation)  RR (machine): 22  TV (machine): 450  FiO2: 30  PEEP: 5  ITime: 0.78  MAP: 15  PC: 89  PIP: 35    CARDIOVASCULAR  Exam: Regular rate and rhythm     GI/NUTRITION  Exam: Abdomen soft, moderately distended    VASCULAR  Exam: Extremities warm, pink, well-perfused.    SKIN  Exam: Good skin turgor, no skin breakdown.     METABOLIC/FLUIDS/ELECTROLYTES  sodium chloride 0.9% 1000 milliLiter(s) IV Continuous <Continuous>      HEMATOLOGIC  [x] VTE Prophylaxis: heparin   Injectable 5000 Unit(s) SubCutaneous every 8 hours        LABS  --------------------------------------------------------------------------------------    T(C): 37.2 (03-28-23 @ 00:00), Max: 38.8 (03-27-23 @ 04:00)  HR: 78 (03-28-23 @ 01:00) (72 - 93)  BP: --  RR: 22 (03-28-23 @ 01:00) (22 - 22)  SpO2: 98% (03-28-23 @ 00:31) (97% - 100%)    --------------------------------------------------------------------------------------   SICU Daily Progress Note  =====================================================    24 HR Events:  - Held TF overnight due to output from mouth, NGT now post-pyloric   - TFs  - repeat CT chest/abd/pelvis today   - d/c flagyl     PAST MEDICAL & SURGICAL HISTORY:  Hypertension  Hypothyroidism  Breast cancer  S/P   H/O left mastectomy      MEDICATIONS:   --------------------------------------------------------------------------------------  Neurologic Medications  dexMEDEtomidine Infusion 0.2 MICROgram(s)/kG/Hr IV Continuous <Continuous>  fentaNYL   Infusion 0.5 MICROgram(s)/kG/Hr IV Continuous <Continuous>    Respiratory Medications    Cardiovascular Medications  norepinephrine Infusion 0.05 MICROgram(s)/kG/Min IV Continuous <Continuous>    Gastrointestinal Medications  pantoprazole  Injectable 40 milliGRAM(s) IV Push daily  sodium chloride 0.9% 1000 milliLiter(s) IV Continuous <Continuous>    Genitourinary Medications    Hematologic/Oncologic Medications  heparin   Injectable 5000 Unit(s) SubCutaneous every 8 hours    Antimicrobial/Immunologic Medications  meropenem  IVPB 1000 milliGRAM(s) IV Intermittent every 12 hours  meropenem  IVPB      metroNIDAZOLE  IVPB 500 milliGRAM(s) IV Intermittent every 8 hours    Endocrine/Metabolic Medications  hydrocortisone sodium succinate Injectable 50 milliGRAM(s) IV Push every 6 hours  levothyroxine Injectable 20 MICROGram(s) IV Push at bedtime  vasopressin Infusion 0.03 Unit(s)/Min IV Continuous <Continuous>    Topical/Other Medications  chlorhexidine 0.12% Liquid 15 milliLiter(s) Oral Mucosa every 12 hours  chlorhexidine 2% Cloths 1 Application(s) Topical daily  CRRT Treatment    <Continuous>  petrolatum Ophthalmic Ointment 1 Application(s) Both EYES two times a day  Phoxillum Filtration BK 4 / 2.5 5000 milliLiter(s) CRRT <Continuous>  Phoxillum Filtration BK 4 / 2.5 5000 milliLiter(s) CRRT <Continuous>  PrismaSOL Filtration BGK 4 / 2.5 5000 milliLiter(s) CRRT <Continuous>    --------------------------------------------------------------------------------------    VITAL SIGNS, INS/OUTS (last 24 hours):    ICU Vital Signs Last 24 Hrs  T(C): 37.2 (28 Mar 2023 08:00), Max: 37.5 (27 Mar 2023 20:00)  T(F): 98.9 (28 Mar 2023 08:00), Max: 99.5 (27 Mar 2023 20:00)  HR: 76 (28 Mar 2023 11:00) (70 - 84)  ABP: 100/48 (28 Mar 2023 11:00) (82/51 - 125/65)  ABP(mean): 68 (28 Mar 2023 11:00) (59 - 88)  RR: 22 (28 Mar 2023 11:00) (22 - 22)  SpO2: 100% (28 Mar 2023 11:00) (97% - 100%)    O2 Parameters below as of 28 Mar 2023 11:00  Patient On (Oxygen Delivery Method): ventilator    O2 Concentration (%): 30      I&O's Detail    27 Mar 2023 07:01  -  28 Mar 2023 07:00  --------------------------------------------------------  IN:    Dexmedetomidine: 300.6 mL    FentaNYL: 460.2 mL    IV PiggyBack: 550 mL    Lactated Ringers: 150 mL    Nepro with Carb Steady: 536 mL    Norepinephrine: 122.1 mL    sodium chloride 0.9% w/ Additives: 840 mL    Vasopressin: 94.5 mL    Vasopressin: 13.5 mL  Total IN: 3066.9 mL    OUT:    Indwelling Catheter - Urethral (mL): 55 mL    Nasogastric/Oral tube (mL): 400 mL    Other (mL): 2017 mL  Total OUT: 2472 mL    Total NET: 594.9 mL      28 Mar 2023 07:01  -  28 Mar 2023 12:11  --------------------------------------------------------  IN:    Dexmedetomidine: 76.6 mL    FentaNYL: 59 mL    IV PiggyBack: 100 mL    Nepro with Carb Steady: 20 mL    Norepinephrine: 36.3 mL    sodium chloride 0.9% w/ Additives: 120 mL    Vasopressin: 22.5 mL  Total IN: 434.4 mL    OUT:    Indwelling Catheter - Urethral (mL): 0 mL    Nasogastric/Oral tube (mL): 0 mL    Other (mL): 504 mL  Total OUT: 504 mL    Total NET: -69.6 mL        --------------------------------------------------------------------------------------    EXAM  NEUROLOGY  Exam: Sedated    RESPIRATORY  Exam: Intubated on AC  Mechanical Ventilation:   Mode: AC/ CMV (Assist Control/ Continuous Mandatory Ventilation)  RR (machine): 22  TV (machine): 450  FiO2: 30  PEEP: 5  ITime: 0.78  MAP: 15  PC: 89  PIP: 35    CARDIOVASCULAR  Exam: S1, S2. Regular rate and rhythm on monitor.     GI/NUTRITION  Exam: Abdomen slightly firm, moderately distended.    VASCULAR  Exam: Extremities warm.     METABOLIC/FLUIDS/ELECTROLYTES  sodium chloride 0.9% 1000 milliLiter(s) IV Continuous <Continuous>      HEMATOLOGIC  [x] VTE Prophylaxis: heparin   Injectable 5000 Unit(s) SubCutaneous every 8 hours        LABS                            9.1    29.16 )-----------( 93       ( 28 Mar 2023 03:07 )             26.7     03-27    138  |  105  |  16  ----------------------------<  141<H>  4.8   |  18<L>  |  1.75<H>    Ca    7.2<L>      27 Mar 2023 19:55  Phos  3.5       Mg     2.50         TPro  4.6<L>  /  Alb  2.6<L>  /  TBili  6.6<H>  /  DBili  x   /  AST  1701<H>  /  ALT  1847<H>  /  AlkPhos  194<H>        --------------------------------------------------------------------------------------   SICU Daily Progress Note  =====================================================    24 HR Events:  - Held TF overnight due to output from mouth, NGT now post-pyloric   - TFs  - repeat CT chest/abd/pelvis today   - d/c flagyl     PAST MEDICAL & SURGICAL HISTORY:  Hypertension  Hypothyroidism  Breast cancer  S/P   H/O left mastectomy      MEDICATIONS:   --------------------------------------------------------------------------------------  Neurologic Medications  dexMEDEtomidine Infusion 0.2 MICROgram(s)/kG/Hr IV Continuous <Continuous>  fentaNYL   Infusion 0.5 MICROgram(s)/kG/Hr IV Continuous <Continuous>    Respiratory Medications    Cardiovascular Medications  norepinephrine Infusion 0.05 MICROgram(s)/kG/Min IV Continuous <Continuous>    Gastrointestinal Medications  pantoprazole  Injectable 40 milliGRAM(s) IV Push daily  sodium chloride 0.9% 1000 milliLiter(s) IV Continuous <Continuous>    Genitourinary Medications    Hematologic/Oncologic Medications  heparin   Injectable 5000 Unit(s) SubCutaneous every 8 hours    Antimicrobial/Immunologic Medications  meropenem  IVPB 1000 milliGRAM(s) IV Intermittent every 12 hours  meropenem  IVPB      metroNIDAZOLE  IVPB 500 milliGRAM(s) IV Intermittent every 8 hours    Endocrine/Metabolic Medications  hydrocortisone sodium succinate Injectable 50 milliGRAM(s) IV Push every 6 hours  levothyroxine Injectable 20 MICROGram(s) IV Push at bedtime  vasopressin Infusion 0.03 Unit(s)/Min IV Continuous <Continuous>    Topical/Other Medications  chlorhexidine 0.12% Liquid 15 milliLiter(s) Oral Mucosa every 12 hours  chlorhexidine 2% Cloths 1 Application(s) Topical daily  CRRT Treatment    <Continuous>  petrolatum Ophthalmic Ointment 1 Application(s) Both EYES two times a day  Phoxillum Filtration BK 4 / 2.5 5000 milliLiter(s) CRRT <Continuous>  Phoxillum Filtration BK 4 / 2.5 5000 milliLiter(s) CRRT <Continuous>  PrismaSOL Filtration BGK 4 / 2.5 5000 milliLiter(s) CRRT <Continuous>    --------------------------------------------------------------------------------------    VITAL SIGNS, INS/OUTS (last 24 hours):    ICU Vital Signs Last 24 Hrs  T(C): 37.2 (28 Mar 2023 08:00), Max: 37.5 (27 Mar 2023 20:00)  T(F): 98.9 (28 Mar 2023 08:00), Max: 99.5 (27 Mar 2023 20:00)  HR: 76 (28 Mar 2023 11:00) (70 - 84)  ABP: 100/48 (28 Mar 2023 11:00) (82/51 - 125/65)  ABP(mean): 68 (28 Mar 2023 11:00) (59 - 88)  RR: 22 (28 Mar 2023 11:00) (22 - 22)  SpO2: 100% (28 Mar 2023 11:00) (97% - 100%)    O2 Parameters below as of 28 Mar 2023 11:00  Patient On (Oxygen Delivery Method): ventilator    O2 Concentration (%): 30      I&O's Detail    27 Mar 2023 07:01  -  28 Mar 2023 07:00  --------------------------------------------------------  IN:    Dexmedetomidine: 300.6 mL    FentaNYL: 460.2 mL    IV PiggyBack: 550 mL    Lactated Ringers: 150 mL    Nepro with Carb Steady: 536 mL    Norepinephrine: 122.1 mL    sodium chloride 0.9% w/ Additives: 840 mL    Vasopressin: 94.5 mL    Vasopressin: 13.5 mL  Total IN: 3066.9 mL    OUT:    Indwelling Catheter - Urethral (mL): 55 mL    Nasogastric/Oral tube (mL): 400 mL    Other (mL): 2017 mL  Total OUT: 2472 mL    Total NET: 594.9 mL      28 Mar 2023 07:01  -  28 Mar 2023 12:11  --------------------------------------------------------  IN:    Dexmedetomidine: 76.6 mL    FentaNYL: 59 mL    IV PiggyBack: 100 mL    Nepro with Carb Steady: 20 mL    Norepinephrine: 36.3 mL    sodium chloride 0.9% w/ Additives: 120 mL    Vasopressin: 22.5 mL  Total IN: 434.4 mL    OUT:    Indwelling Catheter - Urethral (mL): 0 mL    Nasogastric/Oral tube (mL): 0 mL    Other (mL): 504 mL  Total OUT: 504 mL    Total NET: -69.6 mL        --------------------------------------------------------------------------------------    EXAM  NEUROLOGY  Exam: Sedated    RESPIRATORY  Exam: Intubated on AC  Mechanical Ventilation:   Mode: AC/ CMV (Assist Control/ Continuous Mandatory Ventilation)  RR (machine): 22  TV (machine): 450  FiO2: 30  PEEP: 5  ITime: 0.78  MAP: 15  PC: 89  PIP: 35    CARDIOVASCULAR  Exam: S1, S2. Regular rate and rhythm     GI/NUTRITION  Exam: Abdomen slightly firm, moderately distended    VASCULAR  Exam: Extremities warm    METABOLIC/FLUIDS/ELECTROLYTES  sodium chloride 0.9% 1000 milliLiter(s) IV Continuous <Continuous>      HEMATOLOGIC  [x] VTE Prophylaxis: heparin   Injectable 5000 Unit(s) SubCutaneous every 8 hours        LABS                            9.1    29.16 )-----------( 93       ( 28 Mar 2023 03:07 )             26.7     03-27    138  |  105  |  16  ----------------------------<  141<H>  4.8   |  18<L>  |  1.75<H>    Ca    7.2<L>      27 Mar 2023 19:55  Phos  3.5       Mg     2.50         TPro  4.6<L>  /  Alb  2.6<L>  /  TBili  6.6<H>  /  DBili  x   /  AST  1701<H>  /  ALT  1847<H>  /  AlkPhos  194<H>        --------------------------------------------------------------------------------------

## 2023-03-28 NOTE — PROGRESS NOTE ADULT - SUBJECTIVE AND OBJECTIVE BOX
SICU Daily Progress Note  =====================================================  Interval/Overnight Events:     ***    POD #          	SICU Day #    ***    HPI:  ((insert from previous note)) ***    PMH:   ***    ALLERGIES:  No Known Allergies      --------------------------------------------------------------------------------------    MEDICATIONS:    Neurologic Medications  dexMEDEtomidine Infusion 0.2 MICROgram(s)/kG/Hr IV Continuous <Continuous>  fentaNYL   Infusion 0.5 MICROgram(s)/kG/Hr IV Continuous <Continuous>    Respiratory Medications    Cardiovascular Medications  norepinephrine Infusion 0.05 MICROgram(s)/kG/Min IV Continuous <Continuous>    Gastrointestinal Medications  pantoprazole  Injectable 40 milliGRAM(s) IV Push daily    Genitourinary Medications    Hematologic/Oncologic Medications  heparin   Injectable 5000 Unit(s) SubCutaneous every 8 hours    Antimicrobial/Immunologic Medications  meropenem  IVPB 1000 milliGRAM(s) IV Intermittent every 12 hours  meropenem  IVPB        Endocrine/Metabolic Medications  hydrocortisone sodium succinate Injectable 50 milliGRAM(s) IV Push every 6 hours  levothyroxine Injectable 20 MICROGram(s) IV Push at bedtime  vasopressin Infusion 0.03 Unit(s)/Min IV Continuous <Continuous>    Topical/Other Medications  chlorhexidine 0.12% Liquid 15 milliLiter(s) Oral Mucosa every 12 hours  chlorhexidine 2% Cloths 1 Application(s) Topical daily  CRRT Treatment    <Continuous>  petrolatum Ophthalmic Ointment 1 Application(s) Both EYES two times a day  Phoxillum Filtration BK 4 / 2.5 5000 milliLiter(s) CRRT <Continuous>  Phoxillum Filtration BK 4 / 2.5 5000 milliLiter(s) CRRT <Continuous>  PrismaSOL Filtration BGK 4 / 2.5 5000 milliLiter(s) CRRT <Continuous>    --------------------------------------------------------------------------------------    VITAL SIGNS:    --------------------------------------------------------------------------------------    INS AND OUTS:  ((Insert SICU Vitals/Is+Os here))***  --------------------------------------------------------------------------------------    EXAM  NEUROLOGY  RASS:   	GCS:    Exam: Normal, in no acute distress.  Alert and oriented x4.  No focal neurologic deficits. ***    HEENT  Exam: Normocephalic, atraumatic, EOMI.  ***    RESPIRATORY  Exam: Lungs clear to auscultation, Normal expansion/effort. ***  Mechanical Ventilation: Mode: AC/ CMV (Assist Control/ Continuous Mandatory Ventilation), RR (machine): 22, TV (machine): 450, FiO2: 30, PEEP: 5, ITime: 0.72, MAP: 12, PIP: 32    CARDIOVASCULAR  Exam: S1, S2.  Regular rate and rhythm.   ***    GI/NUTRITION  Exam: Abdomen soft, Non-tender, Non-distended.  ***  Wound:  ***  Current Diet: NPO***    VASCULAR  Exam: Extremities warm, pink, well-perfused. ***    MUSCULOSKELETAL  Exam: All extremities moving spontaneously without limitations. ***    SKIN  Exam: Good skin turgor, no skin breakdown. ***    METABOLIC / FLUIDS / ELECTROLYTES      HEMATOLOGIC  [x] VTE Prophylaxis: heparin   Injectable 5000 Unit(s) SubCutaneous every 8 hours    Transfusions:	[] PRBC	[] Platelets		[] FFP	[] Cryoprecipitate    INFECTIOUS DISEASE  Antimicrobials/Immunologic Medications:  meropenem  IVPB 1000 milliGRAM(s) IV Intermittent every 12 hours  meropenem  IVPB          TUBES / LINES / DRAINS  ***  [x] Peripheral IV  [] Central Venous Line     	[] R	[] L	[] IJ	[] Fem	[] SC	Date Placed:   [] Arterial Line		[] R	[] L	[] Fem	[] Rad	[] Ax	Date Placed:   [] PICC		[] Midline		[] Mediport  [] Urinary Catheter		Date Placed:   [x] Necessity of urinary, arterial, and venous catheters discussed    --------------------------------------------------------------------------------------    LABS                          9.1    29.16 )-----------( 93       ( 28 Mar 2023 03:07 )             26.7       03-27    138  |  105  |  16  ----------------------------<  141<H>  4.8   |  18<L>  |  1.75<H>    Ca    7.2<L>      27 Mar 2023 19:55  Phos  3.5     03-28  Mg     2.50     03-28    TPro  4.6<L>  /  Alb  2.6<L>  /  TBili  6.6<H>  /  DBili  x   /  AST  1701<H>  /  ALT  1847<H>  /  AlkPhos  194<H>  03-27      PT/INR - ( 28 Mar 2023 03:07 )   PT: 16.9 sec;   INR: 1.45 ratio    PTT - ( 28 Mar 2023 03:07 )  PTT:30.5 sec  --------------------------------------------------------------------------------------       SICU PM Progress Note  =====================================================  Interval/Overnight Events:    - pending CT chest/abd/pelvis with IV contrast- f/u results  - TFs started    PAST MEDICAL & SURGICAL HISTORY:  Hypertension  Hypothyroidism  Breast cancer  S/P   H/O left mastectomy      ALLERGIES:  No Known Allergies      --------------------------------------------------------------------------------------    MEDICATIONS:    Neurologic Medications  dexMEDEtomidine Infusion 0.2 MICROgram(s)/kG/Hr IV Continuous <Continuous>  fentaNYL   Infusion 0.5 MICROgram(s)/kG/Hr IV Continuous <Continuous>    Respiratory Medications    Cardiovascular Medications  norepinephrine Infusion 0.05 MICROgram(s)/kG/Min IV Continuous <Continuous>    Gastrointestinal Medications  pantoprazole  Injectable 40 milliGRAM(s) IV Push daily    Genitourinary Medications    Hematologic/Oncologic Medications  heparin   Injectable 5000 Unit(s) SubCutaneous every 8 hours    Antimicrobial/Immunologic Medications  meropenem  IVPB 1000 milliGRAM(s) IV Intermittent every 12 hours  meropenem  IVPB        Endocrine/Metabolic Medications  hydrocortisone sodium succinate Injectable 50 milliGRAM(s) IV Push every 6 hours  levothyroxine Injectable 20 MICROGram(s) IV Push at bedtime  vasopressin Infusion 0.03 Unit(s)/Min IV Continuous <Continuous>    Topical/Other Medications  chlorhexidine 0.12% Liquid 15 milliLiter(s) Oral Mucosa every 12 hours  chlorhexidine 2% Cloths 1 Application(s) Topical daily  CRRT Treatment    <Continuous>  petrolatum Ophthalmic Ointment 1 Application(s) Both EYES two times a day  Phoxillum Filtration BK 4 / 2.5 5000 milliLiter(s) CRRT <Continuous>  Phoxillum Filtration BK 4 / 2.5 5000 milliLiter(s) CRRT <Continuous>  PrismaSOL Filtration BGK 4 / 2.5 5000 milliLiter(s) CRRT <Continuous>    --------------------------------------------------------------------------------------    VITAL SIGNS:  ICU Vital Signs Last 24 Hrs  T(C): 37.2 (28 Mar 2023 08:00), Max: 37.5 (27 Mar 2023 20:00)  T(F): 98.9 (28 Mar 2023 08:00), Max: 99.5 (27 Mar 2023 20:00)  HR: 75 (28 Mar 2023 11:34) (70 - 84)  ABP: 100/48 (28 Mar 2023 11:00) (82/51 - 125/65)  ABP(mean): 68 (28 Mar 2023 11:00) (59 - 88)  RR: 22 (28 Mar 2023 11:00) (22 - 22)  SpO2: 99% (28 Mar 2023 11:34) (97% - 100%)    O2 Parameters below as of 28 Mar 2023 11:00  Patient On (Oxygen Delivery Method): ventilator    O2 Concentration (%): 30    --------------------------------------------------------------------------------------    INS AND OUTS:    I&O's Detail    27 Mar 2023 07:01  -  28 Mar 2023 07:00  --------------------------------------------------------  IN:    Dexmedetomidine: 300.6 mL    FentaNYL: 460.2 mL    IV PiggyBack: 550 mL    Lactated Ringers: 150 mL    Nepro with Carb Steady: 536 mL    Norepinephrine: 122.1 mL    sodium chloride 0.9% w/ Additives: 840 mL    Vasopressin: 94.5 mL    Vasopressin: 13.5 mL  Total IN: 3066.9 mL    OUT:    Indwelling Catheter - Urethral (mL): 55 mL    Nasogastric/Oral tube (mL): 400 mL    Other (mL): 2017 mL  Total OUT: 2472 mL    Total NET: 594.9 mL      28 Mar 2023 07:01  -  28 Mar 2023 12:42  --------------------------------------------------------  IN:    Dexmedetomidine: 76.6 mL    FentaNYL: 59 mL    IV PiggyBack: 100 mL    Nepro with Carb Steady: 20 mL    Norepinephrine: 36.3 mL    sodium chloride 0.9% w/ Additives: 120 mL    Vasopressin: 22.5 mL  Total IN: 434.4 mL    OUT:    Indwelling Catheter - Urethral (mL): 0 mL    Nasogastric/Oral tube (mL): 0 mL    Other (mL): 504 mL  Total OUT: 504 mL    Total NET: -69.6 mL    --------------------------------------------------------------------------------------    EXAM  NEUROLOGY  Exam: Sedated.     RESPIRATORY  Exam: Normal expansion/effort. Intubated on AC   Mechanical Ventilation: Mode: AC/ CMV (Assist Control/ Continuous Mandatory Ventilation), RR (machine): 22, TV (machine): 450, FiO2: 30, PEEP: 5, ITime: 0.72, MAP: 12, PIP: 32    CARDIOVASCULAR  Exam: S1, S2.  Regular rate and rhythm.       GI/NUTRITION  Exam: Abdomen slightly firm. distended.   Current Diet: NPO with TFs          HEMATOLOGIC  [x] VTE Prophylaxis: heparin   Injectable 5000 Unit(s) SubCutaneous every 8 hours      INFECTIOUS DISEASE  Antimicrobials/Immunologic Medications:  meropenem  IVPB 1000 milliGRAM(s) IV Intermittent every 12 hours  meropenem  IVPB            --------------------------------------------------------------------------------------    LABS                          9.1    29.16 )-----------( 93       ( 28 Mar 2023 03:07 )             26.7           138  |  105  |  16  ----------------------------<  141<H>  4.8   |  18<L>  |  1.75<H>    Ca    7.2<L>      27 Mar 2023 19:55  Phos  3.5       Mg     2.50         TPro  4.6<L>  /  Alb  2.6<L>  /  TBili  6.6<H>  /  DBili  x   /  AST  1701<H>  /  ALT  1847<H>  /  AlkPhos  194<H>        PT/INR - ( 28 Mar 2023 03:07 )   PT: 16.9 sec;   INR: 1.45 ratio    PTT - ( 28 Mar 2023 03:07 )  PTT:30.5 sec  --------------------------------------------------------------------------------------

## 2023-03-28 NOTE — PROGRESS NOTE ADULT - SUBJECTIVE AND OBJECTIVE BOX
B TEAM SURGERY DAILY PROGRESS NOTE    S: Patient seen and examined at beside.   - Held TF overnight due to output from mouth    O: Vital Signs Last 24 Hrs  T(C): 37.3 (28 Mar 2023 04:00), Max: 37.5 (27 Mar 2023 20:00)  T(F): 99.1 (28 Mar 2023 04:00), Max: 99.5 (27 Mar 2023 20:00)  HR: 79 (28 Mar 2023 07:49) (70 - 84)  BP: --  BP(mean): --  RR: 22 (28 Mar 2023 07:00) (22 - 22)  SpO2: 98% (28 Mar 2023 07:49) (97% - 100%)    Parameters below as of 28 Mar 2023 04:00  Patient On (Oxygen Delivery Method): ventilator    O2 Concentration (%): 30    I&O's Detail    27 Mar 2023 07:01  -  28 Mar 2023 07:00  --------------------------------------------------------  IN:    Dexmedetomidine: 300.6 mL    FentaNYL: 460.2 mL    IV PiggyBack: 550 mL    Lactated Ringers: 150 mL    Nepro with Carb Steady: 536 mL    Norepinephrine: 122.1 mL    sodium chloride 0.9% w/ Additives: 840 mL    Vasopressin: 94.5 mL    Vasopressin: 13.5 mL  Total IN: 3066.9 mL    OUT:    Indwelling Catheter - Urethral (mL): 55 mL    Nasogastric/Oral tube (mL): 400 mL    Other (mL): 2017 mL  Total OUT: 2472 mL    Total NET: 594.9 mL      28 Mar 2023 07:01  -  28 Mar 2023 08:07  --------------------------------------------------------  IN:    Dexmedetomidine: 11.8 mL    FentaNYL: 17.7 mL    IV PiggyBack: 100 mL    Norepinephrine: 5.5 mL    sodium chloride 0.9% w/ Additives: 40 mL    Vasopressin: 4.5 mL  Total IN: 179.5 mL    OUT:    Indwelling Catheter - Urethral (mL): 0 mL    Nasogastric/Oral tube (mL): 0 mL  Total OUT: 0 mL    Total NET: 179.5 mL      EXAM  General: sedated  Resp: intubated  CVS: regular rate and rhythm  Abdomen: distended  Extremities: no edema  Skin: warm, dry, appropriate color       LABS                       9.1    29.16 )-----------( 93       ( 28 Mar 2023 03:07 )             26.7   03-27    138  |  105  |  16  ----------------------------<  141<H>  4.8   |  18<L>  |  1.75<H>    Ca    7.2<L>      27 Mar 2023 19:55  Phos  3.5     03-28  Mg     2.50     03-28    TPro  4.6<L>  /  Alb  2.6<L>  /  TBili  6.6<H>  /  DBili  x   /  AST  1701<H>  /  ALT  1847<H>  /  AlkPhos  194<H>  03-27

## 2023-03-28 NOTE — PROGRESS NOTE ADULT - PROBLEM SELECTOR PLAN 3
Pt with hyperkalemia in setting of acute renal failure. Serum potassium of 4.8. Continue CRRT as above. Continue to monitor serum potassium.

## 2023-03-29 NOTE — PROGRESS NOTE ADULT - PROBLEM SELECTOR PLAN 1
Pt with anuric CHATO in the setting of necrotizing pancreatitis leading to shock, requiring vasopressors support and renal replacement therapy. Scr was 1.24 on 3/23/23 and increased to 4.27 on 3/24/23. This is likely ATN. Initiated on CRRT (3/24), per nursing, continues to tolerate net negative balance. Urine output of 25cc in past 24 hours. Plan to continue CRRT. Consider Renal US when more stable (no hydronephrosis noted on initial CT abd/pel). Monitor labs and urine output. Avoid nephrotoxins. Dose medications as per eGFR.

## 2023-03-29 NOTE — PROGRESS NOTE ADULT - ASSESSMENT
ASSESSMENT:  48 year old female with PMHx HTN, hypothyroidism, breast CA found to have necrotizing pancreatitis. Patient intubated, requiring pressors, and CVVH.       PLAN:  Neuro:  - Sedation with precedex  - Multimodal pain control  - Fentanyl for pain control and sedation    Resp:  - intubated, currently on AC   - Maintain SpO2 > 92%  - monitor ABG  - daily CXR     CV: hx of HTN  - remains on pressors (levo + vaso), wean as tolerated   - MAP >65   - monitor hemodynamics     GI:   - Diet: NPO with TFs   - Trend LFTs  - Pending RUQ US    Renal/:  - Indwelling viera catheter in place  - Continue CRRT, -50cc/hr  - Monitor I&Os, UOP  - Replete lytes with CRRT    Heme:  - Monitor CBC and coags  - Monitor H/H, transfuse for Hgb <7  - SQH for VTE prophylaxis    ID:   - Monitor for clinical evidence of active infection  - Continue meropenem    Endo:   - Monitor glucose with metabolic panel  - Continue levothyroxine   - Continue Solu-medrol 10 q6h, will plan to taper     Code Status: Full code    Dispo: SICU ASSESSMENT:  48 year old female with PMHx HTN, hypothyroidism, breast CA found to have necrotizing pancreatitis. Patient intubated, requiring pressors, and CVVH.       PLAN:  Neuro:  - Sedation with precedex  - Multimodal pain control  - Fentanyl for pain control and sedation    Resp:  - intubated, currently on AC   - Maintain SpO2 > 92%  - monitor ABG  - daily CXR     CV: hx of HTN  - remains on pressors (levo + vaso), wean as tolerated   - MAP >65   - monitor hemodynamics     GI:   - Diet: NPO with TFs   - Trend LFTs  - Pending RUQ US    Renal/:  - Indwelling viera catheter in place  - Continue CRRT, net even  - Monitor I&Os, UOP  - Replete lytes with CRRT    Heme:  - Monitor CBC and coags  - Monitor H/H, transfuse for Hgb <7  - SQH for VTE prophylaxis    ID:   - Monitor for clinical evidence of active infection  - Continue meropenem    Endo:   - Monitor glucose with metabolic panel  - Continue levothyroxine   - Continue Solu-medrol 10 q6h, will plan to taper     Code Status: Full code    Dispo: SICU

## 2023-03-29 NOTE — PROGRESS NOTE ADULT - ASSESSMENT
48 year old female with necrotizing pancreatitis. Intubated and hemodynamically unstable requiring pressors. CT 3/28 w/ ill-defined collections.    PLAN:  - NPO/IVF/post pyloric TF  - wean pressors as tolerated  - wean vent as tolerated  - wean sedation as tolerated  - CVVHD  - continue IV abx  - monitor vital signs  - trend SICU labs  - SQH  - appreciate SICU care    B Team Surgery  [id number]

## 2023-03-29 NOTE — PROGRESS NOTE ADULT - ATTENDING COMMENTS
I agree with the detailed interval history, physical, and plan, which I have reviewed and edited where appropriate'; also agree with notes/assessment with my team on service.  I have personally examined the patient.  I was physically present for the key portions of the evaluation and management (E/M) service provided.  I reviewed all the pertinent data.  The patient is a critical care patient with life threatening hemodynamic and metabolic instability in SICU.  The SICU team has a constant risk benefit analyzes discussion and coordinating care with the primary team and all consultants.   The patient is in SICU with the chief complaint and diagnosis mentioned in the note.   The plan will be specified in the note.  48 year old female with necrotizing pancreatitis, requiring vasopressors, and CVVH in SICU   EXAM  NEUROLOGY  Exam: Sedated  RESPIRATORY  Exam: Intubated on volume AC,   Coarse BS  CARDIOVASCULAR  Exam: Regular rate  GI/NUTRITION  Exam: Abdomen slightly firm, distended    PLAN:  Neuro:  - precedex  - Fentanyl   Resp:  - intubated, currently on AC   - Maintain SpO2 > 92%  CV:   -levo + vaso wean as tolerated   - MAP >65   GI:   - Diet: NPO with TFs   - RUQ US  Renal/:  - Replete electrolytes   Heme:  - SQH for VTE prophylaxis  ID:   meropenem  Endo:   - Monitor glucose   Code Status: Full code    Dispo: SICU

## 2023-03-29 NOTE — PROGRESS NOTE ADULT - SUBJECTIVE AND OBJECTIVE BOX
[facility] DIVISION OF KIDNEY DISEASES AND HYPERTENSION   FOLLOW UP NOTE  --------------------------------------------------------------------------------  Chief Complaint: Anuric CHATO requiring renal replacement therapy     24 hour events/subjective: Pt. was seen and examined in the SICU. She remains intubated, sedated and requiring IV vasopressors. She remains on CRRT with net negative balance. Unable to obtain further ROS.     PAST HISTORY  --------------------------------------------------------------------------------  No significant changes to PMH, PSH, FHx, SHx, unless otherwise noted    ALLERGIES & MEDICATIONS  --------------------------------------------------------------------------------  Allergies  No Known Allergies    Standing Inpatient Medications  chlorhexidine 0.12% Liquid 15 milliLiter(s) Oral Mucosa every 12 hours  chlorhexidine 2% Cloths 1 Application(s) Topical daily  CRRT Treatment    <Continuous>  dexMEDEtomidine Infusion 0.2 MICROgram(s)/kG/Hr IV Continuous <Continuous>  fentaNYL   Infusion 0.5 MICROgram(s)/kG/Hr IV Continuous <Continuous>  heparin   Injectable 5000 Unit(s) SubCutaneous every 8 hours  levothyroxine Injectable 20 MICROGram(s) IV Push at bedtime  meropenem  IVPB 1000 milliGRAM(s) IV Intermittent every 12 hours  meropenem  IVPB      methylPREDNISolone sodium succinate Injectable 10 milliGRAM(s) IV Push every 6 hours  norepinephrine Infusion 0.05 MICROgram(s)/kG/Min IV Continuous <Continuous>  pantoprazole  Injectable 40 milliGRAM(s) IV Push daily  petrolatum Ophthalmic Ointment 1 Application(s) Both EYES two times a day  Phoxillum Filtration BK 4 / 2.5 5000 milliLiter(s) CRRT <Continuous>  Phoxillum Filtration BK 4 / 2.5 5000 milliLiter(s) CRRT <Continuous>  PrismaSOL Filtration BGK 4 / 2.5 5000 milliLiter(s) CRRT <Continuous>  vasopressin Infusion 0.03 Unit(s)/Min IV Continuous <Continuous>    PRN Inpatient Medications    REVIEW OF SYSTEMS  --------------------------------------------------------------------------------  Unable to obtain ROS     VITALS/PHYSICAL EXAM  --------------------------------------------------------------------------------  T(C): 37.4 (03-29-23 @ 04:00), Max: 37.4 (03-29-23 @ 04:00)  HR: 81 (03-29-23 @ 07:00) (74 - 89)  BP: --  RR: 22 (03-29-23 @ 07:00) (22 - 22)  SpO2: 99% (03-29-23 @ 06:00) (98% - 100%)  Wt(kg): --    03-28-23 @ 07:01  -  03-29-23 @ 07:00  --------------------------------------------------------  IN: 1720.5 mL / OUT: 2661 mL / NET: -940.5 mL    Physical Exam:  	Gen: Ill appearing, intubated, sedated   	HEENT: +ETT  	Pulm: Mechanical breath sounds  	CV: RRR  	Abd: Obese, distended  	Ext: + LE edema B/L (improving)   	Neuro: Sedated, not following commands               : Babin+ with small amount of dark urine in the bag  	Skin: Warm and dry              Dialysis access: L IJ non tunneled HD catheter     LABS/STUDIES  --------------------------------------------------------------------------------              9.2    39.33 >-----------<  97       [03-29-23 @ 03:31]              27.2     137  |  103  |  22  ----------------------------<  152      [03-29-23 @ 03:31]  5.2   |  18  |  1.65        Ca     7.4     [03-29-23 @ 03:31]      iCa    0.93     [03-28 @ 03:07]      Mg     2.60     [03-29-23 @ 03:31]      Phos  3.9     [03-29-23 @ 03:31]    TPro  4.9  /  Alb  2.3  /  TBili  6.0  /  DBili  x   /  AST  550  /  ALT  1058  /  AlkPhos  216  [03-29-23 @ 03:31]    PT/INR: PT 15.5 , INR 1.33       [03-29-23 @ 03:31]  PTT: 26.9       [03-29-23 @ 03:31]    Creatinine Trend:  SCr 1.65 [03-29 @ 03:31]  SCr 1.71 [03-28 @ 20:15]  SCr 1.67 [03-28 @ 12:30]  SCr 1.75 [03-27 @ 19:55]  SCr 1.91 [03-27 @ 11:50]

## 2023-03-29 NOTE — PROGRESS NOTE ADULT - SUBJECTIVE AND OBJECTIVE BOX
SICU Daily Progress Note  =====================================================    24 HR Events:  - NGT placed post-pyloric, TF started  - Re[eat CT C/A/P demonstrating ill-defined collections, pleural effusions  - Flagyl dc'd  - No acute overnight events      MEDICATIONS:   --------------------------------------------------------------------------------------  Neurologic Medications  dexMEDEtomidine Infusion 0.2 MICROgram(s)/kG/Hr IV Continuous <Continuous>  fentaNYL   Infusion 0.5 MICROgram(s)/kG/Hr IV Continuous <Continuous>    Respiratory Medications    Cardiovascular Medications  norepinephrine Infusion 0.05 MICROgram(s)/kG/Min IV Continuous <Continuous>    Gastrointestinal Medications  pantoprazole  Injectable 40 milliGRAM(s) IV Push daily    Genitourinary Medications    Hematologic/Oncologic Medications  heparin   Injectable 5000 Unit(s) SubCutaneous every 8 hours    Antimicrobial/Immunologic Medications  meropenem  IVPB 1000 milliGRAM(s) IV Intermittent every 12 hours  meropenem  IVPB        Endocrine/Metabolic Medications  levothyroxine Injectable 20 MICROGram(s) IV Push at bedtime  methylPREDNISolone sodium succinate Injectable 10 milliGRAM(s) IV Push every 6 hours  vasopressin Infusion 0.03 Unit(s)/Min IV Continuous <Continuous>    Topical/Other Medications  chlorhexidine 0.12% Liquid 15 milliLiter(s) Oral Mucosa every 12 hours  chlorhexidine 2% Cloths 1 Application(s) Topical daily  CRRT Treatment    <Continuous>  petrolatum Ophthalmic Ointment 1 Application(s) Both EYES two times a day  Phoxillum Filtration BK 4 / 2.5 5000 milliLiter(s) CRRT <Continuous>  Phoxillum Filtration BK 4 / 2.5 5000 milliLiter(s) CRRT <Continuous>  PrismaSOL Filtration BGK 4 / 2.5 5000 milliLiter(s) CRRT <Continuous>    --------------------------------------------------------------------------------------    VITAL SIGNS, INS/OUTS (last 24 hours):  --------------------------------------------------------------------------------------    03-27-23 @ 07:01  -  03-28-23 @ 07:00  --------------------------------------------------------  IN: 3066.9 mL / OUT: 2035 mL / NET: 1031.9 mL    03-28-23 @ 07:01  -  03-29-23 @ 01:51  --------------------------------------------------------  IN: 1233.7 mL / OUT: 1232 mL / NET: 1.7 mL      --------------------------------------------------------------------------------------    EXAM  NEUROLOGY  Exam: Sedated.     RESPIRATORY  Exam: Intubated on AC, mechanical ventilation  Mode: AC/ CMV (Assist Control/ Continuous Mandatory Ventilation)  RR (machine): 22  TV (machine): 450  FiO2: 30  PEEP: 5  ITime: 0.78  MAP: 14  PIP: 30    CARDIOVASCULAR  Exam: Regular rate and rhythm.       GI/NUTRITION  Exam: Abdomen slightly firm. distended.   Current Diet: NPO with TFs      METABOLIC/FLUIDS/ELECTROLYTES      HEMATOLOGIC  [x] VTE Prophylaxis: heparin   Injectable 5000 Unit(s) SubCutaneous every 8 hours        LABS                          9.1    35.49 )-----------( 99       ( 28 Mar 2023 20:15 )             26.5     03-28    138  |  104  |  22  ----------------------------<  145<H>  5.0   |  18<L>  |  1.71<H>    Ca    7.4<L>      28 Mar 2023 20:15  Phos  4.1     03-28  Mg     2.70     03-28    TPro  4.7<L>  /  Alb  2.3<L>  /  TBili  5.9<H>  /  DBili  x   /  AST  684<H>  /  ALT  1189<H>  /  AlkPhos  219<H>  03-28    PT/INR - ( 28 Mar 2023 20:15 )   PT: 15.3 sec;   INR: 1.32 ratio         PTT - ( 28 Mar 2023 20:15 )  PTT:28.2 sec    --------------------------------------------------------------------------------------    T(C): 37.3 (03-29-23 @ 00:00), Max: 37.3 (03-28-23 @ 04:00)  HR: 81 (03-29-23 @ 01:00) (70 - 88)  BP: --  RR: 22 (03-29-23 @ 01:00) (22 - 22)  SpO2: 100% (03-29-23 @ 01:00) (98% - 100%)    --------------------------------------------------------------------------------------   SICU Daily Progress Note  =====================================================    24 HR Events:  - NGT placed post-pyloric, TFs started  - Repeat CT C/A/P demonstrating ill-defined collections, pleural effusions  - Flagyl dc'd  - No acute overnight events      MEDICATIONS:   --------------------------------------------------------------------------------------  Neurologic Medications  dexMEDEtomidine Infusion 0.2 MICROgram(s)/kG/Hr IV Continuous <Continuous>  fentaNYL   Infusion 0.5 MICROgram(s)/kG/Hr IV Continuous <Continuous>    Respiratory Medications    Cardiovascular Medications  norepinephrine Infusion 0.05 MICROgram(s)/kG/Min IV Continuous <Continuous>    Gastrointestinal Medications  pantoprazole  Injectable 40 milliGRAM(s) IV Push daily    Genitourinary Medications    Hematologic/Oncologic Medications  heparin   Injectable 5000 Unit(s) SubCutaneous every 8 hours    Antimicrobial/Immunologic Medications  meropenem  IVPB 1000 milliGRAM(s) IV Intermittent every 12 hours  meropenem  IVPB        Endocrine/Metabolic Medications  levothyroxine Injectable 20 MICROGram(s) IV Push at bedtime  methylPREDNISolone sodium succinate Injectable 10 milliGRAM(s) IV Push every 6 hours  vasopressin Infusion 0.03 Unit(s)/Min IV Continuous <Continuous>    Topical/Other Medications  chlorhexidine 0.12% Liquid 15 milliLiter(s) Oral Mucosa every 12 hours  chlorhexidine 2% Cloths 1 Application(s) Topical daily  CRRT Treatment    <Continuous>  petrolatum Ophthalmic Ointment 1 Application(s) Both EYES two times a day  Phoxillum Filtration BK 4 / 2.5 5000 milliLiter(s) CRRT <Continuous>  Phoxillum Filtration BK 4 / 2.5 5000 milliLiter(s) CRRT <Continuous>  PrismaSOL Filtration BGK 4 / 2.5 5000 milliLiter(s) CRRT <Continuous>    --------------------------------------------------------------------------------------    VITAL SIGNS, INS/OUTS (last 24 hours):  --------------------------------------------------------------------------------------    03-27-23 @ 07:01  -  03-28-23 @ 07:00  --------------------------------------------------------  IN: 3066.9 mL / OUT: 2035 mL / NET: 1031.9 mL    03-28-23 @ 07:01  -  03-29-23 @ 01:51  --------------------------------------------------------  IN: 1233.7 mL / OUT: 1232 mL / NET: 1.7 mL      --------------------------------------------------------------------------------------    EXAM  NEUROLOGY  Exam: Sedated    RESPIRATORY  Exam: Intubated on volume AC, mechanical ventilation  Mode: AC/ CMV (Assist Control/ Continuous Mandatory Ventilation)  RR (machine): 22  TV (machine): 450  FiO2: 30  PEEP: 5  ITime: 0.78  MAP: 14  PIP: 30    CARDIOVASCULAR  Exam: Regular rate and rhythm.       GI/NUTRITION  Exam: Abdomen slightly firm, distended  Current Diet: NPO with TFs      METABOLIC/FLUIDS/ELECTROLYTES      HEMATOLOGIC  [x] VTE Prophylaxis: heparin   Injectable 5000 Unit(s) SubCutaneous every 8 hours        LABS                          9.1    35.49 )-----------( 99       ( 28 Mar 2023 20:15 )             26.5     03-28    138  |  104  |  22  ----------------------------<  145<H>  5.0   |  18<L>  |  1.71<H>    Ca    7.4<L>      28 Mar 2023 20:15  Phos  4.1     03-28  Mg     2.70     03-28    TPro  4.7<L>  /  Alb  2.3<L>  /  TBili  5.9<H>  /  DBili  x   /  AST  684<H>  /  ALT  1189<H>  /  AlkPhos  219<H>  03-28    PT/INR - ( 28 Mar 2023 20:15 )   PT: 15.3 sec;   INR: 1.32 ratio         PTT - ( 28 Mar 2023 20:15 )  PTT:28.2 sec    --------------------------------------------------------------------------------------    T(C): 37.3 (03-29-23 @ 00:00), Max: 37.3 (03-28-23 @ 04:00)  HR: 81 (03-29-23 @ 01:00) (70 - 88)  BP: --  RR: 22 (03-29-23 @ 01:00) (22 - 22)  SpO2: 100% (03-29-23 @ 01:00) (98% - 100%)    --------------------------------------------------------------------------------------

## 2023-03-29 NOTE — PROGRESS NOTE ADULT - PROBLEM SELECTOR PLAN 3
Pt with hyperkalemia in setting of acute renal failure. Serum potassium of 5.2. Continue CRRT as above. Continue to monitor serum potassium and adjust dialysate accordingly.

## 2023-03-29 NOTE — PROGRESS NOTE ADULT - ATTENDING COMMENTS
Patient remains ventilated, intermittently on pressors, anuric renal failure  plan  supportive care per sicu  nutrition tpn, not tolerating tube feeds  on abx  appreciate sicu care  no surgical intervention  awaiting repeat ct a/p    I have personally interviewed and examined this patient, reviewed pertinent labs and imaging, and discussed the case with colleagues, residents, and physician assistants on B Team rounds.    The active care issues are:  1. necrotizing pancreatitis    The Acute Care Surgery (B Team) Attending Group Practice:  Dr. Ayden Yip, Dr. Adonis Botello, Dr. Jam Nj, Dr. Sathya Andrade,     urgent issues - spectra [phone]  nonurgent issues - [phone]  patient appointments or afterhours - [phone]

## 2023-03-29 NOTE — PROGRESS NOTE ADULT - SUBJECTIVE AND OBJECTIVE BOX
Surgery Progress Note     Overnight Events: see SICU note    Subjective:  Patient seen and examined, intubated and sedated. Still requiring vasopressor support.      Vital Signs:  Vital Signs Last 24 Hrs  T(C): 37.4 (29 Mar 2023 04:00), Max: 37.4 (29 Mar 2023 04:00)  T(F): 99.4 (29 Mar 2023 04:00), Max: 99.4 (29 Mar 2023 04:00)  HR: 81 (29 Mar 2023 07:52) (75 - 89)  BP: --  BP(mean): --  RR: 22 (29 Mar 2023 07:00) (22 - 22)  SpO2: 100% (29 Mar 2023 07:52) (98% - 100%)    Parameters below as of 29 Mar 2023 04:00  Patient On (Oxygen Delivery Method): ventilator    O2 Concentration (%): 50    CAPILLARY BLOOD GLUCOSE          I&O's Detail    28 Mar 2023 07:01  -  29 Mar 2023 07:00  --------------------------------------------------------  IN:    Dexmedetomidine: 317 mL    FentaNYL: 283.2 mL    IV PiggyBack: 200 mL    Nepro with Carb Steady: 455 mL    Norepinephrine: 237.3 mL    sodium chloride 0.9% w/ Additives: 120 mL    Vasopressin: 108 mL  Total IN: 1720.5 mL    OUT:    Indwelling Catheter - Urethral (mL): 25 mL    Nasogastric/Oral tube (mL): 0 mL    Other (mL): 2636 mL  Total OUT: 2661 mL    Total NET: -940.5 mL      29 Mar 2023 07:01  -  29 Mar 2023 08:37  --------------------------------------------------------  IN:    Dexmedetomidine: 11.8 mL    FentaNYL: 11.8 mL    Nepro with Carb Steady: 25 mL    Norepinephrine: 9.9 mL    Vasopressin: 4.5 mL  Total IN: 63 mL    OUT:    Indwelling Catheter - Urethral (mL): 0 mL  Total OUT: 0 mL    Total NET: 63 mL            Physical Exam:  General: sedated  HEENT: Normocephalic atraumatic  Respiratory: intubated on vent  Cardio: regular rate, on levo and vaso  Abdomen: distended  Vascular: extremities are warm and well perfused      Labs:    03-29    137  |  103  |  22  ----------------------------<  152<H>  5.2   |  18<L>  |  1.65<H>    Ca    7.4<L>      29 Mar 2023 03:31  Phos  3.9     03-29  Mg     2.60     03-29    TPro  4.9<L>  /  Alb  2.3<L>  /  TBili  6.0<H>  /  DBili  x   /  AST  550<H>  /  ALT  1058<H>  /  AlkPhos  216<H>  03-29    LIVER FUNCTIONS - ( 29 Mar 2023 03:31 )  Alb: 2.3 g/dL / Pro: 4.9 g/dL / ALK PHOS: 216 U/L / ALT: 1058 U/L / AST: 550 U/L / GGT: x                                 9.2    39.33 )-----------( 97       ( 29 Mar 2023 03:31 )             27.2     PT/INR - ( 29 Mar 2023 03:31 )   PT: 15.5 sec;   INR: 1.33 ratio         PTT - ( 29 Mar 2023 03:31 )  PTT:26.9 sec

## 2023-03-30 NOTE — PROGRESS NOTE ADULT - ASSESSMENT
Assessment:   Patient is a 48 year old female with PMHx significant for HTN, hypothyroidism,  30 years ago, breast cancer s/p left mastectomy and chemo in  presenting with epigastric pain and LUQ pain. Found to have necrotizing pancreatitis. Transferred from OSH ([place]) for hypotension and tachypnea requiring emergent intubation and multiple pressors. SICU consulted for hemodynamic monitoring and respiratory management.    PLAN:  Neuro:  - Sedation with precedex  - Multimodal pain control  - Fentanyl for pain control and sedation    Resp:  - intubated, currently on AC   - Maintain SpO2 > 92%  - monitor ABG  - daily CXR     CV: hx of HTN  - remains on pressors (levo + vaso), wean as tolerated   - MAP >65   - monitor hemodynamics     GI:   - Diet: NPO with TFs   - Trend LFTs  - RUQ US showed cholelithiasis     Renal/:  - Indwelling viera catheter in place  - Continue CRRT, net even  - Monitor I&Os, UOP  - Replete lytes with CRRT    Heme:  - Monitor CBC and coags  - Monitor H/H, transfuse for Hgb <7  - SQH for VTE prophylaxis    ID:   - Monitor for clinical evidence of active infection  - Continue meropenem    Endo:   - Monitor glucose with metabolic panel  - Continue levothyroxine   - Continue Solu-medrol 10 q6h, will plan to taper     Code Status: Full code    Dispo: SICU Assessment:   Patient is a 48 year old female with PMHx significant for HTN, hypothyroidism,  30 years ago, breast cancer s/p left mastectomy and chemo in  presenting with epigastric pain and LUQ pain. Found to have necrotizing pancreatitis. Transferred from OSH ([place]) for hypotension and tachypnea requiring emergent intubation and multiple pressors. SICU consulted for hemodynamic monitoring and respiratory management.    PLAN:  Neuro:  - Sedation with precedex  - Multimodal pain control  - Fentanyl for pain control and sedation    Resp:  - intubated, currently on AC   - , rpt ABG  - Maintain SpO2 > 92%  - monitor ABG  - daily CXR     CV: hx of HTN  - remains on pressors (levo + vaso), wean as tolerated   - MAP >65   - monitor hemodynamics     GI:   - Diet: NPO with TFs   - Trend LFTs and T. bili  - RUQ US showed cholelithiasis     Renal/:  - Indwelling viera catheter in place  - Continue CRRT net even, try net negative  as tolerated   - Monitor I&Os, UOP  - Replete lytes with CRRT    Heme:  - Monitor CBC and coags  - Monitor H/H, transfuse for Hgb <7  - SQH for VTE prophylaxis  - F/u HIPA    ID:   - Monitor for clinical evidence of active infection  - Continue meropenem  - F/u blood cx    Endo:   - Monitor glucose with metabolic panel  - ISS  - Continue levothyroxine   - Continue Solu-medrol 10 q6h, will plan to taper     Code Status: Full code    Dispo: SICU Assessment:   Patient is a 48 year old female with PMHx significant for HTN, hypothyroidism,  30 years ago, breast cancer s/p left mastectomy and chemo in  presenting with epigastric pain and LUQ pain. Found to have necrotizing pancreatitis. Transferred from OSH ([place]) for hypotension and tachypnea requiring emergent intubation and multiple pressors. SICU consulted for hemodynamic monitoring and respiratory management.    PLAN:  Neuro  Intubated and sedated for clinical course; No acute neurological issues   - Sedated w/ precedex and fentanyl  - Fentanyl for pain control     Resp:  AHRF w/ b/l pleff 2/2 necrotizing pancreatitis   -Volume control 20/350/5/40    CV  Mixed shock state 2/2 septic shock w/ E. Coli in urine vs hypovolemic shock now s/p 11L resus    - c/w levo and vaso; wean as tolerated as below   - Treat ID as below     GI:   #Necrotizing pancreatitis 2/2 gallstones  Diagnostics  CT 3/27: >50%of the pancreatic head  extends into the mesentery and inferiorly along the retroperitoneum into the lower abdomen  RUQ US: Chololithiasis (2cm stone) w/ no acute frannie     Therapeutics:   -Plan for interval CT 3/28 or 3/29   - Continue TF (Nepro) at rate of 30cc/hr  - GI ppx w/ protonix      #Shocked Liver + Hyperbilirubinemia   Likely 2/2 to shocked state w/ LFTs now trending down w/ bili following protracted course as expected but yet to peak. Possible concern for acute obstructive biliary process.   -consider MRCP pending improvement in clinical course  -Monitor LFTs     Renal:  ARF 2/2 shock state resulting in ATN requiring CRRT    -Continue CRRT net even, try net negative  as tolerated   - Babin catheter in place    Heme:   Anemia likely 2/2 disease course vs suppression; No active bleeding reported   Diagnostics:   -CBC q8     Therapeutics:   -received 1U PRBC 3/27 v  -SQH for VTE prophylaxis    #Thromboctopenia  Likely supression 2/2 infection vs shock state  -HIT Ab sent; will f/u    #Splenic Vein Thrombosis  Diagnostics:  3/22 CT Abd w/ contrast: Focal filling defect in the splenic vein at the level of the body of   the pancreas for which an underlying thrombus is suspected    Therapeutics:   -Will hold therapeutic AC given risks outweigh benefits      ID:   Intermittently febrile w/ leukocytosis c/f septic shock 2/2 E coli UTI  Diagnostics:  -3/22 Ucx: E coli  -3/24 Bcx: negative     Therapeutics:   -Meropenem/Flagyl (3/26-)    Endo:   -Continue Solu-medrol 10 q6h until ; will taper  -c/w synthroid 20 IV (home med)   -Monitor glucose with metabolic panel    Code Status: Full code    Disposition: SICU

## 2023-03-30 NOTE — PROGRESS NOTE ADULT - PROBLEM SELECTOR PLAN 3
Pt with resolving/resolved hyperkalemia in setting of acute renal failure. Serum potassium of 4.5. Continue CRRT as above. Continue to monitor serum potassium and adjust dialysate accordingly.

## 2023-03-30 NOTE — PROGRESS NOTE ADULT - ATTENDING COMMENTS
Respiratory failure  a.  Remains mechanical ventilator dependent at this time  b.  Decrease TV to 350  c.  Obtain ABG  d.  Daily CXR    Severe sepsis secondary to infected APN  a.  Rising WBC count (steroid related?)  b.  Afebrile over 24 hrs  c.  Wean levophed, vasopressin  d.  Continue IV meropenem at this time  e.   Culture prn    Hyperbilirubinemia  a.  Initial imaging without suggestion of biliary obstruction  b.  Fractionate TB  c.  May need MRCP/ERCP once more stable  d.  Ultraosund reviewed  e.      CHATO  a.  Remain oliguric  b,.  CVVH at in positive fluid balance  c.  Attempt 0 and then - fluid removal/hr as tolerated      Malnutrition, severe  a.  NPO at this time  b.  TF at goal as tolerated

## 2023-03-30 NOTE — PROGRESS NOTE ADULT - PROBLEM SELECTOR PLAN 1
Pt with anuric CHATO in the setting of necrotizing pancreatitis leading to shock, requiring vasopressors support and renal replacement therapy. Scr was 1.24 on 3/23/23 and increased to 4.27 on 3/24/23. This is likely ATN. Initiated on CRRT (3/24), per nursing, received IV albumin overnight and is net even for now. Urine output of 33cc in past 24 hours. Plan to continue CRRT. Consider Renal US when more stable (no hydronephrosis noted on initial CT abd/pel). Monitor labs and urine output. Avoid nephrotoxins. Dose medications as per eGFR.

## 2023-03-30 NOTE — PROGRESS NOTE ADULT - SUBJECTIVE AND OBJECTIVE BOX
[facility] DIVISION OF KIDNEY DISEASES AND HYPERTENSION   FOLLOW UP NOTE  --------------------------------------------------------------------------------  Chief Complaint: Anuric CHATO requiring renal replacement therapy     24 hour events/subjective: Pt. was seen and examined in the SICU. She remains intubated, sedated and requiring IV vasopressors. Received IV albumin per nursing overnight. She remains on CRRT with net even balance per nursing. Unable to obtain further ROS.     PAST HISTORY  --------------------------------------------------------------------------------  No significant changes to PMH, PSH, FHx, SHx, unless otherwise noted    ALLERGIES & MEDICATIONS  --------------------------------------------------------------------------------  Allergies  No Known Allergies    Standing Inpatient Medications  chlorhexidine 0.12% Liquid 15 milliLiter(s) Oral Mucosa every 12 hours  chlorhexidine 2% Cloths 1 Application(s) Topical daily  CRRT Treatment    <Continuous>  dexMEDEtomidine Infusion 0.207 MICROgram(s)/kG/Hr IV Continuous <Continuous>  dextrose 5%. 1000 milliLiter(s) IV Continuous <Continuous>  dextrose 5%. 1000 milliLiter(s) IV Continuous <Continuous>  dextrose 50% Injectable 25 Gram(s) IV Push once  dextrose 50% Injectable 12.5 Gram(s) IV Push once  dextrose 50% Injectable 25 Gram(s) IV Push once  fentaNYL   Infusion 0.5 MICROgram(s)/kG/Hr IV Continuous <Continuous>  glucagon  Injectable 1 milliGRAM(s) IntraMuscular once  heparin   Injectable 5000 Unit(s) SubCutaneous every 8 hours  insulin lispro (ADMELOG) corrective regimen sliding scale   SubCutaneous every 6 hours  levothyroxine Injectable 20 MICROGram(s) IV Push at bedtime  meropenem  IVPB 1000 milliGRAM(s) IV Intermittent every 12 hours  meropenem  IVPB      methylPREDNISolone sodium succinate Injectable 10 milliGRAM(s) IV Push every 6 hours  norepinephrine Infusion 0.05 MICROgram(s)/kG/Min IV Continuous <Continuous>  pantoprazole  Injectable 40 milliGRAM(s) IV Push daily  petrolatum Ophthalmic Ointment 1 Application(s) Both EYES two times a day  PrismaSATE Dialysate BGK 4 / 2.5 5000 milliLiter(s) CRRT <Continuous>  PrismaSOL Filtration BGK 0 / 2.5 5000 milliLiter(s) CRRT <Continuous>  PrismaSOL Filtration BGK 0 / 2.5 5000 milliLiter(s) CRRT <Continuous>  vasopressin Infusion 0.03 Unit(s)/Min IV Continuous <Continuous>    PRN Inpatient Medications  dextrose Oral Gel 15 Gram(s) Oral once PRN    REVIEW OF SYSTEMS  --------------------------------------------------------------------------------  Unable to obtain ROS     VITALS/PHYSICAL EXAM  --------------------------------------------------------------------------------  T(C): 37.5 (03-30-23 @ 04:00), Max: 37.5 (03-30-23 @ 04:00)  HR: 79 (03-30-23 @ 07:00) (77 - 95)  BP: 106/65 (03-29-23 @ 20:00) (106/65 - 106/65)  RR: 20 (03-30-23 @ 07:00) (20 - 22)  SpO2: 100% (03-30-23 @ 07:00) (99% - 100%)  Wt(kg): --    03-29-23 @ 07:01  -  03-30-23 @ 07:00  --------------------------------------------------------  IN: 1846.6 mL / OUT: 776 mL / NET: 1070.6 mL    Physical Exam:  	Gen: Ill appearing, intubated, sedated   	HEENT: +ETT  	Pulm: Mechanical breath sounds  	CV: RRR  	Abd: Obese, distended  	Ext: + LE edema B/L (improving)   	Neuro: Sedated, not following commands               : Babin+ with small amount of dark urine in the bag  	Skin: Warm and dry              Dialysis access: L IJ non tunneled HD catheter     LABS/STUDIES  --------------------------------------------------------------------------------              7.9    41.44 >-----------<  74       [03-30-23 @ 05:30]              23.5     136  |  101  |  25  ----------------------------<  213      [03-30-23 @ 05:30]  4.5   |  19  |  1.45        Ca     7.1     [03-30-23 @ 05:30]      Mg     2.70     [03-30-23 @ 05:30]      Phos  3.0     [03-30-23 @ 05:30]    TPro  4.9  /  Alb  2.4  /  TBili  6.5  /  DBili  x   /  AST  284  /  ALT  635  /  AlkPhos  175  [03-30-23 @ 05:30]    PT/INR: PT 16.0 , INR 1.37       [03-30-23 @ 05:30]  PTT: 27.5       [03-30-23 @ 05:30]    Creatinine Trend:  SCr 1.45 [03-30 @ 05:30]  SCr 1.51 [03-29 @ 21:45]  SCr 1.55 [03-29 @ 13:20]  SCr 1.65 [03-29 @ 03:31]  SCr 1.71 [03-28 @ 20:15]    HBsAg Nonreact      [03-27-23 @ 00:35]  HCV 0.05, Nonreact      [03-27-23 @ 00:35] detailed exam

## 2023-03-30 NOTE — PROGRESS NOTE ADULT - SUBJECTIVE AND OBJECTIVE BOX
Surgery Progress Note     Overnight Events: see SICU note    Subjective:  Patient seen and examined on AM rounds. She remains intubated and sedated, still requiring vasopressor support.      Vital Signs:  Vital Signs Last 24 Hrs  T(C): 37.4 (30 Mar 2023 08:00), Max: 37.5 (30 Mar 2023 04:00)  T(F): 99.3 (30 Mar 2023 08:00), Max: 99.5 (30 Mar 2023 04:00)  HR: 81 (30 Mar 2023 08:00) (77 - 95)  BP: 106/65 (29 Mar 2023 20:00) (106/65 - 106/65)  BP(mean): 78 (29 Mar 2023 20:00) (78 - 78)  RR: 20 (30 Mar 2023 08:00) (20 - 22)  SpO2: 100% (30 Mar 2023 08:00) (99% - 100%)    Parameters below as of 30 Mar 2023 08:00  Patient On (Oxygen Delivery Method): ventilator    O2 Concentration (%): 40    CAPILLARY BLOOD GLUCOSE      POCT Blood Glucose.: 201 mg/dL (30 Mar 2023 05:34)  POCT Blood Glucose.: 196 mg/dL (29 Mar 2023 23:52)      I&O's Detail    29 Mar 2023 07:01  -  30 Mar 2023 07:00  --------------------------------------------------------  IN:    Dexmedetomidine: 177 mL    Dexmedetomidine: 91.4 mL    Enteral Tube Flush: 30 mL    FentaNYL: 218.3 mL    IV PiggyBack: 450 mL    Nepro with Carb Steady: 600 mL    Norepinephrine: 171.9 mL    Vasopressin: 108 mL  Total IN: 1846.6 mL    OUT:    Indwelling Catheter - Urethral (mL): 43 mL    Other (mL): 733 mL  Total OUT: 776 mL    Total NET: 1070.6 mL      30 Mar 2023 07:01  -  30 Mar 2023 08:58  --------------------------------------------------------  IN:    Dexmedetomidine: 7.4 mL    FentaNYL: 5.9 mL    Nepro with Carb Steady: 25 mL    Norepinephrine: 2.2 mL    Vasopressin: 4.5 mL  Total IN: 45 mL    OUT:    Indwelling Catheter - Urethral (mL): 0 mL  Total OUT: 0 mL    Total NET: 45 mL            Physical Exam:  General: sedated  HEENT: Normocephalic atraumatic  Respiratory: intubated on vent  Cardio: regular rate, on levo and vaso  Abdomen: distended, edematous  Vascular: extremities are warm and edematous      Labs:    03-30    136  |  101  |  25<H>  ----------------------------<  213<H>  4.5   |  19<L>  |  1.45<H>    Ca    7.1<L>      30 Mar 2023 05:30  Phos  3.0     03-30  Mg     2.70     03-30    TPro  4.9<L>  /  Alb  2.4<L>  /  TBili  6.5<H>  /  DBili  x   /  AST  284<H>  /  ALT  635<H>  /  AlkPhos  175<H>  03-30    LIVER FUNCTIONS - ( 30 Mar 2023 05:30 )  Alb: 2.4 g/dL / Pro: 4.9 g/dL / ALK PHOS: 175 U/L / ALT: 635 U/L / AST: 284 U/L / GGT: x                                 7.9    41.44 )-----------( 74       ( 30 Mar 2023 05:30 )             23.5     PT/INR - ( 30 Mar 2023 05:30 )   PT: 16.0 sec;   INR: 1.37 ratio         PTT - ( 30 Mar 2023 05:30 )  PTT:27.5 sec

## 2023-03-30 NOTE — PROGRESS NOTE ADULT - SUBJECTIVE AND OBJECTIVE BOX
SICU Daily Progress Note  =====================================================  Interval/Overnight Events:    - ISS due to steroids and increasing glucose    - NGT post-pyloric, TFs started   - 250cc albumin bolus  - repeat CT chest/abd/pelvis 3/28 --> ill-defined collections, pleural effusions   - d/c flagyl   - cholelithiasis on RUQ US  - vent 450/20/5/40%  - net positive 100    ALLERGIES:  No Known Allergies      --------------------------------------------------------------------------------------    MEDICATIONS:    Neurologic Medications  dexMEDEtomidine Infusion 0.207 MICROgram(s)/kG/Hr IV Continuous <Continuous>  fentaNYL   Infusion 0.5 MICROgram(s)/kG/Hr IV Continuous <Continuous>    Respiratory Medications    Cardiovascular Medications  norepinephrine Infusion 0.05 MICROgram(s)/kG/Min IV Continuous <Continuous>    Gastrointestinal Medications  dextrose 5%. 1000 milliLiter(s) IV Continuous <Continuous>  dextrose 5%. 1000 milliLiter(s) IV Continuous <Continuous>  pantoprazole  Injectable 40 milliGRAM(s) IV Push daily    Genitourinary Medications    Hematologic/Oncologic Medications  heparin   Injectable 5000 Unit(s) SubCutaneous every 8 hours    Antimicrobial/Immunologic Medications  meropenem  IVPB      meropenem  IVPB 1000 milliGRAM(s) IV Intermittent every 12 hours    Endocrine/Metabolic Medications  dextrose 50% Injectable 25 Gram(s) IV Push once  dextrose 50% Injectable 12.5 Gram(s) IV Push once  dextrose 50% Injectable 25 Gram(s) IV Push once  dextrose Oral Gel 15 Gram(s) Oral once PRN Blood Glucose LESS THAN 70 milliGRAM(s)/deciliter  glucagon  Injectable 1 milliGRAM(s) IntraMuscular once  insulin lispro (ADMELOG) corrective regimen sliding scale   SubCutaneous every 6 hours  levothyroxine Injectable 20 MICROGram(s) IV Push at bedtime  methylPREDNISolone sodium succinate Injectable 10 milliGRAM(s) IV Push every 6 hours  vasopressin Infusion 0.03 Unit(s)/Min IV Continuous <Continuous>    Topical/Other Medications  chlorhexidine 0.12% Liquid 15 milliLiter(s) Oral Mucosa every 12 hours  chlorhexidine 2% Cloths 1 Application(s) Topical daily  CRRT Treatment    <Continuous>  petrolatum Ophthalmic Ointment 1 Application(s) Both EYES two times a day  PrismaSATE Dialysate BGK 4 / 2.5 5000 milliLiter(s) CRRT <Continuous>  PrismaSOL Filtration BGK 0 / 2.5 5000 milliLiter(s) CRRT <Continuous>  PrismaSOL Filtration BGK 0 / 2.5 5000 milliLiter(s) CRRT <Continuous>    --------------------------------------------------------------------------------------    VITAL SIGNS:  ICU Vital Signs Last 24 Hrs  T(C): 37.1 (30 Mar 2023 00:00), Max: 37.4 (29 Mar 2023 04:00)  T(F): 98.7 (30 Mar 2023 00:00), Max: 99.4 (29 Mar 2023 04:00)  HR: 82 (30 Mar 2023 00:00) (77 - 93)  BP: 106/65 (29 Mar 2023 20:00) (106/65 - 106/65)  BP(mean): 78 (29 Mar 2023 20:00) (78 - 78)  ABP: 109/59 (30 Mar 2023 00:00) (85/46 - 132/65)  ABP(mean): 80 (30 Mar 2023 00:00) (63 - 91)  RR: 20 (30 Mar 2023 00:00) (20 - 22)  SpO2: 100% (29 Mar 2023 23:00) (99% - 100%)    O2 Parameters below as of 30 Mar 2023 00:00  Patient On (Oxygen Delivery Method): ventilator,AC mode    O2 Concentration (%): 40      --------------------------------------------------------------------------------------    INS AND OUTS:  I&O's Detail    28 Mar 2023 07:01  -  29 Mar 2023 07:00  --------------------------------------------------------  IN:    Dexmedetomidine: 317 mL    FentaNYL: 283.2 mL    IV PiggyBack: 200 mL    Nepro with Carb Steady: 455 mL    Norepinephrine: 237.3 mL    sodium chloride 0.9% w/ Additives: 120 mL    Vasopressin: 108 mL  Total IN: 1720.5 mL    OUT:    Indwelling Catheter - Urethral (mL): 25 mL    Nasogastric/Oral tube (mL): 0 mL    Other (mL): 2766 mL  Total OUT: 2791 mL    Total NET: -1070.5 mL      29 Mar 2023 07:01  -  30 Mar 2023 00:47  --------------------------------------------------------  IN:    Dexmedetomidine: 177 mL    Dexmedetomidine: 22.1 mL    Enteral Tube Flush: 30 mL    FentaNYL: 177 mL    IV PiggyBack: 400 mL    Nepro with Carb Steady: 425 mL    Norepinephrine: 147.7 mL    Vasopressin: 76.5 mL  Total IN: 1455.3 mL    OUT:    Indwelling Catheter - Urethral (mL): 23 mL    Other (mL): 733 mL  Total OUT: 756 mL    Total NET: 699.3 mL        --------------------------------------------------------------------------------------    EXAM  NEUROLOGY  Exam: Sedated    RESPIRATORY  Exam: Intubated on volume AC, mechanical ventilation  Mode: AC/ CMV (Assist Control/ Continuous Mandatory Ventilation)  RR (machine): 22  TV (machine): 450  FiO2: 30  PEEP: 5  ITime: 0.78  MAP: 14  PIP: 30    CARDIOVASCULAR  Exam: Regular rate and rhythm.       GI/NUTRITION  Exam: Abdomen slightly firm, distended  Current Diet: NPO with TFs      METABOLIC / FLUIDS / ELECTROLYTES  dextrose 5%. 1000 milliLiter(s) IV Continuous <Continuous>  dextrose 5%. 1000 milliLiter(s) IV Continuous <Continuous>      HEMATOLOGIC  [x] VTE Prophylaxis: heparin   Injectable 5000 Unit(s) SubCutaneous every 8 hours    Transfusions:	[] PRBC	[] Platelets		[] FFP	[] Cryoprecipitate    INFECTIOUS DISEASE  Antimicrobials/Immunologic Medications:  meropenem  IVPB      meropenem  IVPB 1000 milliGRAM(s) IV Intermittent every 12 hours    Day #      of     ***    TUBES / LINES / DRAINS  ***  [x] Peripheral IV  [x] Central Venous Line     	[] R	[] L	[] IJ	[] Fem	[] SC	Date Placed:   [x] Arterial Line		[] R	[] L	[] Fem	[] Rad	[] Ax	Date Placed:   [] PICC		[] Midline		[] Mediport  [x] Urinary Catheter		Date Placed:   [x] Necessity of urinary, arterial, and venous catheters discussed    --------------------------------------------------------------------------------------    LABS                          8.5    45.31 )-----------( 86       ( 29 Mar 2023 21:45 )             25.8   03-29    x   |  x   |  x   ----------------------------<  204<H>  x    |  x   |  x     Ca    7.3<L>      29 Mar 2023 21:45  Phos  3.4     03-29  Mg     2.70     03-29    TPro  4.9<L>  /  Alb  2.3<L>  /  TBili  6.1<H>  /  DBili  x   /  AST  337<H>  /  ALT  778<H>  /  AlkPhos  198<H>  03-29    --------------------------------------------------------------------------------------    OTHER LABORATORY:     IMAGING STUDIES:   CXR:      SICU Daily Progress Note  =====================================================  Interval/Overnight Events:    - ISS due to steroids and increasing glucose  - NGT post-pyloric, TFs started   - 250cc albumin bolus  - repeat CT chest/abd/pelvis 3/28 --> ill-defined collections, pleural effusions   - d/c flagyl   - cholelithiasis on RUQ US  - vent 450/20/5/40%  - net positive 100    ALLERGIES:  No Known Allergies      --------------------------------------------------------------------------------------    MEDICATIONS:    Neurologic Medications  dexMEDEtomidine Infusion 0.207 MICROgram(s)/kG/Hr IV Continuous <Continuous>  fentaNYL   Infusion 0.5 MICROgram(s)/kG/Hr IV Continuous <Continuous>    Respiratory Medications    Cardiovascular Medications  norepinephrine Infusion 0.05 MICROgram(s)/kG/Min IV Continuous <Continuous>    Gastrointestinal Medications  dextrose 5%. 1000 milliLiter(s) IV Continuous <Continuous>  dextrose 5%. 1000 milliLiter(s) IV Continuous <Continuous>  pantoprazole  Injectable 40 milliGRAM(s) IV Push daily    Genitourinary Medications    Hematologic/Oncologic Medications  heparin   Injectable 5000 Unit(s) SubCutaneous every 8 hours    Antimicrobial/Immunologic Medications  meropenem  IVPB      meropenem  IVPB 1000 milliGRAM(s) IV Intermittent every 12 hours    Endocrine/Metabolic Medications  dextrose 50% Injectable 25 Gram(s) IV Push once  dextrose 50% Injectable 12.5 Gram(s) IV Push once  dextrose 50% Injectable 25 Gram(s) IV Push once  dextrose Oral Gel 15 Gram(s) Oral once PRN Blood Glucose LESS THAN 70 milliGRAM(s)/deciliter  glucagon  Injectable 1 milliGRAM(s) IntraMuscular once  insulin lispro (ADMELOG) corrective regimen sliding scale   SubCutaneous every 6 hours  levothyroxine Injectable 20 MICROGram(s) IV Push at bedtime  methylPREDNISolone sodium succinate Injectable 10 milliGRAM(s) IV Push every 6 hours  vasopressin Infusion 0.03 Unit(s)/Min IV Continuous <Continuous>    Topical/Other Medications  chlorhexidine 0.12% Liquid 15 milliLiter(s) Oral Mucosa every 12 hours  chlorhexidine 2% Cloths 1 Application(s) Topical daily  CRRT Treatment    <Continuous>  petrolatum Ophthalmic Ointment 1 Application(s) Both EYES two times a day  PrismaSATE Dialysate BGK 4 / 2.5 5000 milliLiter(s) CRRT <Continuous>  PrismaSOL Filtration BGK 0 / 2.5 5000 milliLiter(s) CRRT <Continuous>  PrismaSOL Filtration BGK 0 / 2.5 5000 milliLiter(s) CRRT <Continuous>    --------------------------------------------------------------------------------------    VITAL SIGNS:  ICU Vital Signs Last 24 Hrs  T(C): 37.1 (30 Mar 2023 00:00), Max: 37.4 (29 Mar 2023 04:00)  T(F): 98.7 (30 Mar 2023 00:00), Max: 99.4 (29 Mar 2023 04:00)  HR: 82 (30 Mar 2023 00:00) (77 - 93)  BP: 106/65 (29 Mar 2023 20:00) (106/65 - 106/65)  BP(mean): 78 (29 Mar 2023 20:00) (78 - 78)  ABP: 109/59 (30 Mar 2023 00:00) (85/46 - 132/65)  ABP(mean): 80 (30 Mar 2023 00:00) (63 - 91)  RR: 20 (30 Mar 2023 00:00) (20 - 22)  SpO2: 100% (29 Mar 2023 23:00) (99% - 100%)    O2 Parameters below as of 30 Mar 2023 00:00  Patient On (Oxygen Delivery Method): ventilator,AC mode    O2 Concentration (%): 40      --------------------------------------------------------------------------------------    INS AND OUTS:  I&O's Detail    28 Mar 2023 07:01  -  29 Mar 2023 07:00  --------------------------------------------------------  IN:    Dexmedetomidine: 317 mL    FentaNYL: 283.2 mL    IV PiggyBack: 200 mL    Nepro with Carb Steady: 455 mL    Norepinephrine: 237.3 mL    sodium chloride 0.9% w/ Additives: 120 mL    Vasopressin: 108 mL  Total IN: 1720.5 mL    OUT:    Indwelling Catheter - Urethral (mL): 25 mL    Nasogastric/Oral tube (mL): 0 mL    Other (mL): 2766 mL  Total OUT: 2791 mL    Total NET: -1070.5 mL      29 Mar 2023 07:01  -  30 Mar 2023 00:47  --------------------------------------------------------  IN:    Dexmedetomidine: 177 mL    Dexmedetomidine: 22.1 mL    Enteral Tube Flush: 30 mL    FentaNYL: 177 mL    IV PiggyBack: 400 mL    Nepro with Carb Steady: 425 mL    Norepinephrine: 147.7 mL    Vasopressin: 76.5 mL  Total IN: 1455.3 mL    OUT:    Indwelling Catheter - Urethral (mL): 23 mL    Other (mL): 733 mL  Total OUT: 756 mL    Total NET: 699.3 mL        --------------------------------------------------------------------------------------    EXAM  NEUROLOGY  Exam: Sedated    RESPIRATORY  Exam: Intubated on volume AC, mechanical ventilation  Mode: AC/ CMV (Assist Control/ Continuous Mandatory Ventilation)  RR (machine): 22  TV (machine): 450  FiO2: 30  PEEP: 5  ITime: 0.78  MAP: 14  PIP: 30    CARDIOVASCULAR  Exam: Regular rate and rhythm.       GI/NUTRITION  Exam: Abdomen slightly firm, distended  Current Diet: NPO with TFs      METABOLIC / FLUIDS / ELECTROLYTES  dextrose 5%. 1000 milliLiter(s) IV Continuous <Continuous>  dextrose 5%. 1000 milliLiter(s) IV Continuous <Continuous>      HEMATOLOGIC  [x] VTE Prophylaxis: heparin   Injectable 5000 Unit(s) SubCutaneous every 8 hours    Transfusions:	[] PRBC	[] Platelets		[] FFP	[] Cryoprecipitate    INFECTIOUS DISEASE  Antimicrobials/Immunologic Medications:  meropenem  IVPB      meropenem  IVPB 1000 milliGRAM(s) IV Intermittent every 12 hours    Day #      of     ***    TUBES / LINES / DRAINS  ***  [x] Peripheral IV  [x] Central Venous Line     	[] R	[] L	[] IJ	[] Fem	[] SC	Date Placed:   [x] Arterial Line		[] R	[] L	[] Fem	[] Rad	[] Ax	Date Placed:   [] PICC		[] Midline		[] Mediport  [x] Urinary Catheter		Date Placed:   [x] Necessity of urinary, arterial, and venous catheters discussed    --------------------------------------------------------------------------------------    LABS                          8.5    45.31 )-----------( 86       ( 29 Mar 2023 21:45 )             25.8   03-29    x   |  x   |  x   ----------------------------<  204<H>  x    |  x   |  x     Ca    7.3<L>      29 Mar 2023 21:45  Phos  3.4     03-29  Mg     2.70     03-29    TPro  4.9<L>  /  Alb  2.3<L>  /  TBili  6.1<H>  /  DBili  x   /  AST  337<H>  /  ALT  778<H>  /  AlkPhos  198<H>  03-29    --------------------------------------------------------------------------------------    OTHER LABORATORY:     IMAGING STUDIES:   CXR:

## 2023-03-30 NOTE — PROGRESS NOTE ADULT - ASSESSMENT
48 year old female with necrotizing pancreatitis. Intubated and hemodynamically unstable requiring pressors. CT 3/28 w/ ill-defined collections.    PLAN:  - recommend MRCP when stable due to elevated Tbili and poorly visualized CBD on RUQ US  - recommend antifungal for increasing WBC (44.5 > 45.3)  - NPO/IVF/post pyloric TF  - wean pressors as tolerated  - wean vent as tolerated  - wean sedation as tolerated  - CVVHD  - continue IV abx  - monitor vital signs  - trend SICU labs  - [facility]  - appreciate SICU care    B Team Surgery  [id number]

## 2023-03-31 NOTE — PROGRESS NOTE ADULT - ASSESSMENT
48 year old female with necrotizing pancreatitis. Intubated and hemodynamically unstable requiring pressors. CT 3/28 w/ ill-defined collections.    PLAN:  - recommend MRCP when stable due to elevated Tbili and poorly visualized CBD on RUQ US  - recommend antifungal for increasing WBC (44.5 > 45.3)  - NPO/IVF/post pyloric TF  - wean pressors as tolerated  - wean vent as tolerated  - wean sedation as tolerated  - CVVHD  - continue IV abx  - monitor vital signs  - trend SICU labs  - [facility]  - appreciate SICU care    B Team Surgery  [id number]

## 2023-03-31 NOTE — PROGRESS NOTE ADULT - ATTENDING COMMENTS
Patient with necrotizing pancreatitis and oliguric renal failure. WBC to 45.  mrcp for evaluation of pancreatic anatomy  supportive care per sicu team  abx for likely superimposed infection    I have personally interviewed and examined this patient, reviewed pertinent labs and imaging, and discussed the case with colleagues, residents, and physician assistants on B Team rounds.    The active care issues are:  1. pancreatitis  2. renal failure  3. respiratory failure    The Acute Care Surgery (B Team) Attending Group Practice:  Dr. Ayden Yip, Dr. Adonis Botello, Dr. Jam Nj, Dr. Sathya Andrade,     urgent issues - spectra [id number]  nonurgent issues - [phone]  patient appointments or afterhours - [phone]

## 2023-03-31 NOTE — PROGRESS NOTE ADULT - ASSESSMENT
Assessment:   Patient is a 48 year old female with PMHx significant for HTN, hypothyroidism,  30 years ago, breast cancer s/p left mastectomy and chemo in  presenting with epigastric pain and LUQ pain. Found to have necrotizing pancreatitis. Transferred from OSH ([place]) for hypotension and tachypnea requiring emergent intubation and multiple pressors. SICU consulted for hemodynamic monitoring and respiratory management.    PLAN:  Neuro  Intubated and sedated for clinical course; No acute neurological issues   - Sedated w/ precedex and fentanyl  - Fentanyl for pain control     Resp:  AHRF w/ b/l pleff 2/2 necrotizing pancreatitis   -Volume control 20/350/5/40    CV  Mixed shock state 2/2 septic shock w/ E. Coli in urine vs hypovolemic shock now s/p 11L resus    - c/w levo and vaso; wean as tolerated as below   - Treat ID as below     GI:   #Necrotizing pancreatitis 2/2 gallstones  Diagnostics  CT 3/27: >50%of the pancreatic head  extends into the mesentery and inferiorly along the retroperitoneum into the lower abdomen  RUQ US: Chololithiasis (2cm stone) w/ no acute frannie     Therapeutics:   -Plan for interval CT 3/28 or 3/29   - Continue TF (Nepro) at rate of 30cc/hr  - GI ppx w/ protonix      #Shocked Liver + Hyperbilirubinemia   Likely 2/2 to shocked state w/ LFTs now trending down w/ bili following protracted course as expected but yet to peak. Possible concern for acute obstructive biliary process.   -consider MRCP pending improvement in clinical course  -Monitor LFTs     Renal:  ARF 2/2 shock state resulting in ATN requiring CRRT    -Continue CRRT net even, try net negative  as tolerated   - Babin catheter in place    Heme:   Anemia likely 2/2 disease course vs suppression; No active bleeding reported   Diagnostics:   -CBC q8     Therapeutics:   -received 1U PRBC 3/27 v  -SQH for VTE prophylaxis    #Thromboctopenia  Likely supression 2/2 infection vs shock state  -HIT Ab sent; will f/u    #Splenic Vein Thrombosis  Diagnostics:  3/22 CT Abd w/ contrast: Focal filling defect in the splenic vein at the level of the body of   the pancreas for which an underlying thrombus is suspected    Therapeutics:   -Will hold therapeutic AC given risks outweigh benefits      ID:   Intermittently febrile w/ leukocytosis c/f septic shock 2/2 E coli UTI  Diagnostics:  -3/22 Ucx: E coli  -3/24 Bcx: negative     Therapeutics:   -Meropenem/Flagyl (3/26-)    Endo:   -Continue Solu-medrol 10 q6h until ; will taper  -c/w synthroid 20 IV (home med)   -Monitor glucose with metabolic panel    Code Status: Full code    Disposition: SICU Assessment:   Patient is a 48 year old female with PMHx significant for HTN, hypothyroidism,  30 years ago, breast cancer s/p left mastectomy and chemo in  presenting with epigastric pain and LUQ pain. Found to have necrotizing pancreatitis. Transferred from [facility] ([place]) for hypotension and tachypnea requiring emergent intubation and multiple pressors. SICU consulted for hemodynamic monitoring and respiratory management.    PLAN:  Neuro  Intubated and sedated for clinical course; No acute neurological issues   - CPAP trial  - Wean precedex  - Dc fentanyl  - Dilaudid prn    Resp:  AHRF w/ b/l pleff 2/2 necrotizing pancreatitis   -Volume control 20/350/5/40    CV  Mixed shock state 2/2 septic shock w/ E. Coli in urine vs hypovolemic shock now s/p 11L resus    - c/w levo, wean as tolerated as below   - Treat ID as below     GI:   #Necrotizing pancreatitis 2/2 gallstones  Diagnostics  CT 3/27: >50%of the pancreatic head  extends into the mesentery and inferiorly along the retroperitoneum into the lower abdomen  RUQ US: Chololithiasis (2cm stone) w/ no acute frannie     Therapeutics:   - Plan for interval CT 3/28 or 3/29   - Continue TF (Nepro) at rate of 30cc/hr  - GI ppx w/ protonix   - Reglan  - Docolax    #Shocked Liver + Hyperbilirubinemia   Likely 2/2 to shocked state w/ LFTs now trending down w/ bili following protracted course as expected but yet to peak. Possible concern for acute obstructive biliary process.   - consider MRCP pending improvement in clinical course  - Monitor LFTs     Renal:  ARF 2/2 shock state resulting in ATN requiring CRRT    - Continue CRRT net negative  as tolerated   - Babin catheter in place    Heme:   Anemia likely 2/2 disease course vs suppression; No active bleeding reported   Diagnostics:   - CBC q8     Therapeutics:   - received 1U PRBC 3/27   - SCDs and argatroban for DVT ppx    #Thromboctopenia  Likely suppression 2/2 infection vs shock state  - HIT Ab positive    #Splenic Vein Thrombosis  Diagnostics:  3/22 CT Abd w/ contrast: Focal filling defect in the splenic vein at the level of the body of   the pancreas for which an underlying thrombus is suspected     ID:   Intermittently febrile w/ leukocytosis c/f septic shock 2/2 E coli UTI  Diagnostics:  - 3/22 Ucx: E coli  - 3/24 Bcx: negative     Therapeutics:   - Meropenem (3/26-)    Endo:   - Continue Solu-medrol 10 q6h until ; will taper  - c/w synthroid 20 IV (home med)   - ISS  - Monitor glucose with metabolic panel    Code Status: Full code    Disposition: SICU

## 2023-03-31 NOTE — PROGRESS NOTE ADULT - SUBJECTIVE AND OBJECTIVE BOX
[facility] DIVISION OF KIDNEY DISEASES AND HYPERTENSION   FOLLOW UP NOTE  --------------------------------------------------------------------------------  Chief Complaint: Anuric CHATO requiring renal replacement therapy     24 hour events/subjective: Pt. was seen and examined in the SICU. She remains intubated, sedated and requiring IV vasopressors. Remains on CRRT. Unable to obtain further ROS.     PAST HISTORY  --------------------------------------------------------------------------------  No significant changes to PMH, PSH, FHx, SHx, unless otherwise noted    ALLERGIES & MEDICATIONS  --------------------------------------------------------------------------------  Allergies  No Known Allergies    Standing Inpatient Medications  chlorhexidine 0.12% Liquid 15 milliLiter(s) Oral Mucosa every 12 hours  chlorhexidine 2% Cloths 1 Application(s) Topical daily  CRRT Treatment    <Continuous>  dexMEDEtomidine Infusion 0.207 MICROgram(s)/kG/Hr IV Continuous <Continuous>  dextrose 50% Injectable 25 Gram(s) IV Push once  dextrose 50% Injectable 12.5 Gram(s) IV Push once  fentaNYL   Infusion 0.5 MICROgram(s)/kG/Hr IV Continuous <Continuous>  insulin lispro (ADMELOG) corrective regimen sliding scale   SubCutaneous every 6 hours  levothyroxine Injectable 20 MICROGram(s) IV Push at bedtime  meropenem  IVPB 1000 milliGRAM(s) IV Intermittent every 12 hours  meropenem  IVPB      methylPREDNISolone sodium succinate Injectable 10 milliGRAM(s) IV Push every 6 hours  norepinephrine Infusion 0.05 MICROgram(s)/kG/Min IV Continuous <Continuous>  pantoprazole  Injectable 40 milliGRAM(s) IV Push daily  petrolatum Ophthalmic Ointment 1 Application(s) Both EYES two times a day  PrismaSATE Dialysate BGK 4 / 2.5 5000 milliLiter(s) CRRT <Continuous>  PrismaSOL Filtration BGK 0 / 2.5 5000 milliLiter(s) CRRT <Continuous>  PrismaSOL Filtration BGK 0 / 2.5 5000 milliLiter(s) CRRT <Continuous>  vasopressin Infusion 0.03 Unit(s)/Min IV Continuous <Continuous>    PRN Inpatient Medications    REVIEW OF SYSTEMS  --------------------------------------------------------------------------------  Unable to obtain ROS    VITALS/PHYSICAL EXAM  --------------------------------------------------------------------------------  T(C): 37 (03-31-23 @ 08:00), Max: 38.3 (03-30-23 @ 16:00)  HR: 77 (03-31-23 @ 08:00) (77 - 180)  BP: 86/61 (03-31-23 @ 07:45) (86/61 - 111/62)  RR: 23 (03-31-23 @ 08:00) (20 - 32)  SpO2: 100% (03-31-23 @ 08:00) (96% - 100%)  Wt(kg): --    03-30-23 @ 07:01  -  03-31-23 @ 07:00  --------------------------------------------------------  IN: 1021.2 mL / OUT: 521 mL / NET: 500.2 mL    03-31-23 @ 07:01  -  03-31-23 @ 08:16  --------------------------------------------------------  IN: 36.5 mL / OUT: 0 mL / NET: 36.5 mL    Physical Exam:  	Gen: Ill appearing, intubated, sedated   	HEENT: +ETT  	Pulm: Mechanical breath sounds  	CV: RRR  	Abd: Obese, distended  	Ext: + LE edema B/L. Trace B/L UE edema   	Neuro: Sedated, not following commands               : Babin+ with small amount of dark urine in the bag  	Skin: Warm and dry              Dialysis access: L IJ non tunneled HD catheter     LABS/STUDIES  --------------------------------------------------------------------------------              8.4    41.63 >-----------<  86       [03-31-23 @ 04:00]              25.0     134  |  102  |  41  ----------------------------<  244      [03-31-23 @ 04:00]  4.4   |  21  |  1.80        Ca     7.2     [03-31-23 @ 04:00]      Mg     2.90     [03-31-23 @ 04:00]      Phos  2.8     [03-31-23 @ 04:00]    TPro  5.1  /  Alb  2.4  /  TBili  10.5  /  DBili  x   /  AST  268  /  ALT  491  /  AlkPhos  185  [03-31-23 @ 04:00]    PT/INR: PT 14.9 , INR 1.28       [03-31-23 @ 04:00]  PTT: 24.4       [03-31-23 @ 04:00]    Creatinine Trend:  SCr 1.80 [03-31 @ 04:00]  SCr 2.00 [03-30 @ 20:10]  SCr 1.41 [03-30 @ 12:00]  SCr 1.45 [03-30 @ 05:30]  SCr 1.51 [03-29 @ 21:45]

## 2023-03-31 NOTE — CONSULT NOTE ADULT - SUBJECTIVE AND OBJECTIVE BOX
Chief Complaint:  Patient is a 48y old  Female who presents with a chief complaint of Nec pancreatitis (31 Mar 2023 10:09)      HPI:    Allergies:  No Known Allergies      Home Medications:    Hospital Medications:  bisacodyl 5 milliGRAM(s) Oral once  chlorhexidine 0.12% Liquid 15 milliLiter(s) Oral Mucosa every 12 hours  chlorhexidine 2% Cloths 1 Application(s) Topical daily  CRRT Treatment    <Continuous>  dexMEDEtomidine Infusion 0.207 MICROgram(s)/kG/Hr IV Continuous <Continuous>  dextrose 50% Injectable 25 Gram(s) IV Push once  dextrose 50% Injectable 12.5 Gram(s) IV Push once  insulin lispro (ADMELOG) corrective regimen sliding scale   SubCutaneous every 6 hours  levothyroxine Injectable 20 MICROGram(s) IV Push at bedtime  meropenem  IVPB 1000 milliGRAM(s) IV Intermittent every 12 hours  meropenem  IVPB      methylPREDNISolone sodium succinate Injectable 10 milliGRAM(s) IV Push every 6 hours  metoclopramide Injectable 10 milliGRAM(s) IV Push every 8 hours  norepinephrine Infusion 0.05 MICROgram(s)/kG/Min IV Continuous <Continuous>  pantoprazole  Injectable 40 milliGRAM(s) IV Push daily  petrolatum Ophthalmic Ointment 1 Application(s) Both EYES two times a day  PrismaSATE Dialysate BGK 4 / 2.5 5000 milliLiter(s) CRRT <Continuous>  PrismaSOL Filtration BGK 0 / 2.5 5000 milliLiter(s) CRRT <Continuous>  PrismaSOL Filtration BGK 0 / 2.5 5000 milliLiter(s) CRRT <Continuous>      PMHX/PSHX:  Hypertension    Hypothyroidism    Breast cancer    S/P     H/O left mastectomy        Family history:  No pertinent family history in first degree relatives    FH: myocardial infarction        Denies family history of colon cancer/polyps, stomach cancer/polyps, pancreatic cancer/masses, liver cancer/disease, ovarian cancer and endometrial cancer.    Social History:     Tob: Denies  EtOH: As above  Illicit Drugs: Denies    ROS:   General:  No wt loss, fevers, chills, night sweats, fatigue  Eyes:  Good vision, no reported pain  ENT:  No sore throat, pain, runny nose, dysphagia  CV:  No pain, palpitations, hypo/hypertension  Pulm:  No dyspnea, cough, tachypnea, wheezing  GI:  As per HPI  :  No pain, bleeding, incontinence, nocturia  Muscle:  No pain, weakness  Neuro:  No weakness, tingling, memory problems  Psych:  No fatigue, insomnia, mood problems, depression  Endocrine:  No polyuria, polydipsia, cold/heat intolerance  Heme:  No petechiae, ecchymosis, easy bruisability  Skin:  No rash, tattoos, scars, edema    PHYSICAL EXAM:   GENERAL:  No acute distress  HEENT:  Normocephalic/atraumatic, no scleral icterus  CHEST:  No accessory muscle use  HEART:  Regular rate and rhythm  ABDOMEN:  Soft, non-tender, non-distended, normoactive bowel sounds,  no masses, no hepato-splenomegaly, no signs of chronic liver disease  EXTREMITIES: No cyanosis, clubbing, or edema  SKIN:  No rash  NEURO:  Alert and oriented x 3, no asterixis    Vital Signs:  Vital Signs Last 24 Hrs  T(C): 36.8 (31 Mar 2023 12:00), Max: 38.3 (30 Mar 2023 16:00)  T(F): 98.3 (31 Mar 2023 12:00), Max: 100.9 (30 Mar 2023 16:00)  HR: 83 (31 Mar 2023 12:00) (77 - 180)  BP: 86/61 (31 Mar 2023 07:45) (86/61 - 111/62)  BP(mean): 69 (31 Mar 2023 07:45) (69 - 78)  RR: 23 (31 Mar 2023 12:00) (20 - 32)  SpO2: 100% (31 Mar 2023 12:00) (96% - 100%)    Parameters below as of 31 Mar 2023 12:00  Patient On (Oxygen Delivery Method): ventilator    O2 Concentration (%): 40  Daily     Daily Weight in k.1 (31 Mar 2023 04:00)    LABS:                        8.4    41.63 )-----------( 86       ( 31 Mar 2023 04:00 )             25.0     Mean Cell Volume: 84.7 fL (- @ 04:00)        134<L>  |  102  |  41<H>  ----------------------------<  244<H>  4.4   |  21<L>  |  1.80<H>    Ca    7.2<L>      31 Mar 2023 04:00  Phos  2.8       Mg     2.90         TPro  5.1<L>  /  Alb  2.4<L>  /  TBili  10.5<H>  /  DBili  x   /  AST  268<H>  /  ALT  491<H>  /  AlkPhos  185<H>      LIVER FUNCTIONS - ( 31 Mar 2023 04:00 )  Alb: 2.4 g/dL / Pro: 5.1 g/dL / ALK PHOS: 185 U/L / ALT: 491 U/L / AST: 268 U/L / GGT: x           PT/INR - ( 31 Mar 2023 04:00 )   PT: 14.9 sec;   INR: 1.28 ratio         PTT - ( 31 Mar 2023 04:00 )  PTT:24.4 sec                            8.4    41.63 )-----------( 86       ( 31 Mar 2023 04:00 )             25.0                         8.5    44.71 )-----------( 91       ( 30 Mar 2023 20:10 )             25.7                         8.1    43.10 )-----------( 83       ( 30 Mar 2023 12:00 )             25.0                         7.9    41.44 )-----------( 74       ( 30 Mar 2023 05:30 )             23.5                         8.5    45.31 )-----------( 86       ( 29 Mar 2023 21:45 )             25.8       Imaging:           Chief Complaint:  Patient is a 48y old  Female who presents with a chief complaint of Nec pancreatitis (31 Mar 2023 10:09)      HPI:  Ms. Berger is a 49yo F with PMH significant for HTN, hypothyroidism, breast cancer (s/p left mastectomy and chemotherapy, ) who presented with epigastric pain, found to have severe necrotizing pancreatitis. GI consulted for elevated liver tests.    Patient presented 3/22 to [facility] with severe pancreatitis, transferred to [facility] for worsening clinical status. Now in the SICU, intubated on CRRT and pressor support. Pressors coming down. Etiology of pancreatitis is unclear. No known previous episodes. Initial labs with normal liver tests. Imaging with no evidence of choledocholithiasis.    Course notable for severe hypotension and subsequent AST/ALT elevation >7000. AST/ALT have been slowly improving with notable slow uptrending of bilirubin, now 11. No positive blood cultures. Repeat CT 3/28 with no evidence of choledocholithiasis. RUQ with cholelithiasis, CBD not visualized.    Allergies:  No Known Allergies      Home Medications:  fentaNYL: intravenous every minute (24 Mar 2023 13:48)  heparin 100 units/mL-D5% intravenous solution: 10 milliliter(s) intravenous every minute (24 Mar 2023 13:48)  ketamine 10 mg/mL injectable solution: intravenous every minute (24 Mar 2023 13:48)  norepinephrine: 16 milligram(s) intravenous every minute (24 Mar 2023 13:48)  phenylephrine: 40 milligram(s) intravenous every minute (24 Mar 2023 13:48)  piperacillin-tazobactam: 3.375 gram(s) intravenous every minute (24 Mar 2023 13:48)  sodium bicarbonate: 150 drop(s) intravenous every minute (24 Mar 2023 13:48)    Hospital Medications:  bisacodyl 5 milliGRAM(s) Oral once  chlorhexidine 0.12% Liquid 15 milliLiter(s) Oral Mucosa every 12 hours  chlorhexidine 2% Cloths 1 Application(s) Topical daily  CRRT Treatment    <Continuous>  dexMEDEtomidine Infusion 0.207 MICROgram(s)/kG/Hr IV Continuous <Continuous>  dextrose 50% Injectable 25 Gram(s) IV Push once  dextrose 50% Injectable 12.5 Gram(s) IV Push once  insulin lispro (ADMELOG) corrective regimen sliding scale   SubCutaneous every 6 hours  levothyroxine Injectable 20 MICROGram(s) IV Push at bedtime  meropenem  IVPB 1000 milliGRAM(s) IV Intermittent every 12 hours  meropenem  IVPB      methylPREDNISolone sodium succinate Injectable 10 milliGRAM(s) IV Push every 6 hours  metoclopramide Injectable 10 milliGRAM(s) IV Push every 8 hours  norepinephrine Infusion 0.05 MICROgram(s)/kG/Min IV Continuous <Continuous>  pantoprazole  Injectable 40 milliGRAM(s) IV Push daily  petrolatum Ophthalmic Ointment 1 Application(s) Both EYES two times a day  PrismaSATE Dialysate BGK 4 / 2.5 5000 milliLiter(s) CRRT <Continuous>  PrismaSOL Filtration BGK 0 / 2.5 5000 milliLiter(s) CRRT <Continuous>  PrismaSOL Filtration BGK 0 / 2.5 5000 milliLiter(s) CRRT <Continuous>      PMHX/PSHX:  Hypertension    Hypothyroidism    Breast cancer    S/P     H/O left mastectomy        Family history:  No pertinent family history in first degree relatives    FH: myocardial infarction        Denies family history of colon cancer/polyps, stomach cancer/polyps, pancreatic cancer/masses, liver cancer/disease, ovarian cancer and endometrial cancer.    Social History:     Tob: Denies  EtOH: As above  Illicit Drugs: Denies    ROS:   Unable to obtain.    PHYSICAL EXAM:   GENERAL:  No acute distress, sedated, intubated  HEENT:  Normocephalic/atraumatic, +scleral icterus  CHEST:  Mechanical breath sounds  HEART:  Regular rate and rhythm  ABDOMEN:  Soft, non-tender, +distended  EXTREMITIES: No cyanosis, clubbing, or edema  SKIN:  No rash  NEURO:  Sedated    Vital Signs:  Vital Signs Last 24 Hrs  T(C): 36.8 (31 Mar 2023 12:00), Max: 38.3 (30 Mar 2023 16:00)  T(F): 98.3 (31 Mar 2023 12:00), Max: 100.9 (30 Mar 2023 16:00)  HR: 83 (31 Mar 2023 12:00) (77 - 180)  BP: 86/61 (31 Mar 2023 07:45) (86/61 - 111/62)  BP(mean): 69 (31 Mar 2023 07:45) (69 - 78)  RR: 23 (31 Mar 2023 12:00) (20 - 32)  SpO2: 100% (31 Mar 2023 12:00) (96% - 100%)    Parameters below as of 31 Mar 2023 12:00  Patient On (Oxygen Delivery Method): ventilator    O2 Concentration (%): 40  Daily     Daily Weight in k.1 (31 Mar 2023 04:00)    LABS:                        8.4    41.63 )-----------( 86       ( 31 Mar 2023 04:00 )             25.0     Mean Cell Volume: 84.7 fL (- @ 04:00)        134<L>  |  102  |  41<H>  ----------------------------<  244<H>  4.4   |  21<L>  |  1.80<H>    Ca    7.2<L>      31 Mar 2023 04:00  Phos  2.8       Mg     2.90         TPro  5.1<L>  /  Alb  2.4<L>  /  TBili  10.5<H>  /  DBili  x   /  AST  268<H>  /  ALT  491<H>  /  AlkPhos  185<H>      LIVER FUNCTIONS - ( 31 Mar 2023 04:00 )  Alb: 2.4 g/dL / Pro: 5.1 g/dL / ALK PHOS: 185 U/L / ALT: 491 U/L / AST: 268 U/L / GGT: x           PT/INR - ( 31 Mar 2023 04:00 )   PT: 14.9 sec;   INR: 1.28 ratio         PTT - ( 31 Mar 2023 04:00 )  PTT:24.4 sec                            8.4    41.63 )-----------( 86       ( 31 Mar 2023 04:00 )             25.0                         8.5    44.71 )-----------( 91       ( 30 Mar 2023 20:10 )             25.7                         8.1    43.10 )-----------( 83       ( 30 Mar 2023 12:00 )             25.0                         7.9    41.44 )-----------( 74       ( 30 Mar 2023 05:30 )             23.5                         8.5    45.31 )-----------( 86       ( 29 Mar 2023 21:45 )             25.8       Imaging:  < from: CT Abdomen and Pelvis w/ IV Cont (23 @ 14:39) >    ACC: [id number] EXAM:  CT ABDOMEN AND PELVIS IC   ORDERED BY: SAFIA HANNA     ACC: [id number] EXAM:  CT CHEST IC   ORDERED BY: SAFIA HANNA     PROCEDURE DATE:  2023          INTERPRETATION:  CLINICAL INFORMATION: Necrotizing pancreatitis, interval   follow-up.    COMPARISON: CT abdomen pelvis 3/22/2023.    CONTRAST/COMPLICATIONS:  IV Contrast: Omnipaque 350  90 cc administered   10 cc discarded  Oral Contrast: NONE  Complications: None reported at time of study completion    PROCEDURE:  CT of the Chest, Abdomen and Pelvis was performed.  Dual phase, arterial and portal venous phase imaging was performed   through the upper abdomen.  Sagittal and coronal reformats were performed.    FINDINGS:  Study significantly limited bystreak artifact from bilateral upper   extremities.    CHEST:  LUNGS AND LARGE AIRWAYS: Endotracheal tube in place with tip above the   gilma. Bilateral lower lobe consolidation/atelectasis. Mild patchy   bilateral upper lung peribronchovascular opacities.  PLEURA: Small bilateral pleural effusions, left greater than right.  VESSELS: Right-sided central venous catheter terminating in the SVC and   left-sided central venous catheter terminating at the superior cavoatrial   junction.  HEART: Heartsize is normal. No pericardial effusion.  MEDIASTINUM AND ALICIA: No lymphadenopathy.  CHEST WALL AND LOWER NECK: Left breast implant.    ABDOMEN AND PELVIS:  LIVER: Within normal limits.  BILE DUCTS: Normal caliber.  GALLBLADDER: Within normal limits.  SPLEEN: Within normal limits.  PANCREAS: Limited evaluation of pancreas due to streak artifact as above.   Necrotizing pancreatitis involving greater than 50% of the body with   extensive peripancreatic inflammation and ill-defined fluid, grossly   similar to the prior exam.  ADRENALS: Within normal limits.  KIDNEYS/URETERS: Within normal limits.    BLADDER: Babin catheter.  REPRODUCTIVE ORGANS: Uterus and adnexa within normal limits.    BOWEL: Enteric tube is coiled in the stomach and terminating in the   distal duodenum. No bowel obstruction. Appendix is normal.  PERITONEUM: Small volume abdominal and pelvic ascites, increased  VESSELS: Within normal limits.  RETROPERITONEUM/LYMPH NODES: No lymphadenopathy.  ABDOMINAL WALL: Anasarca.  BONES: Within normal limits.    IMPRESSION:  Significantly limited study due to streak artifact from bilateral upper   extremities.    Redemonstrated necrotizing pancreatitis with extensive peripancreatic   inflammation and ill-defined fluid, grossly similar to the prior exam.    Small volume ascites, increased.    New small bilateral pleural effusions with adjacent compressive   atelectasis. Mild patchy bilateral upper lung peribronchovascular   opacities, possibly infectious in etiology.    --- End of Report ---

## 2023-03-31 NOTE — PROGRESS NOTE ADULT - ATTENDING COMMENTS
I have examined this patient, reviewed pertinent labs and imaging on SICU rounds.    50   minutes in total were spent in providing direct critical care for the diagnoses, assessment and plan outlined below.  This patient suffers from a critical illness that acutely impairs one or more vital organ systems such that there is a high probability of life threatening or imminent deterioration of the patient's condition.  These diagnoses are unrelated to the surgical procedure.    Additionally, time spent in teaching or the performance of separately billable procedures was not counted toward my critical care time.  There is no overlap.  Time included review of vitals, labs, imaging, discussion with consultants (GI).    Reason for encounter:  necrotizing gallstone pancreatitis sequelae (resp failure, renal failure, HIT, hyperglycemia, nutrition risk)    48F with PMHx significant for HTN, hypothyroidism. Found to have necrotizing pancreatitis. Transferred from OSH ([facility]) for hypotension and tachypnea requiring emergent intubation and multiple pressors. SICU consulted for hemodynamic monitoring and respiratory management.

## 2023-03-31 NOTE — PROGRESS NOTE ADULT - SUBJECTIVE AND OBJECTIVE BOX
B TEAM SURGERY DAILY PROGRESS NOTE    S: Patient seen and examined at bedside.     O: Vital Signs Last 24 Hrs  T(C): 37 (31 Mar 2023 08:00), Max: 38.3 (30 Mar 2023 16:00)  T(F): 98.6 (31 Mar 2023 08:00), Max: 100.9 (30 Mar 2023 16:00)  HR: 85 (31 Mar 2023 09:00) (77 - 180)  BP: 86/61 (31 Mar 2023 07:45) (86/61 - 111/62)  BP(mean): 69 (31 Mar 2023 07:45) (69 - 78)  RR: 24 (31 Mar 2023 09:00) (20 - 32)  SpO2: 99% (31 Mar 2023 09:00) (96% - 100%)    Parameters below as of 31 Mar 2023 08:00  Patient On (Oxygen Delivery Method): ventilator    O2 Concentration (%): 40    I&O's Detail    30 Mar 2023 07:01  -  31 Mar 2023 07:00  --------------------------------------------------------  IN:    Dexmedetomidine: 331.8 mL    Enteral Tube Flush: 30 mL    FentaNYL: 262.5 mL    IV PiggyBack: 50 mL    Nepro with Carb Steady: 300 mL    Norepinephrine: 15.4 mL    Vasopressin: 31.5 mL  Total IN: 1021.2 mL    OUT:    Indwelling Catheter - Urethral (mL): 126 mL    Other (mL): 395 mL  Total OUT: 521 mL    Total NET: 500.2 mL      31 Mar 2023 07:01  -  31 Mar 2023 10:10  --------------------------------------------------------  IN:    Dexmedetomidine: 38.4 mL    FentaNYL: 23.6 mL    Norepinephrine: 5.5 mL  Total IN: 67.5 mL    OUT:    Indwelling Catheter - Urethral (mL): 0 mL    Other (mL): 78 mL  Total OUT: 78 mL    Total NET: -10.5 mL      EXAM  General: sedated  Respiratory: intubated on vent  Cardio: regular rate, on levo and vaso  Abdomen: distended, edematous  Vascular: extremities are warm and edematous      LABS                      8.4    41.63 )-----------( 86       ( 31 Mar 2023 04:00 )             25.0   03-31    134<L>  |  102  |  41<H>  ----------------------------<  244<H>  4.4   |  21<L>  |  1.80<H>    Ca    7.2<L>      31 Mar 2023 04:00  Phos  2.8     03-31  Mg     2.90     03-31    TPro  5.1<L>  /  Alb  2.4<L>  /  TBili  10.5<H>  /  DBili  x   /  AST  268<H>  /  ALT  491<H>  /  AlkPhos  185<H>  03-31

## 2023-03-31 NOTE — PROGRESS NOTE ADULT - SUBJECTIVE AND OBJECTIVE BOX
SICU Daily Progress Note  =====================================================    Interval Events:  - ISS due to steroids and increasing glucose; changed from low to moderate ISS in evening  - L IJ Shiley malfunctioning; L IJ triple lumen CVC replaced over wire  - R IJ triple lumen CVC removed; new R IJ Shiley placed  - Shortly after R IJ shiley placement, patient went into SVT and BP started to drop; required adenosine 6 push which caused her to break, also was on levo for a short period of time  - TF held ovenight due to emesis at 9pm      MEDICATIONS:   --------------------------------------------------------------------------------------  Neurologic Medications  dexMEDEtomidine Infusion 0.207 MICROgram(s)/kG/Hr IV Continuous <Continuous>  fentaNYL   Infusion 0.5 MICROgram(s)/kG/Hr IV Continuous <Continuous>    Respiratory Medications    Cardiovascular Medications  norepinephrine Infusion 0.05 MICROgram(s)/kG/Min IV Continuous <Continuous>    Gastrointestinal Medications  pantoprazole  Injectable 40 milliGRAM(s) IV Push daily    Genitourinary Medications    Hematologic/Oncologic Medications    Antimicrobial/Immunologic Medications  meropenem  IVPB 1000 milliGRAM(s) IV Intermittent every 12 hours  meropenem  IVPB        Endocrine/Metabolic Medications  dextrose 50% Injectable 25 Gram(s) IV Push once  dextrose 50% Injectable 12.5 Gram(s) IV Push once  insulin lispro (ADMELOG) corrective regimen sliding scale   SubCutaneous every 6 hours  levothyroxine Injectable 20 MICROGram(s) IV Push at bedtime  methylPREDNISolone sodium succinate Injectable 10 milliGRAM(s) IV Push every 6 hours  vasopressin Infusion 0.03 Unit(s)/Min IV Continuous <Continuous>    Topical/Other Medications  chlorhexidine 0.12% Liquid 15 milliLiter(s) Oral Mucosa every 12 hours  chlorhexidine 2% Cloths 1 Application(s) Topical daily  CRRT Treatment    <Continuous>  petrolatum Ophthalmic Ointment 1 Application(s) Both EYES two times a day  PrismaSATE Dialysate BGK 4 / 2.5 5000 milliLiter(s) CRRT <Continuous>  PrismaSOL Filtration BGK 0 / 2.5 5000 milliLiter(s) CRRT <Continuous>  PrismaSOL Filtration BGK 0 / 2.5 5000 milliLiter(s) CRRT <Continuous>    --------------------------------------------------------------------------------------    VITAL SIGNS, INS/OUTS (last 24 hours):  --------------------------------------------------------------------------------------    03-29-23 @ 07:01 - 03-30-23 @ 07:00  --------------------------------------------------------  IN: 1846.6 mL / OUT: 776 mL / NET: 1070.6 mL    03-30-23 @ 07:01  -  03-31-23 @ 01:30  --------------------------------------------------------  IN: 778.2 mL / OUT: 171 mL / NET: 607.2 mL      --------------------------------------------------------------------------------------    EXAM  NEUROLOGY  Exam: Sedated    RESPIRATORY  Exam: Intubated on volume AC, mechanical ventilation  Mode: AC/ CMV (Assist Control/ Continuous Mandatory Ventilation)  RR (machine): 22  TV (machine): 450  FiO2: 30  PEEP: 5  ITime: 0.78  MAP: 14  PIP: 30    CARDIOVASCULAR  Exam: Regular rate and rhythm.       GI/NUTRITION  Exam: Abdomen slightly firm, distended  Current Diet: NPO, TF held      METABOLIC/FLUIDS/ELECTROLYTES      HEMATOLOGIC  [x] VTE Prophylaxis:       LABS                          8.5    44.71 )-----------( 91       ( 30 Mar 2023 20:10 )             25.7     03-30    133<L>  |  101  |  43<H>  ----------------------------<  289<H>  4.8   |  19<L>  |  2.00<H>    Ca    7.3<L>      30 Mar 2023 20:10  Phos  3.5     03-30  Mg     2.80     03-30    TPro  5.1<L>  /  Alb  2.6<L>  /  TBili  9.4<H>  /  DBili  x   /  AST  291<H>  /  ALT  540<H>  /  AlkPhos  202<H>  03-30    PT/INR - ( 30 Mar 2023 12:00 )   PT: 15.4 sec;   INR: 1.32 ratio         PTT - ( 30 Mar 2023 20:10 )  PTT:27.7 sec      --------------------------------------------------------------------------------------    T(C): 37.1 (03-31-23 @ 00:00), Max: 38.3 (03-30-23 @ 16:00)  HR: 99 (03-31-23 @ 01:00) (79 - 180)  BP: 111/62 (03-31-23 @ 01:00) (111/62 - 111/62)  RR: 22 (03-31-23 @ 01:00) (20 - 32)  SpO2: 99% (03-31-23 @ 01:00) (96% - 100%)    --------------------------------------------------------------------------------------   SICU Daily Progress Note  =====================================================    Interval Events:  - ISS due to steroids and increasing glucose; changed from low to moderate ISS in evening  - L IJ Shiley malfunctioning; L IJ triple lumen CVC replaced over wire  - R IJ triple lumen CVC removed; new R IJ Shiley placed  - Shortly after R IJ shiley placement, patient went into SVT and BP started to drop; required adenosine 6 push which caused her to break, also was on levo for a short period of time  - Off vaso  - TF held ovenight due to emesis at 9pm  - Positive for HIT ABs      MEDICATIONS:   --------------------------------------------------------------------------------------  Neurologic Medications  dexMEDEtomidine Infusion 0.207 MICROgram(s)/kG/Hr IV Continuous <Continuous>  fentaNYL   Infusion 0.5 MICROgram(s)/kG/Hr IV Continuous <Continuous>    Respiratory Medications    Cardiovascular Medications  norepinephrine Infusion 0.05 MICROgram(s)/kG/Min IV Continuous <Continuous>    Gastrointestinal Medications  pantoprazole  Injectable 40 milliGRAM(s) IV Push daily    Genitourinary Medications    Hematologic/Oncologic Medications    Antimicrobial/Immunologic Medications  meropenem  IVPB 1000 milliGRAM(s) IV Intermittent every 12 hours  meropenem  IVPB        Endocrine/Metabolic Medications  dextrose 50% Injectable 25 Gram(s) IV Push once  dextrose 50% Injectable 12.5 Gram(s) IV Push once  insulin lispro (ADMELOG) corrective regimen sliding scale   SubCutaneous every 6 hours  levothyroxine Injectable 20 MICROGram(s) IV Push at bedtime  methylPREDNISolone sodium succinate Injectable 10 milliGRAM(s) IV Push every 6 hours  vasopressin Infusion 0.03 Unit(s)/Min IV Continuous <Continuous>    Topical/Other Medications  chlorhexidine 0.12% Liquid 15 milliLiter(s) Oral Mucosa every 12 hours  chlorhexidine 2% Cloths 1 Application(s) Topical daily  CRRT Treatment    <Continuous>  petrolatum Ophthalmic Ointment 1 Application(s) Both EYES two times a day  PrismaSATE Dialysate BGK 4 / 2.5 5000 milliLiter(s) CRRT <Continuous>  PrismaSOL Filtration BGK 0 / 2.5 5000 milliLiter(s) CRRT <Continuous>  PrismaSOL Filtration BGK 0 / 2.5 5000 milliLiter(s) CRRT <Continuous>    --------------------------------------------------------------------------------------    VITAL SIGNS, INS/OUTS (last 24 hours):  --------------------------------------------------------------------------------------    03-29-23 @ 07:01 - 03-30-23 @ 07:00  --------------------------------------------------------  IN: 1846.6 mL / OUT: 776 mL / NET: 1070.6 mL    03-30-23 @ 07:01 - 03-31-23 @ 01:30  --------------------------------------------------------  IN: 778.2 mL / OUT: 171 mL / NET: 607.2 mL      --------------------------------------------------------------------------------------    EXAM  NEUROLOGY  Exam: Sedated    RESPIRATORY  Exam: Intubated on volume AC, mechanical ventilation  Mode: AC/ CMV (Assist Control/ Continuous Mandatory Ventilation)  RR (machine): 20  TV (machine): 350  FiO2: 40  PEEP: 5  ITime: 0.78  MAP: 14  PIP: 30    CARDIOVASCULAR  Exam: Regular rate and rhythm.       GI/NUTRITION  Exam: Abdomen slightly firm, distended  Current Diet: NPO, TF held      METABOLIC/FLUIDS/ELECTROLYTES      HEMATOLOGIC  [x] VTE Prophylaxis:       LABS                          8.5    44.71 )-----------( 91       ( 30 Mar 2023 20:10 )             25.7     03-30    133<L>  |  101  |  43<H>  ----------------------------<  289<H>  4.8   |  19<L>  |  2.00<H>    Ca    7.3<L>      30 Mar 2023 20:10  Phos  3.5     03-30  Mg     2.80     03-30    TPro  5.1<L>  /  Alb  2.6<L>  /  TBili  9.4<H>  /  DBili  x   /  AST  291<H>  /  ALT  540<H>  /  AlkPhos  202<H>  03-30    PT/INR - ( 30 Mar 2023 12:00 )   PT: 15.4 sec;   INR: 1.32 ratio         PTT - ( 30 Mar 2023 20:10 )  PTT:27.7 sec      --------------------------------------------------------------------------------------    T(C): 37.1 (03-31-23 @ 00:00), Max: 38.3 (03-30-23 @ 16:00)  HR: 99 (03-31-23 @ 01:00) (79 - 180)  BP: 111/62 (03-31-23 @ 01:00) (111/62 - 111/62)  RR: 22 (03-31-23 @ 01:00) (20 - 32)  SpO2: 99% (03-31-23 @ 01:00) (96% - 100%)    --------------------------------------------------------------------------------------

## 2023-03-31 NOTE — PROGRESS NOTE ADULT - PROBLEM SELECTOR PLAN 1
Pt with anuric CHATO in the setting of necrotizing pancreatitis leading to shock, requiring vasopressors support and renal replacement therapy. Scr was 1.24 on 3/23/23 and increased to 4.60 on 3/24/23. Urine output slightly improving with 126cc of output in past 24 hours. Plan to continue CRRT. Consider Renal US when more stable (no hydronephrosis noted on initial CT abd/pel). Monitor labs and urine output. Avoid nephrotoxins. Dose medications as per eGFR.

## 2023-03-31 NOTE — CHART NOTE - NSCHARTNOTEFT_GEN_A_CORE
Per chart----48 year old female with necrotizing pancreatitis. Intubated and hemodynamically unstable requiring pressors. CT 3/28 w/ ill-defined collections.    Nutrition follow up for critical care length of stay. Spoke with Nursing and SICU Team. Pt made NPO 3/30 2/2 large emesis. Per Team Pt's feeding placement is post pyloric. Nursing states current plan is to give reglan and enema. ? Last BM. Remains with elevated POCT >200 mg/dL, continues on steroid therapy and is on ISS. Admit weight 118 kg, noted weight significantly increased to 139 kg and current weight is 132 kg. Pt continues on CRRT and remains with 3+gen edema and 3/30 4+ edema. Pt meets criteria for severe malnutrition 2/2 moderate edema and </50% of estimated energy requirement for >/5 days.     DIET : Diet, NPO:   Except Medications (03-30-23 @ 23:34)    WEIGHT: Weight (kg): admit: 117.9   (3/31) 132.1 (3/30) 132.2 (3/29) 130.8 (3/28) 134.2  (3/27) 139.7     PERTINENT MEDICATIONS: MEDICATIONS  (STANDING):  bisacodyl Suppository 10 milliGRAM(s) Rectal once  chlorhexidine 0.12% Liquid 15 milliLiter(s) Oral Mucosa every 12 hours  chlorhexidine 2% Cloths 1 Application(s) Topical daily  CRRT Treatment    <Continuous>  dexMEDEtomidine Infusion 0.207 MICROgram(s)/kG/Hr (6.11 mL/Hr) IV Continuous <Continuous>  dextrose 50% Injectable 25 Gram(s) IV Push once  dextrose 50% Injectable 12.5 Gram(s) IV Push once  insulin lispro (ADMELOG) corrective regimen sliding scale   SubCutaneous every 6 hours  levothyroxine Injectable 20 MICROGram(s) IV Push at bedtime  meropenem  IVPB 1000 milliGRAM(s) IV Intermittent every 12 hours  meropenem  IVPB      methylPREDNISolone sodium succinate Injectable 10 milliGRAM(s) IV Push every 6 hours  metoclopramide Injectable 10 milliGRAM(s) IV Push every 8 hours  norepinephrine Infusion 0.05 MICROgram(s)/kG/Min (5.73 mL/Hr) IV Continuous <Continuous>  pantoprazole  Injectable 40 milliGRAM(s) IV Push daily  petrolatum Ophthalmic Ointment 1 Application(s) Both EYES two times a day  PrismaSATE Dialysate BGK 4 / 2.5 5000 milliLiter(s) (2200 mL/Hr) CRRT <Continuous>  PrismaSOL Filtration BGK 0 / 2.5 5000 milliLiter(s) (1600 mL/Hr) CRRT <Continuous>  PrismaSOL Filtration BGK 0 / 2.5 5000 milliLiter(s) (400 mL/Hr) CRRT <Continuous>    LABS:  03-31 Na134 mmol/L<L> Glu 222 mg/dL<H> K+ 4.0 mmol/L Cr  1.51 mg/dL<H> BUN 35 mg/dL<H>    CAPILLARY BLOOD GLUCOSE  POCT Blood Glucose.: 239 mg/dL (31 Mar 2023 11:47)  POCT Blood Glucose.: 231 mg/dL (31 Mar 2023 05:51)  POCT Blood Glucose.: 257 mg/dL (31 Mar 2023 01:54)  POCT Blood Glucose.: 308 mg/dL (30 Mar 2023 23:57)  POCT Blood Glucose.: 268 mg/dL (30 Mar 2023 17:45)    SKIN: R Nose tip DTI.    EDEMA: (3/31) 3+gen edema, (3/30) 4+ edema L/R Ankle/Foot/Arm/Wrist/Hand    Nutrient Needs:  IBW: 56.8 kg  1,420-1,704 kcal (25-30 kcal/kg)  113-142 gm (2-2.5 gm/kg)      New Nutrition Diagnosis:   Pt meets criteria for severe malnutrition in the setting of acute illness.   Moderate fluid accumulation, </50% of estimated energy requirement for >/5 days.                                           ~~~~~RECOMMEND~~~~~  1.  When able to initiate TF---Suggest Nepro Carb Steady @25 mL/hr, increase as tolerated to reach goal rate of 40 mL/hr to provide 1,728 kcal/77 gm.      Suggest add No Carb Prosource 4 packs daily (+ 60g additional pro)= total 137 gm pro.   2.  Monitor EN tolerance, skin integrity and pertinent labs.    3.  Please obtain current weight.  4. Reconsult nutrition as needed.        SUN Calderon RD, CDN, CDCES

## 2023-03-31 NOTE — CONSULT NOTE ADULT - ASSESSMENT
49yo F with PMH significant for HTN, hypothyroidism, breast cancer (s/p left mastectomy and chemotherapy, 2008) who presented with epigastric pain, found to have severe necrotizing pancreatitis. GI consulted for elevated liver tests.    # Severe necrotizing pancreatitis - with multiorgan failure, requiring pressor support. No mature collections to drain. Etiology remains unknown - it is possible patient passed a stone, but no clear evidence to suggest gallstone pancreatitis at this time.   # Elevated bilirubin - DDx includes most likely sequela or recent ischemic hepatitis vs. less likely cholangitis/choledocholithaisis. No evidence of stones on recent imaging and time course fits ischemia (lagging bilirubin after severe AST/ALT). Noted negative acute viral hepatitis studies. Given low suspicion for choledocholithiasis and overall improving clinical status, would plan for MRCP instead of EUS/ERCP.    Recommendations:  - MRCP  - Trend CMP, CBC, coags  - Rest of care per primary team  - Repeat CT in 1-2 weeks to assess maturity of collections    Please note that the recommendations are not final until attested by an attending.    Thank you for involving us in the care of this patient. Please reach out if any further questions.    Mateo Richmond, PGY-6  Gastroenterology/Hepatology Fellow    Available on [employer] Teams  After 5PM/Weekends, please contact the on-call GI fellow: [phone]

## 2023-03-31 NOTE — PROGRESS NOTE ADULT - PROBLEM SELECTOR PLAN 3
Pt with resolving/resolved hyperkalemia in setting of acute renal failure. Serum potassium of 4.4. Continue CRRT as above. Continue to monitor serum potassium and adjust dialysate accordingly.

## 2023-04-01 NOTE — PROGRESS NOTE ADULT - PROBLEM SELECTOR PLAN 1
Pt with anuric CHATO in the setting of necrotizing pancreatitis leading to shock, requiring vasopressors support and renal replacement therapy. Scr was 1.24 on 3/23/23 and increased to 4.60 on 3/24/23. Pt was initiated on CRRT/CVVHDF on 3/24. No issues with CRRT as per discussion with RN at bedside. Labs reviewed. Plan to continue CRRT/CVVHDF. Consider Renal US when more stable (no hydronephrosis noted on initial CT abd/pel). Monitor labs and urine output. Avoid nephrotoxins. Dose medications as per eGFR.

## 2023-04-01 NOTE — PROGRESS NOTE ADULT - ATTENDING COMMENTS
Respiratory failure  a.  Remains dependent on mechanical ventilation  b.  Trial of CPAP discontinued for tachypnea  c.  Consider tracheostomy  d.  Obtain ABG    CHATO  a.  Remains anuric overnight  b,  Continue CVVH with negative fluid balance (-100/ml) as tolerated    Malnutrition  a.  NPO  b.  NGT in place  c.  Toelrating  nephro TF at goal    Severe sepsis secondary to infected acute pancreatic necrosis/UTI   a.  Remains afebrile  b  WBC elevated to 35  c.  On meropenem, consider broadening coverage   d.  Culture prn    Hyperbilirubinema r/o obstruction vs shock liver  a.  CT, ultrasound reviewed. MRCP once stable  b.  Appreciate GI note

## 2023-04-01 NOTE — PROGRESS NOTE ADULT - PROBLEM SELECTOR PLAN 3
Pt with resolving/resolved hyperkalemia in setting of acute renal failure. Serum potassium of 3.7. Continue CRRT as above. Continue to monitor serum potassium and adjust dialysate accordingly.    If you have any questions, please feel free to contact me  Howard De La Fuente  Nephrology Fellow  [phone]/ [employer] Teams(Preferred)  (After 5pm or on weekends please page the on-call fellow).

## 2023-04-01 NOTE — PROGRESS NOTE ADULT - SUBJECTIVE AND OBJECTIVE BOX
Surgery Progress Note     24hour Events:     Subjective:  Patient seen and examined.       Vital Signs:  Vital Signs Last 24 Hrs  T(C): 37.8 (02 Apr 2023 16:15), Max: 38.3 (02 Apr 2023 12:00)  T(F): 100.1 (02 Apr 2023 16:15), Max: 100.9 (02 Apr 2023 12:00)  HR: 90 (02 Apr 2023 19:28) (89 - 137)  BP: 96/54 (02 Apr 2023 04:00) (96/54 - 104/64)  BP(mean): 67 (02 Apr 2023 04:00) (67 - 76)  RR: 19 (02 Apr 2023 18:30) (16 - 46)  SpO2: 100% (02 Apr 2023 19:28) (96% - 100%)    Parameters below as of 02 Apr 2023 18:00  Patient On (Oxygen Delivery Method): ventilator    O2 Concentration (%): 30    CAPILLARY BLOOD GLUCOSE      POCT Blood Glucose.: 175 mg/dL (02 Apr 2023 11:09)  POCT Blood Glucose.: 208 mg/dL (02 Apr 2023 05:08)  POCT Blood Glucose.: 200 mg/dL (02 Apr 2023 00:32)  POCT Blood Glucose.: 200 mg/dL (01 Apr 2023 20:22)      I&O's Detail    01 Apr 2023 07:01  -  02 Apr 2023 07:00  --------------------------------------------------------  IN:    Dexmedetomidine: 822.7 mL    IV PiggyBack: 100 mL    Nepro with Carb Steady: 780 mL    Norepinephrine: 132.4 mL  Total IN: 1835.1 mL    OUT:    Indwelling Catheter - Urethral (mL): 145 mL    Nasogastric/Oral tube (mL): 1300 mL    Other (mL): 2982 mL  Total OUT: 4427 mL    Total NET: -2591.9 mL      02 Apr 2023 07:01  -  02 Apr 2023 20:01  --------------------------------------------------------  IN:    Dexmedetomidine: 442.4 mL    IV PiggyBack: 50 mL    Norepinephrine: 230 mL  Total IN: 722.4 mL    OUT:    Indwelling Catheter - Urethral (mL): 155 mL    Nasogastric/Oral tube (mL): 650 mL    Other (mL): 768 mL  Total OUT: 1573 mL    Total NET: -850.6 mL            Physical Exam:  General: sedated  Respiratory: intubated on vent  Cardio: pulse present   Abdomen: distended, edematous  Vascular: extremities are warm and edematous       Labs:    04-02    136  |  103  |  29<H>  ----------------------------<  186<H>  3.7   |  22  |  1.50<H>    Ca    7.2<L>      02 Apr 2023 16:30  Phos  2.8     04-02  Mg     2.60     04-02    TPro  5.2<L>  /  Alb  2.3<L>  /  TBili  9.9<H>  /  DBili  x   /  AST  141<H>  /  ALT  220<H>  /  AlkPhos  218<H>  04-02    LIVER FUNCTIONS - ( 02 Apr 2023 16:30 )  Alb: 2.3 g/dL / Pro: 5.2 g/dL / ALK PHOS: 218 U/L / ALT: 220 U/L / AST: 141 U/L / GGT: x                                 7.2    28.90 )-----------( 216      ( 02 Apr 2023 16:30 )             21.6     PT/INR - ( 02 Apr 2023 16:30 )   PT: 15.7 sec;   INR: 1.35 ratio         PTT - ( 02 Apr 2023 16:30 )  PTT:25.9 sec

## 2023-04-01 NOTE — PROGRESS NOTE ADULT - SUBJECTIVE AND OBJECTIVE BOX
SICU Daily Progress Note  =====================================================    24 HR Events:  - fondupironx for DVT ppx  - CPAP during day, back to AC at night due to tachypnea  - Back on levo      MEDICATIONS:   --------------------------------------------------------------------------------------  Neurologic Medications  dexMEDEtomidine Infusion 0.207 MICROgram(s)/kG/Hr IV Continuous <Continuous>  HYDROmorphone  Injectable 0.5 milliGRAM(s) IV Push every 4 hours PRN Moderate Pain (4 - 6)  HYDROmorphone  Injectable 1 milliGRAM(s) IV Push every 4 hours PRN Severe Pain (7 - 10)  metoclopramide Injectable 10 milliGRAM(s) IV Push every 8 hours    Respiratory Medications    Cardiovascular Medications  enalaprilat Injectable 1.25 milliGRAM(s) IV Push every 6 hours  norepinephrine Infusion 0.05 MICROgram(s)/kG/Min IV Continuous <Continuous>    Gastrointestinal Medications  dextrose 5%. 1000 milliLiter(s) IV Continuous <Continuous>  dextrose 5%. 1000 milliLiter(s) IV Continuous <Continuous>  pantoprazole  Injectable 40 milliGRAM(s) IV Push daily  polyethylene glycol 3350 17 Gram(s) Oral at bedtime    Genitourinary Medications    Hematologic/Oncologic Medications  fondaparinux Injectable 1.5 milliGRAM(s) SubCutaneous daily    Antimicrobial/Immunologic Medications  meropenem  IVPB 1000 milliGRAM(s) IV Intermittent every 12 hours  meropenem  IVPB        Endocrine/Metabolic Medications  dextrose 50% Injectable 12.5 Gram(s) IV Push once  dextrose 50% Injectable 25 Gram(s) IV Push once  dextrose 50% Injectable 25 Gram(s) IV Push once  dextrose 50% Injectable 12.5 Gram(s) IV Push once  dextrose 50% Injectable 25 Gram(s) IV Push once  dextrose Oral Gel 15 Gram(s) Oral once PRN Blood Glucose LESS THAN 70 milliGRAM(s)/deciliter  glucagon  Injectable 1 milliGRAM(s) IntraMuscular once  insulin glargine Injectable (LANTUS) 9 Unit(s) SubCutaneous at bedtime  insulin lispro (ADMELOG) corrective regimen sliding scale   SubCutaneous every 6 hours  insulin lispro Injectable (ADMELOG) 2 Unit(s) SubCutaneous every 6 hours  levothyroxine Injectable 20 MICROGram(s) IV Push at bedtime  methylPREDNISolone sodium succinate Injectable 10 milliGRAM(s) IV Push every 6 hours    Topical/Other Medications  chlorhexidine 0.12% Liquid 15 milliLiter(s) Oral Mucosa every 12 hours  chlorhexidine 2% Cloths 1 Application(s) Topical daily  CRRT Treatment    <Continuous>  petrolatum Ophthalmic Ointment 1 Application(s) Both EYES two times a day  PrismaSATE Dialysate BGK 4 / 2.5 5000 milliLiter(s) CRRT <Continuous>  PrismaSOL Filtration BGK 0 / 2.5 5000 milliLiter(s) CRRT <Continuous>  PrismaSOL Filtration BGK 0 / 2.5 5000 milliLiter(s) CRRT <Continuous>    --------------------------------------------------------------------------------------    VITAL SIGNS, INS/OUTS (last 24 hours):  --------------------------------------------------------------------------------------    03-30-23 @ 07:01  -  03-31-23 @ 07:00  --------------------------------------------------------  IN: 1021.2 mL / OUT: 521 mL / NET: 500.2 mL    03-31-23 @ 07:01  -  04-01-23 @ 01:50  --------------------------------------------------------  IN: 1056.7 mL / OUT: 1988 mL / NET: -931.3 mL      --------------------------------------------------------------------------------------    EXAM  NEUROLOGY  Exam: Sedated    RESPIRATORY  Exam: Intubated on volume AC, mechanical ventilation  Mode: AC/ CMV (Assist Control/ Continuous Mandatory Ventilation)  RR (machine): 20  TV (machine): 350  FiO2: 40  PEEP: 5  ITime: 0.78  MAP: 14  PIP: 30    CARDIOVASCULAR  Exam: Regular rate and rhythm.       GI/NUTRITION  Exam: Abdomen slightly firm, distended  Current Diet: NPO, TF held      METABOLIC/FLUIDS/ELECTROLYTES  dextrose 5%. 1000 milliLiter(s) IV Continuous <Continuous>  dextrose 5%. 1000 milliLiter(s) IV Continuous <Continuous>      HEMATOLOGIC  [x] VTE Prophylaxis: fondaparinux Injectable 1.5 milliGRAM(s) SubCutaneous daily        LABS                          7.8    34.35 )-----------( 97       ( 31 Mar 2023 20:00 )             23.6     03-31    x   |  x   |  x   ----------------------------<  216<H>  x    |  x   |  x     Ca    7.1<L>      31 Mar 2023 20:00  Phos  2.6     03-31  Mg     2.60     03-31    TPro  4.8<L>  /  Alb  2.3<L>  /  TBili  11.6<H>  /  DBili  x   /  AST  243<H>  /  ALT  392<H>  /  AlkPhos  232<H>  03-31    PT/INR - ( 31 Mar 2023 20:00 )   PT: 14.6 sec;   INR: 1.26 ratio         PTT - ( 31 Mar 2023 20:00 )  PTT:25.3 sec    --------------------------------------------------------------------------------------    T(C): 36.6 (04-01-23 @ 00:00), Max: 37.1 (03-31-23 @ 04:00)  HR: 91 (04-01-23 @ 01:00) (75 - 115)  BP: 80/49 (03-31-23 @ 20:30) (80/49 - 94/67)  RR: 23 (04-01-23 @ 01:00) (14 - 39)  SpO2: 100% (04-01-23 @ 01:00) (96% - 100%)    --------------------------------------------------------------------------------------

## 2023-04-01 NOTE — PROGRESS NOTE ADULT - ATTENDING COMMENTS
1. CHATO - remains on CRRT.  Repeat phosphorus with 4/2 labs.  Adjust CRRT fluids to labs.  Monitor urine outputs.  2. Metabolic acidosis - monitor with CRRT  3. Hyperkalemia - improved with CRRT.  Further recommendations per clinical course.

## 2023-04-01 NOTE — PROGRESS NOTE ADULT - SUBJECTIVE AND OBJECTIVE BOX
[facility] DIVISION OF KIDNEY DISEASES AND HYPERTENSION -- FOLLOW UP NOTE  --------------------------------------------------------------------------------    PAST HISTORY  --------------------------------------------------------------------------------  No significant changes to PMH, PSH, FHx, SHx, unless otherwise noted    ALLERGIES & MEDICATIONS  --------------------------------------------------------------------------------  Allergies    No Known Allergies    Intolerances    Standing Inpatient Medications  chlorhexidine 0.12% Liquid 15 milliLiter(s) Oral Mucosa every 12 hours  chlorhexidine 2% Cloths 1 Application(s) Topical daily  CRRT Treatment    <Continuous>  dexMEDEtomidine Infusion 0.207 MICROgram(s)/kG/Hr IV Continuous <Continuous>  dextrose 50% Injectable 25 Gram(s) IV Push once  dextrose 50% Injectable 12.5 Gram(s) IV Push once  enalaprilat Injectable 1.25 milliGRAM(s) IV Push every 6 hours  fondaparinux Injectable 1.5 milliGRAM(s) SubCutaneous daily  insulin glargine Injectable (LANTUS) 9 Unit(s) SubCutaneous at bedtime  insulin lispro (ADMELOG) corrective regimen sliding scale   SubCutaneous every 6 hours  insulin lispro Injectable (ADMELOG) 2 Unit(s) SubCutaneous every 6 hours  levothyroxine Injectable 20 MICROGram(s) IV Push at bedtime  meropenem  IVPB 1000 milliGRAM(s) IV Intermittent every 12 hours  meropenem  IVPB      methylPREDNISolone sodium succinate Injectable 10 milliGRAM(s) IV Push every 6 hours  metoclopramide Injectable 10 milliGRAM(s) IV Push every 8 hours  norepinephrine Infusion 0.05 MICROgram(s)/kG/Min IV Continuous <Continuous>  pantoprazole  Injectable 40 milliGRAM(s) IV Push daily  petrolatum Ophthalmic Ointment 1 Application(s) Both EYES two times a day  polyethylene glycol 3350 17 Gram(s) Oral at bedtime  PrismaSATE Dialysate BGK 4 / 2.5 5000 milliLiter(s) CRRT <Continuous>  PrismaSOL Filtration BGK 0 / 2.5 5000 milliLiter(s) CRRT <Continuous>  PrismaSOL Filtration BGK 0 / 2.5 5000 milliLiter(s) CRRT <Continuous>    PRN Inpatient Medications  HYDROmorphone  Injectable 0.5 milliGRAM(s) IV Push every 4 hours PRN  HYDROmorphone  Injectable 1 milliGRAM(s) IV Push every 4 hours PRN    REVIEW OF SYSTEMS  --------------------------------------------------------------------------------  Unable to obtain ROS     VITALS/PHYSICAL EXAM  --------------------------------------------------------------------------------  T(C): 37.6 (04-01-23 @ 12:00), Max: 37.6 (04-01-23 @ 04:00)  HR: 116 (04-01-23 @ 12:00) (75 - 116)  BP: 85/49 (04-01-23 @ 02:20) (80/49 - 94/67)  RR: 33 (04-01-23 @ 12:00) (14 - 39)  SpO2: 100% (04-01-23 @ 12:00) (96% - 100%)  Wt(kg): --    03-31-23 @ 07:01  -  04-01-23 @ 07:00  --------------------------------------------------------  IN: 1552 mL / OUT: 2964 mL / NET: -1412 mL    04-01-23 @ 07:01  -  04-01-23 @ 12:48  --------------------------------------------------------  IN: 329.9 mL / OUT: 539 mL / NET: -209.1 mL    Physical Exam:  	    LABS/STUDIES  --------------------------------------------------------------------------------              8.3    32.72 >-----------<  135      [04-01-23 @ 12:00]              24.8     134  |  101  |  30  ----------------------------<  211      [04-01-23 @ 12:00]  3.7   |  23  |  1.22        Ca     7.3     [04-01-23 @ 12:00]      Mg     2.60     [04-01-23 @ 12:00]      Phos  2.4     [04-01-23 @ 12:00]    TPro  5.4  /  Alb  2.4  /  TBili  11.6  /  DBili  x   /  AST  196  /  ALT  345  /  AlkPhos  251  [04-01-23 @ 12:00]    PT/INR: PT 14.5 , INR 1.25       [04-01-23 @ 04:00]  PTT: 24.6       [04-01-23 @ 04:00]    Creatinine Trend:  SCr 1.22 [04-01 @ 12:00]  SCr 1.28 [04-01 @ 04:00]  SCr 1.35 [03-31 @ 20:00]  SCr 1.51 [03-31 @ 12:00]  SCr 1.80 [03-31 @ 04:00]    Urinalysis - [03-24-23 @ 05:30]      Color Yellow / Appearance Clear / SG 1.020 / pH 5.0      Gluc 250 / Ketone Trace  / Bili Negative / Urobili Negative       Blood Moderate / Protein 100 / Leuk Est Trace / Nitrite Negative      RBC 0-2 / WBC 6-10 / Hyaline  / Gran  / Sq Epi  / Non Sq Epi Moderate / Bacteria Many    Lipid: chol --, TG 74, HDL --, LDL --      [03-24-23 @ 08:47]    HBsAg Nonreact      [03-27-23 @ 00:35]  HCV 0.05, Nonreact      [03-27-23 @ 00:35]  HIV Nonreact      [03-30-23 @ 20:10] [facility] DIVISION OF KIDNEY DISEASES AND HYPERTENSION -- FOLLOW UP NOTE  --------------------------------------------------------------------------------  Chief Complaint: Anuric CHATO requiring renal replacement therapy     24 hour events/subjective: Pt. was seen and examined in the SICU. She remains intubated, sedated and requiring IV vasopressors. Remains on CRRT. Unable to obtain further ROS.     PAST HISTORY  --------------------------------------------------------------------------------  No significant changes to PMH, PSH, FHx, SHx, unless otherwise noted    ALLERGIES & MEDICATIONS  --------------------------------------------------------------------------------  Allergies    No Known Allergies    Intolerances    Standing Inpatient Medications  chlorhexidine 0.12% Liquid 15 milliLiter(s) Oral Mucosa every 12 hours  chlorhexidine 2% Cloths 1 Application(s) Topical daily  CRRT Treatment    <Continuous>  dexMEDEtomidine Infusion 0.207 MICROgram(s)/kG/Hr IV Continuous <Continuous>  dextrose 50% Injectable 25 Gram(s) IV Push once  dextrose 50% Injectable 12.5 Gram(s) IV Push once  enalaprilat Injectable 1.25 milliGRAM(s) IV Push every 6 hours  fondaparinux Injectable 1.5 milliGRAM(s) SubCutaneous daily  insulin glargine Injectable (LANTUS) 9 Unit(s) SubCutaneous at bedtime  insulin lispro (ADMELOG) corrective regimen sliding scale   SubCutaneous every 6 hours  insulin lispro Injectable (ADMELOG) 2 Unit(s) SubCutaneous every 6 hours  levothyroxine Injectable 20 MICROGram(s) IV Push at bedtime  meropenem  IVPB 1000 milliGRAM(s) IV Intermittent every 12 hours  meropenem  IVPB      methylPREDNISolone sodium succinate Injectable 10 milliGRAM(s) IV Push every 6 hours  metoclopramide Injectable 10 milliGRAM(s) IV Push every 8 hours  norepinephrine Infusion 0.05 MICROgram(s)/kG/Min IV Continuous <Continuous>  pantoprazole  Injectable 40 milliGRAM(s) IV Push daily  petrolatum Ophthalmic Ointment 1 Application(s) Both EYES two times a day  polyethylene glycol 3350 17 Gram(s) Oral at bedtime  PrismaSATE Dialysate BGK 4 / 2.5 5000 milliLiter(s) CRRT <Continuous>  PrismaSOL Filtration BGK 0 / 2.5 5000 milliLiter(s) CRRT <Continuous>  PrismaSOL Filtration BGK 0 / 2.5 5000 milliLiter(s) CRRT <Continuous>    PRN Inpatient Medications  HYDROmorphone  Injectable 0.5 milliGRAM(s) IV Push every 4 hours PRN  HYDROmorphone  Injectable 1 milliGRAM(s) IV Push every 4 hours PRN    REVIEW OF SYSTEMS  --------------------------------------------------------------------------------  Unable to obtain ROS     VITALS/PHYSICAL EXAM  --------------------------------------------------------------------------------  T(C): 37.6 (04-01-23 @ 12:00), Max: 37.6 (04-01-23 @ 04:00)  HR: 116 (04-01-23 @ 12:00) (75 - 116)  BP: 85/49 (04-01-23 @ 02:20) (80/49 - 94/67)  RR: 33 (04-01-23 @ 12:00) (14 - 39)  SpO2: 100% (04-01-23 @ 12:00) (96% - 100%)  Wt(kg): --    03-31-23 @ 07:01  -  04-01-23 @ 07:00  --------------------------------------------------------  IN: 1552 mL / OUT: 2964 mL / NET: -1412 mL    04-01-23 @ 07:01  -  04-01-23 @ 12:48  --------------------------------------------------------  IN: 329.9 mL / OUT: 539 mL / NET: -209.1 mL    Physical Exam:  	Gen: Ill appearing, intubated, sedated   	HEENT: +ETT  	Pulm: Mechanical breath sounds  	CV: RRR  	Abd: Obese, distended  	Ext: + LE edema B/L. Trace B/L UE edema   	Neuro: Sedated, not following commands               : Babin+ with small amount of dark urine in the bag  	Skin: Warm and dry              Dialysis access: L IJ non tunneled HD catheter     LABS/STUDIES  --------------------------------------------------------------------------------              8.3    32.72 >-----------<  135      [04-01-23 @ 12:00]              24.8     134  |  101  |  30  ----------------------------<  211      [04-01-23 @ 12:00]  3.7   |  23  |  1.22        Ca     7.3     [04-01-23 @ 12:00]      Mg     2.60     [04-01-23 @ 12:00]      Phos  2.4     [04-01-23 @ 12:00]    TPro  5.4  /  Alb  2.4  /  TBili  11.6  /  DBili  x   /  AST  196  /  ALT  345  /  AlkPhos  251  [04-01-23 @ 12:00]    PT/INR: PT 14.5 , INR 1.25       [04-01-23 @ 04:00]  PTT: 24.6       [04-01-23 @ 04:00]    Creatinine Trend:  SCr 1.22 [04-01 @ 12:00]  SCr 1.28 [04-01 @ 04:00]  SCr 1.35 [03-31 @ 20:00]  SCr 1.51 [03-31 @ 12:00]  SCr 1.80 [03-31 @ 04:00]    Urinalysis - [03-24-23 @ 05:30]      Color Yellow / Appearance Clear / SG 1.020 / pH 5.0      Gluc 250 / Ketone Trace  / Bili Negative / Urobili Negative       Blood Moderate / Protein 100 / Leuk Est Trace / Nitrite Negative      RBC 0-2 / WBC 6-10 / Hyaline  / Gran  / Sq Epi  / Non Sq Epi Moderate / Bacteria Many    Lipid: chol --, TG 74, HDL --, LDL --      [03-24-23 @ 08:47]    HBsAg Nonreact      [03-27-23 @ 00:35]  HCV 0.05, Nonreact      [03-27-23 @ 00:35]  HIV Nonreact      [03-30-23 @ 20:10]

## 2023-04-01 NOTE — PROGRESS NOTE ADULT - ATTENDING COMMENTS
I have examined this patient, reviewed pertinent labs and imaging on SICU rounds.    50   minutes in total were spent in providing direct critical care for the diagnoses, assessment and plan outlined below.  This patient suffers from a critical illness that acutely impairs one or more vital organ systems such that there is a high probability of life threatening or imminent deterioration of the patient's condition.  These diagnoses are unrelated to the surgical procedure.    Additionally, time spent in teaching or the performance of separately billable procedures was not counted toward my critical care time.  There is no overlap.  Time included review of vitals, labs.    Reason for encounter:  gallstone panc necrotizing with sequelae  septic shock  acute anuric renal failure  HIT  malnutrition  elevated LFTs concerning for obstructive jaundice    48F HTN, hypothyroidism, found to have necrotizing pancreatitis. Transferred from [facility] ([place]) for hypotension and tachypnea requiring emergent intubation and multiple pressors. SICU consulted for hemodynamic monitoring and respiratory management.    MEDICATIONS  (STANDING):  chlorhexidine 0.12% Liquid 15 milliLiter(s) Oral Mucosa every 12 hours  chlorhexidine 2% Cloths 1 Application(s) Topical daily  CRRT Treatment    <Continuous>  dexMEDEtomidine Infusion 0.207 MICROgram(s)/kG/Hr (6.11 mL/Hr) IV Continuous <Continuous>  dextrose 50% Injectable 25 Gram(s) IV Push once  dextrose 50% Injectable 12.5 Gram(s) IV Push once  enalaprilat Injectable 1.25 milliGRAM(s) IV Push every 6 hours  fondaparinux Injectable 1.5 milliGRAM(s) SubCutaneous daily  insulin glargine Injectable (LANTUS) 9 Unit(s) SubCutaneous at bedtime  insulin lispro (ADMELOG) corrective regimen sliding scale   SubCutaneous every 6 hours  insulin lispro Injectable (ADMELOG) 2 Unit(s) SubCutaneous every 6 hours  levothyroxine Injectable 20 MICROGram(s) IV Push at bedtime  meropenem  IVPB 1000 milliGRAM(s) IV Intermittent every 12 hours  meropenem  IVPB      methylPREDNISolone sodium succinate Injectable 10 milliGRAM(s) IV Push every 6 hours  metoclopramide Injectable 10 milliGRAM(s) IV Push every 8 hours  norepinephrine Infusion 0.05 MICROgram(s)/kG/Min (5.73 mL/Hr) IV Continuous <Continuous>  pantoprazole  Injectable 40 milliGRAM(s) IV Push daily  petrolatum Ophthalmic Ointment 1 Application(s) Both EYES two times a day  polyethylene glycol 3350 17 Gram(s) Oral at bedtime  PrismaSATE Dialysate BGK 4 / 2.5 5000 milliLiter(s) (2200 mL/Hr) CRRT <Continuous>  PrismaSOL Filtration BGK 0 / 2.5 5000 milliLiter(s) (1600 mL/Hr) CRRT <Continuous>  PrismaSOL Filtration BGK 0 / 2.5 5000 milliLiter(s) (400 mL/Hr) CRRT <Continuous>    MEDICATIONS  (PRN):  HYDROmorphone  Injectable 0.5 milliGRAM(s) IV Push every 4 hours PRN Moderate Pain (4 - 6)  HYDROmorphone  Injectable 1 milliGRAM(s) IV Push every 4 hours PRN Severe Pain (7 - 10)                        8.3    32.72 )-----------( 135      ( 01 Apr 2023 12:00 )             24.8   04-01    134<L>  |  101  |  30<H>  ----------------------------<  211<H>  3.7   |  23  |  1.22    Ca    7.3<L>      01 Apr 2023 12:00  Phos  2.4     04-01  Mg     2.60     04-01    TPro  5.4<L>  /  Alb  2.4<L>  /  TBili  11.6<H>  /  DBili  x   /  AST  196<H>  /  ALT  345<H>  /  AlkPhos  251<H>  04-01    CXR reviewed    MRCP pending (spoke with MRI tech but they are backed up with ED patients)

## 2023-04-01 NOTE — PROGRESS NOTE ADULT - ASSESSMENT
Assessment:   Patient is a 48 year old female with PMHx significant for HTN, hypothyroidism,  30 years ago, breast cancer s/p left mastectomy and chemo in  presenting with epigastric pain and LUQ pain. Found to have necrotizing pancreatitis. Transferred from [facility] ([place]) for hypotension and tachypnea requiring emergent intubation and multiple pressors. SICU consulted for hemodynamic monitoring and respiratory management.    PLAN:  Neuro  Intubated and sedated for clinical course; No acute neurological issues   - CPAP trial  - Wean precedex  - Dc fentanyl  - Dilaudid prn    Resp:  AHRF w/ b/l pleff 2/2 necrotizing pancreatitis   -Volume control 20/350/5/40    CV  Mixed shock state 2/2 septic shock w/ E. Coli in urine vs hypovolemic shock now s/p 11L resus    - c/w levo, wean as tolerated as below   - Treat ID as below     GI:   #Necrotizing pancreatitis 2/2 gallstones  Diagnostics  CT 3/27: >50%of the pancreatic head  extends into the mesentery and inferiorly along the retroperitoneum into the lower abdomen  RUQ US: Chololithiasis (2cm stone) w/ no acute frannie     Therapeutics:   - Plan for interval CT 3/28 or 3/29   - Continue TF (Nepro) at rate of 30cc/hr  - GI ppx w/ protonix   - Reglan  - Docolax    #Shocked Liver + Hyperbilirubinemia   Likely 2/2 to shocked state w/ LFTs now trending down w/ bili following protracted course as expected but yet to peak. Possible concern for acute obstructive biliary process.   - Pending MRCP  - Monitor LFTs     Renal:  ARF 2/2 shock state resulting in ATN requiring CRRT    - Continue CRRT net negative  as tolerated   - Babin catheter in place    Heme:   Anemia likely 2/2 disease course vs suppression; No active bleeding reported   Diagnostics:   - CBC q8     Therapeutics:   - received 1U PRBC 3/27   - SCDs and argatroban for DVT ppx    #Thromboctopenia  Likely suppression 2/2 infection vs shock state  - HIT Ab positive    #Splenic Vein Thrombosis  Diagnostics:  3/22 CT Abd w/ contrast: Focal filling defect in the splenic vein at the level of the body of   the pancreas for which an underlying thrombus is suspected     ID:   Intermittently febrile w/ leukocytosis c/f septic shock 2/2 E coli UTI  Diagnostics:  - 3/22 Ucx: E coli  - 3/24 Bcx: negative     Therapeutics:   - Meropenem (3/26-)    Endo:   - Continue Solu-medrol 10 q6h until ; will taper  - c/w synthroid 20 IV (home med)   - ISS  - Monitor glucose with metabolic panel    Code Status: Full code    Disposition: SICU

## 2023-04-02 NOTE — PROGRESS NOTE ADULT - ATTENDING COMMENTS
1. CHATO- continues on CRRT.  Will adjust fluids for low phosphorus.  2. Metabolic acidosis - improved.  Monitor on current regimen.  3. Hyperkalemia- monitor serial labs on current CRRT regimen.  4. Hyponatremia - repeat labs 4/3 with continued attempts at net negative balance via CRRT

## 2023-04-02 NOTE — PROGRESS NOTE ADULT - SUBJECTIVE AND OBJECTIVE BOX
[facility] DIVISION OF KIDNEY DISEASES AND HYPERTENSION -- FOLLOW UP NOTE  --------------------------------------------------------------------------------  Chief Complaint: Anuric CHATO requiring renal replacement therapy     24 hour events/subjective: Pt. was seen and examined in the SICU. She remains intubated, sedated and requiring IV vasopressors. Remains on CRRT. Unable to obtain further ROS.     PAST HISTORY  --------------------------------------------------------------------------------  No significant changes to PMH, PSH, FHx, SHx, unless otherwise noted    ALLERGIES & MEDICATIONS  --------------------------------------------------------------------------------  Allergies    No Known Allergies    Intolerances    Standing Inpatient Medications  chlorhexidine 0.12% Liquid 15 milliLiter(s) Oral Mucosa every 12 hours  chlorhexidine 2% Cloths 1 Application(s) Topical daily  CRRT Treatment    <Continuous>  dexMEDEtomidine Infusion 0.207 MICROgram(s)/kG/Hr IV Continuous <Continuous>  dextrose 50% Injectable 25 Gram(s) IV Push once  dextrose 50% Injectable 12.5 Gram(s) IV Push once  enalaprilat Injectable 1.25 milliGRAM(s) IV Push every 6 hours  fondaparinux Injectable 1.5 milliGRAM(s) SubCutaneous daily  insulin glargine Injectable (LANTUS) 9 Unit(s) SubCutaneous at bedtime  insulin lispro (ADMELOG) corrective regimen sliding scale   SubCutaneous every 6 hours  insulin lispro Injectable (ADMELOG) 2 Unit(s) SubCutaneous every 6 hours  levothyroxine Injectable 20 MICROGram(s) IV Push at bedtime  meropenem  IVPB 1000 milliGRAM(s) IV Intermittent every 12 hours  meropenem  IVPB      methylPREDNISolone sodium succinate Injectable 10 milliGRAM(s) IV Push every 6 hours  metoclopramide Injectable 10 milliGRAM(s) IV Push every 8 hours  norepinephrine Infusion 0.05 MICROgram(s)/kG/Min IV Continuous <Continuous>  pantoprazole  Injectable 40 milliGRAM(s) IV Push every 12 hours  petrolatum Ophthalmic Ointment 1 Application(s) Both EYES two times a day  polyethylene glycol 3350 17 Gram(s) Oral at bedtime  PrismaSATE Dialysate BGK 4 / 2.5 5000 milliLiter(s) CRRT <Continuous>  PrismaSOL Filtration BGK 0 / 2.5 5000 milliLiter(s) CRRT <Continuous>  PrismaSOL Filtration BGK 0 / 2.5 5000 milliLiter(s) CRRT <Continuous>    PRN Inpatient Medications  acetaminophen     Tablet .. 650 milliGRAM(s) Oral every 6 hours PRN  HYDROmorphone  Injectable 0.5 milliGRAM(s) IV Push every 4 hours PRN  HYDROmorphone  Injectable 1 milliGRAM(s) IV Push every 4 hours PRN    REVIEW OF SYSTEMS  --------------------------------------------------------------------------------  Unable to obtain ROS     VITALS/PHYSICAL EXAM  --------------------------------------------------------------------------------  T(C): 37.8 (04-02-23 @ 04:00), Max: 38 (04-02-23 @ 00:00)  HR: 94 (04-02-23 @ 06:00) (87 - 137)  BP: 96/54 (04-02-23 @ 04:00) (96/54 - 115/53)  RR: 25 (04-02-23 @ 06:00) (20 - 46)  SpO2: 98% (04-02-23 @ 06:00) (96% - 100%)  Wt(kg): --    04-01-23 @ 07:01  -  04-02-23 @ 07:00  --------------------------------------------------------  IN: 1766.6 mL / OUT: 4330 mL / NET: -2563.4 mL    Physical Exam:  	Gen: Ill appearing, intubated, sedated   	HEENT: +ETT  	Pulm: Mechanical breath sounds  	CV: RRR  	Abd: Obese, distended  	Ext: + LE edema B/L. Trace B/L UE edema   	Neuro: Sedated, not following commands               : Babin+ with small amount of dark urine in the bag  	Skin: Warm and dry              Dialysis access: L IJ non tunneled HD catheter     LABS/STUDIES  --------------------------------------------------------------------------------              7.9    33.44 >-----------<  153      [04-01-23 @ 20:24]              23.6     131  |  98  |  27  ----------------------------<  202      [04-02-23 @ 05:11]  3.6   |  22  |  1.31        Ca     7.5     [04-02-23 @ 05:11]      Mg     2.50     [04-02-23 @ 05:11]      Phos  2.3     [04-02-23 @ 05:11]    TPro  5.4  /  Alb  2.4  /  TBili  11.4  /  DBili  x   /  AST  163  /  ALT  272  /  AlkPhos  249  [04-02-23 @ 05:11]    PT/INR: PT 15.3 , INR 1.32       [04-02-23 @ 05:11]  PTT: 25.2       [04-02-23 @ 05:11]    Creatinine Trend:  SCr 1.31 [04-02 @ 05:11]  SCr 1.41 [04-01 @ 20:24]  SCr 1.22 [04-01 @ 12:00]  SCr 1.28 [04-01 @ 04:00]  SCr 1.35 [03-31 @ 20:00]    Urinalysis - [03-24-23 @ 05:30]      Color Yellow / Appearance Clear / SG 1.020 / pH 5.0      Gluc 250 / Ketone Trace  / Bili Negative / Urobili Negative       Blood Moderate / Protein 100 / Leuk Est Trace / Nitrite Negative      RBC 0-2 / WBC 6-10 / Hyaline  / Gran  / Sq Epi  / Non Sq Epi Moderate / Bacteria Many    Lipid: chol --, TG 74, HDL --, LDL --      [03-24-23 @ 08:47]    HBsAg Nonreact      [03-27-23 @ 00:35]  HCV 0.05, Nonreact      [03-27-23 @ 00:35]  HIV Nonreact      [03-30-23 @ 20:10]

## 2023-04-02 NOTE — PROGRESS NOTE ADULT - PROBLEM SELECTOR PLAN 3
Pt with resolving/resolved hyperkalemia in setting of acute renal failure. Serum potassium of 3.6. Continue CRRT as above. Continue to monitor serum potassium and adjust dialysate accordingly.    If you have any questions, please feel free to contact me  Howard De La Fuente  Nephrology Fellow  [phone]/ [employer] Teams(Preferred)  (After 5pm or on weekends please page the on-call fellow).

## 2023-04-02 NOTE — PROGRESS NOTE ADULT - ATTENDING COMMENTS
Respiratory failure  a.  Remains dependent on mechanical ventilation  b.  Trial of CPAP discontinued for tachypnea  c.  Consider tracheostomy  d.  Obtain ABG    CHATO with hypoNa, HypoK  a.  Remains anuric overnight  b,  Continue CVVH with negative fluid balance (-100/ml) as tolerated  c.  Replete K  Malnutrition  a.  NPO  b.  NGT in place  c.  Tolerating  nephro TF at goal    Severe sepsis secondary to infected acute pancreatic necrosis/UTI   a.  Remains afebrile  b  WBC elevated to 35  c.  On meropenem, consider broadening coverage   d.  Culture prn    Hyperbilirubinemia r/o obstruction vs shock liver  a.  TB remains persistently elevated at 11  b.  MRCP vs HIDA  c,  Discuss with GI

## 2023-04-02 NOTE — PROGRESS NOTE ADULT - ASSESSMENT
48 year old female with necrotizing pancreatitis. Intubated and hemodynamically unstable requiring pressors. CT 3/28 w/ ill-defined collections.    PLAN:  - recommend MRCP when stable due to elevated Tbili and poorly visualized CBD on RUQ US  - IV abx  - NPO/IVF/post pyloric TF  - wean pressors, vent, sedation as tolerated  - CVVHD  - dvt ppx   - appreciate SICU care    B Team Surgery  [id number]

## 2023-04-02 NOTE — PROGRESS NOTE ADULT - ATTENDING COMMENTS
I have examined this patient, reviewed pertinent labs and imaging on SICU rounds.    70   minutes in total were spent in providing direct critical care for the diagnoses, assessment and plan outlined below.  This patient suffers from a critical illness that acutely impairs one or more vital organ systems such that there is a high probability of life threatening or imminent deterioration of the patient's condition.  These diagnoses are unrelated to the surgical procedure.    Additionally, time spent in teaching or the performance of separately billable procedures was not counted toward my critical care time.  There is no overlap.  Time included review of vitals, labs, imaging, discussion with consultants/surrogate decision-makers, and coordination with the operating room/emergency department.    Reason for encounter:  septic shock  resp failure  renal failure  intestinal failure  jaundice    48F with PMHx significant for HTN, hypothyroidism,found to have necrotizing pancreatitis 2/2 gallstones. Transferred from [facility] ([place]) for hypotension and tachypnea requiring emergent intubation and multiple pressors. SICU consulted for hemodynamic monitoring and respiratory management. Vomited overnight, LFTs worsening.    MEDICATIONS  (STANDING):  chlorhexidine 0.12% Liquid 15 milliLiter(s) Oral Mucosa every 12 hours  chlorhexidine 2% Cloths 1 Application(s) Topical daily  CRRT Treatment    <Continuous>  dexMEDEtomidine Infusion 0.207 MICROgram(s)/kG/Hr (6.11 mL/Hr) IV Continuous <Continuous>  dextrose 50% Injectable 25 Gram(s) IV Push once  dextrose 50% Injectable 12.5 Gram(s) IV Push once  enalaprilat Injectable 1.25 milliGRAM(s) IV Push every 6 hours  fondaparinux Injectable 1.5 milliGRAM(s) SubCutaneous daily  insulin glargine Injectable (LANTUS) 9 Unit(s) SubCutaneous at bedtime  insulin lispro (ADMELOG) corrective regimen sliding scale   SubCutaneous every 6 hours  insulin lispro Injectable (ADMELOG) 2 Unit(s) SubCutaneous every 6 hours  levothyroxine Injectable 20 MICROGram(s) IV Push at bedtime  meropenem  IVPB 1000 milliGRAM(s) IV Intermittent every 12 hours  meropenem  IVPB      methylPREDNISolone sodium succinate Injectable 10 milliGRAM(s) IV Push every 6 hours  metoclopramide Injectable 10 milliGRAM(s) IV Push every 8 hours  norepinephrine Infusion 0.05 MICROgram(s)/kG/Min (5.73 mL/Hr) IV Continuous <Continuous>  pantoprazole  Injectable 40 milliGRAM(s) IV Push daily  petrolatum Ophthalmic Ointment 1 Application(s) Both EYES two times a day  polyethylene glycol 3350 17 Gram(s) Oral at bedtime  PrismaSATE Dialysate BGK 4 / 2.5 5000 milliLiter(s) (2200 mL/Hr) CRRT <Continuous>  PrismaSOL Filtration BGK 0 / 2.5 5000 milliLiter(s) (400 mL/Hr) CRRT <Continuous>  PrismaSOL Filtration BGK 4 / 2.5 5000 milliLiter(s) (1600 mL/Hr) CRRT <Continuous>    MEDICATIONS  (PRN):  HYDROmorphone  Injectable 0.5 milliGRAM(s) IV Push every 4 hours PRN Moderate Pain (4 - 6)  HYDROmorphone  Injectable 1 milliGRAM(s) IV Push every 4 hours PRN Severe Pain (7 - 10)                        7.8    33.33 )-----------( 197      ( 02 Apr 2023 06:11 )             23.4   04-02    131<L>  |  98  |  27<H>  ----------------------------<  202<H>  3.6   |  22  |  1.31<H>    Ca    7.5<L>      02 Apr 2023 05:11  Phos  2.3     04-02  Mg     2.50     04-02    TPro  5.4<L>  /  Alb  2.4<L>  /  TBili  11.4<H>  /  DBili  x   /  AST  163<H>  /  ALT  272<H>  /  AlkPhos  249<H>  04-02    CXR reviewed    PLAN:

## 2023-04-02 NOTE — PROGRESS NOTE ADULT - ASSESSMENT
Patient is a 48 year old female with PMHx significant for HTN, hypothyroidism,  30 years ago, breast cancer s/p left mastectomy and chemo in  presenting with epigastric pain and LUQ pain. Found to have necrotizing pancreatitis. Transferred from OSH ([place]) for hypotension and tachypnea requiring emergent intubation and multiple pressors. SICU consulted for hemodynamic monitoring and respiratory management.    PLAN:  Neuro  Intubated and sedated for clinical course; No acute neurological issues   - Wean precedex  - Dilaudid prn    Resp:  AHRF w/ b/l pleff 2/2 necrotizing pancreatitis   -Volume control 20/350/5/40  -CPAP as tolerated     CV  Mixed shock state 2/2 septic shock w/ E. Coli in urine vs hypovolemic shock now s/p 11L resus    - c/w levo, wean as tolerated as below   - Treat ID as below     GI:   #Necrotizing pancreatitis 2/2 gallstones  Diagnostics  CT 3/27: >50%of the pancreatic head  extends into the mesentery and inferiorly along the retroperitoneum into the lower abdomen  RUQ US: Chololithiasis (2cm stone) w/ no acute frannie     Therapeutics:   - Plan for interval CT   - Continue TF (Nepro) at rate of 30cc/hr  - GI ppx w/ protonix   - Reglan  - Dolcolax    #Shocked Liver + Hyperbilirubinemia   Likely 2/2 to shocked state w/ LFTs now trending down w/ bili following protracted course as expected but yet to peak. Possible concern for acute obstructive biliary process.   - Pending MRCP  - Monitor LFTs     Renal:  ARF 2/2 shock state resulting in ATN requiring CRRT    - Continue CRRT net negative  as tolerated   - Babin catheter in place    Heme:   Anemia likely 2/2 disease course vs suppression; No active bleeding reported   Diagnostics:   - CBC q8     Therapeutics:   - received 1U PRBC 3/27   - SCDs and argatroban for DVT ppx    #Thrombocyopenia  Likely suppression 2/2 infection vs shock state vs HIT  Diagnostics  - HIT Ab positive  -f/u DOUGLAS     Therapeutics:  -CRRT dosed Fondaparinux 1.5 qd for DVT ppx     #Splenic Vein Thrombosis  Diagnostics:  3/22 CT Abd w/ contrast: Focal filling defect in the splenic vein at the level of the body of   the pancreas for which an underlying thrombus is suspected     ID:   Intermittently febrile w/ leukocytosis c/f septic shock  E coli UTI  Diagnostics:  - 3/22 Ucx: E coli  - 3/24 Bcx: negative     Therapeutics:   - Meropenem (3/26-)    Endo:   - Continue Solu-medrol 10 q6h until ; will begin taper  - c/w synthroid 20 IV (home med)   - ISS    Code Status: Full code    Disposition: SICU   Patient is a 48 year old female with PMHx significant for HTN, hypothyroidism,  30 years ago, breast cancer s/p left mastectomy and chemo in  presenting with epigastric pain and LUQ pain. Found to have necrotizing pancreatitis. Transferred from OSH ([place]) for hypotension and tachypnea requiring emergent intubation and multiple pressors. SICU consulted for hemodynamic monitoring and respiratory management.    PLAN:  Neuro  Intubated and sedated for clinical course; No acute neurological issues   - Wean precedex  - Dilaudid prn    Resp:  AHRF w/ b/l pleff 2/2 necrotizing pancreatitis   -Volume control 20/350/5/40  -CPAP as tolerated     CV  Mixed shock state 2/2 septic shock w/ E. Coli in urine vs hypovolemic shock now s/p 11L resus    - c/w levo, wean as tolerated as below   - Treat ID as below     GI:   #Necrotizing pancreatitis 2/2 gallstones  Diagnostics  CT 3/27: >50%of the pancreatic head  extends into the mesentery and inferiorly along the retroperitoneum into the lower abdomen  RUQ US: Chololithiasis (2cm stone) w/ no acute frannie     Therapeutics:   - Plan for interval CT   - Continue TF (Nepro) at rate of 30cc/hr  - GI ppx w/ protonix   - Reglan  - Dolcolax    #Shocked Liver + Hyperbilirubinemia   Likely 2/2 to shocked state w/ LFTs now trending down w/ bili following protracted course as expected but yet to peak. Possible concern for acute obstructive biliary process.   - Pending MRCP  - Monitor LFTs     Renal:  ARF 2/2 shock state resulting in ATN requiring CRRT    - Continue CRRT net negative  as tolerated   - Babin catheter in place    Heme:   Anemia likely 2/2 disease course vs suppression; No active bleeding reported   Diagnostics:   - CBC q8     Therapeutics:   - received 1U PRBC 3/27   - SCDs and argatroban for DVT ppx    #Thrombocyopenia  Likely suppression 2/2 infection vs shock state vs HIT  Diagnostics  - HIT Ab positive  -f/u DOUGLSA     Therapeutics:  -CRRT dosed Fondaparinux 1.5 qd for DVT ppx     #Splenic Vein Thrombosis  Diagnostics:  3/22 CT Abd w/ contrast: Focal filling defect in the splenic vein at the level of the body of   the pancreas for which an underlying thrombus is suspected     ID:   Intermittently febrile w/ leukocytosis c/f septic shock  E coli UTI  Diagnostics:  - 3/22 Ucx: E coli  - 3/30 Bcx: staph hominis in aerobic bottle x 1 (likely contaminant)     Therapeutics:   - Meropenem (3/26-)    Endo:   - Continue Solu-medrol 10 q6h until ; will begin taper  - c/w synthroid 20 IV (home med)   - ISS    Code Status: Full code    Disposition: SICU

## 2023-04-02 NOTE — PROGRESS NOTE ADULT - SUBJECTIVE AND OBJECTIVE BOX
HISTORY  Patient is a 48 year old female with PMHx significant for HTN, hypothyroidism,  30 years ago, breast cancer s/p left mastectomy and chemo in  presenting with epigastric pain and LUQ pain. Found to have necrotizing pancreatitis. Transferred from [facility] ([place]) for hypotension and tachypnea requiring emergent intubation and multiple pressors. SICU consulted for hemodynamic monitoring and respiratory management.    24 HOUR EVENTS:    SUBJECTIVE/ROS:  [ ] A ten-point review of systems was otherwise negative except as noted.  [ ] Due to altered mental status/intubation, subjective information were not able to be obtained from the patient. History was obtained, to the extent possible, from review of the chart and collateral sources of information.      NEURO  RASS:     GCS:     CAM ICU:  Exam:   Meds: acetaminophen     Tablet .. 650 milliGRAM(s) Oral every 6 hours PRN Temp greater or equal to 38C (100.4F), Mild Pain (1 - 3)  dexMEDEtomidine Infusion 0.207 MICROgram(s)/kG/Hr IV Continuous <Continuous>  HYDROmorphone  Injectable 0.5 milliGRAM(s) IV Push every 4 hours PRN Moderate Pain (4 - 6)  HYDROmorphone  Injectable 1 milliGRAM(s) IV Push every 4 hours PRN Severe Pain (7 - 10)  metoclopramide Injectable 10 milliGRAM(s) IV Push every 8 hours    [x] Adequacy of sedation and pain control has been assessed and adjusted      RESPIRATORY  RR: 38 (23 @ 01:00) (20 - 43)  SpO2: 97% (23 @ 01:00) (97% - 100%)  Wt(kg): --  Exam:   Mechanical Ventilation: Mode: CPAP with PS, RR (patient): 34, FiO2: 30, PEEP: 5, PS: 12, MAP: 10, PIP: 18  ABG - ( 2023 20:24 )  pH: 7.43  /  pCO2: 39    /  pO2: 181   / HCO3: 26    / Base Excess: 1.5   /  SaO2: 99.3    Lactate: x                [ ] Extubation Readiness Assessed  Meds:       CARDIOVASCULAR  HR: 123 (23 @ 01:00) (78 - 137)  BP: 104/64 (23 @ 00:00) (85/49 - 115/53)  BP(mean): 76 (23 @ 00:00) (58 - 76)  ABP: 119/66 (23 @ 01:00) (80/46 - 182/90)  ABP(mean): 85 (23 @ 01:00) (58 - 128)  Wt(kg): --  CVP(cm H2O): --      Exam:  Cardiac Rhythm:  Perfusion     [ ]Adequate   [ ]Inadequate  Mentation   [ ]Normal       [ ]Reduced  Extremities  [ ]Warm         [ ]Cool  Volume Status [ ]Hypervolemic [ ]Euvolemic [ ]Hypovolemic  Meds: enalaprilat Injectable 1.25 milliGRAM(s) IV Push every 6 hours  norepinephrine Infusion 0.05 MICROgram(s)/kG/Min IV Continuous <Continuous>        GI/NUTRITION  Exam:  Diet:  Meds: pantoprazole  Injectable 40 milliGRAM(s) IV Push daily  polyethylene glycol 3350 17 Gram(s) Oral at bedtime      GENITOURINARY  I&O's Detail     @ 07:01  -   @ 07:00  --------------------------------------------------------  IN:    Dexmedetomidine: 629.7 mL    Enteral Tube Flush: 190 mL    FentaNYL: 35.4 mL    IV PiggyBack: 100 mL    Nepro with Carb Steady: 565 mL    Norepinephrine: 31.9 mL  Total IN: 1552 mL    OUT:    Indwelling Catheter - Urethral (mL): 155 mL    Other (mL): 2809 mL  Total OUT: 2964 mL    Total NET: -1412 mL       @ 07:01  -  02 @ 01:08  --------------------------------------------------------  IN:    Dexmedetomidine: 557.5 mL    IV PiggyBack: 50 mL    Nepro with Carb Steady: 720 mL    Norepinephrine: 17.6 mL  Total IN: 1345.1 mL    OUT:    Indwelling Catheter - Urethral (mL): 145 mL    Other (mL): 2362 mL  Total OUT: 2507 mL    Total NET: -1161.9 mL              135  |  102  |  32<H>  ----------------------------<  207<H>  3.8   |  22  |  1.41<H>    Ca    7.4<L>      2023 20:24  Phos  2.4       Mg     2.60         TPro  5.3<L>  /  Alb  2.4<L>  /  TBili  12.0<H>  /  DBili  x   /  AST  183<H>  /  ALT  305<H>  /  AlkPhos  264<H>      [ ] Babin catheter, indication:   Meds:       HEMATOLOGIC  Meds: fondaparinux Injectable 1.5 milliGRAM(s) SubCutaneous daily    [x] VTE Prophylaxis                        7.9    33.44 )-----------( 153      ( 2023 20:24 )             23.6     PT/INR - ( 2023 20:24 )   PT: 14.9 sec;   INR: 1.28 ratio         PTT - ( 2023 20:24 )  PTT:24.7 sec  Transfusion     [ ] PRBC   [ ] Platelets   [ ] FFP   [ ] Cryoprecipitate      INFECTIOUS DISEASES  T(C): 38 (23 @ 00:00), Max: 38 (23 @ 00:00)  Wt(kg): --  WBC Count: 33.44 K/uL ( @ 20:24)  WBC Count: 32.72 K/uL ( @ 12:00)  WBC Count: 34.52 K/uL ( @ 04:00)    Recent Cultures:  Specimen Source: .Blood Blood-Peripheral,  @ 16:37; Results   Growth in aerobic bottle: Staphylococcus hominis  Coagulase Negative Staphylococci isolated from a single blood culture set  may represent contamination.  Contact the Microbiology Department at [phone] if susceptibility  testing is clinically indicated.  ***Blood Panel PCR results on this specimen are available  approximately 3 hours after the Gram stain result.***  Gram stain, PCR, and/or culture results may not always  correspond due to difference in methodologies.  ************************************************************  This PCR assay was performed by multiplex PCR. This  Assay tests for 66 bacterial and resistance gene targets.  Please refer to the [facility] test directory  at [email or web address] for details.; Gram Stain:   Growth in aerobic bottle: Gram Positive Cocci in Clusters; Organism: Blood Culture PCR  Specimen Source: .Blood Blood-Peripheral,  @ 16:30; Results   No growth to date.; Gram Stain: --; Organism: --    Meds: meropenem  IVPB 1000 milliGRAM(s) IV Intermittent every 12 hours  meropenem  IVPB            ENDOCRINE  Capillary Blood Glucose    Meds: dextrose 50% Injectable 25 Gram(s) IV Push once  dextrose 50% Injectable 12.5 Gram(s) IV Push once  insulin glargine Injectable (LANTUS) 9 Unit(s) SubCutaneous at bedtime  insulin lispro (ADMELOG) corrective regimen sliding scale   SubCutaneous every 6 hours  insulin lispro Injectable (ADMELOG) 2 Unit(s) SubCutaneous every 6 hours  levothyroxine Injectable 20 MICROGram(s) IV Push at bedtime  methylPREDNISolone sodium succinate Injectable 10 milliGRAM(s) IV Push every 6 hours        ACCESS DEVICES:  [ ] Peripheral IV  [ ] Central Venous Line	[ ] R	[ ] L	[ ] IJ	[ ] Fem	[ ] SC	Placed:   [ ] Arterial Line		[ ] R	[ ] L	[ ] Fem	[ ] Rad	[ ] Ax	Placed:   [ ] PICC:					[ ] Mediport  [ ] Urinary Catheter, Date Placed:   [ ] Necessity of urinary, arterial, and venous catheters discussed    OTHER MEDICATIONS:  chlorhexidine 0.12% Liquid 15 milliLiter(s) Oral Mucosa every 12 hours  chlorhexidine 2% Cloths 1 Application(s) Topical daily  CRRT Treatment    <Continuous>  petrolatum Ophthalmic Ointment 1 Application(s) Both EYES two times a day  PrismaSATE Dialysate BGK 4 / 2.5 5000 milliLiter(s) CRRT <Continuous>  PrismaSOL Filtration BGK 0 / 2.5 5000 milliLiter(s) CRRT <Continuous>  PrismaSOL Filtration BGK 0 / 2.5 5000 milliLiter(s) CRRT <Continuous>      CODE STATUS:     IMAGING:

## 2023-04-02 NOTE — PROGRESS NOTE ADULT - PROBLEM SELECTOR PLAN 1
Pt with anuric CHATO in the setting of necrotizing pancreatitis leading to shock, requiring vasopressors support and renal replacement therapy. Scr was 1.24 on 3/23/23 and increased to 4.60 on 3/24/23. Pt was initiated on CRRT/CVVHDF on 3/24. No issues with CRRT as per discussion with RN at bedside. Labs reviewed. Plan to continue CRRT/CVVHDF with net negative balance. Consider Renal US when more stable (no hydronephrosis noted on initial CT abd/pel). Monitor labs and urine output. Avoid nephrotoxins. Dose medications as per eGFR.

## 2023-04-02 NOTE — PROGRESS NOTE ADULT - SUBJECTIVE AND OBJECTIVE BOX
Surgery Progress Note     24hour Events:     Subjective:  Patient seen and examined.       Vital Signs:  Vital Signs Last 24 Hrs  T(C): 37.8 (02 Apr 2023 04:00), Max: 38 (02 Apr 2023 00:00)  T(F): 100.1 (02 Apr 2023 04:00), Max: 100.4 (02 Apr 2023 00:00)  HR: 94 (02 Apr 2023 08:06) (91 - 137)  BP: 96/54 (02 Apr 2023 04:00) (96/54 - 115/53)  BP(mean): 67 (02 Apr 2023 04:00) (67 - 76)  RR: 22 (02 Apr 2023 08:00) (20 - 46)  SpO2: 99% (02 Apr 2023 08:06) (96% - 100%)    Parameters below as of 02 Apr 2023 08:00  Patient On (Oxygen Delivery Method): ventilator,AC    O2 Concentration (%): 30    CAPILLARY BLOOD GLUCOSE      POCT Blood Glucose.: 208 mg/dL (02 Apr 2023 05:08)  POCT Blood Glucose.: 200 mg/dL (02 Apr 2023 00:32)  POCT Blood Glucose.: 200 mg/dL (01 Apr 2023 20:22)  POCT Blood Glucose.: 202 mg/dL (01 Apr 2023 18:03)  POCT Blood Glucose.: 219 mg/dL (01 Apr 2023 11:51)      I&O's Detail    01 Apr 2023 07:01  -  02 Apr 2023 07:00  --------------------------------------------------------  IN:    Dexmedetomidine: 822.7 mL    IV PiggyBack: 100 mL    Nepro with Carb Steady: 780 mL    Norepinephrine: 132.4 mL  Total IN: 1835.1 mL    OUT:    Indwelling Catheter - Urethral (mL): 145 mL    Nasogastric/Oral tube (mL): 1300 mL    Other (mL): 2885 mL  Total OUT: 4330 mL    Total NET: -2494.9 mL      02 Apr 2023 07:01  -  02 Apr 2023 08:14  --------------------------------------------------------  IN:    Dexmedetomidine: 44.2 mL    Norepinephrine: 23.2 mL  Total IN: 67.4 mL    OUT:    Indwelling Catheter - Urethral (mL): 0 mL    Nasogastric/Oral tube (mL): 200 mL  Total OUT: 200 mL    Total NET: -132.6 mL            Physical Exam:  General: NAD, resting comfortably in bed  HEENT: Normocephalic atraumatic  Respiratory: Nonlabored respirations  Cardio: pulse present  Abdomen: softly distended, appropriately tender, surgical incisions are c/d/i. NGT/OGT*  Vascular: extremities are warm and well perfused.       Labs:    04-02    131<L>  |  98  |  27<H>  ----------------------------<  202<H>  3.6   |  22  |  1.31<H>    Ca    7.5<L>      02 Apr 2023 05:11  Phos  2.3     04-02  Mg     2.50     04-02    TPro  5.4<L>  /  Alb  2.4<L>  /  TBili  11.4<H>  /  DBili  x   /  AST  163<H>  /  ALT  272<H>  /  AlkPhos  249<H>  04-02    LIVER FUNCTIONS - ( 02 Apr 2023 05:11 )  Alb: 2.4 g/dL / Pro: 5.4 g/dL / ALK PHOS: 249 U/L / ALT: 272 U/L / AST: 163 U/L / GGT: x                                 7.9    33.44 )-----------( 153      ( 01 Apr 2023 20:24 )             23.6     PT/INR - ( 02 Apr 2023 05:11 )   PT: 15.3 sec;   INR: 1.32 ratio         PTT - ( 02 Apr 2023 05:11 )  PTT:25.2 sec     Surgery Progress Note         Subjective:  Patient seen and examined.       Vital Signs:  Vital Signs Last 24 Hrs  T(C): 37.8 (02 Apr 2023 04:00), Max: 38 (02 Apr 2023 00:00)  T(F): 100.1 (02 Apr 2023 04:00), Max: 100.4 (02 Apr 2023 00:00)  HR: 94 (02 Apr 2023 08:06) (91 - 137)  BP: 96/54 (02 Apr 2023 04:00) (96/54 - 115/53)  BP(mean): 67 (02 Apr 2023 04:00) (67 - 76)  RR: 22 (02 Apr 2023 08:00) (20 - 46)  SpO2: 99% (02 Apr 2023 08:06) (96% - 100%)    Parameters below as of 02 Apr 2023 08:00  Patient On (Oxygen Delivery Method): ventilator,AC    O2 Concentration (%): 30    CAPILLARY BLOOD GLUCOSE      POCT Blood Glucose.: 208 mg/dL (02 Apr 2023 05:08)  POCT Blood Glucose.: 200 mg/dL (02 Apr 2023 00:32)  POCT Blood Glucose.: 200 mg/dL (01 Apr 2023 20:22)  POCT Blood Glucose.: 202 mg/dL (01 Apr 2023 18:03)  POCT Blood Glucose.: 219 mg/dL (01 Apr 2023 11:51)      I&O's Detail    01 Apr 2023 07:01  -  02 Apr 2023 07:00  --------------------------------------------------------  IN:    Dexmedetomidine: 822.7 mL    IV PiggyBack: 100 mL    Nepro with Carb Steady: 780 mL    Norepinephrine: 132.4 mL  Total IN: 1835.1 mL    OUT:    Indwelling Catheter - Urethral (mL): 145 mL    Nasogastric/Oral tube (mL): 1300 mL    Other (mL): 2885 mL  Total OUT: 4330 mL    Total NET: -2494.9 mL      02 Apr 2023 07:01  -  02 Apr 2023 08:14  --------------------------------------------------------  IN:    Dexmedetomidine: 44.2 mL    Norepinephrine: 23.2 mL  Total IN: 67.4 mL    OUT:    Indwelling Catheter - Urethral (mL): 0 mL    Nasogastric/Oral tube (mL): 200 mL  Total OUT: 200 mL    Total NET: -132.6 mL            Physical Exam:  General: sedated  Respiratory: intubated on vent  Cardio: pulse present   Abdomen: distended, edematous  Vascular: extremities are warm and edematous       Labs:    04-02    131<L>  |  98  |  27<H>  ----------------------------<  202<H>  3.6   |  22  |  1.31<H>    Ca    7.5<L>      02 Apr 2023 05:11  Phos  2.3     04-02  Mg     2.50     04-02    TPro  5.4<L>  /  Alb  2.4<L>  /  TBili  11.4<H>  /  DBili  x   /  AST  163<H>  /  ALT  272<H>  /  AlkPhos  249<H>  04-02    LIVER FUNCTIONS - ( 02 Apr 2023 05:11 )  Alb: 2.4 g/dL / Pro: 5.4 g/dL / ALK PHOS: 249 U/L / ALT: 272 U/L / AST: 163 U/L / GGT: x                                 7.9    33.44 )-----------( 153      ( 01 Apr 2023 20:24 )             23.6     PT/INR - ( 02 Apr 2023 05:11 )   PT: 15.3 sec;   INR: 1.32 ratio         PTT - ( 02 Apr 2023 05:11 )  PTT:25.2 sec

## 2023-04-03 NOTE — PROGRESS NOTE ADULT - ASSESSMENT
Patient is a 48 year old female with PMHx significant for HTN, hypothyroidism,  30 years ago, breast cancer s/p left mastectomy and chemo in  presenting with epigastric pain and LUQ pain. Found to have necrotizing pancreatitis. Transferred from OSH ([place]) for hypotension and tachypnea requiring emergent intubation and multiple pressors. SICU consulted for hemodynamic monitoring and respiratory management.    PLAN:  Neuro  Intubated and sedated for clinical course; No acute neurological issues   - Wean precedex  - Dilaudid prn    Resp:  AHRF w/ b/l pleff 2/2 necrotizing pancreatitis   -Volume control 20/350/5/40  -CPAP as tolerated     CV  Mixed shock state 2/2 septic shock w/ E. Coli in urine vs hypovolemic shock now s/p 11L resus    - c/w levo, wean as tolerated as below   - Treat ID as below     GI:   #Necrotizing pancreatitis 2/2 gallstones  Diagnostics  CT 3/27: >50%of the pancreatic head  extends into the mesentery and inferiorly along the retroperitoneum into the lower abdomen  RUQ US: Chololithiasis (2cm stone) w/ no acute frannie   MRCP Pend:     Therapeutics:   - Plan for interval CT   - Continue TF (Nepro) at rate of 30cc/hr  - GI ppx w/ protonix   - Reglan  - Dolcolax    #Shocked Liver + Hyperbilirubinemia   Likely 2/2 to shocked state w/ LFTs now trending down w/ bili following protracted course as expected but yet to peak. Possible concern for acute obstructive biliary process.   - MRCP read pending  - Hold tylenol     Renal:  ARF 2/2 shock state resulting in ATN requiring CRRT    - Continue CRRT net negative  as tolerated   - Babin catheter in place    Heme:   Anemia likely 2/2 disease course vs suppression; No active bleeding reported   Diagnostics:   - CBC q8     Therapeutics:   - received 1U PRBC 3/27   - SCDs and argatroban for DVT ppx    #Thrombocyopenia  Likely suppression 2/2 infection vs shock state vs HIT  Diagnostics  - HIT Ab positive  -f/u DOUGLAS     Therapeutics:  -CRRT dosed Fondaparinux 1.5 qd for DVT ppx     #Splenic Vein Thrombosis  Diagnostics:  3/22 CT Abd w/ contrast: Focal filling defect in the splenic vein at the level of the body of   the pancreas for which an underlying thrombus is suspected     ID:   Intermittently febrile w/ leukocytosis c/f septic shock  E coli UTI  Diagnostics:  - 3/22 Ucx: E coli  - 3/30 Bcx: staph hominis in aerobic bottle x 1 (likely contaminant)     Therapeutics:   - Meropenem (3/26-)  -Consider redrawing blood culture to verify clearance     Endo:   - Continue Solu-medrol 10 q6h until ; will begin taper  - c/w synthroid 20 IV (home med)   - ISS    Code Status: Full code    Disposition: SICU   Patient is a 48 year old female with PMHx significant for HTN, hypothyroidism,  30 years ago, breast cancer s/p left mastectomy and chemo in  presenting with epigastric pain and LUQ pain. Found to have necrotizing pancreatitis. Transferred from OSH ([place]) for hypotension and tachypnea requiring emergent intubation and multiple pressors. SICU consulted for hemodynamic monitoring and respiratory management.    PLAN:  Neuro  Intubated and sedated for clinical course; No acute neurological issues   - Wean precedex  - Dilaudid prn    Resp:  AHRF w/ b/l pleff 2/2 necrotizing pancreatitis   -Volume control 20/350/5/40 --> CPAP  -CPAP as tolerated   - POCUS 4/3, albumin 250, lasix 80mg    CV  Mixed shock state 2/2 septic shock w/ E. Coli in urine vs hypovolemic shock now s/p 11L resus    - off levo   - Treat ID as below     GI:   #Necrotizing pancreatitis 2/2 gallstones  Diagnostics  CT 3/27: >50%of the pancreatic head  extends into the mesentery and inferiorly along the retroperitoneum into the lower abdomen  RUQ US: Chololithiasis (2cm stone) w/ no acute frannie   - MRCP 4/3: no choledocholithiasis    Therapeutics:   - Plan for interval CT   - Continue TF (Nepro) at rate of 30cc/hr  - GI ppx w/ protonix   - Reglan  - Dolcolax    #Shocked Liver + Hyperbilirubinemia   Likely 2/2 to shocked state w/ LFTs now trending down w/ bili following protracted course as expected but yet to peak. Possible concern for acute obstructive biliary process.   - MRCP read no choledocholithiasis  - Hold tylenol     Renal:  ARF 2/2 shock state resulting in ATN requiring CRRT    - Continue CRRT net negative  as tolerated   - Babin catheter in place    Heme:   Anemia likely 2/2 disease course vs suppression; No active bleeding reported   Diagnostics:   - CBC q12     Therapeutics:   - received 1U PRBC 3/27   - SCDs and argatroban for DVT ppx    #Thrombocyopenia  Likely suppression 2/2 infection vs shock state vs HIT  Diagnostics  - HIT Ab positive  -f/u DOUGLAS     Therapeutics:  -CRRT dosed Fondaparinux 1.5 qd for DVT ppx     #Splenic Vein Thrombosis  Diagnostics:  3/22 CT Abd w/ contrast: Focal filling defect in the splenic vein at the level of the body of   the pancreas for which an underlying thrombus is suspected     ID:   Intermittently febrile w/ leukocytosis c/f septic shock  E coli UTI  Diagnostics:  - 3/22 Ucx: E coli  - 3/30 Bcx: staph hominis in aerobic bottle x 1 (likely contaminant)     Therapeutics:   - Meropenem (3/26- )    Endo:   - Off solumedrol  - c/w synthroid 20 IV (home med)   - ISS    Code Status: Full code    Disposition: SICU

## 2023-04-03 NOTE — PROGRESS NOTE ADULT - SUBJECTIVE AND OBJECTIVE BOX
TEAM [ B ] Surgery Daily Progress Note  =====================================================    SUBJECTIVE: Patient seen and examined at bedside on AM rounds. Patient remains on CRRT.     PAST MEDICAL & SURGICAL HISTORY:  Hypertension  Hypothyroidism  Breast cancer  S/P   H/O left mastectomy    ALLERGIES:  No Known Allergies      --------------------------------------------------------------------------------------    MEDICATIONS:    Neurologic Medications  dexMEDEtomidine Infusion 0.207 MICROgram(s)/kG/Hr IV Continuous <Continuous>  HYDROmorphone  Injectable 0.5 milliGRAM(s) IV Push every 4 hours PRN Moderate Pain (4 - 6)  HYDROmorphone  Injectable 1 milliGRAM(s) IV Push every 4 hours PRN Severe Pain (7 - 10)  metoclopramide Injectable 10 milliGRAM(s) IV Push every 8 hours    Respiratory Medications    Cardiovascular Medications  enalaprilat Injectable 1.25 milliGRAM(s) IV Push every 6 hours  norepinephrine Infusion 0.05 MICROgram(s)/kG/Min IV Continuous <Continuous>    Gastrointestinal Medications  pantoprazole  Injectable 40 milliGRAM(s) IV Push daily  polyethylene glycol 3350 17 Gram(s) Oral at bedtime    Genitourinary Medications    Hematologic/Oncologic Medications  fondaparinux Injectable 1.5 milliGRAM(s) SubCutaneous daily    Antimicrobial/Immunologic Medications    Endocrine/Metabolic Medications  dextrose 50% Injectable 25 Gram(s) IV Push once  dextrose 50% Injectable 12.5 Gram(s) IV Push once  insulin glargine Injectable (LANTUS) 9 Unit(s) SubCutaneous at bedtime  insulin lispro (ADMELOG) corrective regimen sliding scale   SubCutaneous every 6 hours  insulin lispro Injectable (ADMELOG) 2 Unit(s) SubCutaneous every 6 hours  levothyroxine Injectable 20 MICROGram(s) IV Push at bedtime  methylPREDNISolone sodium succinate Injectable 10 milliGRAM(s) IV Push every 6 hours    Topical/Other Medications  chlorhexidine 0.12% Liquid 15 milliLiter(s) Oral Mucosa every 12 hours  chlorhexidine 2% Cloths 1 Application(s) Topical daily  CRRT Treatment    <Continuous>  petrolatum Ophthalmic Ointment 1 Application(s) Both EYES two times a day  Phoxillum Filtration BK 4 / 2.5 5000 milliLiter(s) CRRT <Continuous>  Phoxillum Filtration BK 4 / 2.5 5000 milliLiter(s) CRRT <Continuous>  PrismaSOL Filtration BGK 0 / 2.5 5000 milliLiter(s) CRRT <Continuous>    --------------------------------------------------------------------------------------    VITAL SIGNS:  T(C): 37.9 (23 @ 04:00), Max: 38.3 (23 @ 12:00)  HR: 101 (23 @ 05:30) (88 - 108)  BP: --  RR: 22 (23 @ 05:30) (4 - 32)  SpO2: 100% (23 @ 05:30) (97% - 100%)  --------------------------------------------------------------------------------------    EXAM    General: intubated, sedated. Opens eyes.   Cardiac: S1, S2. RRR on monitor.   Respiratory: intubated on mechanical ventilator.   Abdomen: distended, soft. NGT in place. Babin in place.   Extremities: warm.     --------------------------------------------------------------------------------------    LABS                          7.6    30.23 )-----------( 262      ( 2023 01:00 )             22.7     04-03    133<L>  |  101  |  28<H>  ----------------------------<  156<H>  3.8   |  22  |  1.44<H>    Ca    7.6<L>      2023 01:00  Phos  3.4     04-  Mg     2.60     04-    TPro  5.7<L>  /  Alb  2.4<L>  /  TBili  10.0<H>  /  DBili  x   /  AST  138<H>  /  ALT  219<H>  /  AlkPhos  208<H>        --------------------------------------------------------------------------------------    INS AND OUTS:    23 @ 07:  -  23 @ 07:00  --------------------------------------------------------  IN: 1835.1 mL / OUT: 4427 mL / NET: -2591.9 mL    23 @ 07:01  -  23 @ 05:58  --------------------------------------------------------  IN: 1312.4 mL / OUT: 1698 mL / NET: -385.6 mL      --------------------------------------------------------------------------------------       TEAM [ B ] Surgery Daily Progress Note  =====================================================    SUBJECTIVE: Patient seen and examined at bedside on AM rounds. Patient remains on CRRT.     PAST MEDICAL & SURGICAL HISTORY:  Hypertension  Hypothyroidism  Breast cancer  S/P   H/O left mastectomy    ALLERGIES:  No Known Allergies      --------------------------------------------------------------------------------------    MEDICATIONS:    Neurologic Medications  dexMEDEtomidine Infusion 0.207 MICROgram(s)/kG/Hr IV Continuous <Continuous>  HYDROmorphone  Injectable 0.5 milliGRAM(s) IV Push every 4 hours PRN Moderate Pain (4 - 6)  HYDROmorphone  Injectable 1 milliGRAM(s) IV Push every 4 hours PRN Severe Pain (7 - 10)  metoclopramide Injectable 10 milliGRAM(s) IV Push every 8 hours    Respiratory Medications    Cardiovascular Medications  enalaprilat Injectable 1.25 milliGRAM(s) IV Push every 6 hours  norepinephrine Infusion 0.05 MICROgram(s)/kG/Min IV Continuous <Continuous>    Gastrointestinal Medications  pantoprazole  Injectable 40 milliGRAM(s) IV Push daily  polyethylene glycol 3350 17 Gram(s) Oral at bedtime    Genitourinary Medications    Hematologic/Oncologic Medications  fondaparinux Injectable 1.5 milliGRAM(s) SubCutaneous daily    Antimicrobial/Immunologic Medications    Endocrine/Metabolic Medications  dextrose 50% Injectable 25 Gram(s) IV Push once  dextrose 50% Injectable 12.5 Gram(s) IV Push once  insulin glargine Injectable (LANTUS) 9 Unit(s) SubCutaneous at bedtime  insulin lispro (ADMELOG) corrective regimen sliding scale   SubCutaneous every 6 hours  insulin lispro Injectable (ADMELOG) 2 Unit(s) SubCutaneous every 6 hours  levothyroxine Injectable 20 MICROGram(s) IV Push at bedtime  methylPREDNISolone sodium succinate Injectable 10 milliGRAM(s) IV Push every 6 hours    Topical/Other Medications  chlorhexidine 0.12% Liquid 15 milliLiter(s) Oral Mucosa every 12 hours  chlorhexidine 2% Cloths 1 Application(s) Topical daily  CRRT Treatment    <Continuous>  petrolatum Ophthalmic Ointment 1 Application(s) Both EYES two times a day  Phoxillum Filtration BK 4 / 2.5 5000 milliLiter(s) CRRT <Continuous>  Phoxillum Filtration BK 4 / 2.5 5000 milliLiter(s) CRRT <Continuous>  PrismaSOL Filtration BGK 0 / 2.5 5000 milliLiter(s) CRRT <Continuous>    --------------------------------------------------------------------------------------    VITAL SIGNS:  T(C): 37.9 (23 @ 04:00), Max: 38.3 (23 @ 12:00)  HR: 101 (23 @ 05:30) (88 - 108)  BP: --  RR: 22 (23 @ 05:30) (4 - 32)  SpO2: 100% (23 @ 05:30) (97% - 100%)  --------------------------------------------------------------------------------------    EXAM    General: intubated, sedated. Opens eyes and follows commands.    Cardiac: S1, S2. RRR on monitor.   Respiratory: intubated on mechanical ventilator.   Abdomen: distended, soft. NGT in place. Babin in place.   Extremities: warm.     --------------------------------------------------------------------------------------    LABS                          7.6    30.23 )-----------( 262      ( 2023 01:00 )             22.7     04-03    133<L>  |  101  |  28<H>  ----------------------------<  156<H>  3.8   |  22  |  1.44<H>    Ca    7.6<L>      2023 01:00  Phos  3.4     04-  Mg     2.60     04-    TPro  5.7<L>  /  Alb  2.4<L>  /  TBili  10.0<H>  /  DBili  x   /  AST  138<H>  /  ALT  219<H>  /  AlkPhos  208<H>        --------------------------------------------------------------------------------------    INS AND OUTS:    23 @ 07:  -  23 @ 07:00  --------------------------------------------------------  IN: 1835.1 mL / OUT: 4427 mL / NET: -2591.9 mL    23 @ 07:01  -  23 @ 05:58  --------------------------------------------------------  IN: 1312.4 mL / OUT: 1698 mL / NET: -385.6 mL      --------------------------------------------------------------------------------------

## 2023-04-03 NOTE — PROGRESS NOTE ADULT - ASSESSMENT
48 year old female with necrotizing pancreatitis. Course c/b respiratory distress and hemodynamic instability requiring intubation and pressor support. Patient requiring CRRT and found to have HIT. CT 3/28 w/ ill-defined collections.    PLAN:  - MRCP done due to elevated Tbili and poorly visualized CBD on RUQ US. F/u read  - IV abx  - Diet: NPO/IVF/post pyloric TF  - wean pressors as tolerated  - SBTs daily  - continue CVVH, f/u nephro recs   - dvt ppx with fondaparinux (found to have HIT)   - appreciate SICU care    B Team Surgery  [id number]

## 2023-04-03 NOTE — PROGRESS NOTE ADULT - PROBLEM SELECTOR PLAN 3
Pt with resolving/resolved hyperkalemia in setting of acute renal failure. Serum potassium of 3.8. Continue CRRT as above. Continue to monitor serum potassium and adjust dialysate accordingly.    If you have any questions, please feel free to contact me  Neville Schulz  Nephrology Fellow  [phone]; Prefer [employer] TEAMS  (After 5pm or on weekends please page the on-call fellow). Pt with resolving/resolved hyperkalemia in setting of acute renal failure. Serum potassium of 3.8. Continue CRRT as above. Continue to monitor serum potassium and adjust dialysate accordingly.

## 2023-04-03 NOTE — CHART NOTE - NSCHARTNOTEFT_GEN_A_CORE
POCUS exam performed bedside. B- lines visualized- R/L apex of lung, R/L base of lung. Exam saved on US under patients ID, first, last name. POCUS exam performed bedside. B- lines visualized- R/L apex of lung, R/L base of lung. Exam saved on US under patients ID, first, last name.  Agree

## 2023-04-03 NOTE — PROGRESS NOTE ADULT - PROBLEM SELECTOR PLAN 1
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     Chief Complaint   Patient presents with   • Office Visit     History of Present Illness:   Mariluz Hurst is a 70 year old female seen today to discuss adjuvant chemotherapy after hysterectomy and debulking surgery for ovarian mass.  Mariluz reports that she developed bloating a few days after being treated for a UTI. She had upper abdominal pain and she was evaluated by her PCP due to this  She underwent xray and then was referred for CT imaging that showed sub centimeter lung nodules, an anterior pelvic mass measuring 9.1 x 8.7 x 10.4 cm mass with moderate ascites.  Infiltrative densities in the omentum.  Subcm density in right lobe of liver and 14 mm gallbladder stone.  She had  of 333 and underwent paracentesis with negative cytology.  She was evaluated by Dr Peter and underwent hysterectomy and debulking.  6 liters of ascites removed at the time of the surgical procedure.      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  Fallopian tube with fimbral involvement by high grade serous carcinoma.  Right adnexal ligament with microscopic serosal involvement by high grade serous carcinoma.  Cul de sac nodule involved with high grade serous carcinoma (per operative report others were treated with argon coagulation).  Left lobe liver cyst was negative for carcinoma.  Multiple right and left pelvic lymph nodes were removed and negative.      Initiated carboplatin and paclitaxel on 1/15/21.    Completed 6 cycles of carboplatin and paclitaxel on 4/30/21.    CT C/A/P on 5/17/21 showed apparent  enlargement of partially calcified aortapulmonary window and left suprahilar lymph nodes, which raised concern for evolving metastatic disease. New cluster of small nodular densities in the superior segment of the left upper lobe. 1 cm left upper lobe pulmonary nodule, with central calcification, either stable or slightly increased in size since prior exam. Multiple additional sub centimeter pulmonary nodules, without interval change. Stable subcentimeter liver hypo densities. Post surgical changes of pelvic mass resection, with nonspecific 6.8 cm unilocular fluid collection in the mid pelvis which may have represented postoperative seroma or lymphocele. Mild pelvic ascites.    PET scan on 6/10/21 showed the aorticopulmonary window lymphadenopathy containing coarse calcifications demonstrated heterogenous hypermetabolism. Differential considerations included unspecified inflammatory/infectious and metastatic. Small focus of mild hypermetabolism in the right paramedic anterior mid abdomen, along the peritoneal side of the right abdominis rectus. Additional small focus of mild hypermetabolsim in the infra umbilical midline. Length of hypermetabolism involving the distal left iliopsoas muscle, lower pelvic level. Small amount of hyper metabolic pelvic free fluid. Lung nodules.    Pt underwent biopsy of mediastinal lymph nodes on 8/19/21 which was negative.     CT C/A/P scan 10/04/2021 showed new small subcentimeter areas of patchy opacity at right lower lobe. Additional pulmonary nodules/nodular opacities and calcified lymph nodes appear unchanged. No abnormal mass or significantly enlarged lymph nodes are identified at the abdomen or pelvis. Interval resolution of previously noted small amount of fluid in the pelvis. Remainder of findings as above, unchanged.    CT C/A/P on 1/3/22 showed new multiple nodules and large mass in the pelvis, consistent with metastases. New moderate amount of ascites. New tiny/small  nodules in the right abdomen. Findings likely represented peritoneal metastatic disease. new two tiny nodules overlying the right hemidiaphragm, highly suspicious for metastasis. New small clustered centrilobular nodules in the right lower lobe, likely infectious/inflammatory in nature although metastasis was not fully excluded.    Initiated doxorubicin, carboplatin, and avastin on 1/13/22.    Echocardiogram in March 2022 was normal.    CT C/A/P on 4/4/22 showed markedly improved metastatic disease. The dominant mass in the lower abdomen/upper passed pelvis smaller, and now more cystic-appearing. The mass in the deep pelvis was markedly nearly completely resolved with a small perirectal nodule remaining. Right pelvic sidewall nodules/lymph node was smaller. Other metastatic disease seen on prior exam was essentially resolved. New bilateral pulmonary nodular and was subpleural consolidative opacities favoring infection over metastasis.    PET CT on 6/10/22 showed subtle increased FDG activity at bilobed cystic lesion at midline pelvis, new from prior PET/CT study. Multiple new FDG avid lymph nodes suspicious for disease progression.    Completed 6 cycles of carboplatin + doxil + avastin on 6/3/22.     Initiated gemcitabine + avastin on 7/11/22.    PET on 9/8/22 showed interval unfavorable change. New sites of FDG avid adenopathy above and below the diaphragm consistent with jeb metastases. New and increased FDG avid abdominal peritoneal nodular thickening and pelvic soft tissue mass like fullness most consistent with carcinomatosis-metastatic implants.    Pt underwent paracentesis on 9/15.    Initiated weekly taxol on 10/3/22.     Date of Service October 10th, 2022:  Ms. Hurst returns. She is feeling okay. Notes that her appetite has slightly decreased. She states she has a hemorrhoid. Stools are soft. Notes soreness in buttocks and rectum. States there was some blood upon wiping yesterday and Saturday.  Neuropathy present without worsening. Notes excess fluid drainage from vagina.     Recent CEA decreased to 17    Review of Systems   Constitutional: Negative.    HENT: Negative.    Eyes: Negative.    Respiratory: Negative.    Cardiovascular: Positive for leg swelling.   Gastrointestinal: Positive for abdominal distention and abdominal pain.   Endocrine: Negative.    Genitourinary: Positive for vaginal discharge. Negative for vaginal bleeding.   Musculoskeletal: Positive for gait problem.   Skin: Negative.    Allergic/Immunologic: Negative.    Neurological: Positive for numbness.   Hematological: Negative.    Psychiatric/Behavioral: Negative.       Objective:     /73 (BP Location: RUE - Right upper extremity, Patient Position: Sitting, Cuff Size: Regular)   Pulse (!) 112   Temp 98.3 °F (36.8 °C)   Resp 18   Ht 5' 1.63\" (1.565 m)   Wt 62.7 kg (138 lb 3.2 oz)   BMI 25.58 kg/m²   BSA 1.63 m²    ECOG [10/10/22 0900]   ECOG Performance Status 2     Physical Exam  Constitutional:       General: She is not in acute distress.     Appearance: Normal appearance. She is well-developed. She is not diaphoretic.   HENT:      Head: Normocephalic.      Nose: Nose normal.      Mouth/Throat:      Pharynx: No oropharyngeal exudate.   Eyes:      General: No scleral icterus.     Pupils: Pupils are equal, round, and reactive to light.   Cardiovascular:      Rate and Rhythm: Normal rate and regular rhythm.      Pulses: Normal pulses.      Heart sounds: Normal heart sounds. No murmur heard.    No friction rub. No gallop.   Pulmonary:      Effort: Pulmonary effort is normal. No respiratory distress.      Breath sounds: Normal breath sounds.   Abdominal:      General: There is distension.      Palpations: Abdomen is soft.      Tenderness: There is no abdominal tenderness.   Musculoskeletal:         General: Swelling present. Normal range of motion.      Cervical back: Normal range of motion.      Comments: Mild edema bilaterally  - left greater than right    Lymphadenopathy:      Cervical: No cervical adenopathy.   Skin:     General: Skin is warm and dry.   Neurological:      General: No focal deficit present.      Mental Status: She is alert and oriented to person, place, and time.      Cranial Nerves: No cranial nerve deficit.      Motor: No weakness.        Office Visit on 10/03/2022   Component Date Value Ref Range Status   • Cancer Antigen 125 10/03/2022 17  0 - 35 Units/mL Final    [employer] Advia Cangradeaur Chemiluminescence Immunoassay. Results obtained with different assay methods or kits cannot be used interchangeably.   • WBC 10/03/2022 10.1  4.2 - 11.0 K/mcL Final   • RBC 10/03/2022 2.93 (A) 4.00 - 5.20 mil/mcL Final   • HGB 10/03/2022 10.0 (A) 12.0 - 15.5 g/dL Final   • HCT 10/03/2022 31.7 (A) 36.0 - 46.5 % Final   • MCV 10/03/2022 108.2 (A) 78.0 - 100.0 fl Final   • MCH 10/03/2022 34.1 (A) 26.0 - 34.0 pg Final   • MCHC 10/03/2022 31.5 (A) 32.0 - 36.5 g/dL Final   • RDW-CV 10/03/2022 18.0 (A) 11.0 - 15.0 % Final   • RDW-SD 10/03/2022 72.3 (A) 39.0 - 50.0 fL Final   • PLT 10/03/2022 222  140 - 450 K/mcL Final   • Neutrophil, Percent 10/03/2022 84  % Final   • Lymphocytes, Percent 10/03/2022 6  % Final   • Mono, Percent 10/03/2022 8  % Final   • Eosinophils, Percent 10/03/2022 1  % Final   • Basophils, Percent 10/03/2022 0  % Final   • Immature Granulocytes 10/03/2022 1  % Final   • Absolute Neutrophils 10/03/2022 8.5 (A) 1.8 - 7.7 K/mcL Final   • Absolute Lymphocytes 10/03/2022 0.6 (A) 1.0 - 4.0 K/mcL Final   • Absolute Monocytes 10/03/2022 0.8  0.3 - 0.9 K/mcL Final   • Absolute Eosinophils  10/03/2022 0.1  0.0 - 0.5 K/mcL Final   • Absolute Basophils 10/03/2022 0.0  0.0 - 0.3 K/mcL Final   • Absolute Immmature Granulocytes 10/03/2022 0.1  0.0 - 0.2 K/mcL Final   • Sodium 10/03/2022 136  135 - 145 mmol/L Final   • Potassium 10/03/2022 3.8  3.4 - 5.1 mmol/L Final   • Chloride 10/03/2022 102  97 - 110 mmol/L Final   • Carbon Dioxide  10/03/2022 25  21 - 32 mmol/L Final   • Anion Gap 10/03/2022 13  7 - 19 mmol/L Final   • Glucose 10/03/2022 110 (A) 70 - 99 mg/dL Final   • BUN 10/03/2022 24 (A) 6 - 20 mg/dL Final   • Creatinine 10/03/2022 0.76  0.51 - 0.95 mg/dL Final   • Glomerular Filtration Rate 10/03/2022 83  >=60 Final    eGFR results = or >60 mL/min/1.73m2 = Normal kidney function. Estimated GFR calculated using the CKD-EPI-R (2021) equation that does not include race in the creatinine calculation.   • BUN/ Creatinine Ratio 10/03/2022 32 (A) 7 - 25 Final   • Calcium 10/03/2022 8.3 (A) 8.4 - 10.2 mg/dL Final   • Bilirubin, Total 10/03/2022 0.3  0.2 - 1.0 mg/dL Final   • GOT/AST 10/03/2022 71 (A) <=37 Units/L Final   • GPT/ALT 10/03/2022 26  <64 Units/L Final   • Alkaline Phosphatase 10/03/2022 130 (A) 45 - 117 Units/L Final   • Albumin 10/03/2022 2.0 (A) 3.6 - 5.1 g/dL Final   • Protein, Total 10/03/2022 5.5 (A) 6.4 - 8.2 g/dL Final   • Globulin 10/03/2022 3.5  2.0 - 4.0 g/dL Final   • A/G Ratio 10/03/2022 0.6 (A) 1.0 - 2.4 Final   • VIA MED ONC PATHWAYS TAG 10/03/2022 [id number]   Final        Imaging studies were reviewed with the following pertinent positives: None.    ASSESSMENT AND PLAN:  1.  FIGO stage IIB ovarian carcinosarcoma. Status post RO debulking. Initiated carboplatin and paclitaxel on 1/15/21 and completed 6 cycles. TEMPUS testing negative. Repeat imaging in January 2022 indicated disease recurrence. Initiated carboplatin, doxil, avastin on 1/13/22. Completed 6 cycles 6/3/22. Imaging in June 2022 showed increased size of FDG active area in the pelvis and new FDG avid lymph nodes. Initiated gemcitabine + avastin on 7/11/22. Progression on PET imaging in Sept and therapy stopped.    2.  Vaginal bleeding secondary to tumor.  Worsened with low platelet count.      PET on 9/8 showed unfavorable change. Given this, I previously discussed continuing chemotherapy with the addition of topotecan or paclitaxel. Recommended weekly paclitaxel.  Initiated on 10/3/22.   Pt met with Dr. Bajwa on 9/21. Discussed that there is one clinical trial available. Pt opted to forgo this. Will proceed with weekly paclitaxel. Will use cooling slippers and gloves when giving treatment due to ongoing neuropathy.  Consent forms signed. Will give dexamethasone and benadryl with treatment.   Discussed that we may need to hold or change treatment if neuropathy worsens or if she does not have a good response.   Will need to monitor labs closely with taxol. Discussed possible addition of alpha lipoic acid for neuropathy.   Advised use of iron supplement due to recent bleeding. Reviewed she may experience constipation with this and should take 4 hours apart from pantoprazole. Advised she hold this due to upset stomach.   Mariluz has ongoing vaginal discharge but bleeding resolved. ? Ascites - she has visit with Dr Peter.     Pt noted rectal pain. Explained that this may be radiation induced. Advised she continue use of wipes. Recommended sitz baths. Prescribed tramadol. If without improvement within a few days, instructed her to see radiation oncology. Per Dr. Cagle, advised she discontinue use of suppository.   Most recent CA-125 decreased to 17.   Will get repeat imaging following cycle 2. Discussed continuing out to 4 cycles if she were to have a good response and her neuropathy is stable.   Pt on gabapentin for neuropathy. Continue vitamin B12 and B6 supplement for neuropathy. Continue regular hand exercises.   Pt noted left ankle edema. Prescription sent for lasix.   Pt previously noted cramping in abdomen and LE. Advised she stretch daily and use a multi-vitamin. Will send muscle relaxer as well.   Pt noted fluid in belly. Will get paracentesis tomorrow. Advised she notify MD if abdomen swelling or weight gain to undergo another paracentesis.   Pt noted slight weight gain. Recommended she discontinue ensure due to stable appetite and p.o. intake. Will monitor  weight.  Advised pt to take 100 - 200 mg of gabapentin at night.   Will add tramadol as needed for pain   Advised she notify MD with increased abdominal pain, foul odor with vaginal discharge, or fever.   Will give flu shot today with labs.     RTC MD in 1 week with treatment.       On 10/10/22, IPina scribed the services personally performed by Siena Alvarado MD    The documentation recorded by the scribe accurately and completely reflects the service(s) I personally performed and the decisions made by me.     Recent PHQ 2/9 Score    PHQ 2:  Date Adult PHQ 2 Score Adult PHQ 2 Interpretation   4/8/2021 0 No further screening needed       PHQ 9:       Distress Score   Distress Management Group      Distress Scale (0-10)                     Pt with anuric CHATO in the setting of necrotizing pancreatitis leading to shock, requiring vasopressors support and renal replacement therapy. Scr was 1.24 on 3/23/23 and increased to 4.60 on 3/24/23. Pt was initiated on CRRT/CVVHDF on 3/24. No issues with CRRT as per discussion with RN at bedside. Labs reviewed. Plan to continue CRRT/CVVHDF with net negative balance. Consider Renal US when more stable (no hydronephrosis noted on initial CT abd/pel). Monitor labs and urine output. Avoid nephrotoxins. Dose medications as per eGFR. Pt. with oliguric CHATO in the setting of necrotizing pancreatitis and shock. Pt. with most likely ATN. Scr was 1.24 on 3/23/23 and increased to 4.60 on 3/24/23. Pt. was initiated on CRRT/CVVHDF on 3/24. Pt. tolerating CRRT. No issues with CRRT as per discussion with RN at bedside. Labs reviewed. Plan to continue CRRT/CVVHDF with net negative balance. Monitor labs and urine output. Avoid nephrotoxins. Dose medications as per eGFR.    If you have any questions, please feel free to contact me  Neville Schulz  Nephrology Fellow  [phone]; Prefer [employer] TEAMS   (After 5pm or on weekends please page the on-call fellow).

## 2023-04-03 NOTE — PROGRESS NOTE ADULT - PROBLEM SELECTOR PLAN 4
Hgb below goal. Transfusion per SICU team. Please obtain iron studies. Will assess for iron or QUOC as necessary.     If you have any questions, please feel free to contact me  Neville Schulz  Nephrology Fellow  [phone]; Prefer [employer] [facility] TEAMS  (After 5pm or on weekends please page the on-call fellow).

## 2023-04-03 NOTE — PROGRESS NOTE ADULT - ATTENDING COMMENTS
I agree with the detailed interval history, physical, and plan, which I have reviewed and edited where appropriate'; also agree with notes/assessment with my team on service.  I have personally examined the patient.  I was physically present for the key portions of the evaluation and management (E/M) service provided.  I reviewed all the pertinent data.  The patient is a critical care patient with life threatening hemodynamic and metabolic instability in SICU.  The SICU team has a constant risk benefit analyzes discussion and coordinating care with the primary team and all consultants.   The patient is in SICU with the chief complaint and diagnosis mentioned in the note.   The plan will be specified in the note.  48 year old female with necrotizing pancreatitis in SICU in respiratory failure.  Sedated  LUNG coarse Bs  HEART RR  ABD softly dist  PLAN:  Neuro  - Wean precedex  - Dilaudid prn  Resp:  -Volume control 20/350/5/40 --> CPAP/PSV  - POCUS   CV  - off levo   GI:   - Continue TF (Nepro) at rate of 30cc/hr  - GI ppx w/ protonix   Renal:  - Continue CRRT   Heme:   CRRT dosed Fondaparinux 1.5 qd for DVT ppx   ID:    Meropenem   Endo:   - ISS    Code Status: Full code    Disposition: SICU

## 2023-04-03 NOTE — PROGRESS NOTE ADULT - PROBLEM SELECTOR PLAN 2
Pt with metabolic acidosis in setting of acute renal failure. SCO2 remains low at 18. If worsens/remains low, will adjust dialysate flow rate. Continue CRRT as above. Continue to monitor SCO2.
Pt with metabolic acidosis in setting of acute renal failure. SCO2 remains low/stable at 19. Continue CRRT as above. Continue to monitor SCO2.
Pt with metabolic acidosis in setting of acute renal failure. SCO2 remains low/stable at 22. Continue CRRT as above. Continue to monitor SCO2.
Pt with metabolic acidosis in setting of acute renal failure. SCO2 improved to 16. Continue CRRT. Continue to monitor SCO2.
Pt with metabolic acidosis in setting of acute renal failure. SCO2 remains low/stable at 22. Continue CRRT as above. Continue to monitor SCO2.
Pt with metabolic acidosis in setting of acute renal failure. SCO2 improved to 19. Continue CRRT. Continue to monitor SCO2.
Pt with metabolic acidosis in setting of acute renal failure. SCO2 remains low/stable at 23. Continue CRRT as above. Continue to monitor SCO2.
Pt with metabolic acidosis in setting of acute renal failure. SCO2 remains low/stable at 21. Continue CRRT as above. Continue to monitor SCO2.
Pt with metabolic acidosis in setting of acute renal failure. SCO2 improved to 21. Continue CRRT. Continue to monitor SCO2.
Pt with metabolic acidosis in setting of acute renal failure. SCO2 remains low/stable at 18. Will plant adjust dialysate flow rate. Continue CRRT as above. Continue to monitor SCO2.

## 2023-04-03 NOTE — PROGRESS NOTE ADULT - SUBJECTIVE AND OBJECTIVE BOX
[facility] Division of Kidney Diseases & Hypertension  FOLLOW UP NOTE  [phone]--------------------------------------------------------------------------------  Chief Complaint: Anuric CHATO requiring renal replacement therapy       24 hour events/subjective:  Pt. was seen and examined in the SICU. She remains intubated, sedated. No longer requiring pressors. . Remains on CRRT. Unable to obtain further ROS.     PAST HISTORY  --------------------------------------------------------------------------------  No significant changes to PMH, PSH, FHx, SHx, unless otherwise noted    ALLERGIES & MEDICATIONS  --------------------------------------------------------------------------------  Allergies    No Known Allergies    Intolerances      Standing Inpatient Medications  chlorhexidine 0.12% Liquid 15 milliLiter(s) Oral Mucosa every 12 hours  chlorhexidine 2% Cloths 1 Application(s) Topical daily  CRRT Treatment    <Continuous>  dexMEDEtomidine Infusion 0.1 MICROgram(s)/kG/Hr IV Continuous <Continuous>  dextrose 50% Injectable 25 Gram(s) IV Push once  dextrose 50% Injectable 12.5 Gram(s) IV Push once  enalaprilat Injectable 1.25 milliGRAM(s) IV Push every 6 hours  fondaparinux Injectable 1.5 milliGRAM(s) SubCutaneous daily  insulin glargine Injectable (LANTUS) 9 Unit(s) SubCutaneous at bedtime  insulin lispro (ADMELOG) corrective regimen sliding scale   SubCutaneous every 6 hours  insulin lispro Injectable (ADMELOG) 2 Unit(s) SubCutaneous every 6 hours  levothyroxine Injectable 20 MICROGram(s) IV Push at bedtime  meropenem  IVPB 1000 milliGRAM(s) IV Intermittent every 12 hours  methylPREDNISolone sodium succinate Injectable 10 milliGRAM(s) IV Push every 6 hours  metoclopramide Injectable 10 milliGRAM(s) IV Push every 8 hours  norepinephrine Infusion 0.05 MICROgram(s)/kG/Min IV Continuous <Continuous>  pantoprazole  Injectable 40 milliGRAM(s) IV Push daily  petrolatum Ophthalmic Ointment 1 Application(s) Both EYES two times a day  Phoxillum Filtration BK 4 / 2.5 5000 milliLiter(s) CRRT <Continuous>  Phoxillum Filtration BK 4 / 2.5 5000 milliLiter(s) CRRT <Continuous>  polyethylene glycol 3350 17 Gram(s) Oral at bedtime  PrismaSOL Filtration BGK 4 / 2.5 5000 milliLiter(s) CRRT <Continuous>    PRN Inpatient Medications  HYDROmorphone  Injectable 0.5 milliGRAM(s) IV Push every 4 hours PRN  HYDROmorphone  Injectable 1 milliGRAM(s) IV Push every 4 hours PRN      REVIEW OF SYSTEMS  --------------------------------------------------------------------------------  Unable to obtain ROS    All other systems were reviewed and are negative, except as noted.    VITALS/PHYSICAL EXAM  --------------------------------------------------------------------------------  T(C): 37.8 (04-03-23 @ 08:00), Max: 38.3 (04-02-23 @ 12:00)  HR: 79 (04-03-23 @ 09:00) (79 - 108)  BP: --  RR: 22 (04-03-23 @ 09:00) (4 - 32)  SpO2: 100% (04-03-23 @ 09:00) (97% - 100%)  Wt(kg): --        04-02-23 @ 07:01  -  04-03-23 @ 07:00  --------------------------------------------------------  IN: 1366.6 mL / OUT: 2631 mL / NET: -1264.4 mL    04-03-23 @ 07:01  -  04-03-23 @ 10:11  --------------------------------------------------------  IN: 192.6 mL / OUT: 10 mL / NET: 182.6 mL      Physical Exam:  	Gen: Ill appearing, intubated   	HEENT: +ETT  	Pulm: Mechanical breath sounds  	CV: RRR  	Abd: Obese, distended  	Ext: + LE edema B/L. Trace B/L UE edema   	Neuro: Sedated              : Babin+ with small amount of dark urine in the bag  	Skin: Warm and dry              Dialysis access: L IJ non tunneled HD catheter       LABS/STUDIES  --------------------------------------------------------------------------------              7.6    30.23 >-----------<  262      [04-03-23 @ 01:00]              22.7     133  |  101  |  28  ----------------------------<  156      [04-03-23 @ 01:00]  3.8   |  22  |  1.44        Ca     7.6     [04-03-23 @ 01:00]      Mg     2.60     [04-03-23 @ 01:00]      Phos  3.4     [04-03-23 @ 01:00]    TPro  5.7  /  Alb  2.4  /  TBili  10.0  /  DBili  x   /  AST  138  /  ALT  219  /  AlkPhos  208  [04-03-23 @ 01:00]    PT/INR: PT 15.7 , INR 1.35       [04-03-23 @ 01:00]  PTT: 25.6       [04-03-23 @ 01:00]      Creatinine Trend:  SCr 1.44 [04-03 @ 01:00]  SCr 1.50 [04-02 @ 16:30]  SCr 1.31 [04-02 @ 05:11]  SCr 1.41 [04-01 @ 20:24]  SCr 1.22 [04-01 @ 12:00]          HIV Nonreact      [03-30-23 @ 20:10]     [facility] Division of Kidney Diseases & Hypertension  FOLLOW UP NOTE  [phone]--------------------------------------------------------------------------------  Chief Complaint: Anuric CHATO requiring CRRT     24 hour events/subjective: Pt. was seen and examined in the SICU. Pt. intubated, sedated. No longer requiring pressors.  Remains on CRRT. Unable to obtain further ROS.     PAST HISTORY  --------------------------------------------------------------------------------  No significant changes to PMH, PSH, FHx, SHx, unless otherwise noted    ALLERGIES & MEDICATIONS  --------------------------------------------------------------------------------  Allergies    No Known Allergies    Intolerances    Standing Inpatient Medications  chlorhexidine 0.12% Liquid 15 milliLiter(s) Oral Mucosa every 12 hours  chlorhexidine 2% Cloths 1 Application(s) Topical daily  CRRT Treatment    <Continuous>  dexMEDEtomidine Infusion 0.1 MICROgram(s)/kG/Hr IV Continuous <Continuous>  dextrose 50% Injectable 25 Gram(s) IV Push once  dextrose 50% Injectable 12.5 Gram(s) IV Push once  enalaprilat Injectable 1.25 milliGRAM(s) IV Push every 6 hours  fondaparinux Injectable 1.5 milliGRAM(s) SubCutaneous daily  insulin glargine Injectable (LANTUS) 9 Unit(s) SubCutaneous at bedtime  insulin lispro (ADMELOG) corrective regimen sliding scale   SubCutaneous every 6 hours  insulin lispro Injectable (ADMELOG) 2 Unit(s) SubCutaneous every 6 hours  levothyroxine Injectable 20 MICROGram(s) IV Push at bedtime  meropenem  IVPB 1000 milliGRAM(s) IV Intermittent every 12 hours  methylPREDNISolone sodium succinate Injectable 10 milliGRAM(s) IV Push every 6 hours  metoclopramide Injectable 10 milliGRAM(s) IV Push every 8 hours  norepinephrine Infusion 0.05 MICROgram(s)/kG/Min IV Continuous <Continuous>  pantoprazole  Injectable 40 milliGRAM(s) IV Push daily  petrolatum Ophthalmic Ointment 1 Application(s) Both EYES two times a day  Phoxillum Filtration BK 4 / 2.5 5000 milliLiter(s) CRRT <Continuous>  Phoxillum Filtration BK 4 / 2.5 5000 milliLiter(s) CRRT <Continuous>  polyethylene glycol 3350 17 Gram(s) Oral at bedtime  PrismaSOL Filtration BGK 4 / 2.5 5000 milliLiter(s) CRRT <Continuous>    PRN Inpatient Medications  HYDROmorphone  Injectable 0.5 milliGRAM(s) IV Push every 4 hours PRN  HYDROmorphone  Injectable 1 milliGRAM(s) IV Push every 4 hours PRN    REVIEW OF SYSTEMS  --------------------------------------------------------------------------------  Unable to obtain ROS    VITALS/PHYSICAL EXAM  --------------------------------------------------------------------------------  T(C): 37.8 (04-03-23 @ 08:00), Max: 38.3 (04-02-23 @ 12:00)  HR: 79 (04-03-23 @ 09:00) (79 - 108)  BP: --  RR: 22 (04-03-23 @ 09:00) (4 - 32)  SpO2: 100% (04-03-23 @ 09:00) (97% - 100%)  Wt(kg): --    04-02-23 @ 07:01  -  04-03-23 @ 07:00  --------------------------------------------------------  IN: 1366.6 mL / OUT: 2631 mL / NET: -1264.4 mL    04-03-23 @ 07:01  -  04-03-23 @ 10:11  --------------------------------------------------------  IN: 192.6 mL / OUT: 10 mL / NET: 182.6 mL    Physical Exam:  	Gen: Ill appearing, intubated   	HEENT: +ETT  	Pulm: Mechanical breath sounds  	CV: S1S2+  	Abd: Obese, distended  	Ext: +LE edema B/L, Trace B/L UE edema   	Neuro: Sedated              : Babin+ with small amount of dark urine in the bag  	Skin: Warm and dry              Dialysis access: L IJ non tunneled HD catheter       LABS/STUDIES  --------------------------------------------------------------------------------              7.6    30.23 >-----------<  262      [04-03-23 @ 01:00]              22.7     133  |  101  |  28  ----------------------------<  156      [04-03-23 @ 01:00]  3.8   |  22  |  1.44        Ca     7.6     [04-03-23 @ 01:00]      Mg     2.60     [04-03-23 @ 01:00]      Phos  3.4     [04-03-23 @ 01:00]    TPro  5.7  /  Alb  2.4  /  TBili  10.0  /  DBili  x   /  AST  138  /  ALT  219  /  AlkPhos  208  [04-03-23 @ 01:00]    Creatinine Trend:  SCr 1.44 [04-03 @ 01:00]  SCr 1.50 [04-02 @ 16:30]  SCr 1.31 [04-02 @ 05:11]  SCr 1.41 [04-01 @ 20:24]  SCr 1.22 [04-01 @ 12:00]

## 2023-04-03 NOTE — PROGRESS NOTE ADULT - SUBJECTIVE AND OBJECTIVE BOX
HISTORY  Patient is a 48 year old female with PMHx significant for HTN, hypothyroidism,  30 years ago, breast cancer s/p left mastectomy and chemo in  presenting with epigastric pain and LUQ pain. Found to have necrotizing pancreatitis. Transferred from [facility] ([place]) for hypotension and tachypnea requiring emergent intubation and multiple pressors. SICU consulted for hemodynamic monitoring and respiratory management.    24 HOUR EVENTS:  - MRCP completed, pending read  - Tylenol dc'd given elevated liver enzymes and T Bili  - Protonix decreased back to qD  - Continuing to hold TF and NGT to LCWS      SUBJECTIVE/ROS:  [ ] A ten-point review of systems was otherwise negative except as noted.  [ ] Due to altered mental status/intubation, subjective information were not able to be obtained from the patient. History was obtained, to the extent possible, from review of the chart and collateral sources of information.      NEURO  RASS:     GCS:     CAM ICU:  Exam:   Meds: dexMEDEtomidine Infusion 0.207 MICROgram(s)/kG/Hr IV Continuous <Continuous>  HYDROmorphone  Injectable 0.5 milliGRAM(s) IV Push every 4 hours PRN Moderate Pain (4 - 6)  HYDROmorphone  Injectable 1 milliGRAM(s) IV Push every 4 hours PRN Severe Pain (7 - 10)  metoclopramide Injectable 10 milliGRAM(s) IV Push every 8 hours    [x] Adequacy of sedation and pain control has been assessed and adjusted      RESPIRATORY  RR: 23 (23 @ 23:15) (4 - 46)  SpO2: 100% (23 @ 23:21) (96% - 100%)  Wt(kg): --  Exam:   Mechanical Ventilation: Mode: AC/ CMV (Assist Control/ Continuous Mandatory Ventilation), RR (machine): 20, RR (patient): 20, TV (machine): 350, FiO2: 30, PEEP: 5, ITime: 0.68, MAP: 10, PIP: 25  ABG - ( 2023 08:10 )  pH: 7.46  /  pCO2: 35    /  pO2: 92    / HCO3: 25    / Base Excess: 1.1   /  SaO2: 98.2    Lactate: x                [ ] Extubation Readiness Assessed  Meds:       CARDIOVASCULAR  HR: 92 (23 @ 23:21) (88 - 130)  BP: 96/54 (23 @ 04:00) (96/54 - 96/54)  BP(mean): 67 (23 @ 04:00) (67 - 67)  ABP: 113/61 (23 @ 23:15) (81/42 - 127/67)  ABP(mean): 83 (23 @ 23:15) (58 - 91)  Wt(kg): --  CVP(cm H2O): --      Exam:  Cardiac Rhythm:  Perfusion     [ ]Adequate   [ ]Inadequate  Mentation   [ ]Normal       [ ]Reduced  Extremities  [ ]Warm         [ ]Cool  Volume Status [ ]Hypervolemic [ ]Euvolemic [ ]Hypovolemic  Meds: enalaprilat Injectable 1.25 milliGRAM(s) IV Push every 6 hours  norepinephrine Infusion 0.05 MICROgram(s)/kG/Min IV Continuous <Continuous>        GI/NUTRITION  Exam:  Diet:  Meds: pantoprazole  Injectable 40 milliGRAM(s) IV Push daily  polyethylene glycol 3350 17 Gram(s) Oral at bedtime      GENITOURINARY  I&O's Detail     @ 07:  -  02 @ 07:00  --------------------------------------------------------  IN:    Dexmedetomidine: 822.7 mL    IV PiggyBack: 100 mL    Nepro with Carb Steady: 780 mL    Norepinephrine: 132.4 mL  Total IN: 1835.1 mL    OUT:    Indwelling Catheter - Urethral (mL): 145 mL    Nasogastric/Oral tube (mL): 1300 mL    Other (mL): 2982 mL  Total OUT: 4427 mL    Total NET: -2591.9 mL       @ 07:  -   @ 00:47  --------------------------------------------------------  IN:    Dexmedetomidine: 442.4 mL    IV PiggyBack: 50 mL    Norepinephrine: 289.8 mL  Total IN: 782.2 mL    OUT:    Indwelling Catheter - Urethral (mL): 165 mL    Nasogastric/Oral tube (mL): 700 mL    Other (mL): 768 mL  Total OUT: 1633 mL    Total NET: -850.8 mL              136  |  103  |  29<H>  ----------------------------<  186<H>  3.7   |  22  |  1.50<H>    Ca    7.2<L>      2023 16:30  Phos  2.8       Mg     2.60         TPro  5.2<L>  /  Alb  2.3<L>  /  TBili  9.9<H>  /  DBili  x   /  AST  141<H>  /  ALT  220<H>  /  AlkPhos  218<H>      [ ] Babin catheter, indication:   Meds:       HEMATOLOGIC  Meds: fondaparinux Injectable 1.5 milliGRAM(s) SubCutaneous daily    [x] VTE Prophylaxis                        7.2    28.90 )-----------( 216      ( 2023 16:30 )             21.6     PT/INR - ( 2023 16:30 )   PT: 15.7 sec;   INR: 1.35 ratio         PTT - ( 2023 16:30 )  PTT:25.9 sec  Transfusion     [ ] PRBC   [ ] Platelets   [ ] FFP   [ ] Cryoprecipitate      INFECTIOUS DISEASES  T(C): 37.8 (23 @ 16:15), Max: 38.3 (23 @ 12:00)  Wt(kg): --  WBC Count: 28.90 K/uL ( @ 16:30)  WBC Count: 33.33 K/uL ( @ 06:11)    Recent Cultures:  Specimen Source: .Blood Blood-Peripheral,  @ 16:37; Results   Growth in aerobic bottle: Staphylococcus hominis  Coagulase Negative Staphylococci isolated from a single blood culture set  may represent contamination.  Contact the Microbiology Department at [phone] if susceptibility  testing is clinically indicated.  ***Blood Panel PCR results on this specimen are available  approximately 3 hours after the Gram stain result.***  Gram stain, PCR, and/or culture results may not always  correspond due to difference in methodologies.  ************************************************************  This PCR assay was performed by multiplex PCR. This  Assay tests for 66 bacterial and resistance gene targets.  Please refer to the [facility] test directory  at [email or web address] for details.; Gram Stain:   Growth in aerobic bottle: Gram Positive Cocci in Clusters; Organism: Blood Culture PCR  Specimen Source: .Blood Blood-Peripheral,  @ 16:30; Results   No growth to date.; Gram Stain: --; Organism: --    Meds: meropenem  IVPB 1000 milliGRAM(s) IV Intermittent every 12 hours  meropenem  IVPB            ENDOCRINE  Capillary Blood Glucose    Meds: dextrose 50% Injectable 25 Gram(s) IV Push once  dextrose 50% Injectable 12.5 Gram(s) IV Push once  insulin glargine Injectable (LANTUS) 9 Unit(s) SubCutaneous at bedtime  insulin lispro (ADMELOG) corrective regimen sliding scale   SubCutaneous every 6 hours  insulin lispro Injectable (ADMELOG) 2 Unit(s) SubCutaneous every 6 hours  levothyroxine Injectable 20 MICROGram(s) IV Push at bedtime  methylPREDNISolone sodium succinate Injectable 10 milliGRAM(s) IV Push every 6 hours        ACCESS DEVICES:  [ ] Peripheral IV  [ ] Central Venous Line	[ ] R	[ ] L	[ ] IJ	[ ] Fem	[ ] SC	Placed:   [ ] Arterial Line		[ ] R	[ ] L	[ ] Fem	[ ] Rad	[ ] Ax	Placed:   [ ] PICC:					[ ] Mediport  [ ] Urinary Catheter, Date Placed:   [ ] Necessity of urinary, arterial, and venous catheters discussed    OTHER MEDICATIONS:  chlorhexidine 0.12% Liquid 15 milliLiter(s) Oral Mucosa every 12 hours  chlorhexidine 2% Cloths 1 Application(s) Topical daily  CRRT Treatment    <Continuous>  petrolatum Ophthalmic Ointment 1 Application(s) Both EYES two times a day  Phoxillum Filtration BK 4 / 2.5 5000 milliLiter(s) CRRT <Continuous>  Phoxillum Filtration BK 4 / 2.5 5000 milliLiter(s) CRRT <Continuous>  PrismaSOL Filtration BGK 0 / 2.5 5000 milliLiter(s) CRRT <Continuous>      CODE STATUS:     IMAGING: HISTORY  Patient is a 48 year old female with PMHx significant for HTN, hypothyroidism,  30 years ago, breast cancer s/p left mastectomy and chemo in  presenting with epigastric pain and LUQ pain. Found to have necrotizing pancreatitis. Transferred from [facility] ([place]) for hypotension and tachypnea requiring emergent intubation and multiple pressors. SICU consulted for hemodynamic monitoring and respiratory management.    24 HOUR EVENTS:  - MRCP 4/3: no choledocholithiasis  - Tylenol dc'd given elevated liver enzymes and T Bili  - Protonix decreased back to qD  - Continuing to hold TF and NGT to LCWS      SUBJECTIVE/ROS:  [ ] A ten-point review of systems was otherwise negative except as noted.  [ ] Due to altered mental status/intubation, subjective information were not able to be obtained from the patient. History was obtained, to the extent possible, from review of the chart and collateral sources of information.      NEURO  RASS:     GCS:     CAM ICU:  Exam:   Meds: dexMEDEtomidine Infusion 0.207 MICROgram(s)/kG/Hr IV Continuous <Continuous>  HYDROmorphone  Injectable 0.5 milliGRAM(s) IV Push every 4 hours PRN Moderate Pain (4 - 6)  HYDROmorphone  Injectable 1 milliGRAM(s) IV Push every 4 hours PRN Severe Pain (7 - 10)  metoclopramide Injectable 10 milliGRAM(s) IV Push every 8 hours    [x] Adequacy of sedation and pain control has been assessed and adjusted      RESPIRATORY  RR: 23 (23 @ 23:15) (4 - 46)  SpO2: 100% (23 @ 23:21) (96% - 100%)  Wt(kg): --  Exam:   Mechanical Ventilation: Mode: AC/ CMV (Assist Control/ Continuous Mandatory Ventilation), RR (machine): 20, RR (patient): 20, TV (machine): 350, FiO2: 30, PEEP: 5, ITime: 0.68, MAP: 10, PIP: 25  ABG - ( 2023 08:10 )  pH: 7.46  /  pCO2: 35    /  pO2: 92    / HCO3: 25    / Base Excess: 1.1   /  SaO2: 98.2    Lactate: x                [ ] Extubation Readiness Assessed  Meds:       CARDIOVASCULAR  HR: 92 (23 @ 23:21) (88 - 130)  BP: 96/54 (23 @ 04:00) (96/54 - 96/54)  BP(mean): 67 (23 @ 04:00) (67 - 67)  ABP: 113/61 (23 @ 23:15) (81/42 - 127/67)  ABP(mean): 83 (23 @ 23:15) (58 - 91)  Wt(kg): --  CVP(cm H2O): --      Exam:  Cardiac Rhythm:  Perfusion     [ ]Adequate   [ ]Inadequate  Mentation   [ ]Normal       [ ]Reduced  Extremities  [ ]Warm         [ ]Cool  Volume Status [ ]Hypervolemic [ ]Euvolemic [ ]Hypovolemic  Meds: enalaprilat Injectable 1.25 milliGRAM(s) IV Push every 6 hours  norepinephrine Infusion 0.05 MICROgram(s)/kG/Min IV Continuous <Continuous>        GI/NUTRITION  Exam:  Diet:  Meds: pantoprazole  Injectable 40 milliGRAM(s) IV Push daily  polyethylene glycol 3350 17 Gram(s) Oral at bedtime      GENITOURINARY  I&O's Detail     @ 07:  -   @ 07:00  --------------------------------------------------------  IN:    Dexmedetomidine: 822.7 mL    IV PiggyBack: 100 mL    Nepro with Carb Steady: 780 mL    Norepinephrine: 132.4 mL  Total IN: 1835.1 mL    OUT:    Indwelling Catheter - Urethral (mL): 145 mL    Nasogastric/Oral tube (mL): 1300 mL    Other (mL): 2982 mL  Total OUT: 4427 mL    Total NET: -2591.9 mL       @ 07:  -  03 @ 00:47  --------------------------------------------------------  IN:    Dexmedetomidine: 442.4 mL    IV PiggyBack: 50 mL    Norepinephrine: 289.8 mL  Total IN: 782.2 mL    OUT:    Indwelling Catheter - Urethral (mL): 165 mL    Nasogastric/Oral tube (mL): 700 mL    Other (mL): 768 mL  Total OUT: 1633 mL    Total NET: -850.8 mL              136  |  103  |  29<H>  ----------------------------<  186<H>  3.7   |  22  |  1.50<H>    Ca    7.2<L>      2023 16:30  Phos  2.8       Mg     2.60         TPro  5.2<L>  /  Alb  2.3<L>  /  TBili  9.9<H>  /  DBili  x   /  AST  141<H>  /  ALT  220<H>  /  AlkPhos  218<H>      [ ] Babin catheter, indication:   Meds:       HEMATOLOGIC  Meds: fondaparinux Injectable 1.5 milliGRAM(s) SubCutaneous daily    [x] VTE Prophylaxis                        7.2    28.90 )-----------( 216      ( 2023 16:30 )             21.6     PT/INR - ( 2023 16:30 )   PT: 15.7 sec;   INR: 1.35 ratio         PTT - ( 2023 16:30 )  PTT:25.9 sec  Transfusion     [ ] PRBC   [ ] Platelets   [ ] FFP   [ ] Cryoprecipitate      INFECTIOUS DISEASES  T(C): 37.8 (23 @ 16:15), Max: 38.3 (23 @ 12:00)  Wt(kg): --  WBC Count: 28.90 K/uL ( @ 16:30)  WBC Count: 33.33 K/uL ( @ 06:11)    Recent Cultures:  Specimen Source: .Blood Blood-Peripheral,  @ 16:37; Results   Growth in aerobic bottle: Staphylococcus hominis  Coagulase Negative Staphylococci isolated from a single blood culture set  may represent contamination.  Contact the Microbiology Department at [phone] if susceptibility  testing is clinically indicated.  ***Blood Panel PCR results on this specimen are available  approximately 3 hours after the Gram stain result.***  Gram stain, PCR, and/or culture results may not always  correspond due to difference in methodologies.  ************************************************************  This PCR assay was performed by multiplex PCR. This  Assay tests for 66 bacterial and resistance gene targets.  Please refer to the [facility] test directory  at [email or web address] for details.; Gram Stain:   Growth in aerobic bottle: Gram Positive Cocci in Clusters; Organism: Blood Culture PCR  Specimen Source: .Blood Blood-Peripheral,  @ 16:30; Results   No growth to date.; Gram Stain: --; Organism: --    Meds: meropenem  IVPB 1000 milliGRAM(s) IV Intermittent every 12 hours  meropenem  IVPB            ENDOCRINE  Capillary Blood Glucose    Meds: dextrose 50% Injectable 25 Gram(s) IV Push once  dextrose 50% Injectable 12.5 Gram(s) IV Push once  insulin glargine Injectable (LANTUS) 9 Unit(s) SubCutaneous at bedtime  insulin lispro (ADMELOG) corrective regimen sliding scale   SubCutaneous every 6 hours  insulin lispro Injectable (ADMELOG) 2 Unit(s) SubCutaneous every 6 hours  levothyroxine Injectable 20 MICROGram(s) IV Push at bedtime  methylPREDNISolone sodium succinate Injectable 10 milliGRAM(s) IV Push every 6 hours        ACCESS DEVICES:  [ ] Peripheral IV  [ ] Central Venous Line	[ ] R	[ ] L	[ ] IJ	[ ] Fem	[ ] SC	Placed:   [ ] Arterial Line		[ ] R	[ ] L	[ ] Fem	[ ] Rad	[ ] Ax	Placed:   [ ] PICC:					[ ] Mediport  [ ] Urinary Catheter, Date Placed:   [ ] Necessity of urinary, arterial, and venous catheters discussed    OTHER MEDICATIONS:  chlorhexidine 0.12% Liquid 15 milliLiter(s) Oral Mucosa every 12 hours  chlorhexidine 2% Cloths 1 Application(s) Topical daily  CRRT Treatment    <Continuous>  petrolatum Ophthalmic Ointment 1 Application(s) Both EYES two times a day  Phoxillum Filtration BK 4 / 2.5 5000 milliLiter(s) CRRT <Continuous>  Phoxillum Filtration BK 4 / 2.5 5000 milliLiter(s) CRRT <Continuous>  PrismaSOL Filtration BGK 0 / 2.5 5000 milliLiter(s) CRRT <Continuous>      CODE STATUS:     IMAGING:

## 2023-04-03 NOTE — PROGRESS NOTE ADULT - ATTENDING COMMENTS
Pt. with oliguric CHATO, currently on CRRT/CVVHDF. Pt. tolerating CRRT/CVVHDF during rounds. BP stable during rounds. HD catheter functioning well. Monitor labs and urine output. Avoid any potential nephrotoxins. Dose medications as per eGFR. Overall prognosis guarded.

## 2023-04-04 NOTE — PROGRESS NOTE ADULT - ASSESSMENT
Patient is a 48 year old female with PMHx significant for HTN, hypothyroidism,  30 years ago, breast cancer s/p left mastectomy and chemo in  presenting with epigastric pain and LUQ pain. Found to have necrotizing pancreatitis. Transferred from OSH ([place]) for hypotension and tachypnea requiring emergent intubation and multiple pressors. SICU consulted for hemodynamic monitoring and respiratory management.    PLAN:  Neuro  Intubated and sedated for clinical course; No acute neurological issues   - Wean precedex  - Dilaudid prn    Resp:  AHRF w/ b/l pleff 2/2 necrotizing pancreatitis   -Volume control 20/350/5/40 --> CPAP  -CPAP as tolerated   - POCUS 4/3, albumin 250, lasix 80mg    CV  Mixed shock state 2/2 septic shock w/ E. Coli in urine vs hypovolemic shock now s/p 11L resus    - off levo   - Treat ID as below     GI:   #Necrotizing pancreatitis 2/2 gallstones  Diagnostics  CT 3/27: >50%of the pancreatic head  extends into the mesentery and inferiorly along the retroperitoneum into the lower abdomen  RUQ US: Chololithiasis (2cm stone) w/ no acute frannie   - MRCP 4/3: no choledocholithiasis    Therapeutics:   - Plan for interval CT   - Continue TF (Nepro) at rate of 30cc/hr  - GI ppx w/ protonix   - Reglan  - Dolcolax    #Shocked Liver + Hyperbilirubinemia   Likely 2/2 to shocked state w/ LFTs now trending down w/ bili following protracted course as expected but yet to peak. Possible concern for acute obstructive biliary process.   - MRCP read no choledocholithiasis  - Hold tylenol     Renal:  ARF 2/2 shock state resulting in ATN requiring CRRT    - Continue CRRT net negative  as tolerated   - Baibn catheter in place    Heme:   Anemia likely 2/2 disease course vs suppression; No active bleeding reported   Diagnostics:   - CBC q12     Therapeutics:   - received 1U PRBC 3/27   - SCDs and argatroban for DVT ppx    #Thrombocyopenia  Likely suppression 2/2 infection vs shock state vs HIT  Diagnostics  - HIT Ab positive  -f/u DOUGLAS     Therapeutics:  -CRRT dosed Fondaparinux 1.5 qd for DVT ppx     #Splenic Vein Thrombosis  Diagnostics:  3/22 CT Abd w/ contrast: Focal filling defect in the splenic vein at the level of the body of   the pancreas for which an underlying thrombus is suspected     ID:   Intermittently febrile w/ leukocytosis c/f septic shock  E coli UTI  Diagnostics:  - 3/22 Ucx: E coli  - 3/30 Bcx: staph hominis in aerobic bottle x 1 (likely contaminant)     Therapeutics:   - Meropenem (3/26- )    Endo:   - Off solumedrol  - c/w synthroid 20 IV (home med)   - ISS    Code Status: Full code    Disposition: SICU Patient is a 48 year old female with PMHx significant for HTN, hypothyroidism,  30 years ago, breast cancer s/p left mastectomy and chemo in  presenting with epigastric pain and LUQ pain. Found to have necrotizing pancreatitis. Transferred from OSH ([place]) for hypotension and tachypnea requiring emergent intubation and multiple pressors. SICU consulted for hemodynamic monitoring and respiratory management.    PLAN:  Neuro  Intubated and sedated for clinical course; No acute neurological issues   - Wean precedex  - Dilaudid prn    Resp:  AHRF w/ b/l pleff 2/2 necrotizing pancreatitis   -CPAP as tolerated     CV  Mixed shock state 2/2 septic shock w/ E. Coli in urine vs hypovolemic shock now s/p 11L resus    - wean levo as tolerated   - Treat ID as below     GI:   #Necrotizing pancreatitis 2/2 gallstones  Diagnostics  CT 3/27: >50%of the pancreatic head  extends into the mesentery and inferiorly along the retroperitoneum into the lower abdomen  RUQ US: Chololithiasis (2cm stone) w/ no acute frannie   - MRCP 4/3: no choledocholithiasis    Therapeutics:   - Plan for interval CT   - Continue TF (Nepro) at rate of 30cc/hr  - GI ppx w/ protonix   - Reglan  - Dulcolax    #Shocked Liver + Hyperbilirubinemia   Likely 2/2 to shocked state w/ LFTs now trending down w/ bili following protracted course as expected but yet to peak. Possible concern for acute obstructive biliary process.   - MRCP read no choledocholithiasis  - Hold tylenol     Renal:  ARF 2/2 shock state resulting in ATN requiring CRRT    - Continue CRRT net negative  as tolerated   - Babin catheter in place    Heme:   Anemia likely 2/2 disease course vs suppression; No active bleeding reported   Diagnostics:   - CBC q12     Therapeutics:   - received 1U PRBC 3/27   - SCDs and argatroban for DVT ppx    #Thrombocyopenia  Likely suppression 2/2 infection vs shock state vs HIT  Diagnostics  - HIT Ab positive    Therapeutics:  -CRRT dosed Fondaparinux 1.5 qd for DVT ppx     #Splenic Vein Thrombosis  Diagnostics:  3/22 CT Abd w/ contrast: Focal filling defect in the splenic vein at the level of the body of   the pancreas for which an underlying thrombus is suspected     ID:   Intermittently febrile w/ leukocytosis c/f septic shock  E coli UTI  Diagnostics:  - 3/22 Ucx: E coli  - 3/30 Bcx: staph hominis in aerobic bottle x 1 (likely contaminant)     Therapeutics:   - Meropenem (3/26- )    Endo:   - Off solumedrol  - c/w synthroid 20 IV (home med)   - ISS    Code Status: Full code    Disposition: SICU

## 2023-04-04 NOTE — PROGRESS NOTE ADULT - SUBJECTIVE AND OBJECTIVE BOX
Surgery Daily Progress Note  =====================================================  Interval / Overnight Events: Following commands this AM.      HPI:  Patient is a 48 year old female with a PMHx of HTN, hypothyroidism,  (30 years ago) and breast cancer (S/P left mastectomy and chemo in ) who presented with epigastric pain and left upper quadrant pain.  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  Transferred from [facility] ([place] for hypotension and tachypnea requiring emergent intubation and multiple pressors.  SICU consulted for hemodynamic monitoring and respiratory management. (24 Mar 2023 18:01)      PAST MEDICAL & SURGICAL HISTORY:  Hypertension  Hypothyroidism  Breast cancer  S/P   H/O left mastectomy      ALLERGIES:  No Known Allergies    --------------------------------------------------------------------------------------    MEDICATIONS:    Neurologic Medications  dexMEDEtomidine Infusion 0.1 MICROgram(s)/kG/Hr IV Continuous <Continuous>  HYDROmorphone  Injectable 1 milliGRAM(s) IV Push every 4 hours PRN Severe Pain (7 - 10)  HYDROmorphone  Injectable 0.5 milliGRAM(s) IV Push every 4 hours PRN Moderate Pain (4 - 6)  metoclopramide Injectable 10 milliGRAM(s) IV Push every 8 hours    Cardiovascular Medications  enalaprilat Injectable 1.25 milliGRAM(s) IV Push every 6 hours  norepinephrine Infusion 0.05 MICROgram(s)/kG/Min IV Continuous <Continuous>    Gastrointestinal Medications  pantoprazole  Injectable 40 milliGRAM(s) IV Push daily  polyethylene glycol 3350 17 Gram(s) Oral at bedtime    Hematologic/Oncologic Medications  fondaparinux Injectable 1.5 milliGRAM(s) SubCutaneous daily    Antimicrobial/Immunologic Medications  meropenem  IVPB 1000 milliGRAM(s) IV Intermittent every 12 hours    Endocrine/Metabolic Medications  dextrose 50% Injectable 25 Gram(s) IV Push once  dextrose 50% Injectable 12.5 Gram(s) IV Push once  insulin glargine Injectable (LANTUS) 9 Unit(s) SubCutaneous at bedtime  insulin lispro (ADMELOG) corrective regimen sliding scale   SubCutaneous every 6 hours  insulin lispro Injectable (ADMELOG) 2 Unit(s) SubCutaneous every 6 hours  levothyroxine Injectable 20 MICROGram(s) IV Push at bedtime  methylPREDNISolone sodium succinate Injectable 10 milliGRAM(s) IV Push every 6 hours    Topical/Other Medications  chlorhexidine 0.12% Liquid 15 milliLiter(s) Oral Mucosa every 12 hours  chlorhexidine 2% Cloths 1 Application(s) Topical daily  CRRT Treatment    <Continuous>  petrolatum Ophthalmic Ointment 1 Application(s) Both EYES two times a day  Phoxillum Filtration BK 4 / 2.5 5000 milliLiter(s) CRRT <Continuous>  Phoxillum Filtration BK 4 / 2.5 5000 milliLiter(s) CRRT <Continuous>  PrismaSOL Filtration BGK 4 / 2.5 5000 milliLiter(s) CRRT <Continuous>    --------------------------------------------------------------------------------------    VITAL SIGNS:  T(C): 37.7 (2023 04:00), Max: 37.7 (2023 04:00)  T(F): 99.9 (2023 04:00), Max: 99.9 (2023 04:00)  HR: 92 (2023 08:00) (65 - 126)  ABP: 100/51 (2023 08:00) (81/48 - 143/79)  ABP(mean): 66 (2023 08:00) (59 - 109)  RR: 23 (2023 08:00) (19 - 38)  SpO2: 100% (2023 08:00) (99% - 100%)    --------------------------------------------------------------------------------------    INS AND OUTS:    2023 07:01  -  2023 07:00  --------------------------------------------------------  IN:    Dexmedetomidine: 1060.8 mL    IV PiggyBack: 200 mL    Nepro with Carb Steady: 585 mL    Norepinephrine: 78.6 mL  Total IN: 1924.4 mL    OUT:    Indwelling Catheter - Urethral (mL): 75 mL    Nasogastric/Oral tube (mL): 50 mL    Other (mL): 1560 mL  Total OUT: 1685 mL    Total NET: 239.4 mL      2023 07:  -  2023 08:36  --------------------------------------------------------  IN:    Dexmedetomidine: 22.1 mL    Nepro with Carb Steady: 25 mL    Norepinephrine: 7.7 mL  Total IN: 54.8 mL    OUT:    Indwelling Catheter - Urethral (mL): 0 mL    Nasogastric/Oral tube (mL): 0 mL  Total OUT: 0 mL    Total NET: 54.8 mL    --------------------------------------------------------------------------------------    EXAM    NEUROLOGY  Exam: Normal, in no acute distress.    HEENT  Exam: Normocephalic, atraumatic.    RESPIRATORY  Exam: Intubated.  Mechanical Ventilation: Mode: AC/ CMV (Assist Control/ Continuous Mandatory Ventilation), RR (machine): 20, TV (machine): 350, FiO2: 30, PEEP: 5, ITime: 0.6, MAP: 10, PIP: 23    CARDIOVASCULAR  Exam: S1, S2.  Regular rate and rhythm.    GI/NUTRITION  Exam: Abdomen softly distended.  Non-tender.  NGT.  Current Diet: NPO with tube feeds via NGT    MUSCULOSKELETAL  Exam: All extremities moving spontaneously without limitations.      HEMATOLOGIC  [x] VTE Prophylaxis: fondaparinux Injectable 1.5 milliGRAM(s) SubCutaneous daily      INFECTIOUS DISEASE  Antimicrobials/Immunologic Medications:  meropenem  IVPB 1000 milliGRAM(s) IV Intermittent every 12 hours    --------------------------------------------------------------------------------------

## 2023-04-04 NOTE — PROGRESS NOTE ADULT - PROBLEM SELECTOR PLAN 1
Pt. with oliguric CHATO in the setting of necrotizing pancreatitis and shock. Pt. with most likely ATN. Scr was 1.24 on 3/23/23 and increased to 4.60 on 3/24/23. Pt. was initiated on CRRT/CVVHDF on 3/24. Pt. tolerating CRRT with IV pressor support.  No issues with CRRT as per discussion with RN at bedside. Labs reviewed. Plan to continue CRRT/CVVHDF with net negative balance for anticipated extubation per SICU team. Monitor labs and urine output. Avoid nephrotoxins. Dose medications as per eGFR.    If you have any questions, please feel free to contact me  Neville Schulz  Nephrology Fellow  [phone]; Prefer [employer] TEAMS   (After 5pm or on weekends please page the on-call fellow). Pt. with oliguric CHATO in the setting of necrotizing pancreatitis and shock. Pt. with most likely ATN. Scr was 1.24 on 3/23/23 and increased to 4.60 on 3/24/23. Pt. was initiated on CRRT/CVVHDF on 3/24. Pt. tolerating CRRT with IV pressor support. No issues with CRRT as per discussion with RN at bedside. Labs reviewed. Plan to continue CRRT/CVVHDF with net negative balance for anticipated extubation per SICU team. Monitor labs and urine output. Avoid nephrotoxins. Dose medications as per eGFR.    If you have any questions, please feel free to contact me  Neville Schulz  Nephrology Fellow  [phone]; Prefer [employer] TEAMS   (After 5pm or on weekends please page the on-call fellow).

## 2023-04-04 NOTE — PROGRESS NOTE ADULT - SUBJECTIVE AND OBJECTIVE BOX
[facility] Division of Kidney Diseases & Hypertension  FOLLOW UP NOTE  [phone]--------------------------------------------------------------------------------  Chief Complaint: Anuric CHATO requiring CRRT       24 hour events/subjective:  Pt. was seen and examined in the SICU. Pt. intubated, sedated. Required pressors again with increase fluid removal with CRRT.  Tolerating IV pressors. Unable to obtain further ROS.       PAST HISTORY  --------------------------------------------------------------------------------  No significant changes to PMH, PSH, FHx, SHx, unless otherwise noted    ALLERGIES & MEDICATIONS  --------------------------------------------------------------------------------  Allergies    No Known Allergies    Intolerances      Standing Inpatient Medications  chlorhexidine 0.12% Liquid 15 milliLiter(s) Oral Mucosa every 12 hours  chlorhexidine 2% Cloths 1 Application(s) Topical daily  CRRT Treatment    <Continuous>  dexMEDEtomidine Infusion 0.1 MICROgram(s)/kG/Hr IV Continuous <Continuous>  dextrose 50% Injectable 25 Gram(s) IV Push once  dextrose 50% Injectable 12.5 Gram(s) IV Push once  enalaprilat Injectable 1.25 milliGRAM(s) IV Push every 6 hours  fondaparinux Injectable 1.5 milliGRAM(s) SubCutaneous daily  insulin glargine Injectable (LANTUS) 9 Unit(s) SubCutaneous at bedtime  insulin lispro (ADMELOG) corrective regimen sliding scale   SubCutaneous every 6 hours  insulin lispro Injectable (ADMELOG) 2 Unit(s) SubCutaneous every 6 hours  levothyroxine Injectable 20 MICROGram(s) IV Push at bedtime  meropenem  IVPB 1000 milliGRAM(s) IV Intermittent every 12 hours  methylPREDNISolone sodium succinate Injectable 10 milliGRAM(s) IV Push every 6 hours  metoclopramide Injectable 10 milliGRAM(s) IV Push every 8 hours  norepinephrine Infusion 0.05 MICROgram(s)/kG/Min IV Continuous <Continuous>  pantoprazole  Injectable 40 milliGRAM(s) IV Push daily  petrolatum Ophthalmic Ointment 1 Application(s) Both EYES two times a day  Phoxillum Filtration BK 4 / 2.5 5000 milliLiter(s) CRRT <Continuous>  Phoxillum Filtration BK 4 / 2.5 5000 milliLiter(s) CRRT <Continuous>  polyethylene glycol 3350 17 Gram(s) Oral at bedtime  PrismaSOL Filtration BGK 4 / 2.5 5000 milliLiter(s) CRRT <Continuous>    PRN Inpatient Medications  HYDROmorphone  Injectable 0.5 milliGRAM(s) IV Push every 4 hours PRN  HYDROmorphone  Injectable 1 milliGRAM(s) IV Push every 4 hours PRN      REVIEW OF SYSTEMS  --------------------------------------------------------------------------------  Unable to obtain ROS    VITALS/PHYSICAL EXAM  --------------------------------------------------------------------------------  T(C): 37.7 (04-04-23 @ 04:00), Max: 37.7 (04-04-23 @ 04:00)  HR: 115 (04-04-23 @ 09:00) (65 - 126)  BP: --  RR: 31 (04-04-23 @ 09:00) (19 - 38)  SpO2: 100% (04-04-23 @ 09:00) (99% - 100%)  Wt(kg): --        04-03-23 @ 07:01  -  04-04-23 @ 07:00  --------------------------------------------------------  IN: 1924.4 mL / OUT: 1685 mL / NET: 239.4 mL    04-04-23 @ 07:01  -  04-04-23 @ 10:28  --------------------------------------------------------  IN: 113.1 mL / OUT: 125 mL / NET: -11.9 mL      Physical Exam:  	Gen: Ill appearing, intubated   	HEENT: +ETT  	Pulm: Mechanical breath sounds  	CV: S1S2+  	Abd: Obese, distended  	Ext: +LE edema B/L, Trace B/L UE edema   	Neuro: Sedated              : Babin+ with small amount of dark urine in the bag  	Skin: Warm and dry              Dialysis access: L IJ non tunneled HD catheter     LABS/STUDIES  --------------------------------------------------------------------------------              7.5    23.13 >-----------<  293      [04-04-23 @ 00:35]              22.3     138  |  104  |  25  ----------------------------<  131      [04-04-23 @ 00:35]  4.1   |  16  |  1.08        Ca     7.7     [04-04-23 @ 00:35]      Mg     2.50     [04-04-23 @ 00:35]      Phos  4.6     [04-04-23 @ 00:35]    TPro  5.6  /  Alb  2.4  /  TBili  9.0  /  DBili  x   /  AST  139  /  ALT  187  /  AlkPhos  170  [04-04-23 @ 00:35]    PT/INR: PT 17.1 , INR 1.47       [04-04-23 @ 00:35]  PTT: 26.5       [04-04-23 @ 00:35]      Creatinine Trend:  SCr 1.08 [04-04 @ 00:35]  SCr 1.31 [04-03 @ 09:47]  SCr 1.44 [04-03 @ 01:00]  SCr 1.50 [04-02 @ 16:30]  SCr 1.31 [04-02 @ 05:11]          HIV Nonreact      [03-30-23 @ 20:10]     [facility] Division of Kidney Diseases & Hypertension  FOLLOW UP NOTE  [phone]--------------------------------------------------------------------------------    Chief Complaint: Oliguric CHATO requiring CRRT     24 hour events/subjective: Pt. seen and examined in the SICU today. Pt. intubated, sedated and on IV vasopressors. Pt. currently on CRRT. .       PAST HISTORY  --------------------------------------------------------------------------------  No significant changes to PMH, PSH, FHx, SHx, unless otherwise noted    ALLERGIES & MEDICATIONS  --------------------------------------------------------------------------------  Allergies    No Known Allergies    Intolerances    Standing Inpatient Medications  chlorhexidine 0.12% Liquid 15 milliLiter(s) Oral Mucosa every 12 hours  chlorhexidine 2% Cloths 1 Application(s) Topical daily  CRRT Treatment    <Continuous>  dexMEDEtomidine Infusion 0.1 MICROgram(s)/kG/Hr IV Continuous <Continuous>  dextrose 50% Injectable 25 Gram(s) IV Push once  dextrose 50% Injectable 12.5 Gram(s) IV Push once  enalaprilat Injectable 1.25 milliGRAM(s) IV Push every 6 hours  fondaparinux Injectable 1.5 milliGRAM(s) SubCutaneous daily  insulin glargine Injectable (LANTUS) 9 Unit(s) SubCutaneous at bedtime  insulin lispro (ADMELOG) corrective regimen sliding scale   SubCutaneous every 6 hours  insulin lispro Injectable (ADMELOG) 2 Unit(s) SubCutaneous every 6 hours  levothyroxine Injectable 20 MICROGram(s) IV Push at bedtime  meropenem  IVPB 1000 milliGRAM(s) IV Intermittent every 12 hours  methylPREDNISolone sodium succinate Injectable 10 milliGRAM(s) IV Push every 6 hours  metoclopramide Injectable 10 milliGRAM(s) IV Push every 8 hours  norepinephrine Infusion 0.05 MICROgram(s)/kG/Min IV Continuous <Continuous>  pantoprazole  Injectable 40 milliGRAM(s) IV Push daily  petrolatum Ophthalmic Ointment 1 Application(s) Both EYES two times a day  Phoxillum Filtration BK 4 / 2.5 5000 milliLiter(s) CRRT <Continuous>  Phoxillum Filtration BK 4 / 2.5 5000 milliLiter(s) CRRT <Continuous>  polyethylene glycol 3350 17 Gram(s) Oral at bedtime  PrismaSOL Filtration BGK 4 / 2.5 5000 milliLiter(s) CRRT <Continuous>    PRN Inpatient Medications  HYDROmorphone  Injectable 0.5 milliGRAM(s) IV Push every 4 hours PRN  HYDROmorphone  Injectable 1 milliGRAM(s) IV Push every 4 hours PRN    REVIEW OF SYSTEMS  --------------------------------------------------------------------------------  Unable to obtain ROS    VITALS/PHYSICAL EXAM  --------------------------------------------------------------------------------  T(C): 37.7 (04-04-23 @ 04:00), Max: 37.7 (04-04-23 @ 04:00)  HR: 115 (04-04-23 @ 09:00) (65 - 126)  BP: --  RR: 31 (04-04-23 @ 09:00) (19 - 38)  SpO2: 100% (04-04-23 @ 09:00) (99% - 100%)  Wt(kg): --    04-03-23 @ 07:01  -  04-04-23 @ 07:00  --------------------------------------------------------  IN: 1924.4 mL / OUT: 1685 mL / NET: 239.4 mL    04-04-23 @ 07:01  -  04-04-23 @ 10:28  --------------------------------------------------------  IN: 113.1 mL / OUT: 125 mL / NET: -11.9 mL    Physical Exam:  	Gen: Ill appearing, intubated   	HEENT: +ETT  	Pulm: Mechanical breath sounds  	CV: S1S2+  	Abd: Obese, distended  	Ext: +LE edema B/L, Trace B/L UE edema   	Neuro: Sedated              : Babin catheter+ with small amount of dark urine in the bag  	Skin: Warm and dry              Dialysis access: L IJ non tunneled HD catheter     LABS/STUDIES  --------------------------------------------------------------------------------              7.5    23.13 >-----------<  293      [04-04-23 @ 00:35]              22.3     138  |  104  |  25  ----------------------------<  131      [04-04-23 @ 00:35]  4.1   |  16  |  1.08        Ca     7.7     [04-04-23 @ 00:35]      Mg     2.50     [04-04-23 @ 00:35]      Phos  4.6     [04-04-23 @ 00:35]    TPro  5.6  /  Alb  2.4  /  TBili  9.0  /  DBili  x   /  AST  139  /  ALT  187  /  AlkPhos  170  [04-04-23 @ 00:35]    Creatinine Trend:  SCr 1.08 [04-04 @ 00:35]  SCr 1.31 [04-03 @ 09:47]  SCr 1.44 [04-03 @ 01:00]  SCr 1.50 [04-02 @ 16:30]  SCr 1.31 [04-02 @ 05:11] [facility] Division of Kidney Diseases & Hypertension  FOLLOW UP NOTE  [phone]--------------------------------------------------------------------------------    Chief Complaint: Oliguric CHATO requiring CRRT     24 hour events/subjective: Pt. seen and examined in the SICU today. Pt. intubated, sedated and on IV vasopressors. Pt. currently on CRRT.      PAST HISTORY  --------------------------------------------------------------------------------  No significant changes to PMH, PSH, FHx, SHx, unless otherwise noted    ALLERGIES & MEDICATIONS  --------------------------------------------------------------------------------  Allergies    No Known Allergies    Intolerances    Standing Inpatient Medications  chlorhexidine 0.12% Liquid 15 milliLiter(s) Oral Mucosa every 12 hours  chlorhexidine 2% Cloths 1 Application(s) Topical daily  CRRT Treatment    <Continuous>  dexMEDEtomidine Infusion 0.1 MICROgram(s)/kG/Hr IV Continuous <Continuous>  dextrose 50% Injectable 25 Gram(s) IV Push once  dextrose 50% Injectable 12.5 Gram(s) IV Push once  enalaprilat Injectable 1.25 milliGRAM(s) IV Push every 6 hours  fondaparinux Injectable 1.5 milliGRAM(s) SubCutaneous daily  insulin glargine Injectable (LANTUS) 9 Unit(s) SubCutaneous at bedtime  insulin lispro (ADMELOG) corrective regimen sliding scale   SubCutaneous every 6 hours  insulin lispro Injectable (ADMELOG) 2 Unit(s) SubCutaneous every 6 hours  levothyroxine Injectable 20 MICROGram(s) IV Push at bedtime  meropenem  IVPB 1000 milliGRAM(s) IV Intermittent every 12 hours  methylPREDNISolone sodium succinate Injectable 10 milliGRAM(s) IV Push every 6 hours  metoclopramide Injectable 10 milliGRAM(s) IV Push every 8 hours  norepinephrine Infusion 0.05 MICROgram(s)/kG/Min IV Continuous <Continuous>  pantoprazole  Injectable 40 milliGRAM(s) IV Push daily  petrolatum Ophthalmic Ointment 1 Application(s) Both EYES two times a day  Phoxillum Filtration BK 4 / 2.5 5000 milliLiter(s) CRRT <Continuous>  Phoxillum Filtration BK 4 / 2.5 5000 milliLiter(s) CRRT <Continuous>  polyethylene glycol 3350 17 Gram(s) Oral at bedtime  PrismaSOL Filtration BGK 4 / 2.5 5000 milliLiter(s) CRRT <Continuous>    PRN Inpatient Medications  HYDROmorphone  Injectable 0.5 milliGRAM(s) IV Push every 4 hours PRN  HYDROmorphone  Injectable 1 milliGRAM(s) IV Push every 4 hours PRN    REVIEW OF SYSTEMS  --------------------------------------------------------------------------------  Unable to obtain ROS    VITALS/PHYSICAL EXAM  --------------------------------------------------------------------------------  T(C): 37.7 (04-04-23 @ 04:00), Max: 37.7 (04-04-23 @ 04:00)  HR: 115 (04-04-23 @ 09:00) (65 - 126)  BP: --  RR: 31 (04-04-23 @ 09:00) (19 - 38)  SpO2: 100% (04-04-23 @ 09:00) (99% - 100%)  Wt(kg): --    04-03-23 @ 07:01  -  04-04-23 @ 07:00  --------------------------------------------------------  IN: 1924.4 mL / OUT: 1685 mL / NET: 239.4 mL    04-04-23 @ 07:01  -  04-04-23 @ 10:28  --------------------------------------------------------  IN: 113.1 mL / OUT: 125 mL / NET: -11.9 mL    Physical Exam:  	Gen: Ill appearing, intubated   	HEENT: +ETT  	Pulm: Mechanical breath sounds  	CV: S1S2+  	Abd: Obese, distended  	Ext: +LE edema B/L, Trace B/L UE edema   	Neuro: Sedated              : Babin catheter+ with small amount of dark urine in the bag  	Skin: Warm and dry              Dialysis access: L IJ non tunneled HD catheter     LABS/STUDIES  --------------------------------------------------------------------------------              7.5    23.13 >-----------<  293      [04-04-23 @ 00:35]              22.3     138  |  104  |  25  ----------------------------<  131      [04-04-23 @ 00:35]  4.1   |  16  |  1.08        Ca     7.7     [04-04-23 @ 00:35]      Mg     2.50     [04-04-23 @ 00:35]      Phos  4.6     [04-04-23 @ 00:35]    TPro  5.6  /  Alb  2.4  /  TBili  9.0  /  DBili  x   /  AST  139  /  ALT  187  /  AlkPhos  170  [04-04-23 @ 00:35]    Creatinine Trend:  SCr 1.08 [04-04 @ 00:35]  SCr 1.31 [04-03 @ 09:47]  SCr 1.44 [04-03 @ 01:00]  SCr 1.50 [04-02 @ 16:30]  SCr 1.31 [04-02 @ 05:11]

## 2023-04-04 NOTE — PROGRESS NOTE ADULT - ATTENDING COMMENTS
Pt. with oliguric CHATO, currently on CRRT/CVVHDF. Pt. tolerating CRRT/CVVHDF during rounds. BP being maintained on IV vasopressor support. HD catheter functioning during rounds. Monitor labs and urine output. Avoid any potential nephrotoxins. Dose medications as per eGFR. Overall prognosis guarded.

## 2023-04-04 NOTE — CHART NOTE - NSCHARTNOTEFT_GEN_A_CORE
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  No plans for inpatient endoscopic intervention, however please contact advanced endoscopy team upon discharge to help arrange outpatient follow up.    Jesus Corcoran  GI/Hepatology Fellow    MONDAY-FRIDAY 8AM-5PM:  Pager# [phone] ([facility]) or [phone] ([facility])    NON-URGENT CONSULTS:  Please email [email or web address]  AT NIGHT AND ON WEEKENDS:  Contact on-call GI fellow via answering service ([phone]) from 5pm-8am and on weekends/holidays

## 2023-04-04 NOTE — PROGRESS NOTE ADULT - ASSESSMENT
Patient is a 48 year old female with a PMHx of HTN, hypothyroidism,  (30 years ago) and breast cancer (S/P left mastectomy and chemo in ) who presented with epigastric pain and left upper quadrant pain.  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  Transferred from OSH ([facility]) for hypotension and tachypnea requiring emergent intubation and multiple pressors.  SICU consulted for hemodynamic monitoring and respiratory management.      PLAN:  -  Patient is a 48 year old female with a PMHx of HTN, hypothyroidism,  (30 years ago) and breast cancer (S/P left mastectomy and chemo in ) who presented with epigastric pain and left upper quadrant pain.  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  Transferred from OSH ([facility]) for hypotension and tachypnea requiring emergent intubation and multiple pressors.  SICU consulted for hemodynamic monitoring and respiratory management.      PLAN:  - NPO with tube feeds via NGT  - Spontaneous breathing trials as tolerated  - Wean off vasopressors as BP tolerates  - Continue with CVVH  - Continue with IV Meropenem  - Appreciate care per SICU      #[id number]  B Team Surgery

## 2023-04-04 NOTE — PROGRESS NOTE ADULT - SUBJECTIVE AND OBJECTIVE BOX
HISTORY  Patient is a 48 year old female with PMHx significant for HTN, hypothyroidism,  30 years ago, breast cancer s/p left mastectomy and chemo in  presenting with epigastric pain and LUQ pain. Found to have necrotizing pancreatitis. Transferred from [facility] ([place]) for hypotension and tachypnea requiring emergent intubation and multiple pressors. SICU consulted for hemodynamic monitoring and respiratory management.      24 HOUR EVENTS:    SUBJECTIVE/ROS:  [ ] A ten-point review of systems was otherwise negative except as noted.  [ ] Due to altered mental status/intubation, subjective information were not able to be obtained from the patient. History was obtained, to the extent possible, from review of the chart and collateral sources of information.    Physical Exam:   General: intubated and sedated   HEENT: neck supple, anicteric sclera  Cardiovascular: Normal s1, s2, RRR  Respiratory: CTA b/l   Abdominal: Soft, ntnd  Extremities: No swelling in LEs  Neurologic: Non focal  Psych: Awake, alert     NEURO  RASS:     GCS:     CAM ICU:  Exam:   Meds: dexMEDEtomidine Infusion 0.1 MICROgram(s)/kG/Hr IV Continuous <Continuous>  HYDROmorphone  Injectable 0.5 milliGRAM(s) IV Push every 4 hours PRN Moderate Pain (4 - 6)  HYDROmorphone  Injectable 1 milliGRAM(s) IV Push every 4 hours PRN Severe Pain (7 - 10)  metoclopramide Injectable 10 milliGRAM(s) IV Push every 8 hours    [x] Adequacy of sedation and pain control has been assessed and adjusted      RESPIRATORY  RR: 25 (23 @ 22:15) (18 - 34)  SpO2: 100% (23 @ 23:18) (99% - 100%)  Wt(kg): --  Exam:   Mechanical Ventilation: Mode: CPAP with PS, RR (patient): 26, FiO2: 30, PEEP: 5, PS: 10, MAP: 9, PIP: 16  ABG - ( 2023 09:47 )  pH: 7.40  /  pCO2: 37    /  pO2: 149   / HCO3: 23    / Base Excess: -1.6  /  SaO2: 99.1    Lactate: x                [ ] Extubation Readiness Assessed  Meds:       CARDIOVASCULAR  HR: 107 (23 @ 23:18) (65 - 108)  BP: --  BP(mean): --  ABP: 91/48 (23 @ 22:15) (81/48 - 143/68)  ABP(mean): 67 (23 @ 22:15) (61 - 109)  Wt(kg): --  CVP(cm H2O): --      Exam:  Cardiac Rhythm:  Perfusion     [ ]Adequate   [ ]Inadequate  Mentation   [ ]Normal       [ ]Reduced  Extremities  [ ]Warm         [ ]Cool  Volume Status [ ]Hypervolemic [ ]Euvolemic [ ]Hypovolemic  Meds: enalaprilat Injectable 1.25 milliGRAM(s) IV Push every 6 hours  norepinephrine Infusion 0.05 MICROgram(s)/kG/Min IV Continuous <Continuous>        GI/NUTRITION  Exam:  Diet:  Meds: pantoprazole  Injectable 40 milliGRAM(s) IV Push daily  polyethylene glycol 3350 17 Gram(s) Oral at bedtime      GENITOURINARY  I&O's Detail     @ 07:01  -  03 @ 07:00  --------------------------------------------------------  IN:    Dexmedetomidine: 893.6 mL    IV PiggyBack: 100 mL    Nepro with Carb Steady: 30 mL    Norepinephrine: 343 mL  Total IN: 1366.6 mL    OUT:    Indwelling Catheter - Urethral (mL): 180 mL    Nasogastric/Oral tube (mL): 750 mL    Other (mL): 1701 mL  Total OUT: 2631 mL    Total NET: -1264.4 mL       @ 07:01  -   @ 00:39  --------------------------------------------------------  IN:    Dexmedetomidine: 707.2 mL    IV PiggyBack: 100 mL    Nepro with Carb Steady: 385 mL    Norepinephrine: 27.5 mL  Total IN: 1219.7 mL    OUT:    Indwelling Catheter - Urethral (mL): 10 mL  Total OUT: 10 mL    Total NET: 1209.7 mL              135  |  103  |  27<H>  ----------------------------<  145<H>  3.8   |  22  |  1.31<H>    Ca    7.4<L>      2023 09:47  Phos  4.0       Mg     2.60         TPro  5.5<L>  /  Alb  2.3<L>  /  TBili  9.3<H>  /  DBili  x   /  AST  133<H>  /  ALT  194<H>  /  AlkPhos  177<H>      [ ] Babin catheter, indication:   Meds:       HEMATOLOGIC  Meds: fondaparinux Injectable 1.5 milliGRAM(s) SubCutaneous daily    [x] VTE Prophylaxis                        7.3    22.29 )-----------( 235      ( 2023 09:47 )             21.3     PT/INR - ( 2023 09:47 )   PT: 16.0 sec;   INR: 1.37 ratio         PTT - ( 2023 09:47 )  PTT:26.5 sec  Transfusion     [ ] PRBC   [ ] Platelets   [ ] FFP   [ ] Cryoprecipitate      INFECTIOUS DISEASES  T(C): 36.2 (23 @ 16:00), Max: 37.9 (23 @ 04:00)  Wt(kg): --  WBC Count: 22.29 K/uL ( @ 09:47)  WBC Count: 30.23 K/uL ( @ 01:00)    Recent Cultures:  Specimen Source: .Blood Blood-Peripheral,  @ 16:37; Results   Growth in aerobic bottle: Staphylococcus hominis  Coagulase Negative Staphylococci isolated from a single blood culture set  may represent contamination.  Contact the Microbiology Department at [phone] if susceptibility  testing is clinically indicated.  ***Blood Panel PCR results on this specimen are available  approximately 3 hours after the Gram stain result.***  Gram stain, PCR, and/or culture results may not always  correspond due to difference in methodologies.  ************************************************************  This PCR assay was performed by multiplex PCR. This  Assay tests for 66 bacterial and resistance gene targets.  Please refer to the [facility] test directory  at [email or web address] for details.; Gram Stain:   Growth in aerobic bottle: Gram Positive Cocci in Clusters; Organism: Blood Culture PCR  Specimen Source: .Blood Blood-Peripheral,  @ 16:30; Results   No growth to date.; Gram Stain: --; Organism: --    Meds: meropenem  IVPB 1000 milliGRAM(s) IV Intermittent every 12 hours        ENDOCRINE  Capillary Blood Glucose    Meds: dextrose 50% Injectable 25 Gram(s) IV Push once  dextrose 50% Injectable 12.5 Gram(s) IV Push once  insulin glargine Injectable (LANTUS) 9 Unit(s) SubCutaneous at bedtime  insulin lispro (ADMELOG) corrective regimen sliding scale   SubCutaneous every 6 hours  insulin lispro Injectable (ADMELOG) 2 Unit(s) SubCutaneous every 6 hours  levothyroxine Injectable 20 MICROGram(s) IV Push at bedtime  methylPREDNISolone sodium succinate Injectable 10 milliGRAM(s) IV Push every 6 hours        ACCESS DEVICES:  [ ] Peripheral IV  [ ] Central Venous Line	[ ] R	[ ] L	[ ] IJ	[ ] Fem	[ ] SC	Placed:   [ ] Arterial Line		[ ] R	[ ] L	[ ] Fem	[ ] Rad	[ ] Ax	Placed:   [ ] PICC:					[ ] Mediport  [ ] Urinary Catheter, Date Placed:   [ ] Necessity of urinary, arterial, and venous catheters discussed    OTHER MEDICATIONS:  chlorhexidine 0.12% Liquid 15 milliLiter(s) Oral Mucosa every 12 hours  chlorhexidine 2% Cloths 1 Application(s) Topical daily  CRRT Treatment    <Continuous>  petrolatum Ophthalmic Ointment 1 Application(s) Both EYES two times a day  Phoxillum Filtration BK 4 / 2.5 5000 milliLiter(s) CRRT <Continuous>  Phoxillum Filtration BK 4 / 2.5 5000 milliLiter(s) CRRT <Continuous>  PrismaSOL Filtration BGK 4 / 2.5 5000 milliLiter(s) CRRT <Continuous>      CODE STATUS:     IMAGING: HISTORY  Patient is a 48 year old female with PMHx significant for HTN, hypothyroidism,  30 years ago, breast cancer s/p left mastectomy and chemo in  presenting with epigastric pain and LUQ pain. Found to have necrotizing pancreatitis. Transferred from [facility] ([place]) for hypotension and tachypnea requiring emergent intubation and multiple pressors. SICU consulted for hemodynamic monitoring and respiratory management.      24 HOUR EVENTS:  - US for bilateral leg pain   - CPAP this AM    SUBJECTIVE/ROS:  [ ] A ten-point review of systems was otherwise negative except as noted.  [ ] Due to altered mental status/intubation, subjective information were not able to be obtained from the patient. History was obtained, to the extent possible, from review of the chart and collateral sources of information.    Physical Exam:   General: intubated and sedated   HEENT: neck supple, anicteric sclera  Cardiovascular: Normal s1, s2, RRR  Respiratory: CTA b/l   Abdominal: Soft, ntnd  Extremities: No swelling in LEs  Neurologic: Non focal  Psych: Awake, alert     NEURO  RASS:     GCS:     CAM ICU:  Exam:   Meds: dexMEDEtomidine Infusion 0.1 MICROgram(s)/kG/Hr IV Continuous <Continuous>  HYDROmorphone  Injectable 0.5 milliGRAM(s) IV Push every 4 hours PRN Moderate Pain (4 - 6)  HYDROmorphone  Injectable 1 milliGRAM(s) IV Push every 4 hours PRN Severe Pain (7 - 10)  metoclopramide Injectable 10 milliGRAM(s) IV Push every 8 hours    [x] Adequacy of sedation and pain control has been assessed and adjusted      RESPIRATORY  RR: 25 (23 @ 22:15) (18 - 34)  SpO2: 100% (23 @ 23:18) (99% - 100%)  Wt(kg): --  Exam:   Mechanical Ventilation: Mode: CPAP with PS, RR (patient): 26, FiO2: 30, PEEP: 5, PS: 10, MAP: 9, PIP: 16  ABG - ( 2023 09:47 )  pH: 7.40  /  pCO2: 37    /  pO2: 149   / HCO3: 23    / Base Excess: -1.6  /  SaO2: 99.1    Lactate: x                [ ] Extubation Readiness Assessed  Meds:       CARDIOVASCULAR  HR: 107 (23 @ 23:18) (65 - 108)  BP: --  BP(mean): --  ABP: 91/48 (23 @ 22:15) (81/48 - 143/68)  ABP(mean): 67 (23 @ 22:15) (61 - 109)  Wt(kg): --  CVP(cm H2O): --      Exam:  Cardiac Rhythm:  Perfusion     [ ]Adequate   [ ]Inadequate  Mentation   [ ]Normal       [ ]Reduced  Extremities  [ ]Warm         [ ]Cool  Volume Status [ ]Hypervolemic [ ]Euvolemic [ ]Hypovolemic  Meds: enalaprilat Injectable 1.25 milliGRAM(s) IV Push every 6 hours  norepinephrine Infusion 0.05 MICROgram(s)/kG/Min IV Continuous <Continuous>        GI/NUTRITION  Exam:  Diet:  Meds: pantoprazole  Injectable 40 milliGRAM(s) IV Push daily  polyethylene glycol 3350 17 Gram(s) Oral at bedtime      GENITOURINARY  I&O's Detail     @ 07:  -  03 @ 07:00  --------------------------------------------------------  IN:    Dexmedetomidine: 893.6 mL    IV PiggyBack: 100 mL    Nepro with Carb Steady: 30 mL    Norepinephrine: 343 mL  Total IN: 1366.6 mL    OUT:    Indwelling Catheter - Urethral (mL): 180 mL    Nasogastric/Oral tube (mL): 750 mL    Other (mL): 1701 mL  Total OUT: 2631 mL    Total NET: -1264.4 mL       @ 07:  -   @ 00:39  --------------------------------------------------------  IN:    Dexmedetomidine: 707.2 mL    IV PiggyBack: 100 mL    Nepro with Carb Steady: 385 mL    Norepinephrine: 27.5 mL  Total IN: 1219.7 mL    OUT:    Indwelling Catheter - Urethral (mL): 10 mL  Total OUT: 10 mL    Total NET: 1209.7 mL              135  |  103  |  27<H>  ----------------------------<  145<H>  3.8   |  22  |  1.31<H>    Ca    7.4<L>      2023 09:47  Phos  4.0       Mg     2.60         TPro  5.5<L>  /  Alb  2.3<L>  /  TBili  9.3<H>  /  DBili  x   /  AST  133<H>  /  ALT  194<H>  /  AlkPhos  177<H>      [ ] Babin catheter, indication:   Meds:       HEMATOLOGIC  Meds: fondaparinux Injectable 1.5 milliGRAM(s) SubCutaneous daily    [x] VTE Prophylaxis                        7.3    22.29 )-----------( 235      ( 2023 09:47 )             21.3     PT/INR - ( 2023 09:47 )   PT: 16.0 sec;   INR: 1.37 ratio         PTT - ( 2023 09:47 )  PTT:26.5 sec  Transfusion     [ ] PRBC   [ ] Platelets   [ ] FFP   [ ] Cryoprecipitate      INFECTIOUS DISEASES  T(C): 36.2 (23 @ 16:00), Max: 37.9 (23 @ 04:00)  Wt(kg): --  WBC Count: 22.29 K/uL ( @ 09:47)  WBC Count: 30.23 K/uL ( @ 01:00)    Recent Cultures:  Specimen Source: .Blood Blood-Peripheral,  @ 16:37; Results   Growth in aerobic bottle: Staphylococcus hominis  Coagulase Negative Staphylococci isolated from a single blood culture set  may represent contamination.  Contact the Microbiology Department at [phone] if susceptibility  testing is clinically indicated.  ***Blood Panel PCR results on this specimen are available  approximately 3 hours after the Gram stain result.***  Gram stain, PCR, and/or culture results may not always  correspond due to difference in methodologies.  ************************************************************  This PCR assay was performed by multiplex PCR. This  Assay tests for 66 bacterial and resistance gene targets.  Please refer to the [facility] test directory  at [email or web address] for details.; Gram Stain:   Growth in aerobic bottle: Gram Positive Cocci in Clusters; Organism: Blood Culture PCR  Specimen Source: .Blood Blood-Peripheral,  @ 16:30; Results   No growth to date.; Gram Stain: --; Organism: --    Meds: meropenem  IVPB 1000 milliGRAM(s) IV Intermittent every 12 hours        ENDOCRINE  Capillary Blood Glucose    Meds: dextrose 50% Injectable 25 Gram(s) IV Push once  dextrose 50% Injectable 12.5 Gram(s) IV Push once  insulin glargine Injectable (LANTUS) 9 Unit(s) SubCutaneous at bedtime  insulin lispro (ADMELOG) corrective regimen sliding scale   SubCutaneous every 6 hours  insulin lispro Injectable (ADMELOG) 2 Unit(s) SubCutaneous every 6 hours  levothyroxine Injectable 20 MICROGram(s) IV Push at bedtime  methylPREDNISolone sodium succinate Injectable 10 milliGRAM(s) IV Push every 6 hours        ACCESS DEVICES:  [ ] Peripheral IV  [ ] Central Venous Line	[ ] R	[ ] L	[ ] IJ	[ ] Fem	[ ] SC	Placed:   [ ] Arterial Line		[ ] R	[ ] L	[ ] Fem	[ ] Rad	[ ] Ax	Placed:   [ ] PICC:					[ ] Mediport  [ ] Urinary Catheter, Date Placed:   [ ] Necessity of urinary, arterial, and venous catheters discussed    OTHER MEDICATIONS:  chlorhexidine 0.12% Liquid 15 milliLiter(s) Oral Mucosa every 12 hours  chlorhexidine 2% Cloths 1 Application(s) Topical daily  CRRT Treatment    <Continuous>  petrolatum Ophthalmic Ointment 1 Application(s) Both EYES two times a day  Phoxillum Filtration BK 4 / 2.5 5000 milliLiter(s) CRRT <Continuous>  Phoxillum Filtration BK 4 / 2.5 5000 milliLiter(s) CRRT <Continuous>  PrismaSOL Filtration BGK 4 / 2.5 5000 milliLiter(s) CRRT <Continuous>      CODE STATUS:     IMAGING:

## 2023-04-04 NOTE — PROGRESS NOTE ADULT - ATTENDING COMMENTS
I agree with the detailed interval history, physical, and plan, which I have reviewed and edited where appropriate'; also agree with notes/assessment with my team on service.  I have personally examined the patient.  I was physically present for the key portions of the evaluation and management (E/M) service provided.  I reviewed all the pertinent data.  The patient is a critical care patient with life threatening hemodynamic and metabolic instability in SICU.  The SICU team has a constant risk benefit analyzes discussion and coordinating care with the primary team and all consultants.   The patient is in SICU with the chief complaint and diagnosis mentioned in the note.   The plan will be specified in the note.  48 year old female with necrotizing pancreatitis in respiratory failure in SICU.  Arousable  LUNG coarse bs  Heart RR  ABD softly dist  PLAN:  Neuro  - Wean precedex  - Dilaudid prn  Resp:  -CPAP/PSV  CV  - wean levo as tolerated   GI:   - GI ppx w/ protonix   - Reglan  - Dulcolax  Renal:  - Babin catheter in place  Heme:   - CBC q12   Therapeutics:   - Fondaparinux 1.5 qd for DVT ppx   ID:   - Meropenem   Endo:   - ISS    Code Status: Full code    Disposition: SICU

## 2023-04-05 NOTE — CHART NOTE - NSCHARTNOTEFT_GEN_A_CORE
Patient being seen for malnutrition follow up. Spoke with SICU Team and obtained subjective information from extensive chart review.     Current Diet : Diet, NPO with Tube Feed:   Tube Feeding Modality: Nasogastric  Nepro with Carb Steady (NEPRORTH)  Total Volume for 24 Hours (mL): 600  Continuous  Starting Tube Feed Rate {mL per Hour}: 10  Increase Tube Feed Rate by (mL): 15     Every 4 hours  Until Goal Tube Feed Rate (mL per Hour): 25  Tube Feed Duration (in Hours): 24  Tube Feed Start Time: 04:00  No Carb Prosource (1pkg = 15gms Protein)     Qty per Day:  4 (04-03-23 @ 03:54)    TF Provides:  1080 kcals  49 gms protein  436 mL free H2O  600 mL total volume    Current Weight Trend: 124.5 kg (4/4), 146.6 kg (4/2), 132.1 kg (3/31)  Height (cm): 162.6   Admit Weight (kg): 117.9   BMI (kg/m2): 44.6   IBW (kg): 56.8    Nutrition Interval Events: Pt remains intubated and sedated on precedex. Receiving and tolerating TF at this time (no nausea/vomiting or abdominal distention); no BMs recently - metoclopramide ordered along with bowel regimen. NGT to LWCS with 450 mL output over the past 24 hrs. Nephrology following for oliguric CHATO requiring CRRT which confers a higher nutrition need. Pt also with a suspected DTI of top of nose which also elicits higher protein need. Given these clinical parameters, suggest increasing TF to 38 mL/hr x 24 hrs which will offer 1641 kcals, 74 gms protein, 663 mL free H2O in 912 mL total volume. Please continue with No Carb Prosource x 4/day (60 gms protein) which, along with TF, will provide a daily protein amount of 134 gms. Enteral recommendation will give pt 14 kcals/kg and 1.2 gms protein/kg of admit wt. Nutrition needs recalculated and verified with [place] State Equation which utilizes ventilator measurements as well as febrile state over the past 24 hrs. Please order a Nephro-Dennis to ensure complete micronutrient coverage and assist with micronutrients required for wound healing. Weight trend reflective of edema presently 3+ generalized. FS over the past 24 hrs 98 - 182 mg/dl with Lantus and Admelog insulins ordered for coverage. RDN services to remain available as needed.     __________________ Pertinent Medications__________________   MEDICATIONS  (STANDING):  albumin human 25% IVPB 50 milliLiter(s) IV Intermittent once  chlorhexidine 0.12% Liquid 15 milliLiter(s) Oral Mucosa every 12 hours  chlorhexidine 2% Cloths 1 Application(s) Topical daily  CRRT Treatment    <Continuous>  dexMEDEtomidine Infusion 0.1 MICROgram(s)/kG/Hr (2.95 mL/Hr) IV Continuous <Continuous>  dextrose 50% Injectable 25 Gram(s) IV Push once  dextrose 50% Injectable 12.5 Gram(s) IV Push once  enalaprilat Injectable 1.25 milliGRAM(s) IV Push every 6 hours  fondaparinux Injectable 1.5 milliGRAM(s) SubCutaneous daily  furosemide   Injectable 80 milliGRAM(s) IV Push once  HYDROmorphone  Injectable 1 milliGRAM(s) IV Push once  insulin glargine Injectable (LANTUS) 9 Unit(s) SubCutaneous at bedtime  insulin lispro (ADMELOG) corrective regimen sliding scale   SubCutaneous every 6 hours  insulin lispro Injectable (ADMELOG) 2 Unit(s) SubCutaneous every 6 hours  levothyroxine Injectable 20 MICROGram(s) IV Push at bedtime  meropenem  IVPB 1000 milliGRAM(s) IV Intermittent every 12 hours  methylPREDNISolone sodium succinate Injectable 10 milliGRAM(s) IV Push every 6 hours  metoclopramide Injectable 10 milliGRAM(s) IV Push every 8 hours  norepinephrine Infusion 0.05 MICROgram(s)/kG/Min (5.73 mL/Hr) IV Continuous <Continuous>  pantoprazole  Injectable 40 milliGRAM(s) IV Push daily  petrolatum Ophthalmic Ointment 1 Application(s) Both EYES two times a day  Phoxillum Filtration BK 4 / 2.5 5000 milliLiter(s) (2200 mL/Hr) CRRT <Continuous>  polyethylene glycol 3350 17 Gram(s) Oral at bedtime  PrismaSOL Filtration BGK 0 / 2.5 5000 milliLiter(s) (1600 mL/Hr) CRRT <Continuous>  PrismaSOL Filtration BGK 0 / 2.5 5000 milliLiter(s) (400 mL/Hr) CRRT <Continuous>    __________________ Pertinent Labs__________________   04-05 Na137 mmol/L Glu 180 mg/dL<H> K+ 4.4 mmol/L Cr  1.38 mg/dL<H> BUN 27 mg/dL<H> 04-05 Phos 4.2 mg/dL 04-05 Alb 2.5 g/dL<L> 03-24 Chol --    LDL --    HDL --    Trig 74 mg/dL        Skin: suspected DTI of top of nose    Estimated Needs:     1415 - 1886 kcals/day (12-16 kcals/kg of admit wt)  113 - 142 gms protein/day (2.0-2.5 gms protein/kg of IBW)    Previous Nutrition Diagnosis:     Severe Malnutrition     Nutrition Diagnosis is [X] ongoing       New Nutrition Diagnosis:     Increased Nutrient Needs        Related to: physiological causes  As evidenced by: oliguric CHATO requiring CRRT; suspected DTI of top of nose      Goal(s):  1. Patient to meet > 75% estimated energy needs    Recommendations:   1. Nepro with Carb Steady @ goal rate of 38 mL/hr x 24 hrs with No Carb Prosource x 4/day.  2. Please order a Nephro-Dennis multivitamin to ensure complete micronutrient coverage.    Monitoring and Evaluation:   1. Monitor weights, labs, BMs, skin integrity, enteral tolerance and edema.  2. RD services to remain available.     Mar Fung, MS, RDN, CDN, CNSC on MS TEAMS or Pager #[phone] Patient being seen for malnutrition follow up. Spoke with SICU Team and obtained subjective information from extensive chart review.     Current Diet : Diet, NPO with Tube Feed:   Tube Feeding Modality: Nasogastric  Nepro with Carb Steady (NEPRORTH)  Total Volume for 24 Hours (mL): 600  Continuous  Starting Tube Feed Rate {mL per Hour}: 10  Increase Tube Feed Rate by (mL): 15     Every 4 hours  Until Goal Tube Feed Rate (mL per Hour): 25  Tube Feed Duration (in Hours): 24  Tube Feed Start Time: 04:00  No Carb Prosource (1pkg = 15gms Protein)     Qty per Day:  4 (04-03-23 @ 03:54)    TF Provides:  1080 kcals  49 gms protein  436 mL free H2O  600 mL total volume    Current Weight Trend: 124.5 kg (4/4), 146.6 kg (4/2), 132.1 kg (3/31)  Height (cm): 162.6   Admit Weight (kg): 117.9   BMI (kg/m2): 44.6   IBW (kg): 56.8    Nutrition Interval Events: Pt remains intubated and sedated on precedex. Pt had a small amount of emesis overnight; spoke with RN who said that pt was slightly distended now with TF held - no BMs recently - metoclopramide ordered along with bowel regimen. NGT to LWCS with 450 mL output over the past 24 hrs. Nephrology following for oliguric CHATO requiring CRRT which confers a higher nutrition need. Pt also with a suspected DTI of top of nose which also elicits higher protein need. Spoke with SICU Team  suggest increasing TF to 38 mL/hr x 24 hrs which will offer 1641 kcals, 74 gms protein, 663 mL free H2O in 912 mL total volume. Please continue with No Carb Prosource x 4/day (60 gms protein) which, along with TF, will provide a daily protein amount of 134 gms. Enteral recommendation will give pt 14 kcals/kg and 1.2 gms protein/kg of admit wt. Nutrition needs recalculated and verified with [place] State Equation which utilizes ventilator measurements as well as febrile state over the past 24 hrs. Please order a Nephro-Dennis to ensure complete micronutrient coverage and assist with micronutrients required for wound healing. Weight trend reflective of edema presently 3+ generalized. FS over the past 24 hrs 98 - 182 mg/dl with Lantus and Admelog insulins ordered for coverage. RDN services to remain available as needed.     __________________ Pertinent Medications__________________   MEDICATIONS  (STANDING):  albumin human 25% IVPB 50 milliLiter(s) IV Intermittent once  chlorhexidine 0.12% Liquid 15 milliLiter(s) Oral Mucosa every 12 hours  chlorhexidine 2% Cloths 1 Application(s) Topical daily  CRRT Treatment    <Continuous>  dexMEDEtomidine Infusion 0.1 MICROgram(s)/kG/Hr (2.95 mL/Hr) IV Continuous <Continuous>  dextrose 50% Injectable 25 Gram(s) IV Push once  dextrose 50% Injectable 12.5 Gram(s) IV Push once  enalaprilat Injectable 1.25 milliGRAM(s) IV Push every 6 hours  fondaparinux Injectable 1.5 milliGRAM(s) SubCutaneous daily  furosemide   Injectable 80 milliGRAM(s) IV Push once  HYDROmorphone  Injectable 1 milliGRAM(s) IV Push once  insulin glargine Injectable (LANTUS) 9 Unit(s) SubCutaneous at bedtime  insulin lispro (ADMELOG) corrective regimen sliding scale   SubCutaneous every 6 hours  insulin lispro Injectable (ADMELOG) 2 Unit(s) SubCutaneous every 6 hours  levothyroxine Injectable 20 MICROGram(s) IV Push at bedtime  meropenem  IVPB 1000 milliGRAM(s) IV Intermittent every 12 hours  methylPREDNISolone sodium succinate Injectable 10 milliGRAM(s) IV Push every 6 hours  metoclopramide Injectable 10 milliGRAM(s) IV Push every 8 hours  norepinephrine Infusion 0.05 MICROgram(s)/kG/Min (5.73 mL/Hr) IV Continuous <Continuous>  pantoprazole  Injectable 40 milliGRAM(s) IV Push daily  petrolatum Ophthalmic Ointment 1 Application(s) Both EYES two times a day  Phoxillum Filtration BK 4 / 2.5 5000 milliLiter(s) (2200 mL/Hr) CRRT <Continuous>  polyethylene glycol 3350 17 Gram(s) Oral at bedtime  PrismaSOL Filtration BGK 0 / 2.5 5000 milliLiter(s) (1600 mL/Hr) CRRT <Continuous>  PrismaSOL Filtration BGK 0 / 2.5 5000 milliLiter(s) (400 mL/Hr) CRRT <Continuous>    __________________ Pertinent Labs__________________   04-05 Na137 mmol/L Glu 180 mg/dL<H> K+ 4.4 mmol/L Cr  1.38 mg/dL<H> BUN 27 mg/dL<H> 04-05 Phos 4.2 mg/dL 04-05 Alb 2.5 g/dL<L> 03-24 Chol --    LDL --    HDL --    Trig 74 mg/dL        Skin: suspected DTI of top of nose    Estimated Needs:     1415 - 1886 kcals/day (12-16 kcals/kg of admit wt)  113 - 142 gms protein/day (2.0-2.5 gms protein/kg of IBW)    Previous Nutrition Diagnosis:     Severe Malnutrition     Nutrition Diagnosis is [X] ongoing       New Nutrition Diagnosis:     Increased Nutrient Needs        Related to: physiological causes  As evidenced by: oliguric CHATO requiring CRRT; suspected DTI of top of nose      Goal(s):  1. Patient to meet > 75% estimated energy needs    Recommendations:   1. Nepro with Carb Steady @ goal rate of 38 mL/hr x 24 hrs with No Carb Prosource x 4/day.  2. Please order a Nephro-Dennis multivitamin to ensure complete micronutrient coverage.    Monitoring and Evaluation:   1. Monitor weights, labs, BMs, skin integrity, enteral tolerance and edema.  2. RD services to remain available.     Mar Fung, MS, RDN, CDN, CNSC on MS TEAMS or Pager #[phone] Patient being seen for malnutrition follow up. Spoke with RN, SICU Team and obtained subjective information from extensive chart review.     Current Diet : Diet, NPO with Tube Feed:   Tube Feeding Modality: Nasogastric  Nepro with Carb Steady (NEPRORTH)  Total Volume for 24 Hours (mL): 600  Continuous  Starting Tube Feed Rate {mL per Hour}: 10  Increase Tube Feed Rate by (mL): 15     Every 4 hours  Until Goal Tube Feed Rate (mL per Hour): 25  Tube Feed Duration (in Hours): 24  Tube Feed Start Time: 04:00  No Carb Prosource (1pkg = 15gms Protein)     Qty per Day:  4 (04-03-23 @ 03:54)    TF Provides:  1080 kcals  49 gms protein  436 mL free H2O  600 mL total volume    Current Weight Trend: 124.5 kg (4/4), 146.6 kg (4/2), 132.1 kg (3/31)  Height (cm): 162.6   Admit Weight (kg): 117.9   BMI (kg/m2): 44.6   IBW (kg): 56.8    Nutrition Interval Events: Pt remains intubated and sedated on precedex. Pt had a small amount of emesis overnight; spoke with RN who said that pt was slightly distended now with TF held - no BMs recently - metoclopramide ordered along with bowel regimen. NGT to LWCS with 450 mL output over the past 24 hrs. Nephrology following for oliguric CHATO requiring CRRT which confers a higher nutrition need. Pt also with a suspected DTI of top of nose which also elicits higher protein need. Spoke with SICU Team; TF to continue to be held with bowel regimen to be reassessed. Once pt has a BM, recommend restarting TF to goal of 38 mL/hr x 24 hrs which will offer 1641 kcals, 74 gms protein, 663 mL free H2O in 912 mL total volume. Please continue with No Carb Prosource x 4/day (60 gms protein) which, along with TF, will provide a daily protein amount of 134 gms. Enteral recommendation will give pt 14 kcals/kg and 1.2 gms protein/kg of admit wt. Nutrition needs recalculated and verified with [place] State Equation which utilizes ventilator measurements as well as febrile state over the past 24 hrs. Please order a Nephro-Dennis to ensure complete micronutrient coverage and assist with micronutrients required for wound healing. Weight trend reflective of edema presently 3+ generalized. FS over the past 24 hrs 98 - 182 mg/dl with Lantus and Admelog insulins ordered for coverage. RDN services to remain available as needed.     __________________ Pertinent Medications__________________   MEDICATIONS  (STANDING):  albumin human 25% IVPB 50 milliLiter(s) IV Intermittent once  chlorhexidine 0.12% Liquid 15 milliLiter(s) Oral Mucosa every 12 hours  chlorhexidine 2% Cloths 1 Application(s) Topical daily  CRRT Treatment    <Continuous>  dexMEDEtomidine Infusion 0.1 MICROgram(s)/kG/Hr (2.95 mL/Hr) IV Continuous <Continuous>  dextrose 50% Injectable 25 Gram(s) IV Push once  dextrose 50% Injectable 12.5 Gram(s) IV Push once  enalaprilat Injectable 1.25 milliGRAM(s) IV Push every 6 hours  fondaparinux Injectable 1.5 milliGRAM(s) SubCutaneous daily  furosemide   Injectable 80 milliGRAM(s) IV Push once  HYDROmorphone  Injectable 1 milliGRAM(s) IV Push once  insulin glargine Injectable (LANTUS) 9 Unit(s) SubCutaneous at bedtime  insulin lispro (ADMELOG) corrective regimen sliding scale   SubCutaneous every 6 hours  insulin lispro Injectable (ADMELOG) 2 Unit(s) SubCutaneous every 6 hours  levothyroxine Injectable 20 MICROGram(s) IV Push at bedtime  meropenem  IVPB 1000 milliGRAM(s) IV Intermittent every 12 hours  methylPREDNISolone sodium succinate Injectable 10 milliGRAM(s) IV Push every 6 hours  metoclopramide Injectable 10 milliGRAM(s) IV Push every 8 hours  norepinephrine Infusion 0.05 MICROgram(s)/kG/Min (5.73 mL/Hr) IV Continuous <Continuous>  pantoprazole  Injectable 40 milliGRAM(s) IV Push daily  petrolatum Ophthalmic Ointment 1 Application(s) Both EYES two times a day  Phoxillum Filtration BK 4 / 2.5 5000 milliLiter(s) (2200 mL/Hr) CRRT <Continuous>  polyethylene glycol 3350 17 Gram(s) Oral at bedtime  PrismaSOL Filtration BGK 0 / 2.5 5000 milliLiter(s) (1600 mL/Hr) CRRT <Continuous>  PrismaSOL Filtration BGK 0 / 2.5 5000 milliLiter(s) (400 mL/Hr) CRRT <Continuous>    __________________ Pertinent Labs__________________   04-05 Na137 mmol/L Glu 180 mg/dL<H> K+ 4.4 mmol/L Cr  1.38 mg/dL<H> BUN 27 mg/dL<H> 04-05 Phos 4.2 mg/dL 04-05 Alb 2.5 g/dL<L> 03-24 Chol --    LDL --    HDL --    Trig 74 mg/dL        Skin: suspected DTI of top of nose    Estimated Needs:     1415 - 1886 kcals/day (12-16 kcals/kg of admit wt)  113 - 142 gms protein/day (2.0-2.5 gms protein/kg of IBW)    Previous Nutrition Diagnosis:     Severe Malnutrition     Nutrition Diagnosis is [X] ongoing       New Nutrition Diagnosis:     Increased Nutrient Needs        Related to: physiological causes  As evidenced by: oliguric CHAOT requiring CRRT; suspected DTI of top of nose      Goal(s):  1. Patient to meet > 75% estimated energy needs    Recommendations:   1. Nepro with Carb Steady @ goal rate of 38 mL/hr x 24 hrs with No Carb Prosource x 4/day.  2. Please order a Nephro-Dennis multivitamin to ensure complete micronutrient coverage.    Monitoring and Evaluation:   1. Monitor weights, labs, BMs, skin integrity, enteral tolerance and edema.  2. RD services to remain available.     Mar Fung, MS, RDN, CDN, CNSC on MS TEAMS or Pager #[phone]

## 2023-04-05 NOTE — PROGRESS NOTE ADULT - ATTENDING COMMENTS
I agree with the detailed interval history, physical, and plan, which I have reviewed and edited where appropriate'; also agree with notes/assessment with my team on service.  I have personally examined the patient.  I was physically present for the key portions of the evaluation and management (E/M) service provided.  I reviewed all the pertinent data.  The patient is a critical care patient with life threatening hemodynamic and metabolic instability in SICU.  The SICU team has a constant risk benefit analyzes discussion and coordinating care with the primary team and all consultants.   The patient is in SICU with the chief complaint and diagnosis mentioned in the note.   The plan will be specified in the note.  48 year old female with necrotizing pancreatitis in SICU for hemodynamic monitoring and respiratory management.  EXAM  General: intubated and sedated   Cardiovascular: RR  Respiratory: coarse BS  Abdominal: Soft, distended   Extremities: edema+  PLAN  Neuro  -wean precedex  - Dilaudid prn  Resp:  -CPAP/PSV as tolerated   - POCUS  CV  - off levo   GI:   TF (Nepro) at rate of 30cc/hr  - GI ppx w/ protonix   Renal:  - Continue CRRT net negative   Heme:   Diagnostics  Therapeutics:  -CRRT dosed Fondaparinux   ID:   Therapeutics:   - Meropenem   Endo:   - c/w synthroid   - ISS    Code Status: Full code    Disposition: SICU

## 2023-04-05 NOTE — PROGRESS NOTE ADULT - SUBJECTIVE AND OBJECTIVE BOX
Surgery Daily Progress Note  =====================================================  Interval / Overnight Events: No acute events overnight.      HPI:  Patient is a 48 year old female with a PMHx of HTN, hypothyroidism,  (30 years ago) and breast cancer (S/P left mastectomy and chemo in ) who presented with epigastric pain and left upper quadrant pain.  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  Transferred from [facility] ([employer] for hypotension and tachypnea requiring emergent intubation and multiple pressors.  SICU consulted for hemodynamic monitoring and respiratory management. (24 Mar 2023 18:01)      PAST MEDICAL & SURGICAL HISTORY:  Hypertension  Hypothyroidism  Breast cancer  S/P   H/O left mastectomy      ALLERGIES:  No Known Allergies    --------------------------------------------------------------------------------------    MEDICATIONS:    Neurologic Medications  dexMEDEtomidine Infusion 0.1 MICROgram(s)/kG/Hr IV Continuous <Continuous>  HYDROmorphone  Injectable 0.5 milliGRAM(s) IV Push every 4 hours PRN Moderate Pain (4 - 6)  HYDROmorphone  Injectable 1 milliGRAM(s) IV Push every 4 hours PRN Severe Pain (7 - 10)  HYDROmorphone  Injectable 1 milliGRAM(s) IV Push once  metoclopramide Injectable 10 milliGRAM(s) IV Push every 8 hours    Cardiovascular Medications  enalaprilat Injectable 1.25 milliGRAM(s) IV Push every 6 hours  norepinephrine Infusion 0.05 MICROgram(s)/kG/Min IV Continuous <Continuous>    Gastrointestinal Medications  pantoprazole  Injectable 40 milliGRAM(s) IV Push daily  polyethylene glycol 3350 17 Gram(s) Oral at bedtime    Hematologic/Oncologic Medications  fondaparinux Injectable 1.5 milliGRAM(s) SubCutaneous daily    Antimicrobial/Immunologic Medications  meropenem  IVPB 1000 milliGRAM(s) IV Intermittent every 12 hours    Endocrine/Metabolic Medications  dextrose 50% Injectable 25 Gram(s) IV Push once  dextrose 50% Injectable 12.5 Gram(s) IV Push once  insulin glargine Injectable (LANTUS) 9 Unit(s) SubCutaneous at bedtime  insulin lispro (ADMELOG) corrective regimen sliding scale   SubCutaneous every 6 hours  insulin lispro Injectable (ADMELOG) 2 Unit(s) SubCutaneous every 6 hours  levothyroxine Injectable 20 MICROGram(s) IV Push at bedtime  methylPREDNISolone sodium succinate Injectable 10 milliGRAM(s) IV Push every 6 hours    Topical/Other Medications  chlorhexidine 0.12% Liquid 15 milliLiter(s) Oral Mucosa every 12 hours  chlorhexidine 2% Cloths 1 Application(s) Topical daily  CRRT Treatment    <Continuous>  petrolatum Ophthalmic Ointment 1 Application(s) Both EYES two times a day  Phoxillum Filtration BK 4 / 2.5 5000 milliLiter(s) CRRT <Continuous>  PrismaSOL Filtration BGK 0 / 2.5 5000 milliLiter(s) CRRT <Continuous>  PrismaSOL Filtration BGK 0 / 2.5 5000 milliLiter(s) CRRT <Continuous>    --------------------------------------------------------------------------------------    VITAL SIGNS:  T(C): 35.9 (2023 08:00), Max: 38.6 (2023 12:00)  T(F): 96.6 (2023 08:00), Max: 101.4 (2023 12:00)  HR: 107 (2023 08:00) (103 - 147)  ABP: 115/62 (2023 08:00) (81/44 - 140/73)  ABP(mean): 85 (2023 08:00) (57 - 101)  RR: 32 (2023 08:00) (28 - 48)  SpO2: 100% (2023 08:00) (97% - 100%)    --------------------------------------------------------------------------------------    INS AND OUTS:    2023 07:  -  2023 07:00  --------------------------------------------------------  IN:    Dexmedetomidine: 805.6 mL    IV PiggyBack: 150 mL    Nepro with Carb Steady: 500 mL    Norepinephrine: 47.5 mL    PRBCs (Packed Red Blood Cells): 300 mL  Total IN: 1803.1 mL    OUT:    Indwelling Catheter - Urethral (mL): 10 mL    Nasogastric/Oral tube (mL): 450 mL    Other (mL): 2262 mL  Total OUT: 2722 mL    Total NET: -918.9 mL      2023 07:01  -  2023 08:47  --------------------------------------------------------  IN:    Dexmedetomidine: 28 mL    Enteral Tube Flush: 30 mL  Total IN: 58 mL    OUT:    Indwelling Catheter - Urethral (mL): 0 mL  Total OUT: 0 mL    Total NET: 58 mL    --------------------------------------------------------------------------------------    EXAM    NEUROLOGY  Exam: Normal, in no acute distress.    HEENT  Exam: Normocephalic, atraumatic.    RESPIRATORY  Exam: Intubated.  Mechanical Ventilation: Mode: AC/ CMV (Assist Control/ Continuous Mandatory Ventilation), RR (machine): 20, TV (machine): 350, FiO2: 30, PEEP: 5, ITime: 0.6, MAP: 10, PIP: 23    CARDIOVASCULAR  Exam: S1, S2.  Regular rate and rhythm.    GI/NUTRITION  Exam: Abdomen softly distended.  Non-tender.  NGT.  Current Diet: NPO with tube feeds via NGT    MUSCULOSKELETAL  Exam: All extremities moving spontaneously without limitations.      HEMATOLOGIC  [x] VTE Prophylaxis: fondaparinux Injectable 1.5 milliGRAM(s) SubCutaneous daily      INFECTIOUS DISEASE  Antimicrobials/Immunologic Medications:  meropenem  IVPB 1000 milliGRAM(s) IV Intermittent every 12 hours    --------------------------------------------------------------------------------------

## 2023-04-05 NOTE — PROGRESS NOTE ADULT - SUBJECTIVE AND OBJECTIVE BOX
[facility] Division of Kidney Diseases & Hypertension  FOLLOW UP NOTE  [phone]--------------------------------------------------------------------------------  Chief Complaint: Oliguric CHATO requiring CRRT      24 hour events/subjective:  Pt. seen and examined in the SICU today. Pt. intubated, sedated. Off IV vasopressors this morning. Pt. currently on CRRT, tolerating well.       PAST HISTORY  --------------------------------------------------------------------------------  No significant changes to PMH, PSH, FHx, SHx, unless otherwise noted    ALLERGIES & MEDICATIONS  --------------------------------------------------------------------------------  Allergies    No Known Allergies    Intolerances      Standing Inpatient Medications  chlorhexidine 0.12% Liquid 15 milliLiter(s) Oral Mucosa every 12 hours  chlorhexidine 2% Cloths 1 Application(s) Topical daily  CRRT Treatment    <Continuous>  dexMEDEtomidine Infusion 0.1 MICROgram(s)/kG/Hr IV Continuous <Continuous>  dextrose 50% Injectable 25 Gram(s) IV Push once  dextrose 50% Injectable 12.5 Gram(s) IV Push once  enalaprilat Injectable 1.25 milliGRAM(s) IV Push every 6 hours  fondaparinux Injectable 1.5 milliGRAM(s) SubCutaneous daily  HYDROmorphone  Injectable 1 milliGRAM(s) IV Push once  insulin glargine Injectable (LANTUS) 9 Unit(s) SubCutaneous at bedtime  insulin lispro (ADMELOG) corrective regimen sliding scale   SubCutaneous every 6 hours  insulin lispro Injectable (ADMELOG) 2 Unit(s) SubCutaneous every 6 hours  levothyroxine Injectable 20 MICROGram(s) IV Push at bedtime  meropenem  IVPB 1000 milliGRAM(s) IV Intermittent every 12 hours  methylPREDNISolone sodium succinate Injectable 10 milliGRAM(s) IV Push every 6 hours  metoclopramide Injectable 10 milliGRAM(s) IV Push every 8 hours  norepinephrine Infusion 0.05 MICROgram(s)/kG/Min IV Continuous <Continuous>  pantoprazole  Injectable 40 milliGRAM(s) IV Push daily  petrolatum Ophthalmic Ointment 1 Application(s) Both EYES two times a day  Phoxillum Filtration BK 4 / 2.5 5000 milliLiter(s) CRRT <Continuous>  polyethylene glycol 3350 17 Gram(s) Oral at bedtime  PrismaSOL Filtration BGK 0 / 2.5 5000 milliLiter(s) CRRT <Continuous>  PrismaSOL Filtration BGK 0 / 2.5 5000 milliLiter(s) CRRT <Continuous>    PRN Inpatient Medications  HYDROmorphone  Injectable 0.5 milliGRAM(s) IV Push every 4 hours PRN  HYDROmorphone  Injectable 1 milliGRAM(s) IV Push every 4 hours PRN      REVIEW OF SYSTEMS  --------------------------------------------------------------------------------  Unable to obtain ROS    VITALS/PHYSICAL EXAM  --------------------------------------------------------------------------------  T(C): 35.9 (04-05-23 @ 08:00), Max: 38.6 (04-04-23 @ 12:00)  HR: 107 (04-05-23 @ 08:00) (103 - 147)  BP: --  RR: 32 (04-05-23 @ 08:00) (28 - 48)  SpO2: 100% (04-05-23 @ 08:00) (97% - 100%)  Wt(kg): --        04-04-23 @ 07:01  -  04-05-23 @ 07:00  --------------------------------------------------------  IN: 1803.1 mL / OUT: 2722 mL / NET: -918.9 mL    04-05-23 @ 07:01  -  04-05-23 @ 08:52  --------------------------------------------------------  IN: 58 mL / OUT: 0 mL / NET: 58 mL      Physical Exam:  	Gen: Ill appearing, intubated   	HEENT: +ETT  	Pulm: Mechanical breath sounds  	CV: S1S2+  	Abd: Obese, distended  	Ext: +LE edema B/L, Trace B/L UE edema   	Neuro: Sedated              : Babin catheter+ with small amount of dark urine in the bag  	Skin: Warm and dry              Dialysis access: L IJ non tunneled HD catheter     LABS/STUDIES  --------------------------------------------------------------------------------              8.1    18.35 >-----------<  330      [04-05-23 @ 01:50]              24.0     137  |  102  |  27  ----------------------------<  180      [04-05-23 @ 01:50]  4.4   |  16  |  1.38        Ca     7.8     [04-05-23 @ 01:50]      Mg     2.60     [04-05-23 @ 01:50]      Phos  4.2     [04-05-23 @ 01:50]    TPro  5.8  /  Alb  2.5  /  TBili  9.2  /  DBili  x   /  AST  124  /  ALT  163  /  AlkPhos  156  [04-05-23 @ 01:50]    PT/INR: PT 17.1 , INR 1.47       [04-05-23 @ 01:50]  PTT: 27.1       [04-05-23 @ 01:50]      Creatinine Trend:  SCr 1.38 [04-05 @ 01:50]  SCr 1.24 [04-04 @ 12:25]  SCr 1.08 [04-04 @ 00:35]  SCr 1.31 [04-03 @ 09:47]  SCr 1.44 [04-03 @ 01:00]          HIV Nonreact      [03-30-23 @ 20:10]     [facility] Division of Kidney Diseases & Hypertension  FOLLOW UP NOTE  [phone]--------------------------------------------------------------------------------  Chief Complaint: Oliguric CHATO, on CRRT    24 hour events/subjective:  Pt. seen and examined in the SICU today. Pt. intubated, sedated. Off IV vasopressors this morning. Pt. currently on CRRT, tolerating well.     PAST HISTORY  --------------------------------------------------------------------------------  No significant changes to PMH, PSH, FHx, SHx, unless otherwise noted    ALLERGIES & MEDICATIONS  --------------------------------------------------------------------------------  Allergies    No Known Allergies    Intolerances    Standing Inpatient Medications  chlorhexidine 0.12% Liquid 15 milliLiter(s) Oral Mucosa every 12 hours  chlorhexidine 2% Cloths 1 Application(s) Topical daily  CRRT Treatment    <Continuous>  dexMEDEtomidine Infusion 0.1 MICROgram(s)/kG/Hr IV Continuous <Continuous>  dextrose 50% Injectable 25 Gram(s) IV Push once  dextrose 50% Injectable 12.5 Gram(s) IV Push once  enalaprilat Injectable 1.25 milliGRAM(s) IV Push every 6 hours  fondaparinux Injectable 1.5 milliGRAM(s) SubCutaneous daily  HYDROmorphone  Injectable 1 milliGRAM(s) IV Push once  insulin glargine Injectable (LANTUS) 9 Unit(s) SubCutaneous at bedtime  insulin lispro (ADMELOG) corrective regimen sliding scale   SubCutaneous every 6 hours  insulin lispro Injectable (ADMELOG) 2 Unit(s) SubCutaneous every 6 hours  levothyroxine Injectable 20 MICROGram(s) IV Push at bedtime  meropenem  IVPB 1000 milliGRAM(s) IV Intermittent every 12 hours  methylPREDNISolone sodium succinate Injectable 10 milliGRAM(s) IV Push every 6 hours  metoclopramide Injectable 10 milliGRAM(s) IV Push every 8 hours  norepinephrine Infusion 0.05 MICROgram(s)/kG/Min IV Continuous <Continuous>  pantoprazole  Injectable 40 milliGRAM(s) IV Push daily  petrolatum Ophthalmic Ointment 1 Application(s) Both EYES two times a day  Phoxillum Filtration BK 4 / 2.5 5000 milliLiter(s) CRRT <Continuous>  polyethylene glycol 3350 17 Gram(s) Oral at bedtime  PrismaSOL Filtration BGK 0 / 2.5 5000 milliLiter(s) CRRT <Continuous>  PrismaSOL Filtration BGK 0 / 2.5 5000 milliLiter(s) CRRT <Continuous>    PRN Inpatient Medications  HYDROmorphone  Injectable 0.5 milliGRAM(s) IV Push every 4 hours PRN  HYDROmorphone  Injectable 1 milliGRAM(s) IV Push every 4 hours PRN    REVIEW OF SYSTEMS  --------------------------------------------------------------------------------  Unable to obtain ROS    VITALS/PHYSICAL EXAM  --------------------------------------------------------------------------------  T(C): 35.9 (04-05-23 @ 08:00), Max: 38.6 (04-04-23 @ 12:00)  HR: 107 (04-05-23 @ 08:00) (103 - 147)  BP: --  RR: 32 (04-05-23 @ 08:00) (28 - 48)  SpO2: 100% (04-05-23 @ 08:00) (97% - 100%)  Wt(kg): --    04-04-23 @ 07:01  -  04-05-23 @ 07:00  --------------------------------------------------------  IN: 1803.1 mL / OUT: 2722 mL / NET: -918.9 mL    04-05-23 @ 07:01  -  04-05-23 @ 08:52  --------------------------------------------------------  IN: 58 mL / OUT: 0 mL / NET: 58 mL    Physical Exam:  	Gen: Ill appearing, intubated   	HEENT: +ETT  	Pulm: Mechanical breath sounds  	CV: S1S2+  	Abd: Obese, distended  	Ext: +LE edema B/L, Trace B/L UE edema   	Neuro: Intubated              : Babin catheter+ with small amount of dark urine in the bag  	Skin: Warm and dry              Dialysis access: Left IJ non tunneled HD catheter     LABS/STUDIES  --------------------------------------------------------------------------------              8.1    18.35 >-----------<  330      [04-05-23 @ 01:50]              24.0     137  |  102  |  27  ----------------------------<  180      [04-05-23 @ 01:50]  4.4   |  16  |  1.38        Ca     7.8     [04-05-23 @ 01:50]      Mg     2.60     [04-05-23 @ 01:50]      Phos  4.2     [04-05-23 @ 01:50]    TPro  5.8  /  Alb  2.5  /  TBili  9.2  /  DBili  x   /  AST  124  /  ALT  163  /  AlkPhos  156  [04-05-23 @ 01:50]    Creatinine Trend:  SCr 1.38 [04-05 @ 01:50]  SCr 1.24 [04-04 @ 12:25]  SCr 1.08 [04-04 @ 00:35]  SCr 1.31 [04-03 @ 09:47]  SCr 1.44 [04-03 @ 01:00]

## 2023-04-05 NOTE — PROGRESS NOTE ADULT - PROBLEM SELECTOR PLAN 1
Pt. with oliguric CHATO in the setting of necrotizing pancreatitis and shock. Pt. with most likely ATN. Scr was 1.24 on 3/23/23 and increased to 4.60 on 3/24/23. Pt. was initiated on CRRT/CVVHDF on 3/24. Pt. tolerating CRRT without IV pressors. CRRT filter changed once overnight, no additional issues reported by RN at bedside. Labs reviewed. Plan to continue CRRT/CVVHDF with net negative balance for anticipated extubation per SICU team. Monitor labs and urine output. Avoid nephrotoxins. Dose medications as per eGFR.    If you have any questions, please feel free to contact me  Neville Schulz  Nephrology Fellow  [phone]; Prefer [employer] TEAMS   (After 5pm or on weekends please page the on-call fellow).

## 2023-04-05 NOTE — PROGRESS NOTE ADULT - ASSESSMENT
Patient is a 48 year old female with a PMHx of HTN, hypothyroidism,  (30 years ago) and breast cancer (S/P left mastectomy and chemo in ) who presented with epigastric pain and left upper quadrant pain.  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  Transferred from OSH ([place]) for hypotension and tachypnea requiring emergent intubation and multiple pressors.  SICU consulted for hemodynamic monitoring and respiratory management.      PLAN:  - NPO with tube feeds via NGT  - Spontaneous breathing trials as tolerated  - Plan for possible extubation today  - Continue with CVVH  - Continue with IV Meropenem  - Appreciate care per SICU      #[id number]  B Team Surgery

## 2023-04-05 NOTE — PROGRESS NOTE ADULT - SUBJECTIVE AND OBJECTIVE BOX
SICU Daily Progress Note  =====================================================  24 HR Events:  - plan to extubate 4/5 AM  - US for b/l leg pain   - hgb 6.9, 1U pRBC, repeat CBC     MEDICATIONS:   --------------------------------------------------------------------------------------  Neurologic Medications  dexMEDEtomidine Infusion 0.1 MICROgram(s)/kG/Hr IV Continuous <Continuous>  HYDROmorphone  Injectable 0.5 milliGRAM(s) IV Push every 4 hours PRN Moderate Pain (4 - 6)  HYDROmorphone  Injectable 1 milliGRAM(s) IV Push every 4 hours PRN Severe Pain (7 - 10)  HYDROmorphone  Injectable 1 milliGRAM(s) IV Push once  metoclopramide Injectable 10 milliGRAM(s) IV Push every 8 hours    Respiratory Medications    Cardiovascular Medications  enalaprilat Injectable 1.25 milliGRAM(s) IV Push every 6 hours  norepinephrine Infusion 0.05 MICROgram(s)/kG/Min IV Continuous <Continuous>    Gastrointestinal Medications  pantoprazole  Injectable 40 milliGRAM(s) IV Push daily  polyethylene glycol 3350 17 Gram(s) Oral at bedtime    Genitourinary Medications    Hematologic/Oncologic Medications  fondaparinux Injectable 1.5 milliGRAM(s) SubCutaneous daily    Antimicrobial/Immunologic Medications  meropenem  IVPB 1000 milliGRAM(s) IV Intermittent every 12 hours    Endocrine/Metabolic Medications  dextrose 50% Injectable 25 Gram(s) IV Push once  dextrose 50% Injectable 12.5 Gram(s) IV Push once  insulin glargine Injectable (LANTUS) 9 Unit(s) SubCutaneous at bedtime  insulin lispro (ADMELOG) corrective regimen sliding scale   SubCutaneous every 6 hours  insulin lispro Injectable (ADMELOG) 2 Unit(s) SubCutaneous every 6 hours  levothyroxine Injectable 20 MICROGram(s) IV Push at bedtime  methylPREDNISolone sodium succinate Injectable 10 milliGRAM(s) IV Push every 6 hours    Topical/Other Medications  chlorhexidine 0.12% Liquid 15 milliLiter(s) Oral Mucosa every 12 hours  chlorhexidine 2% Cloths 1 Application(s) Topical daily  CRRT Treatment    <Continuous>  petrolatum Ophthalmic Ointment 1 Application(s) Both EYES two times a day  Phoxillum Filtration BK 4 / 2.5 5000 milliLiter(s) CRRT <Continuous>  PrismaSOL Filtration BGK 0 / 2.5 5000 milliLiter(s) CRRT <Continuous>  PrismaSOL Filtration BGK 4 / 2.5 5000 milliLiter(s) CRRT <Continuous>    --------------------------------------------------------------------------------------    VITAL SIGNS, INS/OUTS (last 24 hours):  ICU Vital Signs Last 24 Hrs  T(C): 38 (04 Apr 2023 20:00), Max: 38.6 (04 Apr 2023 12:00)  T(F): 100.4 (04 Apr 2023 20:00), Max: 101.4 (04 Apr 2023 12:00)  HR: 130 (05 Apr 2023 01:05) (92 - 147)  BP: --  BP(mean): --  ABP: 118/62 (05 Apr 2023 01:05) (81/44 - 143/79)  ABP(mean): 86 (05 Apr 2023 01:05) (56 - 105)  RR: 41 (05 Apr 2023 01:05) (23 - 48)  SpO2: 99% (05 Apr 2023 01:05) (97% - 100%)    O2 Parameters below as of 05 Apr 2023 01:00  Patient On (Oxygen Delivery Method): ventilator    O2 Concentration (%): 30    --------------------------------------------------------------------------------------    04-03-23 @ 07:01  -  04-04-23 @ 07:00  --------------------------------------------------------  IN: 1924.4 mL / OUT: 1685 mL / NET: 239.4 mL    04-04-23 @ 07:01  -  04-05-23 @ 02:14  --------------------------------------------------------  IN: 1407.6 mL / OUT: 1386 mL / NET: 21.6 mL      --------------------------------------------------------------------------------------    EXAM  General: intubated and sedated   HEENT: neck supple, anicteric sclera  Cardiovascular: Normal s1, s2, RRR  Respiratory: CTA b/l   Abdominal: Soft, ntnd, distended   Extremities: No swelling in LEs  Neurologic: Non focal  Psych: Awake, alert     METABOLIC/FLUIDS/ELECTROLYTES      HEMATOLOGIC  [x] VTE Prophylaxis: fondaparinux Injectable 1.5 milliGRAM(s) SubCutaneous daily        LABS  --------------------------------------------------------------------------------------    T(C): 38 (04-04-23 @ 20:00), Max: 38.6 (04-04-23 @ 12:00)  HR: 130 (04-05-23 @ 01:05) (92 - 147)  BP: --  RR: 41 (04-05-23 @ 01:05) (23 - 48)  SpO2: 99% (04-05-23 @ 01:05) (97% - 100%)    --------------------------------------------------------------------------------------   SICU Daily Progress Note  =====================================================  Interval events:  - CRRT filter clotted, cbc check and f/u  - plan to extubate 4/5   - US for b/l leg pain   - hgb 6.9, 1U pRBC (4/4), repeat CBC  - Consult Pathology   - 50 cc Albumin  - start diuresis w/ lasix 80 x1, f/u UOP      MEDICATIONS:   --------------------------------------------------------------------------------------  Neurologic Medications  dexMEDEtomidine Infusion 0.1 MICROgram(s)/kG/Hr IV Continuous <Continuous>  HYDROmorphone  Injectable 0.5 milliGRAM(s) IV Push every 4 hours PRN Moderate Pain (4 - 6)  HYDROmorphone  Injectable 1 milliGRAM(s) IV Push every 4 hours PRN Severe Pain (7 - 10)  HYDROmorphone  Injectable 1 milliGRAM(s) IV Push once  metoclopramide Injectable 10 milliGRAM(s) IV Push every 8 hours    Respiratory Medications    Cardiovascular Medications  enalaprilat Injectable 1.25 milliGRAM(s) IV Push every 6 hours  norepinephrine Infusion 0.05 MICROgram(s)/kG/Min IV Continuous <Continuous>    Gastrointestinal Medications  pantoprazole  Injectable 40 milliGRAM(s) IV Push daily  polyethylene glycol 3350 17 Gram(s) Oral at bedtime    Genitourinary Medications    Hematologic/Oncologic Medications  fondaparinux Injectable 1.5 milliGRAM(s) SubCutaneous daily    Antimicrobial/Immunologic Medications  meropenem  IVPB 1000 milliGRAM(s) IV Intermittent every 12 hours    Endocrine/Metabolic Medications  dextrose 50% Injectable 25 Gram(s) IV Push once  dextrose 50% Injectable 12.5 Gram(s) IV Push once  insulin glargine Injectable (LANTUS) 9 Unit(s) SubCutaneous at bedtime  insulin lispro (ADMELOG) corrective regimen sliding scale   SubCutaneous every 6 hours  insulin lispro Injectable (ADMELOG) 2 Unit(s) SubCutaneous every 6 hours  levothyroxine Injectable 20 MICROGram(s) IV Push at bedtime  methylPREDNISolone sodium succinate Injectable 10 milliGRAM(s) IV Push every 6 hours    Topical/Other Medications  chlorhexidine 0.12% Liquid 15 milliLiter(s) Oral Mucosa every 12 hours  chlorhexidine 2% Cloths 1 Application(s) Topical daily  CRRT Treatment    <Continuous>  petrolatum Ophthalmic Ointment 1 Application(s) Both EYES two times a day  Phoxillum Filtration BK 4 / 2.5 5000 milliLiter(s) CRRT <Continuous>  PrismaSOL Filtration BGK 0 / 2.5 5000 milliLiter(s) CRRT <Continuous>  PrismaSOL Filtration BGK 4 / 2.5 5000 milliLiter(s) CRRT <Continuous>    --------------------------------------------------------------------------------------    VITAL SIGNS, INS/OUTS (last 24 hours):  ICU Vital Signs Last 24 Hrs  T(C): 38 (04 Apr 2023 20:00), Max: 38.6 (04 Apr 2023 12:00)  T(F): 100.4 (04 Apr 2023 20:00), Max: 101.4 (04 Apr 2023 12:00)  HR: 130 (05 Apr 2023 01:05) (92 - 147)  BP: --  BP(mean): --  ABP: 118/62 (05 Apr 2023 01:05) (81/44 - 143/79)  ABP(mean): 86 (05 Apr 2023 01:05) (56 - 105)  RR: 41 (05 Apr 2023 01:05) (23 - 48)  SpO2: 99% (05 Apr 2023 01:05) (97% - 100%)    O2 Parameters below as of 05 Apr 2023 01:00  Patient On (Oxygen Delivery Method): ventilator    O2 Concentration (%): 30    --------------------------------------------------------------------------------------    04-03-23 @ 07:01 - 04-04-23 @ 07:00  --------------------------------------------------------  IN: 1924.4 mL / OUT: 1685 mL / NET: 239.4 mL    04-04-23 @ 07:01  -  04-05-23 @ 02:14  --------------------------------------------------------  IN: 1407.6 mL / OUT: 1386 mL / NET: 21.6 mL      --------------------------------------------------------------------------------------    EXAM  General: intubated and sedated   HEENT: neck supple, anicteric sclera  Cardiovascular: Normal s1, s2, RRR  Respiratory: CTA b/l   Abdominal: Soft, ntnd, distended   Extremities: No swelling in LEs  Neurologic: Non focal  Psych: Awake, alert     METABOLIC/FLUIDS/ELECTROLYTES      HEMATOLOGIC  [x] VTE Prophylaxis: fondaparinux Injectable 1.5 milliGRAM(s) SubCutaneous daily        LABS  --------------------------------------------------------------------------------------    T(C): 38 (04-04-23 @ 20:00), Max: 38.6 (04-04-23 @ 12:00)  HR: 130 (04-05-23 @ 01:05) (92 - 147)  BP: --  RR: 41 (04-05-23 @ 01:05) (23 - 48)  SpO2: 99% (04-05-23 @ 01:05) (97% - 100%)    --------------------------------------------------------------------------------------

## 2023-04-05 NOTE — PROGRESS NOTE ADULT - ASSESSMENT
Patient is a 48 year old female with PMHx significant for HTN, hypothyroidism,  30 years ago, breast cancer s/p left mastectomy and chemo in  presenting with epigastric pain and LUQ pain. Found to have necrotizing pancreatitis. Transferred from OSH ([place]) for hypotension and tachypnea requiring emergent intubation and multiple pressors. SICU consulted for hemodynamic monitoring and respiratory management.    Neuro  Intubated and sedated for clinical course; No acute neurological issues   - Wean precedex  - Dilaudid prn    Resp:  AHRF w/ b/l pleff 2/2 necrotizing pancreatitis   -Volume control 20/350/5/40 --> CPAP  -CPAP as tolerated   - POCUS 4/3, albumin 250, lasix 80mg    CV  Mixed shock state 2/2 septic shock w/ E. Coli in urine vs hypovolemic shock now s/p 11L resus    - off levo   - Treat ID as below     GI:   #Necrotizing pancreatitis 2/2 gallstones  Diagnostics  CT 3/27: >50%of the pancreatic head  extends into the mesentery and inferiorly along the retroperitoneum into the lower abdomen  RUQ US: Chololithiasis (2cm stone) w/ no acute frannie   - MRCP 4/3: no choledocholithiasis    Therapeutics:   - Plan for interval CT   - Continue TF (Nepro) at rate of 30cc/hr  - GI ppx w/ protonix   - Reglan  - Dolcolax    #Shocked Liver + Hyperbilirubinemia   Likely 2/2 to shocked state w/ LFTs now trending down w/ bili following protracted course as expected but yet to peak. Possible concern for acute obstructive biliary process.   - MRCP read no choledocholithiasis  - Hold tylenol     Renal:  ARF 2/2 shock state resulting in ATN requiring CRRT    - Continue CRRT net negative  as tolerated   - Babin catheter in place    Heme:   Anemia likely 2/2 disease course vs suppression; No active bleeding reported   Diagnostics:   - CBC q12     Therapeutics:   - received 1U PRBC 3/27   - SCDs and argatroban for DVT ppx    #Thrombocyopenia  Likely suppression 2/2 infection vs shock state vs HIT  Diagnostics  - HIT Ab positive  -f/u DOUGLAS     Therapeutics:  -CRRT dosed Fondaparinux 1.5 qd for DVT ppx     #Splenic Vein Thrombosis  Diagnostics:  3/22 CT Abd w/ contrast: Focal filling defect in the splenic vein at the level of the body of   the pancreas for which an underlying thrombus is suspected     ID:   Intermittently febrile w/ leukocytosis c/f septic shock  E coli UTI  Diagnostics:  - 3/22 Ucx: E coli  - 3/30 Bcx: staph hominis in aerobic bottle x 1 (likely contaminant)     Therapeutics:   - Meropenem (3/26- )    Endo:   - Off solumedrol  - c/w synthroid 20 IV (home med)   - ISS    Code Status: Full code    Disposition: SICU   Patient is a 48 year old female with PMHx significant for HTN, hypothyroidism,  30 years ago, breast cancer s/p left mastectomy and chemo in  presenting with epigastric pain and LUQ pain. Found to have necrotizing pancreatitis. Transferred from OSH ([place]) for hypotension and tachypnea requiring emergent intubation and multiple pressors. SICU consulted for hemodynamic monitoring and respiratory management.    Neuro  Intubated and sedated for clinical course; No acute neurological issues   - wean precedex  - Dilaudid prn    Resp:  AHRF w/ b/l pleff 2/2 necrotizing pancreatitis   -Volume control 20/350/5/40 --> CPAP  -CPAP as tolerated   - POCUS 4/3, albumin 250, lasix 80mg    CV  Mixed shock state 2/2 septic shock w/ E. Coli in urine vs hypovolemic shock now s/p 11L resus    - off levo   - Treat ID as below     GI:   #Necrotizing pancreatitis 2/2 gallstones  Diagnostics  CT 3/27: >50%of the pancreatic head  extends into the mesentery and inferiorly along the retroperitoneum into the lower abdomen  RUQ US: Chololithiasis (2cm stone) w/ no acute frannie   - MRCP 4/3: no choledocholithiasis    Therapeutics:   - Plan for interval CT   - Continue TF (Nepro) at rate of 30cc/hr  - GI ppx w/ protonix   - Reglan  - Dolcolax    #Shocked Liver + Hyperbilirubinemia   Likely 2/2 to shocked state w/ LFTs now trending down w/ bili following protracted course as expected but yet to peak. Possible concern for acute obstructive biliary process.   - MRCP read no choledocholithiasis  - Hold tylenol     Renal:  ARF 2/2 shock state resulting in ATN requiring CRRT    - Continue CRRT net negative  as tolerated   - Babin catheter in place    Heme:   Anemia likely 2/2 disease course vs suppression; No active bleeding reported   Diagnostics:   - CBC q12     Therapeutics:   - received 1U PRBC 3/27   - SCDs and argatroban for DVT ppx    #Thrombocyopenia  Likely suppression 2/2 infection vs shock state vs HIT  Diagnostics  - HIT Ab positive  -f/u DOUGLAS     Therapeutics:  -CRRT dosed Fondaparinux 1.5 qd for DVT ppx     #Splenic Vein Thrombosis  Diagnostics:  3/22 CT Abd w/ contrast: Focal filling defect in the splenic vein at the level of the body of   the pancreas for which an underlying thrombus is suspected     ID:   Intermittently febrile w/ leukocytosis c/f septic shock  E coli UTI  Diagnostics:  - 3/22 Ucx: E coli  - 3/30 Bcx: staph hominis in aerobic bottle x 1 (likely contaminant)     Therapeutics:   - Meropenem (3/26- )    Endo:   - Off solumedrol  - c/w synthroid 20 IV (home med)   - ISS    Code Status: Full code    Disposition: SICU

## 2023-04-06 NOTE — PROGRESS NOTE ADULT - ASSESSMENT
Patient is a 48 year old female with a PMHx of HTN, hypothyroidism,  (30 years ago) and breast cancer (S/P left mastectomy and chemo in ) who presented with epigastric pain and left upper quadrant pain.  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  Transferred from OSH ([place]) for hypotension and tachypnea requiring emergent intubation and multiple pressors.  SICU consulted for hemodynamic monitoring and respiratory management.      PLAN:  - NPO with tube feeds via NGT  - Spontaneous breathing trials as tolerated  - Plan for possible extubation today  - Transitioned from CVVH to intermittent HD  - Continue with IV Meropenem  - Appreciate care per SICU      #[id number]  B Team Surgery

## 2023-04-06 NOTE — CONSULT NOTE ADULT - ASSESSMENT
48 year old female with PMHx significant for HTN, hypothyroidism, breast cancer s/p left mastectomy and chemo in 2008 presenting with epigastric pain and LUQ pain, found to have necrotizing pancreatitis with hemorrhage, multiorgan failure with shock liver and ATN requiring CVVHD.    Impression:  #Jaundice - patient previously in shock liver with transaminases >7000 with bili peak to 12 on 4/1. now stable ~10. Transaminases drastically improved consistent with cholestatic pattern and trend of shock liver vs intrahepatic cholestasis of sepsis. Hep A, B and C negative. MR without obstruction.  #necrotizing pancreatitis    Recommendation:  - supportive care per SICU   - can trial ursodiol 500 BID for cholestasis however will likely take weeks for improvement given injury and significant illness  - continued to trend daily  - no further hepatology w/u needed at this time, please call back with questions    Note not finalized until signed by attending.    Joann Whiteside PGY-6  Gastroenterology/Hepatology Fellow  Pager #[phone] ([facility]) or [phone] ([facility])  Available on [employer] Teams.  Please contact on-call GI fellow via answering service ([phone]) after 5pm and before 8am, and on weekends.

## 2023-04-06 NOTE — PROGRESS NOTE ADULT - SUBJECTIVE AND OBJECTIVE BOX
SICU Daily Progress Note  =====================================================  24 HR Events:  - Stop CRRT, start intermittent HD  - plan to extubate 4/6  - f/u anti Xa level  - US for b/l leg pain   - hgb 6.9, 1U pRBC (4/4)  - Consult Pathology   - 50 cc Albumin  - start diuresis w/ lasix 80 x1, f/u UOP      MEDICATIONS:   --------------------------------------------------------------------------------------  Neurologic Medications  dexMEDEtomidine Infusion 0.1 MICROgram(s)/kG/Hr IV Continuous <Continuous>  HYDROmorphone  Injectable 0.5 milliGRAM(s) IV Push every 4 hours PRN Moderate Pain (4 - 6)  HYDROmorphone  Injectable 1 milliGRAM(s) IV Push every 4 hours PRN Severe Pain (7 - 10)  HYDROmorphone  Injectable 1 milliGRAM(s) IV Push once  metoclopramide Injectable 10 milliGRAM(s) IV Push every 8 hours    Respiratory Medications    Cardiovascular Medications  enalaprilat Injectable 1.25 milliGRAM(s) IV Push every 6 hours  norepinephrine Infusion 0.05 MICROgram(s)/kG/Min IV Continuous <Continuous>    Gastrointestinal Medications  pantoprazole  Injectable 40 milliGRAM(s) IV Push daily  polyethylene glycol 3350 17 Gram(s) Oral at bedtime    Genitourinary Medications    Hematologic/Oncologic Medications  fondaparinux Injectable 1.5 milliGRAM(s) SubCutaneous daily    Antimicrobial/Immunologic Medications  meropenem  IVPB 1000 milliGRAM(s) IV Intermittent every 12 hours    Endocrine/Metabolic Medications  dextrose 50% Injectable 25 Gram(s) IV Push once  dextrose 50% Injectable 12.5 Gram(s) IV Push once  insulin glargine Injectable (LANTUS) 9 Unit(s) SubCutaneous at bedtime  insulin lispro (ADMELOG) corrective regimen sliding scale   SubCutaneous every 6 hours  insulin lispro Injectable (ADMELOG) 2 Unit(s) SubCutaneous every 6 hours  levothyroxine Injectable 20 MICROGram(s) IV Push at bedtime  methylPREDNISolone sodium succinate Injectable 10 milliGRAM(s) IV Push every 6 hours    Topical/Other Medications  chlorhexidine 0.12% Liquid 15 milliLiter(s) Oral Mucosa every 12 hours  chlorhexidine 2% Cloths 1 Application(s) Topical daily  CRRT Treatment    <Continuous>  petrolatum Ophthalmic Ointment 1 Application(s) Both EYES two times a day  Phoxillum Filtration BK 4 / 2.5 5000 milliLiter(s) CRRT <Continuous>  PrismaSOL Filtration BGK 0 / 2.5 5000 milliLiter(s) CRRT <Continuous>  PrismaSOL Filtration BGK 0 / 2.5 5000 milliLiter(s) CRRT <Continuous>    --------------------------------------------------------------------------------------    VITAL SIGNS, INS/OUTS (last 24 hours):  ICU Vital Signs Last 24 Hrs  T(C): 36.7 (05 Apr 2023 20:00), Max: 36.7 (05 Apr 2023 20:00)  T(F): 98.1 (05 Apr 2023 20:00), Max: 98.1 (05 Apr 2023 20:00)  HR: 113 (05 Apr 2023 23:03) (94 - 130)  BP: --  BP(mean): --  ABP: 92/48 (05 Apr 2023 23:00) (90/48 - 140/73)  ABP(mean): 66 (05 Apr 2023 23:00) (64 - 101)  RR: 25 (05 Apr 2023 23:00) (19 - 41)  SpO2: 100% (05 Apr 2023 23:03) (99% - 100%)    O2 Parameters below as of 05 Apr 2023 20:00  Patient On (Oxygen Delivery Method): ventilator,(CPAP mode)    O2 Concentration (%): 30    --------------------------------------------------------------------------------------    04-04-23 @ 07:01  - 04-05-23 @ 07:00  --------------------------------------------------------  IN: 1803.1 mL / OUT: 2722 mL / NET: -918.9 mL    04-05-23 @ 07:01  -  04-06-23 @ 00:15  --------------------------------------------------------  IN: 563.5 mL / OUT: 2614 mL / NET: -2050.5 mL      --------------------------------------------------------------------------------------    EXAM  General: intubated and sedated   HEENT: neck supple, anicteric sclera  Cardiovascular: Normal s1, s2, RRR  Respiratory: CTA b/l   Abdominal: Soft, ntnd, distended   Extremities: No swelling in LEs  Neurologic: Non focal  Psych: Awake, alert     METABOLIC/FLUIDS/ELECTROLYTES      HEMATOLOGIC  [x] VTE Prophylaxis: fondaparinux Injectable 1.5 milliGRAM(s) SubCutaneous daily        LABS  --------------------------------------------------------------------------------------    T(C): 36.7 (04-05-23 @ 20:00), Max: 36.7 (04-05-23 @ 20:00)  HR: 113 (04-05-23 @ 23:03) (94 - 130)  BP: --  RR: 25 (04-05-23 @ 23:00) (19 - 41)  SpO2: 100% (04-05-23 @ 23:03) (99% - 100%)    --------------------------------------------------------------------------------------   SICU Daily Progress Note  =====================================================  24 HR Events:  - Stop CRRT, start intermittent HD  - plan to extubate 4/6  - f/u anti Xa level  - US for b/l leg pain   - Consult Pathology   - 50 cc Albumin   - start diuresis w/ lasix 80 x1, f/u UOP       MEDICATIONS:   --------------------------------------------------------------------------------------  Neurologic Medications  dexMEDEtomidine Infusion 0.1 MICROgram(s)/kG/Hr IV Continuous <Continuous>  HYDROmorphone  Injectable 0.5 milliGRAM(s) IV Push every 4 hours PRN Moderate Pain (4 - 6)  HYDROmorphone  Injectable 1 milliGRAM(s) IV Push every 4 hours PRN Severe Pain (7 - 10)  HYDROmorphone  Injectable 1 milliGRAM(s) IV Push once  metoclopramide Injectable 10 milliGRAM(s) IV Push every 8 hours    Respiratory Medications    Cardiovascular Medications  enalaprilat Injectable 1.25 milliGRAM(s) IV Push every 6 hours  norepinephrine Infusion 0.05 MICROgram(s)/kG/Min IV Continuous <Continuous>    Gastrointestinal Medications  pantoprazole  Injectable 40 milliGRAM(s) IV Push daily  polyethylene glycol 3350 17 Gram(s) Oral at bedtime    Genitourinary Medications    Hematologic/Oncologic Medications  fondaparinux Injectable 1.5 milliGRAM(s) SubCutaneous daily    Antimicrobial/Immunologic Medications  meropenem  IVPB 1000 milliGRAM(s) IV Intermittent every 12 hours    Endocrine/Metabolic Medications  dextrose 50% Injectable 25 Gram(s) IV Push once  dextrose 50% Injectable 12.5 Gram(s) IV Push once  insulin glargine Injectable (LANTUS) 9 Unit(s) SubCutaneous at bedtime  insulin lispro (ADMELOG) corrective regimen sliding scale   SubCutaneous every 6 hours  insulin lispro Injectable (ADMELOG) 2 Unit(s) SubCutaneous every 6 hours  levothyroxine Injectable 20 MICROGram(s) IV Push at bedtime  methylPREDNISolone sodium succinate Injectable 10 milliGRAM(s) IV Push every 6 hours    Topical/Other Medications  chlorhexidine 0.12% Liquid 15 milliLiter(s) Oral Mucosa every 12 hours  chlorhexidine 2% Cloths 1 Application(s) Topical daily  CRRT Treatment    <Continuous>  petrolatum Ophthalmic Ointment 1 Application(s) Both EYES two times a day  Phoxillum Filtration BK 4 / 2.5 5000 milliLiter(s) CRRT <Continuous>  PrismaSOL Filtration BGK 0 / 2.5 5000 milliLiter(s) CRRT <Continuous>  PrismaSOL Filtration BGK 0 / 2.5 5000 milliLiter(s) CRRT <Continuous>    --------------------------------------------------------------------------------------    VITAL SIGNS, INS/OUTS (last 24 hours):  ICU Vital Signs Last 24 Hrs  T(C): 36.7 (05 Apr 2023 20:00), Max: 36.7 (05 Apr 2023 20:00)  T(F): 98.1 (05 Apr 2023 20:00), Max: 98.1 (05 Apr 2023 20:00)  HR: 113 (05 Apr 2023 23:03) (94 - 130)  BP: --  BP(mean): --  ABP: 92/48 (05 Apr 2023 23:00) (90/48 - 140/73)  ABP(mean): 66 (05 Apr 2023 23:00) (64 - 101)  RR: 25 (05 Apr 2023 23:00) (19 - 41)  SpO2: 100% (05 Apr 2023 23:03) (99% - 100%)    O2 Parameters below as of 05 Apr 2023 20:00  Patient On (Oxygen Delivery Method): ventilator,(CPAP mode)    O2 Concentration (%): 30    --------------------------------------------------------------------------------------    04-04-23 @ 07:01  -  04-05-23 @ 07:00  --------------------------------------------------------  IN: 1803.1 mL / OUT: 2722 mL / NET: -918.9 mL    04-05-23 @ 07:01  -  04-06-23 @ 00:15  --------------------------------------------------------  IN: 563.5 mL / OUT: 2614 mL / NET: -2050.5 mL      --------------------------------------------------------------------------------------    EXAM  General: intubated and sedated   HEENT: neck supple, anicteric sclera  Cardiovascular: Normal s1, s2, RRR  Respiratory: CTA b/l   Abdominal: Soft, ntnd, distended   Extremities: No swelling in LEs  Neurologic: Non focal  Psych: Awake, alert     METABOLIC/FLUIDS/ELECTROLYTES      HEMATOLOGIC  [x] VTE Prophylaxis: fondaparinux Injectable 1.5 milliGRAM(s) SubCutaneous daily        LABS  --------------------------------------------------------------------------------------    T(C): 36.7 (04-05-23 @ 20:00), Max: 36.7 (04-05-23 @ 20:00)  HR: 113 (04-05-23 @ 23:03) (94 - 130)  BP: --  RR: 25 (04-05-23 @ 23:00) (19 - 41)  SpO2: 100% (04-05-23 @ 23:03) (99% - 100%)    --------------------------------------------------------------------------------------   SICU Daily Progress Note  =====================================================  24 HR Events:  - Stop CRRT, start intermittent HD  - plan to extubate 4/7  - f/u anti Xa level  - US for b/l leg pain negative   - Consult Pathology         MEDICATIONS:   --------------------------------------------------------------------------------------  Neurologic Medications  dexMEDEtomidine Infusion 0.1 MICROgram(s)/kG/Hr IV Continuous <Continuous>  HYDROmorphone  Injectable 0.5 milliGRAM(s) IV Push every 4 hours PRN Moderate Pain (4 - 6)  HYDROmorphone  Injectable 1 milliGRAM(s) IV Push every 4 hours PRN Severe Pain (7 - 10)  HYDROmorphone  Injectable 1 milliGRAM(s) IV Push once  metoclopramide Injectable 10 milliGRAM(s) IV Push every 8 hours    Respiratory Medications    Cardiovascular Medications  enalaprilat Injectable 1.25 milliGRAM(s) IV Push every 6 hours  norepinephrine Infusion 0.05 MICROgram(s)/kG/Min IV Continuous <Continuous>    Gastrointestinal Medications  pantoprazole  Injectable 40 milliGRAM(s) IV Push daily  polyethylene glycol 3350 17 Gram(s) Oral at bedtime    Genitourinary Medications    Hematologic/Oncologic Medications  fondaparinux Injectable 1.5 milliGRAM(s) SubCutaneous daily    Antimicrobial/Immunologic Medications  meropenem  IVPB 1000 milliGRAM(s) IV Intermittent every 12 hours    Endocrine/Metabolic Medications  dextrose 50% Injectable 25 Gram(s) IV Push once  dextrose 50% Injectable 12.5 Gram(s) IV Push once  insulin glargine Injectable (LANTUS) 9 Unit(s) SubCutaneous at bedtime  insulin lispro (ADMELOG) corrective regimen sliding scale   SubCutaneous every 6 hours  insulin lispro Injectable (ADMELOG) 2 Unit(s) SubCutaneous every 6 hours  levothyroxine Injectable 20 MICROGram(s) IV Push at bedtime  methylPREDNISolone sodium succinate Injectable 10 milliGRAM(s) IV Push every 6 hours    Topical/Other Medications  chlorhexidine 0.12% Liquid 15 milliLiter(s) Oral Mucosa every 12 hours  chlorhexidine 2% Cloths 1 Application(s) Topical daily  CRRT Treatment    <Continuous>  petrolatum Ophthalmic Ointment 1 Application(s) Both EYES two times a day  Phoxillum Filtration BK 4 / 2.5 5000 milliLiter(s) CRRT <Continuous>  PrismaSOL Filtration BGK 0 / 2.5 5000 milliLiter(s) CRRT <Continuous>  PrismaSOL Filtration BGK 0 / 2.5 5000 milliLiter(s) CRRT <Continuous>    --------------------------------------------------------------------------------------    VITAL SIGNS, INS/OUTS (last 24 hours):  ICU Vital Signs Last 24 Hrs  T(C): 36.7 (05 Apr 2023 20:00), Max: 36.7 (05 Apr 2023 20:00)  T(F): 98.1 (05 Apr 2023 20:00), Max: 98.1 (05 Apr 2023 20:00)  HR: 113 (05 Apr 2023 23:03) (94 - 130)  BP: --  BP(mean): --  ABP: 92/48 (05 Apr 2023 23:00) (90/48 - 140/73)  ABP(mean): 66 (05 Apr 2023 23:00) (64 - 101)  RR: 25 (05 Apr 2023 23:00) (19 - 41)  SpO2: 100% (05 Apr 2023 23:03) (99% - 100%)    O2 Parameters below as of 05 Apr 2023 20:00  Patient On (Oxygen Delivery Method): ventilator,(CPAP mode)    O2 Concentration (%): 30    --------------------------------------------------------------------------------------    04-04-23 @ 07:01  -  04-05-23 @ 07:00  --------------------------------------------------------  IN: 1803.1 mL / OUT: 2722 mL / NET: -918.9 mL    04-05-23 @ 07:01  -  04-06-23 @ 00:15  --------------------------------------------------------  IN: 563.5 mL / OUT: 2614 mL / NET: -2050.5 mL      --------------------------------------------------------------------------------------    EXAM  General: intubated and sedated   HEENT: neck supple, anicteric sclera  Cardiovascular: Normal s1, s2, RRR  Respiratory: CTA b/l   Abdominal: Soft, ntnd, distended   Extremities: No swelling in LEs  Neurologic: Non focal  Psych: Awake, alert     METABOLIC/FLUIDS/ELECTROLYTES      HEMATOLOGIC  [x] VTE Prophylaxis: fondaparinux Injectable 1.5 milliGRAM(s) SubCutaneous daily        LABS  --------------------------------------------------------------------------------------    T(C): 36.7 (04-05-23 @ 20:00), Max: 36.7 (04-05-23 @ 20:00)  HR: 113 (04-05-23 @ 23:03) (94 - 130)  BP: --  RR: 25 (04-05-23 @ 23:00) (19 - 41)  SpO2: 100% (04-05-23 @ 23:03) (99% - 100%)    --------------------------------------------------------------------------------------   SICU Daily Progress Note  =====================================================  24 HR Events:  - Stop CRRT, start intermittent HD  - plan to extubate 4/7  - f/u anti Xa level  - US for b/l leg pain negative   - Consult Pathology   - POCUS 4/6        MEDICATIONS:   --------------------------------------------------------------------------------------  Neurologic Medications  dexMEDEtomidine Infusion 0.1 MICROgram(s)/kG/Hr IV Continuous <Continuous>  HYDROmorphone  Injectable 0.5 milliGRAM(s) IV Push every 4 hours PRN Moderate Pain (4 - 6)  HYDROmorphone  Injectable 1 milliGRAM(s) IV Push every 4 hours PRN Severe Pain (7 - 10)  HYDROmorphone  Injectable 1 milliGRAM(s) IV Push once  metoclopramide Injectable 10 milliGRAM(s) IV Push every 8 hours    Respiratory Medications    Cardiovascular Medications  enalaprilat Injectable 1.25 milliGRAM(s) IV Push every 6 hours  norepinephrine Infusion 0.05 MICROgram(s)/kG/Min IV Continuous <Continuous>    Gastrointestinal Medications  pantoprazole  Injectable 40 milliGRAM(s) IV Push daily  polyethylene glycol 3350 17 Gram(s) Oral at bedtime    Genitourinary Medications    Hematologic/Oncologic Medications  fondaparinux Injectable 1.5 milliGRAM(s) SubCutaneous daily    Antimicrobial/Immunologic Medications  meropenem  IVPB 1000 milliGRAM(s) IV Intermittent every 12 hours    Endocrine/Metabolic Medications  dextrose 50% Injectable 25 Gram(s) IV Push once  dextrose 50% Injectable 12.5 Gram(s) IV Push once  insulin glargine Injectable (LANTUS) 9 Unit(s) SubCutaneous at bedtime  insulin lispro (ADMELOG) corrective regimen sliding scale   SubCutaneous every 6 hours  insulin lispro Injectable (ADMELOG) 2 Unit(s) SubCutaneous every 6 hours  levothyroxine Injectable 20 MICROGram(s) IV Push at bedtime  methylPREDNISolone sodium succinate Injectable 10 milliGRAM(s) IV Push every 6 hours    Topical/Other Medications  chlorhexidine 0.12% Liquid 15 milliLiter(s) Oral Mucosa every 12 hours  chlorhexidine 2% Cloths 1 Application(s) Topical daily  CRRT Treatment    <Continuous>  petrolatum Ophthalmic Ointment 1 Application(s) Both EYES two times a day  Phoxillum Filtration BK 4 / 2.5 5000 milliLiter(s) CRRT <Continuous>  PrismaSOL Filtration BGK 0 / 2.5 5000 milliLiter(s) CRRT <Continuous>  PrismaSOL Filtration BGK 0 / 2.5 5000 milliLiter(s) CRRT <Continuous>    --------------------------------------------------------------------------------------    VITAL SIGNS, INS/OUTS (last 24 hours):  ICU Vital Signs Last 24 Hrs  T(C): 36.7 (05 Apr 2023 20:00), Max: 36.7 (05 Apr 2023 20:00)  T(F): 98.1 (05 Apr 2023 20:00), Max: 98.1 (05 Apr 2023 20:00)  HR: 113 (05 Apr 2023 23:03) (94 - 130)  BP: --  BP(mean): --  ABP: 92/48 (05 Apr 2023 23:00) (90/48 - 140/73)  ABP(mean): 66 (05 Apr 2023 23:00) (64 - 101)  RR: 25 (05 Apr 2023 23:00) (19 - 41)  SpO2: 100% (05 Apr 2023 23:03) (99% - 100%)    O2 Parameters below as of 05 Apr 2023 20:00  Patient On (Oxygen Delivery Method): ventilator,(CPAP mode)    O2 Concentration (%): 30    --------------------------------------------------------------------------------------    04-04-23 @ 07:01  -  04-05-23 @ 07:00  --------------------------------------------------------  IN: 1803.1 mL / OUT: 2722 mL / NET: -918.9 mL    04-05-23 @ 07:01  -  04-06-23 @ 00:15  --------------------------------------------------------  IN: 563.5 mL / OUT: 2614 mL / NET: -2050.5 mL      --------------------------------------------------------------------------------------    EXAM  General: intubated and sedated   HEENT: neck supple, anicteric sclera  Cardiovascular: Normal s1, s2, RRR  Respiratory: CTA b/l   Abdominal: Soft, ntnd, distended   Extremities: No swelling in LEs  Neurologic: Non focal  Psych: Awake, alert     METABOLIC/FLUIDS/ELECTROLYTES      HEMATOLOGIC  [x] VTE Prophylaxis: fondaparinux Injectable 1.5 milliGRAM(s) SubCutaneous daily        LABS  --------------------------------------------------------------------------------------    T(C): 36.7 (04-05-23 @ 20:00), Max: 36.7 (04-05-23 @ 20:00)  HR: 113 (04-05-23 @ 23:03) (94 - 130)  BP: --  RR: 25 (04-05-23 @ 23:00) (19 - 41)  SpO2: 100% (04-05-23 @ 23:03) (99% - 100%)    --------------------------------------------------------------------------------------

## 2023-04-06 NOTE — CONSULT NOTE ADULT - SUBJECTIVE AND OBJECTIVE BOX
Chief Complaint:  Patient is a 48y old  Female who presents with a chief complaint of Nec pancreatitis (2023 00:14)      HPI: 48 year old female with PMHx significant for HTN, hypothyroidism, breast cancer s/p left mastectomy and chemo in  presenting with epigastric pain and LUQ pain. Found to have necrotizing pancreatitis. Transferred from OSH ([place]) for hypotension and tachypnea requiring emergent intubation and multiple pressors. Course c/b multiorgan failure requiring CVVHD and shock liver. Continues to be intermittent febrile/hypothermic. Urine+ for Ecoli. Course c/b anemia with areas of hemorrhagic pancreatitis on imaging, TCP with +AC, -DOUGLAS.         Allergies:  No Known Allergies      Home Medications:    Hospital Medications:  chlorhexidine 0.12% Liquid 15 milliLiter(s) Oral Mucosa every 12 hours  chlorhexidine 2% Cloths 1 Application(s) Topical daily  CRRT Treatment    <Continuous>  dexMEDEtomidine Infusion 0.1 MICROgram(s)/kG/Hr IV Continuous <Continuous>  dextrose 50% Injectable 25 Gram(s) IV Push once  dextrose 50% Injectable 12.5 Gram(s) IV Push once  enalaprilat Injectable 1.25 milliGRAM(s) IV Push every 6 hours  fondaparinux Injectable 1.5 milliGRAM(s) SubCutaneous daily  HYDROmorphone  Injectable 0.5 milliGRAM(s) IV Push every 4 hours PRN  HYDROmorphone  Injectable 1 milliGRAM(s) IV Push every 4 hours PRN  HYDROmorphone  Injectable 1 milliGRAM(s) IV Push once  insulin glargine Injectable (LANTUS) 9 Unit(s) SubCutaneous at bedtime  insulin lispro (ADMELOG) corrective regimen sliding scale   SubCutaneous every 6 hours  insulin lispro Injectable (ADMELOG) 2 Unit(s) SubCutaneous every 6 hours  levothyroxine Injectable 20 MICROGram(s) IV Push at bedtime  meropenem  IVPB 1000 milliGRAM(s) IV Intermittent every 12 hours  methylPREDNISolone sodium succinate Injectable 10 milliGRAM(s) IV Push every 6 hours  metoclopramide Injectable 10 milliGRAM(s) IV Push every 8 hours  norepinephrine Infusion 0.05 MICROgram(s)/kG/Min IV Continuous <Continuous>  pantoprazole  Injectable 40 milliGRAM(s) IV Push daily  petrolatum Ophthalmic Ointment 1 Application(s) Both EYES two times a day  Phoxillum Filtration BK 4 / 2.5 5000 milliLiter(s) CRRT <Continuous>  polyethylene glycol 3350 17 Gram(s) Oral at bedtime  PrismaSOL Filtration BGK 0 / 2.5 5000 milliLiter(s) CRRT <Continuous>  PrismaSOL Filtration BGK 0 / 2.5 5000 milliLiter(s) CRRT <Continuous>      PMHX/PSHX:  Hypertension    Hypothyroidism    Breast cancer    S/P     H/O left mastectomy        Family history:  No pertinent family history in first degree relatives    FH: myocardial infarction        Social History:     ROS:     General:  No weight loss, fevers, chills, night sweats, fatigue  Eyes:  No vision changes, no yellowing of eyes   ENT:  No throat pain, runny nose  CV:  No chest pain, palpitations  Resp:  No SOB, cough, wheezing  GI:  See HPI  :  No burning with urination, no hematuria   Muscle:  No muscle pain, weakness  Neuro:  No numbness/tingling, memory problems  Psych:  No fatigue, insomnia, mood problems  Heme:  No easy bruisability  Skin:  No rash, itching       PHYSICAL EXAM:     GENERAL:  Appears stated age, well-groomed, well-nourished, no distress  HEENT:  NC/AT,  conjunctivae clear and pink,  no JVD  CHEST:  Full & symmetric excursion, no increased effort, breath sounds clear  HEART:  Regular rhythm, S1, S2, no murmur/rub/S3/S4, no abdominal bruit, no edema  ABDOMEN:  Soft, non-tender, non-distended, normoactive bowel sounds,  no masses ,  EXTREMITIES:  no cyanosis,clubbing or edema  SKIN:  No rash/erythema/ecchymoses/petechiae/wounds/abscess/warm/dry  NEURO:  Alert, oriented    Vital Signs:  Vital Signs Last 24 Hrs  T(C): 38.6 (2023 04:00), Max: 38.6 (2023 04:00)  T(F): 101.5 (2023 04:00), Max: 101.5 (2023 04:00)  HR: 121 (2023 06:00) (94 - 125)  BP: --  BP(mean): --  RR: 26 (2023 06:00) (19 - 42)  SpO2: 100% (2023 06:00) (100% - 100%)    Parameters below as of 2023 04:00  Patient On (Oxygen Delivery Method): ventilator,(CPAP mode)    O2 Concentration (%): 30  Daily     Daily Weight in k.4 (2023 05:00)    LABS:                        7.5    12.75 )-----------( 333      ( 2023 01:17 )             22.7     04-06    137  |  103  |  21  ----------------------------<  131<H>  4.1   |  17<L>  |  1.02    Ca    7.9<L>      2023 01:17  Phos  3.9     04-06  Mg     2.50     04-06    TPro  6.0  /  Alb  2.6<L>  /  TBili  10.1<H>  /  DBili  x   /  AST  147<H>  /  ALT  173<H>  /  AlkPhos  164<H>  04-    LIVER FUNCTIONS - ( 2023 11:45 )  Alb: 2.6 g/dL / Pro: 6.0 g/dL / ALK PHOS: 164 U/L / ALT: 173 U/L / AST: 147 U/L / GGT: x           PT/INR - ( 2023 01:17 )   PT: 17.7 sec;   INR: 1.52 ratio         PTT - ( 2023 01:17 )  PTT:25.9 sec        Imaging:    < from: MR MRCP No Cont (23 @ 17:35) >    FINDINGS:  LOWER CHEST: Left breast implant. Small bilateral pleural effusions with   compressive atelectasis of both lower lobes.    LIVER: Within normal limits.  BILE DUCTS: Normal caliber.  GALLBLADDER: Cholelithiasis.  SPLEEN: Within normal limits.  PANCREAS: Extensive necrotizing pancreatitis with areas of hemorrhage   involving the body and tail.  ADRENALS: Within normal limits.  KIDNEYS/URETERS: Within normal limits.    VISUALIZED PORTIONS:  BOWEL: Enteric tube with tip terminating in the duodenum. Dilated loops   ofproximal small bowel in the left hemiabdomen without discrete   transition point, possibly ileus.  PERITONEUM: Small volume abdominopelvic ascites.  VESSELS: Evaluation of vascular complications associated with   pancreatitis cannot be assessed due to absence of intravenous contrast.  RETROPERITONEUM/LYMPH NODES: No lymphadenopathy.  ABDOMINAL WALL: Subcutaneous edema.  BONES: Degenerative changes.    IMPRESSION:  Extensive necrotizing pancreatitis with areas of hemorrhage involving the   body and tail.    No choledocholithiasis.    --- End of Report ---    < end of copied text >           Chief Complaint:  Patient is a 48y old  Female who presents with a chief complaint of Nec pancreatitis (2023 00:14)      HPI: 48 year old female with PMHx significant for HTN, hypothyroidism, breast cancer s/p left mastectomy and chemo in  presenting with epigastric pain and LUQ pain. Found to have necrotizing pancreatitis. Transferred from OSH ([facility]) for hypotension and tachypnea requiring emergent intubation and multiple pressors. Course c/b multiorgan failure requiring CVVHD and shock liver. Continues to be intermittent febrile/hypothermic. Urine+ for Ecoli. Course c/b anemia with areas of hemorrhagic pancreatitis on imaging, TCP with +AC, -DOUGLAS.         Allergies:  No Known Allergies      Home Medications:    Hospital Medications:  chlorhexidine 0.12% Liquid 15 milliLiter(s) Oral Mucosa every 12 hours  chlorhexidine 2% Cloths 1 Application(s) Topical daily  CRRT Treatment    <Continuous>  dexMEDEtomidine Infusion 0.1 MICROgram(s)/kG/Hr IV Continuous <Continuous>  dextrose 50% Injectable 25 Gram(s) IV Push once  dextrose 50% Injectable 12.5 Gram(s) IV Push once  enalaprilat Injectable 1.25 milliGRAM(s) IV Push every 6 hours  fondaparinux Injectable 1.5 milliGRAM(s) SubCutaneous daily  HYDROmorphone  Injectable 0.5 milliGRAM(s) IV Push every 4 hours PRN  HYDROmorphone  Injectable 1 milliGRAM(s) IV Push every 4 hours PRN  HYDROmorphone  Injectable 1 milliGRAM(s) IV Push once  insulin glargine Injectable (LANTUS) 9 Unit(s) SubCutaneous at bedtime  insulin lispro (ADMELOG) corrective regimen sliding scale   SubCutaneous every 6 hours  insulin lispro Injectable (ADMELOG) 2 Unit(s) SubCutaneous every 6 hours  levothyroxine Injectable 20 MICROGram(s) IV Push at bedtime  meropenem  IVPB 1000 milliGRAM(s) IV Intermittent every 12 hours  methylPREDNISolone sodium succinate Injectable 10 milliGRAM(s) IV Push every 6 hours  metoclopramide Injectable 10 milliGRAM(s) IV Push every 8 hours  norepinephrine Infusion 0.05 MICROgram(s)/kG/Min IV Continuous <Continuous>  pantoprazole  Injectable 40 milliGRAM(s) IV Push daily  petrolatum Ophthalmic Ointment 1 Application(s) Both EYES two times a day  Phoxillum Filtration BK 4 / 2.5 5000 milliLiter(s) CRRT <Continuous>  polyethylene glycol 3350 17 Gram(s) Oral at bedtime  PrismaSOL Filtration BGK 0 / 2.5 5000 milliLiter(s) CRRT <Continuous>  PrismaSOL Filtration BGK 0 / 2.5 5000 milliLiter(s) CRRT <Continuous>      PMHX/PSHX:  Hypertension    Hypothyroidism    Breast cancer    S/P     H/O left mastectomy        Family history:  No pertinent family history in first degree relatives    FH: myocardial infarction        Social History:     ROS:           PHYSICAL EXAM:         Vital Signs:  Vital Signs Last 24 Hrs  T(C): 38.6 (2023 04:00), Max: 38.6 (2023 04:00)  T(F): 101.5 (2023 04:00), Max: 101.5 (2023 04:00)  HR: 121 (2023 06:00) (94 - 125)  BP: --  BP(mean): --  RR: 26 (2023 06:00) (19 - 42)  SpO2: 100% (2023 06:00) (100% - 100%)    Parameters below as of 2023 04:00  Patient On (Oxygen Delivery Method): ventilator,(CPAP mode)    O2 Concentration (%): 30  Daily     Daily Weight in k.4 (2023 05:00)    LABS:                        7.5    12.75 )-----------( 333      ( 2023 01:17 )             22.7     04-06    137  |  103  |  21  ----------------------------<  131<H>  4.1   |  17<L>  |  1.02    Ca    7.9<L>      2023 01:17  Phos  3.9     04-06  Mg     2.50     04-06    TPro  6.0  /  Alb  2.6<L>  /  TBili  10.1<H>  /  DBili  x   /  AST  147<H>  /  ALT  173<H>  /  AlkPhos  164<H>  04-05    LIVER FUNCTIONS - ( 2023 11:45 )  Alb: 2.6 g/dL / Pro: 6.0 g/dL / ALK PHOS: 164 U/L / ALT: 173 U/L / AST: 147 U/L / GGT: x           PT/INR - ( 2023 01:17 )   PT: 17.7 sec;   INR: 1.52 ratio         PTT - ( 2023 01:17 )  PTT:25.9 sec        Imaging:    < from: MR MRCP No Cont (23 @ 17:35) >    FINDINGS:  LOWER CHEST: Left breast implant. Small bilateral pleural effusions with   compressive atelectasis of both lower lobes.    LIVER: Within normal limits.  BILE DUCTS: Normal caliber.  GALLBLADDER: Cholelithiasis.  SPLEEN: Within normal limits.  PANCREAS: Extensive necrotizing pancreatitis with areas of hemorrhage   involving the body and tail.  ADRENALS: Within normal limits.  KIDNEYS/URETERS: Within normal limits.    VISUALIZED PORTIONS:  BOWEL: Enteric tube with tip terminating in the duodenum. Dilated loops   ofproximal small bowel in the left hemiabdomen without discrete   transition point, possibly ileus.  PERITONEUM: Small volume abdominopelvic ascites.  VESSELS: Evaluation of vascular complications associated with   pancreatitis cannot be assessed due to absence of intravenous contrast.  RETROPERITONEUM/LYMPH NODES: No lymphadenopathy.  ABDOMINAL WALL: Subcutaneous edema.  BONES: Degenerative changes.    IMPRESSION:  Extensive necrotizing pancreatitis with areas of hemorrhage involving the   body and tail.    No choledocholithiasis.    --- End of Report ---    < end of copied text >           Chief Complaint:  Patient is a 48y old  Female who presents with a chief complaint of Nec pancreatitis (2023 00:14)      HPI: 48 year old female with PMHx significant for HTN, hypothyroidism, breast cancer s/p left mastectomy and chemo in  presenting with epigastric pain and LUQ pain. Found to have necrotizing pancreatitis. Transferred from OSH ([place]) for hypotension and tachypnea requiring emergent intubation and multiple pressors. Course c/b multiorgan failure requiring CVVHD and shock liver. Continues to be intermittent febrile/hypothermic. Urine+ for Ecoli. Course c/b anemia with areas of hemorrhagic pancreatitis on imaging, TCP with +AC, -DOUGLAS.         Allergies:  No Known Allergies      Home Medications:    Hospital Medications:  chlorhexidine 0.12% Liquid 15 milliLiter(s) Oral Mucosa every 12 hours  chlorhexidine 2% Cloths 1 Application(s) Topical daily  CRRT Treatment    <Continuous>  dexMEDEtomidine Infusion 0.1 MICROgram(s)/kG/Hr IV Continuous <Continuous>  dextrose 50% Injectable 25 Gram(s) IV Push once  dextrose 50% Injectable 12.5 Gram(s) IV Push once  enalaprilat Injectable 1.25 milliGRAM(s) IV Push every 6 hours  fondaparinux Injectable 1.5 milliGRAM(s) SubCutaneous daily  HYDROmorphone  Injectable 0.5 milliGRAM(s) IV Push every 4 hours PRN  HYDROmorphone  Injectable 1 milliGRAM(s) IV Push every 4 hours PRN  HYDROmorphone  Injectable 1 milliGRAM(s) IV Push once  insulin glargine Injectable (LANTUS) 9 Unit(s) SubCutaneous at bedtime  insulin lispro (ADMELOG) corrective regimen sliding scale   SubCutaneous every 6 hours  insulin lispro Injectable (ADMELOG) 2 Unit(s) SubCutaneous every 6 hours  levothyroxine Injectable 20 MICROGram(s) IV Push at bedtime  meropenem  IVPB 1000 milliGRAM(s) IV Intermittent every 12 hours  methylPREDNISolone sodium succinate Injectable 10 milliGRAM(s) IV Push every 6 hours  metoclopramide Injectable 10 milliGRAM(s) IV Push every 8 hours  norepinephrine Infusion 0.05 MICROgram(s)/kG/Min IV Continuous <Continuous>  pantoprazole  Injectable 40 milliGRAM(s) IV Push daily  petrolatum Ophthalmic Ointment 1 Application(s) Both EYES two times a day  Phoxillum Filtration BK 4 / 2.5 5000 milliLiter(s) CRRT <Continuous>  polyethylene glycol 3350 17 Gram(s) Oral at bedtime  PrismaSOL Filtration BGK 0 / 2.5 5000 milliLiter(s) CRRT <Continuous>  PrismaSOL Filtration BGK 0 / 2.5 5000 milliLiter(s) CRRT <Continuous>      PMHX/PSHX:  Hypertension    Hypothyroidism    Breast cancer    S/P     H/O left mastectomy        Family history:  No pertinent family history in first degree relatives    FH: myocardial infarction        Social History:     ROS: unable to obtain, patient intubated          PHYSICAL EXAM:   GENERAL: intubated however awake and nods  HEENT: ETT and OGT in place  CHEST/LUNG: vent breaths  HEART: Regular rate and rhythm  ABDOMEN: Soft, Nontender, Nondistended  EXTREMITIES:  b/l edema  NEUROLOGY: awake and nods  SKIN: No rashes or lesions      Vital Signs:  Vital Signs Last 24 Hrs  T(C): 38.6 (2023 04:00), Max: 38.6 (2023 04:00)  T(F): 101.5 (2023 04:00), Max: 101.5 (2023 04:00)  HR: 121 (2023 06:00) (94 - 125)  BP: --  BP(mean): --  RR: 26 (2023 06:00) (19 - 42)  SpO2: 100% (2023 06:00) (100% - 100%)    Parameters below as of 2023 04:00  Patient On (Oxygen Delivery Method): ventilator,(CPAP mode)    O2 Concentration (%): 30  Daily     Daily Weight in k.4 (2023 05:00)    LABS:                        7.5    12.75 )-----------( 333      ( 2023 01:17 )             22.7     04-06    137  |  103  |  21  ----------------------------<  131<H>  4.1   |  17<L>  |  1.02    Ca    7.9<L>      2023 01:17  Phos  3.9     04-06  Mg     2.50     04-06    TPro  6.0  /  Alb  2.6<L>  /  TBili  10.1<H>  /  DBili  x   /  AST  147<H>  /  ALT  173<H>  /  AlkPhos  164<H>  -    LIVER FUNCTIONS - ( 2023 11:45 )  Alb: 2.6 g/dL / Pro: 6.0 g/dL / ALK PHOS: 164 U/L / ALT: 173 U/L / AST: 147 U/L / GGT: x           PT/INR - ( 2023 01:17 )   PT: 17.7 sec;   INR: 1.52 ratio         PTT - ( 2023 01:17 )  PTT:25.9 sec        Imaging:    < from: MR MRCP No Cont (23 @ 17:35) >    FINDINGS:  LOWER CHEST: Left breast implant. Small bilateral pleural effusions with   compressive atelectasis of both lower lobes.    LIVER: Within normal limits.  BILE DUCTS: Normal caliber.  GALLBLADDER: Cholelithiasis.  SPLEEN: Within normal limits.  PANCREAS: Extensive necrotizing pancreatitis with areas of hemorrhage   involving the body and tail.  ADRENALS: Within normal limits.  KIDNEYS/URETERS: Within normal limits.    VISUALIZED PORTIONS:  BOWEL: Enteric tube with tip terminating in the duodenum. Dilated loops   ofproximal small bowel in the left hemiabdomen without discrete   transition point, possibly ileus.  PERITONEUM: Small volume abdominopelvic ascites.  VESSELS: Evaluation of vascular complications associated with   pancreatitis cannot be assessed due to absence of intravenous contrast.  RETROPERITONEUM/LYMPH NODES: No lymphadenopathy.  ABDOMINAL WALL: Subcutaneous edema.  BONES: Degenerative changes.    IMPRESSION:  Extensive necrotizing pancreatitis with areas of hemorrhage involving the   body and tail.    No choledocholithiasis.    --- End of Report ---    < end of copied text >

## 2023-04-06 NOTE — PROGRESS NOTE ADULT - ATTENDING COMMENTS
Pt. with oliguric CHATO. Assessment and plan for CHATO/RRT as outlined above. CRRT discontinued overnight by SICU team. Pt. remains oliguric. Labs reviewed. Plan for intermittent HD treatment tomorrow. Monitor labs and urine output. Avoid any potential nephrotoxins. Dose medications as per eGFR. Overall prognosis guarded.

## 2023-04-06 NOTE — CHART NOTE - NSCHARTNOTEFT_GEN_A_CORE
Bedside routine POCUS exam performed. B-lines found at the base and apex bilateral. Bedside routine POCUS exam performed. B-lines found at the base and apex bilateral.  Agree

## 2023-04-06 NOTE — PROGRESS NOTE ADULT - ATTENDING COMMENTS
I agree with the detailed interval history, physical, and plan, which I have reviewed and edited where appropriate'; also agree with notes/assessment with my team on service.  I have personally examined the patient.  I was physically present for the key portions of the evaluation and management (E/M) service provided.  I reviewed all the pertinent data.  The patient is a critical care patient with life threatening hemodynamic and metabolic instability in SICU.  The SICU team has a constant risk benefit analyzes discussion and coordinating care with the primary team and all consultants.   The patient is in SICU with the chief complaint and diagnosis mentioned in the note.   The plan will be specified in the note.  48 year old female with necrotizing pancreatitis in SICU for hemodynamic monitoring and respiratory management.  EXAM  General: intubated and sedated   Cardiovascular: RR  Respiratory: clear  Abdominal: Soft, distended   Extremities: No edema  Neurologic: Non focal    Neuro:  - Wean precedex  - Dilaudid prn  Resp:   CPAP/PSV as tolerated   CV:  - off levo   GI:   - Continue TF (Nepro) at rate of 30cc/hr  - GI ppx w/ protonix   Renal:  - Stop CRRT, start intermittent hemodialysis  Heme:   - SCDs and fondaparinux for DVT ppx  ID:   - Meropenem   Endo:   - c/w synthroid   - ISS    Code Status: Full code    Disposition: SICU

## 2023-04-06 NOTE — PROGRESS NOTE ADULT - PROBLEM SELECTOR PLAN 1
Pt. with oliguric CHATO in the setting of necrotizing pancreatitis and shock. Pt. with most likely ATN. Scr was 1.24 on 3/23/23 and increased to 4.60 on 3/24/23. Pt. was initiated on CRRT/CVVHDF on 3/24 and stopped overnight on 4/6. Pt. tolerated CRRT well. Challenged with lasix on 4/5 but remains anuric. Labs reviewed. Will plan for iHD with UF tomorrow. Monitor labs and urine output. Avoid nephrotoxins. Dose medications as per eGFR. Plan discussed with the primary team.     If you have any questions, please feel free to contact me  Neville Schulz  Nephrology Fellow  [phone]; Prefer [employer] TEAMS   (After 5pm or on weekends please page the on-call fellow). Pt. with oliguric CHATO in the setting of necrotizing pancreatitis and shock. Pt. with most likely ATN. Scr was 1.24 on 3/23/23 and increased to 4.60 on 3/24/23. Pt. was initiated on CRRT/CVVHDF on 3/24 and stopped overnight on 4/6. Pt. tolerated CRRT well. Challenged with lasix on 4/5 but remains anuric. Labs reviewed. Will plan for iHD with UF tomorrow. Monitor labs and urine output. Avoid nephrotoxins including ACEi/ARBs for now. Dose medications as per eGFR. Plan discussed with the primary team.     If you have any questions, please feel free to contact me  Neville Schulz  Nephrology Fellow  [phone]; Prefer [employer] TEAMS   (After 5pm or on weekends please page the on-call fellow). Pt. with oliguric CHATO in the setting of necrotizing pancreatitis and shock. Pt. with most likely ATN. Scr was 1.24 on 3/23/23 and increased to 4.60 on 3/24/23. Pt. was initiated on CRRT/CVVHDF on 3/24/23 and discontinued overnight (4/6/23) by SICU team. Pt. tolerated CRRT well. Pt. remains oliguric. Labs reviewed. Will plan for intermittent HD tomorrow as per discussion with SICU team/attending. Monitor labs and urine output. Avoid nephrotoxins and ACE-I/ARB therapy. Dose medications as per eGFR.      If you have any questions, please feel free to contact me  Neville Schulz  Nephrology Fellow  [phone]; Prefer [employer] TEAMS   (After 5pm or on weekends please page the on-call fellow).

## 2023-04-06 NOTE — PROGRESS NOTE ADULT - SUBJECTIVE AND OBJECTIVE BOX
[facility] Division of Kidney Diseases & Hypertension  FOLLOW UP NOTE  [phone]--------------------------------------------------------------------------------  Chief Complaint: Oliguric CHATO, on CRRT    24 hour events/subjective:  Pt. seen and examined in the SICU today. Pt. intubated, sedated. Off IV vasopressors this morning. CRRT was held last night. Will trial iHD tomorrow     PAST HISTORY  --------------------------------------------------------------------------------  No significant changes to PMH, PSH, FHx, SHx, unless otherwise noted    ALLERGIES & MEDICATIONS  --------------------------------------------------------------------------------  Allergies    No Known Allergies    Intolerances      Standing Inpatient Medications  chlorhexidine 0.12% Liquid 15 milliLiter(s) Oral Mucosa every 12 hours  chlorhexidine 2% Cloths 1 Application(s) Topical daily  dexMEDEtomidine Infusion 0.1 MICROgram(s)/kG/Hr IV Continuous <Continuous>  dextrose 50% Injectable 25 Gram(s) IV Push once  dextrose 50% Injectable 12.5 Gram(s) IV Push once  enalaprilat Injectable 1.25 milliGRAM(s) IV Push every 6 hours  fondaparinux Injectable 1.5 milliGRAM(s) SubCutaneous daily  HYDROmorphone  Injectable 1 milliGRAM(s) IV Push once  insulin glargine Injectable (LANTUS) 9 Unit(s) SubCutaneous at bedtime  insulin lispro (ADMELOG) corrective regimen sliding scale   SubCutaneous every 6 hours  insulin lispro Injectable (ADMELOG) 2 Unit(s) SubCutaneous every 6 hours  levothyroxine Injectable 20 MICROGram(s) IV Push at bedtime  meropenem  IVPB 1000 milliGRAM(s) IV Intermittent every 12 hours  methylPREDNISolone sodium succinate Injectable 10 milliGRAM(s) IV Push every 6 hours  metoclopramide Injectable 10 milliGRAM(s) IV Push every 8 hours  pantoprazole  Injectable 40 milliGRAM(s) IV Push daily  petrolatum Ophthalmic Ointment 1 Application(s) Both EYES two times a day  polyethylene glycol 3350 17 Gram(s) Oral at bedtime    PRN Inpatient Medications  HYDROmorphone  Injectable 0.5 milliGRAM(s) IV Push every 4 hours PRN  HYDROmorphone  Injectable 1 milliGRAM(s) IV Push every 4 hours PRN      REVIEW OF SYSTEMS  --------------------------------------------------------------------------------  Unable to obtain ROS      VITALS/PHYSICAL EXAM  --------------------------------------------------------------------------------  T(C): 38.6 (04-06-23 @ 04:00), Max: 38.6 (04-06-23 @ 04:00)  HR: 131 (04-06-23 @ 09:30) (94 - 133)  BP: 108/55 (04-06-23 @ 08:00) (108/55 - 108/55)  RR: 33 (04-06-23 @ 09:30) (19 - 42)  SpO2: 99% (04-06-23 @ 09:30) (99% - 100%)  Wt(kg): --        04-05-23 @ 07:01  -  04-06-23 @ 07:00  --------------------------------------------------------  IN: 870.2 mL / OUT: 3119 mL / NET: -2248.8 mL    04-06-23 @ 07:01  -  04-06-23 @ 10:25  --------------------------------------------------------  IN: 56 mL / OUT: 2 mL / NET: 54 mL      Physical Exam:  	Gen: Ill appearing, intubated   	HEENT: +ETT  	Pulm: Mechanical breath sounds  	CV: S1S2+  	Abd: Obese, distended  	Ext: +LE edema B/L, Trace B/L UE edema   	Neuro: Intubated              : Babin catheter+ with small amount of dark urine in the bag  	Skin: Warm and dry              Dialysis access: Left IJ non tunneled HD catheter     LABS/STUDIES  --------------------------------------------------------------------------------              7.5    12.75 >-----------<  333      [04-06-23 @ 01:17]              22.7     137  |  103  |  21  ----------------------------<  131      [04-06-23 @ 01:17]  4.1   |  17  |  1.02        Ca     7.9     [04-06-23 @ 01:17]      Mg     2.50     [04-06-23 @ 01:17]      Phos  3.9     [04-06-23 @ 01:17]    TPro  6.0  /  Alb  2.6  /  TBili  10.1  /  DBili  x   /  AST  147  /  ALT  173  /  AlkPhos  164  [04-05-23 @ 11:45]    PT/INR: PT 17.7 , INR 1.52       [04-06-23 @ 01:17]  PTT: 25.9       [04-06-23 @ 01:17]      Creatinine Trend:  SCr 1.02 [04-06 @ 01:17]  SCr 1.24 [04-05 @ 11:45]  SCr 1.38 [04-05 @ 01:50]  SCr 1.24 [04-04 @ 12:25]  SCr 1.08 [04-04 @ 00:35]          HIV Nonreact      [03-30-23 @ 20:10]     [facility] Division of Kidney Diseases & Hypertension  FOLLOW UP NOTE  [phone]--------------------------------------------------------------------------------    Chief Complaint: Oliguric CHATO, on RRT    24 hour events/subjective: Pt. seen and examined in the SICU today. Pt. intubated, sedated. Off IV vasopressors this morning. CRRT was discontinued overnight by MICU team. Pt. remains oliguric.     PAST HISTORY  --------------------------------------------------------------------------------  No significant changes to PMH, PSH, FHx, SHx, unless otherwise noted    ALLERGIES & MEDICATIONS  --------------------------------------------------------------------------------  Allergies    No Known Allergies    Intolerances    Standing Inpatient Medications  chlorhexidine 0.12% Liquid 15 milliLiter(s) Oral Mucosa every 12 hours  chlorhexidine 2% Cloths 1 Application(s) Topical daily  dexMEDEtomidine Infusion 0.1 MICROgram(s)/kG/Hr IV Continuous <Continuous>  dextrose 50% Injectable 25 Gram(s) IV Push once  dextrose 50% Injectable 12.5 Gram(s) IV Push once  enalaprilat Injectable 1.25 milliGRAM(s) IV Push every 6 hours  fondaparinux Injectable 1.5 milliGRAM(s) SubCutaneous daily  HYDROmorphone  Injectable 1 milliGRAM(s) IV Push once  insulin glargine Injectable (LANTUS) 9 Unit(s) SubCutaneous at bedtime  insulin lispro (ADMELOG) corrective regimen sliding scale   SubCutaneous every 6 hours  insulin lispro Injectable (ADMELOG) 2 Unit(s) SubCutaneous every 6 hours  levothyroxine Injectable 20 MICROGram(s) IV Push at bedtime  meropenem  IVPB 1000 milliGRAM(s) IV Intermittent every 12 hours  methylPREDNISolone sodium succinate Injectable 10 milliGRAM(s) IV Push every 6 hours  metoclopramide Injectable 10 milliGRAM(s) IV Push every 8 hours  pantoprazole  Injectable 40 milliGRAM(s) IV Push daily  petrolatum Ophthalmic Ointment 1 Application(s) Both EYES two times a day  polyethylene glycol 3350 17 Gram(s) Oral at bedtime    PRN Inpatient Medications  HYDROmorphone  Injectable 0.5 milliGRAM(s) IV Push every 4 hours PRN  HYDROmorphone  Injectable 1 milliGRAM(s) IV Push every 4 hours PRN    REVIEW OF SYSTEMS  --------------------------------------------------------------------------------  Unable to obtain ROS    VITALS/PHYSICAL EXAM  --------------------------------------------------------------------------------  T(C): 38.6 (04-06-23 @ 04:00), Max: 38.6 (04-06-23 @ 04:00)  HR: 131 (04-06-23 @ 09:30) (94 - 133)  BP: 108/55 (04-06-23 @ 08:00) (108/55 - 108/55)  RR: 33 (04-06-23 @ 09:30) (19 - 42)  SpO2: 99% (04-06-23 @ 09:30) (99% - 100%)  Wt(kg): --    04-05-23 @ 07:01  -  04-06-23 @ 07:00  --------------------------------------------------------  IN: 870.2 mL / OUT: 3119 mL / NET: -2248.8 mL    04-06-23 @ 07:01  -  04-06-23 @ 10:25  --------------------------------------------------------  IN: 56 mL / OUT: 2 mL / NET: 54 mL    Physical Exam:  	Gen: Ill appearing, intubated   	HEENT: +ETT  	Pulm: Mechanical breath sounds  	CV: S1S2+  	Abd: Obese, distended  	Ext: +LE edema B/L, Trace B/L UE edema   	Neuro: Intubated              : Babin catheter+ with small amount of bloody urine seen in the bag  	Skin: Warm and dry              Dialysis access: Left IJ non tunneled HD catheter     LABS/STUDIES  --------------------------------------------------------------------------------              7.5    12.75 >-----------<  333      [04-06-23 @ 01:17]              22.7     137  |  103  |  21  ----------------------------<  131      [04-06-23 @ 01:17]  4.1   |  17  |  1.02        Ca     7.9     [04-06-23 @ 01:17]      Mg     2.50     [04-06-23 @ 01:17]      Phos  3.9     [04-06-23 @ 01:17]    TPro  6.0  /  Alb  2.6  /  TBili  10.1  /  DBili  x   /  AST  147  /  ALT  173  /  AlkPhos  164  [04-05-23 @ 11:45]    Creatinine Trend:  SCr 1.02 [04-06 @ 01:17]  SCr 1.24 [04-05 @ 11:45]  SCr 1.38 [04-05 @ 01:50]  SCr 1.24 [04-04 @ 12:25]  SCr 1.08 [04-04 @ 00:35]

## 2023-04-06 NOTE — PROGRESS NOTE ADULT - ASSESSMENT
Patient is a 48 year old female with PMHx significant for HTN, hypothyroidism,  30 years ago, breast cancer s/p left mastectomy and chemo in  presenting with epigastric pain and LUQ pain. Found to have necrotizing pancreatitis. Transferred from OSH ([place]) for hypotension and tachypnea requiring emergent intubation and multiple pressors. SICU consulted for hemodynamic monitoring and respiratory management.    Neuro:  Intubated and sedated for clinical course; No acute neurological issues   - Wean precedex  - Dilaudid prn    Resp:  AHRF w/ b/l pleff 2/2 necrotizing pancreatitis   - Volume control 20/350/5/40 --> CPAP  - CPAP as tolerated   - POCUS 4/3, albumin 250, lasix 80mg    CV:  Mixed shock state 2/2 septic shock w/ E. Coli in urine vs hypovolemic shock now s/p 11L resus    - off levo   - Treat ID as below     GI:   #Necrotizing pancreatitis 2/2 gallstones  Diagnostics  CT 3/27: >50%of the pancreatic head  extends into the mesentery and inferiorly along the retroperitoneum into the lower abdomen  RUQ US: Chololithiasis (2cm stone) w/ no acute frannie   - MRCP 4/3: no choledocholithiasis    Therapeutics:   - Plan for interval CT   - Continue TF (Nepro) at rate of 30cc/hr  - GI ppx w/ protonix   - Reglan  - Dolcolax    #Shocked Liver + Hyperbilirubinemia   Likely 2/2 to shocked state w/ LFTs now trending down w/ bili following protracted course as expected. Possible concern for acute obstructive biliary process.   - MRCP read no choledocholithiasis  - Hold tylenol     Renal:  ARF 2/2 shock state resulting in ATN requiring CRRT    - Stop CRRT, start intermittent hemodialysis  - Babin catheter in place    Heme:   Anemia likely 2/2 disease course vs suppression; No active bleeding reported   Diagnostics:   - CBC q12     Therapeutics:   - received 1U PRBC   - SCDs and fondaparinux for DVT ppx    #Thrombocytopenia  Likely suppression 2/2 infection vs shock state vs HIT  Diagnostics  - HIT Ab positive  - f/u DOUGLAS     Therapeutics:  -CRRT dosed Fondaparinux 1.5 qd for DVT ppx   - f/u anti Xa level    #Splenic Vein Thrombosis  Diagnostics:  3/22 CT Abd w/ contrast: Focal filling defect in the splenic vein at the level of the body of   the pancreas for which an underlying thrombus is suspected     ID:   Intermittently febrile w/ leukocytosis c/f septic shock  E coli UTI  Diagnostics:  - 3/22 Ucx: E coli  - 3/30 Bcx: staph hominis in aerobic bottle x 1 (likely contaminant)     Therapeutics:   - Meropenem (3/26- )    Endo:   - Off solumedrol  - c/w synthroid 20 IV (home med)   - ISS    Code Status: Full code    Disposition: SICU   Patient is a 48 year old female with PMHx significant for HTN, hypothyroidism,  30 years ago, breast cancer s/p left mastectomy and chemo in  presenting with epigastric pain and LUQ pain. Found to have necrotizing pancreatitis. Transferred from OSH ([place]) for hypotension and tachypnea requiring emergent intubation and multiple pressors. SICU consulted for hemodynamic monitoring and respiratory management.    Neuro:  Intubated and sedated for clinical course; No acute neurological issues   - Wean precedex  - Dilaudid prn    Resp:  AHRF w/ b/l pleff 2/2 necrotizing pancreatitis   - Volume control --> CPAP as tolerated     CV:  Mixed shock state 2/2 septic shock w/ E. Coli in urine vs hypovolemic shock now s/p 11L resus    - off levo   - Treat ID as below     GI:   #Necrotizing pancreatitis 2/2 gallstones  Diagnostics  CT 3/27: >50%of the pancreatic head  extends into the mesentery and inferiorly along the retroperitoneum into the lower abdomen  RUQ US: Chololithiasis (2cm stone) w/ no acute frannie   - MRCP 4/3: no choledocholithiasis    Therapeutics:   - Plan for interval CT   - Continue TF (Nepro) at rate of 30cc/hr  - GI ppx w/ protonix   - Reglan  - Dulcolax    #Shocked Liver + Hyperbilirubinemia   Likely 2/2 to shocked state w/ LFTs now trending down w/ bili following protracted course as expected. Possible concern for acute obstructive biliary process.   - MRCP read no choledocholithiasis  - Hold tylenol     Renal:  ARF 2/2 shock state resulting in ATN requiring CRRT    - Stop CRRT, start intermittent hemodialysis  - Babin catheter in place    Heme:   Anemia likely 2/2 disease course vs suppression; No active bleeding reported   Diagnostics:   - CBC q12     Therapeutics:   - received 1U PRBC   - SCDs and fondaparinux for DVT ppx    #Thrombocytopenia  Likely suppression 2/2 infection vs shock state vs HIT  Diagnostics  - HIT Ab positive  - f/u DOUGLAS     Therapeutics:  -CRRT dosed Fondaparinux 1.5 qd for DVT ppx   - f/u anti Xa level    #Splenic Vein Thrombosis  Diagnostics:  3/22 CT Abd w/ contrast: Focal filling defect in the splenic vein at the level of the body of   the pancreas for which an underlying thrombus is suspected     ID:   Intermittently febrile w/ leukocytosis c/f septic shock  E coli UTI  Diagnostics:  - 3/22 Ucx: E coli  - 3/30 Bcx: staph hominis in aerobic bottle x 1 (likely contaminant)     Therapeutics:   - Meropenem (3/26- )    Endo:   - Off solumedrol  - c/w synthroid 20 IV (home med)   - ISS    Code Status: Full code    Disposition: SICU

## 2023-04-06 NOTE — PROGRESS NOTE ADULT - SUBJECTIVE AND OBJECTIVE BOX
Surgery Daily Progress Note  =====================================================  Interval / Overnight Events: No acute events overnight.      HPI:  Patient is a 48 year old female with a PMHx of HTN, hypothyroidism,  (30 years ago) and breast cancer (S/P left mastectomy and chemo in ) who presented with epigastric pain and left upper quadrant pain.  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  Transferred from [facility] ([place]) for hypotension and tachypnea requiring emergent intubation and multiple pressors.  SICU consulted for hemodynamic monitoring and respiratory management. (24 Mar 2023 18:01)      PAST MEDICAL & SURGICAL HISTORY:  Hypertension  Hypothyroidism  Breast cancer  S/P   H/O left mastectomy      ALLERGIES:  No Known Allergies    --------------------------------------------------------------------------------------    MEDICATIONS:    Neurologic Medications  dexMEDEtomidine Infusion 0.1 MICROgram(s)/kG/Hr IV Continuous <Continuous>  HYDROmorphone  Injectable 0.5 milliGRAM(s) IV Push every 4 hours PRN Moderate Pain (4 - 6)  HYDROmorphone  Injectable 1 milliGRAM(s) IV Push every 4 hours PRN Severe Pain (7 - 10)  HYDROmorphone  Injectable 1 milliGRAM(s) IV Push once  metoclopramide Injectable 10 milliGRAM(s) IV Push every 8 hours    Cardiovascular Medications  enalaprilat Injectable 1.25 milliGRAM(s) IV Push every 6 hours  norepinephrine Infusion 0.05 MICROgram(s)/kG/Min IV Continuous <Continuous>    Gastrointestinal Medications  pantoprazole  Injectable 40 milliGRAM(s) IV Push daily  polyethylene glycol 3350 17 Gram(s) Oral at bedtime    Hematologic/Oncologic Medications  fondaparinux Injectable 1.5 milliGRAM(s) SubCutaneous daily    Antimicrobial/Immunologic Medications  meropenem  IVPB 1000 milliGRAM(s) IV Intermittent every 12 hours    Endocrine/Metabolic Medications  dextrose 50% Injectable 25 Gram(s) IV Push once  dextrose 50% Injectable 12.5 Gram(s) IV Push once  insulin glargine Injectable (LANTUS) 9 Unit(s) SubCutaneous at bedtime  insulin lispro (ADMELOG) corrective regimen sliding scale   SubCutaneous every 6 hours  insulin lispro Injectable (ADMELOG) 2 Unit(s) SubCutaneous every 6 hours  levothyroxine Injectable 20 MICROGram(s) IV Push at bedtime  methylPREDNISolone sodium succinate Injectable 10 milliGRAM(s) IV Push every 6 hours    Topical/Other Medications  chlorhexidine 0.12% Liquid 15 milliLiter(s) Oral Mucosa every 12 hours  chlorhexidine 2% Cloths 1 Application(s) Topical daily  CRRT Treatment    <Continuous>  petrolatum Ophthalmic Ointment 1 Application(s) Both EYES two times a day  Phoxillum Filtration BK 4 / 2.5 5000 milliLiter(s) CRRT <Continuous>  PrismaSOL Filtration BGK 0 / 2.5 5000 milliLiter(s) CRRT <Continuous>  PrismaSOL Filtration BGK 0 / 2.5 5000 milliLiter(s) CRRT <Continuous>    --------------------------------------------------------------------------------------    VITAL SIGNS:  T(C): 38.6 (2023 04:00), Max: 38.6 (2023 04:00)  T(F): 101.5 (2023 04:00), Max: 101.5 (2023 04:00)  HR: 119 (2023 08:15) (94 - 133)  BP: 108/55 (2023 08:00) (108/55 - 108/55)  BP(mean): 70 (2023 08:00) (70 - 70)  ABP: 96/46 (2023 08:15) (90/48 - 153/85)  ABP(mean): 65 (2023 08:15) (64 - 113)  RR: 30 (2023 08:15) (19 - 42)  SpO2: 100% (2023 08:15) (100% - 100%)    --------------------------------------------------------------------------------------    INS AND OUTS:    2023 07:01  -  2023 07:00  --------------------------------------------------------  IN:    Dexmedetomidine: 690.2 mL    Enteral Tube Flush: 30 mL    IV PiggyBack: 150 mL  Total IN: 870.2 mL    OUT:    Indwelling Catheter - Urethral (mL): 30 mL    Nasogastric/Oral tube (mL): 600 mL    Other (mL): 2489 mL  Total OUT: 3119 mL    Total NET: -2248.8 mL      2023 07:01  -  2023 08:55  --------------------------------------------------------  IN:    Dexmedetomidine: 56 mL  Total IN: 56 mL    OUT:    Indwelling Catheter - Urethral (mL): 2 mL  Total OUT: 2 mL    Total NET: 54 mL    --------------------------------------------------------------------------------------    EXAM    NEUROLOGY  Exam: Normal, in no acute distress.    HEENT  Exam: Normocephalic, atraumatic.    RESPIRATORY  Exam: Intubated.  Mechanical Ventilation: Mode: AC/ CMV (Assist Control/ Continuous Mandatory Ventilation), RR (machine): 20, TV (machine): 350, FiO2: 30, PEEP: 5, ITime: 0.6, MAP: 10, PIP: 23    CARDIOVASCULAR  Exam: S1, S2.  Regular rate and rhythm.    GI/NUTRITION  Exam: Abdomen softly distended.  Non-tender.  NGT.  Current Diet: NPO with tube feeds via NGT    MUSCULOSKELETAL  Exam: All extremities moving spontaneously without limitations.      HEMATOLOGIC  [x] VTE Prophylaxis: fondaparinux Injectable 1.5 milliGRAM(s) SubCutaneous daily      INFECTIOUS DISEASE  Antimicrobials/Immunologic Medications:  meropenem  IVPB 1000 milliGRAM(s) IV Intermittent every 12 hours    --------------------------------------------------------------------------------------

## 2023-04-06 NOTE — CONSULT NOTE ADULT - ATTENDING COMMENTS
Agree with above  # Necrotizing Pancreatitis  # Hyperbilirubinemia: Suspect secondary to shocked liver given time course. Lower suspicious for biliary obstruction    Pressor requirements reportedly decreasing. Recommend MRCP when more clinical stable.
I agree with the detailed interval history, physical, and plan, which I have reviewed and edited where appropriate'; also agree with notes/assessment with my team on service.  I have personally examined the patient.  I was physically present for the key portions of the evaluation and management (E/M) service provided.  I reviewed all the pertinent data.  The patient is a critical care patient with life threatening hemodynamic and metabolic instability in SICU.  The SICU team has a constant risk benefit analyzes discussion and coordinating care with the primary team and all consultants.   The patient is in SICU with the chief complaint and diagnosis mentioned in the note.   The plan will be specified in the note.  48 year old female with necrotizing pancreatitis; SICU consult for hemodynamic instability requiring vasopressors pressors in CHATO.  EXAM  NEUROLOGY  Exam: Sedated,   RESPIRATORY  Exam: Lungs clear   Mechanical Ventilation: AC: 450/5/22/50  CARDIOVASCULAR  Exam.  Regular rate   GI/NUTRITION  Exam: Abdomen moderately distended.      PLAN:  Neuro:  - precedex  - Fentanyl   Resp:  - Mechanical ventilation - AC settings 450/22/5/40  CV:  - levo, venkata and vaso  - Will wean vasopressor   GI:   - NPO  - NGT   Renal:  - CRRT,   Heme:  - SQH for VTE prophylaxis  ID:   - blood cultures,  - meropenem   Endo:   - Cont to monitor FS  Code Status: Full code  Disposition: SICU
48 year old female with PMHx significant for HTN, hypothyroidism, breast cancer s/p left mastectomy and chemo in 2008 presenting with epigastric pain and LUQ pain, found to have necrotizing pancreatitis with hemorrhage, multiorgan failure with shock liver and ATN requiring CVVHD.  patient previously in shock liver with transaminases >7000 with bili peak to 12 on 4/1. now stable ~10. Transaminases drastically improved consistent with cholestatic pattern and trend of shock liver vs intrahepatic cholestasis of sepsis. Hep A, B and C negative. MR without obstruction. bilirubin is likely lagging behind in the setting of illness, supportive care per SICU, can trial ursodiol 500 BID for cholestasis however will likely take weeks for improvement given injury and significant illness
CHATO  Metabolic Acidosis  Hyperkalemia    Will start CRRT  Will monitor labs, Is/Os and daily weights.

## 2023-04-07 NOTE — PROGRESS NOTE ADULT - ASSESSMENT
Patient is a 48 year old female with PMHx significant for HTN, hypothyroidism,  30 years ago, breast cancer s/p left mastectomy and chemo in  presenting with epigastric pain and LUQ pain. Found to have necrotizing pancreatitis. Transferred from OSH ([place]) for hypotension and tachypnea requiring emergent intubation and multiple pressors. SICU consulted for hemodynamic monitoring and respiratory management.    Neuro:  Intubated and sedated for clinical course; No acute neurological issues   - Wean precedex  - Dilaudid prn    Resp:  AHRF w/ b/l pleff 2/2 necrotizing pancreatitis   - Volume control 20/350/ --> CPAP  - CPAP as tolerated   - POCUS : b-lines in b/l apex and base  - Extubate     CV:  Mixed shock state 2/2 septic shock w/ E. Coli in urine vs hypovolemic shock now s/p 11L resus    - off levo   - Treat ID as below     GI:   #Necrotizing pancreatitis 2/2 gallstones  Diagnostics  CT 3/27: >50% of the pancreatic head extends into the mesentery and inferiorly along the retroperitoneum into the lower abdomen  RUQ US: Chololithiasis (2cm stone) w/ no acute frannie   - MRCP 4/3: no choledocholithiasis    Therapeutics:   - Plan for interval CT   - Continue TF (Nepro) at rate of 30cc/hr  - GI ppx w/ protonix   - Reglan  - Dolcolax    #Shocked Liver + Hyperbilirubinemia   Likely 2/2 to shocked state w/ LFTs now trending down w/ bili following protracted course as expected. Possible concern for acute obstructive biliary process  - MRCP read no choledocholithiasis  - Hold tylenol     Renal:  ARF 2/2 shock state resulting in ATN requiring CRRT    - Stop CRRT, start intermittent hemodialysis   - Babin catheter in place    Heme:   Anemia likely 2/2 disease course vs suppression; No active bleeding reported   Diagnostics:   - CBC q12     Therapeutics:   - received 1U PRBC   - SCDs and fondaparinux for DVT ppx    #Thrombocytopenia  Likely suppression 2/2 infection vs shock state vs HIT  Diagnostics  - HIT Ab positive  - f/u DOUGLAS     Therapeutics:  -CRRT dosed Fondaparinux 1.5 qd for DVT ppx   - f/u anti Xa level    #Splenic Vein Thrombosis  Diagnostics:  3/22 CT Abd w/ contrast: Focal filling defect in the splenic vein at the level of the body of   the pancreas for which an underlying thrombus is suspected     ID:   Intermittently febrile w/ leukocytosis c/f septic shock  E coli UTI  Diagnostics:  - 3/22 Ucx: E coli  - 3/30 Bcx: staph hominis in aerobic bottle x 1 (likely contaminant)     Therapeutics:   - Meropenem (3/26- )    Endo:   - Off solumedrol  - c/w synthroid 20 IV (home med)   - ISS    Code Status: Full code    Disposition: SICU   Patient is a 48 year old female with PMHx significant for HTN, hypothyroidism,  30 years ago, breast cancer s/p left mastectomy and chemo in  presenting with epigastric pain and LUQ pain. Found to have necrotizing pancreatitis. Transferred from OSH ([place]) for hypotension and tachypnea requiring emergent intubation and multiple pressors. SICU consulted for hemodynamic monitoring and respiratory management.    Neuro:  Intubated and sedated for clinical course; No acute neurological issues   - Wean precedex  - Dilaudid prn    Resp:  AHRF w/ b/l pleff 2/2 necrotizing pancreatitis   - Volume control 20/350/ --> CPAP  - CPAP as tolerated   - POCUS : b-lines in b/l apex and base    CV:  Mixed shock state 2/2 septic shock w/ E. Coli in urine vs hypovolemic shock now s/p 11L resus    - off levo   - Treat ID as below     GI:   #Necrotizing pancreatitis 2/2 gallstones  Diagnostics  CT 3/27: >50% of the pancreatic head extends into the mesentery and inferiorly along the retroperitoneum into the lower abdomen  RUQ US: Chololithiasis (2cm stone) w/ no acute frannie   - MRCP 4/3: no choledocholithiasis    Therapeutics:   - Plan for interval CT   - Continue TF (Nepro) at rate of 30cc/hr  - GI ppx w/ protonix   - Reglan  - Dolcolax    #Shocked Liver + Hyperbilirubinemia   Likely 2/2 to shocked state w/ LFTs now trending down w/ bili following protracted course as expected. Possible concern for acute obstructive biliary process  - MRCP read no choledocholithiasis  - Hold tylenol     Renal:  ARF 2/2 shock state resulting in ATN requiring CRRT    - Stop CRRT, plan to start intermittent hemodialysis  but cannot tolerate yet, per renal. Restart CVVH  - Babin catheter in place    Heme:   Anemia likely 2/2 disease course vs suppression; No active bleeding reported   Diagnostics:   - CBC q12     Therapeutics:   - received 1U PRBC   - SCDs and fondaparinux for DVT ppx    #Thrombocytopenia  Likely suppression 2/2 infection vs shock state vs HIT  Diagnostics  - HIT Ab positive    Therapeutics:  -CRRT dosed Fondaparinux 1.5 qd for DVT ppx   - f/u anti Xa level    #Splenic Vein Thrombosis  Diagnostics:  3/22 CT Abd w/ contrast: Focal filling defect in the splenic vein at the level of the body of   the pancreas for which an underlying thrombus is suspected     ID:   Intermittently febrile w/ leukocytosis c/f septic shock  E coli UTI  Diagnostics:  - 3/22 Ucx: E coli  - 3/30 Bcx: staph hominis in aerobic bottle x 1 (likely contaminant)     Therapeutics:   - Meropenem (3/26- )    Endo:   - Off solumedrol  - c/w synthroid 20 IV (home med)   - ISS    Code Status: Full code    Disposition: SICU

## 2023-04-07 NOTE — PROGRESS NOTE ADULT - SUBJECTIVE AND OBJECTIVE BOX
stated [facility] Division of Kidney Diseases & Hypertension  FOLLOW UP NOTE  [phone]--------------------------------------------------------------------------------    24 hour events/subjective:  Pt. seen and examined in the SICU today. Pt. intubated, sedated. Restarted on pressors overnight. CRRT was discontinued overnight on 4/6. Pt. remains oliguric.       PAST HISTORY  --------------------------------------------------------------------------------  No significant changes to PMH, PSH, FHx, SHx, unless otherwise noted    ALLERGIES & MEDICATIONS  --------------------------------------------------------------------------------  Allergies    No Known Allergies    Intolerances      Standing Inpatient Medications  chlorhexidine 0.12% Liquid 15 milliLiter(s) Oral Mucosa every 12 hours  chlorhexidine 2% Cloths 1 Application(s) Topical daily  dexMEDEtomidine Infusion 0.1 MICROgram(s)/kG/Hr IV Continuous <Continuous>  dextrose 50% Injectable 25 Gram(s) IV Push once  dextrose 50% Injectable 12.5 Gram(s) IV Push once  enalaprilat Injectable 1.25 milliGRAM(s) IV Push every 6 hours  fondaparinux Injectable 1.5 milliGRAM(s) SubCutaneous daily  HYDROmorphone  Injectable 1 milliGRAM(s) IV Push once  insulin glargine Injectable (LANTUS) 9 Unit(s) SubCutaneous at bedtime  insulin lispro (ADMELOG) corrective regimen sliding scale   SubCutaneous every 6 hours  insulin lispro Injectable (ADMELOG) 2 Unit(s) SubCutaneous every 6 hours  levothyroxine Injectable 20 MICROGram(s) IV Push at bedtime  meropenem  IVPB 1000 milliGRAM(s) IV Intermittent every 12 hours  methylPREDNISolone sodium succinate Injectable 10 milliGRAM(s) IV Push every 6 hours  metoclopramide Injectable 10 milliGRAM(s) IV Push every 8 hours  norepinephrine Infusion 0.05 MICROgram(s)/kG/Min IV Continuous <Continuous>  pantoprazole  Injectable 40 milliGRAM(s) IV Push daily  petrolatum Ophthalmic Ointment 1 Application(s) Both EYES two times a day  polyethylene glycol 3350 17 Gram(s) Oral at bedtime    PRN Inpatient Medications  HYDROmorphone  Injectable 0.5 milliGRAM(s) IV Push every 4 hours PRN  HYDROmorphone  Injectable 1 milliGRAM(s) IV Push every 4 hours PRN      REVIEW OF SYSTEMS  --------------------------------------------------------------------------------  Unable to obtain ROS      VITALS/PHYSICAL EXAM  --------------------------------------------------------------------------------  T(C): 38.4 (04-07-23 @ 06:00), Max: 39.2 (04-07-23 @ 00:00)  HR: 101 (04-07-23 @ 07:00) (96 - 143)  BP: 108/55 (04-06-23 @ 08:00) (108/55 - 108/55)  RR: 29 (04-07-23 @ 07:00) (26 - 52)  SpO2: 100% (04-07-23 @ 07:00) (97% - 100%)  Wt(kg): --        04-06-23 @ 07:01  -  04-07-23 @ 07:00  --------------------------------------------------------  IN: 1234 mL / OUT: 470 mL / NET: 764 mL      Physical Exam:  	Gen: Ill appearing, intubated   	HEENT: +ETT  	Pulm: Mechanical breath sounds  	CV: S1S2+  	Abd: Obese, distended  	Ext: +LE edema B/L, Trace B/L UE edema   	Neuro: Intubated              : Babin catheter+ with small amount of urine seen in the bag  	Skin: Warm and dry              Dialysis access: Right IJ non tunneled HD catheter       LABS/STUDIES  --------------------------------------------------------------------------------              7.3    13.96 >-----------<  398      [04-07-23 @ 01:46]              22.1     136  |  102  |  47  ----------------------------<  242      [04-07-23 @ 01:46]  4.2   |  15  |  2.62        Ca     7.9     [04-07-23 @ 01:46]      Mg     2.80     [04-07-23 @ 01:46]      Phos  5.0     [04-07-23 @ 01:46]    TPro  5.9  /  Alb  2.4  /  TBili  10.3  /  DBili  x   /  AST  138  /  ALT  141  /  AlkPhos  133  [04-07-23 @ 01:46]    PT/INR: PT 20.7 , INR 1.77       [04-07-23 @ 01:46]  PTT: 28.1       [04-07-23 @ 01:46]      Creatinine Trend:  SCr 2.62 [04-07 @ 01:46]  SCr 1.73 [04-06 @ 11:50]  SCr 1.02 [04-06 @ 01:17]  SCr 1.24 [04-05 @ 11:45]  SCr 1.38 [04-05 @ 01:50]             [facility] Division of Kidney Diseases & Hypertension  FOLLOW UP NOTE  [phone]--------------------------------------------------------------------------------    Chief Complaint: Oliguric CHATO, on RRT    24 hour events/subjective: Pt. seen and examined in the SICU today. Pt. intubated. Restarted on pressors overnight. CRRT was discontinued overnight on 4/6. Pt. remains oliguric.       PAST HISTORY  --------------------------------------------------------------------------------  No significant changes to PMH, PSH, FHx, SHx, unless otherwise noted    ALLERGIES & MEDICATIONS  --------------------------------------------------------------------------------  Allergies    No Known Allergies    Intolerances    Standing Inpatient Medications  chlorhexidine 0.12% Liquid 15 milliLiter(s) Oral Mucosa every 12 hours  chlorhexidine 2% Cloths 1 Application(s) Topical daily  dexMEDEtomidine Infusion 0.1 MICROgram(s)/kG/Hr IV Continuous <Continuous>  dextrose 50% Injectable 25 Gram(s) IV Push once  dextrose 50% Injectable 12.5 Gram(s) IV Push once  enalaprilat Injectable 1.25 milliGRAM(s) IV Push every 6 hours  fondaparinux Injectable 1.5 milliGRAM(s) SubCutaneous daily  HYDROmorphone  Injectable 1 milliGRAM(s) IV Push once  insulin glargine Injectable (LANTUS) 9 Unit(s) SubCutaneous at bedtime  insulin lispro (ADMELOG) corrective regimen sliding scale   SubCutaneous every 6 hours  insulin lispro Injectable (ADMELOG) 2 Unit(s) SubCutaneous every 6 hours  levothyroxine Injectable 20 MICROGram(s) IV Push at bedtime  meropenem  IVPB 1000 milliGRAM(s) IV Intermittent every 12 hours  methylPREDNISolone sodium succinate Injectable 10 milliGRAM(s) IV Push every 6 hours  metoclopramide Injectable 10 milliGRAM(s) IV Push every 8 hours  norepinephrine Infusion 0.05 MICROgram(s)/kG/Min IV Continuous <Continuous>  pantoprazole  Injectable 40 milliGRAM(s) IV Push daily  petrolatum Ophthalmic Ointment 1 Application(s) Both EYES two times a day  polyethylene glycol 3350 17 Gram(s) Oral at bedtime    PRN Inpatient Medications  HYDROmorphone  Injectable 0.5 milliGRAM(s) IV Push every 4 hours PRN  HYDROmorphone  Injectable 1 milliGRAM(s) IV Push every 4 hours PRN    REVIEW OF SYSTEMS  --------------------------------------------------------------------------------  Unable to obtain ROS    VITALS/PHYSICAL EXAM  --------------------------------------------------------------------------------  T(C): 38.4 (04-07-23 @ 06:00), Max: 39.2 (04-07-23 @ 00:00)  HR: 101 (04-07-23 @ 07:00) (96 - 143)  BP: 108/55 (04-06-23 @ 08:00) (108/55 - 108/55)  RR: 29 (04-07-23 @ 07:00) (26 - 52)  SpO2: 100% (04-07-23 @ 07:00) (97% - 100%)  Wt(kg): --    04-06-23 @ 07:01  -  04-07-23 @ 07:00  --------------------------------------------------------  IN: 1234 mL / OUT: 470 mL / NET: 764 mL    Physical Exam:  	Gen: Ill appearing, intubated   	HEENT: +ETT  	Pulm: Mechanical breath sounds  	CV: S1S2+  	Abd: Obese, distended  	Ext: +LE edema B/L, Trace B/L UE edema   	Neuro: Intubated              : Babin catheter+  	Skin: Warm and dry              Dialysis access: Right IJ non tunneled HD catheter+     LABS/STUDIES  --------------------------------------------------------------------------------              7.3    13.96 >-----------<  398      [04-07-23 @ 01:46]              22.1     136  |  102  |  47  ----------------------------<  242      [04-07-23 @ 01:46]  4.2   |  15  |  2.62        Ca     7.9     [04-07-23 @ 01:46]      Mg     2.80     [04-07-23 @ 01:46]      Phos  5.0     [04-07-23 @ 01:46]    TPro  5.9  /  Alb  2.4  /  TBili  10.3  /  DBili  x   /  AST  138  /  ALT  141  /  AlkPhos  133  [04-07-23 @ 01:46]    Creatinine Trend:  SCr 2.62 [04-07 @ 01:46]  SCr 1.73 [04-06 @ 11:50]  SCr 1.02 [04-06 @ 01:17]  SCr 1.24 [04-05 @ 11:45]  SCr 1.38 [04-05 @ 01:50]

## 2023-04-07 NOTE — PROGRESS NOTE ADULT - ATTENDING COMMENTS
I agree with the detailed interval history, physical, and plan, which I have reviewed and edited where appropriate'; also agree with notes/assessment with my team on service.  I have personally examined the patient.  I was physically present for the key portions of the evaluation and management (E/M) service provided.  I reviewed all the pertinent data.  The patient is a critical care patient with life threatening hemodynamic and metabolic instability in SICU.  The SICU team has a constant risk benefit analyzes discussion and coordinating care with the primary team and all consultants.   The patient is in SICU with the chief complaint and diagnosis mentioned in the note.   The plan will be specified in the note.  48 year old femalewith necrotizing pancreatitis in respiratory failure on multiple vasopressors in SICU for hemodynamic monitoring and respiratory management.  EXAM  General: intubated and sedated   Cardiovascular: RR  Respiratory: Coarse BS  Abdominal: Softly distended   Extremities: edema+  Neurologic: Non focal  PLAN  Neuro:  - Wean precedex  - Dilaudid prn  Resp:  - Volume control 20/350/5/30   - POCUS   CV:  - wean vasopressors  GI:   - Continue TF (Nepro) at rate of 30cc/hr  - GI ppx w/ protonix   Renal:  - Stop CRRT, plan to start intermittent hemodialysis if possible  Heme:   - SCDs and fondaparinux for DVT ppx  ID:   - Meropenem   Endo:  -c/w synthroid 20 IV (home med)   - ISS  Code Status: Full code  Disposition: SICU

## 2023-04-07 NOTE — PROGRESS NOTE ADULT - SUBJECTIVE AND OBJECTIVE BOX
Surgery Progress Note     24 hours: TFs held due to emesis, Resume NGT feeds if placement is post pyloric, febrile 102.5    Subjective:  Patient seen and examined.       Vital Signs:  Vital Signs Last 24 Hrs  T(C): 38.3 (07 Apr 2023 08:00), Max: 39.2 (07 Apr 2023 00:00)  T(F): 101 (07 Apr 2023 08:00), Max: 102.5 (07 Apr 2023 00:00)  HR: 95 (07 Apr 2023 12:00) (95 - 143)  RR: 27 (07 Apr 2023 12:00) (25 - 52)  SpO2: 100% (07 Apr 2023 12:00) (97% - 100%)    Parameters below as of 07 Apr 2023 12:00  Patient On (Oxygen Delivery Method): ventilator,CPAP 15/5    O2 Concentration (%): 30    CAPILLARY BLOOD GLUCOSE      POCT Blood Glucose.: 216 mg/dL (07 Apr 2023 11:15)  POCT Blood Glucose.: 231 mg/dL (07 Apr 2023 06:10)  POCT Blood Glucose.: 220 mg/dL (06 Apr 2023 22:41)  POCT Blood Glucose.: 191 mg/dL (06 Apr 2023 18:05)      I&O's Detail    06 Apr 2023 07:01  -  07 Apr 2023 07:00  --------------------------------------------------------  IN:    Dexmedetomidine: 718.5 mL    Enteral Tube Flush: 190 mL    Nepro with Carb Steady: 170 mL    Norepinephrine: 155.5 mL  Total IN: 1234 mL    OUT:    Indwelling Catheter - Urethral (mL): 20 mL    Nasogastric/Oral tube (mL): 450 mL  Total OUT: 470 mL    Total NET: 764 mL      07 Apr 2023 07:01  -  07 Apr 2023 12:37  --------------------------------------------------------  IN:    Dexmedetomidine: 169.5 mL    Norepinephrine: 75.6 mL  Total IN: 245.1 mL    OUT:    Indwelling Catheter - Urethral (mL): 20 mL  Total OUT: 20 mL    Total NET: 225.1 mL            Physical Exam:  General: intubated and sedated   Respiratory: Nonlabored respirations  Cardio: pulse present  Abdomen: soft, distended, nontender  Vascular: extremities are warm and well perfused.       Labs:    04-07    136  |  102  |  47<H>  ----------------------------<  242<H>  4.2   |  15<L>  |  2.62<H>    Ca    7.9<L>      07 Apr 2023 01:46  Phos  5.0     04-07  Mg     2.80     04-07    TPro  5.9<L>  /  Alb  2.4<L>  /  TBili  10.3<H>  /  DBili  x   /  AST  138<H>  /  ALT  141<H>  /  AlkPhos  133<H>  04-07    LIVER FUNCTIONS - ( 07 Apr 2023 01:46 )  Alb: 2.4 g/dL / Pro: 5.9 g/dL / ALK PHOS: 133 U/L / ALT: 141 U/L / AST: 138 U/L / GGT: x                                 7.3    13.96 )-----------( 398      ( 07 Apr 2023 01:46 )             22.1     PT/INR - ( 07 Apr 2023 01:46 )   PT: 20.7 sec;   INR: 1.77 ratio         PTT - ( 07 Apr 2023 01:46 )  PTT:28.1 sec

## 2023-04-07 NOTE — PROGRESS NOTE ADULT - ASSESSMENT
Patient is a 48 year old female with a PMHx of HTN, hypothyroidism,  (30 years ago) and breast cancer (S/P left mastectomy and chemo in ) who presented with epigastric pain and left upper quadrant pain.  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  Transferred from OSH ([place]) for hypotension and tachypnea requiring emergent intubation and multiple pressors.  SICU consulted for hemodynamic monitoring and respiratory management.    Plan:   - NPO with tube feeds via NGT  - Spontaneous breathing trials as tolerated  - extubate as tolerated   - Transitioned from CVVH to intermittent HD  - Continue with IV Meropenem  - Appreciate care per SICU      #[id number]  B Team Surgery

## 2023-04-07 NOTE — PROGRESS NOTE ADULT - ATTENDING COMMENTS
Pt. with oliguric CHATO. Assessment and plan for CHATO/RRT as outlined above. Pt. remains oliguric. Labs reviewed. Plan to resume CRRT today. Monitor labs and urine output. Avoid any potential nephrotoxins. Dose medications as per eGFR. Overall prognosis guarded.

## 2023-04-07 NOTE — PROGRESS NOTE ADULT - PROBLEM SELECTOR PLAN 1
Pt. with oliguric CHATO in the setting of necrotizing pancreatitis and shock. Pt. with most likely ATN. Scr was 1.24 on 3/23/23 and increased to 4.60 on 3/24/23. Pt. was initiated on CRRT/CVVHDF on 3/24/23 and discontinued overnight (4/6/23) by SICU team. Pt. tolerated CRRT well. Pt. remains anuric. Labs reviewed. Pt's BP persistently low despite vasopressor support this morning. Recommend restarting CRRT/CVVHDF over iHD today for volume removal. Monitor labs and urine output. Avoid nephrotoxins and ACE-I/ARB therapy. Dose medications as per eGFR.      If you have any questions, please feel free to contact me  Neville Schulz  Nephrology Fellow  [phone]; Prefer [employer] TEAMS   (After 5pm or on weekends please page the on-call fellow). Pt. with oliguric CHATO in the setting of necrotizing pancreatitis and shock. Pt. with most likely ATN. Scr was 1.24 on 3/23/23 and increased to 4.60 on 3/24/23. Pt. was initiated on CRRT/CVVHDF on 3/24/23 and discontinued overnight (4/6/23) by SICU team. Pt. tolerated CRRT well. Pt. remains oliguric. Labs reviewed. Pt. with hypotension, BP currently being maintained on IV vasopressor support. Recommend restarting CRRT/CVVHDF. Monitor labs and urine output. Avoid nephrotoxins and ACE-I/ARB therapy. Dose medications as per eGFR. Discussed with SICU team/attending.      If you have any questions, please feel free to contact me  Neville Schulz  Nephrology Fellow  [phone]; Prefer [employer] TEAMS   (After 5pm or on weekends please page the on-call fellow).

## 2023-04-07 NOTE — PROGRESS NOTE ADULT - SUBJECTIVE AND OBJECTIVE BOX
SICU Daily Progress Note  =====================================================  24 HR Events:  - f/u anti Xa level   - emesis, holding TFs  - febrile 102.5 --> cooling blanket  - plan to extubate 4/7 after HD       MEDICATIONS:   --------------------------------------------------------------------------------------  Neurologic Medications  dexMEDEtomidine Infusion 0.1 MICROgram(s)/kG/Hr IV Continuous <Continuous>  HYDROmorphone  Injectable 0.5 milliGRAM(s) IV Push every 4 hours PRN Moderate Pain (4 - 6)  HYDROmorphone  Injectable 1 milliGRAM(s) IV Push every 4 hours PRN Severe Pain (7 - 10)  HYDROmorphone  Injectable 1 milliGRAM(s) IV Push once  metoclopramide Injectable 10 milliGRAM(s) IV Push every 8 hours    Respiratory Medications    Cardiovascular Medications  enalaprilat Injectable 1.25 milliGRAM(s) IV Push every 6 hours  norepinephrine Infusion 0.05 MICROgram(s)/kG/Min IV Continuous <Continuous>    Gastrointestinal Medications  pantoprazole  Injectable 40 milliGRAM(s) IV Push daily  polyethylene glycol 3350 17 Gram(s) Oral at bedtime    Genitourinary Medications    Hematologic/Oncologic Medications  fondaparinux Injectable 1.5 milliGRAM(s) SubCutaneous daily    Antimicrobial/Immunologic Medications  meropenem  IVPB 1000 milliGRAM(s) IV Intermittent every 12 hours    Endocrine/Metabolic Medications  dextrose 50% Injectable 25 Gram(s) IV Push once  dextrose 50% Injectable 12.5 Gram(s) IV Push once  insulin glargine Injectable (LANTUS) 9 Unit(s) SubCutaneous at bedtime  insulin lispro (ADMELOG) corrective regimen sliding scale   SubCutaneous every 6 hours  insulin lispro Injectable (ADMELOG) 2 Unit(s) SubCutaneous every 6 hours  levothyroxine Injectable 20 MICROGram(s) IV Push at bedtime  methylPREDNISolone sodium succinate Injectable 10 milliGRAM(s) IV Push every 6 hours    Topical/Other Medications  chlorhexidine 0.12% Liquid 15 milliLiter(s) Oral Mucosa every 12 hours  chlorhexidine 2% Cloths 1 Application(s) Topical daily  petrolatum Ophthalmic Ointment 1 Application(s) Both EYES two times a day    --------------------------------------------------------------------------------------    VITAL SIGNS, INS/OUTS (last 24 hours):  ICU Vital Signs Last 24 Hrs  T(C): 39.2 (07 Apr 2023 00:00), Max: 39.2 (07 Apr 2023 00:00)  T(F): 102.5 (07 Apr 2023 00:00), Max: 102.5 (07 Apr 2023 00:00)  HR: 120 (07 Apr 2023 00:23) (112 - 143)  BP: 108/55 (06 Apr 2023 08:00) (108/55 - 108/55)  BP(mean): 70 (06 Apr 2023 08:00) (70 - 70)  ABP: 87/44 (07 Apr 2023 00:23) (75/38 - 158/75)  ABP(mean): 57 (07 Apr 2023 00:23) (51 - 113)  RR: 32 (07 Apr 2023 00:23) (21 - 52)  SpO2: 99% (07 Apr 2023 00:23) (97% - 100%)    O2 Parameters below as of 07 Apr 2023 00:00  Patient On (Oxygen Delivery Method): ventilator    O2 Concentration (%): 30    --------------------------------------------------------------------------------------    04-05-23 @ 07:01  -  04-06-23 @ 07:00  --------------------------------------------------------  IN: 870.2 mL / OUT: 3119 mL / NET: -2248.8 mL    04-06-23 @ 07:01  -  04-07-23 @ 00:34  --------------------------------------------------------  IN: 692.5 mL / OUT: 205 mL / NET: 487.5 mL      --------------------------------------------------------------------------------------    EXAM  General: intubated and sedated   Cardiovascular: Normal s1, s2, RRR  Respiratory: CTA b/l   Abdominal: Soft, ntnd, distended   Extremities: No swelling in LEs  Neurologic: Non focal  Psych: Awake, alert     METABOLIC/FLUIDS/ELECTROLYTES      HEMATOLOGIC  [x] VTE Prophylaxis: fondaparinux Injectable 1.5 milliGRAM(s) SubCutaneous daily        LABS  --------------------------------------------------------------------------------------    T(C): 39.2 (04-07-23 @ 00:00), Max: 39.2 (04-07-23 @ 00:00)  HR: 120 (04-07-23 @ 00:23) (112 - 143)  BP: 108/55 (04-06-23 @ 08:00) (108/55 - 108/55)  RR: 32 (04-07-23 @ 00:23) (21 - 52)  SpO2: 99% (04-07-23 @ 00:23) (97% - 100%)    --------------------------------------------------------------------------------------   SICU Daily Progress Note  =====================================================  24 HR Events:  - f/u anti Xa level   - emesis, holding TFs  - f/u CXR for NGT. Restart feeds when post-pyloric NGT  - febrile 102.5 --> cooling blanket  - CVVH as cannot tolerate IHD per nephro      MEDICATIONS:   --------------------------------------------------------------------------------------  Neurologic Medications  dexMEDEtomidine Infusion 0.1 MICROgram(s)/kG/Hr IV Continuous <Continuous>  HYDROmorphone  Injectable 0.5 milliGRAM(s) IV Push every 4 hours PRN Moderate Pain (4 - 6)  HYDROmorphone  Injectable 1 milliGRAM(s) IV Push every 4 hours PRN Severe Pain (7 - 10)  HYDROmorphone  Injectable 1 milliGRAM(s) IV Push once  metoclopramide Injectable 10 milliGRAM(s) IV Push every 8 hours    Respiratory Medications    Cardiovascular Medications  enalaprilat Injectable 1.25 milliGRAM(s) IV Push every 6 hours  norepinephrine Infusion 0.05 MICROgram(s)/kG/Min IV Continuous <Continuous>    Gastrointestinal Medications  pantoprazole  Injectable 40 milliGRAM(s) IV Push daily  polyethylene glycol 3350 17 Gram(s) Oral at bedtime    Genitourinary Medications    Hematologic/Oncologic Medications  fondaparinux Injectable 1.5 milliGRAM(s) SubCutaneous daily    Antimicrobial/Immunologic Medications  meropenem  IVPB 1000 milliGRAM(s) IV Intermittent every 12 hours    Endocrine/Metabolic Medications  dextrose 50% Injectable 25 Gram(s) IV Push once  dextrose 50% Injectable 12.5 Gram(s) IV Push once  insulin glargine Injectable (LANTUS) 9 Unit(s) SubCutaneous at bedtime  insulin lispro (ADMELOG) corrective regimen sliding scale   SubCutaneous every 6 hours  insulin lispro Injectable (ADMELOG) 2 Unit(s) SubCutaneous every 6 hours  levothyroxine Injectable 20 MICROGram(s) IV Push at bedtime  methylPREDNISolone sodium succinate Injectable 10 milliGRAM(s) IV Push every 6 hours    Topical/Other Medications  chlorhexidine 0.12% Liquid 15 milliLiter(s) Oral Mucosa every 12 hours  chlorhexidine 2% Cloths 1 Application(s) Topical daily  petrolatum Ophthalmic Ointment 1 Application(s) Both EYES two times a day    --------------------------------------------------------------------------------------    VITAL SIGNS, INS/OUTS (last 24 hours):  ICU Vital Signs Last 24 Hrs  T(C): 39.2 (07 Apr 2023 00:00), Max: 39.2 (07 Apr 2023 00:00)  T(F): 102.5 (07 Apr 2023 00:00), Max: 102.5 (07 Apr 2023 00:00)  HR: 120 (07 Apr 2023 00:23) (112 - 143)  BP: 108/55 (06 Apr 2023 08:00) (108/55 - 108/55)  BP(mean): 70 (06 Apr 2023 08:00) (70 - 70)  ABP: 87/44 (07 Apr 2023 00:23) (75/38 - 158/75)  ABP(mean): 57 (07 Apr 2023 00:23) (51 - 113)  RR: 32 (07 Apr 2023 00:23) (21 - 52)  SpO2: 99% (07 Apr 2023 00:23) (97% - 100%)    O2 Parameters below as of 07 Apr 2023 00:00  Patient On (Oxygen Delivery Method): ventilator    O2 Concentration (%): 30    --------------------------------------------------------------------------------------    04-05-23 @ 07:01  -  04-06-23 @ 07:00  --------------------------------------------------------  IN: 870.2 mL / OUT: 3119 mL / NET: -2248.8 mL    04-06-23 @ 07:01  -  04-07-23 @ 00:34  --------------------------------------------------------  IN: 692.5 mL / OUT: 205 mL / NET: 487.5 mL      --------------------------------------------------------------------------------------    EXAM  General: intubated and sedated   Cardiovascular: Normal s1, s2, RRR  Respiratory: CTA b/l   Abdominal: Soft, ntnd, distended   Extremities: No swelling in LEs  Neurologic: Non focal  Psych: Awake, alert     METABOLIC/FLUIDS/ELECTROLYTES      HEMATOLOGIC  [x] VTE Prophylaxis: fondaparinux Injectable 1.5 milliGRAM(s) SubCutaneous daily        LABS  --------------------------------------------------------------------------------------    T(C): 39.2 (04-07-23 @ 00:00), Max: 39.2 (04-07-23 @ 00:00)  HR: 120 (04-07-23 @ 00:23) (112 - 143)  BP: 108/55 (04-06-23 @ 08:00) (108/55 - 108/55)  RR: 32 (04-07-23 @ 00:23) (21 - 52)  SpO2: 99% (04-07-23 @ 00:23) (97% - 100%)    --------------------------------------------------------------------------------------

## 2023-04-08 NOTE — PROGRESS NOTE ADULT - SUBJECTIVE AND OBJECTIVE BOX
[facility] DIVISION OF KIDNEY DISEASES AND HYPERTENSION   FOLLOW UP NOTE    --------------------------------------------------------------------------------  Chief Complaint: Oliguric CHATO, on RRT    24 hour events/subjective: Pt. seen and examined in the SICU today. Pt. intubated and hemodynamically unstable with BP maintained on IV vasopressors. Pt restarted on CVVHDF/CRRT on 4/7/23.  Pt. remains oliguric.     PAST HISTORY  --------------------------------------------------------------------------------  No significant changes to PMH, PSH, FHx, SHx, unless otherwise noted    ALLERGIES & MEDICATIONS  --------------------------------------------------------------------------------  Allergies    No Known Allergies    Intolerances    Standing Inpatient Medications  chlorhexidine 0.12% Liquid 15 milliLiter(s) Oral Mucosa every 12 hours  chlorhexidine 2% Cloths 1 Application(s) Topical daily  CRRT Treatment    <Continuous>  dexMEDEtomidine Infusion 0.1 MICROgram(s)/kG/Hr IV Continuous <Continuous>  dextrose 50% Injectable 25 Gram(s) IV Push once  dextrose 50% Injectable 12.5 Gram(s) IV Push once  fondaparinux Injectable 1.5 milliGRAM(s) SubCutaneous daily  insulin glargine Injectable (LANTUS) 9 Unit(s) SubCutaneous at bedtime  insulin lispro (ADMELOG) corrective regimen sliding scale   SubCutaneous every 6 hours  insulin lispro Injectable (ADMELOG) 2 Unit(s) SubCutaneous every 6 hours  levothyroxine Injectable 20 MICROGram(s) IV Push at bedtime  meropenem  IVPB 1000 milliGRAM(s) IV Intermittent every 12 hours  methylPREDNISolone sodium succinate Injectable 10 milliGRAM(s) IV Push every 6 hours  metoclopramide Injectable 10 milliGRAM(s) IV Push every 8 hours  norepinephrine Infusion 0.05 MICROgram(s)/kG/Min IV Continuous <Continuous>  pantoprazole  Injectable 40 milliGRAM(s) IV Push daily  petrolatum Ophthalmic Ointment 1 Application(s) Both EYES two times a day  Phoxillum Filtration BK 4 / 2.5 5000 milliLiter(s) CRRT <Continuous>  polyethylene glycol 3350 17 Gram(s) Oral at bedtime  PrismaSOL Filtration BGK 0 / 2.5 5000 milliLiter(s) CRRT <Continuous>  PrismaSOL Filtration BGK 4 / 2.5 5000 milliLiter(s) CRRT <Continuous>    PRN Inpatient Medications    REVIEW OF SYSTEMS  --------------------------------------------------------------------------------  Unable to obtain ROS    VITALS/PHYSICAL EXAM  --------------------------------------------------------------------------------  T(C): 37 (04-08-23 @ 08:00), Max: 38.1 (04-07-23 @ 12:00)  HR: 87 (04-08-23 @ 08:00) (74 - 116)  BP: --  RR: 24 (04-08-23 @ 08:00) (13 - 28)  SpO2: 100% (04-08-23 @ 08:00) (99% - 100%)  Wt(kg): --    04-07-23 @ 07:01  -  04-08-23 @ 07:00  --------------------------------------------------------  IN: 1434.6 mL / OUT: 2796 mL / NET: -1361.4 mL    04-08-23 @ 07:01  -  04-08-23 @ 09:04  --------------------------------------------------------  IN: 67.7 mL / OUT: 0 mL / NET: 67.7 mL    Physical Exam:  	Gen: Ill appearing, intubated   	HEENT: +ETT  	Pulm: Mechanical breath sounds  	CV: S1S2+  	Abd: Obese, distended  	Ext: +LE edema B/L, Trace B/L UE edema   	Neuro: awake, unable to provide ROS              : Babin catheter+  	Skin: Warm and dry              Dialysis access: Right IJ non tunneled HD catheter+ connected to CRRT machine    LABS/STUDIES  --------------------------------------------------------------------------------              7.5    13.31 >-----------<  400      [04-08-23 @ 00:45]              22.7     136  |  102  |  33  ----------------------------<  173      [04-08-23 @ 00:45]  4.2   |  19  |  1.89        Ca     7.8     [04-08-23 @ 00:45]      Mg     2.60     [04-08-23 @ 00:45]      Phos  4.2     [04-08-23 @ 00:4    Creatinine Trend:  SCr 1.89 [04-08 @ 00:45]  SCr 3.02 [04-07 @ 13:45]  SCr 2.62 [04-07 @ 01:46]  SCr 1.73 [04-06 @ 11:50]  SCr 1.02 [04-06 @ 01:17]    Urinalysis - [03-24-23 @ 05:30]      Color Yellow / Appearance Clear / SG 1.020 / pH 5.0      Gluc 250 / Ketone Trace  / Bili Negative / Urobili Negative       Blood Moderate / Protein 100 / Leuk Est Trace / Nitrite Negative      RBC 0-2 / WBC 6-10 / Hyaline  / Gran  / Sq Epi  / Non Sq Epi Moderate / Bacteria Many    HBsAg Nonreact      [03-27-23 @ 00:35]  HCV 0.05, Nonreact      [03-27-23 @ 00:35]  HIV Nonreact      [03-30-23 @ 20:10] [facility] DIVISION OF KIDNEY DISEASES AND HYPERTENSION   FOLLOW UP NOTE    --------------------------------------------------------------------------------  Chief Complaint: Oliguric CHATO, on RRT    24 hour events/subjective: Pt. seen and examined in the SICU today. Pt. awake, intubated. BP maintained on IV vasopressors. Pt restarted on CVVHDF/CRRT on 4/7/23.  Pt. remains oliguric.     PAST HISTORY  --------------------------------------------------------------------------------  No significant changes to PMH, PSH, FHx, SHx, unless otherwise noted    ALLERGIES & MEDICATIONS  --------------------------------------------------------------------------------  Allergies    No Known Allergies    Intolerances    Standing Inpatient Medications  chlorhexidine 0.12% Liquid 15 milliLiter(s) Oral Mucosa every 12 hours  chlorhexidine 2% Cloths 1 Application(s) Topical daily  CRRT Treatment    <Continuous>  dexMEDEtomidine Infusion 0.1 MICROgram(s)/kG/Hr IV Continuous <Continuous>  dextrose 50% Injectable 25 Gram(s) IV Push once  dextrose 50% Injectable 12.5 Gram(s) IV Push once  fondaparinux Injectable 1.5 milliGRAM(s) SubCutaneous daily  insulin glargine Injectable (LANTUS) 9 Unit(s) SubCutaneous at bedtime  insulin lispro (ADMELOG) corrective regimen sliding scale   SubCutaneous every 6 hours  insulin lispro Injectable (ADMELOG) 2 Unit(s) SubCutaneous every 6 hours  levothyroxine Injectable 20 MICROGram(s) IV Push at bedtime  meropenem  IVPB 1000 milliGRAM(s) IV Intermittent every 12 hours  methylPREDNISolone sodium succinate Injectable 10 milliGRAM(s) IV Push every 6 hours  metoclopramide Injectable 10 milliGRAM(s) IV Push every 8 hours  norepinephrine Infusion 0.05 MICROgram(s)/kG/Min IV Continuous <Continuous>  pantoprazole  Injectable 40 milliGRAM(s) IV Push daily  petrolatum Ophthalmic Ointment 1 Application(s) Both EYES two times a day  Phoxillum Filtration BK 4 / 2.5 5000 milliLiter(s) CRRT <Continuous>  polyethylene glycol 3350 17 Gram(s) Oral at bedtime  PrismaSOL Filtration BGK 0 / 2.5 5000 milliLiter(s) CRRT <Continuous>  PrismaSOL Filtration BGK 4 / 2.5 5000 milliLiter(s) CRRT <Continuous>    PRN Inpatient Medications    REVIEW OF SYSTEMS  --------------------------------------------------------------------------------  Unable to obtain ROS    VITALS/PHYSICAL EXAM  --------------------------------------------------------------------------------  T(C): 37 (04-08-23 @ 08:00), Max: 38.1 (04-07-23 @ 12:00)  HR: 87 (04-08-23 @ 08:00) (74 - 116)  BP: --  RR: 24 (04-08-23 @ 08:00) (13 - 28)  SpO2: 100% (04-08-23 @ 08:00) (99% - 100%)  Wt(kg): --    04-07-23 @ 07:01  -  04-08-23 @ 07:00  --------------------------------------------------------  IN: 1434.6 mL / OUT: 2796 mL / NET: -1361.4 mL    04-08-23 @ 07:01  -  04-08-23 @ 09:04  --------------------------------------------------------  IN: 67.7 mL / OUT: 0 mL / NET: 67.7 mL    Physical Exam:  	Gen: Ill appearing, intubated   	HEENT: +ETT  	Pulm: Mechanical breath sounds  	CV: S1S2+  	Abd: Obese, distended  	Ext: +LE edema B/L, Trace B/L UE edema   	Neuro: Awake, unable to provide ROS              : Babin catheter+  	Skin: Warm and dry              Dialysis access: Right IJ non tunneled HD catheter being used for CRRT    LABS/STUDIES  --------------------------------------------------------------------------------              7.5    13.31 >-----------<  400      [04-08-23 @ 00:45]              22.7     136  |  102  |  33  ----------------------------<  173      [04-08-23 @ 00:45]  4.2   |  19  |  1.89        Ca     7.8     [04-08-23 @ 00:45]      Mg     2.60     [04-08-23 @ 00:45]      Phos  4.2     [04-08-23 @ 00:4    Creatinine Trend:  SCr 1.89 [04-08 @ 00:45]  SCr 3.02 [04-07 @ 13:45]  SCr 2.62 [04-07 @ 01:46]  SCr 1.73 [04-06 @ 11:50]  SCr 1.02 [04-06 @ 01:17]

## 2023-04-08 NOTE — PROGRESS NOTE ADULT - ASSESSMENT
48 year old female with PMHx significant for HTN, hypothyroidism,  30 years ago, breast cancer s/p left mastectomy and chemo in  presenting with epigastric pain and LUQ pain. Found to have necrotizing pancreatitis. Transferred from OSH ([place]) for hypotension and tachypnea requiring emergent intubation and multiple pressors. SICU consulted for hemodynamic monitoring and respiratory management.    PLAN  Neuro:  Intubated and sedated for clinical course; No acute neurological issues   - Wean precedex  - Dilaudid prn    Resp:  AHRF w/ b/l pleff 2/2 necrotizing pancreatitis   - CPAP 15/5/30  - POCUS : b-lines in b/l apex and base    CV:  Mixed shock state 2/2 septic shock w/ E. Coli in urine vs hypovolemic shock now s/p 11L resus    - off levo     GI:   #Necrotizing pancreatitis 2/2 gallstones  - CT 3/27: >50% of the pancreatic head extends into the mesentery and inferiorly along the retroperitoneum into the lower abdomen  - RUQ US: Chololithiasis (2cm stone) w/ no acute frannie   - MRCP 4/3: no choledocholithiasis  - Continue TF (Nepro) at rate of 20cc/hr  - GI ppx w/ protonix   - Reglan  - Dolcolax    #Shocked Liver + Hyperbilirubinemia   Likely 2/2 to shocked state w/ LFTs now trending down w/ bili following protracted course as expected. Possible concern for acute obstructive biliary process  - MRCP read no choledocholithiasis  - Hold tylenol     Renal:  ARF 2/2 shock state resulting in ATN requiring CRRT    - On CRRT, plan to start intermittent hemodialysis   - Viera catheter in place    Heme:   Anemia likely 2/2 disease course vs suppression; No active bleeding reported   - CBC q12   - received 1U PRBC   - SCDs and fondaparinux for DVT ppx    #Thrombocytopenia  Likely suppression 2/2 infection vs shock state vs HIT  Diagnostics  - HIT Ab positive  -CRRT dosed Fondaparinux 1.5 qd for DVT ppx   - f/u anti Xa level    #Splenic Vein Thrombosis  3/22 CT Abd w/ contrast: Focal filling defect in the splenic vein at the level of the body of   the pancreas for which an underlying thrombus is suspected     ID:   Intermittently febrile w/ leukocytosis c/f septic shock 2/2 E coli UTI  Diagnostics:  - 3/22 Ucx: E coli  - 3/30 Bcx: staph hominis in aerobic bottle x 1 (likely contaminant)   - Meropenem (3/26- )    Endo:   - Lantus 9 units, admelog 2 units q6  - c/w synthroid 20 IV (home med)   - ISS    Code Status: Full code  Lines: KELLY Mendoza CVC, A line, viera     Disposition: SICU     48 year old female with PMHx significant for HTN, hypothyroidism,  30 years ago, breast cancer s/p left mastectomy and chemo in  presenting with epigastric pain and LUQ pain. Found to have necrotizing pancreatitis. Transferred from OSH ([place]) for hypotension and tachypnea requiring emergent intubation and multiple pressors. SICU consulted for hemodynamic monitoring and respiratory management.    PLAN  Neuro:  Intubated and sedated for clinical course; No acute neurological issues   - Wean precedex  - Dilaudid prn    Resp:  AHRF w/ b/l pleff 2/2 necrotizing pancreatitis   - CPAP 10/5/30    CV:  Mixed shock state 2/2 septic shock w/ E. Coli in urine vs hypovolemic shock now s/p 11L resus    - wean levo as tolerated     GI:   #Necrotizing pancreatitis 2/2 gallstones  - CT 3/27: >50% of the pancreatic head extends into the mesentery and inferiorly along the retroperitoneum into the lower abdomen  - RUQ US: Chololithiasis (2cm stone) w/ no acute frannie   - MRCP 4/3: no choledocholithiasis  - Continue TF (Nepro) at rate of 20cc/hr  - GI ppx w/ protonix   - Reglan  - Dolcolax    #Shocked Liver + Hyperbilirubinemia   Likely 2/2 to shocked state w/ LFTs now trending down w/ bili following protracted course as expected. Possible concern for acute obstructive biliary process  - MRCP read no choledocholithiasis  - Hold tylenol     Renal:  ARF 2/2 shock state resulting in ATN requiring CRRT    - On CRRT, plan to start intermittent hemodialysis when tolerated   - Viera catheter in place     Heme:   Anemia likely 2/2 disease course vs suppression; No active bleeding reported   - CBC q12   - received 1U PRBC   - SCDs and fondaparinux for DVT ppx    #Thrombocytopenia  Likely suppression 2/2 infection vs shock state vs HIT  Diagnostics  - HIT Ab positive  -CRRT dosed Fondaparinux 1.5 qd for DVT ppx   - f/u anti Xa level    #Splenic Vein Thrombosis  3/22 CT Abd w/ contrast: Focal filling defect in the splenic vein at the level of the body of   the pancreas for which an underlying thrombus is suspected     ID:   Intermittently febrile w/ leukocytosis c/f septic shock 2/2 E coli UTI  Diagnostics:  - 3/22 Ucx: E coli  - 3/30 Bcx: staph hominis in aerobic bottle x 1 (likely contaminant)   - Meropenem (3/26- )    Endo:   - Lantus 9 units, admelog 2 units q6  - c/w synthroid 20 IV (home med)   - ISS    Code Status: Full code  Lines: KELLY Mendoza CVC, A line, viera     Disposition: SICU     48 year old female with PMHx significant for HTN, hypothyroidism,  30 years ago, breast cancer s/p left mastectomy and chemo in  presenting with epigastric pain and LUQ pain. Found to have necrotizing pancreatitis. Transferred from OSH ([place]) for hypotension and tachypnea requiring emergent intubation and multiple pressors. SICU consulted for hemodynamic monitoring and respiratory management.    PLAN  Neuro:  No acute neurological issues   - Wean precedex as tolerated  - Dilaudid prn    Resp:  AHRF w/ b/l pleff 2/2 necrotizing pancreatitis   - extubated 4/8AM    CV:  Mixed shock state 2/2 septic shock w/ E. Coli in urine vs hypovolemic shock now s/p 11L resus    - wean levo as tolerated     GI:   #Necrotizing pancreatitis 2/2 gallstones  - CT 3/27: >50% of the pancreatic head extends into the mesentery and inferiorly along the retroperitoneum into the lower abdomen  - RUQ US: Chololithiasis (2cm stone) w/ no acute frannie   - MRCP 4/3: no choledocholithiasis  - Continue TF (Nepro) at rate of 20cc/hr  - GI ppx w/ protonix   - Reglan  - Dolcolax    #Shocked Liver + Hyperbilirubinemia   Likely 2/2 to shocked state w/ LFTs now trending down w/ bili following protracted course as expected. Possible concern for acute obstructive biliary process  - MRCP read no choledocholithiasis  - Hold tylenol     Renal:  ARF 2/2 shock state resulting in ATN requiring CRRT    - On CRRT, plan to start intermittent hemodialysis when tolerated   - Viera catheter in place, oliguric      Heme:   Anemia likely 2/2 disease course vs suppression; No active bleeding reported   - CBC q12   - received 1U PRBC   - SCDs and fondaparinux for DVT ppx    #Thrombocytopenia  Likely suppression 2/2 infection vs shock state vs HIT  Diagnostics  - HIT Ab positive  -CRRT dosed Fondaparinux 1.5 qd for DVT ppx   - f/u anti Xa level    #Splenic Vein Thrombosis  3/22 CT Abd w/ contrast: Focal filling defect in the splenic vein at the level of the body of   the pancreas for which an underlying thrombus is suspected     ID:   Intermittently febrile w/ leukocytosis c/f septic shock 2/2 E coli UTI  Diagnostics:  - 3/22 Ucx: E coli  - 3/30 Bcx: staph hominis in aerobic bottle x 1 (likely contaminant)   - Meropenem (3/26- )    Endo:   - Lantus 9 units, admelog 2 units q6  - c/w synthroid 20 IV (home med)   - ISS    Code Status: Full code  Lines: KELLY Mendoza CVC, A line, viera     Disposition: SICU

## 2023-04-08 NOTE — PROGRESS NOTE ADULT - ATTENDING COMMENTS
Pt. with oliguric CHATO, currently on CRRT/CVVHDF. Pt. tolerating CRRT/CVVHDF during rounds. BP being maintained on vasopressors. HD catheter functioning well. Monitor labs and urine output. Avoid any potential nephrotoxins. Dose medications as per eGFR. Overall prognosis guarded.

## 2023-04-08 NOTE — PROGRESS NOTE ADULT - ASSESSMENT
Patient is a 48 year old female with a PMHx of HTN, hypothyroidism,  (30 years ago) and breast cancer (S/P left mastectomy and chemo in ) who presented with epigastric pain and left upper quadrant pain.  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  Transferred from OSH ([facility]) for hypotension and tachypnea requiring emergent intubation and multiple pressors.  SICU consulted for hemodynamic monitoring and respiratory management.    Plan:   - NPO with tube feeds   - spontaneous breathing trials as tolerated  - extubated as tolerated - trach planning for next week if unable to extubate  - recommend repeat CT in the next week to reassess collections  - back on CVVH  - continue with IV Meropenem  - appreciate care per SICU      #[id number]  B Team Surgery

## 2023-04-08 NOTE — PROGRESS NOTE ADULT - ATTENDING COMMENTS
Critical Care Dx    R06.89 Acute respiratory insufficiency  -assess for extubation today, tolerating CPAP and neurologically intact  -careful pulm toilet  -net negative 100 cc via CVVHD  N17.9 Acute kidney injury  -trend BMP, lasix challenge prior was unsuccessful  -transition to HD when stable   K85.91 Necrotizing pancreatitis  -pain control, serial imaging, tolerating enteral nutrition  -con't abx  R17 Total bilirubin, elevated  -mrcp and ultrasound showed no obstruction however she may be resorbing clot or have transient obstruction due to the RP inflammation/hemorrhage   0trend labs        The patient is a critical care patient with life threatening metabolic and respiratory instability in SICU.  I have personally interviewed and examined the patient, reviewed data and laboratory tests/x-rays and all pertinent electronic images.  The SICU team has a constant risk benefit analyzes discussion with the primary team, all consultants, House Staff and PA's on all decisions.   Time involved in performance of separately billable procedures was not counted toward my critical care time. There is no overlap.    I have personally provided 60 minutes of critical care time concurrently with the resident/fellow. This time excludes time spent on separate procedures and time spent teaching. I have reviewed the resident's/fellow's documentation and agree with the assessment and plan of care.  I was physically present for the key portions of the evaluation and management (E/M) service provided.      Jam Nj MD  Acute and Critical Care Surgery

## 2023-04-08 NOTE — AIRWAY REMOVAL NOTE  ADULT & PEDS - ARTIFICAL AIRWAY REMOVAL COMMENTS
patient safety maintained during extubation. Respiratory notified of extubating, SICU team aware- order in

## 2023-04-08 NOTE — PROGRESS NOTE ADULT - SUBJECTIVE AND OBJECTIVE BOX
SICU Daily Progress Note  =====================================================  Interval/Overnight Events:      - No acute overnight events, off Levo     ALLERGIES:  No Known Allergies      --------------------------------------------------------------------------------------    MEDICATIONS:    Neurologic Medications  dexMEDEtomidine Infusion 0.1 MICROgram(s)/kG/Hr IV Continuous <Continuous>  HYDROmorphone  Injectable 1 milliGRAM(s) IV Push every 4 hours PRN Severe Pain (7 - 10)  metoclopramide Injectable 10 milliGRAM(s) IV Push every 8 hours    Respiratory Medications    Cardiovascular Medications  norepinephrine Infusion 0.05 MICROgram(s)/kG/Min IV Continuous <Continuous>    Gastrointestinal Medications  pantoprazole  Injectable 40 milliGRAM(s) IV Push daily  polyethylene glycol 3350 17 Gram(s) Oral at bedtime    Genitourinary Medications    Hematologic/Oncologic Medications  fondaparinux Injectable 1.5 milliGRAM(s) SubCutaneous daily    Antimicrobial/Immunologic Medications  meropenem  IVPB 1000 milliGRAM(s) IV Intermittent every 12 hours    Endocrine/Metabolic Medications  dextrose 50% Injectable 25 Gram(s) IV Push once  dextrose 50% Injectable 12.5 Gram(s) IV Push once  insulin glargine Injectable (LANTUS) 9 Unit(s) SubCutaneous at bedtime  insulin lispro (ADMELOG) corrective regimen sliding scale   SubCutaneous every 6 hours  insulin lispro Injectable (ADMELOG) 2 Unit(s) SubCutaneous every 6 hours  levothyroxine Injectable 20 MICROGram(s) IV Push at bedtime  methylPREDNISolone sodium succinate Injectable 10 milliGRAM(s) IV Push every 6 hours    Topical/Other Medications  chlorhexidine 0.12% Liquid 15 milliLiter(s) Oral Mucosa every 12 hours  chlorhexidine 2% Cloths 1 Application(s) Topical daily  CRRT Treatment    <Continuous>  petrolatum Ophthalmic Ointment 1 Application(s) Both EYES two times a day  Phoxillum Filtration BK 4 / 2.5 5000 milliLiter(s) CRRT <Continuous>  PrismaSOL Filtration BGK 0 / 2.5 5000 milliLiter(s) CRRT <Continuous>  PrismaSOL Filtration BGK 4 / 2.5 5000 milliLiter(s) CRRT <Continuous>    --------------------------------------------------------------------------------------    VITAL SIGNS:  ICU Vital Signs Last 24 Hrs  T(C): 36.1 (08 Apr 2023 00:00), Max: 38.7 (07 Apr 2023 04:00)  T(F): 97 (08 Apr 2023 00:00), Max: 101.7 (07 Apr 2023 04:00)  HR: 97 (08 Apr 2023 00:00) (93 - 121)  BP: --  BP(mean): --  ABP: 103/59 (08 Apr 2023 00:00) (86/50 - 141/90)  ABP(mean): 79 (08 Apr 2023 00:00) (59 - 111)  RR: 20 (08 Apr 2023 00:00) (13 - 32)  SpO2: 100% (08 Apr 2023 00:00) (99% - 100%)    O2 Parameters below as of 08 Apr 2023 00:00  Patient On (Oxygen Delivery Method): CPAP 15/5    O2 Concentration (%): 30  --------------------------------------------------------------------------------------    INS AND OUTS:  I&O's Detail    06 Apr 2023 07:01  -  07 Apr 2023 07:00  --------------------------------------------------------  IN:    Dexmedetomidine: 718.5 mL    Enteral Tube Flush: 190 mL    Nepro with Carb Steady: 170 mL    Norepinephrine: 155.5 mL  Total IN: 1234 mL    OUT:    Indwelling Catheter - Urethral (mL): 20 mL    Nasogastric/Oral tube (mL): 450 mL  Total OUT: 470 mL    Total NET: 764 mL      07 Apr 2023 07:01  -  08 Apr 2023 00:29  --------------------------------------------------------  IN:    Dexmedetomidine: 576.3 mL    Nepro with Carb Steady: 125 mL    Norepinephrine: 111.4 mL  Total IN: 812.7 mL    OUT:    Indwelling Catheter - Urethral (mL): 40 mL    Nasogastric/Oral tube (mL): 200 mL    Other (mL): 1340 mL  Total OUT: 1580 mL    Total NET: -767.3 mL    --------------------------------------------------------------------------------------    EXAM    NEURO: NAD, sedated  HEENT: NC/AT  RESPIRATORY: intubated  CARDIO: RRR, S1S2  ABDOMEN: soft, nontender, nondistended, NGT x 2 (TF vs compression)  EXTREMITIES: normal strength, no deformities    --------------------------------------------------------------------------------------    LABS                        7.7    14.08 )-----------( 399      ( 07 Apr 2023 13:45 )             23.1   04-07    139  |  105  |  48<H>  ----------------------------<  217<H>  4.1   |  19<L>  |  3.02<H>    Ca    7.7<L>      07 Apr 2023 13:45  Phos  5.2     04-07  Mg     2.80     04-07    TPro  5.7<L>  /  Alb  2.5<L>  /  TBili  10.1<H>  /  DBili  x   /  AST  116<H>  /  ALT  126<H>  /  AlkPhos  139<H>  04-07    -------------------------------------------------------------------------------------- SICU Daily Progress Note  =====================================================  Interval/Overnight Events:      - No acute overnight events, off Levo   - plan to extubate 4/8     ALLERGIES:  No Known Allergies      --------------------------------------------------------------------------------------    MEDICATIONS:    Neurologic Medications  dexMEDEtomidine Infusion 0.1 MICROgram(s)/kG/Hr IV Continuous <Continuous>  HYDROmorphone  Injectable 1 milliGRAM(s) IV Push every 4 hours PRN Severe Pain (7 - 10)  metoclopramide Injectable 10 milliGRAM(s) IV Push every 8 hours    Respiratory Medications    Cardiovascular Medications  norepinephrine Infusion 0.05 MICROgram(s)/kG/Min IV Continuous <Continuous>    Gastrointestinal Medications  pantoprazole  Injectable 40 milliGRAM(s) IV Push daily  polyethylene glycol 3350 17 Gram(s) Oral at bedtime    Genitourinary Medications    Hematologic/Oncologic Medications  fondaparinux Injectable 1.5 milliGRAM(s) SubCutaneous daily    Antimicrobial/Immunologic Medications  meropenem  IVPB 1000 milliGRAM(s) IV Intermittent every 12 hours    Endocrine/Metabolic Medications  dextrose 50% Injectable 25 Gram(s) IV Push once  dextrose 50% Injectable 12.5 Gram(s) IV Push once  insulin glargine Injectable (LANTUS) 9 Unit(s) SubCutaneous at bedtime  insulin lispro (ADMELOG) corrective regimen sliding scale   SubCutaneous every 6 hours  insulin lispro Injectable (ADMELOG) 2 Unit(s) SubCutaneous every 6 hours  levothyroxine Injectable 20 MICROGram(s) IV Push at bedtime  methylPREDNISolone sodium succinate Injectable 10 milliGRAM(s) IV Push every 6 hours    Topical/Other Medications  chlorhexidine 0.12% Liquid 15 milliLiter(s) Oral Mucosa every 12 hours  chlorhexidine 2% Cloths 1 Application(s) Topical daily  CRRT Treatment    <Continuous>  petrolatum Ophthalmic Ointment 1 Application(s) Both EYES two times a day  Phoxillum Filtration BK 4 / 2.5 5000 milliLiter(s) CRRT <Continuous>  PrismaSOL Filtration BGK 0 / 2.5 5000 milliLiter(s) CRRT <Continuous>  PrismaSOL Filtration BGK 4 / 2.5 5000 milliLiter(s) CRRT <Continuous>    --------------------------------------------------------------------------------------    VITAL SIGNS:  ICU Vital Signs Last 24 Hrs  T(C): 36.1 (08 Apr 2023 00:00), Max: 38.7 (07 Apr 2023 04:00)  T(F): 97 (08 Apr 2023 00:00), Max: 101.7 (07 Apr 2023 04:00)  HR: 97 (08 Apr 2023 00:00) (93 - 121)  BP: --  BP(mean): --  ABP: 103/59 (08 Apr 2023 00:00) (86/50 - 141/90)  ABP(mean): 79 (08 Apr 2023 00:00) (59 - 111)  RR: 20 (08 Apr 2023 00:00) (13 - 32)  SpO2: 100% (08 Apr 2023 00:00) (99% - 100%)    O2 Parameters below as of 08 Apr 2023 00:00  Patient On (Oxygen Delivery Method): CPAP 15/5    O2 Concentration (%): 30  --------------------------------------------------------------------------------------    INS AND OUTS:  I&O's Detail    06 Apr 2023 07:01  -  07 Apr 2023 07:00  --------------------------------------------------------  IN:    Dexmedetomidine: 718.5 mL    Enteral Tube Flush: 190 mL    Nepro with Carb Steady: 170 mL    Norepinephrine: 155.5 mL  Total IN: 1234 mL    OUT:    Indwelling Catheter - Urethral (mL): 20 mL    Nasogastric/Oral tube (mL): 450 mL  Total OUT: 470 mL    Total NET: 764 mL      07 Apr 2023 07:01  -  08 Apr 2023 00:29  --------------------------------------------------------  IN:    Dexmedetomidine: 576.3 mL    Nepro with Carb Steady: 125 mL    Norepinephrine: 111.4 mL  Total IN: 812.7 mL    OUT:    Indwelling Catheter - Urethral (mL): 40 mL    Nasogastric/Oral tube (mL): 200 mL    Other (mL): 1340 mL  Total OUT: 1580 mL    Total NET: -767.3 mL    --------------------------------------------------------------------------------------    EXAM    NEURO: NAD, sedated  HEENT: NC/AT  RESPIRATORY: intubated  CARDIO: RRR, S1S2  ABDOMEN: soft, nontender, nondistended, NGT x 2 (TF vs compression)  EXTREMITIES: normal strength, no deformities    --------------------------------------------------------------------------------------    LABS                        7.7    14.08 )-----------( 399      ( 07 Apr 2023 13:45 )             23.1   04-07    139  |  105  |  48<H>  ----------------------------<  217<H>  4.1   |  19<L>  |  3.02<H>    Ca    7.7<L>      07 Apr 2023 13:45  Phos  5.2     04-07  Mg     2.80     04-07    TPro  5.7<L>  /  Alb  2.5<L>  /  TBili  10.1<H>  /  DBili  x   /  AST  116<H>  /  ALT  126<H>  /  AlkPhos  139<H>  04-07    -------------------------------------------------------------------------------------- SICU Daily Progress Note  =====================================================  Interval/Overnight Events:      - No acute overnight events, currently requiring Levo   - extubated 4/8 AM    ALLERGIES:  No Known Allergies      --------------------------------------------------------------------------------------    MEDICATIONS:    Neurologic Medications  dexMEDEtomidine Infusion 0.1 MICROgram(s)/kG/Hr IV Continuous <Continuous>  HYDROmorphone  Injectable 1 milliGRAM(s) IV Push every 4 hours PRN Severe Pain (7 - 10)  metoclopramide Injectable 10 milliGRAM(s) IV Push every 8 hours    Respiratory Medications    Cardiovascular Medications  norepinephrine Infusion 0.05 MICROgram(s)/kG/Min IV Continuous <Continuous>    Gastrointestinal Medications  pantoprazole  Injectable 40 milliGRAM(s) IV Push daily  polyethylene glycol 3350 17 Gram(s) Oral at bedtime    Genitourinary Medications    Hematologic/Oncologic Medications  fondaparinux Injectable 1.5 milliGRAM(s) SubCutaneous daily    Antimicrobial/Immunologic Medications  meropenem  IVPB 1000 milliGRAM(s) IV Intermittent every 12 hours    Endocrine/Metabolic Medications  dextrose 50% Injectable 25 Gram(s) IV Push once  dextrose 50% Injectable 12.5 Gram(s) IV Push once  insulin glargine Injectable (LANTUS) 9 Unit(s) SubCutaneous at bedtime  insulin lispro (ADMELOG) corrective regimen sliding scale   SubCutaneous every 6 hours  insulin lispro Injectable (ADMELOG) 2 Unit(s) SubCutaneous every 6 hours  levothyroxine Injectable 20 MICROGram(s) IV Push at bedtime  methylPREDNISolone sodium succinate Injectable 10 milliGRAM(s) IV Push every 6 hours    Topical/Other Medications  chlorhexidine 0.12% Liquid 15 milliLiter(s) Oral Mucosa every 12 hours  chlorhexidine 2% Cloths 1 Application(s) Topical daily  CRRT Treatment    <Continuous>  petrolatum Ophthalmic Ointment 1 Application(s) Both EYES two times a day  Phoxillum Filtration BK 4 / 2.5 5000 milliLiter(s) CRRT <Continuous>  PrismaSOL Filtration BGK 0 / 2.5 5000 milliLiter(s) CRRT <Continuous>  PrismaSOL Filtration BGK 4 / 2.5 5000 milliLiter(s) CRRT <Continuous>    --------------------------------------------------------------------------------------    VITAL SIGNS:  ICU Vital Signs Last 24 Hrs  T(C): 36.1 (08 Apr 2023 00:00), Max: 38.7 (07 Apr 2023 04:00)  T(F): 97 (08 Apr 2023 00:00), Max: 101.7 (07 Apr 2023 04:00)  HR: 97 (08 Apr 2023 00:00) (93 - 121)  BP: --  BP(mean): --  ABP: 103/59 (08 Apr 2023 00:00) (86/50 - 141/90)  ABP(mean): 79 (08 Apr 2023 00:00) (59 - 111)  RR: 20 (08 Apr 2023 00:00) (13 - 32)  SpO2: 100% (08 Apr 2023 00:00) (99% - 100%)    O2 Parameters below as of 08 Apr 2023 00:00  Patient On (Oxygen Delivery Method): CPAP 15/5    O2 Concentration (%): 30  --------------------------------------------------------------------------------------    INS AND OUTS:  I&O's Detail    06 Apr 2023 07:01  -  07 Apr 2023 07:00  --------------------------------------------------------  IN:    Dexmedetomidine: 718.5 mL    Enteral Tube Flush: 190 mL    Nepro with Carb Steady: 170 mL    Norepinephrine: 155.5 mL  Total IN: 1234 mL    OUT:    Indwelling Catheter - Urethral (mL): 20 mL    Nasogastric/Oral tube (mL): 450 mL  Total OUT: 470 mL    Total NET: 764 mL      07 Apr 2023 07:01  -  08 Apr 2023 00:29  --------------------------------------------------------  IN:    Dexmedetomidine: 576.3 mL    Nepro with Carb Steady: 125 mL    Norepinephrine: 111.4 mL  Total IN: 812.7 mL    OUT:    Indwelling Catheter - Urethral (mL): 40 mL    Nasogastric/Oral tube (mL): 200 mL    Other (mL): 1340 mL  Total OUT: 1580 mL    Total NET: -767.3 mL    --------------------------------------------------------------------------------------    EXAM    NEURO: NAD, sedated  HEENT: NC/AT  RESPIRATORY: intubated  CARDIO: RRR, S1S2  ABDOMEN: soft, nontender, nondistended, NGT x 2 (TF vs compression)  EXTREMITIES: normal strength, no deformities    --------------------------------------------------------------------------------------    LABS                        7.7    14.08 )-----------( 399      ( 07 Apr 2023 13:45 )             23.1   04-07    139  |  105  |  48<H>  ----------------------------<  217<H>  4.1   |  19<L>  |  3.02<H>    Ca    7.7<L>      07 Apr 2023 13:45  Phos  5.2     04-07  Mg     2.80     04-07    TPro  5.7<L>  /  Alb  2.5<L>  /  TBili  10.1<H>  /  DBili  x   /  AST  116<H>  /  ALT  126<H>  /  AlkPhos  139<H>  04-07    --------------------------------------------------------------------------------------

## 2023-04-08 NOTE — PROGRESS NOTE ADULT - PROBLEM SELECTOR PLAN 1
Pt. with oliguric CHATO in the setting of necrotizing pancreatitis and shock. Pt. with most likely ATN. Scr was 1.24 on 3/23/23 and increased to 4.60 on 3/24/23. Pt. was initiated on CRRT/CVVHDF on 3/24/23 and discontinued overnight (4/6/23) by SICU team. Pt. tolerated CRRT well. Pt. remained oliguric and was restarted on CVVHDH on 4/7/23. Labs reviewed. Pt. currently tolerating CRRT. BP being maintained on vasopressors. HD catheter functioning without any clotting issues overnight. Plan is to continue CRRT today as per discussion with SICU team. Monitor labs and urine output. Avoid any potential nephrotoxins. Dose medications as per eGFR. Pt. with oliguric CHATO in the setting of necrotizing pancreatitis and shock. Pt. with most likely ATN. Scr was 1.24 on 3/23/23 and increased to 4.60 on 3/24/23. Pt. was initiated on CRRT/CVVHDF on 3/24/23 and discontinued overnight (4/6/23) by SICU team. Pt. remained oliguric and was restarted on CVVHDH on 4/7/23. Labs reviewed. Pt. currently tolerating CRRT. BP being maintained on vasopressors. HD catheter functioning without any clotting issues overnight. Plan is to continue CRRT today as per discussion with SICU team. Monitor labs and urine output. Avoid any potential nephrotoxins. Dose medications as per eGFR.

## 2023-04-08 NOTE — PROGRESS NOTE ADULT - SUBJECTIVE AND OBJECTIVE BOX
Surgery Progress Note     Overnight Events: continuing to require levo, TFs resumed    Subjective:  Patient seen and examined on AM rounds.      Vital Signs:  Vital Signs Last 24 Hrs  T(C): 37.8 (08 Apr 2023 12:00), Max: 37.9 (07 Apr 2023 16:00)  T(F): 100 (08 Apr 2023 12:00), Max: 100.3 (07 Apr 2023 16:00)  HR: 110 (08 Apr 2023 12:00) (74 - 116)  BP: --  BP(mean): --  RR: 16 (08 Apr 2023 12:00) (13 - 29)  SpO2: 98% (08 Apr 2023 12:00) (98% - 100%)    Parameters below as of 08 Apr 2023 12:00  Patient On (Oxygen Delivery Method): face tent    O2 Concentration (%): 40    CAPILLARY BLOOD GLUCOSE      POCT Blood Glucose.: 130 mg/dL (08 Apr 2023 11:34)  POCT Blood Glucose.: 144 mg/dL (08 Apr 2023 05:53)  POCT Blood Glucose.: 141 mg/dL (08 Apr 2023 00:50)  POCT Blood Glucose.: 150 mg/dL (07 Apr 2023 22:06)  POCT Blood Glucose.: 171 mg/dL (07 Apr 2023 17:56)      I&O's Detail    07 Apr 2023 07:01  -  08 Apr 2023 07:00  --------------------------------------------------------  IN:    Dexmedetomidine: 813.6 mL    Enteral Tube Flush: 75 mL    IV PiggyBack: 100 mL    Nepro with Carb Steady: 300 mL    Norepinephrine: 146 mL  Total IN: 1434.6 mL    OUT:    Indwelling Catheter - Urethral (mL): 40 mL    Nasogastric/Oral tube (mL): 200 mL    Other (mL): 2556 mL  Total OUT: 2796 mL    Total NET: -1361.4 mL      08 Apr 2023 07:01  -  08 Apr 2023 12:24  --------------------------------------------------------  IN:    Dexmedetomidine: 135.6 mL    Nepro with Carb Steady: 125 mL    Norepinephrine: 44 mL  Total IN: 304.6 mL    OUT:    Indwelling Catheter - Urethral (mL): 0 mL    Other (mL): 448 mL  Total OUT: 448 mL    Total NET: -143.4 mL            Physical Exam:  General: NAD  HEENT: Normocephalic atraumatic, NGT and KO feeding tube in place  Respiratory: intubated  Cardio: regular rate  Abdomen: soft, nondistended, appropriately tender  Vascular: extremities are warm and well perfused      Labs:    04-08    136  |  102  |  33<H>  ----------------------------<  173<H>  4.2   |  19<L>  |  1.89<H>    Ca    7.8<L>      08 Apr 2023 00:45  Phos  4.2     04-08  Mg     2.60     04-08    TPro  6.3  /  Alb  2.5<L>  /  TBili  9.1<H>  /  DBili  x   /  AST  122<H>  /  ALT  124<H>  /  AlkPhos  133<H>  04-08    LIVER FUNCTIONS - ( 08 Apr 2023 00:45 )  Alb: 2.5 g/dL / Pro: 6.3 g/dL / ALK PHOS: 133 U/L / ALT: 124 U/L / AST: 122 U/L / GGT: x                                 7.5    13.31 )-----------( 400      ( 08 Apr 2023 00:45 )             22.7     PT/INR - ( 08 Apr 2023 00:45 )   PT: 21.1 sec;   INR: 1.81 ratio         PTT - ( 08 Apr 2023 00:45 )  PTT:29.5 sec

## 2023-04-09 NOTE — PROGRESS NOTE ADULT - SUBJECTIVE AND OBJECTIVE BOX
Surgery Progress Note     24 hours: patient extubated yesterday    Subjective:  Patient seen and examined. Patient was extubated yesterday. She endorses discomfort from the NGT.       Vital Signs:  Vital Signs Last 24 Hrs  T(C): 35 (09 Apr 2023 12:00), Max: 37.2 (09 Apr 2023 04:00)  T(F): 95 (09 Apr 2023 12:00), Max: 98.9 (09 Apr 2023 04:00)  HR: 97 (09 Apr 2023 12:00) (97 - 128)  BP: --  BP(mean): --  RR: 39 (09 Apr 2023 12:00) (25 - 47)  SpO2: 100% (09 Apr 2023 12:00) (96% - 100%)    Parameters below as of 09 Apr 2023 09:00  Patient On (Oxygen Delivery Method): room air        CAPILLARY BLOOD GLUCOSE      POCT Blood Glucose.: 93 mg/dL (09 Apr 2023 11:12)  POCT Blood Glucose.: 114 mg/dL (09 Apr 2023 05:58)  POCT Blood Glucose.: 124 mg/dL (09 Apr 2023 02:39)  POCT Blood Glucose.: 131 mg/dL (08 Apr 2023 21:58)  POCT Blood Glucose.: 136 mg/dL (08 Apr 2023 17:21)      I&O's Detail    08 Apr 2023 07:01  -  09 Apr 2023 07:00  --------------------------------------------------------  IN:    Dexmedetomidine: 414.9 mL    IV PiggyBack: 100 mL    Nepro with Carb Steady: 175 mL    Norepinephrine: 154 mL  Total IN: 843.9 mL    OUT:    Indwelling Catheter - Urethral (mL): 10 mL    Nasogastric/Oral tube (mL): 300 mL    Other (mL): 3158 mL  Total OUT: 3468 mL    Total NET: -2624.1 mL      09 Apr 2023 07:01  -  09 Apr 2023 12:26  --------------------------------------------------------  IN:    Dexmedetomidine: 57 mL    Norepinephrine: 55.2 mL  Total IN: 112.2 mL    OUT:    Other (mL): 321 mL  Total OUT: 321 mL    Total NET: -208.8 mL            Physical Exam:  General: NAD  HEENT: Normocephalic atraumatic, NGT and KO feeding tube in place  Respiratory: tachypneic   Cardio: regular rate  Abdomen: soft, distended, appropriately tender  Vascular: extremities are warm and well perfused      Labs:    04-09    137  |  99  |  18  ----------------------------<  127<H>  4.1   |  16<L>  |  1.10    Ca    8.3<L>      09 Apr 2023 02:30  Phos  4.0     04-09  Mg     2.60     04-09    TPro  6.8  /  Alb  2.5<L>  /  TBili  8.5<H>  /  DBili  x   /  AST  118<H>  /  ALT  116<H>  /  AlkPhos  146<H>  04-09    LIVER FUNCTIONS - ( 09 Apr 2023 02:30 )  Alb: 2.5 g/dL / Pro: 6.8 g/dL / ALK PHOS: 146 U/L / ALT: 116 U/L / AST: 118 U/L / GGT: x                                 7.7    17.18 )-----------( 480      ( 09 Apr 2023 02:30 )             23.4     PT/INR - ( 09 Apr 2023 02:30 )   PT: 24.0 sec;   INR: 2.05 ratio         PTT - ( 09 Apr 2023 02:30 )  PTT:31.3 sec

## 2023-04-09 NOTE — PROGRESS NOTE ADULT - PROBLEM SELECTOR PLAN 1
Pt. with oliguric CHATO in the setting of necrotizing pancreatitis and shock. Pt. with most likely ATN. Scr was 1.24 on 3/23/23 and increased to 4.60 on 3/24/23. Pt. was initiated on CRRT/CVVHDF on 3/24/23 and discontinued overnight (4/6/23) by SICU team. Pt. remained oliguric and was restarted on CVVHDH on 4/7/23. Labs reviewed. Pt. currently tolerating CRRT. BP being maintained on vasopressors. HD catheter functioning without any clotting issues overnight. Plan is to continue CRRT today as per discussion with SICU team. Monitor labs and urine output. Avoid any potential nephrotoxins. Dose medications as per eGFR.

## 2023-04-09 NOTE — PROGRESS NOTE ADULT - ASSESSMENT
Patient is a 48 year old female with a PMHx of HTN, hypothyroidism,  (30 years ago) and breast cancer (S/P left mastectomy and chemo in ) who presented with epigastric pain and left upper quadrant pain.  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  Transferred from OSH ([facility]) for hypotension and tachypnea requiring emergent intubation and multiple pressors.  SICU consulted for hemodynamic monitoring and respiratory management.    Plan:   - NPO with tube feeds   - spontaneous breathing trials as tolerated  - extubated as tolerated - trach planning for next week if unable to extubate  - recommend repeat CT in the next week to reassess collections  - back on CVVH  - continue with IV Meropenem  - appreciate care per SICU      #[id number]  B Team Surgery

## 2023-04-09 NOTE — PROGRESS NOTE ADULT - ASSESSMENT
48 year old female with PMHx significant for HTN, hypothyroidism,  30 years ago, breast cancer s/p left mastectomy and chemo in  presenting with epigastric pain and LUQ pain. Found to have necrotizing pancreatitis. Transferred from OSH ([facility]) for hypotension and tachypnea requiring emergent intubation and multiple pressors. SICU consulted for hemodynamic monitoring and respiratory management.    PLAN  Neuro:  No acute neurological issues   - Wean precedex as tolerated  - Dilaudid prn    Resp:  AHRF w/ b/l pleff 2/2 necrotizing pancreatitis   - extubated 4/8AM    CV:  Mixed shock state 2/2 septic shock w/ E. Coli in urine vs hypovolemic shock now s/p 11L resus    - wean levo as tolerated     GI:   #Necrotizing pancreatitis 2/2 gallstones  - CT 3/27: >50% of the pancreatic head extends into the mesentery and inferiorly along the retroperitoneum into the lower abdomen  - RUQ US: Chololithiasis (2cm stone) w/ no acute frannie   - MRCP 4/3: no choledocholithiasis  - Continue TF (Nepro) at rate of 20cc/hr  - GI ppx w/ protonix   - Reglan  - Dolcolax    #Shocked Liver + Hyperbilirubinemia   Likely 2/2 to shocked state w/ LFTs now trending down w/ bili following protracted course as expected. Possible concern for acute obstructive biliary process  - MRCP read no choledocholithiasis  - Hold tylenol     Renal:  ARF 2/2 shock state resulting in ATN requiring CRRT    - On CRRT, plan to start intermittent hemodialysis when tolerated   - Viera catheter in place, oliguric      Heme:   Anemia likely 2/2 disease course vs suppression; No active bleeding reported   - CBC q12   - received 1U PRBC   - SCDs and fondaparinux for DVT ppx    #Thrombocytopenia  Likely suppression 2/2 infection vs shock state vs HIT  Diagnostics  - HIT Ab positive; DOUGLAS Negative   -CRRT dosed Fondaparinux 1.5 qd for DVT ppx   - f/u anti Xa level    #Splenic Vein Thrombosis  3/22 CT Abd w/ contrast: Focal filling defect in the splenic vein at the level of the body of   the pancreas for which an underlying thrombus is suspected     ID:   Intermittently febrile w/ leukocytosis c/f septic shock 2/2 E coli UTI  Diagnostics:  - 3/22 Ucx: E coli  - 3/30 Bcx: staph hominis in aerobic bottle x 1 (likely contaminant)   - Meropenem (3/26- )    Endo:   - Lantus 9 units, admelog 2 units q6  - c/w synthroid 20 IV (home med)   - ISS    Code Status: Full code  Lines: KELLY Mendoza CVC, A line, viera     Disposition: SICU 48 year old female with PMHx significant for HTN, hypothyroidism,  30 years ago, breast cancer s/p left mastectomy and chemo in  presenting with epigastric pain and LUQ pain. Found to have necrotizing pancreatitis. Transferred from OSH ([facility]) for hypotension and tachypnea requiring emergent intubation and multiple pressors. SICU consulted for hemodynamic monitoring and respiratory management.    PLAN  Neuro:  No acute neurological issues   - Wean precedex as tolerated  - Dilaudid prn    Resp:  AHRF w/ b/l pleff 2/2 necrotizing pancreatitis   - extubated 4/8AM    CV:  Mixed shock state 2/2 septic shock w/ E. Coli in urine vs hypovolemic shock now s/p 11L resus    - wean levo as tolerated     GI:   - CT 3/27: >50% of the pancreatic head extends into the mesentery and inferiorly along the retroperitoneum into the lower abdomen  - RUQ US: Chololithiasis (2cm stone) w/ no acute frannie   - MRCP 4/3: no choledocholithiasis. Hold tylenol.  - TF (Nepro) at rate of 20cc/hr held  - d/c post pyloric ngt, clogged  - Keep ngt to LCWS  - GI ppx w/ protonix   - Reglan  - Dolcolax    Renal:  ARF 2/2 shock state resulting in ATN requiring CRRT    - On CRRT keep net even, plan to start intermittent hemodialysis when tolerated   - Viera catheter in place, oliguric      Heme:   - CBC q12   - received 1U PRBC   - SCDs and CRRT dosed fondaparinux for DVT ppx  - HIT Ab positive; DOUGLAS Negative   - f/u anti Xa level    #Splenic Vein Thrombosis  3/22 CT Abd w/ contrast: Focal filling defect in the splenic vein at the level of the body of   the pancreas for which an underlying thrombus is suspected     ID:   Intermittently febrile w/ leukocytosis c/f septic shock 2/2 E coli UTI  Diagnostics:  - 3/22 Ucx: E coli  - 3/30 Bcx: staph hominis in aerobic bottle x 1 (likely contaminant)   - Meropenem (3/26- )    Endo:   - Lantus 9 units, admelog 2 units q6  - c/w synthroid 20 IV (home med)   - ISS    Code Status: Full code  Lines: KELLY Mendoza CVC, A line, viera     Disposition: SICU 87 year old female with PMHx of HTN on 50mg metoprolol and 80mg valsartan BID, thyroid CA (not on any medication), HLD on atorvastatin, presents to the ED BIBEMS from home c/o general weakness, diaphoresis, shaking chills x3 days. Patient febrile, tachycardic upon ED arrival meets sepsis criteria. No resp distress, BP stable, not hypoxic. Plan sepsis alert, EKG, labs including pan cx, covid swab, cxray, IV fluids, tylenol PO, monitor, observe and reassess.    Lactate normal. UA +UTI. Doesn't require sepsis IVF. IV ceftriaxone ordered.     7:15 Dr. Guthrie aware of admission. 87 year old female with PMHx of HTN,  thyroid CA, HLD, BIBA from home to ED c/o general weakness, diaphoresis, shaking chills x3 days. Patient febrile, tachycardic upon ED arrival, + meets sepsis criteria. No resp distress, BP stable, not hypoxic.   Plan: sepsis alert, EKG, labs including pan cx, covid swab, CXR, IV fluids, tylenol PO, monitor, observe and reassess.    Lactate normal. UA +UTI. BP stable.  Doesn't require sepsis IVF. IV ceftriaxone ordered.     7:15 Dr. Guthrie aware of admission.

## 2023-04-09 NOTE — PROGRESS NOTE ADULT - SUBJECTIVE AND OBJECTIVE BOX
HISTORY  48y Female    24 HOUR EVENTS:    SUBJECTIVE/ROS:  [ ] A ten-point review of systems was otherwise negative except as noted.  [ ] Due to altered mental status/intubation, subjective information were not able to be obtained from the patient. History was obtained, to the extent possible, from review of the chart and collateral sources of information.      NEURO  RASS:     GCS:     CAM ICU:  Exam:   Meds: dexMEDEtomidine Infusion 0.1 MICROgram(s)/kG/Hr IV Continuous <Continuous>  HYDROmorphone  Injectable 0.5 milliGRAM(s) IV Push every 4 hours PRN Moderate Pain (4 - 6)  HYDROmorphone  Injectable 1 milliGRAM(s) IV Push every 4 hours PRN Severe Pain (7 - 10)  metoclopramide Injectable 10 milliGRAM(s) IV Push every 8 hours    [x] Adequacy of sedation and pain control has been assessed and adjusted      RESPIRATORY  RR: 32 (04-09-23 @ 03:00) (16 - 45)  SpO2: 100% (04-09-23 @ 03:00) (96% - 100%)  Wt(kg): --  Exam:   Mechanical Ventilation: Mode: CPAP with PS, RR (patient): 24, FiO2: 30, PEEP: 5, PS: 10, MAP: 9, PIP: 17  ABG - ( 09 Apr 2023 02:30 )  pH: 7.38  /  pCO2: 29    /  pO2: 100   / HCO3: 17    / Base Excess: -6.6  /  SaO2: 98.3    Lactate: x                [ ] Extubation Readiness Assessed  Meds:       CARDIOVASCULAR  HR: 115 (04-09-23 @ 03:00) (74 - 128)  BP: --  BP(mean): --  ABP: 100/53 (04-09-23 @ 03:00) (73/42 - 145/84)  ABP(mean): 73 (04-09-23 @ 03:00) (56 - 109)  Wt(kg): --  CVP(cm H2O): --      Exam:  Cardiac Rhythm:  Perfusion     [ ]Adequate   [ ]Inadequate  Mentation   [ ]Normal       [ ]Reduced  Extremities  [ ]Warm         [ ]Cool  Volume Status [ ]Hypervolemic [ ]Euvolemic [ ]Hypovolemic  Meds: norepinephrine Infusion 0.05 MICROgram(s)/kG/Min IV Continuous <Continuous>        GI/NUTRITION  Exam:  Diet:  Meds: pantoprazole  Injectable 40 milliGRAM(s) IV Push daily  polyethylene glycol 3350 17 Gram(s) Oral at bedtime      GENITOURINARY  I&O's Detail    04-07 @ 07:01  -  04-08 @ 07:00  --------------------------------------------------------  IN:    Dexmedetomidine: 813.6 mL    Enteral Tube Flush: 75 mL    IV PiggyBack: 100 mL    Nepro with Carb Steady: 300 mL    Norepinephrine: 146 mL  Total IN: 1434.6 mL    OUT:    Indwelling Catheter - Urethral (mL): 40 mL    Nasogastric/Oral tube (mL): 200 mL    Other (mL): 2556 mL  Total OUT: 2796 mL    Total NET: -1361.4 mL      04-08 @ 07:01  - 04-09 @ 03:30  --------------------------------------------------------  IN:    Dexmedetomidine: 356.1 mL    IV PiggyBack: 50 mL    Nepro with Carb Steady: 175 mL    Norepinephrine: 110 mL  Total IN: 691.1 mL    OUT:    Indwelling Catheter - Urethral (mL): 0 mL    Nasogastric/Oral tube (mL): 300 mL    Other (mL): 2368 mL  Total OUT: 2668 mL    Total NET: -1976.9 mL          04-08    136  |  102  |  33<H>  ----------------------------<  173<H>  4.2   |  19<L>  |  1.89<H>    Ca    7.8<L>      08 Apr 2023 00:45  Phos  4.2     04-08  Mg     2.60     04-08    TPro  6.3  /  Alb  2.5<L>  /  TBili  9.1<H>  /  DBili  x   /  AST  122<H>  /  ALT  124<H>  /  AlkPhos  133<H>  04-08    [ ] Babin catheter, indication:   Meds:       HEMATOLOGIC  Meds: fondaparinux Injectable 1.5 milliGRAM(s) SubCutaneous daily    [x] VTE Prophylaxis                        7.7    17.18 )-----------( 480      ( 09 Apr 2023 02:30 )             23.4     PT/INR - ( 09 Apr 2023 02:30 )   PT: 24.0 sec;   INR: 2.05 ratio         PTT - ( 09 Apr 2023 02:30 )  PTT:31.3 sec  Transfusion     [ ] PRBC   [ ] Platelets   [ ] FFP   [ ] Cryoprecipitate      INFECTIOUS DISEASES  T(C): 35.9 (04-09-23 @ 02:00), Max: 37.8 (04-08-23 @ 12:00)  Wt(kg): --  WBC Count: 17.18 K/uL (04-09 @ 02:30)    Recent Cultures:    Meds: meropenem  IVPB 1000 milliGRAM(s) IV Intermittent every 12 hours        ENDOCRINE  Capillary Blood Glucose    Meds: dextrose 50% Injectable 25 Gram(s) IV Push once  dextrose 50% Injectable 12.5 Gram(s) IV Push once  insulin glargine Injectable (LANTUS) 9 Unit(s) SubCutaneous at bedtime  insulin lispro (ADMELOG) corrective regimen sliding scale   SubCutaneous every 6 hours  insulin lispro Injectable (ADMELOG) 2 Unit(s) SubCutaneous every 6 hours  levothyroxine Injectable 20 MICROGram(s) IV Push at bedtime  methylPREDNISolone sodium succinate Injectable 10 milliGRAM(s) IV Push every 6 hours        ACCESS DEVICES:  [ ] Peripheral IV  [ ] Central Venous Line	[ ] R	[ ] L	[ ] IJ	[ ] Fem	[ ] SC	Placed:   [ ] Arterial Line		[ ] R	[ ] L	[ ] Fem	[ ] Rad	[ ] Ax	Placed:   [ ] PICC:					[ ] Mediport  [ ] Urinary Catheter, Date Placed:   [ ] Necessity of urinary, arterial, and venous catheters discussed    OTHER MEDICATIONS:  chlorhexidine 2% Cloths 1 Application(s) Topical daily  CRRT Treatment    <Continuous>  petrolatum Ophthalmic Ointment 1 Application(s) Both EYES two times a day  Phoxillum Filtration BK 4 / 2.5 5000 milliLiter(s) CRRT <Continuous>  PrismaSOL Filtration BGK 0 / 2.5 5000 milliLiter(s) CRRT <Continuous>  PrismaSOL Filtration BGK 4 / 2.5 5000 milliLiter(s) CRRT <Continuous>      CODE STATUS:     IMAGING: HISTORY  48y Female    24 HOUR EVENTS:  - CVVH -100cc/hr  - back on levo 0.06  - tachypneic overnight    SUBJECTIVE/ROS:  [ ] A ten-point review of systems was otherwise negative except as noted.  [ ] Due to altered mental status/intubation, subjective information were not able to be obtained from the patient. History was obtained, to the extent possible, from review of the chart and collateral sources of information.      NEURO  RASS:     GCS:     CAM ICU:  Exam:   Meds: dexMEDEtomidine Infusion 0.1 MICROgram(s)/kG/Hr IV Continuous <Continuous>  HYDROmorphone  Injectable 0.5 milliGRAM(s) IV Push every 4 hours PRN Moderate Pain (4 - 6)  HYDROmorphone  Injectable 1 milliGRAM(s) IV Push every 4 hours PRN Severe Pain (7 - 10)  metoclopramide Injectable 10 milliGRAM(s) IV Push every 8 hours    [x] Adequacy of sedation and pain control has been assessed and adjusted      RESPIRATORY  RR: 32 (04-09-23 @ 03:00) (16 - 45)  SpO2: 100% (04-09-23 @ 03:00) (96% - 100%)  Wt(kg): --  Exam:   Mechanical Ventilation: Mode: CPAP with PS, RR (patient): 24, FiO2: 30, PEEP: 5, PS: 10, MAP: 9, PIP: 17  ABG - ( 09 Apr 2023 02:30 )  pH: 7.38  /  pCO2: 29    /  pO2: 100   / HCO3: 17    / Base Excess: -6.6  /  SaO2: 98.3    Lactate: x                [ ] Extubation Readiness Assessed  Meds:       CARDIOVASCULAR  HR: 115 (04-09-23 @ 03:00) (74 - 128)  BP: --  BP(mean): --  ABP: 100/53 (04-09-23 @ 03:00) (73/42 - 145/84)  ABP(mean): 73 (04-09-23 @ 03:00) (56 - 109)  Wt(kg): --  CVP(cm H2O): --      Exam:  Cardiac Rhythm:  Perfusion     [ ]Adequate   [ ]Inadequate  Mentation   [ ]Normal       [ ]Reduced  Extremities  [ ]Warm         [ ]Cool  Volume Status [ ]Hypervolemic [ ]Euvolemic [ ]Hypovolemic  Meds: norepinephrine Infusion 0.05 MICROgram(s)/kG/Min IV Continuous <Continuous>        GI/NUTRITION  Exam: distended. diffusely ttp. no guarding or rebound.  Diet:  Meds: pantoprazole  Injectable 40 milliGRAM(s) IV Push daily  polyethylene glycol 3350 17 Gram(s) Oral at bedtime      GENITOURINARY  I&O's Detail    04-07 @ 07:01  -  04-08 @ 07:00  --------------------------------------------------------  IN:    Dexmedetomidine: 813.6 mL    Enteral Tube Flush: 75 mL    IV PiggyBack: 100 mL    Nepro with Carb Steady: 300 mL    Norepinephrine: 146 mL  Total IN: 1434.6 mL    OUT:    Indwelling Catheter - Urethral (mL): 40 mL    Nasogastric/Oral tube (mL): 200 mL    Other (mL): 2556 mL  Total OUT: 2796 mL    Total NET: -1361.4 mL      04-08 @ 07:01 - 04-09 @ 03:30  --------------------------------------------------------  IN:    Dexmedetomidine: 356.1 mL    IV PiggyBack: 50 mL    Nepro with Carb Steady: 175 mL    Norepinephrine: 110 mL  Total IN: 691.1 mL    OUT:    Indwelling Catheter - Urethral (mL): 0 mL    Nasogastric/Oral tube (mL): 300 mL    Other (mL): 2368 mL  Total OUT: 2668 mL    Total NET: -1976.9 mL          04-08    136  |  102  |  33<H>  ----------------------------<  173<H>  4.2   |  19<L>  |  1.89<H>    Ca    7.8<L>      08 Apr 2023 00:45  Phos  4.2     04-08  Mg     2.60     04-08    TPro  6.3  /  Alb  2.5<L>  /  TBili  9.1<H>  /  DBili  x   /  AST  122<H>  /  ALT  124<H>  /  AlkPhos  133<H>  04-08    [ ] Babin catheter, indication:   Meds:       HEMATOLOGIC  Meds: fondaparinux Injectable 1.5 milliGRAM(s) SubCutaneous daily    [x] VTE Prophylaxis                        7.7    17.18 )-----------( 480      ( 09 Apr 2023 02:30 )             23.4     PT/INR - ( 09 Apr 2023 02:30 )   PT: 24.0 sec;   INR: 2.05 ratio         PTT - ( 09 Apr 2023 02:30 )  PTT:31.3 sec  Transfusion     [ ] PRBC   [ ] Platelets   [ ] FFP   [ ] Cryoprecipitate      INFECTIOUS DISEASES  T(C): 35.9 (04-09-23 @ 02:00), Max: 37.8 (04-08-23 @ 12:00)  Wt(kg): --  WBC Count: 17.18 K/uL (04-09 @ 02:30)    Recent Cultures:    Meds: meropenem  IVPB 1000 milliGRAM(s) IV Intermittent every 12 hours        ENDOCRINE  Capillary Blood Glucose    Meds: dextrose 50% Injectable 25 Gram(s) IV Push once  dextrose 50% Injectable 12.5 Gram(s) IV Push once  insulin glargine Injectable (LANTUS) 9 Unit(s) SubCutaneous at bedtime  insulin lispro (ADMELOG) corrective regimen sliding scale   SubCutaneous every 6 hours  insulin lispro Injectable (ADMELOG) 2 Unit(s) SubCutaneous every 6 hours  levothyroxine Injectable 20 MICROGram(s) IV Push at bedtime  methylPREDNISolone sodium succinate Injectable 10 milliGRAM(s) IV Push every 6 hours        ACCESS DEVICES:  [ ] Peripheral IV  [x] Central Venous Line	[ ] R	[ ] L	[ ] IJ	[ ] Fem	[ ] SC	Placed:   [ ] Arterial Line		[ ] R	[ ] L	[ ] Fem	[ ] Rad	[ ] Ax	Placed:   [ ] PICC:					[ ] Mediport  [x] Urinary Catheter, Date Placed:   [ ] Necessity of urinary, arterial, and venous catheters discussed    OTHER MEDICATIONS:  chlorhexidine 2% Cloths 1 Application(s) Topical daily  CRRT Treatment    <Continuous>  petrolatum Ophthalmic Ointment 1 Application(s) Both EYES two times a day  Phoxillum Filtration BK 4 / 2.5 5000 milliLiter(s) CRRT <Continuous>  PrismaSOL Filtration BGK 0 / 2.5 5000 milliLiter(s) CRRT <Continuous>  PrismaSOL Filtration BGK 4 / 2.5 5000 milliLiter(s) CRRT <Continuous>      CODE STATUS:     IMAGING:

## 2023-04-09 NOTE — PROGRESS NOTE ADULT - ATTENDING COMMENTS
Critical Care Dx  	  K85.91 Necrotizing pancreatitis  -continued SIRS response and fluid avidity, abdominal exam stable   -repeat scan later this week  -GOO caused by obstruction hopefully resolving  - Postpyloric tube clogged despite efforts to recannulize    N17.9 Acute kidney injury  -monitor u/o, CVVHD continue, nephro input appreciated   -daily BMP  R17 Total bilirubin, elevated  -likely obstructive due to inflammation/pancreatitis   E43 Severe protein-calorie malnutrition  -will try feeding from NG tube  -pressure ulcer over nares noted, needs local wound care  -monitor weekly prealb      The patient is a critical care patient with life threatening hemodynamic and metabolic instability in SICU.  I have personally interviewed and examined the patient, reviewed data and laboratory tests/x-rays and all pertinent electronic images.  The SICU team has a constant risk benefit analyzes discussion with the primary team, all consultants, House Staff and PA's on all decisions.   Time involved in performance of separately billable procedures was not counted toward my critical care time. There is no overlap.    I have personally provided 60 minutes of critical care time concurrently with the resident/fellow. This time excludes time spent on separate procedures and time spent teaching. I have reviewed the resident's/fellow's documentation and agree with the assessment and plan of care.  I was physically present for the key portions of the evaluation and management (E/M) service provided.      Jam Nj MD  Acute and Critical Care Surgery

## 2023-04-09 NOTE — PROGRESS NOTE ADULT - SUBJECTIVE AND OBJECTIVE BOX
[facility] DIVISION OF KIDNEY DISEASES AND HYPERTENSION   FOLLOW UP NOTE    --------------------------------------------------------------------------------  Chief Complaint: Oliguric CHATO, on RRT    24 hour events/subjective: Pt. seen and examined in the SICU today. Pt. awake and was extubated on 4/7/23. BP maintained on IV vasopressors. Pt restarted on CVVHDF/CRRT on 4/7/23.  Pt. remains oliguric.     PAST HISTORY  --------------------------------------------------------------------------------  No significant changes to PMH, PSH, FHx, SHx, unless otherwise noted    ALLERGIES & MEDICATIONS  --------------------------------------------------------------------------------  Allergies    No Known Allergies    Intolerances    Standing Inpatient Medications  chlorhexidine 2% Cloths 1 Application(s) Topical daily  CRRT Treatment    <Continuous>  dexMEDEtomidine Infusion 0.1 MICROgram(s)/kG/Hr IV Continuous <Continuous>  dextrose 50% Injectable 25 Gram(s) IV Push once  dextrose 50% Injectable 12.5 Gram(s) IV Push once  fondaparinux Injectable 1.5 milliGRAM(s) SubCutaneous daily  insulin glargine Injectable (LANTUS) 9 Unit(s) SubCutaneous at bedtime  insulin lispro (ADMELOG) corrective regimen sliding scale   SubCutaneous every 6 hours  insulin lispro Injectable (ADMELOG) 2 Unit(s) SubCutaneous every 6 hours  levothyroxine Injectable 20 MICROGram(s) IV Push at bedtime  meropenem  IVPB 1000 milliGRAM(s) IV Intermittent every 12 hours  methylPREDNISolone sodium succinate Injectable 10 milliGRAM(s) IV Push every 6 hours  metoclopramide Injectable 10 milliGRAM(s) IV Push every 8 hours  norepinephrine Infusion 0.05 MICROgram(s)/kG/Min IV Continuous <Continuous>  pantoprazole  Injectable 40 milliGRAM(s) IV Push daily  petrolatum Ophthalmic Ointment 1 Application(s) Both EYES two times a day  Phoxillum Filtration BK 4 / 2.5 5000 milliLiter(s) CRRT <Continuous>  polyethylene glycol 3350 17 Gram(s) Oral at bedtime  PrismaSOL Filtration BGK 0 / 2.5 5000 milliLiter(s) CRRT <Continuous>  PrismaSOL Filtration BGK 4 / 2.5 5000 milliLiter(s) CRRT <Continuous>    PRN Inpatient Medications  HYDROmorphone  Injectable 0.5 milliGRAM(s) IV Push every 4 hours PRN  HYDROmorphone  Injectable 1 milliGRAM(s) IV Push every 4 hours PRN      REVIEW OF SYSTEMS  --------------------------------------------------------------------------------  Unable to obtain as pt is somnolent    VITALS/PHYSICAL EXAM  --------------------------------------------------------------------------------  T(C): 37.2 (04-09-23 @ 04:00), Max: 37.8 (04-08-23 @ 12:00)  HR: 128 (04-09-23 @ 06:00) (80 - 128)  BP: --  RR: 45 (04-09-23 @ 06:00) (16 - 47)  SpO2: 99% (04-09-23 @ 06:00) (96% - 100%)  Wt(kg): --    04-07-23 @ 07:01  -  04-08-23 @ 07:00  --------------------------------------------------------  IN: 1434.6 mL / OUT: 2796 mL / NET: -1361.4 mL    04-08-23 @ 07:01  -  04-09-23 @ 06:53  --------------------------------------------------------  IN: 818.2 mL / OUT: 3063 mL / NET: -2244.8 mL    Physical Exam:  	Gen: Ill appearing, NAD  	HEENT: NGT+, anicteric  	Pulm: Fair entry B/L  	CV: S1S2+  	Abd: Obese, distended  	Ext: +LE edema B/L, Trace B/L UE edema   	Neuro: Awake, unable to provide ROS              : Babin catheter+  	Skin: Warm and dry              Dialysis access: Right IJ non tunneled HD catheter being used for CRRT    LABS/STUDIES  --------------------------------------------------------------------------------              7.7    17.18 >-----------<  480      [04-09-23 @ 02:30]              23.4     137  |  99  |  18  ----------------------------<  127      [04-09-23 @ 02:30]  4.1   |  16  |  1.10        Ca     8.3     [04-09-23 @ 02:30]      Mg     2.60     [04-09-23 @ 02:30]      Phos  4.0     [04-09-23 @ 02:30]    Creatinine Trend:  SCr 1.10 [04-09 @ 02:30]  SCr 1.89 [04-08 @ 00:45]  SCr 3.02 [04-07 @ 13:45]  SCr 2.62 [04-07 @ 01:46]  SCr 1.73 [04-06 @ 11:50]    Urinalysis - [03-24-23 @ 05:30]      Color Yellow / Appearance Clear / SG 1.020 / pH 5.0      Gluc 250 / Ketone Trace  / Bili Negative / Urobili Negative       Blood Moderate / Protein 100 / Leuk Est Trace / Nitrite Negative      RBC 0-2 / WBC 6-10 / Hyaline  / Gran  / Sq Epi  / Non Sq Epi Moderate / Bacteria Many    HBsAg Nonreact      [03-27-23 @ 00:35]  HCV 0.05, Nonreact      [03-27-23 @ 00:35]  HIV Nonreact      [03-30-23 @ 20:10] [facility] DIVISION OF KIDNEY DISEASES AND HYPERTENSION   FOLLOW UP NOTE    --------------------------------------------------------------------------------  Chief Complaint: Oliguric CHATO, on RRT    24 hour events/subjective: Pt. seen and examined in the SICU today. Pt. awake and was extubated on 4/7/23. BP maintained on IV vasopressors. Pt restarted on CVVHDF/CRRT on 4/7/23.  Pt. remains oliguric.     PAST HISTORY  --------------------------------------------------------------------------------  No significant changes to PMH, PSH, FHx, SHx, unless otherwise noted    ALLERGIES & MEDICATIONS  --------------------------------------------------------------------------------  Allergies    No Known Allergies    Intolerances    Standing Inpatient Medications  chlorhexidine 2% Cloths 1 Application(s) Topical daily  CRRT Treatment    <Continuous>  dexMEDEtomidine Infusion 0.1 MICROgram(s)/kG/Hr IV Continuous <Continuous>  dextrose 50% Injectable 25 Gram(s) IV Push once  dextrose 50% Injectable 12.5 Gram(s) IV Push once  fondaparinux Injectable 1.5 milliGRAM(s) SubCutaneous daily  insulin glargine Injectable (LANTUS) 9 Unit(s) SubCutaneous at bedtime  insulin lispro (ADMELOG) corrective regimen sliding scale   SubCutaneous every 6 hours  insulin lispro Injectable (ADMELOG) 2 Unit(s) SubCutaneous every 6 hours  levothyroxine Injectable 20 MICROGram(s) IV Push at bedtime  meropenem  IVPB 1000 milliGRAM(s) IV Intermittent every 12 hours  methylPREDNISolone sodium succinate Injectable 10 milliGRAM(s) IV Push every 6 hours  metoclopramide Injectable 10 milliGRAM(s) IV Push every 8 hours  norepinephrine Infusion 0.05 MICROgram(s)/kG/Min IV Continuous <Continuous>  pantoprazole  Injectable 40 milliGRAM(s) IV Push daily  petrolatum Ophthalmic Ointment 1 Application(s) Both EYES two times a day  Phoxillum Filtration BK 4 / 2.5 5000 milliLiter(s) CRRT <Continuous>  polyethylene glycol 3350 17 Gram(s) Oral at bedtime  PrismaSOL Filtration BGK 0 / 2.5 5000 milliLiter(s) CRRT <Continuous>  PrismaSOL Filtration BGK 4 / 2.5 5000 milliLiter(s) CRRT <Continuous>    PRN Inpatient Medications  HYDROmorphone  Injectable 0.5 milliGRAM(s) IV Push every 4 hours PRN  HYDROmorphone  Injectable 1 milliGRAM(s) IV Push every 4 hours PRN    REVIEW OF SYSTEMS  --------------------------------------------------------------------------------  Unable to obtain ROS as pt is somnolent    VITALS/PHYSICAL EXAM  --------------------------------------------------------------------------------  T(C): 37.2 (04-09-23 @ 04:00), Max: 37.8 (04-08-23 @ 12:00)  HR: 128 (04-09-23 @ 06:00) (80 - 128)  BP: --  RR: 45 (04-09-23 @ 06:00) (16 - 47)  SpO2: 99% (04-09-23 @ 06:00) (96% - 100%)  Wt(kg): --    04-07-23 @ 07:01  -  04-08-23 @ 07:00  --------------------------------------------------------  IN: 1434.6 mL / OUT: 2796 mL / NET: -1361.4 mL    04-08-23 @ 07:01  -  04-09-23 @ 06:53  --------------------------------------------------------  IN: 818.2 mL / OUT: 3063 mL / NET: -2244.8 mL    Physical Exam:  	Gen: Ill appearing  	HEENT: NGT+, anicteric  	Pulm: Fair entry B/L  	CV: S1S2+  	Abd: Obese, distended  	Ext: +LE edema B/L, Trace B/L UE edema   	Neuro: Opens eyes to verbal stimuli              : Babin catheter+  	Skin: Warm and dry              Dialysis access: Right IJ non tunneled HD catheter being used for CRRT    LABS/STUDIES  --------------------------------------------------------------------------------              7.7    17.18 >-----------<  480      [04-09-23 @ 02:30]              23.4     137  |  99  |  18  ----------------------------<  127      [04-09-23 @ 02:30]  4.1   |  16  |  1.10        Ca     8.3     [04-09-23 @ 02:30]      Mg     2.60     [04-09-23 @ 02:30]      Phos  4.0     [04-09-23 @ 02:30]    Creatinine Trend:  SCr 1.10 [04-09 @ 02:30]  SCr 1.89 [04-08 @ 00:45]  SCr 3.02 [04-07 @ 13:45]  SCr 2.62 [04-07 @ 01:46]  SCr 1.73 [04-06 @ 11:50]    Urinalysis - [03-24-23 @ 05:30]      Color Yellow / Appearance Clear / SG 1.020 / pH 5.0      Gluc 250 / Ketone Trace  / Bili Negative / Urobili Negative       Blood Moderate / Protein 100 / Leuk Est Trace / Nitrite Negative      RBC 0-2 / WBC 6-10 / Hyaline  / Gran  / Sq Epi  / Non Sq Epi Moderate / Bacteria Many    HBsAg Nonreact      [03-27-23 @ 00:35]  HCV 0.05, Nonreact      [03-27-23 @ 00:35]  HIV Nonreact      [03-30-23 @ 20:10]

## 2023-04-10 NOTE — PROGRESS NOTE ADULT - SUBJECTIVE AND OBJECTIVE BOX
TEAM [ ** ] Surgery Daily Progress Note  =====================================================    SUBJECTIVE: Patient seen and examined at bedside on AM rounds. Patient reports that they're feeling well. NPO/Tolerating diet, denies nausea, vomiting.    Flatus/    BM. OOB/Amublating as tolerated. Denies fever, chills.    PMH:   ***    ALLERGIES:  No Known Allergies      --------------------------------------------------------------------------------------    MEDICATIONS:    Neurologic Medications  acetaminophen   Oral Liquid .. 500 milliGRAM(s) Oral every 6 hours PRN Mild Pain (1 - 3)  HYDROmorphone  Injectable 0.5 milliGRAM(s) IV Push every 4 hours PRN Moderate Pain (4 - 6)  HYDROmorphone  Injectable 1 milliGRAM(s) IV Push every 4 hours PRN Severe Pain (7 - 10)  metoclopramide Injectable 10 milliGRAM(s) IV Push every 8 hours    Respiratory Medications    Cardiovascular Medications  norepinephrine Infusion 0.05 MICROgram(s)/kG/Min IV Continuous <Continuous>    Gastrointestinal Medications  pantoprazole  Injectable 40 milliGRAM(s) IV Push daily  polyethylene glycol 3350 17 Gram(s) Oral at bedtime    Genitourinary Medications    Hematologic/Oncologic Medications  fondaparinux Injectable 1.5 milliGRAM(s) SubCutaneous daily    Antimicrobial/Immunologic Medications    Endocrine/Metabolic Medications  dextrose 50% Injectable 25 Gram(s) IV Push once  dextrose 50% Injectable 12.5 Gram(s) IV Push once  insulin glargine Injectable (LANTUS) 9 Unit(s) SubCutaneous at bedtime  insulin lispro (ADMELOG) corrective regimen sliding scale   SubCutaneous every 6 hours  insulin lispro Injectable (ADMELOG) 2 Unit(s) SubCutaneous every 6 hours  levothyroxine Injectable 20 MICROGram(s) IV Push at bedtime  methylPREDNISolone sodium succinate Injectable 10 milliGRAM(s) IV Push every 6 hours    Topical/Other Medications  chlorhexidine 2% Cloths 1 Application(s) Topical daily  CRRT Treatment    <Continuous>  petrolatum Ophthalmic Ointment 1 Application(s) Both EYES two times a day  Phoxillum Filtration BK 4 / 2.5 5000 milliLiter(s) CRRT <Continuous>  PrismaSOL Filtration BGK 0 / 2.5 5000 milliLiter(s) CRRT <Continuous>  PrismaSOL Filtration BGK 4 / 2.5 5000 milliLiter(s) CRRT <Continuous>    --------------------------------------------------------------------------------------    VITAL SIGNS:  T(C): 36.4 (04-10-23 @ 08:00), Max: 36.9 (04-09-23 @ 20:00)  HR: 115 (04-10-23 @ 11:00) (93 - 118)  BP: --  RR: 44 (04-10-23 @ 11:00) (18 - 49)  SpO2: 100% (04-10-23 @ 11:00) (98% - 100%)  --------------------------------------------------------------------------------------    EXAM    General: NAD, resting in bed comfortably.  Cardiac: regular rate, warm and well perfused  Respiratory: Nonlabored respirations, normal cw expansion.  Abdomen: soft, nontender, nondistended. ___ incision is c/d/i, ostomy, NGT, viera.   Extremities: normal strength, FROM, no deformities    --------------------------------------------------------------------------------------    LABS    --------------------------------------------------------------------------------------    INS AND OUTS:    04-09-23 @ 07:01  -  04-10-23 @ 07:00  --------------------------------------------------------  IN: 957.7 mL / OUT: 1131 mL / NET: -173.3 mL    04-10-23 @ 07:01  -  04-10-23 @ 13:23  --------------------------------------------------------  IN: 94.2 mL / OUT: 0 mL / NET: 94.2 mL      -------------------------------------------------------------------------------------- TEAM [ B ] Surgery Daily Progress Note  =====================================================    SUBJECTIVE: Patient seen and examined at bedside on AM rounds. Patient reports that they're feeling well. tolerating tube feeds    ALLERGIES:  No Known Allergies      --------------------------------------------------------------------------------------    MEDICATIONS:    Neurologic Medications  acetaminophen   Oral Liquid .. 500 milliGRAM(s) Oral every 6 hours PRN Mild Pain (1 - 3)  HYDROmorphone  Injectable 0.5 milliGRAM(s) IV Push every 4 hours PRN Moderate Pain (4 - 6)  HYDROmorphone  Injectable 1 milliGRAM(s) IV Push every 4 hours PRN Severe Pain (7 - 10)  metoclopramide Injectable 10 milliGRAM(s) IV Push every 8 hours    Cardiovascular Medications  norepinephrine Infusion 0.05 MICROgram(s)/kG/Min IV Continuous <Continuous>    Gastrointestinal Medications  pantoprazole  Injectable 40 milliGRAM(s) IV Push daily  polyethylene glycol 3350 17 Gram(s) Oral at bedtime    Hematologic/Oncologic Medications  fondaparinux Injectable 1.5 milliGRAM(s) SubCutaneous daily    Antimicrobial/Immunologic Medications    Endocrine/Metabolic Medications  dextrose 50% Injectable 25 Gram(s) IV Push once  dextrose 50% Injectable 12.5 Gram(s) IV Push once  insulin glargine Injectable (LANTUS) 9 Unit(s) SubCutaneous at bedtime  insulin lispro (ADMELOG) corrective regimen sliding scale   SubCutaneous every 6 hours  insulin lispro Injectable (ADMELOG) 2 Unit(s) SubCutaneous every 6 hours  levothyroxine Injectable 20 MICROGram(s) IV Push at bedtime  methylPREDNISolone sodium succinate Injectable 10 milliGRAM(s) IV Push every 6 hours    Topical/Other Medications  chlorhexidine 2% Cloths 1 Application(s) Topical daily  CRRT Treatment    <Continuous>  petrolatum Ophthalmic Ointment 1 Application(s) Both EYES two times a day  Phoxillum Filtration BK 4 / 2.5 5000 milliLiter(s) CRRT <Continuous>  PrismaSOL Filtration BGK 0 / 2.5 5000 milliLiter(s) CRRT <Continuous>  PrismaSOL Filtration BGK 4 / 2.5 5000 milliLiter(s) CRRT <Continuous>    --------------------------------------------------------------------------------------    VITAL SIGNS:  T(C): 36.4 (04-10-23 @ 08:00), Max: 36.9 (04-09-23 @ 20:00)  HR: 115 (04-10-23 @ 11:00) (93 - 118)  BP: --  RR: 44 (04-10-23 @ 11:00) (18 - 49)  SpO2: 100% (04-10-23 @ 11:00) (98% - 100%)  --------------------------------------------------------------------------------------    EXAM    General: NAD, resting in bed comfortably.  Cardiac: regular rate, warm and well perfused  Respiratory: Nonlabored respirations, normal cw expansion.  Abdomen: soft, nontender, nondistended, ngt with tube feeds  Extremities: normal strength, FROM, no deformities    --------------------------------------------------------------------------------------      INS AND OUTS:    04-09-23 @ 07:01  -  04-10-23 @ 07:00  --------------------------------------------------------  IN: 957.7 mL / OUT: 1131 mL / NET: -173.3 mL    04-10-23 @ 07:01  -  04-10-23 @ 13:23  --------------------------------------------------------  IN: 94.2 mL / OUT: 0 mL / NET: 94.2 mL      --------------------------------------------------------------------------------------

## 2023-04-10 NOTE — PROGRESS NOTE ADULT - ASSESSMENT
48 year old female with PMHx significant for HTN, hypothyroidism,  30 years ago, breast cancer s/p left mastectomy and chemo in  presenting with epigastric pain and LUQ pain. Found to have necrotizing pancreatitis. Transferred from OSH ([place]) for hypotension and tachypnea requiring emergent intubation and multiple pressors. SICU consulted for hemodynamic monitoring and respiratory management.    PLAN  Neuro:  No acute neurological issues   - Wean precedex as tolerated  - Dilaudid prn    Resp:  AHRF w/ b/l pleff 2/2 necrotizing pancreatitis   - extubated 4/8AM, breathing comfortably on RA    CV:  Mixed shock state 2/2 septic shock w/ E. Coli in urine vs hypovolemic shock now s/p 11L resus    - wean levo as tolerated     GI:   - CT 3/27: >50% of the pancreatic head extends into the mesentery and inferiorly along the retroperitoneum into the lower abdomen  - RUQ US: Chololithiasis (2cm stone) w/ no acute frannie   - MRCP 4/3: no choledocholithiasis. Hold tylenol.  - TF (Nepro) to goal, at increments of 25   - d/c post pyloric ngt, clogged therefore d/c'd, regular NGT placed  - Keep ngt to LCWS  - GI ppx w/ protonix   - Reglan  - Dolcolax    Renal:  ARF 2/2 shock state resulting in ATN requiring CRRT    - On CRRT keep net even, plan to start intermittent hemodialysis when tolerated   - Viera catheter in place, oliguric      Heme:   - CBC q12   - received 1U PRBC   - SCDs and CRRT dosed fondaparinux for DVT ppx  - HIT Ab positive; DOUGLAS Negative   - f/u anti Xa level    #Splenic Vein Thrombosis  3/22 CT Abd w/ contrast: Focal filling defect in the splenic vein at the level of the body of   the pancreas for which an underlying thrombus is suspected     ID:   Intermittently febrile w/ leukocytosis c/f septic shock 2/2 E coli UTI  Diagnostics:  - 3/22 Ucx: E coli  - 3/30 Bcx: staph hominis in aerobic bottle x 1 (likely contaminant)   - Meropenem (3/26- )  -Tuesday CT for abscess visualization     Endo:   - Lantus 9 units, admelog 2 units q6  - c/w synthroid 20 IV (home med)   - ISS    Code Status: Full code  Lines: KELLY Mendoza CVMARNI, A line, viera     Disposition: SICU  [id number] 48 year old female with PMHx significant for HTN, hypothyroidism,  30 years ago, breast cancer s/p left mastectomy and chemo in  presenting with epigastric pain and LUQ pain. Found to have necrotizing pancreatitis. Transferred from OSH ([place]) for hypotension and tachypnea requiring emergent intubation and multiple pressors. SICU consulted for hemodynamic monitoring and respiratory management.    PLAN  Neuro:  No acute neurological issues   - Wean precedex as tolerated  - Dilaudid prn. APAP PRN q6, 500 mg     Resp:  AHRF w/ b/l pleff 2/2 necrotizing pancreatitis   - extubated 4/8AM, breathing comfortably on RA    CV:  Mixed shock state 2/2 septic shock w/ E. Coli in urine vs hypovolemic shock now s/p 11L resus    - wean levo as tolerated     GI:   - CT 3/27: >50% of the pancreatic head extends into the mesentery and inferiorly along the retroperitoneum into the lower abdomen  - RUQ US: Chololithiasis (2cm stone) w/ no acute frannie   - MRCP 4/3: no choledocholithiasis. Hold tylenol.  - TF (Nepro) to goal, at increments of 25   - d/c post pyloric ngt, clogged therefore d/c'd, regular NGT placed  - Keep ngt to LCWS  - GI ppx w/ protonix   - Reglan  - Dolcolax    Renal:  ARF 2/2 shock state resulting in ATN requiring CRRT    - On CRRT keep net even, plan to start intermittent hemodialysis when tolerated   - Viera catheter removal   - I/Os    Heme:   - CBC q12   - received 1U PRBC   - SCDs and CRRT dosed fondaparinux for DVT ppx  - HIT Ab positive; DOUGLAS Negative   - f/u anti Xa level    #Splenic Vein Thrombosis  3/22 CT Abd w/ contrast: Focal filling defect in the splenic vein at the level of the body of   the pancreas for which an underlying thrombus is suspected     ID:   Intermittently febrile w/ leukocytosis c/f septic shock 2/2 E coli UTI  Diagnostics:  - 3/22 Ucx: E coli  - 3/30 Bcx: staph hominis in aerobic bottle x 1 (likely contaminant)   - Meropenem (3/26- )  -Tuesday CT for abscess visualization     Endo:   - Lantus 9 units, admelog 2 units q6  - c/w synthroid 20 IV (home med)   - ISS    Code Status: Full code  Lines: KELLY Mendoza, A line, viera     Disposition: SICU  [id number] 48 year old female with PMHx significant for HTN, hypothyroidism,  30 years ago, breast cancer s/p left mastectomy and chemo in  presenting with epigastric pain and LUQ pain. Found to have necrotizing pancreatitis. Transferred from OSH ([place]) for hypotension and tachypnea requiring emergent intubation and multiple pressors. SICU consulted for hemodynamic monitoring and respiratory management.    PLAN  Neuro:  No acute neurological issues   - Wean precedex as tolerated  - Dilaudid prn. APAP PRN q6, 500 mg     Resp:  AHRF w/ b/l pleff 2/2 necrotizing pancreatitis   - extubated 4/8AM, breathing comfortably on RA    CV:  Mixed shock state 2/2 septic shock w/ E. Coli in urine vs hypovolemic shock now s/p 11L resus    - wean levo as tolerated   - solu-medrol 10 q 12, continue to wean     GI:   - CT 3/27: >50% of the pancreatic head extends into the mesentery and inferiorly along the retroperitoneum into the lower abdomen  - RUQ US: Chololithiasis (2cm stone) w/ no acute frannie   - MRCP 4/3: no choledocholithiasis. Hold tylenol.  - TF (Nepro) to goal, at increments of 25   - d/c post pyloric ngt, clogged therefore d/c'd, regular NGT placed  - Keep ngt to LCWS  - GI ppx w/ protonix   - Reglan  - Dolcolax    Renal:  ARF 2/2 shock state resulting in ATN requiring CRRT    - On CRRT keep net even, plan to start intermittent hemodialysis when tolerated   - Viera catheter removal   - I/Os    Heme:   - CBC q12   - received 1U PRBC   - SCDs and CRRT dosed fondaparinux for DVT ppx  - HIT Ab positive; DOUGLAS Negative   - f/u anti Xa level    #Splenic Vein Thrombosis  3/22 CT Abd w/ contrast: Focal filling defect in the splenic vein at the level of the body of   the pancreas for which an underlying thrombus is suspected     ID:   Intermittently febrile w/ leukocytosis c/f septic shock 2/2 E coli UTI  Diagnostics:  - 3/22 Ucx: E coli  - 3/30 Bcx: staph hominis in aerobic bottle x 1 (likely contaminant)   - Meropenem (3/26- )  -Tuesday CT for abscess visualization     Endo:   - Lantus 9 units, admelog 2 units q6  - c/w synthroid 20 IV (home med)   - ISS    Code Status: Full code  Lines: KELLY Mendoza, A line, viera     Disposition: SICU  [id number]

## 2023-04-10 NOTE — PROGRESS NOTE ADULT - ATTENDING COMMENTS
I agree with the detailed interval history, physical, and plan, which I have reviewed and edited where appropriate'; also agree with notes/assessment with my team on service.  I have personally examined the patient.  I was physically present for the key portions of the evaluation and management (E/M) service provided.  I reviewed all the pertinent data.  The patient is a critical care patient with life threatening hemodynamic and metabolic instability in SICU.  The SICU team has a constant risk benefit analyzes discussion and coordinating care with the primary team and all consultants.   The patient is in SICU with the chief complaint and diagnosis mentioned in the note.   The plan will be specified in the note.  48 year old female with necrotizing pancreatitis in SICU for hemodynamic monitoring and respiratory management.  Awake  Lung coarse bs  Heart RR  ABD  soft  PLAN  Neuro:  - Wean precedex   - Dilaudid   Resp:  - RA  CV:  - wean levo as tolerated   GI:   - Keep ngt to LCWS  - GI ppx w/ protonix   Renal:  - On CRRT keep net even  Heme:   - CBC q12   ID:   - Meropenem   Endo:   - Lantus 9 units, admelog 2 units q6  - c/w synthroid   Code Status: Full code

## 2023-04-10 NOTE — PROGRESS NOTE ADULT - ASSESSMENT
Patient is a 48 year old female with a PMHx of HTN, hypothyroidism,  (30 years ago) and breast cancer (S/P left mastectomy and chemo in ) who presented with epigastric pain and left upper quadrant pain.  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  Transferred from OSH ([place]) for hypotension and tachypnea requiring emergent intubation and multiple pressors.  SICU consulted for hemodynamic monitoring and respiratory management.    Plan:   - NPO with tube feeds, ok to change to PO diet   - recommend repeat CT in the next week to reassess collections  - back on CVVH  - continue with IV Meropenem  - appreciate care per SICU      #[id number]  B Team Surgery

## 2023-04-10 NOTE — PROGRESS NOTE ADULT - SUBJECTIVE AND OBJECTIVE BOX
24 HOUR EVENTS:  - CVVH net even  - back on levo 0.06  - tachypneic overnight  - resumed tf via ngt      NEURO  RASS (if intubated): -1		[facility] ICU (if concern for delirium):  Meds: dexMEDEtomidine Infusion 0.1 MICROgram(s)/kG/Hr IV Continuous <Continuous>  HYDROmorphone  Injectable 0.5 milliGRAM(s) IV Push every 4 hours PRN Moderate Pain (4 - 6)  HYDROmorphone  Injectable 1 milliGRAM(s) IV Push every 4 hours PRN Severe Pain (7 - 10)  metoclopramide Injectable 10 milliGRAM(s) IV Push every 8 hours      RESPIRATORY  RR: 44 (04-10-23 @ 00:00) (18 - 49)  SpO2: 100% (04-10-23 @ 00:00) (99% - 100%)  Wt(kg): --  Exam: no increased WOB  Mechanical Ventilation:   ABG - ( 09 Apr 2023 02:30 )  pH: 7.38  /  pCO2: 29    /  pO2: 100   / HCO3: 17    / Base Excess: -6.6  /  SaO2: 98.3    Lactate: x                Meds:     CARDIOVASCULAR  HR: 110 (04-10-23 @ 00:00) (91 - 128)  BP: --  BP(mean): --  ABP: 96/47 (04-10-23 @ 00:00) (81/42 - 107/55)  ABP(mean): 67 (04-10-23 @ 00:00) (59 - 76)  Wt(kg): --  CVP(cm H2O): --      Exam: appears well perfused  Cardiac Rhythm: sinus  Perfusion     [ ]Adequate   [X ]Inadequate  Mentation   [X ]Normal       [ ]Reduced  Extremities  [ X]Warm         [ ]Cool  Volume Status [ ]Hypervolemic [ X]Euvolemic [ ]Hypovolemic  Meds: norepinephrine Infusion 0.05 MICROgram(s)/kG/Min IV Continuous <Continuous>      GI/NUTRITION  Exam: soft nondistended  Diet: regular  Meds: pantoprazole  Injectable 40 milliGRAM(s) IV Push daily  polyethylene glycol 3350 17 Gram(s) Oral at bedtime      GENITOURINARY  I&O's Detail    04-08 @ 07:01  -  04-09 @ 07:00  --------------------------------------------------------  IN:    Dexmedetomidine: 414.9 mL    IV PiggyBack: 100 mL    Nepro with Carb Steady: 175 mL    Norepinephrine: 154 mL  Total IN: 843.9 mL    OUT:    Indwelling Catheter - Urethral (mL): 10 mL    Nasogastric/Oral tube (mL): 300 mL    Other (mL): 3158 mL  Total OUT: 3468 mL    Total NET: -2624.1 mL      04-09 @ 07:01  -  04-10 @ 00:32  --------------------------------------------------------  IN:    Dexmedetomidine: 194.4 mL    Enteral Tube Flush: 120 mL    Nepro with Carb Steady: 80 mL    Norepinephrine: 204.7 mL  Total IN: 599.1 mL    OUT:    Indwelling Catheter - Urethral (mL): 5 mL    Other (mL): 776 mL  Total OUT: 781 mL    Total NET: -181.9 mL          04-09    137  |  99  |  18  ----------------------------<  127<H>  4.1   |  16<L>  |  1.10    Ca    8.3<L>      09 Apr 2023 02:30  Phos  4.0     04-09  Mg     2.60     04-09    TPro  6.8  /  Alb  2.5<L>  /  TBili  8.5<H>  /  DBili  x   /  AST  118<H>  /  ALT  116<H>  /  AlkPhos  146<H>  04-09    Meds:     HEMATOLOGIC  Meds: fondaparinux Injectable 1.5 milliGRAM(s) SubCutaneous daily                          7.7    17.18 )-----------( 480      ( 09 Apr 2023 02:30 )             23.4     PT/INR - ( 09 Apr 2023 02:30 )   PT: 24.0 sec;   INR: 2.05 ratio         PTT - ( 09 Apr 2023 02:30 )  PTT:31.3 sec    INFECTIOUS DISEASES  T(C): 36.9 (04-10-23 @ 00:00), Max: 37.2 (04-09-23 @ 04:00)  Wt(kg): --  WBC Count: 17.18 K/uL (04-09 @ 02:30)    Recent Cultures:    Meds: meropenem  IVPB 1000 milliGRAM(s) IV Intermittent every 12 hours      ENDOCRINE  Capillary Blood Glucose    Meds: dextrose 50% Injectable 25 Gram(s) IV Push once  dextrose 50% Injectable 12.5 Gram(s) IV Push once  insulin glargine Injectable (LANTUS) 9 Unit(s) SubCutaneous at bedtime  insulin lispro (ADMELOG) corrective regimen sliding scale   SubCutaneous every 6 hours  insulin lispro Injectable (ADMELOG) 2 Unit(s) SubCutaneous every 6 hours  levothyroxine Injectable 20 MICROGram(s) IV Push at bedtime  methylPREDNISolone sodium succinate Injectable 10 milliGRAM(s) IV Push every 6 hours      ACCESS DEVICES:  [X ] Peripheral IV  [X ] Central Venous Line		[X ] R	[X ] L	[ X] IJ	[ ] Fem	[ ] SC	Placed:   [X ] Arterial Line			[ ] R	[ ] L	[ ] Fem	[ ] Rad	[ ] Ax	Placed:   [ ] PICC:					[ ] Mediport  [X ] Urinary Catheter, Date Placed:   [ ] Necessity of urinary, arterial, and venous catheters discussed    OTHER MEDICATIONS:  chlorhexidine 2% Cloths 1 Application(s) Topical daily  CRRT Treatment    <Continuous>  petrolatum Ophthalmic Ointment 1 Application(s) Both EYES two times a day  Phoxillum Filtration BK 4 / 2.5 5000 milliLiter(s) CRRT <Continuous>  PrismaSOL Filtration BGK 0 / 2.5 5000 milliLiter(s) CRRT <Continuous>  PrismaSOL Filtration BGK 4 / 2.5 5000 milliLiter(s) CRRT <Continuous>      IMAGING:

## 2023-04-10 NOTE — PROGRESS NOTE ADULT - ATTENDING COMMENTS
Pt. with oliguric CHATO, currently on CRRT/CVVHDF. Pt. tolerating CRRT/CVVHDF during rounds. BP being maintained on vasopressors. HD catheter functioning well. Monitor labs and urine output. Avoid any potential nephrotoxins. Dose medications as per eGFR/CRRT.

## 2023-04-10 NOTE — PROGRESS NOTE ADULT - ATTENDING COMMENTS
necrotizing and hemorrhagic gallstone pancreatitis, jaundice but not due to CBD obstruction from stones  malnutrition  HIT    -repeat cross-sectional imaging when feasible to assess for drainable fluid collections/debridement   -care coordinated with SICU team

## 2023-04-10 NOTE — PROGRESS NOTE ADULT - ATTENDING COMMENTS
I agree with the detailed interval history, physical, and plan, which I have reviewed and edited where appropriate'; also agree with notes/assessment with my team on service.  I have personally examined the patient.  I was physically present for the key portions of the evaluation and management (E/M) service provided.  I reviewed all the pertinent data.  The patient is a critical care patient with life threatening hemodynamic and metabolic instability in SICU.  The SICU team has a constant risk benefit analyzes discussion and coordinating care with the primary team and all consultants.   The patient is in SICU with the chief complaint and diagnosis mentioned in the note.   The plan will be specified in the note.  48 year old female with necrotizing pancreatitis in SICU consulted for hemodynamic monitoring.  Arousable  LUNG coarse bs  Heart RR  Abd softly dist  PLAN  Neuro:  No acute neurological issues   - Wean precedex as tolerated  - Dilaudid prn  Resp:  RA  CV:  - wean levo as tolerated   GI:   - GI ppx w/ protonix   Renal:  - CRRT keep net even,   Heme:   - CBC q12   - fondaparinux   ID:   - Meropenem   Endo:   - Lantus 9 units, admelog 2 units q6    Code Status: Full code

## 2023-04-10 NOTE — PROGRESS NOTE ADULT - PROBLEM SELECTOR PLAN 1
Pt. with oliguric CHATO in the setting of necrotizing pancreatitis and shock. Pt. with most likely ATN. Scr was 1.24 on 3/23/23 and increased to 4.60 on 3/24/23. Pt. was initiated on CRRT/CVVHDF on 3/24/23 and discontinued overnight (4/6/23) by SICU team. Pt. remained oliguric and was restarted on CVVHDH on 4/7/23. Labs reviewed. Pt. currently tolerating CRRT. BP being maintained on vasopressors. HD catheter functioning without any clotting issues overnight. Plan is to continue CRRT today as per discussion with SICU team. Monitor labs and urine output. Avoid any potential nephrotoxins. Dose medications as per eGFR.    If you have any questions, please feel free to contact me  Neville Schulz  Nephrology Fellow  [phone]; Prefer [employer] TEAMS  (After 5pm or on weekends please page the on-call fellow).

## 2023-04-10 NOTE — PROGRESS NOTE ADULT - SUBJECTIVE AND OBJECTIVE BOX
[facility] Division of Kidney Diseases & Hypertension  FOLLOW UP NOTE  [phone]--------------------------------------------------------------------------------    Chief Complaint: Oliguric CHATO, on RRT    24 hour events/subjective: Pt. seen and examined in the SICU today. Pt. awake and coversive. BP maintained on IV vasopressors. Pt restarted on CVVHDF/CRRT on 4/7/23.  Pt. remains oliguric.         PAST HISTORY  --------------------------------------------------------------------------------  No significant changes to PMH, PSH, FHx, SHx, unless otherwise noted    ALLERGIES & MEDICATIONS  --------------------------------------------------------------------------------  Allergies    No Known Allergies    Intolerances      Standing Inpatient Medications  chlorhexidine 2% Cloths 1 Application(s) Topical daily  CRRT Treatment    <Continuous>  dextrose 50% Injectable 25 Gram(s) IV Push once  dextrose 50% Injectable 12.5 Gram(s) IV Push once  fondaparinux Injectable 1.5 milliGRAM(s) SubCutaneous daily  insulin glargine Injectable (LANTUS) 9 Unit(s) SubCutaneous at bedtime  insulin lispro (ADMELOG) corrective regimen sliding scale   SubCutaneous every 6 hours  insulin lispro Injectable (ADMELOG) 2 Unit(s) SubCutaneous every 6 hours  levothyroxine Injectable 20 MICROGram(s) IV Push at bedtime  methylPREDNISolone sodium succinate Injectable 10 milliGRAM(s) IV Push every 6 hours  metoclopramide Injectable 10 milliGRAM(s) IV Push every 8 hours  norepinephrine Infusion 0.05 MICROgram(s)/kG/Min IV Continuous <Continuous>  pantoprazole  Injectable 40 milliGRAM(s) IV Push daily  petrolatum Ophthalmic Ointment 1 Application(s) Both EYES two times a day  Phoxillum Filtration BK 4 / 2.5 5000 milliLiter(s) CRRT <Continuous>  polyethylene glycol 3350 17 Gram(s) Oral at bedtime  PrismaSOL Filtration BGK 0 / 2.5 5000 milliLiter(s) CRRT <Continuous>  PrismaSOL Filtration BGK 4 / 2.5 5000 milliLiter(s) CRRT <Continuous>    PRN Inpatient Medications  HYDROmorphone  Injectable 0.5 milliGRAM(s) IV Push every 4 hours PRN  HYDROmorphone  Injectable 1 milliGRAM(s) IV Push every 4 hours PRN      REVIEW OF SYSTEMS  --------------------------------------------------------------------------------  Head/Eyes/Ears/Mouth: No headache; Normal hearing; Normal vision w/o blurriness; complains of NGT   Respiratory: No dyspnea, cough, wheezing, hemoptysis  CV: No chest pain, PND, orthopnea  GI: No abdominal pain, diarrhea,nausea, vomiting + constipated   : Babin catheter  MSK: No joint pain/swelling; no back pain; +edema  Neuro: No dizziness/lightheadedness, weakness, seizures, numbness, tingling      All other systems were reviewed and are negative, except as noted.    VITALS/PHYSICAL EXAM  --------------------------------------------------------------------------------  T(C): 36.4 (04-10-23 @ 08:00), Max: 36.9 (04-09-23 @ 20:00)  HR: 115 (04-10-23 @ 09:00) (91 - 115)  BP: --  RR: 49 (04-10-23 @ 09:00) (18 - 49)  SpO2: 98% (04-10-23 @ 09:00) (98% - 100%)  Wt(kg): --        04-09-23 @ 07:01  -  04-10-23 @ 07:00  --------------------------------------------------------  IN: 957.7 mL / OUT: 1131 mL / NET: -173.3 mL    04-10-23 @ 07:01  -  04-10-23 @ 09:59  --------------------------------------------------------  IN: 94.2 mL / OUT: 0 mL / NET: 94.2 mL      Physical Exam:  	Gen: Ill appearing  	HEENT: NGT+, anicteric  	Pulm: Fair entry B/L  	CV: S1S2+  	Abd: Obese, distended  	Ext: +LE edema B/L, Trace B/L UE edema   	Neuro: Opens eyes to verbal stimuli              : Babin catheter+  	Skin: Warm and dry              Dialysis access: Right IJ non tunneled HD catheter being used for CRRT      LABS/STUDIES  --------------------------------------------------------------------------------              7.6    18.23 >-----------<  426      [04-10-23 @ 02:06]              23.0     136  |  99  |  13  ----------------------------<  98      [04-10-23 @ 02:06]  3.9   |  15  |  0.88        Ca     8.2     [04-10-23 @ 02:06]      Mg     2.60     [04-10-23 @ 02:06]      Phos  3.6     [04-10-23 @ 02:06]    TPro  6.6  /  Alb  2.5  /  TBili  7.3  /  DBili  x   /  AST  131  /  ALT  113  /  AlkPhos  153  [04-10-23 @ 02:06]    PT/INR: PT 30.7 , INR 2.62       [04-10-23 @ 02:06]  PTT: 34.8       [04-10-23 @ 02:06]      Creatinine Trend:  SCr 0.88 [04-10 @ 02:06]  SCr 1.10 [04-09 @ 02:30]  SCr 1.89 [04-08 @ 00:45]  SCr 3.02 [04-07 @ 13:45]  SCr 2.62 [04-07 @ 01:46]             [facility] Division of Kidney Diseases & Hypertension  FOLLOW UP NOTE  [phone]--------------------------------------------------------------------------------    Chief Complaint: Oliguric CHATO, on RRT    24 hour events/subjective: Pt. seen and examined in the SICU today. Pt. awake, denies SOB. Pt. says she wants her NGT removed. BP maintained on IV vasopressors. Pt restarted on CVVHDF/CRRT on 4/7/23.  Pt. remains oliguric.     PAST HISTORY  --------------------------------------------------------------------------------  No significant changes to PMH, PSH, FHx, SHx, unless otherwise noted    ALLERGIES & MEDICATIONS  --------------------------------------------------------------------------------  Allergies    No Known Allergies    Intolerances    Standing Inpatient Medications  chlorhexidine 2% Cloths 1 Application(s) Topical daily  CRRT Treatment    <Continuous>  dextrose 50% Injectable 25 Gram(s) IV Push once  dextrose 50% Injectable 12.5 Gram(s) IV Push once  fondaparinux Injectable 1.5 milliGRAM(s) SubCutaneous daily  insulin glargine Injectable (LANTUS) 9 Unit(s) SubCutaneous at bedtime  insulin lispro (ADMELOG) corrective regimen sliding scale   SubCutaneous every 6 hours  insulin lispro Injectable (ADMELOG) 2 Unit(s) SubCutaneous every 6 hours  levothyroxine Injectable 20 MICROGram(s) IV Push at bedtime  methylPREDNISolone sodium succinate Injectable 10 milliGRAM(s) IV Push every 6 hours  metoclopramide Injectable 10 milliGRAM(s) IV Push every 8 hours  norepinephrine Infusion 0.05 MICROgram(s)/kG/Min IV Continuous <Continuous>  pantoprazole  Injectable 40 milliGRAM(s) IV Push daily  petrolatum Ophthalmic Ointment 1 Application(s) Both EYES two times a day  Phoxillum Filtration BK 4 / 2.5 5000 milliLiter(s) CRRT <Continuous>  polyethylene glycol 3350 17 Gram(s) Oral at bedtime  PrismaSOL Filtration BGK 0 / 2.5 5000 milliLiter(s) CRRT <Continuous>  PrismaSOL Filtration BGK 4 / 2.5 5000 milliLiter(s) CRRT <Continuous>    PRN Inpatient Medications  HYDROmorphone  Injectable 0.5 milliGRAM(s) IV Push every 4 hours PRN  HYDROmorphone  Injectable 1 milliGRAM(s) IV Push every 4 hours PRN    REVIEW OF SYSTEMS  --------------------------------------------------------------------------------  Limited ROS obtained fro pt.     Respiratory: No dyspnea  CV: No chest pain  GI: +constipation  MSK: UE/LE swelling   Neuro: No dizziness    VITALS/PHYSICAL EXAM  --------------------------------------------------------------------------------  T(C): 36.4 (04-10-23 @ 08:00), Max: 36.9 (04-09-23 @ 20:00)  HR: 115 (04-10-23 @ 09:00) (91 - 115)  BP: --  RR: 49 (04-10-23 @ 09:00) (18 - 49)  SpO2: 98% (04-10-23 @ 09:00) (98% - 100%)  Wt(kg): --    04-09-23 @ 07:01  -  04-10-23 @ 07:00  --------------------------------------------------------  IN: 957.7 mL / OUT: 1131 mL / NET: -173.3 mL    04-10-23 @ 07:01  -  04-10-23 @ 09:59  --------------------------------------------------------  IN: 94.2 mL / OUT: 0 mL / NET: 94.2 mL    Physical Exam:  	Gen: Ill appearing  	HEENT: NGT+, anicteric  	Pulm: Fair entry B/L  	CV: S1S2+  	Abd: Obese, distended  	Ext: +LE edema B/L, Trace B/L UE edema   	Neuro: Awake              : Babin catheter+  	Skin: Warm and dry              Dialysis access: Right IJ non tunneled HD catheter being used for CRRT    LABS/STUDIES  --------------------------------------------------------------------------------              7.6    18.23 >-----------<  426      [04-10-23 @ 02:06]              23.0     136  |  99  |  13  ----------------------------<  98      [04-10-23 @ 02:06]  3.9   |  15  |  0.88        Ca     8.2     [04-10-23 @ 02:06]      Mg     2.60     [04-10-23 @ 02:06]      Phos  3.6     [04-10-23 @ 02:06]    TPro  6.6  /  Alb  2.5  /  TBili  7.3  /  DBili  x   /  AST  131  /  ALT  113  /  AlkPhos  153  [04-10-23 @ 02:06]    Creatinine Trend:  SCr 0.88 [04-10 @ 02:06]  SCr 1.10 [04-09 @ 02:30]  SCr 1.89 [04-08 @ 00:45]  SCr 3.02 [04-07 @ 13:45]  SCr 2.62 [04-07 @ 01:46]

## 2023-04-10 NOTE — PROGRESS NOTE ADULT - SUBJECTIVE AND OBJECTIVE BOX
24 HOUR EVENTS:  - CVVH net even  - back on levo 0.06  - tachypneic overnight  - resumed tf via ngt      NEURO  RASS (if intubated): -1		[facility] ICU (if concern for delirium):  Meds: dexMEDEtomidine Infusion 0.1 MICROgram(s)/kG/Hr IV Continuous <Continuous>  HYDROmorphone  Injectable 0.5 milliGRAM(s) IV Push every 4 hours PRN Moderate Pain (4 - 6)  HYDROmorphone  Injectable 1 milliGRAM(s) IV Push every 4 hours PRN Severe Pain (7 - 10)  metoclopramide Injectable 10 milliGRAM(s) IV Push every 8 hours      RESPIRATORY  RR: 44 (04-10-23 @ 00:00) (18 - 49)  SpO2: 100% (04-10-23 @ 00:00) (99% - 100%)  Wt(kg): --  Exam: no increased WOB  Mechanical Ventilation:   ABG - ( 09 Apr 2023 02:30 )  pH: 7.38  /  pCO2: 29    /  pO2: 100   / HCO3: 17    / Base Excess: -6.6  /  SaO2: 98.3    Lactate: x                Meds:     CARDIOVASCULAR  HR: 110 (04-10-23 @ 00:00) (91 - 128)  BP: --  BP(mean): --  ABP: 96/47 (04-10-23 @ 00:00) (81/42 - 107/55)  ABP(mean): 67 (04-10-23 @ 00:00) (59 - 76)  Wt(kg): --  CVP(cm H2O): --      Exam: appears well perfused  Cardiac Rhythm: sinus  Perfusion     [ ]Adequate   [X ]Inadequate  Mentation   [X ]Normal       [ ]Reduced  Extremities  [ X]Warm         [ ]Cool  Volume Status [ ]Hypervolemic [ X]Euvolemic [ ]Hypovolemic  Meds: norepinephrine Infusion 0.05 MICROgram(s)/kG/Min IV Continuous <Continuous>      GI/NUTRITION  Exam: soft nondistended  Diet: regular  Meds: pantoprazole  Injectable 40 milliGRAM(s) IV Push daily  polyethylene glycol 3350 17 Gram(s) Oral at bedtime      GENITOURINARY  I&O's Summary    09 Apr 2023 07:01  -  10 Apr 2023 07:00  --------------------------------------------------------  IN: 957.7 mL / OUT: 1131 mL / NET: -173.3 mL    10 Apr 2023 07:01  -  10 Apr 2023 10:22  --------------------------------------------------------  IN: 94.2 mL / OUT: 0 mL / NET: 94.2 mL      LABS:                        7.6    18.23 )-----------( 426      ( 10 Apr 2023 02:06 )             23.0     04-10    136  |  99  |  13  ----------------------------<  98  3.9   |  15<L>  |  0.88    Ca    8.2<L>      10 Apr 2023 02:06  Phos  3.6     04-10  Mg     2.60     04-10    TPro  6.6  /  Alb  2.5<L>  /  TBili  7.3<H>  /  DBili  x   /  AST  131<H>  /  ALT  113<H>  /  AlkPhos  153<H>  04-10    PT/INR - ( 10 Apr 2023 02:06 )   PT: 30.7 sec;   INR: 2.62 ratio         PTT - ( 10 Apr 2023 02:06 )  PTT:34.8 sec      Meds:     HEMATOLOGIC  Meds: fondaparinux Injectable 1.5 milliGRAM(s) SubCutaneous daily             INFECTIOUS DISEASES    LABS:                        7.6    18.23 )-----------( 426      ( 10 Apr 2023 02:06 )             23.0     04-10    136  |  99  |  13  ----------------------------<  98  3.9   |  15<L>  |  0.88    Ca    8.2<L>      10 Apr 2023 02:06  Phos  3.6     04-10  Mg     2.60     04-10    TPro  6.6  /  Alb  2.5<L>  /  TBili  7.3<H>  /  DBili  x   /  AST  131<H>  /  ALT  113<H>  /  AlkPhos  153<H>  04-10    PT/INR - ( 10 Apr 2023 02:06 )   PT: 30.7 sec;   INR: 2.62 ratio         PTT - ( 10 Apr 2023 02:06 )  PTT:34.8 sec    ICU Vital Signs Last 24 Hrs  T(C): 36.4 (10 Apr 2023 08:00), Max: 36.9 (09 Apr 2023 20:00)  T(F): 97.6 (10 Apr 2023 08:00), Max: 98.4 (09 Apr 2023 20:00)  HR: 116 (10 Apr 2023 10:00) (91 - 116)  BP: --  BP(mean): --  ABP: 98/50 (10 Apr 2023 10:00) (81/42 - 112/50)  ABP(mean): 69 (10 Apr 2023 10:00) (59 - 79)  RR: 38 (10 Apr 2023 10:00) (18 - 49)  SpO2: 99% (10 Apr 2023 10:00) (98% - 100%)    O2 Parameters below as of 10 Apr 2023 08:00  Patient On (Oxygen Delivery Method): room air    Recent Cultures:    Meds: meropenem  IVPB 1000 milliGRAM(s) IV Intermittent every 12 hours      ENDOCRINE  Capillary Blood Glucose    Meds: dextrose 50% Injectable 25 Gram(s) IV Push once  dextrose 50% Injectable 12.5 Gram(s) IV Push once  insulin glargine Injectable (LANTUS) 9 Unit(s) SubCutaneous at bedtime  insulin lispro (ADMELOG) corrective regimen sliding scale   SubCutaneous every 6 hours  insulin lispro Injectable (ADMELOG) 2 Unit(s) SubCutaneous every 6 hours  levothyroxine Injectable 20 MICROGram(s) IV Push at bedtime  methylPREDNISolone sodium succinate Injectable 10 milliGRAM(s) IV Push every 6 hours      ACCESS DEVICES:  [X ] Peripheral IV  [X ] Central Venous Line		[X ] R	[X ] L	[ X] IJ	[ ] Fem	[ ] SC	Placed:   [X ] Arterial Line			[ ] R	[ ] L	[ ] Fem	[ ] Rad	[ ] Ax	Placed:   [ ] PICC:					[ ] Mediport  [X ] Urinary Catheter, Date Placed:   [ ] Necessity of urinary, arterial, and venous catheters discussed    OTHER MEDICATIONS:  chlorhexidine 2% Cloths 1 Application(s) Topical daily  CRRT Treatment    <Continuous>  petrolatum Ophthalmic Ointment 1 Application(s) Both EYES two times a day  Phoxillum Filtration BK 4 / 2.5 5000 milliLiter(s) CRRT <Continuous>  PrismaSOL Filtration BGK 0 / 2.5 5000 milliLiter(s) CRRT <Continuous>  PrismaSOL Filtration BGK 4 / 2.5 5000 milliLiter(s) CRRT <Continuous>      IMAGING: 24 HOUR EVENTS:  - CVVH net even  - back on levo 0.06  - tachypneic overnight  - resumed tf via ngt      NEURO  RASS (if intubated): -1		CAM ICU (if concern for delirium):  Meds: dexMEDEtomidine Infusion 0.1 MICROgram(s)/kG/Hr IV Continuous <Continuous>  HYDROmorphone  Injectable 0.5 milliGRAM(s) IV Push every 4 hours PRN Moderate Pain (4 - 6)  HYDROmorphone  Injectable 1 milliGRAM(s) IV Push every 4 hours PRN Severe Pain (7 - 10)  metoclopramide Injectable 10 milliGRAM(s) IV Push every 8 hours      RESPIRATORY  RR: 44 (04-10-23 @ 00:00) (18 - 49)  SpO2: 100% (04-10-23 @ 00:00) (99% - 100%)  Wt(kg): --  Exam: no increased WOB  ABG - ( 09 Apr 2023 02:30 )  pH: 7.38  /  pCO2: 29    /  pO2: 100   / HCO3: 17    / Base Excess: -6.6  /  SaO2: 98.3    Lactate: x                Meds:     CARDIOVASCULAR  HR: 110 (04-10-23 @ 00:00) (91 - 128)  BP: --  BP(mean): --  ABP: 96/47 (04-10-23 @ 00:00) (81/42 - 107/55)  ABP(mean): 67 (04-10-23 @ 00:00) (59 - 76)  Wt(kg): --  CVP(cm H2O): --      Exam: appears well perfused  Cardiac Rhythm: sinus  Perfusion     [ ]Adequate   [X ]Inadequate  Mentation   [X ]Normal       [ ]Reduced  Extremities  [ X]Warm         [ ]Cool  Volume Status [ ]Hypervolemic [ X]Euvolemic [ ]Hypovolemic  Meds: norepinephrine Infusion 0.05 MICROgram(s)/kG/Min IV Continuous <Continuous>      GI/NUTRITION  Exam: soft nondistended  Diet: regular  Meds: pantoprazole  Injectable 40 milliGRAM(s) IV Push daily  polyethylene glycol 3350 17 Gram(s) Oral at bedtime      GENITOURINARY  I&O's Summary    09 Apr 2023 07:01  -  10 Apr 2023 07:00  --------------------------------------------------------  IN: 957.7 mL / OUT: 1131 mL / NET: -173.3 mL    10 Apr 2023 07:01  -  10 Apr 2023 10:22  --------------------------------------------------------  IN: 94.2 mL / OUT: 0 mL / NET: 94.2 mL      LABS:                        7.6    18.23 )-----------( 426      ( 10 Apr 2023 02:06 )             23.0     04-10    136  |  99  |  13  ----------------------------<  98  3.9   |  15<L>  |  0.88    Ca    8.2<L>      10 Apr 2023 02:06  Phos  3.6     04-10  Mg     2.60     04-10    TPro  6.6  /  Alb  2.5<L>  /  TBili  7.3<H>  /  DBili  x   /  AST  131<H>  /  ALT  113<H>  /  AlkPhos  153<H>  04-10    PT/INR - ( 10 Apr 2023 02:06 )   PT: 30.7 sec;   INR: 2.62 ratio         PTT - ( 10 Apr 2023 02:06 )  PTT:34.8 sec      Meds:     HEMATOLOGIC  Meds: fondaparinux Injectable 1.5 milliGRAM(s) SubCutaneous daily             INFECTIOUS DISEASES    LABS:                        7.6    18.23 )-----------( 426      ( 10 Apr 2023 02:06 )             23.0     04-10    136  |  99  |  13  ----------------------------<  98  3.9   |  15<L>  |  0.88    Ca    8.2<L>      10 Apr 2023 02:06  Phos  3.6     04-10  Mg     2.60     04-10    TPro  6.6  /  Alb  2.5<L>  /  TBili  7.3<H>  /  DBili  x   /  AST  131<H>  /  ALT  113<H>  /  AlkPhos  153<H>  04-10    PT/INR - ( 10 Apr 2023 02:06 )   PT: 30.7 sec;   INR: 2.62 ratio         PTT - ( 10 Apr 2023 02:06 )  PTT:34.8 sec    ICU Vital Signs Last 24 Hrs  T(C): 36.4 (10 Apr 2023 08:00), Max: 36.9 (09 Apr 2023 20:00)  T(F): 97.6 (10 Apr 2023 08:00), Max: 98.4 (09 Apr 2023 20:00)  HR: 116 (10 Apr 2023 10:00) (91 - 116)  BP: --  BP(mean): --  ABP: 98/50 (10 Apr 2023 10:00) (81/42 - 112/50)  ABP(mean): 69 (10 Apr 2023 10:00) (59 - 79)  RR: 38 (10 Apr 2023 10:00) (18 - 49)  SpO2: 99% (10 Apr 2023 10:00) (98% - 100%)    O2 Parameters below as of 10 Apr 2023 08:00  Patient On (Oxygen Delivery Method): room air    Recent Cultures:    Meds: meropenem  IVPB 1000 milliGRAM(s) IV Intermittent every 12 hours      ENDOCRINE  Capillary Blood Glucose    Meds: dextrose 50% Injectable 25 Gram(s) IV Push once  dextrose 50% Injectable 12.5 Gram(s) IV Push once  insulin glargine Injectable (LANTUS) 9 Unit(s) SubCutaneous at bedtime  insulin lispro (ADMELOG) corrective regimen sliding scale   SubCutaneous every 6 hours  insulin lispro Injectable (ADMELOG) 2 Unit(s) SubCutaneous every 6 hours  levothyroxine Injectable 20 MICROGram(s) IV Push at bedtime  methylPREDNISolone sodium succinate Injectable 10 milliGRAM(s) IV Push every 6 hours      ACCESS DEVICES:  [X ] Peripheral IV  [X ] Central Venous Line		[X ] R	[X ] L	[ X] IJ	[ ] Fem	[ ] SC	Placed:   [X ] Arterial Line			[ ] R	[ ] L	[ ] Fem	[ ] Rad	[ ] Ax	Placed:   [ ] PICC:					[ ] Mediport  [X ] Urinary Catheter, Date Placed:   [ ] Necessity of urinary, arterial, and venous catheters discussed    OTHER MEDICATIONS:  chlorhexidine 2% Cloths 1 Application(s) Topical daily  CRRT Treatment    <Continuous>  petrolatum Ophthalmic Ointment 1 Application(s) Both EYES two times a day  Phoxillum Filtration BK 4 / 2.5 5000 milliLiter(s) CRRT <Continuous>  PrismaSOL Filtration BGK 0 / 2.5 5000 milliLiter(s) CRRT <Continuous>  PrismaSOL Filtration BGK 4 / 2.5 5000 milliLiter(s) CRRT <Continuous>      IMAGING: 24 HOUR EVENTS:  - CVVH net even  - back on levo 0.06  - tachypneic overnight  - resumed tf via ngt      NEURO  RASS (if intubated): not intubated 	CAM ICU (if concern for delirium): negative   HYDROmorphone  Injectable 0.5 milliGRAM(s) IV Push every 4 hours PRN Moderate Pain (4 - 6)  HYDROmorphone  Injectable 1 milliGRAM(s) IV Push every 4 hours PRN Severe Pain (7 - 10)  metoclopramide Injectable 10 milliGRAM(s) IV Push every 8 hours      RESPIRATORY  RR: 44 (04-10-23 @ 00:00) (18 - 49)  SpO2: 100% (04-10-23 @ 00:00) (99% - 100%)  Wt(kg): --  Exam: no increased WOB  ABG - ( 09 Apr 2023 02:30 )  pH: 7.38  /  pCO2: 29    /  pO2: 100   / HCO3: 17    / Base Excess: -6.6  /  SaO2: 98.3    Lactate: x                Meds:     CARDIOVASCULAR  HR: 110 (04-10-23 @ 00:00) (91 - 128)  BP: --  BP(mean): --  ABP: 96/47 (04-10-23 @ 00:00) (81/42 - 107/55)  ABP(mean): 67 (04-10-23 @ 00:00) (59 - 76)  Wt(kg): --  CVP(cm H2O): --      Exam: appears well perfused  Cardiac Rhythm: sinus  Perfusion     [ ]Adequate   [X ]Inadequate  Mentation   [X ]Normal       [ ]Reduced  Extremities  [ X]Warm         [ ]Cool  Volume Status [ ]Hypervolemic [ X]Euvolemic [ ]Hypovolemic  Meds: norepinephrine Infusion 0.05 MICROgram(s)/kG/Min IV Continuous <Continuous>      GI/NUTRITION  Exam: soft distended, mildly tender to palpation   Diet: TF @25   Meds: pantoprazole  Injectable 40 milliGRAM(s) IV Push daily  polyethylene glycol 3350 17 Gram(s) Oral at bedtime      GENITOURINARY  I&O's Summary    09 Apr 2023 07:01  -  10 Apr 2023 07:00  --------------------------------------------------------  IN: 957.7 mL / OUT: 1131 mL / NET: -173.3 mL    10 Apr 2023 07:01  -  10 Apr 2023 10:22  --------------------------------------------------------  IN: 94.2 mL / OUT: 0 mL / NET: 94.2 mL      LABS:                        7.6    18.23 )-----------( 426      ( 10 Apr 2023 02:06 )             23.0     04-10    136  |  99  |  13  ----------------------------<  98  3.9   |  15<L>  |  0.88    Ca    8.2<L>      10 Apr 2023 02:06  Phos  3.6     04-10  Mg     2.60     04-10    TPro  6.6  /  Alb  2.5<L>  /  TBili  7.3<H>  /  DBili  x   /  AST  131<H>  /  ALT  113<H>  /  AlkPhos  153<H>  04-10    PT/INR - ( 10 Apr 2023 02:06 )   PT: 30.7 sec;   INR: 2.62 ratio         PTT - ( 10 Apr 2023 02:06 )  PTT:34.8 sec      Meds:     HEMATOLOGIC  Meds: fondaparinux Injectable 1.5 milliGRAM(s) SubCutaneous daily             INFECTIOUS DISEASES    LABS:                        7.6    18.23 )-----------( 426      ( 10 Apr 2023 02:06 )             23.0     04-10    136  |  99  |  13  ----------------------------<  98  3.9   |  15<L>  |  0.88    Ca    8.2<L>      10 Apr 2023 02:06  Phos  3.6     04-10  Mg     2.60     04-10    TPro  6.6  /  Alb  2.5<L>  /  TBili  7.3<H>  /  DBili  x   /  AST  131<H>  /  ALT  113<H>  /  AlkPhos  153<H>  04-10    PT/INR - ( 10 Apr 2023 02:06 )   PT: 30.7 sec;   INR: 2.62 ratio         PTT - ( 10 Apr 2023 02:06 )  PTT:34.8 sec    ICU Vital Signs Last 24 Hrs  T(C): 36.4 (10 Apr 2023 08:00), Max: 36.9 (09 Apr 2023 20:00)  T(F): 97.6 (10 Apr 2023 08:00), Max: 98.4 (09 Apr 2023 20:00)  HR: 116 (10 Apr 2023 10:00) (91 - 116)  BP: --  BP(mean): --  ABP: 98/50 (10 Apr 2023 10:00) (81/42 - 112/50)  ABP(mean): 69 (10 Apr 2023 10:00) (59 - 79)  RR: 38 (10 Apr 2023 10:00) (18 - 49)  SpO2: 99% (10 Apr 2023 10:00) (98% - 100%)    O2 Parameters below as of 10 Apr 2023 08:00  Patient On (Oxygen Delivery Method): room air    Recent Cultures:    Meds: meropenem  IVPB 1000 milliGRAM(s) IV Intermittent every 12 hours      ENDOCRINE  Capillary Blood Glucose    Meds: dextrose 50% Injectable 25 Gram(s) IV Push once  dextrose 50% Injectable 12.5 Gram(s) IV Push once  insulin glargine Injectable (LANTUS) 9 Unit(s) SubCutaneous at bedtime  insulin lispro (ADMELOG) corrective regimen sliding scale   SubCutaneous every 6 hours  insulin lispro Injectable (ADMELOG) 2 Unit(s) SubCutaneous every 6 hours  levothyroxine Injectable 20 MICROGram(s) IV Push at bedtime  methylPREDNISolone sodium succinate Injectable 10 milliGRAM(s) IV Push every 6 hours      ACCESS DEVICES:  [X ] Peripheral IV  [X ] Central Venous Line		[X ] R	[X ] L	[ X] IJ	[ ] Fem	[ ] SC	Placed:   [X ] Arterial Line			[ ] R	[ ] L	[ ] Fem	[ ] Rad	[ ] Ax	Placed:   [ ] PICC:					[ ] Mediport  [X ] Urinary Catheter, Date Placed:   [ ] Necessity of urinary, arterial, and venous catheters discussed    OTHER MEDICATIONS:  chlorhexidine 2% Cloths 1 Application(s) Topical daily  CRRT Treatment    <Continuous>  petrolatum Ophthalmic Ointment 1 Application(s) Both EYES two times a day  Phoxillum Filtration BK 4 / 2.5 5000 milliLiter(s) CRRT <Continuous>  PrismaSOL Filtration BGK 0 / 2.5 5000 milliLiter(s) CRRT <Continuous>  PrismaSOL Filtration BGK 4 / 2.5 5000 milliLiter(s) CRRT <Continuous>      IMAGING:

## 2023-04-10 NOTE — PROGRESS NOTE ADULT - ASSESSMENT
48 year old female with PMHx significant for HTN, hypothyroidism,  30 years ago, breast cancer s/p left mastectomy and chemo in  presenting with epigastric pain and LUQ pain. Found to have necrotizing pancreatitis. Transferred from OSH ([facility]) for hypotension and tachypnea requiring emergent intubation and multiple pressors. SICU consulted for hemodynamic monitoring and respiratory management.    PLAN  Neuro:  No acute neurological issues   - Wean precedex as tolerated  - Dilaudid prn    Resp:  AHRF w/ b/l pleff 2/2 necrotizing pancreatitis   - extubated 4/8AM, breathing comfortably on RA    CV:  Mixed shock state 2/2 septic shock w/ E. Coli in urine vs hypovolemic shock now s/p 11L resus    - wean levo as tolerated     GI:   - CT 3/27: >50% of the pancreatic head extends into the mesentery and inferiorly along the retroperitoneum into the lower abdomen  - RUQ US: Chololithiasis (2cm stone) w/ no acute frannie   - MRCP 4/3: no choledocholithiasis. Hold tylenol.  - TF (Nepro) at rate of 20cc/hr held  - d/c post pyloric ngt, clogged therefore d/c'd, regular NGT placed  - Keep ngt to LCWS  - GI ppx w/ protonix   - Reglan  - Dolcolax    Renal:  ARF 2/2 shock state resulting in ATN requiring CRRT    - On CRRT keep net even, plan to start intermittent hemodialysis when tolerated   - Viera catheter in place, oliguric      Heme:   - CBC q12   - received 1U PRBC   - SCDs and CRRT dosed fondaparinux for DVT ppx  - HIT Ab positive; DOUGLAS Negative   - f/u anti Xa level    #Splenic Vein Thrombosis  3/22 CT Abd w/ contrast: Focal filling defect in the splenic vein at the level of the body of   the pancreas for which an underlying thrombus is suspected     ID:   Intermittently febrile w/ leukocytosis c/f septic shock 2/2 E coli UTI  Diagnostics:  - 3/22 Ucx: E coli  - 3/30 Bcx: staph hominis in aerobic bottle x 1 (likely contaminant)   - Meropenem (3/26- )    Endo:   - Lantus 9 units, admelog 2 units q6  - c/w synthroid 20 IV (home med)   - ISS    Code Status: Full code  Lines: KELLY Mendoza, A line, viera     Disposition: SICU  [id number]

## 2023-04-10 NOTE — PROCEDURE NOTE - NSUSFINDINGS_ED_ALL
IVC 1.4 cm with no respiratory variation. Trace right pleural effusion. Moderate L pleural effusion. No b lines b/l./List any findings

## 2023-04-10 NOTE — CHART NOTE - NSCHARTNOTEFT_GEN_A_CORE
Patient being seen for malnutrition follow up. Spoke with SICU Team and obtained subjective information from extensive chart review.     Current Diet : Diet, NPO with Tube Feed:   Tube Feeding Modality: Nasogastric  Nepro with Carb Steady (NEPRORTH)  Total Volume for 24 Hours (mL): 600  Continuous  Starting Tube Feed Rate {mL per Hour}: 10  Increase Tube Feed Rate by (mL): 15     Every 4 hours  Until Goal Tube Feed Rate (mL per Hour): 25  Tube Feed Duration (in Hours): 24  Tube Feed Start Time: 04:00  No Carb Prosource (1pkg = 15gms Protein)     Qty per Day:  4 (04-03-23 @ 03:54)    TF Provides:  1080 kcals  49 gms protein  436 mL free H2O  600 mL total volume    Current Weight Trend: 118 kg (4/10), 120.9 kg (4/8), 121.4 kg (4/6), 124.5 kg (4/4), 146.6 kg (4/2), 132.1 kg (3/31)  Height (cm): 162.6   Admit Weight (kg): 117.9   BMI (kg/m2): 44.6   IBW (kg): 56.8    Nutrition Interval Events: Pt now extubated. Post pyloric tube replaced with NGT with TF again restarted. Nephrology continues to follow for oliguric CHATO requiring CRRT which confers a higher nutrition need. Pt also with a suspected DTI of top of nose which also elicits higher protein need. Recommend increasing rate of TF to goal of 38 mL/hr x 24 hrs which will offer 1641 kcals, 74 gms protein, 663 mL free H2O in 912 mL total volume. Please continue with No Carb Prosource x 4/day (60 gms protein) which, along with TF, will provide a daily protein amount of 134 gms. Enteral recommendation will give pt 14 kcals/kg and 1.2 gms protein/kg of admit wt. Please order a Nephro-Dennis to ensure complete micronutrient coverage and assist with micronutrients required for wound healing. Weight trend reflective of edema presently 3+ generalized but is concerning as current wt is similar to admit wt with less fluid accumulation identified. Strongly suggest increasing rate of TF to slow the decline of weight loss. FS over the past 24 hrs 93 - 136 mg/dl with Lantus and Admelog insulins ordered for coverage. RDN services to remain available as needed.     __________________ Pertinent Medications__________________   MEDICATIONS  (STANDING):  chlorhexidine 2% Cloths 1 Application(s) Topical daily  CRRT Treatment    <Continuous>  dextrose 50% Injectable 25 Gram(s) IV Push once  dextrose 50% Injectable 12.5 Gram(s) IV Push once  fondaparinux Injectable 1.5 milliGRAM(s) SubCutaneous daily  insulin glargine Injectable (LANTUS) 9 Unit(s) SubCutaneous at bedtime  insulin lispro (ADMELOG) corrective regimen sliding scale   SubCutaneous every 6 hours  insulin lispro Injectable (ADMELOG) 2 Unit(s) SubCutaneous every 6 hours  levothyroxine Injectable 20 MICROGram(s) IV Push at bedtime  metoclopramide Injectable 10 milliGRAM(s) IV Push every 8 hours  norepinephrine Infusion 0.05 MICROgram(s)/kG/Min (11.1 mL/Hr) IV Continuous <Continuous>  pantoprazole  Injectable 40 milliGRAM(s) IV Push daily  petrolatum Ophthalmic Ointment 1 Application(s) Both EYES two times a day  Phoxillum Filtration BK 4 / 2.5 5000 milliLiter(s) (2400 mL/Hr) CRRT <Continuous>  polyethylene glycol 3350 17 Gram(s) Oral at bedtime  PrismaSOL Filtration BGK 0 / 2.5 5000 milliLiter(s) (1200 mL/Hr) CRRT <Continuous>  PrismaSOL Filtration BGK 4 / 2.5 5000 milliLiter(s) (200 mL/Hr) CRRT <Continuous>    __________________ Pertinent Labs__________________   04-10 Na 136 mmol/L Glu 98 mg/dL K+ 3.9 mmol/L Cr 0.88 mg/dL BUN 13 mg/dL Phos 3.6 mg/dL  04-10 @ 11:39 POCT 101 mg/dL  04-10 @ 05:55 POCT 97 mg/dL  04-09 @ 23:02 POCT 99 mg/dL  04-09 @ 17:14 POCT 93 mg/dL      Skin: suspected DTI of top of nose    Estimated Needs:     1415 - 1886 kcals/day (12-16 kcals/kg of admit wt)  113 - 142 gms protein/day (2.0-2.5 gms protein/kg of IBW)      Previous Nutrition Diagnoses:     Severe Malnutrition   Increased Nutrient Needs     Nutrition Diagnoses are [X] ongoing       Goal(s):  1. Patient to meet > 75% estimated energy needs    Recommendations:   1. Nepro with Carb Steady @ goal rate of 38 mL/hr x 24 hrs with No Carb Prosource x 4/day.  2. Please order a Nephro-Dennis multivitamin to ensure complete micronutrient coverage.    Monitoring and Evaluation:   1. Monitor weights, labs, BMs, skin integrity, enteral tolerance and edema.  2. RD services to remain available.     Mar Fung, MS, RDN, CDN, CNSC on [facility] TEAMS or Pager #[phone].

## 2023-04-11 NOTE — PROGRESS NOTE ADULT - ATTENDING COMMENTS
I agree with the detailed interval history, physical, and plan, which I have reviewed and edited where appropriate'; also agree with notes/assessment with my team on service.  I have personally examined the patient.  I was physically present for the key portions of the evaluation and management (E/M) service provided.  I reviewed all the pertinent data.  The patient is a critical care patient with life threatening hemodynamic and metabolic instability in SICU.  The SICU team has a constant risk benefit analyzes discussion and coordinating care with the primary team and all consultants.   The patient is in SICU with the chief complaint and diagnosis mentioned in the note.   The plan will be specified in the note.  48 year old female with necrotizing pancreatitis in SICU for hemodynamic monitoring and respiratory management now extubated on CVVHD.  EXAM  NEUROLOGY  Exam: Delirious  RESPIRATORY  Exam: Lungs clear   CARDIOVASCULAR  Exam: Regular rate   GI/NUTRITION  Exam: Abdomen soft, tender,  PLAN  Neuro:  - Dilaudid   - Tylenol   Resp:  -Pulmonary toilet  CV:  - wean levo/vaso as tolerated   GI:   - Keep ngt to LCWS  - GI ppx w/ protonix   Renal:  - On CRRT keep net even,   Heme:   - fondaparinux    ID:   - Meropenem   Endo:   - Lantus 9 units, admelog 2 units q6    Disposition: SICU

## 2023-04-11 NOTE — PROGRESS NOTE ADULT - SUBJECTIVE AND OBJECTIVE BOX
SICU Daily Progress Note  =====================================================  INTERVAL EVENTS:  - Delirious, Haldol 1mg IVP, QTc 486  - CVVH net even  - back on levo 0.06  - resumed tf via ngt trickle feeds  -Sonogram pending  - TF increased to goal of 40cc/hr  - Babin DC'd, bladder scan QD  - CT Cabd/pelv non con pending tomorrow 4/11      ALLERGIES:  No Known Allergies      --------------------------------------------------------------------------------------    MEDICATIONS:    Neurologic Medications  acetaminophen   Oral Liquid .. 500 milliGRAM(s) Oral every 6 hours  gabapentin Solution 100 milliGRAM(s) Oral three times a day  HYDROmorphone  Injectable 0.5 milliGRAM(s) IV Push every 4 hours PRN breakthrough pain  HYDROmorphone  Injectable 0.5 milliGRAM(s) IV Push every 4 hours PRN Moderate Pain (4 - 6)  metoclopramide Injectable 10 milliGRAM(s) IV Push every 8 hours  oxyCODONE    Solution 5 milliGRAM(s) Oral every 4 hours PRN Moderate Pain (4 - 6)  oxyCODONE    Solution 10 milliGRAM(s) Oral every 4 hours PRN Severe Pain (7 - 10)    Respiratory Medications    Cardiovascular Medications  norepinephrine Infusion 0.05 MICROgram(s)/kG/Min IV Continuous <Continuous>    Gastrointestinal Medications  multivitamin/minerals/iron Oral Solution (CENTRUM) 15 milliLiter(s) Oral daily  pantoprazole  Injectable 40 milliGRAM(s) IV Push daily  polyethylene glycol 3350 17 Gram(s) Oral at bedtime    Genitourinary Medications    Hematologic/Oncologic Medications  fondaparinux Injectable 1.5 milliGRAM(s) SubCutaneous daily    Antimicrobial/Immunologic Medications    Endocrine/Metabolic Medications  dextrose 50% Injectable 25 Gram(s) IV Push once  dextrose 50% Injectable 12.5 Gram(s) IV Push once  insulin glargine Injectable (LANTUS) 9 Unit(s) SubCutaneous at bedtime  insulin lispro (ADMELOG) corrective regimen sliding scale   SubCutaneous every 6 hours  insulin lispro Injectable (ADMELOG) 2 Unit(s) SubCutaneous every 6 hours  levothyroxine Injectable 20 MICROGram(s) IV Push at bedtime  methylPREDNISolone sodium succinate Injectable 10 milliGRAM(s) IV Push every 12 hours  vasopressin Infusion 0.03 Unit(s)/Min IV Continuous <Continuous>    Topical/Other Medications  chlorhexidine 2% Cloths 1 Application(s) Topical daily  CRRT Treatment    <Continuous>  petrolatum Ophthalmic Ointment 1 Application(s) Both EYES two times a day  Phoxillum Filtration BK 4 / 2.5 5000 milliLiter(s) CRRT <Continuous>  PrismaSOL Filtration BGK 0 / 2.5 5000 milliLiter(s) CRRT <Continuous>  PrismaSOL Filtration BGK 4 / 2.5 5000 milliLiter(s) CRRT <Continuous>    --------------------------------------------------------------------------------------    VITAL SIGNS:  ICU Vital Signs Last 24 Hrs  T(C): 36.8 (11 Apr 2023 00:00), Max: 37.8 (10 Apr 2023 16:00)  T(F): 98.2 (11 Apr 2023 00:00), Max: 100 (10 Apr 2023 16:00)  HR: 102 (11 Apr 2023 01:00) (94 - 121)  BP: --  BP(mean): --  ABP: 106/54 (11 Apr 2023 01:00) (78/46 - 159/73)  ABP(mean): 73 (11 Apr 2023 01:00) (55 - 104)  RR: 24 (11 Apr 2023 01:00) (24 - 49)  SpO2: 97% (11 Apr 2023 01:00) (97% - 100%)    O2 Parameters below as of 10 Apr 2023 16:00  Patient On (Oxygen Delivery Method): room air    --------------------------------------------------------------------------------------    INS AND OUTS:  I&O's Detail    09 Apr 2023 07:01  -  10 Apr 2023 07:00  --------------------------------------------------------  IN:    Dexmedetomidine: 267.9 mL    Enteral Tube Flush: 120 mL    Nepro with Carb Steady: 270 mL    Norepinephrine: 299.8 mL  Total IN: 957.7 mL    OUT:    Indwelling Catheter - Urethral (mL): 5 mL    Other (mL): 1230 mL  Total OUT: 1235 mL    Total NET: -277.3 mL      10 Apr 2023 07:01  -  11 Apr 2023 01:32  --------------------------------------------------------  IN:    Nepro with Carb Steady: 340 mL    Norepinephrine: 309.4 mL    Vasopressin: 13.5 mL  Total IN: 662.9 mL    OUT:    Indwelling Catheter - Urethral (mL): 0 mL    Other (mL): 433 mL  Total OUT: 433 mL    Total NET: 229.9 mL    --------------------------------------------------------------------------------------    EXAM  NEUROLOGY  Exam: Delirious    HEENT  Exam: Normocephalic, atraumatic, EOMI.     RESPIRATORY  Exam: Lungs clear to auscultation, Normal expansion/effort.    CARDIOVASCULAR  Exam: S1, S2.  Regular rate and rhythm.       GI/NUTRITION  Exam: Abdomen soft, tender, Non-distended.  Current Diet: NPO      METABOLIC / FLUIDS / ELECTROLYTES  multivitamin/minerals/iron Oral Solution (CENTRUM) 15 milliLiter(s) Oral daily      HEMATOLOGIC  [x] VTE Prophylaxis: fondaparinux Injectable 1.5 milliGRAM(s) SubCutaneous daily    Transfusions:	[] PRBC	[] Platelets		[] FFP	[] Cryoprecipitate    INFECTIOUS DISEASE  Antimicrobials/Immunologic Medications:    Day #      of     ***    TUBES / LINES / DRAINS  ***  [x] Peripheral IV  [x] Central Venous Line     	[] R	[] L	[] IJ	[] Fem	[] SC	Date Placed:   [x] Arterial Line		[] R	[] L	[] Fem	[] Rad	[] Ax	Date Placed:   x[] Urinary Catheter		Date Placed:   [x] Necessity of urinary, arterial, and venous catheters discussed    --------------------------------------------------------------------------------------    LABS                          7.2    15.14 )-----------( 429      ( 10 Apr 2023 17:15 )             22.4     04-10    137  |  101  |  14  ----------------------------<  112<H>  3.9   |  14<L>  |  0.99    Ca    8.2<L>      10 Apr 2023 17:15  Phos  3.6     04-10  Mg     2.50     04-10    TPro  6.8  /  Alb  2.6<L>  /  TBili  7.2<H>  /  DBili  x   /  AST  132<H>  /  ALT  113<H>  /  AlkPhos  173<H>  04-10    --------------------------------------------------------------------------------------    OTHER LABORATORY:     IMAGING STUDIES:   CXR:      SICU Daily Progress Note  =====================================================  INTERVAL EVENTS:  - Delirious, Haldol 1mg IVP, QTc 486  - CVVH net even  - back on levo 0.06  - TF increased to goal of 40cc/hr (10th), nauseous and vomiting night of 11th, TFs halted. Was at goal of 40 cc prior to this.   - Babin DC'd (10th), bladder scan QD showing minimal urine (11th)  - CT abd/pelv non con pending 4/11      ALLERGIES:  No Known Allergies      --------------------------------------------------------------------------------------    MEDICATIONS:    Neurologic Medications  acetaminophen   Oral Liquid .. 500 milliGRAM(s) Oral every 6 hours  gabapentin Solution 100 milliGRAM(s) Oral three times a day  HYDROmorphone  Injectable 0.5 milliGRAM(s) IV Push every 4 hours PRN breakthrough pain  HYDROmorphone  Injectable 0.5 milliGRAM(s) IV Push every 4 hours PRN Moderate Pain (4 - 6)  metoclopramide Injectable 10 milliGRAM(s) IV Push every 8 hours  oxyCODONE    Solution 5 milliGRAM(s) Oral every 4 hours PRN Moderate Pain (4 - 6)  oxyCODONE    Solution 10 milliGRAM(s) Oral every 4 hours PRN Severe Pain (7 - 10)    Respiratory Medications    Cardiovascular Medications  norepinephrine Infusion 0.05 MICROgram(s)/kG/Min IV Continuous <Continuous>    Gastrointestinal Medications  multivitamin/minerals/iron Oral Solution (CENTRUM) 15 milliLiter(s) Oral daily  pantoprazole  Injectable 40 milliGRAM(s) IV Push daily  polyethylene glycol 3350 17 Gram(s) Oral at bedtime    Genitourinary Medications    Hematologic/Oncologic Medications  fondaparinux Injectable 1.5 milliGRAM(s) SubCutaneous daily    Antimicrobial/Immunologic Medications    Endocrine/Metabolic Medications  dextrose 50% Injectable 25 Gram(s) IV Push once  dextrose 50% Injectable 12.5 Gram(s) IV Push once  insulin glargine Injectable (LANTUS) 9 Unit(s) SubCutaneous at bedtime  insulin lispro (ADMELOG) corrective regimen sliding scale   SubCutaneous every 6 hours  insulin lispro Injectable (ADMELOG) 2 Unit(s) SubCutaneous every 6 hours  levothyroxine Injectable 20 MICROGram(s) IV Push at bedtime  methylPREDNISolone sodium succinate Injectable 10 milliGRAM(s) IV Push every 12 hours  vasopressin Infusion 0.03 Unit(s)/Min IV Continuous <Continuous>    Topical/Other Medications  chlorhexidine 2% Cloths 1 Application(s) Topical daily  CRRT Treatment    <Continuous>  petrolatum Ophthalmic Ointment 1 Application(s) Both EYES two times a day  Phoxillum Filtration BK 4 / 2.5 5000 milliLiter(s) CRRT <Continuous>  PrismaSOL Filtration BGK 0 / 2.5 5000 milliLiter(s) CRRT <Continuous>  PrismaSOL Filtration BGK 4 / 2.5 5000 milliLiter(s) CRRT <Continuous>    --------------------------------------------------------------------------------------    VITAL SIGNS:  ICU Vital Signs Last 24 Hrs  T(C): 36.8 (11 Apr 2023 00:00), Max: 37.8 (10 Apr 2023 16:00)  T(F): 98.2 (11 Apr 2023 00:00), Max: 100 (10 Apr 2023 16:00)  HR: 102 (11 Apr 2023 01:00) (94 - 121)  BP: --  BP(mean): --  ABP: 106/54 (11 Apr 2023 01:00) (78/46 - 159/73)  ABP(mean): 73 (11 Apr 2023 01:00) (55 - 104)  RR: 24 (11 Apr 2023 01:00) (24 - 49)  SpO2: 97% (11 Apr 2023 01:00) (97% - 100%)    O2 Parameters below as of 10 Apr 2023 16:00  Patient On (Oxygen Delivery Method): room air    --------------------------------------------------------------------------------------    INS AND OUTS:  I&O's Detail    09 Apr 2023 07:01  -  10 Apr 2023 07:00  --------------------------------------------------------  IN:    Dexmedetomidine: 267.9 mL    Enteral Tube Flush: 120 mL    Nepro with Carb Steady: 270 mL    Norepinephrine: 299.8 mL  Total IN: 957.7 mL    OUT:    Indwelling Catheter - Urethral (mL): 5 mL    Other (mL): 1230 mL  Total OUT: 1235 mL    Total NET: -277.3 mL      10 Apr 2023 07:01  -  11 Apr 2023 01:32  --------------------------------------------------------  IN:    Nepro with Carb Steady: 340 mL    Norepinephrine: 309.4 mL    Vasopressin: 13.5 mL  Total IN: 662.9 mL    OUT:    Indwelling Catheter - Urethral (mL): 0 mL    Other (mL): 433 mL  Total OUT: 433 mL    Total NET: 229.9 mL    --------------------------------------------------------------------------------------    EXAM  NEUROLOGY  Exam: Delirious    HEENT  Exam: Normocephalic, atraumatic, EOMI.     RESPIRATORY  Exam: Lungs clear to auscultation, Normal expansion/effort.    CARDIOVASCULAR  Exam: S1, S2.  Regular rate and rhythm.       GI/NUTRITION  Exam: Abdomen soft, tender, Non-distended.  Current Diet: NPO      METABOLIC / FLUIDS / ELECTROLYTES  multivitamin/minerals/iron Oral Solution (CENTRUM) 15 milliLiter(s) Oral daily

## 2023-04-11 NOTE — PROGRESS NOTE ADULT - PROBLEM SELECTOR PLAN 1
Pt. with oliguric CHATO in the setting of necrotizing pancreatitis and shock. Pt. with most likely ATN. Scr was 1.24 on 3/23/23 and increased to 4.60 on 3/24/23. Pt. was initiated on CRRT/CVVHDF on 3/24/23 and discontinued overnight (4/6/23) by SICU team. Pt. remained oliguric and was restarted on CVVHDH on 4/7/23. Labs reviewed. Pt. currently tolerating CRRT. BP being maintained on 2 vasopressors. HD catheter functioning without any clotting issues overnight. Plan is to continue CRRT today as per discussion with SICU team. Monitor labs and urine output. Avoid any potential nephrotoxins. Dose medications as per eGFR.    If you have any questions, please feel free to contact me  Neville Schulz  Nephrology Fellow  [phone]; Prefer [employer] TEAMS  (After 5pm or on weekends please page the on-call fellow). Pt. with oliguric CHATO in the setting of necrotizing pancreatitis and shock. Pt. with most likely ATN. Scr was 1.24 on 3/23/23 and increased to 4.60 on 3/24/23. Pt. was initiated on CRRT/CVVHDF on 3/24/23 and discontinued overnight (4/6/23) by SICU team. Pt. remained oliguric and was restarted on CVVHDH on 4/7/23. Labs reviewed. Pt. currently tolerating CRRT. BP being maintained on 2 vasopressors. HD catheter functioning well. Plan is to continue CRRT today as per discussion with SICU team. Monitor labs and urine output. Avoid any potential nephrotoxins. Dose medications as per eGFR.    If you have any questions, please feel free to contact me  Neville Schulz  Nephrology Fellow  [phone]; Prefer [employer] TEAMS  (After 5pm or on weekends please page the on-call fellow).

## 2023-04-11 NOTE — PROGRESS NOTE ADULT - ASSESSMENT
Patient is a 48 year old female with a PMHx of HTN, hypothyroidism,  (30 years ago) and breast cancer (S/P left mastectomy and chemo in ) who presented with epigastric pain and left upper quadrant pain.  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  Transferred from OSH ([facility]) for hypotension and tachypnea requiring emergent intubation and multiple pressors.  SICU consulted for hemodynamic monitoring and respiratory management.    Plan:   - bilious output from NGT  - recommend repeat CT in the next week to reassess collections  - cont cvvh  - continue with IV Meropenem  - on levo/vaso  - appreciate care per SICU      #[id number]  B Team Surgery

## 2023-04-11 NOTE — PROGRESS NOTE ADULT - SUBJECTIVE AND OBJECTIVE BOX
[facility] Division of Kidney Diseases & Hypertension  FOLLOW UP NOTE  [phone]--------------------------------------------------------------------------------    Chief Complaint: Oliguric CHATO, on RRT    24 hour events/subjective: Pt. seen and examined in the SICU today. Pt. awake, denies SOB. BP maintained on 2 IV vasopressors. Pt remains on CVVHDF/CRRT as she continues to be oliguric.         PAST HISTORY  --------------------------------------------------------------------------------  No significant changes to PMH, PSH, FHx, SHx, unless otherwise noted    ALLERGIES & MEDICATIONS  --------------------------------------------------------------------------------  Allergies    No Known Allergies    Intolerances      Standing Inpatient Medications  acetaminophen   Oral Liquid .. 500 milliGRAM(s) Oral every 6 hours  chlorhexidine 2% Cloths 1 Application(s) Topical daily  CRRT Treatment    <Continuous>  dextrose 50% Injectable 25 Gram(s) IV Push once  dextrose 50% Injectable 12.5 Gram(s) IV Push once  fondaparinux Injectable 1.5 milliGRAM(s) SubCutaneous daily  gabapentin Solution 100 milliGRAM(s) Oral three times a day  insulin glargine Injectable (LANTUS) 9 Unit(s) SubCutaneous at bedtime  insulin lispro (ADMELOG) corrective regimen sliding scale   SubCutaneous every 6 hours  insulin lispro Injectable (ADMELOG) 2 Unit(s) SubCutaneous every 6 hours  levothyroxine Injectable 20 MICROGram(s) IV Push at bedtime  methylPREDNISolone sodium succinate Injectable 10 milliGRAM(s) IV Push every 12 hours  metoclopramide Injectable 10 milliGRAM(s) IV Push every 8 hours  multivitamin/minerals/iron Oral Solution (CENTRUM) 15 milliLiter(s) Oral daily  norepinephrine Infusion 0.05 MICROgram(s)/kG/Min IV Continuous <Continuous>  pantoprazole  Injectable 40 milliGRAM(s) IV Push daily  petrolatum Ophthalmic Ointment 1 Application(s) Both EYES two times a day  Phoxillum Filtration BK 4 / 2.5 5000 milliLiter(s) CRRT <Continuous>  polyethylene glycol 3350 17 Gram(s) Oral at bedtime  PrismaSOL Filtration BGK 4 / 2.5 5000 milliLiter(s) CRRT <Continuous>  PrismaSOL Filtration BGK 4 / 2.5 5000 milliLiter(s) CRRT <Continuous>  vasopressin Infusion 0.03 Unit(s)/Min IV Continuous <Continuous>    PRN Inpatient Medications  HYDROmorphone  Injectable 0.5 milliGRAM(s) IV Push every 4 hours PRN  HYDROmorphone  Injectable 0.5 milliGRAM(s) IV Push every 4 hours PRN  oxyCODONE    Solution 5 milliGRAM(s) Oral every 4 hours PRN  oxyCODONE    Solution 10 milliGRAM(s) Oral every 4 hours PRN      REVIEW OF SYSTEMS  --------------------------------------------------------------------------------  Limited ROS obtained from pt.     Respiratory: No dyspnea  CV: No chest pain  GI: +constipation  MSK: UE/LE swelling   Neuro: No dizziness      VITALS/PHYSICAL EXAM  --------------------------------------------------------------------------------  T(C): 36.6 (04-11-23 @ 08:00), Max: 37.8 (04-10-23 @ 16:00)  HR: 100 (04-11-23 @ 08:00) (99 - 121)  BP: --  RR: 38 (04-11-23 @ 08:00) (20 - 49)  SpO2: 97% (04-11-23 @ 08:00) (96% - 100%)  Wt(kg): --        04-10-23 @ 07:01  -  04-11-23 @ 07:00  --------------------------------------------------------  IN: 973.4 mL / OUT: 1903 mL / NET: -929.6 mL    04-11-23 @ 07:01  -  04-11-23 @ 10:11  --------------------------------------------------------  IN: 20 mL / OUT: 200 mL / NET: -180 mL      Physical Exam:  	Gen: Ill appearing  	HEENT: NGT+, anicteric  	Pulm: Fair entry B/L  	CV: S1S2+  	Abd: Obese, distended  	Ext: +LE edema B/L, Trace B/L UE edema   	Neuro: Awake  	Skin: Warm and dry              Dialysis access: Right IJ non tunneled HD catheter being used for CRRT    LABS/STUDIES  --------------------------------------------------------------------------------              6.5    14.08 >-----------<  409      [04-11-23 @ 05:12]              20.8     137  |  101  |  14  ----------------------------<  110      [04-11-23 @ 00:40]  3.6   |  14  |  0.87        Ca     7.9     [04-11-23 @ 00:40]      Mg     2.50     [04-11-23 @ 00:40]      Phos  3.5     [04-11-23 @ 00:40]    TPro  6.3  /  Alb  2.4  /  TBili  6.6  /  DBili  x   /  AST  134  /  ALT  112  /  AlkPhos  158  [04-11-23 @ 00:40]    PT/INR: PT 33.8 , INR 2.88       [04-11-23 @ 00:40]  PTT: 34.8       [04-11-23 @ 00:40]      Creatinine Trend:  SCr 0.87 [04-11 @ 00:40]  SCr 0.99 [04-10 @ 17:15]  SCr 0.88 [04-10 @ 02:06]  SCr 1.10 [04-09 @ 02:30]  SCr 1.89 [04-08 @ 00:45]             [facility] Division of Kidney Diseases & Hypertension  FOLLOW UP NOTE  [phone]--------------------------------------------------------------------------------    Chief Complaint: Oliguric CHATO, on RRT    24 hour events/subjective: Pt. seen and examined in the SICU today. Pt. awake, denies SOB. BP maintained on 2 IV vasopressors. Pt. oliguric, remains on CVVHDF/CRRT.     PAST HISTORY  --------------------------------------------------------------------------------  No significant changes to PMH, PSH, FHx, SHx, unless otherwise noted    ALLERGIES & MEDICATIONS  --------------------------------------------------------------------------------  Allergies    No Known Allergies    Intolerances    Standing Inpatient Medications  acetaminophen   Oral Liquid .. 500 milliGRAM(s) Oral every 6 hours  chlorhexidine 2% Cloths 1 Application(s) Topical daily  CRRT Treatment    <Continuous>  dextrose 50% Injectable 25 Gram(s) IV Push once  dextrose 50% Injectable 12.5 Gram(s) IV Push once  fondaparinux Injectable 1.5 milliGRAM(s) SubCutaneous daily  gabapentin Solution 100 milliGRAM(s) Oral three times a day  insulin glargine Injectable (LANTUS) 9 Unit(s) SubCutaneous at bedtime  insulin lispro (ADMELOG) corrective regimen sliding scale   SubCutaneous every 6 hours  insulin lispro Injectable (ADMELOG) 2 Unit(s) SubCutaneous every 6 hours  levothyroxine Injectable 20 MICROGram(s) IV Push at bedtime  methylPREDNISolone sodium succinate Injectable 10 milliGRAM(s) IV Push every 12 hours  metoclopramide Injectable 10 milliGRAM(s) IV Push every 8 hours  multivitamin/minerals/iron Oral Solution (CENTRUM) 15 milliLiter(s) Oral daily  norepinephrine Infusion 0.05 MICROgram(s)/kG/Min IV Continuous <Continuous>  pantoprazole  Injectable 40 milliGRAM(s) IV Push daily  petrolatum Ophthalmic Ointment 1 Application(s) Both EYES two times a day  Phoxillum Filtration BK 4 / 2.5 5000 milliLiter(s) CRRT <Continuous>  polyethylene glycol 3350 17 Gram(s) Oral at bedtime  PrismaSOL Filtration BGK 4 / 2.5 5000 milliLiter(s) CRRT <Continuous>  PrismaSOL Filtration BGK 4 / 2.5 5000 milliLiter(s) CRRT <Continuous>  vasopressin Infusion 0.03 Unit(s)/Min IV Continuous <Continuous>    PRN Inpatient Medications  HYDROmorphone  Injectable 0.5 milliGRAM(s) IV Push every 4 hours PRN  HYDROmorphone  Injectable 0.5 milliGRAM(s) IV Push every 4 hours PRN  oxyCODONE    Solution 5 milliGRAM(s) Oral every 4 hours PRN  oxyCODONE    Solution 10 milliGRAM(s) Oral every 4 hours PRN    REVIEW OF SYSTEMS  --------------------------------------------------------------------------------  Limited ROS obtained from pt.     Respiratory: No dyspnea  CV: No chest pain  GI: +constipation  MSK: UE/LE swelling   Neuro: No dizziness    VITALS/PHYSICAL EXAM  --------------------------------------------------------------------------------  T(C): 36.6 (04-11-23 @ 08:00), Max: 37.8 (04-10-23 @ 16:00)  HR: 100 (04-11-23 @ 08:00) (99 - 121)  BP: --  RR: 38 (04-11-23 @ 08:00) (20 - 49)  SpO2: 97% (04-11-23 @ 08:00) (96% - 100%)  Wt(kg): --    04-10-23 @ 07:01  -  04-11-23 @ 07:00  --------------------------------------------------------  IN: 973.4 mL / OUT: 1903 mL / NET: -929.6 mL    04-11-23 @ 07:01  -  04-11-23 @ 10:11  --------------------------------------------------------  IN: 20 mL / OUT: 200 mL / NET: -180 mL    Physical Exam:  	Gen: Ill appearing  	HEENT: NGT+, anicteric  	Pulm: Fair entry B/L  	CV: S1S2+  	Abd: Obese, distended  	Ext: +LE edema B/L, Trace B/L UE edema   	Neuro: Awake  	Skin: Warm and dry              Dialysis access: Right IJ non tunneled HD catheter being used for CRRT    LABS/STUDIES  --------------------------------------------------------------------------------              6.5    14.08 >-----------<  409      [04-11-23 @ 05:12]              20.8     137  |  101  |  14  ----------------------------<  110      [04-11-23 @ 00:40]  3.6   |  14  |  0.87        Ca     7.9     [04-11-23 @ 00:40]      Mg     2.50     [04-11-23 @ 00:40]      Phos  3.5     [04-11-23 @ 00:40]    TPro  6.3  /  Alb  2.4  /  TBili  6.6  /  DBili  x   /  AST  134  /  ALT  112  /  AlkPhos  158  [04-11-23 @ 00:40]    Creatinine Trend:  SCr 0.87 [04-11 @ 00:40]  SCr 0.99 [04-10 @ 17:15]  SCr 0.88 [04-10 @ 02:06]  SCr 1.10 [04-09 @ 02:30]  SCr 1.89 [04-08 @ 00:45]

## 2023-04-11 NOTE — PROGRESS NOTE ADULT - SUBJECTIVE AND OBJECTIVE BOX
TEAM [ B ] Surgery Daily Progress Note  =====================================================    SUBJECTIVE: Patient seen and examined at bedside on AM rounds. Patient reports that they're feeling well.    ALLERGIES:  No Known Allergies    --------------------------------------------------------------------------------------    MEDICATIONS:    Neurologic Medications  acetaminophen   Oral Liquid .. 500 milliGRAM(s) Oral every 6 hours  gabapentin Solution 100 milliGRAM(s) Oral three times a day  HYDROmorphone  Injectable 0.5 milliGRAM(s) IV Push every 4 hours PRN breakthrough pain  HYDROmorphone  Injectable 0.5 milliGRAM(s) IV Push every 4 hours PRN Moderate Pain (4 - 6)  metoclopramide Injectable 10 milliGRAM(s) IV Push every 8 hours  oxyCODONE    Solution 5 milliGRAM(s) Oral every 4 hours PRN Moderate Pain (4 - 6)  oxyCODONE    Solution 10 milliGRAM(s) Oral every 4 hours PRN Severe Pain (7 - 10)    Cardiovascular Medications  norepinephrine Infusion 0.05 MICROgram(s)/kG/Min IV Continuous <Continuous>    Gastrointestinal Medications  multivitamin/minerals/iron Oral Solution (CENTRUM) 15 milliLiter(s) Oral daily  pantoprazole  Injectable 40 milliGRAM(s) IV Push daily  polyethylene glycol 3350 17 Gram(s) Oral at bedtime    Hematologic/Oncologic Medications  fondaparinux Injectable 1.5 milliGRAM(s) SubCutaneous daily    Endocrine/Metabolic Medications  dextrose 50% Injectable 25 Gram(s) IV Push once  dextrose 50% Injectable 12.5 Gram(s) IV Push once  insulin glargine Injectable (LANTUS) 9 Unit(s) SubCutaneous at bedtime  insulin lispro (ADMELOG) corrective regimen sliding scale   SubCutaneous every 6 hours  insulin lispro Injectable (ADMELOG) 2 Unit(s) SubCutaneous every 6 hours  levothyroxine Injectable 20 MICROGram(s) IV Push at bedtime  methylPREDNISolone sodium succinate Injectable 10 milliGRAM(s) IV Push every 12 hours  vasopressin Infusion 0.03 Unit(s)/Min IV Continuous <Continuous>    Topical/Other Medications  chlorhexidine 2% Cloths 1 Application(s) Topical daily  CRRT Treatment    <Continuous>  petrolatum Ophthalmic Ointment 1 Application(s) Both EYES two times a day  Phoxillum Filtration BK 4 / 2.5 5000 milliLiter(s) CRRT <Continuous>  PrismaSOL Filtration BGK 0 / 2.5 5000 milliLiter(s) CRRT <Continuous>  PrismaSOL Filtration BGK 4 / 2.5 5000 milliLiter(s) CRRT <Continuous>    --------------------------------------------------------------------------------------    VITAL SIGNS:  T(C): 36.6 (04-11-23 @ 08:00), Max: 37.8 (04-10-23 @ 16:00)  HR: 100 (04-11-23 @ 08:00) (99 - 121)  BP: --  RR: 38 (04-11-23 @ 08:00) (20 - 49)  SpO2: 97% (04-11-23 @ 08:00) (96% - 100%)  --------------------------------------------------------------------------------------    EXAM    General: NAD, resting in bed comfortably.  Cardiac: regular rate, warm and well perfused  Respiratory: Nonlabored respirations, normal cw expansion.  Abdomen: soft, nontender, nondistended. NGT to LCWS with bilious output  Extremities: normal strength, FROM, no deformities    --------------------------------------------------------------------------------------        INS AND OUTS:    04-10-23 @ 07:01  -  04-11-23 @ 07:00  --------------------------------------------------------  IN: 973.4 mL / OUT: 1903 mL / NET: -929.6 mL      --------------------------------------------------------------------------------------

## 2023-04-11 NOTE — PROGRESS NOTE ADULT - ASSESSMENT
ASSESSMENT: Patient is a 48 year old female with PMHx significant for HTN, hypothyroidism,  30 years ago, breast cancer s/p left mastectomy and chemo in  presenting with epigastric pain and LUQ pain. Found to have necrotizing pancreatitis. Transferred from OSH ([facility]) for hypotension and tachypnea requiring emergent intubation and multiple pressors. SICU consulted for hemodynamic monitoring and respiratory management.      PLAN  Neuro:  No acute neurological issues   - Dilaudid prn  - Tylenol 500mg Q6h via NGT    Resp:  AHRF w/ b/l pleff 2/2 necrotizing pancreatitis     CV:  Mixed shock state 2/2 septic shock w/ E. Coli in urine vs hypovolemic shock now s/p 11L resus    - wean levo as tolerated     GI:   - CT 3/27: >50% of the pancreatic head extends into the mesentery and inferiorly along the retroperitoneum into the lower abdomen  - RUQ US: Chololithiasis (2cm stone) w/ no acute frannie   - MRCP 4/3: no choledocholithiasis. Hold tylenol.  - TF (Nepro) at rate of 40cc/hr restarted at trickle rate  - Keep ngt to LCWS  - GI ppx w/ protonix   - Reglan  - Dolcolax  - CT Abd/pelv pending tomorrow     Renal:  ARF 2/2 shock state resulting in ATN requiring CRRT    - On CRRT keep net even, plan to start intermittent hemodialysis when tolerated   - Babin removed, bladder scan QD    Heme:   - CBC q12   - received 1U PRBC   - SCDs and CRRT dosed fondaparinux for DVT ppx  - HIT Ab positive; DOUGLAS Negative   - f/u anti Xa level    #Splenic Vein Thrombosis  3/22 CT Abd w/ contrast: Focal filling defect in the splenic vein at the level of the body of   the pancreas for which an underlying thrombus is suspected     ID:   Intermittently febrile w/ leukocytosis c/f septic shock 2/2 E coli UTI  Diagnostics:  - 3/22 Ucx: E coli  - 3/30 Bcx: staph hominis in aerobic bottle x 1 (likely contaminant)   - Meropenem (3/26- )    Endo:   - Lantus 9 units, admelog 2 units q6  - c/w synthroid 20 IV (home med)   - ISS    Code Status: Full code  Lines: KELLY Mendoza, SIDDHARTHA line    Disposition: SICU ASSESSMENT: Patient is a 48 year old female with PMHx significant for HTN, hypothyroidism,  30 years ago, breast cancer s/p left mastectomy and chemo in  presenting with epigastric pain and LUQ pain. Found to have necrotizing pancreatitis. Transferred from OSH ([facility]) for hypotension and tachypnea requiring emergent intubation and multiple pressors. SICU consulted for hemodynamic monitoring and respiratory management now extubated on CVVHD    PLAN  Neuro:  - Dilaudid prn  - Tylenol 500mg Q6h via NGT    Resp:  AHRF w/ b/l pleff 2/2 necrotizing pancreatitis     CV:  Mixed shock state 2/2 septic shock w/ E. Coli in urine vs hypovolemic shock now s/p 11L resus    - wean levo as tolerated     GI:   - CT 3/27: >50% of the pancreatic head extends into the mesentery and inferiorly along the retroperitoneum into the lower abdomen  - RUQ US: Chololithiasis (2cm stone) w/ no acute farnnie   - MRCP 4/3: no choledocholithiasis. Hold tylenol.  - TF (Nepro) at rate of 40cc/hr restarted at trickle rate  - Keep ngt to LCWS  - GI ppx w/ protonix   - Reglan  - Dolcolax  - CT Abd/pelv pending tomorrow   - Protected sleep time    Renal:  ARF 2/2 shock state resulting in ATN requiring CRRT    - On CRRT keep net even, plan to start intermittent hemodialysis when tolerated     Heme:   - CBC   - received 1U PRBC   - SCDs and CRRT dosed fondaparinux for DVT ppx  - HIT Ab positive; DOUGLAS Negative   - f/u anti Xa level    #Splenic Vein Thrombosis  3/22 CT Abd w/ contrast: Focal filling defect in the splenic vein at the level of the body of the pancreas for which an underlying thrombus is suspected     ID:   Intermittently febrile w/ leukocytosis c/f septic shock 2/2 E coli UTI  Diagnostics:  - 3/22 Ucx: E coli  - 3/30 Bcx: staph hominis in aerobic bottle x 1 (likely contaminant)   - Meropenem (3/26-)    Endo:   - Lantus 9 units, admelog 2 units q6  - c/w synthroid 20 IV (home med)   - ISS  - Decrease steriods, for pseudo adrenal insuff    Code Status: Full code  Lines: KELLY Mendoza, SIDDHARTHA line    Disposition: SICU ASSESSMENT: Patient is a 48 year old female with PMHx significant for HTN, hypothyroidism,  30 years ago, breast cancer s/p left mastectomy and chemo in  presenting with epigastric pain and LUQ pain. Found to have necrotizing pancreatitis. Transferred from OSH ([facility]) for hypotension and tachypnea requiring emergent intubation and multiple pressors. SICU consulted for hemodynamic monitoring and respiratory management now extubated on CVVHD    PLAN  Neuro:  - Precautions for delirium   - Dilaudid prn  - Tylenol 500mg Q6h via NGT    Resp:  AHRF w/ b/l plef 2/2 necrotizing pancreatitis     CV:  Mixed shock state 2/2 septic shock w/ E. Coli in urine vs hypovolemic shock now s/p 11L resus    - wean levo/vaso as tolerated     GI:   - CT 3/27: >50% of the pancreatic head extends into the mesentery and inferiorly along the retroperitoneum into the lower abdomen  - RUQ US: Chololithiasis (2cm stone) w/ no acute frannie   - MRCP 4/3: no choledocholithiasis.   - Keep ngt to WS  - GI ppx w/ protonix   - Reglan  - CT Abd/pelv to be done on   - Aggressive bowel regimen to ensure defecation today. If not, CRYSTAL and disimpaction.     Renal:  ARF 2/2 shock state resulting in ATN requiring CRRT    - On CRRT keep net even, plan to start intermittent hemodialysis when tolerated     Heme:   - CBC   - received 1U PRBC  and then .   - SCDs and CRRT dosed fondaparinux for DVT ppx  - HIT Ab positive; DOUGLAS Negative   - f/u anti Xa level. Most recent 0.31.     #Splenic Vein Thrombosis  3/22 CT Abd w/ contrast: Focal filling defect in the splenic vein at the level of the body of the pancreas for which an underlying thrombus is suspected     ID:   Intermittently febrile w/ leukocytosis c/f septic shock 2/2 E coli UTI  Diagnostics:  - 3/22 Ucx: E coli  - 3/30 Bcx: staph hominis in aerobic bottle x 1 (likely contaminant)   - Meropenem (3/26-)    Endo:   - Lantus 9 units, admelog 2 units q6  - c/w synthroid 20 IV (home med)   - ISS  - Weaning steroids (methyl pred 10 to 5 today) for pseudo adrenal insuff    Code Status: Full code  Lines: KELLY Mendoza, SIDDHARTHA line    Disposition: SICU

## 2023-04-11 NOTE — PROGRESS NOTE ADULT - ATTENDING COMMENTS
gastric atony  suggest postpyloric feeding tube insertion or TPN, care plan d/w SICU attending  await repeat CT scan for surveillance  supportive care

## 2023-04-12 NOTE — PROGRESS NOTE ADULT - SUBJECTIVE AND OBJECTIVE BOX
[facility] DIVISION OF KIDNEY DISEASES AND HYPERTENSION   FOLLOW UP NOTE    --------------------------------------------------------------------------------  Chief Complaint:    24 hour events/subjective: Pt. was seen and examined today, not in distress. Denies fever, chills, SOB, CP, dysuria.      PAST HISTORY  --------------------------------------------------------------------------------  No significant changes to PMH, PSH, FHx, SHx, unless otherwise noted    ALLERGIES & MEDICATIONS  --------------------------------------------------------------------------------  Allergies    No Known Allergies    Intolerances      Standing Inpatient Medications  acetaminophen   Oral Liquid .. 500 milliGRAM(s) Oral every 6 hours  albuterol/ipratropium for Nebulization. 3 milliLiter(s) Nebulizer once  albuterol/ipratropium for Nebulization. 3 milliLiter(s) Nebulizer once  albuterol/ipratropium for Nebulization. 3 milliLiter(s) Nebulizer once  chlorhexidine 2% Cloths 1 Application(s) Topical daily  CRRT Treatment    <Continuous>  dextrose 50% Injectable 25 Gram(s) IV Push once  dextrose 50% Injectable 12.5 Gram(s) IV Push once  fondaparinux Injectable 1.5 milliGRAM(s) SubCutaneous daily  gabapentin Solution 100 milliGRAM(s) Oral three times a day  insulin glargine Injectable (LANTUS) 9 Unit(s) SubCutaneous at bedtime  insulin lispro (ADMELOG) corrective regimen sliding scale   SubCutaneous every 6 hours  insulin lispro Injectable (ADMELOG) 2 Unit(s) SubCutaneous every 6 hours  levothyroxine Injectable 20 MICROGram(s) IV Push at bedtime  methylPREDNISolone sodium succinate Injectable 5 milliGRAM(s) IV Push every 12 hours  metoclopramide Injectable 10 milliGRAM(s) IV Push every 8 hours  multivitamin/minerals/iron Oral Solution (CENTRUM) 15 milliLiter(s) Oral daily  norepinephrine Infusion 0.05 MICROgram(s)/kG/Min IV Continuous <Continuous>  pantoprazole  Injectable 40 milliGRAM(s) IV Push daily  petrolatum Ophthalmic Ointment 1 Application(s) Both EYES two times a day  Phoxillum Filtration BK 4 / 2.5 5000 milliLiter(s) CRRT <Continuous>  polyethylene glycol 3350 17 Gram(s) Oral at bedtime  PrismaSOL Filtration BGK 4 / 2.5 5000 milliLiter(s) CRRT <Continuous>  PrismaSOL Filtration BGK 4 / 2.5 5000 milliLiter(s) CRRT <Continuous>  QUEtiapine 50 milliGRAM(s) Oral at bedtime  vasopressin Infusion 0.03 Unit(s)/Min IV Continuous <Continuous>    PRN Inpatient Medications  HYDROmorphone  Injectable 0.5 milliGRAM(s) IV Push every 4 hours PRN  oxyCODONE    Solution 5 milliGRAM(s) Oral every 4 hours PRN  oxyCODONE    Solution 10 milliGRAM(s) Oral every 4 hours PRN      REVIEW OF SYSTEMS  --------------------------------------------------------------------------------  Gen: No fevers/chills  Head/Eyes/Ears: No HA   Respiratory: No dyspnea, cough  CV: No chest pain  GI: No abdominal pain, diarrhea  : No dysuria, hematuria  MSK: No  edema  Skin: No rashes  Heme: No easy bruising or bleeding    All other systems were reviewed and are negative, except as noted.    VITALS/PHYSICAL EXAM  --------------------------------------------------------------------------------  T(C): 36.8 (04-12-23 @ 08:00), Max: 37.6 (04-11-23 @ 21:00)  HR: 103 (04-12-23 @ 08:00) (99 - 113)  BP: 110/71 (04-11-23 @ 21:00) (110/71 - 110/71)  RR: 43 (04-12-23 @ 08:00) (19 - 44)  SpO2: 98% (04-12-23 @ 08:00) (90% - 99%)  Wt(kg): --        04-11-23 @ 07:01  -  04-12-23 @ 07:00  --------------------------------------------------------  IN: 1558.5 mL / OUT: 2262 mL / NET: -703.5 mL        Physical Exam:  	Gen: NAD  	HEENT: Anicteric  	Pulm: CTA B/L  	CV: S1S2+  	Abd: Soft, +BS       	Ext: No LE edema B/L  	Neuro: Awake          :Babin cath+ with clear urine  	Skin: Warm and dry  	Dialysis access:      LABS/STUDIES  --------------------------------------------------------------------------------              7.7    13.29 >-----------<  352      [04-12-23 @ 06:53]              23.8     136  |  101  |  10  ----------------------------<  85      [04-12-23 @ 06:53]  3.5   |  15  |  0.80        Ca     7.7     [04-12-23 @ 06:53]      Mg     2.60     [04-12-23 @ 06:53]      Phos  3.0     [04-12-23 @ 06:53]    TPro  6.3  /  Alb  2.2  /  TBili  5.7  /  DBili  x   /  AST  140  /  ALT  113  /  AlkPhos  152  [04-12-23 @ 06:53]    PT/INR: PT 33.8 , INR 2.88       [04-11-23 @ 00:40]  PTT: 34.8       [04-11-23 @ 00:40]      Creatinine Trend:  SCr 0.80 [04-12 @ 06:53]  SCr 0.87 [04-11 @ 00:40]  SCr 0.99 [04-10 @ 17:15]  SCr 0.88 [04-10 @ 02:06]  SCr 1.10 [04-09 @ 02:30]    Urinalysis - [03-24-23 @ 05:30]      Color Yellow / Appearance Clear / SG 1.020 / pH 5.0      Gluc 250 / Ketone Trace  / Bili Negative / Urobili Negative       Blood Moderate / Protein 100 / Leuk Est Trace / Nitrite Negative      RBC 0-2 / WBC 6-10 / Hyaline  / Gran  / Sq Epi  / Non Sq Epi Moderate / Bacteria Many      HBsAg Nonreact      [03-27-23 @ 00:35]  HCV 0.05, Nonreact      [03-27-23 @ 00:35]  HIV Nonreact      [03-30-23 @ 20:10]      Tacrolimus  Cyclosporine  Sirolimus  Mycophenolate  BK PCR  CMV PCR  Parvo PCR  EBV PCR [facility] DIVISION OF KIDNEY DISEASES AND HYPERTENSION   FOLLOW UP NOTE    --------------------------------------------------------------------------------  Chief Complaint: Oliguric CHATO, on RRT    24 hour events/subjective: Pt. seen and examined in the SICU today. Pt. somnolent, unable to obtain ROS. BP maintained on 2 IV vasopressors. Pt. oliguric, remains on CVVHDF/CRRT.     PAST HISTORY  --------------------------------------------------------------------------------  No significant changes to PMH, PSH, FHx, SHx, unless otherwise noted    ALLERGIES & MEDICATIONS  --------------------------------------------------------------------------------  Allergies    No Known Allergies    Intolerances    Standing Inpatient Medications  acetaminophen   Oral Liquid .. 500 milliGRAM(s) Oral every 6 hours  albuterol/ipratropium for Nebulization. 3 milliLiter(s) Nebulizer once  albuterol/ipratropium for Nebulization. 3 milliLiter(s) Nebulizer once  albuterol/ipratropium for Nebulization. 3 milliLiter(s) Nebulizer once  chlorhexidine 2% Cloths 1 Application(s) Topical daily  CRRT Treatment    <Continuous>  dextrose 50% Injectable 25 Gram(s) IV Push once  dextrose 50% Injectable 12.5 Gram(s) IV Push once  fondaparinux Injectable 1.5 milliGRAM(s) SubCutaneous daily  gabapentin Solution 100 milliGRAM(s) Oral three times a day  insulin glargine Injectable (LANTUS) 9 Unit(s) SubCutaneous at bedtime  insulin lispro (ADMELOG) corrective regimen sliding scale   SubCutaneous every 6 hours  insulin lispro Injectable (ADMELOG) 2 Unit(s) SubCutaneous every 6 hours  levothyroxine Injectable 20 MICROGram(s) IV Push at bedtime  methylPREDNISolone sodium succinate Injectable 5 milliGRAM(s) IV Push every 12 hours  metoclopramide Injectable 10 milliGRAM(s) IV Push every 8 hours  multivitamin/minerals/iron Oral Solution (CENTRUM) 15 milliLiter(s) Oral daily  norepinephrine Infusion 0.05 MICROgram(s)/kG/Min IV Continuous <Continuous>  pantoprazole  Injectable 40 milliGRAM(s) IV Push daily  petrolatum Ophthalmic Ointment 1 Application(s) Both EYES two times a day  Phoxillum Filtration BK 4 / 2.5 5000 milliLiter(s) CRRT <Continuous>  polyethylene glycol 3350 17 Gram(s) Oral at bedtime  PrismaSOL Filtration BGK 4 / 2.5 5000 milliLiter(s) CRRT <Continuous>  PrismaSOL Filtration BGK 4 / 2.5 5000 milliLiter(s) CRRT <Continuous>  QUEtiapine 50 milliGRAM(s) Oral at bedtime  vasopressin Infusion 0.03 Unit(s)/Min IV Continuous <Continuous>    PRN Inpatient Medications  HYDROmorphone  Injectable 0.5 milliGRAM(s) IV Push every 4 hours PRN  oxyCODONE    Solution 5 milliGRAM(s) Oral every 4 hours PRN  oxyCODONE    Solution 10 milliGRAM(s) Oral every 4 hours PRN      REVIEW OF SYSTEMS  --------------------------------------------------------------------------------  Unable to obtain    VITALS/PHYSICAL EXAM  --------------------------------------------------------------------------------  T(C): 36.8 (04-12-23 @ 08:00), Max: 37.6 (04-11-23 @ 21:00)  HR: 103 (04-12-23 @ 08:00) (99 - 113)  BP: 110/71 (04-11-23 @ 21:00) (110/71 - 110/71)  RR: 43 (04-12-23 @ 08:00) (19 - 44)  SpO2: 98% (04-12-23 @ 08:00) (90% - 99%)  Wt(kg): --      04-11-23 @ 07:01  -  04-12-23 @ 07:00  --------------------------------------------------------  IN: 1558.5 mL / OUT: 2262 mL / NET: -703.5 mL      Physical Exam:  	Gen: Ill appearing  	HEENT: NGT+, anicteric  	Pulm: Fair entry B/L  	CV: S1S2+  	Abd: Obese, distended  	Ext: +LE edema B/L, Trace B/L UE edema   	Neuro: Awake  	Skin: Warm and dry              Dialysis access: Right IJ non tunneled HD catheter being used for CRRT    LABS/STUDIES  --------------------------------------------------------------------------------              7.7    13.29 >-----------<  352      [04-12-23 @ 06:53]              23.8     136  |  101  |  10  ----------------------------<  85      [04-12-23 @ 06:53]  3.5   |  15  |  0.80        Ca     7.7     [04-12-23 @ 06:53]      Mg     2.60     [04-12-23 @ 06:53]      Phos  3.0     [04-12-23 @ 06:53]    Creatinine Trend:  SCr 0.80 [04-12 @ 06:53]  SCr 0.87 [04-11 @ 00:40]  SCr 0.99 [04-10 @ 17:15]  SCr 0.88 [04-10 @ 02:06]  SCr 1.10 [04-09 @ 02:30]    Urinalysis - [03-24-23 @ 05:30]      Color Yellow / Appearance Clear / SG 1.020 / pH 5.0      Gluc 250 / Ketone Trace  / Bili Negative / Urobili Negative       Blood Moderate / Protein 100 / Leuk Est Trace / Nitrite Negative      RBC 0-2 / WBC 6-10 / Hyaline  / Gran  / Sq Epi  / Non Sq Epi Moderate / Bacteria Many    HBsAg Nonreact      [03-27-23 @ 00:35]  HCV 0.05, Nonreact      [03-27-23 @ 00:35]  HIV Nonreact      [03-30-23 @ 20:10] [facility] DIVISION OF KIDNEY DISEASES AND HYPERTENSION   FOLLOW UP NOTE    --------------------------------------------------------------------------------  Chief Complaint: Oliguric CHATO, on RRT    24 hour events/subjective: Pt. seen and examined in the SICU today. Pt. somnolent, unable to obtain ROS. BP maintained on 2 IV vasopressors. Pt. oliguric, remains on CVVHDF/CRRT.     PAST HISTORY  --------------------------------------------------------------------------------  No significant changes to PMH, PSH, FHx, SHx, unless otherwise noted    ALLERGIES & MEDICATIONS  --------------------------------------------------------------------------------  Allergies    No Known Allergies    Intolerances    Standing Inpatient Medications  acetaminophen   Oral Liquid .. 500 milliGRAM(s) Oral every 6 hours  albuterol/ipratropium for Nebulization. 3 milliLiter(s) Nebulizer once  albuterol/ipratropium for Nebulization. 3 milliLiter(s) Nebulizer once  albuterol/ipratropium for Nebulization. 3 milliLiter(s) Nebulizer once  chlorhexidine 2% Cloths 1 Application(s) Topical daily  CRRT Treatment    <Continuous>  dextrose 50% Injectable 25 Gram(s) IV Push once  dextrose 50% Injectable 12.5 Gram(s) IV Push once  fondaparinux Injectable 1.5 milliGRAM(s) SubCutaneous daily  gabapentin Solution 100 milliGRAM(s) Oral three times a day  insulin glargine Injectable (LANTUS) 9 Unit(s) SubCutaneous at bedtime  insulin lispro (ADMELOG) corrective regimen sliding scale   SubCutaneous every 6 hours  insulin lispro Injectable (ADMELOG) 2 Unit(s) SubCutaneous every 6 hours  levothyroxine Injectable 20 MICROGram(s) IV Push at bedtime  methylPREDNISolone sodium succinate Injectable 5 milliGRAM(s) IV Push every 12 hours  metoclopramide Injectable 10 milliGRAM(s) IV Push every 8 hours  multivitamin/minerals/iron Oral Solution (CENTRUM) 15 milliLiter(s) Oral daily  norepinephrine Infusion 0.05 MICROgram(s)/kG/Min IV Continuous <Continuous>  pantoprazole  Injectable 40 milliGRAM(s) IV Push daily  petrolatum Ophthalmic Ointment 1 Application(s) Both EYES two times a day  Phoxillum Filtration BK 4 / 2.5 5000 milliLiter(s) CRRT <Continuous>  polyethylene glycol 3350 17 Gram(s) Oral at bedtime  PrismaSOL Filtration BGK 4 / 2.5 5000 milliLiter(s) CRRT <Continuous>  PrismaSOL Filtration BGK 4 / 2.5 5000 milliLiter(s) CRRT <Continuous>  QUEtiapine 50 milliGRAM(s) Oral at bedtime  vasopressin Infusion 0.03 Unit(s)/Min IV Continuous <Continuous>    PRN Inpatient Medications  HYDROmorphone  Injectable 0.5 milliGRAM(s) IV Push every 4 hours PRN  oxyCODONE    Solution 5 milliGRAM(s) Oral every 4 hours PRN  oxyCODONE    Solution 10 milliGRAM(s) Oral every 4 hours PRN    REVIEW OF SYSTEMS  --------------------------------------------------------------------------------  Unable to obtain ROS    VITALS/PHYSICAL EXAM  --------------------------------------------------------------------------------  T(C): 36.8 (04-12-23 @ 08:00), Max: 37.6 (04-11-23 @ 21:00)  HR: 103 (04-12-23 @ 08:00) (99 - 113)  BP: 110/71 (04-11-23 @ 21:00) (110/71 - 110/71)  RR: 43 (04-12-23 @ 08:00) (19 - 44)  SpO2: 98% (04-12-23 @ 08:00) (90% - 99%)  Wt(kg): --    04-11-23 @ 07:01  -  04-12-23 @ 07:00  --------------------------------------------------------  IN: 1558.5 mL / OUT: 2262 mL / NET: -703.5 mL    Physical Exam:  	Gen: Ill appearing  	HEENT: NGT+, anicteric  	Pulm: Fair entry B/L  	CV: S1S2+  	Abd: Obese, distended  	Ext: +LE edema B/L, Trace B/L UE edema   	Neuro: Awake  	Skin: Warm and dry              Dialysis access: Right IJ non tunneled HD catheter being used for CRRT    LABS/STUDIES  --------------------------------------------------------------------------------              7.7    13.29 >-----------<  352      [04-12-23 @ 06:53]              23.8     136  |  101  |  10  ----------------------------<  85      [04-12-23 @ 06:53]  3.5   |  15  |  0.80        Ca     7.7     [04-12-23 @ 06:53]      Mg     2.60     [04-12-23 @ 06:53]      Phos  3.0     [04-12-23 @ 06:53]    Creatinine Trend:  SCr 0.80 [04-12 @ 06:53]  SCr 0.87 [04-11 @ 00:40]  SCr 0.99 [04-10 @ 17:15]  SCr 0.88 [04-10 @ 02:06] 02:30]    Urinalysis - [03-24-23 @ 05:30]      Color Yellow / Appearance Clear / SG 1.020 / pH 5.0      Gluc 250 / Ketone Trace  / Bili Negative / Urobili Negative       Blood Moderate / Protein 100 / Leuk Est Trace / Nitrite Negative      RBC 0-2 / WBC 6-10 / Hyaline  / Gran  / Sq Epi  / Non Sq Epi Moderate / Bacteria Many    HBsAg Nonreact      [03-27-23 @ 00:35]  HCV 0.05, Nonreact      [03-27-23 @ 00:35]  HIV Nonreact      [03-30-23 @ 20:10]

## 2023-04-12 NOTE — PROGRESS NOTE ADULT - ATTENDING COMMENTS
I agree with the detailed interval history, physical, and plan, which I have reviewed and edited where appropriate'; also agree with notes/assessment with my team on service.  I have personally examined the patient.  I was physically present for the key portions of the evaluation and management (E/M) service provided.  I reviewed all the pertinent data.  The patient is a critical care patient with life threatening hemodynamic and metabolic instability in SICU.  The SICU team has a constant risk benefit analyzes discussion and coordinating care with the primary team and all consultants.   The patient is in SICU with the chief complaint and diagnosis mentioned in the note.   The plan will be specified in the note.  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

## 2023-04-12 NOTE — PROGRESS NOTE ADULT - SUBJECTIVE AND OBJECTIVE BOX
24 HOUR EVENTS:  - CT pending  - decreased steriod from 10 to 5 mg  - suppository, post pyloric tube placed f/u xr  - 1 U of blood given for hg 6.5  - Patient getting duoneb x 3  - Seroquel started for hypoactive delirium    NEURO  RASS (if intubated): 		[facility] ICU (if concern for delirium):  Exam: A&Ox1-2 (speaking in nonsensical sentences)  Meds: acetaminophen   Oral Liquid .. 500 milliGRAM(s) Oral every 6 hours  gabapentin Solution 100 milliGRAM(s) Oral three times a day  HYDROmorphone  Injectable 0.5 milliGRAM(s) IV Push every 4 hours PRN breakthrough pain  HYDROmorphone  Injectable 0.5 milliGRAM(s) IV Push every 4 hours PRN Moderate Pain (4 - 6)  metoclopramide Injectable 10 milliGRAM(s) IV Push every 8 hours  oxyCODONE    Solution 5 milliGRAM(s) Oral every 4 hours PRN Moderate Pain (4 - 6)  oxyCODONE    Solution 10 milliGRAM(s) Oral every 4 hours PRN Severe Pain (7 - 10)  QUEtiapine 50 milliGRAM(s) Oral at bedtime      RESPIRATORY  RR: 39 (04-12-23 @ 01:00) (19 - 44)  SpO2: 98% (04-12-23 @ 01:00) (90% - 99%)  Wt(kg): --  Exam: no increased WOB  Mechanical Ventilation:   ABG - ( 11 Apr 2023 00:40 )  pH: 7.37  /  pCO2: 25    /  pO2: 76    / HCO3: 14    / Base Excess: -9.0  /  SaO2: 97.0    Lactate: x                Meds: albuterol/ipratropium for Nebulization. 3 milliLiter(s) Nebulizer once  albuterol/ipratropium for Nebulization. 3 milliLiter(s) Nebulizer once  albuterol/ipratropium for Nebulization. 3 milliLiter(s) Nebulizer once      CARDIOVASCULAR  HR: 108 (04-12-23 @ 01:00) (99 - 112)  BP: 110/71 (04-11-23 @ 21:00) (110/71 - 110/71)  BP(mean): 81 (04-11-23 @ 21:00) (81 - 81)  ABP: 124/61 (04-12-23 @ 01:00) (75/38 - 145/73)  ABP(mean): 85 (04-12-23 @ 01:00) (51 - 101)  Wt(kg): --  CVP(cm H2O): --      Exam: appears well perfused  Cardiac Rhythm: sinus  Perfusion     [X ]Adequate   [ ]Inadequate  Mentation   [X ]Normal       [ ]Reduced  Extremities  [ X]Warm         [ ]Cool  Volume Status [ ]Hypervolemic [ X]Euvolemic [ ]Hypovolemic  Meds: norepinephrine Infusion 0.05 MICROgram(s)/kG/Min IV Continuous <Continuous>      GI/NUTRITION  Exam: soft nondistended  Diet: regular  Meds: pantoprazole  Injectable 40 milliGRAM(s) IV Push daily  polyethylene glycol 3350 17 Gram(s) Oral at bedtime      GENITOURINARY  I&O's Detail    04-10 @ 07:01  -  04-11 @ 07:00  --------------------------------------------------------  IN:    Nepro with Carb Steady: 340 mL    Norepinephrine: 565.9 mL    Vasopressin: 67.5 mL  Total IN: 973.4 mL    OUT:    Indwelling Catheter - Urethral (mL): 0 mL    Nasogastric/Oral tube (mL): 1250 mL    Other (mL): 653 mL  Total OUT: 1903 mL    Total NET: -929.6 mL      04-11 @ 07:01  -  04-12 @ 01:26  --------------------------------------------------------  IN:    Enteral Tube Flush: 320 mL    Nepro with Carb Steady: 75 mL    Norepinephrine: 210 mL    PRBCs (Packed Red Blood Cells): 300 mL    Vasopressin: 81 mL  Total IN: 986 mL    OUT:    Nasogastric/Oral tube (mL): 1100 mL    Other (mL): 435 mL  Total OUT: 1535 mL    Total NET: -549 mL          04-11    137  |  101  |  14  ----------------------------<  110<H>  3.6   |  14<L>  |  0.87    Ca    7.9<L>      11 Apr 2023 00:40  Phos  3.5     04-11  Mg     2.50     04-11    TPro  6.3  /  Alb  2.4<L>  /  TBili  6.6<H>  /  DBili  x   /  AST  134<H>  /  ALT  112<H>  /  AlkPhos  158<H>  04-11    Meds: multivitamin/minerals/iron Oral Solution (CENTRUM) 15 milliLiter(s) Oral daily      HEMATOLOGIC  Meds: fondaparinux Injectable 1.5 milliGRAM(s) SubCutaneous daily                          8.0    12.70 )-----------( 359      ( 11 Apr 2023 20:00 )             24.3     PT/INR - ( 11 Apr 2023 00:40 )   PT: 33.8 sec;   INR: 2.88 ratio         PTT - ( 11 Apr 2023 00:40 )  PTT:34.8 sec    INFECTIOUS DISEASES  T(C): 37.6 (04-12-23 @ 00:00), Max: 37.6 (04-11-23 @ 21:00)  Wt(kg): --  WBC Count: 12.70 K/uL (04-11 @ 20:00)  WBC Count: 14.08 K/uL (04-11 @ 05:12)    Recent Cultures:    Meds:     ENDOCRINE  Capillary Blood Glucose    Meds: dextrose 50% Injectable 25 Gram(s) IV Push once  dextrose 50% Injectable 12.5 Gram(s) IV Push once  insulin glargine Injectable (LANTUS) 9 Unit(s) SubCutaneous at bedtime  insulin lispro (ADMELOG) corrective regimen sliding scale   SubCutaneous every 6 hours  insulin lispro Injectable (ADMELOG) 2 Unit(s) SubCutaneous every 6 hours  levothyroxine Injectable 20 MICROGram(s) IV Push at bedtime  methylPREDNISolone sodium succinate Injectable 5 milliGRAM(s) IV Push every 12 hours  vasopressin Infusion 0.03 Unit(s)/Min IV Continuous <Continuous>      ACCESS DEVICES:  [X ] Peripheral IV  [X ] Central Venous Line		[X ] R	[X ] L	[X ] IJ	[ ] Fem	[ ] SC	Placed:   [X ] Arterial Line			[ ] R	[ ] L	[ ] Fem	[ ] Rad	[ ] Ax	Placed:   [ ] PICC:					[ ] Mediport  [ ] Urinary Catheter, Date Placed:   [ ] Necessity of urinary, arterial, and venous catheters discussed    OTHER MEDICATIONS:  chlorhexidine 2% Cloths 1 Application(s) Topical daily  CRRT Treatment    <Continuous>  petrolatum Ophthalmic Ointment 1 Application(s) Both EYES two times a day  Phoxillum Filtration BK 4 / 2.5 5000 milliLiter(s) CRRT <Continuous>  PrismaSOL Filtration BGK 4 / 2.5 5000 milliLiter(s) CRRT <Continuous>  PrismaSOL Filtration BGK 4 / 2.5 5000 milliLiter(s) CRRT <Continuous>      IMAGING: 24 HOUR EVENTS:  - CT pending  - decreased steriod from 10 to 5 mg  - suppository, post pyloric tube placed f/u xr  - 1 U of blood given for hg 6.5  - Patient getting duoneb x 3  - Seroquel started for hypoactive delirium    NEURO  Exam: A&Ox1-2 (speaking in nonsensical sentences), lethargic   Meds: acetaminophen   Oral Liquid .. 500 milliGRAM(s) Oral every 6 hours  gabapentin Solution 100 milliGRAM(s) Oral three times a day  HYDROmorphone  Injectable 0.5 milliGRAM(s) IV Push every 4 hours PRN breakthrough pain  HYDROmorphone  Injectable 0.5 milliGRAM(s) IV Push every 4 hours PRN Moderate Pain (4 - 6)  metoclopramide Injectable 10 milliGRAM(s) IV Push every 8 hours  oxyCODONE    Solution 5 milliGRAM(s) Oral every 4 hours PRN Moderate Pain (4 - 6)  oxyCODONE    Solution 10 milliGRAM(s) Oral every 4 hours PRN Severe Pain (7 - 10)  QUEtiapine 50 milliGRAM(s) Oral at bedtime    RESPIRATORY  RR: 39 (04-12-23 @ 01:00) (19 - 44)  SpO2: 98% (04-12-23 @ 01:00) (90% - 99%)  Wt(kg): --  Exam: no increased WOB  Mechanical Ventilation:   ABG - ( 11 Apr 2023 00:40 )  pH: 7.37  /  pCO2: 25    /  pO2: 76    / HCO3: 14    / Base Excess: -9.0  /  SaO2: 97.0    Lactate: x      Meds: albuterol/ipratropium for Nebulization. 3 milliLiter(s) Nebulizer once  albuterol/ipratropium for Nebulization. 3 milliLiter(s) Nebulizer once  albuterol/ipratropium for Nebulization. 3 milliLiter(s) Nebulizer once    CARDIOVASCULAR  HR: 108 (04-12-23 @ 01:00) (99 - 112)  BP: 110/71 (04-11-23 @ 21:00) (110/71 - 110/71)  BP(mean): 81 (04-11-23 @ 21:00) (81 - 81)  ABP: 124/61 (04-12-23 @ 01:00) (75/38 - 145/73)  ABP(mean): 85 (04-12-23 @ 01:00) (51 - 101)    Exam: appears well perfused  Cardiac Rhythm: sinus  Perfusion     [X ]Adequate   [ ]Inadequate  Mentation   [X ]Normal       [ ]Reduced  Extremities  [ X]Warm         [ ]Cool  Volume Status [ ]Hypervolemic [ X]Euvolemic [ ]Hypovolemic  Meds: norepinephrine Infusion 0.05 MICROgram(s)/kG/Min IV Continuous <Continuous>    GI/NUTRITION  Exam: soft nondistended  Diet: regular  Meds: pantoprazole  Injectable 40 milliGRAM(s) IV Push daily  polyethylene glycol 3350 17 Gram(s) Oral at bedtime    GENITOURINARY  I&O's Detail  04-11 @ 07:01  -  04-12 @ 01:26  --------------------------------------------------------  IN:    Enteral Tube Flush: 320 mL    Nepro with Carb Steady: 75 mL    Norepinephrine: 210 mL    PRBCs (Packed Red Blood Cells): 300 mL    Vasopressin: 81 mL  Total IN: 986 mL  OUT:    Nasogastric/Oral tube (mL): 1100 mL    Other (mL): 435 mL  Total OUT: 1535 mL  Total NET: -549 mL    04-11  137  |  101  |  14  ----------------------------<  110<H>  3.6   |  14<L>  |  0.87  Ca    7.9<L>      11 Apr 2023 00:40  Phos  3.5     04-11  Mg     2.50     04-11  TPro  6.3  /  Alb  2.4<L>  /  TBili  6.6<H>  /  DBili  x   /  AST  134<H>  /  ALT  112<H>  /  AlkPhos  158<H>  04-11  Meds: multivitamin/minerals/iron Oral Solution (CENTRUM) 15 milliLiter(s) Oral daily    HEMATOLOGIC  Meds: fondaparinux Injectable 1.5 milliGRAM(s) SubCutaneous daily                          8.0    12.70 )-----------( 359      ( 11 Apr 2023 20:00 )             24.3     PT/INR - ( 11 Apr 2023 00:40 )   PT: 33.8 sec;   INR: 2.88 ratio         PTT - ( 11 Apr 2023 00:40 )  PTT:34.8 sec    INFECTIOUS DISEASES  T(C): 37.6 (04-12-23 @ 00:00), Max: 37.6 (04-11-23 @ 21:00)  WBC Count: 12.70 K/uL (04-11 @ 20:00)  WBC Count: 14.08 K/uL (04-11 @ 05:12)    ENDOCRINE  Capillary Blood Glucose    Meds: dextrose 50% Injectable 25 Gram(s) IV Push once  dextrose 50% Injectable 12.5 Gram(s) IV Push once  insulin glargine Injectable (LANTUS) 9 Unit(s) SubCutaneous at bedtime  insulin lispro (ADMELOG) corrective regimen sliding scale   SubCutaneous every 6 hours  insulin lispro Injectable (ADMELOG) 2 Unit(s) SubCutaneous every 6 hours  levothyroxine Injectable 20 MICROGram(s) IV Push at bedtime  methylPREDNISolone sodium succinate Injectable 5 milliGRAM(s) IV Push every 12 hours  vasopressin Infusion 0.03 Unit(s)/Min IV Continuous <Continuous>    ACCESS DEVICES:  [X ] Peripheral IV  [X ] Central Venous Line		[X ] R	[X ] L	[X ] IJ	[ ] Fem	[ ] SC	Placed:   [X ] Arterial Line			[ ] R	[ ] L	[ ] Fem	[ ] Rad	[ ] Ax	Placed:   [ ] PICC:					[ ] Mediport  [ ] Urinary Catheter, Date Placed:   [ ] Necessity of urinary, arterial, and venous catheters discussed    OTHER MEDICATIONS:  chlorhexidine 2% Cloths 1 Application(s) Topical daily  CRRT Treatment    <Continuous>  petrolatum Ophthalmic Ointment 1 Application(s) Both EYES two times a day  Phoxillum Filtration BK 4 / 2.5 5000 milliLiter(s) CRRT <Continuous>  PrismaSOL Filtration BGK 4 / 2.5 5000 milliLiter(s) CRRT <Continuous>  PrismaSOL Filtration BGK 4 / 2.5 5000 milliLiter(s) CRRT <Continuous> 24 HOUR EVENTS:  - CT for today (12th)  - decreased steroid from 10 to 5 mg  - Seroquel started for hypoactive delirium    NEURO  Exam: A&Ox1-2 (speaking in nonsensical sentences), lethargic   Meds: acetaminophen   Oral Liquid .. 500 milliGRAM(s) Oral every 6 hours  gabapentin Solution 100 milliGRAM(s) Oral three times a day  HYDROmorphone  Injectable 0.5 milliGRAM(s) IV Push every 4 hours PRN breakthrough pain  HYDROmorphone  Injectable 0.5 milliGRAM(s) IV Push every 4 hours PRN Moderate Pain (4 - 6)  metoclopramide Injectable 10 milliGRAM(s) IV Push every 8 hours  oxyCODONE    Solution 5 milliGRAM(s) Oral every 4 hours PRN Moderate Pain (4 - 6)  oxyCODONE    Solution 10 milliGRAM(s) Oral every 4 hours PRN Severe Pain (7 - 10)  QUEtiapine 50 milliGRAM(s) Oral at bedtime    RESPIRATORY  RR: 40 (12 Apr 2023 14:00) (19 - 48)  SpO2: 95% (12 Apr 2023 14:00) (90% - 99%)  O2 Parameters below as of 12 Apr 2023 12:00  Patient On (Oxygen Delivery Method): nasal cannula  O2 Concentration (%): 2    Meds: albuterol/ipratropium for Nebulization. 3 milliLiter(s) Nebulizer once  albuterol/ipratropium for Nebulization. 3 milliLiter(s) Nebulizer once  albuterol/ipratropium for Nebulization. 3 milliLiter(s) Nebulizer once    CARDIOVASCULAR  HR: 108 (04-12-23 @ 01:00) (99 - 112)  BP: 110/71 (04-11-23 @ 21:00) (110/71 - 110/71)  BP(mean): 81 (04-11-23 @ 21:00) (81 - 81)  ABP: 124/61 (04-12-23 @ 01:00) (75/38 - 145/73)  ABP(mean): 85 (04-12-23 @ 01:00) (51 - 101)    Exam: appears well perfused  Cardiac Rhythm: sinus  Perfusion     [X ]Adequate   [ ]Inadequate  Mentation   [ ]Normal       [x ]Reduced  Extremities  [ X]Warm         [ ]Cool  Volume Status [ ]Hypervolemic [ X]Euvolemic [ ]Hypovolemic  Meds: norepinephrine Infusion 0.05 MICROgram(s)/kG/Min IV Continuous <Continuous>    GI/NUTRITION  Exam: soft nondistended  Diet: regular  Meds: pantoprazole  Injectable 40 milliGRAM(s) IV Push daily  polyethylene glycol 3350 17 Gram(s) Oral at bedtime    GENITOURINARY  I&O's Detail  04-11 @ 07:01  -  04-12 @ 01:26  --------------------------------------------------------  IN:    Enteral Tube Flush: 320 mL    Nepro with Carb Steady: 75 mL    Norepinephrine: 210 mL    PRBCs (Packed Red Blood Cells): 300 mL    Vasopressin: 81 mL  Total IN: 986 mL  OUT:    Nasogastric/Oral tube (mL): 1100 mL    Other (mL): 435 mL  Total OUT: 1535 mL  Total NET: -549 mL    LABS:                        7.7    13.29 )-----------( 352      ( 12 Apr 2023 06:53 )             23.8     04-12    136  |  101  |  10  ----------------------------<  85  3.5   |  15<L>  |  0.80    Ca    7.7<L>      12 Apr 2023 06:53  Phos  3.0     04-12  Mg     2.60     04-12    TPro  6.3  /  Alb  2.2<L>  /  TBili  5.7<H>  /  DBili  x   /  AST  140<H>  /  ALT  113<H>  /  AlkPhos  152<H>  04-12    PT/INR - ( 11 Apr 2023 00:40 )   PT: 33.8 sec;   INR: 2.88 ratio      PTT - ( 11 Apr 2023 00:40 )  PTT:34.8 sec    Meds: multivitamin/minerals/iron Oral Solution (CENTRUM) 15 milliLiter(s) Oral daily    HEMATOLOGIC  Meds: fondaparinux Injectable 1.5 milliGRAM(s) SubCutaneous daily             INFECTIOUS DISEASES  ICU Vital Signs Last 24 Hrs  T(C): 36.8 (12 Apr 2023 12:00), Max: 37.6 (11 Apr 2023 21:00)  T(F): 98.3 (12 Apr 2023 12:00), Max: 99.7 (12 Apr 2023 00:00)  HR: 107 (12 Apr 2023 14:00) (99 - 113)  BP: 110/71 (11 Apr 2023 21:00) (110/71 - 110/71)  BP(mean): 81 (11 Apr 2023 21:00) (81 - 81)  ABP: 98/52 (12 Apr 2023 14:00) (75/38 - 146/77)  ABP(mean): 69 (12 Apr 2023 14:00) (51 - 106)  RR: 40 (12 Apr 2023 14:00) (19 - 48)  SpO2: 95% (12 Apr 2023 14:00) (90% - 99%)    O2 Parameters below as of 12 Apr 2023 12:00  Patient On (Oxygen Delivery Method): nasal cannula    O2 Concentration (%): 2    ENDOCRINE  Capillary Blood Glucose    Meds: dextrose 50% Injectable 25 Gram(s) IV Push once  dextrose 50% Injectable 12.5 Gram(s) IV Push once  insulin glargine Injectable (LANTUS) 9 Unit(s) SubCutaneous at bedtime  insulin lispro (ADMELOG) corrective regimen sliding scale   SubCutaneous every 6 hours  insulin lispro Injectable (ADMELOG) 2 Unit(s) SubCutaneous every 6 hours  levothyroxine Injectable 20 MICROGram(s) IV Push at bedtime  methylPREDNISolone sodium succinate Injectable 5 milliGRAM(s) IV Push every 12 hours  vasopressin Infusion 0.03 Unit(s)/Min IV Continuous <Continuous>    ACCESS DEVICES:  A line, central line, PIV, shiley      OTHER MEDICATIONS:  chlorhexidine 2% Cloths 1 Application(s) Topical daily  CRRT Treatment    <Continuous>  petrolatum Ophthalmic Ointment 1 Application(s) Both EYES two times a day  Phoxillum Filtration BK 4 / 2.5 5000 milliLiter(s) CRRT <Continuous>  PrismaSOL Filtration BGK 4 / 2.5 5000 milliLiter(s) CRRT <Continuous>  PrismaSOL Filtration BGK 4 / 2.5 5000 milliLiter(s) CRRT <Continuous>

## 2023-04-12 NOTE — PROGRESS NOTE ADULT - ASSESSMENT
Patient is a 48 year old female with a PMHx of HTN, hypothyroidism,  (30 years ago) and breast cancer (S/P left mastectomy and chemo in ) who presented with epigastric pain and left upper quadrant pain.  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  Transferred from OSH ([place]) for hypotension and tachypnea requiring emergent intubation and multiple pressors.  SICU consulted for hemodynamic monitoring and respiratory management.    Plan:   - Post-pyloric feeding tube, f/u XR to confirm placement   - Surveillance CT pending to reassess collections   - NGT with bilious output  - Continue w/ IV Meropenam  - Continue CVVH  - Appreciate care per SICU      B Team Surgery  [id number] Yes

## 2023-04-12 NOTE — PROGRESS NOTE ADULT - SUBJECTIVE AND OBJECTIVE BOX
B TEAM SURGERY DAILY PROGRESS NOTE    SUBJECTIVE:     Patient seen and evaluated on AM rounds.      OBJECTIVE:  Vital Signs Last 24 Hrs  T(C): 36.8 (2023 08:00), Max: 37.6 (2023 21:00)  T(F): 98.2 (2023 08:00), Max: 99.7 (2023 00:00)  HR: 105 (2023 09:00) (99 - 113)  BP: 110/71 (2023 21:00) (110/71 - 110/71)  BP(mean): 81 (2023 21:00) (81 - 81)  RR: 36 (2023 09:00) (19 - 44)  SpO2: 98% (2023 09:) (90% - 99%)    Parameters below as of 2023 08:00  Patient On (Oxygen Delivery Method): nasal cannula    O2 Concentration (%): 2  Daily     Daily Weight in k.9 (2023 06:30)  SWATI:      Chest Tube:      NG Tube:   23 @ 07:01  -  23 @ 07:00  --------------------------------------------------------  OUT: 1550 mL          --------------------------------------------------------  IN:    Enteral Tube Flush: 620 mL    Nepro with Carb Steady: 270 mL    Norepinephrine: 269.5 mL    PRBCs (Packed Red Blood Cells): 300 mL    Vasopressin: 99 mL  Total IN: 1558.5 mL    OUT:    Nasogastric/Oral tube (mL): 1550 mL    Other (mL): 712 mL  Total OUT: 2262 mL        STANDING  acetaminophen   Oral Liquid .. 500 milliGRAM(s) Oral every 6 hours  albuterol/ipratropium for Nebulization. 3 milliLiter(s) Nebulizer once  albuterol/ipratropium for Nebulization. 3 milliLiter(s) Nebulizer once  albuterol/ipratropium for Nebulization. 3 milliLiter(s) Nebulizer once  chlorhexidine 2% Cloths 1 Application(s) Topical daily  CRRT Treatment    <Continuous>  dextrose 50% Injectable 25 Gram(s) IV Push once  dextrose 50% Injectable 12.5 Gram(s) IV Push once  fondaparinux Injectable 1.5 milliGRAM(s) SubCutaneous daily  gabapentin Solution 100 milliGRAM(s) Oral three times a day  insulin glargine Injectable (LANTUS) 9 Unit(s) SubCutaneous at bedtime  insulin lispro (ADMELOG) corrective regimen sliding scale   SubCutaneous every 6 hours  insulin lispro Injectable (ADMELOG) 2 Unit(s) SubCutaneous every 6 hours  levothyroxine Injectable 20 MICROGram(s) IV Push at bedtime  methylPREDNISolone sodium succinate Injectable 5 milliGRAM(s) IV Push every 12 hours  metoclopramide Injectable 10 milliGRAM(s) IV Push every 8 hours  multivitamin/minerals/iron Oral Solution (CENTRUM) 15 milliLiter(s) Oral daily  norepinephrine Infusion 0.05 MICROgram(s)/kG/Min (11.1 mL/Hr) IV Continuous <Continuous>  pantoprazole  Injectable 40 milliGRAM(s) IV Push daily  petrolatum Ophthalmic Ointment 1 Application(s) Both EYES two times a day  Phoxillum Filtration BK 4 / 2.5 5000 milliLiter(s) (2400 mL/Hr) CRRT <Continuous>  polyethylene glycol 3350 17 Gram(s) Oral at bedtime  PrismaSOL Filtration BGK 4 / 2.5 5000 milliLiter(s) (1200 mL/Hr) CRRT <Continuous>  PrismaSOL Filtration BGK 4 / 2.5 5000 milliLiter(s) (200 mL/Hr) CRRT <Continuous>  QUEtiapine 50 milliGRAM(s) Oral at bedtime  vasopressin Infusion 0.03 Unit(s)/Min (4.5 mL/Hr) IV Continuous <Continuous>    PRN  HYDROmorphone  Injectable 0.5 milliGRAM(s) IV Push every 4 hours PRN breakthrough pain  oxyCODONE    Solution 5 milliGRAM(s) Oral every 4 hours PRN Moderate Pain (4 - 6)  oxyCODONE    Solution 10 milliGRAM(s) Oral every 4 hours PRN Severe Pain (7 - 10)      Labs:  136  |  101  |  10  ----------------------------<  85    ()  3.5   |  15<L>  |  0.80            Ca    7.7<L>        Mg    2.60  Phos  3.0              Physical Exam:  General: NAD  Cardiac: regular rate, warm and well perfused  Respiratory: Nonlabored respirations, normal cw expansion.  Abdomen: soft, nontender, nondistended. NGT to LCWS with bilious output

## 2023-04-12 NOTE — PROGRESS NOTE ADULT - ASSESSMENT
Patient is a 48 year old female with PMHx significant for HTN, hypothyroidism,  30 years ago, breast cancer s/p left mastectomy and chemo in  presenting with epigastric pain and LUQ pain. Found to have necrotizing pancreatitis. Transferred from OSH ([place]) for hypotension and tachypnea requiring emergent intubation and multiple pressors. SICU consulted for hemodynamic monitoring and respiratory management now extubated on CVVHD    PLAN  Neuro:  - Precautions for delirium - protected sleep  - Seroquel 50mg Qhs  - Dilaudid prn  - Tylenol 500mg Q6h via NGT    Resp:  - AHRF w/ b/l plef 2/2 necrotizing pancreatitis     CV:  Mixed shock state 2/2 septic shock w/ E. Coli in urine vs hypovolemic shock now s/p 11L resus    - wean levo/vaso as tolerated     GI:   - Pending repeat CT to assess interval progression/improvement. CT 3/27: >50% of the pancreatic head extends into the mesentery and inferiorly along the retroperitoneum into the lower abdomen  - RUQ US: Chololithiasis (2cm stone) w/ no acute frannie. MRCP 4/3: no choledocholithiasis.   - Keep ngt to WS  - GI ppx w/ protonix   - Reglan     Renal:  ARF 2/2 shock state resulting in ATN requiring CRRT    - On CRRT keep net even, plan to start intermittent hemodialysis when tolerated     Heme:   - CBC   - SCDs and CRRT dosed fondaparinux for DVT ppx  - HIT Ab positive; DOUGLAS Negative   - f/u anti Xa level. Most recent 0.31. 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 Found to have necrotizing pancreatitis. Transferred from OSH ([place]) for hypotension and tachypnea requiring emergent intubation and multiple pressors. SICU consulted for hemodynamic monitoring and respiratory management now extubated on CVVHD, patient continues to be on vasopressors    PLAN  Neuro:  - Precautions for delirium - protected sleep  - Seroquel 50mg Qhs ---> decrease to 25mg qhs  - Dilaudid prn  - Tylenol 500mg Q6h via NGT    Resp:  - AHRF w/ b/l plef 2/2 necrotizing pancreatitis     CV:  Mixed shock state 2/2 septic shock w/ E. Coli in urine vs hypovolemic shock now s/p 11L resus    - wean levo/vaso as tolerated  to midodrine 10 q 8 hours    GI:   - Pending repeat CT to assess interval progression/improvement. CT 3/27: >50% of the pancreatic head extends into the mesentery and inferiorly along the retroperitoneum into the lower abdomen. NEEDS PIERRE SCORE  - RUQ US: Chololithiasis (2cm stone) w/ no acute frannie. MRCP 4/3: no choledocholithiasis.   - Keep ngt to LCWS  - Continue TF via post pyloric tube now at goal  - GI ppx w/ protonix   - Reglan     Renal:  ARF 2/2 shock state resulting in ATN requiring CRRT    - On CRRT keep net even, plan to start intermittent hemodialysis when tolerated     Heme:   - CBC   - SCDs and CRRT dosed fondaparinux for DVT ppx  - HIT Ab positive; DOUGLAS Negative   - f/u anti Xa level. Most recent 0.31. 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 Found to have necrotizing pancreatitis. Transferred from OSH ([place]) for hypotension and tachypnea requiring emergent intubation and multiple pressors. SICU consulted for hemodynamic monitoring and respiratory management now extubated on CVVHD, patient continues to be on vasopressors    PLAN  Neuro:  - Precautions for delirium - protected sleep  - Seroquel 50mg Qhs ---> decrease to 25mg qhs  - Dilaudid prn  - Tylenol 500mg Q6h via NGT PRN     Resp:  - AHRF w/ b/l plef 2/2 necrotizing pancreatitis   - CTM     CV:  Mixed shock state 2/2 septic shock w/ E. Coli in urine vs hypovolemic shock now s/p 11L resus    - wean levo/vaso as tolerated to midodrine 10 q 8 hours    GI:   - Pending repeat CT to assess interval progression/improvement. CT 3/27: >50% of the pancreatic head extends into the mesentery and inferiorly along the retroperitoneum into the lower abdomen. NEEDS PIERRE SCORE  - RUQ US: Chololithiasis (2cm stone) w/ no acute frannie. MRCP 4/3: no choledocholithiasis.   - Keep ngt to LCWS  - Continue TF via post pyloric tube now at goal  - GI ppx w/ protonix   - Reglan     Renal:  ARF 2/2 shock state resulting in ATN requiring CRRT    - On CRRT keep net even, plan to start intermittent hemodialysis when tolerated     Heme:   - CBC   - SCDs and CRRT dosed fondaparinux for DVT ppx  - HIT Ab positive; DOUGLAS Negative   - f/u anti Xa level. Most recent 0.31. Next level scheduled to be drawn 13th in AM     #Splenic Vein Thrombosis  3/22 CT Abd w/ contrast: Focal filling defect in the splenic vein at the level of the body of the pancreas for which an underlying thrombus is suspected     ID:   Intermittently febrile w/ leukocytosis c/f septic shock 2/2 E coli UTI  Diagnostics:  - 3/22 Ucx: E coli  - 3/30 Bcx: staph hominis in aerobic bottle x 1 (likely contaminant)   - S/p Meropenem (3/26-)    Endo:   - Lantus 9 units, admelog 2 units q6  - c/w synthroid 20 IV (home med)   - ISS  - Methylpred tapered, and ended today ()    Code Status: Full code  Lines: KELLY Mendoza A line    Disposition: SICU

## 2023-04-12 NOTE — PROGRESS NOTE ADULT - PROBLEM SELECTOR PLAN 1
Pt. with oliguric CHATO in the setting of necrotizing pancreatitis and shock. Pt. with most likely ATN. Scr was 1.24 on 3/23/23 and increased to 4.60 on 3/24/23. Pt. was initiated on CRRT/CVVHDF on 3/24/23 and discontinued overnight (4/6/23) by SICU team. Pt. remained oliguric and was restarted on CVVHDH on 4/7/23. Labs reviewed. Pt. currently tolerating CRRT. BP being maintained on 2 vasopressors. HD catheter functioning well. Plan is to continue CRRT today as per discussion with SICU team. Monitor labs and urine output. Avoid any potential nephrotoxins. Dose medications as per eGFR. Pt. with oliguric CHATO in the setting of necrotizing pancreatitis and shock. Pt. with most likely ATN. Scr was 1.24 on 3/23/23 and increased to 4.60 on 3/24/23. Pt. was initiated on CRRT/CVVHDF on 3/24/23 and discontinued overnight on 4/6/23 by SICU team. Pt. remained oliguric and was restarted on CVVHDH on 4/7/23. Labs reviewed. Pt. currently tolerating CRRT. BP being maintained on 2 vasopressors. HD catheter functioning well. Plan is to continue CRRT today as per discussion with SICU team. Monitor labs and urine output. Avoid any potential nephrotoxins. Dose medications as per eGFR.

## 2023-04-13 NOTE — PROGRESS NOTE ADULT - SUBJECTIVE AND OBJECTIVE BOX
B Team Surgery Daily Progress Note    SUBJECTIVE: Patient seen and examined by team on morning rounds. No acute events overnight. Interval CT 4/12 showing increased collections, no well defined wall. Remains in SICU requiring pressor support.     Vital Signs Last 24 Hrs  T(C): 37.2 (13 Apr 2023 04:00), Max: 37.2 (13 Apr 2023 00:00)  T(F): 99 (13 Apr 2023 04:00), Max: 99 (13 Apr 2023 04:00)  HR: 108 (13 Apr 2023 07:00) (101 - 110)  BP: 105/55 (13 Apr 2023 07:00) (90/61 - 105/55)  BP(mean): 70 (13 Apr 2023 07:00) (70 - 70)  RR: 25 (13 Apr 2023 07:00) (16 - 40)  SpO2: 94% (13 Apr 2023 07:00) (94% - 99%)  Parameters below as of 13 Apr 2023 07:00  O2 Flow (L/min): 2      General Appearance: Appears well, NAD  Neck: Supple  Chest: Equal expansion bilaterally, equal breath sounds  CV: Pulse regular presently  Abdomen: Soft, nontender, nondistended  Extremities: Grossly symmetric, SCD's in place     I&O's Summary    12 Apr 2023 07:01  -  13 Apr 2023 07:00  --------------------------------------------------------  IN: 1591 mL / OUT: 1615 mL / NET: -24 mL    13 Apr 2023 07:01  -  13 Apr 2023 08:52  --------------------------------------------------------  IN: 0 mL / OUT: 50 mL / NET: -50 mL      I&O's Detail    12 Apr 2023 07:01  -  13 Apr 2023 07:00  --------------------------------------------------------  IN:    Enteral Tube Flush: 435 mL    Nepro with Carb Steady: 960 mL    Norepinephrine: 88 mL    Vasopressin: 108 mL  Total IN: 1591 mL    OUT:    Nasogastric/Oral tube (mL): 0 mL    Other (mL): 1615 mL  Total OUT: 1615 mL    Total NET: -24 mL      13 Apr 2023 07:01  -  13 Apr 2023 08:52  --------------------------------------------------------  IN:  Total IN: 0 mL    OUT:    Intermittent Catheterization - Urethral (mL): 50 mL  Total OUT: 50 mL    Total NET: -50 mL          MEDICATIONS  (STANDING):  acetaminophen   Oral Liquid .. 500 milliGRAM(s) Oral every 6 hours  chlorhexidine 2% Cloths 1 Application(s) Topical daily  CRRT Treatment    <Continuous>  CRRT Treatment    <Continuous>  dextrose 50% Injectable 25 Gram(s) IV Push once  dextrose 50% Injectable 12.5 Gram(s) IV Push once  fondaparinux Injectable 1.5 milliGRAM(s) SubCutaneous daily  gabapentin Solution 100 milliGRAM(s) Oral three times a day  insulin glargine Injectable (LANTUS) 9 Unit(s) SubCutaneous at bedtime  insulin lispro (ADMELOG) corrective regimen sliding scale   SubCutaneous every 6 hours  insulin lispro Injectable (ADMELOG) 2 Unit(s) SubCutaneous every 6 hours  levothyroxine Injectable 20 MICROGram(s) IV Push at bedtime  metoclopramide Injectable 10 milliGRAM(s) IV Push every 8 hours  midodrine 10 milliGRAM(s) Oral every 8 hours  multivitamin/minerals/iron Oral Solution (CENTRUM) 15 milliLiter(s) Oral daily  norepinephrine Infusion 0.05 MICROgram(s)/kG/Min (11.1 mL/Hr) IV Continuous <Continuous>  pantoprazole  Injectable 40 milliGRAM(s) IV Push daily  petrolatum Ophthalmic Ointment 1 Application(s) Both EYES two times a day  Phoxillum Filtration BK 4 / 2.5 5000 milliLiter(s) (2400 mL/Hr) CRRT <Continuous>  Phoxillum Filtration BK 4 / 2.5 5000 milliLiter(s) (2400 mL/Hr) CRRT <Continuous>  polyethylene glycol 3350 17 Gram(s) Oral at bedtime  PrismaSOL Filtration BGK 4 / 2.5 5000 milliLiter(s) (1200 mL/Hr) CRRT <Continuous>  PrismaSOL Filtration BGK 4 / 2.5 5000 milliLiter(s) (200 mL/Hr) CRRT <Continuous>  PrismaSOL Filtration BGK 4 / 2.5 5000 milliLiter(s) (1200 mL/Hr) CRRT <Continuous>  PrismaSOL Filtration BGK 4 / 2.5 5000 milliLiter(s) (200 mL/Hr) CRRT <Continuous>  QUEtiapine 25 milliGRAM(s) Oral at bedtime  vasopressin Infusion 0.03 Unit(s)/Min (4.5 mL/Hr) IV Continuous <Continuous>    MEDICATIONS  (PRN):  HYDROmorphone  Injectable 0.5 milliGRAM(s) IV Push every 4 hours PRN breakthrough pain  oxyCODONE    Solution 5 milliGRAM(s) Oral every 4 hours PRN Moderate Pain (4 - 6)  oxyCODONE    Solution 10 milliGRAM(s) Oral every 4 hours PRN Severe Pain (7 - 10)      LABS:                        7.7    11.62 )-----------( 294      ( 13 Apr 2023 05:30 )             23.9     04-13    136  |  102  |  10  ----------------------------<  180<H>  3.6   |  20<L>  |  0.82    Ca    8.0<L>      13 Apr 2023 05:30  Phos  2.7     04-13  Mg     2.40     04-13    TPro  6.5  /  Alb  2.3<L>  /  TBili  5.6<H>  /  DBili  x   /  AST  127<H>  /  ALT  111<H>  /  AlkPhos  172<H>  04-13          RADIOLOGY & ADDITIONAL STUDIES:

## 2023-04-13 NOTE — PROGRESS NOTE ADULT - ASSESSMENT
48 year old female with PMHx significant for HTN, hypothyroidism,  30 years ago, breast cancer s/p left mastectomy and chemo in  presenting with epigastric pain and LUQ pain. Found to have necrotizing pancreatitis. Transferred from OSH ([place]) for hypotension and tachypnea requiring emergent intubation and multiple pressors. SICU consulted for hemodynamic monitoring and respiratory management now extubated on CVVHD, patient continues to be on vasopressors    PLAN  Neuro:  - Precautions for delirium - protected sleep  - Seroquel 25mg Qhs  - Dilaudid prn  - Tylenol 500mg Q6h via NGT PRN     Resp:  - AHRF w/ b/l plef 2/2 necrotizing pancreatitis   - CTM     CV:  Mixed shock state 2/2 septic shock w/ E. Coli in urine vs hypovolemic shock now s/p 11L resus    - wean levo/vaso as tolerated   - midodrine 10 q 8 hours    GI:   - S/p repeat CT showing extensive necrotizing pancreatitis however no discrete walled-off collection  - RUQ US: Chololithiasis (2cm stone) w/ no acute frannie. MRCP 4/3: no choledocholithiasis.   - Continue TF via post pyloric tube now at goal  - GI ppx w/ protonix   - Reglan     Renal:  ARF 2/2 shock state resulting in ATN requiring CRRT    - On CRRT keep net even, plan to start intermittent hemodialysis when tolerated     Heme:   - CBC   - SCDs and CRRT dosed fondaparinux for DVT ppx  - HIT Ab positive; DOUGLAS Negative   - f/u anti Xa level. Most recent 0.31. Next level scheduled to be drawn 13th in AM     #Splenic Vein Thrombosis  3/22 CT Abd w/ contrast: Focal filling defect in the splenic vein at the level of the body of the pancreas for which an underlying thrombus is suspected     ID:   Intermittently febrile w/ leukocytosis c/f septic shock 2/2 E coli UTI  Diagnostics:  - 3/22 Ucx: E coli  - 3/30 Bcx: staph hominis in aerobic bottle x 1 (likely contaminant)   - S/p Meropenem (3/26-)    Endo:   - Lantus 9 units, admelog 2 units q6  - c/w synthroid 20 IV (home med)   - ISS  - S/p steroid taper    Code Status: Full code  Lines: RIJ shiley, LIJ CVC, A line    Disposition: SICU   48 year old female with PMHx significant for HTN, hypothyroidism,  30 years ago, breast cancer s/p left mastectomy and chemo in  presenting with epigastric pain and LUQ pain. Found to have necrotizing pancreatitis. Transferred from OSH ([place]) for hypotension and tachypnea requiring emergent intubation and multiple pressors. SICU consulted for hemodynamic monitoring and respiratory management now extubated on CVVHD, patient continues to be on vasopressors    PLAN  Neuro:  - Precautions for delirium - protected sleep  - Seroquel 25mg Qhs  - Dilaudid prn  - D/c APAP     Resp:  - AHRF w/ b/l plef 2/2 necrotizing pancreatitis   - CTM     CV:  Mixed shock state 2/2 septic shock w/ E. Coli in urine vs hypovolemic shock now s/p 11L resus    - Stop levo  - vaso as tolerated, attempt to d/c  - midodrine inc from 10 to 30 q 8 hours    GI:   - S/p repeat CT showing extensive necrotizing pancreatitis however no discrete walled-off collection  - RUQ US: Chololithiasis (2cm stone) w/ no acute frannie. MRCP 4/3: no choledocholithiasis.   - Continue TF via post pyloric tube now at goal  - GI ppx w/ protonix   - Reglan     Renal:  ARF 2/2 shock state resulting in ATN requiring CRRT    - On CRRT keep net neg 50, plan to start intermittent hemodialysis when tolerated     Heme:   - CBC   - SCDs and CRRT dosed fondaparinux for DVT ppx  - HIT Ab positive; DOUGLAS Negative   - f/u anti Xa level. Most recent 0.31. Next level scheduled to be drawn  in AM     #Splenic Vein Thrombosis  3/22 CT Abd w/ contrast: Focal filling defect in the splenic vein at the level of the body of the pancreas for which an underlying thrombus is suspected     ID:   Intermittently febrile w/ leukocytosis c/f septic shock 2/2 E coli UTI  Diagnostics:  - 3/22 Ucx: E coli  - 3/30 Bcx: staph hominis in aerobic bottle x 1 (likely contaminant)   - S/p Meropenem (3/26-)  - Restart meropenem ()    Endo:   - Lantus 9 units, admelog 2 units q6  - c/w synthroid 20 IV (home med)   - ISS  - S/p steroid taper    Code Status: Full code  Lines: RIJ shiley, LIJ CVC, A line    Disposition: SICU   48 year old female with PMHx significant for HTN, hypothyroidism,  30 years ago, breast cancer s/p left mastectomy and chemo in  presenting with epigastric pain and LUQ pain. Found to have necrotizing pancreatitis. Transferred from OSH ([place]) for hypotension and tachypnea requiring emergent intubation and multiple pressors. SICU consulted for hemodynamic monitoring and respiratory management now extubated on CVVHD, patient continues to be on vasopressors    PLAN  Neuro:  - Precautions for delirium - protected sleep  - Seroquel 25mg Qhs  - Dilaudid prn  - D/c APAP     Resp:  - AHRF w/ b/l plef 2/2 necrotizing pancreatitis   - CTM     CV:  Mixed shock state 2/2 septic shock w/ E. Coli in urine vs hypovolemic shock now s/p 11L resus    - Stop levo  - vaso as tolerated, attempt to d/c  - midodrine inc from 10 to 30 q 8 hours    GI:   - S/p repeat CT showing extensive necrotizing pancreatitis however no discrete walled-off collection  - RUQ US: Chololithiasis (2cm stone) w/ no acute frannie. MRCP 4/3: no choledocholithiasis.   - Continue TF via post pyloric tube now at goal  - GI ppx w/ protonix   - Reglan d/c'd   - ngt to gravity        Renal:  ARF 2/2 shock state resulting in ATN requiring CRRT    - On CRRT keep net neg 50, plan to start intermittent hemodialysis when tolerated     Heme:   - CBC   - SCDs and CRRT dosed fondaparinux for DVT ppx  - HIT Ab positive; DOUGLAS Negative   - f/u anti Xa level. Most recent 0.31. Next level scheduled to be drawn  in AM     #Splenic Vein Thrombosis  3/22 CT Abd w/ contrast: Focal filling defect in the splenic vein at the level of the body of the pancreas for which an underlying thrombus is suspected     ID:   Intermittently febrile w/ leukocytosis c/f septic shock 2/2 E coli UTI  Diagnostics:  - 3/22 Ucx: E coli  - 3/30 Bcx: staph hominis in aerobic bottle x 1 (likely contaminant)   - S/p Meropenem (3/26-)  - Restart meropenem ()    Endo:   - Lantus 9 units, admelog 2 units q6  - c/w synthroid 20 IV (home med)   - ISS  - S/p steroid taper    Code Status: Full code  Lines: KELLY Mendoza CVC, A line    Disposition: SICU

## 2023-04-13 NOTE — PROGRESS NOTE ADULT - SUBJECTIVE AND OBJECTIVE BOX
[facility] DIVISION OF KIDNEY DISEASES AND HYPERTENSION   FOLLOW UP NOTE    --------------------------------------------------------------------------------  Chief Complaint: Oliguric CHATO, on RRT    24 hour events/subjective: Pt. seen and examined in the SICU today. Pt. lethargic, unable to obtain ROS. BP maintained on 2 IV vasopressors. Pt. oliguric, remains on CVVHDF/CRRT.     PAST HISTORY  --------------------------------------------------------------------------------  No significant changes to PMH, PSH, FHx, SHx, unless otherwise noted    ALLERGIES & MEDICATIONS  --------------------------------------------------------------------------------  Allergies  No Known Allergies    Intolerances    Standing Inpatient Medications  acetaminophen   Oral Liquid .. 500 milliGRAM(s) Oral every 6 hours  chlorhexidine 2% Cloths 1 Application(s) Topical daily  CRRT Treatment    <Continuous>  CRRT Treatment    <Continuous>  dextrose 50% Injectable 25 Gram(s) IV Push once  dextrose 50% Injectable 12.5 Gram(s) IV Push once  fondaparinux Injectable 1.5 milliGRAM(s) SubCutaneous daily  gabapentin Solution 100 milliGRAM(s) Oral three times a day  insulin glargine Injectable (LANTUS) 9 Unit(s) SubCutaneous at bedtime  insulin lispro (ADMELOG) corrective regimen sliding scale   SubCutaneous every 6 hours  insulin lispro Injectable (ADMELOG) 2 Unit(s) SubCutaneous every 6 hours  levothyroxine Injectable 20 MICROGram(s) IV Push at bedtime  metoclopramide Injectable 10 milliGRAM(s) IV Push every 8 hours  midodrine 10 milliGRAM(s) Oral every 8 hours  multivitamin/minerals/iron Oral Solution (CENTRUM) 15 milliLiter(s) Oral daily  norepinephrine Infusion 0.05 MICROgram(s)/kG/Min IV Continuous <Continuous>  pantoprazole  Injectable 40 milliGRAM(s) IV Push daily  petrolatum Ophthalmic Ointment 1 Application(s) Both EYES two times a day  Phoxillum Filtration BK 4 / 2.5 5000 milliLiter(s) CRRT <Continuous>  Phoxillum Filtration BK 4 / 2.5 5000 milliLiter(s) CRRT <Continuous>  Phoxillum Filtration BK 4 / 2.5 5000 milliLiter(s) CRRT <Continuous>  Phoxillum Filtration BK 4 / 2.5 5000 milliLiter(s) CRRT <Continuous>  polyethylene glycol 3350 17 Gram(s) Oral at bedtime  PrismaSOL Filtration BGK 4 / 2.5 5000 milliLiter(s) CRRT <Continuous>  PrismaSOL Filtration BGK 4 / 2.5 5000 milliLiter(s) CRRT <Continuous>  QUEtiapine 25 milliGRAM(s) Oral at bedtime  vasopressin Infusion 0.03 Unit(s)/Min IV Continuous <Continuous>    PRN Inpatient Medications  HYDROmorphone  Injectable 0.5 milliGRAM(s) IV Push every 4 hours PRN  oxyCODONE    Solution 5 milliGRAM(s) Oral every 4 hours PRN  oxyCODONE    Solution 10 milliGRAM(s) Oral every 4 hours PRN    REVIEW OF SYSTEMS  --------------------------------------------------------------------------------  Unable to obtain    VITALS/PHYSICAL EXAM  --------------------------------------------------------------------------------  T(C): 37.3 (04-13-23 @ 08:00), Max: 37.3 (04-13-23 @ 08:00)  HR: 105 (04-13-23 @ 10:00) (101 - 111)  BP: 105/55 (04-13-23 @ 07:00) (90/61 - 105/55)  RR: 17 (04-13-23 @ 10:00) (16 - 40)  SpO2: 97% (04-13-23 @ 10:00) (94% - 99%)  Wt(kg): --    04-12-23 @ 07:01  -  04-13-23 @ 07:00  --------------------------------------------------------  IN: 1591 mL / OUT: 1615 mL / NET: -24 mL    04-13-23 @ 07:01  -  04-13-23 @ 10:12  --------------------------------------------------------  IN: 65.7 mL / OUT: 50 mL / NET: 15.7 mL    Physical Exam:  	Gen: Ill appearing  	HEENT: NGT+, anicteric  	Pulm: Fair entry B/L  	CV: S1S2+  	Abd: Obese, distended  	Ext: +LE edema B/L, Trace B/L UE edema   	Neuro: lethargic, unable to provide ROS  	Skin: Warm and dry              Dialysis access: Right IJ non tunneled HD catheter seen  LABS/STUDIES  --------------------------------------------------------------------------------              7.7    11.62 >-----------<  294      [04-13-23 @ 05:30]              23.9     136  |  102  |  10  ----------------------------<  180      [04-13-23 @ 05:30]  3.6   |  20  |  0.82        Ca     8.0     [04-13-23 @ 05:30]      Mg     2.40     [04-13-23 @ 05:30]      Phos  2.7     [04-13-23 @ 05:30]    Creatinine Trend:  SCr 0.82 [04-13 @ 05:30]  SCr 0.80 [04-12 @ 06:53]  SCr 0.87 [04-11 @ 00:40]  SCr 0.99 [04-10 @ 17:15]  SCr 0.88 [04-10 @ 02:06]    Urinalysis - [03-24-23 @ 05:30]      Color Yellow / Appearance Clear / SG 1.020 / pH 5.0      Gluc 250 / Ketone Trace  / Bili Negative / Urobili Negative       Blood Moderate / Protein 100 / Leuk Est Trace / Nitrite Negative      RBC 0-2 / WBC 6-10 / Hyaline  / Gran  / Sq Epi  / Non Sq Epi Moderate / Bacteria Many    HBsAg Nonreact      [03-27-23 @ 00:35]  HCV 0.05, Nonreact      [03-27-23 @ 00:35]  HIV Nonreact      [03-30-23 @ 20:10] [facility] DIVISION OF KIDNEY DISEASES AND HYPERTENSION   FOLLOW UP NOTE    --------------------------------------------------------------------------------  Chief Complaint: Oliguric CHATO, on RRT    24 hour events/subjective: Pt. seen and examined in the SICU today. Pt. lethargic, unable to obtain ROS. BP maintained on 2 IV vasopressors. Pt. oliguric, remains on CVVHDF/CRRT.     PAST HISTORY  --------------------------------------------------------------------------------  No significant changes to PMH, PSH, FHx, SHx, unless otherwise noted    ALLERGIES & MEDICATIONS  --------------------------------------------------------------------------------  Allergies  No Known Allergies    Intolerances    Standing Inpatient Medications  acetaminophen   Oral Liquid .. 500 milliGRAM(s) Oral every 6 hours  chlorhexidine 2% Cloths 1 Application(s) Topical daily  CRRT Treatment    <Continuous>  CRRT Treatment    <Continuous>  dextrose 50% Injectable 25 Gram(s) IV Push once  dextrose 50% Injectable 12.5 Gram(s) IV Push once  fondaparinux Injectable 1.5 milliGRAM(s) SubCutaneous daily  gabapentin Solution 100 milliGRAM(s) Oral three times a day  insulin glargine Injectable (LANTUS) 9 Unit(s) SubCutaneous at bedtime  insulin lispro (ADMELOG) corrective regimen sliding scale   SubCutaneous every 6 hours  insulin lispro Injectable (ADMELOG) 2 Unit(s) SubCutaneous every 6 hours  levothyroxine Injectable 20 MICROGram(s) IV Push at bedtime  metoclopramide Injectable 10 milliGRAM(s) IV Push every 8 hours  midodrine 10 milliGRAM(s) Oral every 8 hours  multivitamin/minerals/iron Oral Solution (CENTRUM) 15 milliLiter(s) Oral daily  norepinephrine Infusion 0.05 MICROgram(s)/kG/Min IV Continuous <Continuous>  pantoprazole  Injectable 40 milliGRAM(s) IV Push daily  petrolatum Ophthalmic Ointment 1 Application(s) Both EYES two times a day  Phoxillum Filtration BK 4 / 2.5 5000 milliLiter(s) CRRT <Continuous>  Phoxillum Filtration BK 4 / 2.5 5000 milliLiter(s) CRRT <Continuous>  Phoxillum Filtration BK 4 / 2.5 5000 milliLiter(s) CRRT <Continuous>  Phoxillum Filtration BK 4 / 2.5 5000 milliLiter(s) CRRT <Continuous>  polyethylene glycol 3350 17 Gram(s) Oral at bedtime  PrismaSOL Filtration BGK 4 / 2.5 5000 milliLiter(s) CRRT <Continuous>  PrismaSOL Filtration BGK 4 / 2.5 5000 milliLiter(s) CRRT <Continuous>  QUEtiapine 25 milliGRAM(s) Oral at bedtime  vasopressin Infusion 0.03 Unit(s)/Min IV Continuous <Continuous>    PRN Inpatient Medications  HYDROmorphone  Injectable 0.5 milliGRAM(s) IV Push every 4 hours PRN  oxyCODONE    Solution 5 milliGRAM(s) Oral every 4 hours PRN  oxyCODONE    Solution 10 milliGRAM(s) Oral every 4 hours PRN    REVIEW OF SYSTEMS  --------------------------------------------------------------------------------  Unable to obtain ROS    VITALS/PHYSICAL EXAM  --------------------------------------------------------------------------------  T(C): 37.3 (04-13-23 @ 08:00), Max: 37.3 (04-13-23 @ 08:00)  HR: 105 (04-13-23 @ 10:00) (101 - 111)  BP: 105/55 (04-13-23 @ 07:00) (90/61 - 105/55)  RR: 17 (04-13-23 @ 10:00) (16 - 40)  SpO2: 97% (04-13-23 @ 10:00) (94% - 99%)  Wt(kg): --    04-12-23 @ 07:01  -  04-13-23 @ 07:00  --------------------------------------------------------  IN: 1591 mL / OUT: 1615 mL / NET: -24 mL    04-13-23 @ 07:01  -  04-13-23 @ 10:12  --------------------------------------------------------  IN: 65.7 mL / OUT: 50 mL / NET: 15.7 mL    Physical Exam:  	Gen: Ill appearing  	HEENT: NGT+, anicteric  	Pulm: Fair entry B/L  	CV: S1S2+  	Abd: Obese, distended  	Ext: +LE edema B/L, trace B/L UE edema   	Neuro: Lethargic, unable to provide ROS  	Skin: Warm and dry              Dialysis access: Right IJ non tunneled HD catheter seen    LABS/STUDIES  --------------------------------------------------------------------------------              7.7    11.62 >-----------<  294      [04-13-23 @ 05:30]              23.9     136  |  102  |  10  ----------------------------<  180      [04-13-23 @ 05:30]  3.6   |  20  |  0.82        Ca     8.0     [04-13-23 @ 05:30]      Mg     2.40     [04-13-23 @ 05:30]      Phos  2.7     [04-13-23 @ 05:30]    Creatinine Trend:  SCr 0.82 [04-13 @ 05:30]  SCr 0.80 [04-12 @ 06:53]  SCr 0.87 [04-11 @ 00:40]  SCr 0.99 [04-10 @ 17:15]  SCr 0.88 [04-10 @ 02:06]

## 2023-04-13 NOTE — PROGRESS NOTE ADULT - ASSESSMENT
48 year old female with a PMHx of HTN, hypothyroidism,  (30 years ago) and breast cancer (S/P left mastectomy and chemo in ) who presented with epigastric pain and left upper quadrant pain.  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  Transferred from OSH ([place]) for hypotension and tachypnea requiring emergent intubation and multiple pressors.  SICU consulted for hemodynamic monitoring and respiratory management.    Plan:      - NGT with bilious output  - Continue w/ IV Meropenam  - Continue CVVH  - Appreciate care per SICU      B Team Surgery  [id number]

## 2023-04-13 NOTE — PROGRESS NOTE ADULT - SUBJECTIVE AND OBJECTIVE BOX
24 HOUR EVENTS:  - Decrease seroquel to 25 from 50 for oversedation  - start midodrine to wean vaso pressors  - stop steroids  - CT 4/12 extensive necrotizing pancreas involving the majority of   the parenchyma with increased acute necrotic collections without well   defined wall.    NEURO  RASS (if intubated): 		CAM ICU (if concern for delirium):  Exam: A&Ox3  Meds: acetaminophen   Oral Liquid .. 500 milliGRAM(s) Oral every 6 hours  gabapentin Solution 100 milliGRAM(s) Oral three times a day  HYDROmorphone  Injectable 0.5 milliGRAM(s) IV Push every 4 hours PRN breakthrough pain  metoclopramide Injectable 10 milliGRAM(s) IV Push every 8 hours  oxyCODONE    Solution 5 milliGRAM(s) Oral every 4 hours PRN Moderate Pain (4 - 6)  oxyCODONE    Solution 10 milliGRAM(s) Oral every 4 hours PRN Severe Pain (7 - 10)  QUEtiapine 25 milliGRAM(s) Oral at bedtime      RESPIRATORY  RR: 24 (04-13-23 @ 00:00) (24 - 48)  SpO2: 98% (04-13-23 @ 00:00) (95% - 99%)  Wt(kg): --  Exam: no increased WOB  Mechanical Ventilation:     Meds: albuterol/ipratropium for Nebulization. 3 milliLiter(s) Nebulizer once  albuterol/ipratropium for Nebulization. 3 milliLiter(s) Nebulizer once  albuterol/ipratropium for Nebulization. 3 milliLiter(s) Nebulizer once      CARDIOVASCULAR  HR: 106 (04-13-23 @ 00:00) (102 - 110)  BP: 90/61 (04-12-23 @ 20:00) (90/61 - 90/61)  BP(mean): 70 (04-12-23 @ 20:00) (70 - 70)  ABP: 95/51 (04-13-23 @ 00:00) (95/51 - 146/77)  ABP(mean): 69 (04-13-23 @ 00:00) (66 - 106)  Wt(kg): --  CVP(cm H2O): --      Exam: appears well perfused  Cardiac Rhythm: sinus  Perfusion     [X ]Adequate   [ ]Inadequate  Mentation   [X ]Normal       [ ]Reduced  Extremities  [ X]Warm         [ ]Cool  Volume Status [ ]Hypervolemic [ X]Euvolemic [ ]Hypovolemic  Meds: midodrine 10 milliGRAM(s) Oral every 8 hours  norepinephrine Infusion 0.05 MICROgram(s)/kG/Min IV Continuous <Continuous>      GI/NUTRITION  Exam: soft nondistended  Diet: TFs  Meds: pantoprazole  Injectable 40 milliGRAM(s) IV Push daily  polyethylene glycol 3350 17 Gram(s) Oral at bedtime      GENITOURINARY  I&O's Detail    04-11 @ 07:01 - 04-12 @ 07:00  --------------------------------------------------------  IN:    Enteral Tube Flush: 620 mL    Nepro with Carb Steady: 270 mL    Norepinephrine: 269.5 mL    PRBCs (Packed Red Blood Cells): 300 mL    Vasopressin: 99 mL  Total IN: 1558.5 mL    OUT:    Nasogastric/Oral tube (mL): 1550 mL    Other (mL): 782 mL  Total OUT: 2332 mL    Total NET: -773.5 mL      04-12 @ 07:01 - 04-13 @ 01:05  --------------------------------------------------------  IN:    Enteral Tube Flush: 335 mL    Nepro with Carb Steady: 680 mL    Norepinephrine: 63.8 mL    Vasopressin: 76.5 mL  Total IN: 1155.3 mL    OUT:    Nasogastric/Oral tube (mL): 0 mL    Other (mL): 1013 mL  Total OUT: 1013 mL    Total NET: 142.3 mL          04-12    136  |  101  |  10  ----------------------------<  85  3.5   |  15<L>  |  0.80    Ca    7.7<L>      12 Apr 2023 06:53  Phos  3.0     04-12  Mg     2.60     04-12    TPro  6.3  /  Alb  2.2<L>  /  TBili  5.7<H>  /  DBili  x   /  AST  140<H>  /  ALT  113<H>  /  AlkPhos  152<H>  04-12    Meds: multivitamin/minerals/iron Oral Solution (CENTRUM) 15 milliLiter(s) Oral daily      HEMATOLOGIC  Meds: fondaparinux Injectable 1.5 milliGRAM(s) SubCutaneous daily                          7.7    13.29 )-----------( 352      ( 12 Apr 2023 06:53 )             23.8         INFECTIOUS DISEASES  T(C): 37.2 (04-13-23 @ 00:00), Max: 37.3 (04-12-23 @ 04:00)  Wt(kg): --  WBC Count: 13.29 K/uL (04-12 @ 06:53)    Recent Cultures:    Meds:     ENDOCRINE  Capillary Blood Glucose    Meds: dextrose 50% Injectable 25 Gram(s) IV Push once  dextrose 50% Injectable 12.5 Gram(s) IV Push once  insulin glargine Injectable (LANTUS) 9 Unit(s) SubCutaneous at bedtime  insulin lispro (ADMELOG) corrective regimen sliding scale   SubCutaneous every 6 hours  insulin lispro Injectable (ADMELOG) 2 Unit(s) SubCutaneous every 6 hours  levothyroxine Injectable 20 MICROGram(s) IV Push at bedtime  vasopressin Infusion 0.03 Unit(s)/Min IV Continuous <Continuous>      OTHER MEDICATIONS:  chlorhexidine 2% Cloths 1 Application(s) Topical daily  CRRT Treatment    <Continuous>  CRRT Treatment    <Continuous>  petrolatum Ophthalmic Ointment 1 Application(s) Both EYES two times a day  Phoxillum Filtration BK 4 / 2.5 5000 milliLiter(s) CRRT <Continuous>  Phoxillum Filtration BK 4 / 2.5 5000 milliLiter(s) CRRT <Continuous>  PrismaSOL Filtration BGK 4 / 2.5 5000 milliLiter(s) CRRT <Continuous>  PrismaSOL Filtration BGK 4 / 2.5 5000 milliLiter(s) CRRT <Continuous>  PrismaSOL Filtration BGK 4 / 2.5 5000 milliLiter(s) CRRT <Continuous>  PrismaSOL Filtration BGK 4 / 2.5 5000 milliLiter(s) CRRT <Continuous>      IMAGING: 24 HOUR EVENTS:  - C/f occlusion of CVC  - Slept well ovn   - Seroq decreased to 25 ovn     NEURO  RASS (if intubated): 		CAM ICU (if concern for delirium):  Exam: A&Ox2, redirectable. Slept well ovn    Meds: acetaminophen   Oral Liquid .. 500 milliGRAM(s) Oral every 6 hours  gabapentin Solution 100 milliGRAM(s) Oral three times a day  HYDROmorphone  Injectable 0.5 milliGRAM(s) IV Push every 4 hours PRN breakthrough pain  metoclopramide Injectable 10 milliGRAM(s) IV Push every 8 hours  oxyCODONE    Solution 5 milliGRAM(s) Oral every 4 hours PRN Moderate Pain (4 - 6)  oxyCODONE    Solution 10 milliGRAM(s) Oral every 4 hours PRN Severe Pain (7 - 10)  QUEtiapine 25 milliGRAM(s) Oral at bedtime    RESPIRATORY  RR: 24 (04-13-23 @ 00:00) (24 - 48)  SpO2: 98% (04-13-23 @ 00:00) (95% - 99%)  Wt(kg): --  Exam: no increased WOB  Mechanical Ventilation:     Meds: albuterol/ipratropium for Nebulization. 3 milliLiter(s) Nebulizer once  albuterol/ipratropium for Nebulization. 3 milliLiter(s) Nebulizer once  albuterol/ipratropium for Nebulization. 3 milliLiter(s) Nebulizer once    CARDIOVASCULAR  HR: 106 (04-13-23 @ 00:00) (102 - 110)  BP: 90/61 (04-12-23 @ 20:00) (90/61 - 90/61)  BP(mean): 70 (04-12-23 @ 20:00) (70 - 70)  ABP: 95/51 (04-13-23 @ 00:00) (95/51 - 146/77)  ABP(mean): 69 (04-13-23 @ 00:00) (66 - 106)  Wt(kg): --  CVP(cm H2O): --    Exam: appears well perfused  Cardiac Rhythm: sinus  Perfusion     [X ]Adequate   [ ]Inadequate  Mentation   [X ]Normal       [ ]Reduced  Extremities  [ X]Warm         [ ]Cool  Volume Status [ ]Hypervolemic [ X]Euvolemic [ ]Hypovolemic  Meds: midodrine 10 milliGRAM(s) Oral every 8 hours  norepinephrine Infusion 0.05 MICROgram(s)/kG/Min IV Continuous <Continuous>    GI/NUTRITION  Exam: soft nondistended  Diet: TFs  Meds: pantoprazole  Injectable 40 milliGRAM(s) IV Push daily  polyethylene glycol 3350 17 Gram(s) Oral at bedtime    GENITOURINARY  I&O's Summary    12 Apr 2023 07:01  -  13 Apr 2023 07:00  --------------------------------------------------------  IN: 1591 mL / OUT: 1615 mL / NET: -24 mL    13 Apr 2023 07:01  -  13 Apr 2023 10:35  --------------------------------------------------------  IN: 65.7 mL / OUT: 68 mL / NET: -2.3 mL    Meds: multivitamin/minerals/iron Oral Solution (CENTRUM) 15 milliLiter(s) Oral daily    HEMATOLOGIC  Meds: fondaparinux Injectable 1.5 milliGRAM(s) SubCutaneous daily    LABS:                        7.7    11.62 )-----------( 294      ( 13 Apr 2023 05:30 )             23.9     04-13    136  |  102  |  10  ----------------------------<  180<H>  3.6   |  20<L>  |  0.82    Ca    8.0<L>      13 Apr 2023 05:30  Phos  2.7     04-13  Mg     2.40     04-13    TPro  6.5  /  Alb  2.3<L>  /  TBili  5.6<H>  /  DBili  x   /  AST  127<H>  /  ALT  111<H>  /  AlkPhos  172<H>  04-13    INFECTIOUS DISEASES  ICU Vital Signs Last 24 Hrs  T(C): 37.3 (13 Apr 2023 08:00), Max: 37.3 (13 Apr 2023 08:00)  T(F): 99.1 (13 Apr 2023 08:00), Max: 99.1 (13 Apr 2023 08:00)  HR: 105 (13 Apr 2023 10:00) (101 - 111)  BP: 105/55 (13 Apr 2023 07:00) (90/61 - 105/55)  BP(mean): 70 (13 Apr 2023 07:00) (70 - 70)    Recent Cultures:    Meds:     ENDOCRINE  Capillary Blood Glucose    Meds: dextrose 50% Injectable 25 Gram(s) IV Push once  dextrose 50% Injectable 12.5 Gram(s) IV Push once  insulin glargine Injectable (LANTUS) 9 Unit(s) SubCutaneous at bedtime  insulin lispro (ADMELOG) corrective regimen sliding scale   SubCutaneous every 6 hours  insulin lispro Injectable (ADMELOG) 2 Unit(s) SubCutaneous every 6 hours  levothyroxine Injectable 20 MICROGram(s) IV Push at bedtime  vasopressin Infusion 0.03 Unit(s)/Min IV Continuous <Continuous>    OTHER MEDICATIONS:  chlorhexidine 2% Cloths 1 Application(s) Topical daily  CRRT Treatment    <Continuous>  CRRT Treatment    <Continuous>  petrolatum Ophthalmic Ointment 1 Application(s) Both EYES two times a day  Phoxillum Filtration BK 4 / 2.5 5000 milliLiter(s) CRRT <Continuous>  Phoxillum Filtration BK 4 / 2.5 5000 milliLiter(s) CRRT <Continuous>  PrismaSOL Filtration BGK 4 / 2.5 5000 milliLiter(s) CRRT <Continuous>  PrismaSOL Filtration BGK 4 / 2.5 5000 milliLiter(s) CRRT <Continuous>  PrismaSOL Filtration BGK 4 / 2.5 5000 milliLiter(s) CRRT <Continuous>  PrismaSOL Filtration BGK 4 / 2.5 5000 milliLiter(s) CRRT <Continuous>      IMAGING:

## 2023-04-13 NOTE — PROGRESS NOTE ADULT - PROBLEM SELECTOR PLAN 1
Pt. with oliguric CHATO in the setting of necrotizing pancreatitis and shock. Pt. with most likely ATN. Scr was 1.24 on 3/23/23 and increased to 4.60 on 3/24/23. Pt. was initiated on CRRT/CVVHDF on 3/24/23 and discontinued overnight on 4/6/23 by SICU team. Pt. remained oliguric and was restarted on CVVHDH on 4/7/23. Labs reviewed. Pt. currently tolerating CRRT. BP being maintained on IV vasopressors. HD catheter functioning well. Plan is to continue CRRT today as per discussion with SICU team. Monitor labs and urine output. Avoid any potential nephrotoxins. Dose medications as per eGFR.

## 2023-04-13 NOTE — PROGRESS NOTE ADULT - ATTENDING COMMENTS
I agree with the detailed interval history, physical, and plan, which I have reviewed and edited where appropriate'; also agree with notes/assessment with my team on service.  I have personally examined the patient.  I was physically present for the key portions of the evaluation and management (E/M) service provided.  I reviewed all the pertinent data.  The patient is a critical care patient with life threatening hemodynamic and metabolic instability in SICU.  The SICU team has a constant risk benefit analyzes discussion and coordinating care with the primary team and all consultants.   The patient is in SICU with the chief complaint and diagnosis mentioned in the note.   The plan will be specified in the note.  48 year old female with PMHx with necrotizing pancreatitis in SICU secondary respiratory insufficiency now extubated on CVVHD and vasopressors  Less delirium  LUNG coarse Bs  Heart RR  Abd softly dist  PLAN  Neuro:  - protected sleep  - Seroquel  - Dilaudid  Resp:  -RA  CV:  -dc  levo  - dc vaso   - midodrine   GI:   protonix   Renal:   CRRT keep net neg 50  Heme:   - fondaparinux   ID:   [email or web address]   Endo:   - Lantus 9 units, admelog 2 units q6  Code Status: Full code  Disposition: SICU

## 2023-04-14 NOTE — CHART NOTE - NSCHARTNOTEFT_GEN_A_CORE
New CT A/P results reviewed.  Compared to recent MRI/MRCP, new peripancreatic collection noted.  However, collection does not contain walled off necrosis.  Thus, there are no areas amenable to endoscopic stenting or drainage.    Discussed with Dr. Hillman.    Jesus Corcoran  GI/Hepatology Fellow    MONDAY-FRIDAY 8AM-5PM:  Pager# [phone] ([facility]) or [phone] ([facility])    NON-URGENT CONSULTS:  Please email [email or web address]  AT NIGHT AND ON WEEKENDS:  Contact on-call GI fellow via answering service ([phone]) from 5pm-8am and on weekends/holidays

## 2023-04-14 NOTE — PROGRESS NOTE ADULT - ATTENDING COMMENTS
severe necrotizing and hemorrhagic pancreatitis with septic shock and sequelae of hepatic and renal insufficiency  slightly improved  appreciate SICU care  no acute surgical interventions planned right now

## 2023-04-14 NOTE — PROGRESS NOTE ADULT - ATTENDING COMMENTS
Pt. with oliguric CHATO, currently on CRRT/CVVHDF. Pt. tolerating CRRT/CVVHDF during rounds. BP low, not on vasopressors during rounds. HD catheter functioning well. Monitor labs and urine output. Avoid any potential nephrotoxins. Dose medications as per eGFR/CRRT.

## 2023-04-14 NOTE — PROGRESS NOTE ADULT - SUBJECTIVE AND OBJECTIVE BOX
24 HOUR EVENTS:  - Midodrine increased to 30 TID  - Dc'd reglan  - Meropenem started per surgery  - Off levo    NEURO  RASS (if intubated): N/A		CAM ICU (if concern for delirium):  Exam: A&Ox0-1  Meds: gabapentin Solution 100 milliGRAM(s) Oral three times a day  HYDROmorphone  Injectable 0.5 milliGRAM(s) IV Push every 4 hours PRN breakthrough pain  oxyCODONE    Solution 5 milliGRAM(s) Oral every 4 hours PRN Moderate Pain (4 - 6)  oxyCODONE    Solution 10 milliGRAM(s) Oral every 4 hours PRN Severe Pain (7 - 10)  QUEtiapine 25 milliGRAM(s) Oral at bedtime      RESPIRATORY  RR: 34 (04-14-23 @ 00:00) (15 - 37)  SpO2: 98% (04-14-23 @ 00:00) (94% - 100%)  Wt(kg): --  Exam: no increased WOB  Mechanical Ventilation:   ABG - ( 13 Apr 2023 05:30 )  pH: 7.39  /  pCO2: 35    /  pO2: 83    / HCO3: 21    / Base Excess: -3.1  /  SaO2: 97.1    Lactate: x                Meds:     CARDIOVASCULAR  HR: 112 (04-14-23 @ 00:00) (101 - 112)  BP: 105/55 (04-13-23 @ 07:00) (105/55 - 105/55)  BP(mean): 70 (04-13-23 @ 07:00) (70 - 70)  ABP: 99/53 (04-14-23 @ 00:00) (80/44 - 121/63)  ABP(mean): 73 (04-14-23 @ 00:00) (59 - 86)  Wt(kg): --  CVP(cm H2O): --      Exam: appears well perfused  Cardiac Rhythm: sinus  Perfusion     [X ]Adequate   [ ]Inadequate  Mentation   [X ]Normal       [ ]Reduced  Extremities  [ X]Warm         [ ]Cool  Volume Status [ ]Hypervolemic [ X]Euvolemic [ ]Hypovolemic  Meds: midodrine 30 milliGRAM(s) Oral every 8 hours  norepinephrine Infusion 0.05 MICROgram(s)/kG/Min IV Continuous <Continuous>      GI/NUTRITION  Exam: soft nondistended  Diet: NPO with TFs  Meds: pantoprazole   Suspension 40 milliGRAM(s) Oral daily  polyethylene glycol 3350 17 Gram(s) Oral at bedtime      GENITOURINARY  I&O's Detail    04-12 @ 07:01 - 04-13 @ 07:00  --------------------------------------------------------  IN:    Enteral Tube Flush: 435 mL    Nepro with Carb Steady: 960 mL    Norepinephrine: 88 mL    Vasopressin: 108 mL  Total IN: 1591 mL    OUT:    Nasogastric/Oral tube (mL): 0 mL    Other (mL): 1615 mL  Total OUT: 1615 mL    Total NET: -24 mL      04-13 @ 07:01 - 04-14 @ 00:45  --------------------------------------------------------  IN:    Enteral Tube Flush: 100 mL    IV PiggyBack: 150 mL    Nepro with Carb Steady: 400 mL    Norepinephrine: 11 mL    Norepinephrine: 4.4 mL    Vasopressin: 36 mL  Total IN: 701.4 mL    OUT:    Intermittent Catheterization - Urethral (mL): 50 mL    Nasogastric/Oral tube (mL): 425 mL    Other (mL): 897 mL  Total OUT: 1372 mL    Total NET: -670.6 mL          04-13    136  |  102  |  10  ----------------------------<  180<H>  3.6   |  20<L>  |  0.82    Ca    8.0<L>      13 Apr 2023 05:30  Phos  2.7     04-13  Mg     2.40     04-13    TPro  6.5  /  Alb  2.3<L>  /  TBili  5.6<H>  /  DBili  x   /  AST  127<H>  /  ALT  111<H>  /  AlkPhos  172<H>  04-13    Meds: multivitamin/minerals/iron Oral Solution (CENTRUM) 15 milliLiter(s) Oral daily      HEMATOLOGIC  Meds: fondaparinux Injectable 1.5 milliGRAM(s) SubCutaneous daily                          7.7    11.62 )-----------( 294      ( 13 Apr 2023 05:30 )             23.9         INFECTIOUS DISEASES  T(C): 37.1 (04-14-23 @ 00:00), Max: 37.3 (04-13-23 @ 08:00)  Wt(kg): --  WBC Count: 11.62 K/uL (04-13 @ 05:30)    Recent Cultures:    Meds: meropenem  IVPB 1000 milliGRAM(s) IV Intermittent every 8 hours      ENDOCRINE  Capillary Blood Glucose    Meds: dextrose 50% Injectable 25 Gram(s) IV Push once  dextrose 50% Injectable 12.5 Gram(s) IV Push once  insulin glargine Injectable (LANTUS) 9 Unit(s) SubCutaneous at bedtime  insulin lispro (ADMELOG) corrective regimen sliding scale   SubCutaneous every 6 hours  insulin lispro Injectable (ADMELOG) 2 Unit(s) SubCutaneous every 6 hours  levothyroxine Injectable 20 MICROGram(s) IV Push at bedtime  vasopressin Infusion 0.03 Unit(s)/Min IV Continuous <Continuous>      ACCESS DEVICES:  [X ] Peripheral IV  [X ] Central Venous Line		[X ] R	[X ] L	[X ] IJ	[ ] Fem	[ ] SC	Placed:   [X ] Arterial Line			[X ] R	[ ] L	[ ] Fem	[ ] Rad	[X ] Ax	Placed:   [ ] PICC:					[ ] Mediport  [ ] Urinary Catheter, Date Placed:   [ ] Necessity of urinary, arterial, and venous catheters discussed    OTHER MEDICATIONS:  chlorhexidine 2% Cloths 1 Application(s) Topical daily  CRRT Treatment    <Continuous>  Phoxillum Filtration BK 4 / 2.5 5000 milliLiter(s) CRRT <Continuous>  Phoxillum Filtration BK 4 / 2.5 5000 milliLiter(s) CRRT <Continuous>  Phoxillum Filtration BK 4 / 2.5 5000 milliLiter(s) CRRT <Continuous>      IMAGING: 24 HOUR EVENTS:  - Midodrine increased to 30 TID  - Dc'd reglan  - Meropenem started per surgery  - Off levo    NEURO  RASS (if intubated): N/A		CAM ICU (if concern for delirium):  Exam: A&Ox0-1, confused at times. S/p sleep precautions   W/ purposeful movements.      CARDIOVASCULAR  HR: 111 (14 Apr 2023 13:00) (101 - 112)  ABP: 89/49 (14 Apr 2023 13:00) (80/44 - 116/59)  ABP(mean): 67 (14 Apr 2023 13:00) (59 - 82)  RR: 30 (14 Apr 2023 13:00) (18 - 37)    Exam: appears well perfused  Cardiac Rhythm: sinus  Perfusion     [X ]Adequate   [ ]Inadequate  Mentation   [ ]Normal       [x]Reduced  Extremities  [ X]Warm         [ ]Cool  Volume Status [ ]Hypervolemic [ X]Euvolemic [ ]Hypovolemic  Meds: midodrine 30 milliGRAM(s) Oral every 8 hours  norepinephrine Infusion 0.05 MICROgram(s)/kG/Min IV Continuous <Continuous>    RESP:   Tachypneic  Patient on not  nasal cannula 2 L/min.  Satting 100%  RR: 30 (14 Apr 2023 13:00) (18 - 37)  SpO2: 97% (14 Apr 2023 13:00) (96% - 100%)    O2 Parameters below as of 14 Apr 2023 12:00  Patient On (Oxygen Delivery Method): nasal cannula  O2 Flow (L/min): 2    GI/NUTRITION  Exam: soft nondistended. Bag to drainage, draining billious grn fluid. BM ovn, green Diarrhea  Diet: NPO with TFs  Meds: pantoprazole Suspension 40 milliGRAM(s) Oral daily  polyethylene glycol 3350 17 Gram(s) Oral at bedtime    GENITOURINARY  I&O's Detail    13 Apr 2023 07:01  -  14 Apr 2023 07:00  --------------------------------------------------------  IN:    Enteral Tube Flush: 100 mL    IV PiggyBack: 200 mL    Nepro with Carb Steady: 575 mL    Norepinephrine: 17.6 mL    Norepinephrine: 11 mL    Vasopressin: 36 mL  Total IN: 939.6 mL    OUT:    Intermittent Catheterization - Urethral (mL): 50 mL    Nasogastric/Oral tube (mL): 525 mL    Other (mL): 1506 mL  Total OUT: 2081 mL    Total NET: -1141.4 mL    14 Apr 2023 07:01  -  14 Apr 2023 13:38  --------------------------------------------------------  IN:    Enteral Tube Flush: 10 mL    Nepro with Carb Steady: 210 mL  Total IN: 220 mL    OUT:    Other (mL): 512 mL  Total OUT: 512 mL    Total NET: -292 mL  Meds: multivitamin/minerals/iron Oral Solution (CENTRUM) 15 milliLiter(s) Oral daily    HEMATOLOGIC  Meds: fondaparinux Injectable 1.5 milliGRAM(s) SubCutaneous daily               7.8    12.56 )-----------( 248      ( 14 Apr 2023 06:20 )             24.5     04-14    136  |  104  |  9   ----------------------------<  133<H>  3.6   |  22  |  0.78    Ca    8.0<L>      14 Apr 2023 06:20  Phos  3.1     04-14  Mg     2.40     04-14    TPro  6.3  /  Alb  2.1<L>  /  TBili  4.8<H>  /  DBili  x   /  AST  123<H>  /  ALT  113<H>  /  AlkPhos  178<H>  04-14    INFECTIOUS DISEASES  T(C): 37.3 (14 Apr 2023 12:00), Max: 37.3 (14 Apr 2023 12:00)  T(F): 99.1 (14 Apr 2023 12:00), Max: 99.1 (14 Apr 2023 12:00)    Meds: meropenem  IVPB 1000 milliGRAM(s) IV Intermittent every 8 hours    ENDOCRINE    Meds: dextrose 50% Injectable 25 Gram(s) IV Push once  dextrose 50% Injectable 12.5 Gram(s) IV Push once  insulin glargine Injectable (LANTUS) 9 Unit(s) SubCutaneous at bedtime  insulin lispro (ADMELOG) corrective regimen sliding scale   SubCutaneous every 6 hours  insulin lispro Injectable (ADMELOG) 2 Unit(s) SubCutaneous every 6 hours  levothyroxine Injectable 20 MICROGram(s) IV Push at bedtime  vasopressin Infusion 0.03 Unit(s)/Min IV Continuous <Continuous>    ACCESS DEVICES:  [X ] Peripheral IV  [X ] Central Venous Line		[X ] R	[X ] L	[X ] IJ	[ ] Fem	[ ] SC	Placed:   [X ] Arterial Line			[X ] R	[ ] L	[ ] Fem	[ ] Rad	[X ] Ax	Placed:   [ ] PICC:					[ ] Mediport  [ ] Urinary Catheter, Date Placed:   [ ] Necessity of urinary, arterial, and venous catheters discussed    OTHER MEDICATIONS:  chlorhexidine 2% Cloths 1 Application(s) Topical daily  CRRT Treatment    <Continuous>  Phoxillum Filtration BK 4 / 2.5 5000 milliLiter(s) CRRT <Continuous>  Phoxillum Filtration BK 4 / 2.5 5000 milliLiter(s) CRRT <Continuous>  Phoxillum Filtration BK 4 / 2.5 5000 milliLiter(s) CRRT <Continuous>

## 2023-04-14 NOTE — PROGRESS NOTE ADULT - SUBJECTIVE AND OBJECTIVE BOX
Surgery Progress Note     24hour Events:   - Midodrine increased to 30 TID  - Dc'd reglan  - Meropenem started per surgery  - Off levo    Subjective:  Patient seen and examined. Reports abdominal pain still      Vital Signs:  Vital Signs Last 24 Hrs  T(C): 37.1 (14 Apr 2023 08:00), Max: 37.2 (13 Apr 2023 12:00)  T(F): 98.8 (14 Apr 2023 08:00), Max: 99 (13 Apr 2023 12:00)  HR: 108 (14 Apr 2023 09:00) (101 - 112)  BP: --  BP(mean): --  RR: 32 (14 Apr 2023 09:00) (15 - 37)  SpO2: 98% (14 Apr 2023 09:00) (96% - 100%)    Parameters below as of 14 Apr 2023 08:00  Patient On (Oxygen Delivery Method): nasal cannula  O2 Flow (L/min): 2      CAPILLARY BLOOD GLUCOSE      POCT Blood Glucose.: 123 mg/dL (14 Apr 2023 06:09)  POCT Blood Glucose.: 124 mg/dL (14 Apr 2023 00:08)  POCT Blood Glucose.: 130 mg/dL (13 Apr 2023 17:41)  POCT Blood Glucose.: 145 mg/dL (13 Apr 2023 11:51)      I&O's Detail    13 Apr 2023 07:01  -  14 Apr 2023 07:00  --------------------------------------------------------  IN:    Enteral Tube Flush: 100 mL    IV PiggyBack: 200 mL    Nepro with Carb Steady: 575 mL    Norepinephrine: 17.6 mL    Norepinephrine: 11 mL    Vasopressin: 36 mL  Total IN: 939.6 mL    OUT:    Intermittent Catheterization - Urethral (mL): 50 mL    Nasogastric/Oral tube (mL): 525 mL    Other (mL): 1506 mL  Total OUT: 2081 mL    Total NET: -1141.4 mL      14 Apr 2023 07:01  -  14 Apr 2023 09:25  --------------------------------------------------------  IN:    Nepro with Carb Steady: 25 mL  Total IN: 25 mL    OUT:  Total OUT: 0 mL    Total NET: 25 mL            Physical Exam:  General Appearance: appears uncomfortable   HEENT: NGT to gravity w/ bilious output. Post pyloric feeding tube present  Chest: Equal expansion bilaterally  CV: Pulse regular presently  Abdomen: Soft, TTP, nondistended  Extremities: Grossly symmetric, SCD's in place         Labs:    04-14    136  |  104  |  9   ----------------------------<  133<H>  3.6   |  22  |  0.78    Ca    8.0<L>      14 Apr 2023 06:20  Phos  3.1     04-14  Mg     2.40     04-14    TPro  6.3  /  Alb  2.1<L>  /  TBili  4.8<H>  /  DBili  x   /  AST  123<H>  /  ALT  113<H>  /  AlkPhos  178<H>  04-14    LIVER FUNCTIONS - ( 14 Apr 2023 06:20 )  Alb: 2.1 g/dL / Pro: 6.3 g/dL / ALK PHOS: 178 U/L / ALT: 113 U/L / AST: 123 U/L / GGT: x                                 7.8    12.56 )-----------( 248      ( 14 Apr 2023 06:20 )             24.5

## 2023-04-14 NOTE — PROGRESS NOTE ADULT - ASSESSMENT
48 year old female with PMHx significant for HTN, hypothyroidism,  30 years ago, breast cancer s/p left mastectomy and chemo in  presenting with epigastric pain and LUQ pain. Found to have necrotizing pancreatitis. Transferred from OSH ([place]) for hypotension and tachypnea requiring emergent intubation and multiple pressors. SICU consulted for hemodynamic monitoring and respiratory management now extubated on CVVHD, weaning off vasopressors.    PLAN  Neuro:  - Precautions for delirium - protected sleep  - Seroquel 25mg Qhs  - Dilaudid prn    Resp:  - AHRF w/ b/l plef 2/2 necrotizing pancreatitis      CV:  Mixed shock state 2/2 septic shock w/ E. Coli in urine vs hypovolemic shock now s/p 11L resus    - Wean pressors as tolerated  - midodrine 30 q 8 hours    GI:   - S/p repeat CT showing extensive necrotizing pancreatitis however no discrete walled-off collection  - RUQ US: Chololithiasis (2cm stone) w/ no acute frannie. MRCP 4/3: no choledocholithiasis.   - Continue TF via post pyloric tube now at goal  - GI ppx w/ protonix     Renal:  ARF 2/2 shock state resulting in ATN requiring CRRT    - On CRRT keep net neg 50, plan to start intermittent hemodialysis when tolerated     Heme:   - SCDs and CRRT dosed fondaparinux for DVT ppx  - HIT Ab positive; DOUGLAS Negative   - f/u anti Xa level. Most recent 0.31. Next level scheduled to be drawn 13 in AM     #Splenic Vein Thrombosis  3/22 CT Abd w/ contrast: Focal filling defect in the splenic vein at the level of the body of the pancreas for which an underlying thrombus is suspected     ID:   Intermittently febrile w/ leukocytosis c/f septic shock 2/2 E coli UTI  Diagnostics:  - 3/22 Ucx: E coli  - 3/30 Bcx: staph hominis in aerobic bottle x 1 (likely contaminant)   - S/p Meropenem (3/26-)  - Restart meropenem ()    Endo:   - Lantus 9 units, admelog 2 units q6  - c/w synthroid 20 IV (home med)   - ISS  - S/p steroid taper    Code Status: Full code  Lines: KELLY Mendoza CVC, A line    Disposition: SICU 48 year old female with PMHx significant for HTN, hypothyroidism,  30 years ago, breast cancer s/p left mastectomy and chemo in  presenting with epigastric pain and LUQ pain. Found to have necrotizing pancreatitis. Transferred from OSH ([facility]) for hypotension and tachypnea requiring emergent intubation and multiple pressors. SICU consulted for hemodynamic monitoring and respiratory management now extubated on CVVHD, weaning off vasopressors.    PLAN  Neuro:  - Precautions for delirium - protected sleep  - Seroquel 25mg Qhs  - Dilaudid prn    Resp:  - AHRF w/ b/l plef 2/2 necrotizing pancreatitis  - CTM  - Satting  100% on 2 L/min nasal cannula      CV:  Mixed shock state 2/2 septic shock w/ E. Coli in urine vs hypovolemic shock now s/p 11L resus    - Weaning pressors as tolerated  - Midodrine 30 q 8 hours to bridge to intermittent HD    GI:   - S/p repeat CT showing extensive necrotizing pancreatitis however no discrete walled-off collection  - RUQ US: Chololithiasis (2cm stone) w/ no acute frannie. MRCP 4/3: no choledocholithiasis.   - Continue TF via post pyloric tube now at goal  - GI ppx w/ protonix   - Abdominal CT showed necrotizing pancreatitis Lionel score E, CTSI 10.    - GI consulted for possible intervention. CT without walled off mass, no gastrointestinal intervention     Renal:  ARF 2/2 shock state resulting in ATN requiring CRRT    - On CRRT, keep net neg 70, plan to start intermittent hemodialysis when tolerated     Heme:   - SCDs and CRRT dosed fondaparinux for DVT ppx  - HIT Ab positive; DOUGLAS Negative   - f/u anti Xa level, sending out new level on . Last 0.31.      #Splenic Vein Thrombosis  3/22 CT Abd w/ contrast: Focal filling defect in the splenic vein at the level of the body of the pancreas for which an underlying thrombus is suspected     ID:   Intermittently febrile w/ leukocytosis c/f septic shock 2/2 E coli UTI  Diagnostics:  - 3/22 Ucx: E coli  - 3/30 Bcx: staph hominis in aerobic bottle x 1 (likely contaminant)   - S/p Meropenem (3/26-)  - Restart meropenem ( )    Endo:   - Lantus 9 units, admelog 2 units q6  - c/w synthroid 20 IV (home med)   - ISS  - S/p steroid taper  - Creon started, can give through NGT in apple juice, etc. Not water.     Code Status: Full code  Lines: KELLY Mendoza, A line    Disposition: SICU

## 2023-04-14 NOTE — PROGRESS NOTE ADULT - PROBLEM SELECTOR PLAN 1
Pt. with oliguric CHATO in the setting of necrotizing pancreatitis and shock. Pt. with most likely ATN. Scr was 1.24 on 3/23/23 and increased to 4.60 on 3/24/23. Pt. was initiated on CRRT/CVVHDF on 3/24/23 and discontinued overnight on 4/6/23 by SICU team. Pt. remained oliguric and was restarted on CVVHDH on 4/7/23. Labs reviewed. Pt. currently tolerating CRRT. Pt currently not on IV vasopressors, however BP remains on the lower side. HD catheter functioning well. Plan is to continue CRRT today as per discussion with SICU team. Will evaluate need for transition to intermittent HD if remains hemodynamically stable. Monitor labs and urine output. Avoid any potential nephrotoxins. Dose medications as per eGFR. Pt. with oliguric CHATO in the setting of necrotizing pancreatitis and shock. Pt. with most likely ATN. Scr was 1.24 on 3/23/23 and increased to 4.60 on 3/24/23. Pt. was initiated on CRRT/CVVHDF on 3/24/23 and discontinued overnight on 4/6/23 by SICU team. Pt. remained oliguric and was restarted on CVVHDH on 4/7/23. Labs reviewed. Pt. tolerating CRRT. Pt with low BP readings, currently not on IV vasopressors. HD catheter functioning well. Plan is to continue CRRT today as per discussion with SICU team. Monitor labs and urine output. Avoid any potential nephrotoxins. Dose medications as per eGFR.

## 2023-04-14 NOTE — PROGRESS NOTE ADULT - SUBJECTIVE AND OBJECTIVE BOX
[facility] DIVISION OF KIDNEY DISEASES AND HYPERTENSION   FOLLOW UP NOTE    --------------------------------------------------------------------------------  Chief Complaint: Oliguric CHATO, on RRT    24 hour events/subjective: Pt. seen and examined in the SICU today. Pt. awake, howevere unable to provide ROS. Pt. currently off IV vasopressors, however BP still remains low. Pt. oliguric, remains on CVVHDF/CRRT.     PAST HISTORY  --------------------------------------------------------------------------------  No significant changes to PMH, PSH, FHx, SHx, unless otherwise noted    ALLERGIES & MEDICATIONS  --------------------------------------------------------------------------------  Allergies  No Known Allergies    Intolerances    Standing Inpatient Medications  chlorhexidine 2% Cloths 1 Application(s) Topical daily  CRRT Treatment    <Continuous>  dextrose 50% Injectable 25 Gram(s) IV Push once  dextrose 50% Injectable 12.5 Gram(s) IV Push once  fondaparinux Injectable 1.5 milliGRAM(s) SubCutaneous daily  gabapentin Solution 100 milliGRAM(s) Oral three times a day  insulin glargine Injectable (LANTUS) 9 Unit(s) SubCutaneous at bedtime  insulin lispro (ADMELOG) corrective regimen sliding scale   SubCutaneous every 6 hours  insulin lispro Injectable (ADMELOG) 2 Unit(s) SubCutaneous every 6 hours  levothyroxine Injectable 20 MICROGram(s) IV Push at bedtime  meropenem  IVPB 1000 milliGRAM(s) IV Intermittent every 8 hours  midodrine 30 milliGRAM(s) Oral every 8 hours  multivitamin/minerals/iron Oral Solution (CENTRUM) 15 milliLiter(s) Oral daily  norepinephrine Infusion 0.05 MICROgram(s)/kG/Min IV Continuous <Continuous>  pantoprazole   Suspension 40 milliGRAM(s) Oral daily  Phoxillum Filtration BK 4 / 2.5 5000 milliLiter(s) CRRT <Continuous>  Phoxillum Filtration BK 4 / 2.5 5000 milliLiter(s) CRRT <Continuous>  Phoxillum Filtration BK 4 / 2.5 5000 milliLiter(s) CRRT <Continuous>  polyethylene glycol 3350 17 Gram(s) Oral at bedtime  QUEtiapine 25 milliGRAM(s) Oral at bedtime    PRN Inpatient Medications  HYDROmorphone  Injectable 0.5 milliGRAM(s) IV Push every 4 hours PRN  oxyCODONE    Solution 5 milliGRAM(s) Oral every 4 hours PRN  oxyCODONE    Solution 10 milliGRAM(s) Oral every 4 hours PRN      REVIEW OF SYSTEMS  --------------------------------------------------------------------------------  Unable to provide ROS    VITALS/PHYSICAL EXAM  --------------------------------------------------------------------------------  T(C): 37.1 (04-14-23 @ 04:00), Max: 37.3 (04-13-23 @ 08:00)  HR: 106 (04-14-23 @ 07:00) (101 - 112)  BP: --  RR: 28 (04-14-23 @ 07:00) (15 - 37)  SpO2: 98% (04-14-23 @ 07:00) (96% - 100%)  Wt(kg): --    04-13-23 @ 07:01  -  04-14-23 @ 07:00  --------------------------------------------------------  IN: 939.6 mL / OUT: 2006 mL / NET: -1066.4 mL    Physical Exam:  	Gen: Ill appearing  	HEENT: NGT+, anicteric  	Pulm: Fair entry B/L  	CV: S1S2+  	Abd: Obese, distended  	Ext: +LE edema B/L, trace B/L UE edema   	Neuro: Eyes open, unable to provide ROS  	Skin: Warm and dry              Dialysis access: Right IJ non tunneled HD catheter connected to CRRT machine    LABS/STUDIES  --------------------------------------------------------------------------------              7.7    11.62 >-----------<  294      [04-13-23 @ 05:30]              23.9     136  |  102  |  10  ----------------------------<  180      [04-13-23 @ 05:30]  3.6   |  20  |  0.82        Ca     8.0     [04-13-23 @ 05:30]      Mg     2.40     [04-13-23 @ 05:30]      Phos  2.7     [04-13-23 @ 05:30]    Creatinine Trend:  SCr 0.82 [04-13 @ 05:30]  SCr 0.80 [04-12 @ 06:53]  SCr 0.87 [04-11 @ 00:40]  SCr 0.99 [04-10 @ 17:15]  SCr 0.88 [04-10 @ 02:06]    Urinalysis - [03-24-23 @ 05:30]      Color Yellow / Appearance Clear / SG 1.020 / pH 5.0      Gluc 250 / Ketone Trace  / Bili Negative / Urobili Negative       Blood Moderate / Protein 100 / Leuk Est Trace / Nitrite Negative      RBC 0-2 / WBC 6-10 / Hyaline  / Gran  / Sq Epi  / Non Sq Epi Moderate / Bacteria Many    HBsAg Nonreact      [03-27-23 @ 00:35]  HCV 0.05, Nonreact      [03-27-23 @ 00:35]  HIV Nonreact      [03-30-23 @ 20:10] [facility] DIVISION OF KIDNEY DISEASES AND HYPERTENSION   FOLLOW UP NOTE    --------------------------------------------------------------------------------  Chief Complaint: Oliguric CHATO, on RRT    24 hour events/subjective: Pt. seen and examined in the SICU today. Pt. awake, however unable to provide ROS. Pt. currently off IV vasopressors, however BP still remains low. Pt. oliguric, remains on CVVHDF/CRRT.     PAST HISTORY  --------------------------------------------------------------------------------  No significant changes to PMH, PSH, FHx, SHx, unless otherwise noted    ALLERGIES & MEDICATIONS  --------------------------------------------------------------------------------  Allergies  No Known Allergies    Intolerances    Standing Inpatient Medications  chlorhexidine 2% Cloths 1 Application(s) Topical daily  CRRT Treatment    <Continuous>  dextrose 50% Injectable 25 Gram(s) IV Push once  dextrose 50% Injectable 12.5 Gram(s) IV Push once  fondaparinux Injectable 1.5 milliGRAM(s) SubCutaneous daily  gabapentin Solution 100 milliGRAM(s) Oral three times a day  insulin glargine Injectable (LANTUS) 9 Unit(s) SubCutaneous at bedtime  insulin lispro (ADMELOG) corrective regimen sliding scale   SubCutaneous every 6 hours  insulin lispro Injectable (ADMELOG) 2 Unit(s) SubCutaneous every 6 hours  levothyroxine Injectable 20 MICROGram(s) IV Push at bedtime  meropenem  IVPB 1000 milliGRAM(s) IV Intermittent every 8 hours  midodrine 30 milliGRAM(s) Oral every 8 hours  multivitamin/minerals/iron Oral Solution (CENTRUM) 15 milliLiter(s) Oral daily  norepinephrine Infusion 0.05 MICROgram(s)/kG/Min IV Continuous <Continuous>  pantoprazole   Suspension 40 milliGRAM(s) Oral daily  Phoxillum Filtration BK 4 / 2.5 5000 milliLiter(s) CRRT <Continuous>  Phoxillum Filtration BK 4 / 2.5 5000 milliLiter(s) CRRT <Continuous>  Phoxillum Filtration BK 4 / 2.5 5000 milliLiter(s) CRRT <Continuous>  polyethylene glycol 3350 17 Gram(s) Oral at bedtime  QUEtiapine 25 milliGRAM(s) Oral at bedtime    PRN Inpatient Medications  HYDROmorphone  Injectable 0.5 milliGRAM(s) IV Push every 4 hours PRN  oxyCODONE    Solution 5 milliGRAM(s) Oral every 4 hours PRN  oxyCODONE    Solution 10 milliGRAM(s) Oral every 4 hours PRN    REVIEW OF SYSTEMS  --------------------------------------------------------------------------------  Unable to provide ROS    VITALS/PHYSICAL EXAM  --------------------------------------------------------------------------------  T(C): 37.1 (04-14-23 @ 04:00), Max: 37.3 (04-13-23 @ 08:00)  HR: 106 (04-14-23 @ 07:00) (101 - 112)  BP: --  RR: 28 (04-14-23 @ 07:00) (15 - 37)  SpO2: 98% (04-14-23 @ 07:00) (96% - 100%)  Wt(kg): --    04-13-23 @ 07:01  -  04-14-23 @ 07:00  --------------------------------------------------------  IN: 939.6 mL / OUT: 2006 mL / NET: -1066.4 mL    Physical Exam:  	Gen: Ill appearing  	HEENT: NGT+, anicteric  	Pulm: Fair entry B/L  	CV: S1S2+  	Abd: Obese, distended  	Ext: +LE edema B/L, trace B/L UE edema   	Neuro: Awake, unable to provide ROS  	Skin: Warm and dry              Dialysis access: Right IJ non tunneled HD catheter connected to CRRT machine    LABS/STUDIES  --------------------------------------------------------------------------------              7.7    11.62 >-----------<  294      [04-13-23 @ 05:30]              23.9     136  |  102  |  10  ----------------------------<  180      [04-13-23 @ 05:30]  3.6   |  20  |  0.82        Ca     8.0     [04-13-23 @ 05:30]      Mg     2.40     [04-13-23 @ 05:30]      Phos  2.7     [04-13-23 @ 05:30]    Creatinine Trend:  SCr 0.82 [04-13 @ 05:30]  SCr 0.80 [04-12 @ 06:53]  SCr 0.87 [04-11 @ 00:40]  SCr 0.99 [04-10 @ 17:15]  SCr 0.88 [04-10 @ 02:06]

## 2023-04-14 NOTE — PROGRESS NOTE ADULT - ASSESSMENT
48 year old female with a PMHx of HTN, hypothyroidism,  (30 years ago) and breast cancer (S/P left mastectomy and chemo in ) who presented with epigastric pain and left upper quadrant pain.  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  Transferred from OSH ([place]) for hypotension and tachypnea requiring emergent intubation and multiple pressors.  SICU consulted for hemodynamic monitoring and respiratory management.    Plan:      - NGT to gravity with bilious output  - Continue w/ IV Meropenem  - Continue CVVH  - Appreciate care per SICU      B Team Surgery  [id number]

## 2023-04-14 NOTE — PROGRESS NOTE ADULT - ATTENDING COMMENTS
Severe sepsis secondary to infected APN  a.  Off levophed at this time  b.  On Midodrine 30 q 8  c.  On meropenem  d.  CT reviewed  e.  Will coordinate with GI re: possible intervention    CHATO  a.  Remains on CVVH  b.  Increase fluid removal to 75/hr as tolerated    Respiratory insufficiency secondary to fluid overload  a.  On supplemental 02 via NC at 4L/min    Malnutrition  a.  On TF at goal  b.  NGT to gravity with ongoing reglan/PPI  c.  Start pancreatic enzyme supplementation

## 2023-04-14 NOTE — CHART NOTE - NSCHARTNOTEFT_GEN_A_CORE
NUTRITION FOLLOW-UP  48 year old female with a PMHx of HTN, hypothyroidism,  (30 years ago) and breast cancer (S/P left mastectomy and chemo in ) who presented with epigastric pain and left upper quadrant pain.  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  Transferred from OSH ([place]) for hypotension and tachypnea requiring emergent intubation and multiple pressors.  SICU consulted for hemodynamic monitoring and respiratory management.  Now receiving CVVHD and NGT is to gravity with bilious output. +Fluid overload.    Has been receiving post pyloric TF with Nepro.  Pt. endorses abdominal discomfort when asked.  No nausea/vomiting.  Spoke w SICU RD and surgical residents.  Suggestion to initiate pancreatic enzyme replacement therapy with [id number] 1 capsules suspended in 50-100mL mildly thickened apple juice q 4hrs (6x/day).  Would also change enteral formula to Peptamen 1.5 at goal of 45mL/hr  which may promote GI tolerance and nutrient absorption.  Also advise addition of 3 packets NoCarb Prosource per day.            _________________Diet___________________  Diet, NPO with Tube Feed:   Tube Feeding Modality: Nasogastric  Nepro with Carb Steady (NEPRORTH)  Total Volume for 24 Hours (mL): 960  Continuous  Starting Tube Feed Rate {mL per Hour}: 10  Increase Tube Feed Rate by (mL): 15     Every 4 hours  Until Goal Tube Feed Rate (mL per Hour): 40  Tube Feed Duration (in Hours): 24  Tube Feed Start Time: 16:00  No Carb Prosource (1pkg = 15gms Protein)     Qty per Day:  4 (04-10-23 @ 10:27) [Active]      Weight:                  Height: 64"         Upper 10% Ideal Body Weight = 60kg  106.9kg ()  118 kg (4/10),   120.9 kg ()  121.4 kg ()  124.5 kg ()  146.6 kg ()  132.1 kg (3/31)            _____Estimated Energy Needs (based on upper 10% IBW)_____  25-30 kcals/kg =6909-2479 kcals/d  1.8-2.0g protein/kg = 108-120g protein/d          Edema:  Skin:        ________________________Pertinent Medications____________   MEDICATIONS  (STANDING):  chlorhexidine 2% Cloths 1 Application(s) Topical daily  CRRT Treatment    <Continuous>  dextrose 50% Injectable 25 Gram(s) IV Push once  dextrose 50% Injectable 12.5 Gram(s) IV Push once  fondaparinux Injectable 1.5 milliGRAM(s) SubCutaneous daily  gabapentin Solution 100 milliGRAM(s) Oral three times a day  insulin glargine Injectable (LANTUS) 9 Unit(s) SubCutaneous at bedtime  insulin lispro (ADMELOG) corrective regimen sliding scale   SubCutaneous every 6 hours  insulin lispro Injectable (ADMELOG) 2 Unit(s) SubCutaneous every 6 hours  levothyroxine Injectable 20 MICROGram(s) IV Push at bedtime  meropenem  IVPB 1000 milliGRAM(s) IV Intermittent every 8 hours  midodrine 30 milliGRAM(s) Oral every 8 hours  multivitamin/minerals/iron Oral Solution (CENTRUM) 15 milliLiter(s) Oral daily  norepinephrine Infusion 0.05 MICROgram(s)/kG/Min (11.1 mL/Hr) IV Continuous <Continuous>  pantoprazole   Suspension 40 milliGRAM(s) Oral daily  Phoxillum Filtration BK 4 / 2.5 5000 milliLiter(s) (1200 mL/Hr) CRRT <Continuous>  Phoxillum Filtration BK 4 / 2.5 5000 milliLiter(s) (200 mL/Hr) CRRT <Continuous>  Phoxillum Filtration BK 4 / 2.5 5000 milliLiter(s) (2400 mL/Hr) CRRT <Continuous>  polyethylene glycol 3350 17 Gram(s) Oral at bedtime  QUEtiapine 25 milliGRAM(s) Oral at bedtime    MEDICATIONS  (PRN):  HYDROmorphone  Injectable 0.5 milliGRAM(s) IV Push every 4 hours PRN breakthrough pain  oxyCODONE    Solution 5 milliGRAM(s) Oral every 4 hours PRN Moderate Pain (4 - 6)  oxyCODONE    Solution 10 milliGRAM(s) Oral every 4 hours PRN Severe Pain (7 - 10)          _________________________Pertinent Labs____________________         136  |  104  |  9   ----------------------------<  133<H>  3.6   |  22  |  0.78    Ca    8.0<L>      2023 06:20  Phos  3.1       Mg     2.40         TPro  6.3  /  Alb  2.1<L>  /  TBili  4.8<H>  /  DBili  x   /  AST  123<H>  /  ALT  113<H>  /  AlkPhos  178<H>                                                                     7.8    12.56 )-----------( 248      ( 2023 06:20 )             24.5         CAPILLARY BLOOD GLUCOSE      POCT Blood Glucose.: 123 mg/dL (2023 06:09)    POCT Blood Glucose.: 123 mg/dL (23 @ 06:09)  POCT Blood Glucose.: 124 mg/dL (23 @ 00:08)  POCT Blood Glucose.: 130 mg/dL (23 @ 17:41)  POCT Blood Glucose.: 145 mg/dL (23 @ 11:51)              PLAN/RECOMMENDATIONS:    1)  D/C Nepro. Initiate Peptamen 1.5 @ goal of 45mL/hr + 3 packet NoCarb Prosource per day  2) Order [id number].... administer 1 capsule opened and suspended in 50-100mL mildly thickened apple juice  q 4hrs.  3) Obtain daily weights  4) Monitor tolerance to TF (GI status, electrolytes, glucose)     RDN remains available and will f/u PRN.          Cheryl Fam, LINDA, CDN       pager [phone] or MS Teams

## 2023-04-15 NOTE — PROGRESS NOTE ADULT - ASSESSMENT
48 year old female with a PMHx of HTN, hypothyroidism,  (30 years ago) and breast cancer (S/P left mastectomy and chemo in ) who presented with epigastric pain and left upper quadrant pain.  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  Transferred from OSH ([place]) for hypotension and tachypnea requiring emergent intubation and multiple pressors.  SICU consulted for hemodynamic monitoring and respiratory management.    Plan:   - NGT to gravity with bilious output  - Continue w/ IV Meropenem  - Continue CVVH  - Appreciate care per SICU      B Team Surgery  [id number]

## 2023-04-15 NOTE — PROGRESS NOTE ADULT - SUBJECTIVE AND OBJECTIVE BOX
24 HOUR EVENTS:  - CVVHD - 75 an hour  - Started on Creon    NEURO  RASS (if intubated): 		CAM ICU (if concern for delirium):  Exam: A&Ox2-3  Meds: gabapentin Solution 100 milliGRAM(s) Oral three times a day  HYDROmorphone  Injectable 0.5 milliGRAM(s) IV Push every 4 hours PRN breakthrough pain  oxyCODONE    Solution 5 milliGRAM(s) Oral every 4 hours PRN Moderate Pain (4 - 6)  oxyCODONE    Solution 10 milliGRAM(s) Oral every 4 hours PRN Severe Pain (7 - 10)  QUEtiapine 25 milliGRAM(s) Oral at bedtime      RESPIRATORY  RR: 32 (04-15-23 @ 00:00) (22 - 35)  SpO2: 100% (04-15-23 @ 00:00) (96% - 100%)  Wt(kg): --  Exam: no increased WOB  Mechanical Ventilation:   ABG - ( 13 Apr 2023 05:30 )  pH: 7.39  /  pCO2: 35    /  pO2: 83    / HCO3: 21    / Base Excess: -3.1  /  SaO2: 97.1    Lactate: x                Meds:     CARDIOVASCULAR  HR: 106 (04-15-23 @ 00:00) (103 - 116)  BP: --  BP(mean): --  ABP: 101/55 (04-15-23 @ 00:00) (85/50 - 102/58)  ABP(mean): 75 (04-15-23 @ 00:00) (65 - 77)  Wt(kg): --  CVP(cm H2O): --      Exam: appears well perfused  Cardiac Rhythm: sinus  Perfusion     [X ]Adequate   [ ]Inadequate  Mentation   [X ]Normal       [ ]Reduced  Extremities  [ X]Warm         [ ]Cool  Volume Status [ ]Hypervolemic [ X]Euvolemic [ ]Hypovolemic  Meds: midodrine 30 milliGRAM(s) Oral every 8 hours  norepinephrine Infusion 0.05 MICROgram(s)/kG/Min IV Continuous <Continuous>      GI/NUTRITION  Exam: soft nondistended  Diet: TFs  Meds: pancrelipase  (CREON 12,000 Lipase Units) 1 Capsule(s) Oral <User Schedule>  pantoprazole   Suspension 40 milliGRAM(s) Oral daily  polyethylene glycol 3350 17 Gram(s) Oral at bedtime      GENITOURINARY  I&O's Detail    04-13 @ 07:01 - 04-14 @ 07:00  --------------------------------------------------------  IN:    Enteral Tube Flush: 100 mL    IV PiggyBack: 200 mL    Nepro with Carb Steady: 575 mL    Norepinephrine: 17.6 mL    Norepinephrine: 11 mL    Vasopressin: 36 mL  Total IN: 939.6 mL    OUT:    Intermittent Catheterization - Urethral (mL): 50 mL    Nasogastric/Oral tube (mL): 525 mL    Other (mL): 1506 mL  Total OUT: 2081 mL    Total NET: -1141.4 mL      04-14 @ 07:01  -  04-15 @ 00:09  --------------------------------------------------------  IN:    Enteral Tube Flush: 10 mL    IV PiggyBack: 100 mL    Nepro with Carb Steady: 250 mL    Peptamen A.F.: 495 mL  Total IN: 855 mL    OUT:    Nasogastric/Oral tube (mL): 775 mL    Other (mL): 1479 mL  Total OUT: 2254 mL    Total NET: -1399 mL          04-14    136  |  104  |  9   ----------------------------<  133<H>  3.6   |  22  |  0.78    Ca    8.0<L>      14 Apr 2023 06:20  Phos  3.1     04-14  Mg     2.40     04-14    TPro  6.3  /  Alb  2.1<L>  /  TBili  4.8<H>  /  DBili  x   /  AST  123<H>  /  ALT  113<H>  /  AlkPhos  178<H>  04-14    Meds: multivitamin/minerals/iron Oral Solution (CENTRUM) 15 milliLiter(s) Oral daily      HEMATOLOGIC  Meds: fondaparinux Injectable 1.5 milliGRAM(s) SubCutaneous daily                          7.8    12.56 )-----------( 248      ( 14 Apr 2023 06:20 )             24.5         INFECTIOUS DISEASES  T(C): 36.8 (04-15-23 @ 00:00), Max: 37.9 (04-14-23 @ 20:00)  Wt(kg): --  WBC Count: 12.56 K/uL (04-14 @ 06:20)    Recent Cultures:    Meds: meropenem  IVPB 1000 milliGRAM(s) IV Intermittent every 8 hours      ENDOCRINE  Capillary Blood Glucose    Meds: dextrose 50% Injectable 25 Gram(s) IV Push once  dextrose 50% Injectable 12.5 Gram(s) IV Push once  insulin glargine Injectable (LANTUS) 9 Unit(s) SubCutaneous at bedtime  insulin lispro (ADMELOG) corrective regimen sliding scale   SubCutaneous every 6 hours  insulin lispro Injectable (ADMELOG) 2 Unit(s) SubCutaneous every 6 hours  levothyroxine Injectable 20 MICROGram(s) IV Push at bedtime    ACCESS DEVICES:  [X ] Peripheral IV  [X ] Central Venous Line		[X ] R	[X ] L	[X ] IJ	[ ] Fem	[ ] SC	Placed:   [X ] Arterial Line			[X ] R	[ ] L	[ ] Fem	[ ] Rad	[X ] Ax	Placed:   [ ] PICC:					[ ] Mediport  [ ] Urinary Catheter, Date Placed:   [ ] Necessity of urinary, arterial, and venous catheters discussed    OTHER MEDICATIONS:  chlorhexidine 2% Cloths 1 Application(s) Topical daily  CRRT Treatment    <Continuous>  Phoxillum Filtration BK 4 / 2.5 5000 milliLiter(s) CRRT <Continuous>  Phoxillum Filtration BK 4 / 2.5 5000 milliLiter(s) CRRT <Continuous>  Phoxillum Filtration BK 4 / 2.5 5000 milliLiter(s) CRRT <Continuous>      IMAGING: 24 HOUR EVENTS:  - CVVHD - 75 an hour  - transient pressor requirement after turn resolved   - Started on Creon    NEURO  RASS (if intubated): 		CAM ICU (if concern for delirium):  Exam: A&Ox2-3  Meds: gabapentin Solution 100 milliGRAM(s) Oral three times a day  HYDROmorphone  Injectable 0.5 milliGRAM(s) IV Push every 4 hours PRN breakthrough pain  oxyCODONE    Solution 5 milliGRAM(s) Oral every 4 hours PRN Moderate Pain (4 - 6)  oxyCODONE    Solution 10 milliGRAM(s) Oral every 4 hours PRN Severe Pain (7 - 10)  QUEtiapine 25 milliGRAM(s) Oral at bedtime      RESPIRATORY  Vital Signs Last 24 Hrs  T(C): 36.6 (15 Apr 2023 08:00), Max: 37.9 (14 Apr 2023 20:00)  T(F): 97.8 (15 Apr 2023 08:00), Max: 100.2 (14 Apr 2023 20:00)  HR: 110 (15 Apr 2023 08:00) (102 - 116)  BP: --  BP(mean): --  RR: 28 (15 Apr 2023 08:00) (22 - 37)  SpO2: 100% (15 Apr 2023 08:00) (96% - 100%)    Parameters below as of 15 Apr 2023 08:00  Patient On (Oxygen Delivery Method): nasal cannula  O2 Flow (L/min): 2    Meds:     CARDIOVASCULAR  HR: 106 (04-15-23 @ 00:00) (103 - 116)  BP: --  BP(mean): --  ABP: 101/55 (04-15-23 @ 00:00) (85/50 - 102/58)  ABP(mean): 75 (04-15-23 @ 00:00) (65 - 77)  Wt(kg): --  CVP(cm H2O): --      Exam: appears well perfused  Cardiac Rhythm: sinus  Perfusion     [X ]Adequate   [ ]Inadequate  Mentation   [X ]Normal       [ ]Reduced  Extremities  [ X]Warm         [ ]Cool  Volume Status [x ]Hypervolemic [ ]Euvolemic [ ]Hypovolemic  Meds: midodrine 30 milliGRAM(s) Oral every 8 hours  norepinephrine Infusion 0.05 MICROgram(s)/kG/Min IV Continuous <Continuous>      GI/NUTRITION  Exam: soft nondistended  Diet: TFs  Meds: pancrelipase  (CREON 12,000 Lipase Units) 1 Capsule(s) Oral <User Schedule>  pantoprazole   Suspension 40 milliGRAM(s) Oral daily  polyethylene glycol 3350 17 Gram(s) Oral at bedtime      GENITOURINARY  I&O's Detail    14 Apr 2023 07:01  -  15 Apr 2023 07:00  --------------------------------------------------------  IN:    Enteral Tube Flush: 10 mL    IV PiggyBack: 150 mL    Nepro with Carb Steady: 250 mL    Norepinephrine: 22 mL    Peptamen A.F.: 765 mL  Total IN: 1197 mL    OUT:    Nasogastric/Oral tube (mL): 850 mL    Other (mL): 2210 mL  Total OUT: 3060 mL    Total NET: -1863 mL      15 Apr 2023 07:01  -  15 Apr 2023 09:16  --------------------------------------------------------  IN:    Peptamen A.F.: 90 mL  Total IN: 90 mL    OUT:    Nasogastric/Oral tube (mL): 50 mL    Other (mL): 126 mL  Total OUT: 176 mL    Total NET: -86 mL                          7.9    12.10 )-----------( 201      ( 15 Apr 2023 06:00 )             24.7   04-15    137  |  105  |  8   ----------------------------<  152<H>  3.7   |  19<L>  |  0.77    Ca    7.9<L>      15 Apr 2023 06:00  Phos  3.2     04-15  Mg     2.40     04-15    TPro  6.8  /  Alb  2.3<L>  /  TBili  5.2<H>  /  DBili  4.3<H>  /  AST  137<H>  /  ALT  122<H>  /  AlkPhos  202<H>  04-15      Meds: multivitamin/minerals/iron Oral Solution (CENTRUM) 15 milliLiter(s) Oral daily      HEMATOLOGIC  Meds: fondaparinux Injectable 1.5 milliGRAM(s) SubCutaneous daily      INFECTIOUS DISEASES  T(C): 36.8 (04-15-23 @ 00:00), Max: 37.9 (04-14-23 @ 20:00)  Wt(kg): --  WBC Count: 12.56 K/uL (04-14 @ 06:20)    Recent Cultures:    Meds: meropenem  IVPB 1000 milliGRAM(s) IV Intermittent every 8 hours      ENDOCRINE  Capillary Blood Glucose    Meds: dextrose 50% Injectable 25 Gram(s) IV Push once  dextrose 50% Injectable 12.5 Gram(s) IV Push once  insulin glargine Injectable (LANTUS) 9 Unit(s) SubCutaneous at bedtime  insulin lispro (ADMELOG) corrective regimen sliding scale   SubCutaneous every 6 hours  insulin lispro Injectable (ADMELOG) 2 Unit(s) SubCutaneous every 6 hours  levothyroxine Injectable 20 MICROGram(s) IV Push at bedtime    ACCESS DEVICES:  [X ] Peripheral IV  [X ] Central Venous Line		[X ] R	[X ] L	[X ] IJ	[ ] Fem	[ ] SC	Placed:   [X ] Arterial Line			[X ] R	[ ] L	[ ] Fem	[ ] Rad	[X ] Ax	Placed:   [ ] PICC:					[ ] Mediport  [ ] Urinary Catheter, Date Placed:   [ ] Necessity of urinary, arterial, and venous catheters discussed    OTHER MEDICATIONS:  chlorhexidine 2% Cloths 1 Application(s) Topical daily  CRRT Treatment    <Continuous>  Phoxillum Filtration BK 4 / 2.5 5000 milliLiter(s) CRRT <Continuous>  Phoxillum Filtration BK 4 / 2.5 5000 milliLiter(s) CRRT <Continuous>  Phoxillum Filtration BK 4 / 2.5 5000 milliLiter(s) CRRT <Continuous>      IMAGING:

## 2023-04-15 NOTE — PROGRESS NOTE ADULT - ATTENDING COMMENTS
Severe sepsis secondary to infected APN  a.  On intermittent levophed gtt to maintain MAP >65.  On midodrine  b.  WBC remains 12, patient afebrile  c.  Continue meropenem  d.  Appreciate GI input  e.  On Creon    CHATO with fluid overload  a.  Remains anuric  b.  - fluid 75 to 100 ml/hr as tolerated    DM  a.  On Lantus      Malnutrition  a.  Tolerating TF  b.  Clamp NGT

## 2023-04-15 NOTE — PROGRESS NOTE ADULT - ASSESSMENT
48 year old female with PMHx significant for HTN, hypothyroidism,  30 years ago, breast cancer s/p left mastectomy and chemo in  presenting with epigastric pain and LUQ pain. Found to have necrotizing pancreatitis. Transferred from OSH ([place]) for hypotension and tachypnea requiring emergent intubation and multiple pressors. SICU consulted for hemodynamic monitoring and respiratory management now extubated on CVVHD, weaning off vasopressors.    PLAN  Neuro:  - Precautions for delirium - protected sleep  - Seroquel 25mg Qhs  - Dilaudid prn    Resp:  - AHRF w/ b/l plef 2/2 necrotizing pancreatitis    CV:  Mixed shock state 2/2 septic shock w/ E. Coli in urine vs hypovolemic shock now s/p 11L resus    - Off pressors  - Midodrine 30 q 8 hours to bridge to intermittent HD    GI:   - Continue TF via post pyloric tube now at goal  - GI ppx w/ protonix   - Abdominal CT showed necrotizing pancreatitis Lionel score E, CTSI 10.    - GI consulted for possible intervention. CT without walled off mass, no gastrointestinal intervention     Renal:  ARF 2/2 shock state resulting in ATN requiring CRRT    - On CRRT, keep net neg 75, plan to start intermittent hemodialysis when tolerated     Heme:   - SCDs and CRRT dosed fondaparinux for DVT ppx  - HIT Ab positive; DOUGLAS Negative   - f/u anti Xa level, sending out new level on . Last 0.31.      #Splenic Vein Thrombosis  3/22 CT Abd w/ contrast: Focal filling defect in the splenic vein at the level of the body of the pancreas for which an underlying thrombus is suspected     ID:   Intermittently febrile w/ leukocytosis c/f septic shock 2/2 E coli UTI  Diagnostics:  - 3/22 Ucx: E coli  - 3/30 Bcx: staph hominis in aerobic bottle x 1 (likely contaminant)   - S/p Meropenem (3/26-)  - Restart meropenem ( )    Endo:   - Lantus 9 units, admelog 2 units q6  - c/w synthroid 20 IV (home med)   - ISS  - Creon started    Code Status: Full code  Lines: KELLY Mendoza CVC, A line    Disposition: SICU     Assessment: 48 year old female with PMHx significant for HTN, hypothyroidism,  30 years ago, breast cancer s/p left mastectomy and chemo in  presenting with epigastric pain and LUQ pain. Found to have necrotizing pancreatitis. Transferred from OSH ([place]) for hypotension and tachypnea requiring emergent intubation and multiple pressors. SICU consulted for hemodynamic monitoring and respiratory management now extubated on CVVHD, weaning off vasopressors.    PLAN  Neuro:  - Precautions for delirium - protected sleep  - Seroquel 25mg Qhs  - Dilaudid prn    Resp:  - AHRF w/ b/l plef 2/2 necrotizing pancreatitis  - 2L NC to maintain goal SpO2 >92    CV:  Mixed shock state 2/2 septic shock w/ E. Coli in urine vs hypovolemic shock now s/p 11L resus    - Off pressors  - Midodrine 30 q 8 hours to bridge to intermittent HD    GI:   - Continue TF via nasoenteral tube now at goal  - Clamp sump NGT this AM  - GI ppx w/ protonix   - Abdominal CT showed necrotizing pancreatitis Lionel score E, CTSI 10.    - GI consulted for possible intervention. CT without walled off mass, no gastrointestinal intervention   - Creon started      Renal:  ARF 2/2 shock state resulting in ATN requiring CRRT    - On CRRT, keep net neg , plan to start intermittent hemodialysis when tolerated     Heme:   - SCDs and CRRT dosed fondaparinux for DVT ppx  - HIT Ab positive; DOUGLAS Negative, will transition to heparin SQ     #Splenic Vein Thrombosis  3/22 CT Abd w/ contrast: Focal filling defect in the splenic vein at the level of the body of the pancreas for which an underlying thrombus is suspected     ID:   Intermittently febrile w/ leukocytosis c/f septic shock 2/2 E coli UTI  Diagnostics:  - 3/22 Ucx: E coli  - 3/30 Bcx: staph hominis in aerobic bottle x 1 (likely contaminant)   - S/p Meropenem (3/26-)  - Restart meropenem ( )    Endo:   - Lantus 9 units, admelog 2 units q6  - c/w synthroid 20 IV (home med)   - ISS  - Creon started    Code Status: Full code  Lines: KELLY Mendoza CVC, A line    Disposition: SICU

## 2023-04-15 NOTE — PROGRESS NOTE ADULT - PROBLEM SELECTOR PLAN 1
Pt. with oliguric CHATO in the setting of necrotizing pancreatitis and shock. Pt. with most likely ATN. Scr was 1.24 on 3/23/23 and increased to 4.60 on 3/24/23. Pt. was initiated on CRRT/CVVHDF on 3/24/23 and discontinued overnight on 4/6/23 by SICU team. Pt. remained oliguric and was restarted on CVVHDH on 4/7/23. Labs reviewed. Pt. tolerating CRRT. Currently not on IV vasopressors but BP remains low. HD catheter functioning well. Plan is to continue CRRT. Monitor labs and urine output. Avoid any potential nephrotoxins. Dose medications as per eGFR.

## 2023-04-15 NOTE — PROGRESS NOTE ADULT - SUBJECTIVE AND OBJECTIVE BOX
B TEAM SURGERY DAILY PROGRESS NOTE    SUBJECTIVE:   Patient seen and evaluated on AM rounds.       OBJECTIVE:  Vital Signs Last 24 Hrs  T(C): 36.6 (15 Apr 2023 08:00), Max: 37.9 (14 Apr 2023 20:00)  T(F): 97.8 (15 Apr 2023 08:00), Max: 100.2 (14 Apr 2023 20:00)  HR: 112 (15 Apr 2023 11:00) (102 - 116)  RR: 30 (15 Apr 2023 11:00) (22 - 37)  SpO2: 95% (15 Apr 2023 11:00) (95% - 100%)    Parameters below as of 15 Apr 2023 11:00  Patient On (Oxygen Delivery Method): room air      Daily        NG Tube:   04-14-23 @ 07:01  -  04-15-23 @ 07:00  --------------------------------------------------------  OUT: 850 mL    04-15-23 @ 07:01  -  04-15-23 @ 11:37  --------------------------------------------------------  OUT: 50 mL        04-14  -  04-15  --------------------------------------------------------  IN:    Enteral Tube Flush: 10 mL    IV PiggyBack: 150 mL    Nepro with Carb Steady: 250 mL    Norepinephrine: 22 mL    Peptamen A.F.: 765 mL  Total IN: 1197 mL    OUT:    Nasogastric/Oral tube (mL): 850 mL    Other (mL): 2210 mL  Total OUT: 3060 mL      04-15  -  04-15  --------------------------------------------------------  IN:    Enteral Tube Flush: 100 mL    Peptamen A.F.: 225 mL  Total IN: 325 mL    OUT:    Nasogastric/Oral tube (mL): 50 mL    Other (mL): 482 mL    Rectal Tube (mL): 300 mL  Total OUT: 832 mL          STANDING  chlorhexidine 2% Cloths 1 Application(s) Topical daily  dextrose 50% Injectable 25 Gram(s) IV Push once  dextrose 50% Injectable 12.5 Gram(s) IV Push once  gabapentin Solution 100 milliGRAM(s) Oral three times a day  heparin   Injectable 5000 Unit(s) SubCutaneous every 8 hours  insulin glargine Injectable (LANTUS) 9 Unit(s) SubCutaneous at bedtime  insulin lispro (ADMELOG) corrective regimen sliding scale   SubCutaneous every 6 hours  insulin lispro Injectable (ADMELOG) 2 Unit(s) SubCutaneous every 6 hours  levothyroxine Injectable 20 MICROGram(s) IV Push at bedtime  meropenem  IVPB 1000 milliGRAM(s) IV Intermittent every 8 hours  midodrine 30 milliGRAM(s) Oral every 8 hours  multivitamin/minerals/iron Oral Solution (CENTRUM) 15 milliLiter(s) Oral daily  norepinephrine Infusion 0.05 MICROgram(s)/kG/Min (11.1 mL/Hr) IV Continuous <Continuous>  pancrelipase  (CREON 12,000 Lipase Units) 1 Capsule(s) Oral <User Schedule>  pantoprazole   Suspension 40 milliGRAM(s) Oral daily  polyethylene glycol 3350 17 Gram(s) Oral at bedtime  QUEtiapine 25 milliGRAM(s) Oral at bedtime    PRN  HYDROmorphone  Injectable 0.5 milliGRAM(s) IV Push every 4 hours PRN breakthrough pain  oxyCODONE    Solution 5 milliGRAM(s) Oral every 4 hours PRN Moderate Pain (4 - 6)  oxyCODONE    Solution 10 milliGRAM(s) Oral every 4 hours PRN Severe Pain (7 - 10)      Labs:  137  |  105  |  8  ----------------------------<  152<H>    (04-15)  3.7   |  19<L>  |  0.77            Ca    7.9<L>      04-15  Mg    2.40  Phos  3.2                Physical Exam:  General Appearance: appears uncomfortable   HEENT: NGT to gravity w/ bilious output. Post pyloric feeding tube present  Chest: Equal expansion bilaterally  CV: Pulse regular presently  Abdomen: Soft, TTP, nondistended  Extremities: Grossly symmetric, SCD's in place

## 2023-04-15 NOTE — PROGRESS NOTE ADULT - ATTENDING COMMENTS
Patient examined and ROS reviewed. A case of oliguric CHATO in the setting of necrotizing pancreatitis and shock.  Patient is tolerating CRRT. Currently not on IV vasopressors but BP remains low. Advised to continue CRRT.

## 2023-04-15 NOTE — PROGRESS NOTE ADULT - SUBJECTIVE AND OBJECTIVE BOX
[facility] DIVISION OF KIDNEY DISEASES AND HYPERTENSION   FOLLOW UP NOTE  --------------------------------------------------------------------------------  Chief Complaint: Oliguric CHATO, on RRT    24 hour events/subjective: Pt. seen and examined in the SICU today. Pt. awake, nodding yes/no to simple questions. Nodded "no" to when asked about feeling short of breath. Per nursing, she was on IV vasopressors overnight but stopped this am. Pt. oliguric, remains on CVVHDF/CRRT.     PAST HISTORY  --------------------------------------------------------------------------------  No significant changes to PMH, PSH, FHx, SHx, unless otherwise noted    ALLERGIES & MEDICATIONS  --------------------------------------------------------------------------------  Allergies  No Known Allergies    Standing Inpatient Medications  chlorhexidine 2% Cloths 1 Application(s) Topical daily  CRRT Treatment    <Continuous>  dextrose 50% Injectable 25 Gram(s) IV Push once  dextrose 50% Injectable 12.5 Gram(s) IV Push once  gabapentin Solution 100 milliGRAM(s) Oral three times a day  heparin   Injectable 5000 Unit(s) SubCutaneous every 8 hours  insulin glargine Injectable (LANTUS) 9 Unit(s) SubCutaneous at bedtime  insulin lispro (ADMELOG) corrective regimen sliding scale   SubCutaneous every 6 hours  insulin lispro Injectable (ADMELOG) 2 Unit(s) SubCutaneous every 6 hours  levothyroxine Injectable 20 MICROGram(s) IV Push at bedtime  meropenem  IVPB 1000 milliGRAM(s) IV Intermittent every 8 hours  midodrine 30 milliGRAM(s) Oral every 8 hours  multivitamin/minerals/iron Oral Solution (CENTRUM) 15 milliLiter(s) Oral daily  norepinephrine Infusion 0.05 MICROgram(s)/kG/Min IV Continuous <Continuous>  pancrelipase  (CREON 12,000 Lipase Units) 1 Capsule(s) Oral <User Schedule>  pantoprazole   Suspension 40 milliGRAM(s) Oral daily  Phoxillum Filtration BK 4 / 2.5 5000 milliLiter(s) CRRT <Continuous>  Phoxillum Filtration BK 4 / 2.5 5000 milliLiter(s) CRRT <Continuous>  Phoxillum Filtration BK 4 / 2.5 5000 milliLiter(s) CRRT <Continuous>  polyethylene glycol 3350 17 Gram(s) Oral at bedtime  QUEtiapine 25 milliGRAM(s) Oral at bedtime    PRN Inpatient Medications  HYDROmorphone  Injectable 0.5 milliGRAM(s) IV Push every 4 hours PRN  oxyCODONE    Solution 5 milliGRAM(s) Oral every 4 hours PRN  oxyCODONE    Solution 10 milliGRAM(s) Oral every 4 hours PRN    REVIEW OF SYSTEMS  --------------------------------------------------------------------------------  Limited ROS, see HPI     VITALS/PHYSICAL EXAM  --------------------------------------------------------------------------------  T(C): 37.2 (04-15-23 @ 12:00), Max: 37.9 (04-14-23 @ 20:00)  HR: 113 (04-15-23 @ 12:00) (102 - 116)  BP: --  RR: 31 (04-15-23 @ 12:00) (22 - 37)  SpO2: 98% (04-15-23 @ 12:00) (95% - 100%)  Wt(kg): --    04-14-23 @ 07:01  -  04-15-23 @ 07:00  --------------------------------------------------------  IN: 1197 mL / OUT: 3060 mL / NET: -1863 mL    04-15-23 @ 07:01  -  04-15-23 @ 12:49  --------------------------------------------------------  IN: 325 mL / OUT: 934 mL / NET: -609 mL    Physical Exam:  	Gen: Remains ill appearing  	HEENT: NGT+, anicteric  	Pulm: Fair entry B/L  	CV: S1S2+  	Abd: Obese, distended  	Ext: +LE edema B/L, trace B/L UE edema   	Neuro: Awake, nodding yes/no and following simple commands  	Skin: Warm and dry              Dialysis access: Right IJ non tunneled HD catheter connected to CRRT machine    LABS/STUDIES  --------------------------------------------------------------------------------              7.7    11.96 >-----------<  182      [04-15-23 @ 11:58]              24.1     134  |  102  |  7   ----------------------------<  149      [04-15-23 @ 11:58]  3.6   |  19  |  0.70        Ca     8.0     [04-15-23 @ 11:58]      Mg     2.50     [04-15-23 @ 11:58]      Phos  3.0     [04-15-23 @ 11:58]    TPro  6.6  /  Alb  2.4  /  TBili  5.3  /  DBili  x   /  AST  142  /  ALT  126  /  AlkPhos  208  [04-15-23 @ 11:58]    Creatinine Trend:  SCr 0.70 [04-15 @ 11:58]  SCr 0.77 [04-15 @ 06:00]  SCr 0.78 [04-14 @ 06:20]  SCr 0.82 [04-13 @ 05:30]  SCr 0.80 [04-12 @ 06:53]

## 2023-04-15 NOTE — PROGRESS NOTE ADULT - ATTENDING COMMENTS
Patient in sicu on crrt. On enteral feeds, iv abx for necrotizing pancreatitis  Continue supportive care  appreciate sicu care    I have personally interviewed and examined this patient, reviewed pertinent labs and imaging, and discussed the case with colleagues, residents, and physician assistants on B Team rounds.    The active care issues are:  1. necrotizing pancreatitis    The Acute Care Surgery (B Team) Attending Group Practice:  Dr. Ayden Yip, Dr. Adonis Botello, Dr. Jam Nj, Dr. Sathya Andrade,     urgent issues - spectra [phone]  nonurgent issues - [phone]  patient appointments or afterhours - [phone]

## 2023-04-16 NOTE — PROGRESS NOTE ADULT - SUBJECTIVE AND OBJECTIVE BOX
B TEAM SURGERY DAILY PROGRESS NOTE    SUBJECTIVE:   Overnight: no acute events   Patient seen and evaluated on AM rounds.      OBJECTIVE:  Vital Signs Last 24 Hrs  T(C): 36.6 (2023 08:00), Max: 37.2 (15 Apr 2023 12:00)  T(F): 97.8 (2023 08:00), Max: 99 (15 Apr 2023 12:00)  HR: 103 (2023 08:00) (99 - 113)  RR: 24 (:00) (19 - 34)  SpO2: 98% (2023 08:00) (94% - 100%)    Parameters below as of 2023 08:00  Patient On (Oxygen Delivery Method): room air      Daily     Daily Weight in k.7 (2023 06:00)    NG Tube:   04-15-23 @ 07:01  -  23 @ 07:00  --------------------------------------------------------  OUT: 50 mL        04-15  -  04-16  --------------------------------------------------------  IN:    Enteral Tube Flush: 250 mL    IV PiggyBack: 150 mL    Peptamen A.F.: 1080 mL  Total IN: 1480 mL    OUT:    Nasogastric/Oral tube (mL): 50 mL    Other (mL): 3385 mL    Rectal Tube (mL): 1325 mL  Total OUT: 4760 mL        --------------------------------------------------------  IN:    Peptamen A.F.: 90 mL  Total IN: 90 mL    OUT:  Total OUT: 0 mL          STANDING  acetaminophen   Oral Liquid .. 975 milliGRAM(s) Oral every 6 hours  chlorhexidine 2% Cloths 1 Application(s) Topical daily  CRRT Treatment    <Continuous>  dextrose 50% Injectable 12.5 Gram(s) IV Push once  dextrose 50% Injectable 25 Gram(s) IV Push once  gabapentin Solution 100 milliGRAM(s) Oral three times a day  heparin   Injectable 5000 Unit(s) SubCutaneous every 8 hours  insulin glargine Injectable (LANTUS) 9 Unit(s) SubCutaneous at bedtime  insulin lispro (ADMELOG) corrective regimen sliding scale   SubCutaneous every 6 hours  insulin lispro Injectable (ADMELOG) 2 Unit(s) SubCutaneous every 6 hours  levothyroxine Injectable 20 MICROGram(s) IV Push at bedtime  meropenem  IVPB 1000 milliGRAM(s) IV Intermittent every 8 hours  midodrine 30 milliGRAM(s) Oral every 8 hours  multivitamin/minerals/iron Oral Solution (CENTRUM) 15 milliLiter(s) Oral daily  pancrelipase  (CREON 12,000 Lipase Units) 1 Capsule(s) Oral <User Schedule>  pantoprazole   Suspension 40 milliGRAM(s) Oral daily  Phoxillum Filtration BK 4 / 2.5 5000 milliLiter(s) (1200 mL/Hr) CRRT <Continuous>  Phoxillum Filtration BK 4 / 2.5 5000 milliLiter(s) (2400 mL/Hr) CRRT <Continuous>  Phoxillum Filtration BK 4 / 2.5 5000 milliLiter(s) (200 mL/Hr) CRRT <Continuous>  polyethylene glycol 3350 17 Gram(s) Oral at bedtime  QUEtiapine 25 milliGRAM(s) Oral at bedtime    PRN  HYDROmorphone  Injectable 0.5 milliGRAM(s) IV Push every 4 hours PRN breakthrough pain  oxyCODONE    Solution 10 milliGRAM(s) Oral every 4 hours PRN Severe Pain (7 - 10)  oxyCODONE    Solution 5 milliGRAM(s) Oral every 4 hours PRN Moderate Pain (4 - 6)      Labs:  136  |  102  |  7  ----------------------------<  168<H>    (16)  3.5   |  20<L>  |  0.67            Ca    8.0<L>      -16  Mg    2.60  Phos  3.2                  Physical Exam:  General Appearance: appears uncomfortable   HEENT: NGT to gravity w/ bilious output. Post pyloric feeding tube present  Chest: Equal expansion bilaterally  CV: Pulse regular presently  Abdomen: Soft, TTP, nondistended  Extremities: Grossly symmetric, SCD's in place

## 2023-04-16 NOTE — CHART NOTE - NSCHARTNOTEFT_GEN_A_CORE
SICU Point of Care Ultrasound    : Alva Malloy    Indication: Volume Status evaluation    Able to obtain adequate Parasternal, Apical 4/5 chamber. Unable to obtain adequate subcostal view    Left Ventricle: Left ventricular cavity dimensions and wall thickness appear of normal size.  Left Ventricular systolic function appears preserved with Ejection Fraction  Right Ventricle: Normal right ventricular size with preserved function.    IVC:  Unable to visualize    Lung ultrasound: Demonstrated good, smooth pleural sliding, without irregularity throughout bilateral lung fields. SICU Point of Care Ultrasound    : Alva Malloy    Indication: Volume Status evaluation    Able to obtain adequate Parasternal, Apical 4/5 chamber. Unable to obtain adequate subcostal view    Left Ventricle: Left ventricular cavity dimensions and wall thickness appear of normal size.  Left Ventricular systolic function appears preserved with normal EF, hyperdynamic ventricles  Right Ventricle: Normal right ventricular size with preserved function.    IVC:  Unable to visualize    Lung ultrasound: Demonstrated good, smooth pleural sliding, without irregularity throughout bilateral lung fields. A-line predominant in bilateral lung fields

## 2023-04-16 NOTE — PHYSICAL THERAPY INITIAL EVALUATION ADULT - PERTINENT HX OF CURRENT PROBLEM, REHAB EVAL
patient is a 48 year old female admitted for necrotizing pancreatitis.  Transferred from OS ([place]) for hypotension and tachypnea requiring emergent intubation and multiple pressors.  SICU consulted for hemodynamic monitoring and respiratory management

## 2023-04-16 NOTE — PROGRESS NOTE ADULT - ATTENDING COMMENTS
Patient examined and ROS reviewed. A  case of oliguric CHATO in the setting of necrotizing pancreatitis and shock being managed on CRRT. Patient is off vasopressors. If remains stable hemodynamically, will consider to transition to intermittent HD.

## 2023-04-16 NOTE — PROGRESS NOTE ADULT - PROBLEM SELECTOR PLAN 1
Pt. with oliguric CHATO in the setting of necrotizing pancreatitis and shock. Pt. with most likely ATN. Scr was 1.24 on 3/23/23 and increased to 4.60 on 3/24/23. Pt. was initiated on CRRT/CVVHDF on 3/24/23 and discontinued overnight on 4/6/23 by SICU team. Pt. remained oliguric and was restarted on CVVHDH on 4/7/23. Labs reviewed. Pt. tolerating CRRT. Currently not on IV vasopressors. HD catheter functioning well. Plan is to continue CRRT. Will assess RRT needs daily. Monitor labs and urine output. Avoid any potential nephrotoxins. Dose medications as per eGFR.

## 2023-04-16 NOTE — PHYSICAL THERAPY INITIAL EVALUATION ADULT - GENERAL OBSERVATIONS, REHAB EVAL
patient received in bed in SICU in NAD +CRRT running to right IJ, NGT, kaia, juventino. patient lethargic mainly nonverbal during encounter patient received in bed in SICU in NAD +CRRT running to right IJ, NGT, tele, juventino. Arterial BP 94/54 HR 100s patient lethargic mainly nonverbal during encounter

## 2023-04-16 NOTE — PROGRESS NOTE ADULT - ASSESSMENT
48 year old female with a PMHx of HTN, hypothyroidism,  (30 years ago) and breast cancer (S/P left mastectomy and chemo in ) who presented with epigastric pain and left upper quadrant pain.  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  Transferred from OSH ([place]) for hypotension and tachypnea requiring emergent intubation and multiple pressors.  SICU consulted for hemodynamic monitoring and respiratory management.    Plan:   - NGT to gravity with bilious output  - Continue w/ IV Meropenem  - Continue CVVH  - Appreciate care per SICU      B Team Surgery  [id number]

## 2023-04-16 NOTE — PROGRESS NOTE ADULT - SUBJECTIVE AND OBJECTIVE BOX
SICU Daily Progress Note  =====================================================  Interval/Overnight Events:   - SHEILA OVN  - Midodrine increased to 30 TID  - Dc'd reglan  - Meropenem started per surgery  - Off levo    ALLERGIES:  No Known Allergies      --------------------------------------------------------------------------------------    MEDICATIONS:    Neurologic Medications  gabapentin Solution 100 milliGRAM(s) Oral three times a day  HYDROmorphone  Injectable 0.5 milliGRAM(s) IV Push every 4 hours PRN breakthrough pain  oxyCODONE    Solution 5 milliGRAM(s) Oral every 4 hours PRN Moderate Pain (4 - 6)  oxyCODONE    Solution 10 milliGRAM(s) Oral every 4 hours PRN Severe Pain (7 - 10)  QUEtiapine 25 milliGRAM(s) Oral at bedtime    Respiratory Medications    Cardiovascular Medications  midodrine 30 milliGRAM(s) Oral every 8 hours  norepinephrine Infusion 0.05 MICROgram(s)/kG/Min IV Continuous <Continuous>    Gastrointestinal Medications  multivitamin/minerals/iron Oral Solution (CENTRUM) 15 milliLiter(s) Oral daily  pancrelipase  (CREON 12,000 Lipase Units) 1 Capsule(s) Oral <User Schedule>  pantoprazole   Suspension 40 milliGRAM(s) Oral daily  polyethylene glycol 3350 17 Gram(s) Oral at bedtime    Genitourinary Medications    Hematologic/Oncologic Medications  heparin   Injectable 5000 Unit(s) SubCutaneous every 8 hours    Antimicrobial/Immunologic Medications  meropenem  IVPB 1000 milliGRAM(s) IV Intermittent every 8 hours    Endocrine/Metabolic Medications  dextrose 50% Injectable 25 Gram(s) IV Push once  dextrose 50% Injectable 12.5 Gram(s) IV Push once  insulin glargine Injectable (LANTUS) 9 Unit(s) SubCutaneous at bedtime  insulin lispro (ADMELOG) corrective regimen sliding scale   SubCutaneous every 6 hours  insulin lispro Injectable (ADMELOG) 2 Unit(s) SubCutaneous every 6 hours  levothyroxine Injectable 20 MICROGram(s) IV Push at bedtime    Topical/Other Medications  chlorhexidine 2% Cloths 1 Application(s) Topical daily  CRRT Treatment    <Continuous>  Phoxillum Filtration BK 4 / 2.5 5000 milliLiter(s) CRRT <Continuous>  Phoxillum Filtration BK 4 / 2.5 5000 milliLiter(s) CRRT <Continuous>  Phoxillum Filtration BK 4 / 2.5 5000 milliLiter(s) CRRT <Continuous>    --------------------------------------------------------------------------------------    VITAL SIGNS:  ICU Vital Signs Last 24 Hrs  T(C): 36.7 (16 Apr 2023 00:00), Max: 37.2 (15 Apr 2023 12:00)  T(F): 98.1 (16 Apr 2023 00:00), Max: 99 (15 Apr 2023 12:00)  HR: 107 (16 Apr 2023 01:00) (99 - 113)  BP: --  BP(mean): --  ABP: 91/57 (16 Apr 2023 01:00) (68/36 - 120/68)  ABP(mean): 72 (16 Apr 2023 01:00) (50 - 89)  RR: 24 (16 Apr 2023 01:00) (23 - 37)  SpO2: 95% (16 Apr 2023 01:00) (94% - 100%)    O2 Parameters below as of 16 Apr 2023 00:00  Patient On (Oxygen Delivery Method): room air      --------------------------------------------------------------------------------------    INS AND OUTS:  I&O's Detail    14 Apr 2023 07:01  -  15 Apr 2023 07:00  --------------------------------------------------------  IN:    Enteral Tube Flush: 10 mL    IV PiggyBack: 150 mL    Nepro with Carb Steady: 250 mL    Norepinephrine: 22 mL    Peptamen A.F.: 765 mL  Total IN: 1197 mL    OUT:    Nasogastric/Oral tube (mL): 850 mL    Other (mL): 2210 mL  Total OUT: 3060 mL    Total NET: -1863 mL      15 Apr 2023 07:01  -  16 Apr 2023 01:02  --------------------------------------------------------  IN:    Enteral Tube Flush: 250 mL    IV PiggyBack: 100 mL    Peptamen A.F.: 810 mL  Total IN: 1160 mL    OUT:    Nasogastric/Oral tube (mL): 50 mL    Other (mL): 2349 mL    Rectal Tube (mL): 1125 mL  Total OUT: 3524 mL    Total NET: -2364 mL    --------------------------------------------------------------------------------------    EXAM  NEUROLOGY  Exam: Responds with nodding    RESPIRATORY  Exam: Lungs clear to auscultation, Normal expansion/effort.     CARDIOVASCULAR  Exam: S1, S2.  Regular rate and rhythm.       GI/NUTRITION  Exam: Abdomen soft, Non-tender, Non-distended.   Current Diet: NPO with TF      Exam: CRRT. No viera      METABOLIC / FLUIDS / ELECTROLYTES  multivitamin/minerals/iron Oral Solution (CENTRUM) 15 milliLiter(s) Oral daily      HEMATOLOGIC  [x] VTE Prophylaxis: heparin   Injectable 5000 Unit(s) SubCutaneous every 8 hours    Transfusions:	[] PRBC	[] Platelets		[] FFP	[] Cryoprecipitate    INFECTIOUS DISEASE  Antimicrobials/Immunologic Medications:  meropenem  IVPB 1000 milliGRAM(s) IV Intermittent every 8 hours    Day #      of     ***    TUBES / LINES / DRAINS  ***  [x] Peripheral IV  [x] Central Venous Line     	[] R	[x] L	[x] IJ	[] Fem	[] SC	Date Placed:  [x] R Shiley   [x] Arterial Line		[] R	[] L	[] Fem	[] Rad	[] Ax	Date Placed:   [x] NGT  [] PICC		[] Midline		[] Mediport  [] Urinary Catheter		Date Placed:   [x] Necessity of urinary, arterial, and venous catheters discussed    --------------------------------------------------------------------------------------    LABS                          8.0    12.21 )-----------( 168      ( 15 Apr 2023 18:10 )             24.3   04-15    135  |  104  |  7   ----------------------------<  149<H>  3.6   |  19<L>  |  0.72    Ca    8.1<L>      15 Apr 2023 18:10  Phos  3.3     04-15  Mg     2.40     04-15    TPro  7.0  /  Alb  2.6<L>  /  TBili  5.4<H>  /  DBili  x   /  AST  142<H>  /  ALT  128<H>  /  AlkPhos  214<H>  04-15    --------------------------------------------------------------------------------------    OTHER LABORATORY:     IMAGING STUDIES:   CXR:    SICU Daily Progress Note  =====================================================  Interval/Overnight Events:   - SHEILA OVN  - DC NGT  - DC miralax in setting of high rectal tube output     ALLERGIES:  No Known Allergies      --------------------------------------------------------------------------------------    MEDICATIONS:    Neurologic Medications  gabapentin Solution 100 milliGRAM(s) Oral three times a day  HYDROmorphone  Injectable 0.5 milliGRAM(s) IV Push every 4 hours PRN breakthrough pain  oxyCODONE    Solution 5 milliGRAM(s) Oral every 4 hours PRN Moderate Pain (4 - 6)  oxyCODONE    Solution 10 milliGRAM(s) Oral every 4 hours PRN Severe Pain (7 - 10)  QUEtiapine 25 milliGRAM(s) Oral at bedtime    Respiratory Medications    Cardiovascular Medications  midodrine 30 milliGRAM(s) Oral every 8 hours  norepinephrine Infusion 0.05 MICROgram(s)/kG/Min IV Continuous <Continuous>    Gastrointestinal Medications  multivitamin/minerals/iron Oral Solution (CENTRUM) 15 milliLiter(s) Oral daily  pancrelipase  (CREON 12,000 Lipase Units) 1 Capsule(s) Oral <User Schedule>  pantoprazole   Suspension 40 milliGRAM(s) Oral daily  polyethylene glycol 3350 17 Gram(s) Oral at bedtime    Genitourinary Medications    Hematologic/Oncologic Medications  heparin   Injectable 5000 Unit(s) SubCutaneous every 8 hours    Antimicrobial/Immunologic Medications  meropenem  IVPB 1000 milliGRAM(s) IV Intermittent every 8 hours    Endocrine/Metabolic Medications  dextrose 50% Injectable 25 Gram(s) IV Push once  dextrose 50% Injectable 12.5 Gram(s) IV Push once  insulin glargine Injectable (LANTUS) 9 Unit(s) SubCutaneous at bedtime  insulin lispro (ADMELOG) corrective regimen sliding scale   SubCutaneous every 6 hours  insulin lispro Injectable (ADMELOG) 2 Unit(s) SubCutaneous every 6 hours  levothyroxine Injectable 20 MICROGram(s) IV Push at bedtime    Topical/Other Medications  chlorhexidine 2% Cloths 1 Application(s) Topical daily  CRRT Treatment    <Continuous>  Phoxillum Filtration BK 4 / 2.5 5000 milliLiter(s) CRRT <Continuous>  Phoxillum Filtration BK 4 / 2.5 5000 milliLiter(s) CRRT <Continuous>  Phoxillum Filtration BK 4 / 2.5 5000 milliLiter(s) CRRT <Continuous>    --------------------------------------------------------------------------------------    VITAL SIGNS:  ICU Vital Signs Last 24 Hrs  T(C): 36.7 (16 Apr 2023 00:00), Max: 37.2 (15 Apr 2023 12:00)  T(F): 98.1 (16 Apr 2023 00:00), Max: 99 (15 Apr 2023 12:00)  HR: 107 (16 Apr 2023 01:00) (99 - 113)  BP: --  BP(mean): --  ABP: 91/57 (16 Apr 2023 01:00) (68/36 - 120/68)  ABP(mean): 72 (16 Apr 2023 01:00) (50 - 89)  RR: 24 (16 Apr 2023 01:00) (23 - 37)  SpO2: 95% (16 Apr 2023 01:00) (94% - 100%)    O2 Parameters below as of 16 Apr 2023 00:00  Patient On (Oxygen Delivery Method): room air      --------------------------------------------------------------------------------------    INS AND OUTS:  I&O's Detail    14 Apr 2023 07:01  -  15 Apr 2023 07:00  --------------------------------------------------------  IN:    Enteral Tube Flush: 10 mL    IV PiggyBack: 150 mL    Nepro with Carb Steady: 250 mL    Norepinephrine: 22 mL    Peptamen A.F.: 765 mL  Total IN: 1197 mL    OUT:    Nasogastric/Oral tube (mL): 850 mL    Other (mL): 2210 mL  Total OUT: 3060 mL    Total NET: -1863 mL      15 Apr 2023 07:01  -  16 Apr 2023 01:02  --------------------------------------------------------  IN:    Enteral Tube Flush: 250 mL    IV PiggyBack: 100 mL    Peptamen A.F.: 810 mL  Total IN: 1160 mL    OUT:    Nasogastric/Oral tube (mL): 50 mL    Other (mL): 2349 mL    Rectal Tube (mL): 1125 mL  Total OUT: 3524 mL    Total NET: -2364 mL    --------------------------------------------------------------------------------------    EXAM  NEUROLOGY  Exam: Responds with nodding    RESPIRATORY  Exam: Lungs clear to auscultation, Normal expansion/effort.     CARDIOVASCULAR  Exam: S1, S2.  Regular rate and rhythm.       GI/NUTRITION  Exam: Abdomen soft, tender to palpation, distended   Current Diet: NPO with TF      Exam: CRRT. No viera      METABOLIC / FLUIDS / ELECTROLYTES  multivitamin/minerals/iron Oral Solution (CENTRUM) 15 milliLiter(s) Oral daily      HEMATOLOGIC  [x] VTE Prophylaxis: heparin   Injectable 5000 Unit(s) SubCutaneous every 8 hours    Transfusions:	[] PRBC	[] Platelets		[] FFP	[] Cryoprecipitate    INFECTIOUS DISEASE  Antimicrobials/Immunologic Medications:  meropenem  IVPB 1000 milliGRAM(s) IV Intermittent every 8 hours    Day #      of     ***    TUBES / LINES / DRAINS  ***  [x] Peripheral IV  [x] Central Venous Line     	[] R	[x] L	[x] IJ	[] Fem	[] SC	Date Placed:  [x] R Shiley   [x] Arterial Line		[] R	[] L	[] Fem	[] Rad	[] Ax	Date Placed:   [x] NGT  [] PICC		[] Midline		[] Mediport  [] Urinary Catheter		Date Placed:   [x] Necessity of urinary, arterial, and venous catheters discussed    --------------------------------------------------------------------------------------    LABS                          8.0    12.21 )-----------( 168      ( 15 Apr 2023 18:10 )             24.3   04-15    135  |  104  |  7   ----------------------------<  149<H>  3.6   |  19<L>  |  0.72    Ca    8.1<L>      15 Apr 2023 18:10  Phos  3.3     04-15  Mg     2.40     04-15    TPro  7.0  /  Alb  2.6<L>  /  TBili  5.4<H>  /  DBili  x   /  AST  142<H>  /  ALT  128<H>  /  AlkPhos  214<H>  04-15    --------------------------------------------------------------------------------------    OTHER LABORATORY:     IMAGING STUDIES:   CXR:

## 2023-04-16 NOTE — PROGRESS NOTE ADULT - ASSESSMENT
ASSESSMENT: Patient is a 48 year old female with PMHx significant for HTN, hypothyroidism,  30 years ago, breast cancer s/p left mastectomy and chemo in  presenting with epigastric pain and LUQ pain. Found to have necrotizing pancreatitis. Transferred from OSH ([place]) for hypotension and tachypnea requiring emergent intubation and multiple pressors. SICU consulted for hemodynamic monitoring and respiratory management.    PLAN  Neuro:  - Precautions for delirium - protected sleep  - Seroquel 25mg Qhs  - Dilaudid prn    Resp:  - AHRF w/ b/l plef 2/2 necrotizing pancreatitis    CV:  Mixed shock state 2/2 septic shock w/ E. Coli in urine vs hypovolemic shock now s/p 11L resus    - Off pressors  - Midodrine 30 q 8 hours to bridge to intermittent HD    GI:   - Continue TF via post pyloric tube now at goal  - GI ppx w/ protonix   - Abdominal CT showed necrotizing pancreatitis Lionel score E, CTSI 10.    - GI consulted for possible intervention. CT without walled off mass, no gastrointestinal intervention     Renal:  ARF 2/2 shock state resulting in ATN requiring CRRT    - On CRRT, keep net neg 75, plan to start intermittent hemodialysis when tolerated     Heme:   - SCDs and CRRT dosed fondaparinux for DVT ppx  - HIT Ab positive; DOUGLAS Negative   - f/u anti Xa level, sending out new level on . Last 0.31.      #Splenic Vein Thrombosis  3/22 CT Abd w/ contrast: Focal filling defect in the splenic vein at the level of the body of the pancreas for which an underlying thrombus is suspected     ID:   Intermittently febrile w/ leukocytosis c/f septic shock 2/2 E coli UTI  Diagnostics:  - 3/22 Ucx: E coli  - 3/30 Bcx: staph hominis in aerobic bottle x 1 (likely contaminant)   - S/p Meropenem (3/26-)  - Restart meropenem ( )    Endo:   - Lantus 9 units, admelog 2 units q6  - c/w synthroid 20 IV (home med)   - ISS  - Creon started    Code Status: Full code  Lines: KELLY Mendoza CVC, A line    Disposition: SICU ASSESSMENT: Patient is a 48 year old female with PMHx significant for HTN, hypothyroidism,  30 years ago, breast cancer s/p left mastectomy and chemo in  presenting with epigastric pain and LUQ pain. Found to have necrotizing pancreatitis. Transferred from OSH ([place]) for hypotension and tachypnea requiring emergent intubation and multiple pressors. SICU consulted for hemodynamic monitoring and respiratory management.    PLAN  Neuro:  - Precautions for delirium - protected sleep  - Seroquel 25mg Qhs  - Dilaudid prn    Resp:  - AHRF w/ b/l plef 2/2 necrotizing pancreatitis    CV:  Mixed shock state 2/2 septic shock w/ E. Coli in urine vs hypovolemic shock now s/p 11L resus    - Off pressors  - Midodrine 30 q 8 hours to bridge to intermittent HD    GI:   - Continue TF via post pyloric tube now at goal  - GI ppx w/ protonix   - Abdominal CT showed necrotizing pancreatitis Lionel score E, CTSI 10.    - GI consulted for possible intervention. CT without walled off mass, no gastrointestinal intervention     Renal:  ARF 2/2 shock state resulting in ATN requiring CRRT    - On CRRT, keep net neg 75, plan to start intermittent hemodialysis when tolerated     Heme:   - SCDs and SUBQ, no longer on fondaparinux  - HIT Ab positive; DOUGLAS Negative  - f/u anti Xa level, sending out new level on . Last 0.31.      #Splenic Vein Thrombosis  3/22 CT Abd w/ contrast: Focal filling defect in the splenic vein at the level of the body of the pancreas for which an underlying thrombus is suspected     ID:   Intermittently febrile w/ leukocytosis c/f septic shock 2/2 E coli UTI  Diagnostics:  - 3/22 Ucx: E coli  - 3/30 Bcx: staph hominis in aerobic bottle x 1 (likely contaminant)   - S/p Meropenem (3/26-)  - Restart meropenem ( )    Endo:   - Lantus 9 units, admelog 2 units q6  - c/w synthroid 20 IV (home med)   - ISS  - Creon started    Code Status: Full code  Lines: KELLY Mendoza CVC, A line    Disposition: SICU ASSESSMENT: Patient is a 48 year old female with PMHx significant for HTN, hypothyroidism,  30 years ago, breast cancer s/p left mastectomy and chemo in  presenting with epigastric pain and LUQ pain. Found to have necrotizing pancreatitis. Transferred from OSH ([place]) for hypotension and tachypnea requiring emergent intubation and multiple pressors. SICU consulted for hemodynamic monitoring and respiratory management.    PLAN  Neuro:  - Precautions for delirium - protected sleep  - Seroquel 25mg Qhs  - tylenol and Dilaudid prn    Resp:  - AHRF w/ b/l plef 2/2 necrotizing pancreatitis    CV:  Mixed shock state 2/2 septic shock w/ E. Coli in urine vs hypovolemic shock now s/p 11L resus    - Off pressors  - Midodrine 30 q 8 hours to bridge to intermittent HD    GI:   - Continue TF via post pyloric tube now at goal  - GI ppx w/ protonix   - Abdominal CT showed necrotizing pancreatitis Lionel score E, CTSI 10.    - GI consulted for possible intervention. CT without walled off mass, no gastrointestinal intervention     Renal:  ARF 2/2 shock state resulting in ATN requiring CRRT    - On CRRT, keep net neg 100, plan to start intermittent hemodialysis when tolerated, likely Monday     Heme:   - SCDs and SQH, no longer on fondaparinux  - HIT Ab positive; DOUGLAS Negative    #Splenic Vein Thrombosis  3/22 CT Abd w/ contrast: Focal filling defect in the splenic vein at the level of the body of the pancreas for which an underlying thrombus is suspected     ID:   Intermittently febrile w/ leukocytosis c/f septic shock 2/2 E coli UTI  Diagnostics:  - 3/22 Ucx: E coli  - 3/30 Bcx: staph hominis in aerobic bottle x 1 (likely contaminant)   - S/p Meropenem (3/26-)  - Restart meropenem ( )    Endo:   - Lantus 9 units, admelog 2 units q6  - c/w synthroid 20 IV (home med)   - ISS  - Creon started    Code Status: Full code  Lines: KELLY Mendoza CVC, A line    Disposition: SICU

## 2023-04-16 NOTE — PROGRESS NOTE ADULT - SUBJECTIVE AND OBJECTIVE BOX
[facility] DIVISION OF KIDNEY DISEASES AND HYPERTENSION   FOLLOW UP NOTE  --------------------------------------------------------------------------------  Chief Complaint: Oliguric CHATO, on RRT    24 hour events/subjective: Pt. seen and examined in the SICU today. She is somnolent today. Per nursing, has been off of IV vasopressors since this morning. Remains anuric.     PAST HISTORY  --------------------------------------------------------------------------------  No significant changes to PMH, PSH, FHx, SHx, unless otherwise noted    ALLERGIES & MEDICATIONS  --------------------------------------------------------------------------------  Allergies  No Known Allergies    Standing Inpatient Medications  acetaminophen   Oral Liquid .. 975 milliGRAM(s) Oral every 6 hours  chlorhexidine 2% Cloths 1 Application(s) Topical daily  CRRT Treatment    <Continuous>  dextrose 50% Injectable 12.5 Gram(s) IV Push once  dextrose 50% Injectable 25 Gram(s) IV Push once  gabapentin Solution 100 milliGRAM(s) Oral three times a day  heparin   Injectable 5000 Unit(s) SubCutaneous every 8 hours  insulin glargine Injectable (LANTUS) 9 Unit(s) SubCutaneous at bedtime  insulin lispro (ADMELOG) corrective regimen sliding scale   SubCutaneous every 6 hours  insulin lispro Injectable (ADMELOG) 2 Unit(s) SubCutaneous every 6 hours  levothyroxine Injectable 20 MICROGram(s) IV Push at bedtime  meropenem  IVPB 1000 milliGRAM(s) IV Intermittent every 8 hours  midodrine 30 milliGRAM(s) Oral every 8 hours  multivitamin/minerals/iron Oral Solution (CENTRUM) 15 milliLiter(s) Oral daily  pancrelipase  (CREON 12,000 Lipase Units) 1 Capsule(s) Oral <User Schedule>  pantoprazole   Suspension 40 milliGRAM(s) Oral daily  Phoxillum Filtration BK 4 / 2.5 5000 milliLiter(s) CRRT <Continuous>  Phoxillum Filtration BK 4 / 2.5 5000 milliLiter(s) CRRT <Continuous>  Phoxillum Filtration BK 4 / 2.5 5000 milliLiter(s) CRRT <Continuous>  psyllium Powder 1 Packet(s) Oral daily  QUEtiapine 25 milliGRAM(s) Oral at bedtime    PRN Inpatient Medications  HYDROmorphone  Injectable 0.5 milliGRAM(s) IV Push every 4 hours PRN  oxyCODONE    Solution 10 milliGRAM(s) Oral every 4 hours PRN  oxyCODONE    Solution 5 milliGRAM(s) Oral every 4 hours PRN    REVIEW OF SYSTEMS  --------------------------------------------------------------------------------  Unable to obtain ROS     VITALS/PHYSICAL EXAM  --------------------------------------------------------------------------------  T(C): 36.6 (04-16-23 @ 08:00), Max: 37.2 (04-15-23 @ 12:00)  HR: 102 (04-16-23 @ 09:00) (99 - 113)  BP: --  RR: 20 (04-16-23 @ 09:00) (19 - 34)  SpO2: 97% (04-16-23 @ 09:00) (94% - 99%)  Wt(kg): --    04-15-23 @ 07:01  -  04-16-23 @ 07:00  --------------------------------------------------------  IN: 1480 mL / OUT: 4760 mL / NET: -3280 mL    04-16-23 @ 07:01  -  04-16-23 @ 10:21  --------------------------------------------------------  IN: 235 mL / OUT: 358 mL / NET: -123 mL    Physical Exam:  	Gen: Remains ill appearing  	HEENT: NGT+, anicteric  	Pulm: Fair entry B/L  	CV: S1S2+  	Abd: Obese, distended  	Ext: +LE edema B/L, trace B/L UE edema   	Neuro: Somnolent   	Skin: Warm and dry              Dialysis access: Right IJ non tunneled HD catheter connected to CRRT machine    LABS/STUDIES  --------------------------------------------------------------------------------              7.6    10.77 >-----------<  154      [04-16-23 @ 06:26]              23.9     136  |  102  |  7   ----------------------------<  168      [04-16-23 @ 06:26]  3.5   |  20  |  0.67        Ca     8.0     [04-16-23 @ 06:26]      Mg     2.60     [04-16-23 @ 06:26]      Phos  3.2     [04-16-23 @ 06:26]    TPro  6.9  /  Alb  2.6  /  TBili  5.6  /  DBili  x   /  AST  135  /  ALT  127  /  AlkPhos  224  [04-16-23 @ 06:26]    Creatinine Trend:  SCr 0.67 [04-16 @ 06:26]  SCr 0.67 [04-16 @ 00:54]  SCr 0.72 [04-15 @ 18:10]  SCr 0.70 [04-15 @ 11:58]  SCr 0.77 [04-15 @ 06:00]

## 2023-04-16 NOTE — PROGRESS NOTE ADULT - ATTENDING COMMENTS
Infected APN  a.  Remains afebrile  b  WBC remains at 12  c.  On meropenem  D.  On midodrine    CHATO with fluid overload  a.  On CVVH  b,  - 100ml/hr as tolerated  c.  Plan for IHD next week    Malnutrition  a.  Remains NPO  b.  DC NGT  c.  Continue TF to goal add fiber  d.  DC miralax, continue Creon    Hypokalemia  a.  Replete k

## 2023-04-17 NOTE — PROGRESS NOTE ADULT - ASSESSMENT
Patient is a 48 year old female with PMHx significant for HTN, hypothyroidism,  30 years ago, breast cancer s/p left mastectomy and chemo in  presenting with epigastric pain and LUQ pain. Found to have necrotizing pancreatitis. Transferred from OSH ([place]) for hypotension and tachypnea requiring emergent intubation and multiple pressors. SICU consulted for hemodynamic monitoring and respiratory management.    PLAN  Neuro:  - Precautions for delirium - protected sleep  - Seroquel 25mg Qhs  - tylenol and Dilaudid prn    Resp:  - AHRF w/ b/l plef 2/2 necrotizing pancreatitis    CV:  Mixed shock state 2/2 septic shock w/ E. Coli in urine vs hypovolemic shock now s/p 11L resus    - Off pressors  - Midodrine 30 q 8 hours to bridge to intermittent HD    GI:   - Continue TF via post pyloric tube at goal  - GI ppx w/ protonix   - Creon  - No GI intervention at this time; no walled off collection on CT    Renal:  ARF 2/2 shock state resulting in ATN requiring CRRT    - On CRRT, no fluid being removed due to hypotension.      Heme:   - SCDs and SQH, no longer on fondaparinux  - HIT Ab positive; DOUGLAS Negative    #Splenic Vein Thrombosis  3/22 CT Abd w/ contrast: Focal filling defect in the splenic vein at the level of the body of the pancreas for which an underlying thrombus is suspected     ID:   Intermittently febrile w/ leukocytosis c/f septic shock 2/2 E coli UTI  Diagnostics:  - 3/22 Ucx: E coli  - 3/30 Bcx: staph hominis in aerobic bottle x 1 (likely contaminant)   - S/p Meropenem (3/26-)  - Restart meropenem ( )    Endo:   - Lantus 9 units, admelog 2 units q6  - c/w synthroid 20 IV (home med)   - ISS    Code Status: Full code  Lines: KELLY Mendoza, A line    Disposition: SICU   Patient is a 48 year old female with PMHx significant for HTN, hypothyroidism,  30 years ago, breast cancer s/p left mastectomy and chemo in  presenting with epigastric pain and LUQ pain. Found to have necrotizing pancreatitis. Transferred from OSH ([place]) for hypotension and tachypnea requiring emergent intubation and multiple pressors. SICU consulted for hemodynamic monitoring and respiratory management.    PLAN  Neuro:  - Precautions for delirium - protected sleep  - Seroquel 25mg Qhs  - tylenol and Dilaudid prn    Resp:  - AHRF w/ b/l plef 2/2 necrotizing pancreatitis    CV:  Mixed shock state 2/2 septic shock w/ E. Coli in urine vs hypovolemic shock now s/p 11L resus    - Monitor Off pressors  - Midodrine 30 q 8 hours to bridge to intermittent HD    GI:   - Continue TF via post pyloric tube at goal  - GI ppx w/ protonix   - Creon  - No GI intervention at this time; no walled off collection on CT    Renal:  ARF 2/2 shock state resulting in ATN requiring CRRT    - On CRRT, no fluid being removed due to hypotension.  - Albumin followed by lasix 80mg iv x3      Heme:   - SCDs and SQH, no longer on fondaparinux  - HIT Ab positive; DOUGLAS Negative    #Splenic Vein Thrombosis  3/22 CT Abd w/ contrast: Focal filling defect in the splenic vein at the level of the body of the pancreas for which an underlying thrombus is suspected     ID:   Intermittently febrile w/ leukocytosis c/f septic shock 2/2 E coli UTI  Diagnostics:  - 3/22 Ucx: E coli  - 3/30 Bcx: staph hominis in aerobic bottle x 1 (likely contaminant)   - S/p Meropenem (3/26-)  - c/w meropenem ( )    Endo:   - Lantus 9 units, admelog 2 units q6  - c/w synthroid 20 IV (home med)   - ISS    Code Status: Full code  Lines: KELLY Mendoza, A line    Disposition: SICU

## 2023-04-17 NOTE — PROGRESS NOTE ADULT - ASSESSMENT
48 year old female with a PMHx of HTN, hypothyroidism,  (30 years ago) and breast cancer (S/P left mastectomy and chemo in ) who presented with epigastric pain and left upper quadrant pain.  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  Transferred from OSH ([place]) for hypotension and tachypnea requiring emergent intubation and multiple pressors.  SICU consulted for hemodynamic monitoring and respiratory management.    Plan:   - Continue w/ IV Meropenem  - Continue CVVH, transition to intermittent HD when able  - Appreciate care per SICU      B Team Surgery  [id number]

## 2023-04-17 NOTE — PROGRESS NOTE ADULT - SUBJECTIVE AND OBJECTIVE BOX
[facility] Division of Kidney Diseases & Hypertension  FOLLOW UP NOTE  [phone]--------------------------------------------------------------------------------    Chief Complaint: Oliguric CHATO, on RRT    24 hour events/subjective: Pt. seen and examined in the SICU today. She is conversive today. Per nursing, was off of IV vasopressors for 26 hours over the past 2 days, but resumed overnight due to hypotension.     PAST HISTORY  --------------------------------------------------------------------------------  No significant changes to PMH, PSH, FHx, SHx, unless otherwise noted    ALLERGIES & MEDICATIONS  --------------------------------------------------------------------------------  Allergies    No Known Allergies    Intolerances      Standing Inpatient Medications  acetaminophen   Oral Liquid .. 975 milliGRAM(s) Oral every 6 hours  chlorhexidine 2% Cloths 1 Application(s) Topical daily  CRRT Treatment    <Continuous>  dextrose 50% Injectable 12.5 Gram(s) IV Push once  dextrose 50% Injectable 25 Gram(s) IV Push once  furosemide   Injectable 80 milliGRAM(s) IV Push <User Schedule>  gabapentin Solution 100 milliGRAM(s) Oral three times a day  heparin   Injectable 5000 Unit(s) SubCutaneous every 8 hours  insulin glargine Injectable (LANTUS) 9 Unit(s) SubCutaneous at bedtime  insulin lispro (ADMELOG) corrective regimen sliding scale   SubCutaneous every 6 hours  insulin lispro Injectable (ADMELOG) 2 Unit(s) SubCutaneous every 6 hours  levothyroxine Injectable 20 MICROGram(s) IV Push at bedtime  meropenem  IVPB 1000 milliGRAM(s) IV Intermittent every 8 hours  midodrine 30 milliGRAM(s) Oral every 8 hours  multivitamin/minerals/iron Oral Solution (CENTRUM) 15 milliLiter(s) Oral daily  norepinephrine Infusion 0.05 MICROgram(s)/kG/Min IV Continuous <Continuous>  pancrelipase  (CREON 12,000 Lipase Units) 1 Capsule(s) Oral <User Schedule>  pantoprazole   Suspension 40 milliGRAM(s) Oral daily  Phoxillum Filtration BK 4 / 2.5 5000 milliLiter(s) CRRT <Continuous>  Phoxillum Filtration BK 4 / 2.5 5000 milliLiter(s) CRRT <Continuous>  Phoxillum Filtration BK 4 / 2.5 5000 milliLiter(s) CRRT <Continuous>  psyllium Powder 1 Packet(s) Oral daily  QUEtiapine 25 milliGRAM(s) Oral at bedtime    PRN Inpatient Medications  HYDROmorphone  Injectable 0.5 milliGRAM(s) IV Push every 4 hours PRN  oxyCODONE    Solution 10 milliGRAM(s) Oral every 4 hours PRN  oxyCODONE    Solution 5 milliGRAM(s) Oral every 4 hours PRN      REVIEW OF SYSTEMS  --------------------------------------------------------------------------------  Unable to obtain ROS     VITALS/PHYSICAL EXAM  --------------------------------------------------------------------------------  T(C): 36 (04-17-23 @ 08:00), Max: 37.6 (04-17-23 @ 04:00)  HR: 91 (04-17-23 @ 09:00) (91 - 112)  BP: --  RR: 34 (04-17-23 @ 09:00) (18 - 38)  SpO2: 98% (04-17-23 @ 09:00) (95% - 100%)  Wt(kg): --        04-16-23 @ 07:01  -  04-17-23 @ 07:00  --------------------------------------------------------  IN: 1858.2 mL / OUT: 3450 mL / NET: -1591.8 mL    04-17-23 @ 07:01  -  04-17-23 @ 10:42  --------------------------------------------------------  IN: 170 mL / OUT: 275 mL / NET: -105 mL      Physical Exam:  	Gen: Remains ill appearing  	HEENT: NGT+, anicteric  	Pulm: Fair entry B/L  	CV: S1S2+  	Abd: Obese, distended  	Ext: +LE edema B/L, trace B/L UE edema   	Neuro: Somnolent   	Skin: Warm and dry              Dialysis access: Right IJ non tunneled HD catheter connected to CRRT machine      LABS/STUDIES  --------------------------------------------------------------------------------              7.4    10.93 >-----------<  142      [04-17-23 @ 05:36]              23.6     137  |  103  |  6   ----------------------------<  171      [04-17-23 @ 00:40]  3.2   |  19  |  0.63        Ca     7.9     [04-17-23 @ 00:40]      iCa    1.05     [04-17 @ 00:40]      Mg     2.40     [04-17-23 @ 00:40]      Phos  2.7     [04-17-23 @ 00:40]    TPro  6.6  /  Alb  2.7  /  TBili  5.5  /  DBili  x   /  AST  117  /  ALT  111  /  AlkPhos  235  [04-17-23 @ 00:40]          Creatinine Trend:  SCr 0.63 [04-17 @ 00:40]  SCr 0.77 [04-16 @ 18:08]  SCr 0.73 [04-16 @ 12:08]  SCr 0.67 [04-16 @ 06:26]  SCr 0.67 [04-16 @ 00:54]

## 2023-04-17 NOTE — PROGRESS NOTE ADULT - SUBJECTIVE AND OBJECTIVE BOX
Surgery Progress Note     24hour Events:   - SHEILA OVN  - DC NGT  - DC miralax in setting of high rectal tube output   - CRRT back to net even, hypotensive with systolics in 70s   - C. diff ordered for 1L rectal tube output   - POCUS performed, ventricles hyperdynamic  - 250cc albumin x2 given for hypotension, diarrhea losses  - C. diff resulted negative  - Xray with worsened pulmonary congestion, CRRT back to -50 and levo restarted      Subjective:  Patient seen and examined. Pt still with diffuse abdominal pain, but appears better than previous days.      Vital Signs:  Vital Signs Last 24 Hrs  T(C): 36 (17 Apr 2023 08:00), Max: 37.6 (17 Apr 2023 04:00)  T(F): 96.8 (17 Apr 2023 08:00), Max: 99.6 (17 Apr 2023 04:00)  HR: 101 (17 Apr 2023 08:00) (100 - 112)  BP: --  BP(mean): --  RR: 28 (17 Apr 2023 08:00) (18 - 38)  SpO2: 100% (17 Apr 2023 08:00) (91% - 100%)    Parameters below as of 17 Apr 2023 08:00  Patient On (Oxygen Delivery Method): room air        CAPILLARY BLOOD GLUCOSE      POCT Blood Glucose.: 147 mg/dL (17 Apr 2023 05:34)  POCT Blood Glucose.: 152 mg/dL (16 Apr 2023 23:32)  POCT Blood Glucose.: 163 mg/dL (16 Apr 2023 17:26)  POCT Blood Glucose.: 178 mg/dL (16 Apr 2023 11:41)      I&O's Detail    16 Apr 2023 07:01  -  17 Apr 2023 07:00  --------------------------------------------------------  IN:    Enteral Tube Flush: 400 mL    IV PiggyBack: 550 mL    Norepinephrine: 53.2 mL    Peptamen A.F.: 855 mL  Total IN: 1858.2 mL    OUT:    Other (mL): 1800 mL    Rectal Tube (mL): 1650 mL  Total OUT: 3450 mL    Total NET: -1591.8 mL      17 Apr 2023 07:01  -  17 Apr 2023 08:41  --------------------------------------------------------  IN:    Enteral Tube Flush: 35 mL    Peptamen A.F.: 90 mL  Total IN: 125 mL    OUT:    Rectal Tube (mL): 200 mL  Total OUT: 200 mL    Total NET: -75 mL            Physical Exam:  General Appearance: appears uncomfortable, improved overall  HEENT: Post pyloric feeding tube present  Chest: Equal expansion bilaterally  CV: Pulse regular presently  Abdomen: Soft, TTP, nondistended  Extremities: Grossly symmetric, SCD's in place       Labs:    04-17    137  |  103  |  6<L>  ----------------------------<  171<H>  3.2<L>   |  19<L>  |  0.63    Ca    7.9<L>      17 Apr 2023 00:40  Phos  2.7     04-17  Mg     2.40     04-17    TPro  6.6  /  Alb  2.7<L>  /  TBili  5.5<H>  /  DBili  x   /  AST  117<H>  /  ALT  111<H>  /  AlkPhos  235<H>  04-17    LIVER FUNCTIONS - ( 17 Apr 2023 00:40 )  Alb: 2.7 g/dL / Pro: 6.6 g/dL / ALK PHOS: 235 U/L / ALT: 111 U/L / AST: 117 U/L / GGT: x                                 7.4    10.93 )-----------( 142      ( 17 Apr 2023 05:36 )             23.6

## 2023-04-17 NOTE — PROGRESS NOTE ADULT - SUBJECTIVE AND OBJECTIVE BOX
24 HOUR EVENTS:  - SHEILA OVN  - DC NGT  - DC miralax in setting of high rectal tube output   - CRRT back to net even, hypotensive with systolics in 70s   - C. diff ordered for 1L rectal tube output   - POCUS performed, ventricles hyperdynamic  - 250cc albumin x2 given for hypotension, diarrhea losses  - C. diff resulted negative    NEURO  RASS (if intubated): N/A 		CAM ICU (if concern for delirium):  Exam: A&Ox3  Meds: acetaminophen   Oral Liquid .. 975 milliGRAM(s) Oral every 6 hours  gabapentin Solution 100 milliGRAM(s) Oral three times a day  HYDROmorphone  Injectable 0.5 milliGRAM(s) IV Push every 4 hours PRN breakthrough pain  oxyCODONE    Solution 10 milliGRAM(s) Oral every 4 hours PRN Severe Pain (7 - 10)  oxyCODONE    Solution 5 milliGRAM(s) Oral every 4 hours PRN Moderate Pain (4 - 6)  QUEtiapine 25 milliGRAM(s) Oral at bedtime      RESPIRATORY  RR: 27 (04-16-23 @ 23:00) (19 - 38)  SpO2: 100% (04-16-23 @ 23:00) (91% - 100%)  Wt(kg): --  Exam: no increased WOB  Mechanical Ventilation:   ABG - ( 16 Apr 2023 13:38 )  pH: 7.41  /  pCO2: 33    /  pO2: 87    / HCO3: 21    / Base Excess: -3.3  /  SaO2: 99.1    Lactate: x                Meds:     CARDIOVASCULAR  HR: 108 (04-16-23 @ 23:00) (100 - 111)  BP: --  BP(mean): --  ABP: 104/55 (04-16-23 @ 23:00) (74/47 - 110/69)  ABP(mean): 76 (04-16-23 @ 23:00) (59 - 87)  Wt(kg): --  CVP(cm H2O): --      Exam: appears well perfused  Cardiac Rhythm: sinus  Perfusion     [ ]Adequate   [x ]Inadequate  Mentation   [X ]Normal       [ ]Reduced  Extremities  [ X]Warm         [ ]Cool  Volume Status [ ]Hypervolemic [ X]Euvolemic [ ]Hypovolemic  Meds: midodrine 30 milliGRAM(s) Oral every 8 hours      GI/NUTRITION  Exam: soft nondistended  Diet: tfS  Meds: pancrelipase  (CREON 12,000 Lipase Units) 1 Capsule(s) Oral <User Schedule>  pantoprazole   Suspension 40 milliGRAM(s) Oral daily  psyllium Powder 1 Packet(s) Oral daily      GENITOURINARY  I&O's Detail    04-15 @ 07:01  -  04-16 @ 07:00  --------------------------------------------------------  IN:    Enteral Tube Flush: 250 mL    IV PiggyBack: 150 mL    Peptamen A.F.: 1080 mL  Total IN: 1480 mL    OUT:    Nasogastric/Oral tube (mL): 50 mL    Other (mL): 3385 mL    Rectal Tube (mL): 1325 mL  Total OUT: 4760 mL    Total NET: -3280 mL      04-16 @ 07:01 - 04-17 @ 00:14  --------------------------------------------------------  IN:    Enteral Tube Flush: 400 mL    IV PiggyBack: 550 mL    Peptamen A.F.: 720 mL  Total IN: 1670 mL    OUT:    Other (mL): 932 mL    Rectal Tube (mL): 1150 mL  Total OUT: 2082 mL    Total NET: -412 mL          04-16    137  |  105  |  8   ----------------------------<  171<H>  3.5   |  20<L>  |  0.77    Ca    8.0<L>      16 Apr 2023 18:08  Phos  3.3     04-16  Mg     2.40     04-16    TPro  7.0  /  Alb  2.6<L>  /  TBili  5.4<H>  /  DBili  x   /  AST  121<H>  /  ALT  118<H>  /  AlkPhos  240<H>  04-16    Meds: multivitamin/minerals/iron Oral Solution (CENTRUM) 15 milliLiter(s) Oral daily      HEMATOLOGIC  Meds: heparin   Injectable 5000 Unit(s) SubCutaneous every 8 hours                          7.7    11.13 )-----------( 131      ( 16 Apr 2023 18:08 )             23.8         INFECTIOUS DISEASES  T(C): 37.3 (04-16-23 @ 20:00), Max: 37.3 (04-16-23 @ 20:00)  Wt(kg): --  WBC Count: 11.13 K/uL (04-16 @ 18:08)  WBC Count: 11.24 K/uL (04-16 @ 11:43)  WBC Count: 10.77 K/uL (04-16 @ 06:26)  WBC Count: 10.66 K/uL (04-16 @ 00:54)    Recent Cultures:    Meds: meropenem  IVPB 1000 milliGRAM(s) IV Intermittent every 8 hours      ENDOCRINE  Capillary Blood Glucose    Meds: dextrose 50% Injectable 25 Gram(s) IV Push once  dextrose 50% Injectable 12.5 Gram(s) IV Push once  insulin glargine Injectable (LANTUS) 9 Unit(s) SubCutaneous at bedtime  insulin lispro (ADMELOG) corrective regimen sliding scale   SubCutaneous every 6 hours  insulin lispro Injectable (ADMELOG) 2 Unit(s) SubCutaneous every 6 hours  levothyroxine Injectable 20 MICROGram(s) IV Push at bedtime      TUBES / LINES / DRAINS  [x] Peripheral IV  [x] Central Venous Line     	[] R	[x] L	[x] IJ	[] Fem	[] SC	Date Placed:  [x] R Shiley   [x] Arterial Line		[] R	[] L	[] Fem	[] Rad	[] Ax	Date Placed:   [x] NGT  [] PICC		[] Midline		[] Mediport  [] Urinary Catheter		Date Placed:   [x] Necessity of urinary, arterial, and venous catheters discussed    OTHER MEDICATIONS:  chlorhexidine 2% Cloths 1 Application(s) Topical daily  CRRT Treatment    <Continuous>  Phoxillum Filtration BK 4 / 2.5 5000 milliLiter(s) CRRT <Continuous>  Phoxillum Filtration BK 4 / 2.5 5000 milliLiter(s) CRRT <Continuous>  Phoxillum Filtration BK 4 / 2.5 5000 milliLiter(s) CRRT <Continuous>      IMAGING: 24 HOUR EVENTS:  - SHEILA OVN  - DC NGT  - DC miralax in setting of high rectal tube output   - CRRT back to net even, hypotensive with systolics in 70s   - C. diff ordered for 1L rectal tube output   - POCUS performed, ventricles hyperdynamic  - 250cc albumin x2 given for hypotension, diarrhea losses  - C. diff resulted negative  - Xray with worsened pulmonary congestion, CRRT back to -50 and levo restarted    NEURO  RASS (if intubated): N/A 		CAM ICU (if concern for delirium):  Exam: A&Ox3  Meds: acetaminophen   Oral Liquid .. 975 milliGRAM(s) Oral every 6 hours  gabapentin Solution 100 milliGRAM(s) Oral three times a day  HYDROmorphone  Injectable 0.5 milliGRAM(s) IV Push every 4 hours PRN breakthrough pain  oxyCODONE    Solution 10 milliGRAM(s) Oral every 4 hours PRN Severe Pain (7 - 10)  oxyCODONE    Solution 5 milliGRAM(s) Oral every 4 hours PRN Moderate Pain (4 - 6)  QUEtiapine 25 milliGRAM(s) Oral at bedtime      RESPIRATORY  RR: 27 (04-16-23 @ 23:00) (19 - 38)  SpO2: 100% (04-16-23 @ 23:00) (91% - 100%)  Wt(kg): --  Exam: no increased WOB  Mechanical Ventilation:   ABG - ( 16 Apr 2023 13:38 )  pH: 7.41  /  pCO2: 33    /  pO2: 87    / HCO3: 21    / Base Excess: -3.3  /  SaO2: 99.1    Lactate: x                Meds:     CARDIOVASCULAR  HR: 108 (04-16-23 @ 23:00) (100 - 111)  BP: --  BP(mean): --  ABP: 104/55 (04-16-23 @ 23:00) (74/47 - 110/69)  ABP(mean): 76 (04-16-23 @ 23:00) (59 - 87)  Wt(kg): --  CVP(cm H2O): --      Exam: appears well perfused  Cardiac Rhythm: sinus  Perfusion     [ ]Adequate   [x ]Inadequate  Mentation   [X ]Normal       [ ]Reduced  Extremities  [ X]Warm         [ ]Cool  Volume Status [ ]Hypervolemic [ X]Euvolemic [ ]Hypovolemic  Meds: midodrine 30 milliGRAM(s) Oral every 8 hours      GI/NUTRITION  Exam: soft nondistended  Diet: tfS  Meds: pancrelipase  (CREON 12,000 Lipase Units) 1 Capsule(s) Oral <User Schedule>  pantoprazole   Suspension 40 milliGRAM(s) Oral daily  psyllium Powder 1 Packet(s) Oral daily      GENITOURINARY  I&O's Detail    04-15 @ 07:01 - 04-16 @ 07:00  --------------------------------------------------------  IN:    Enteral Tube Flush: 250 mL    IV PiggyBack: 150 mL    Peptamen A.F.: 1080 mL  Total IN: 1480 mL    OUT:    Nasogastric/Oral tube (mL): 50 mL    Other (mL): 3385 mL    Rectal Tube (mL): 1325 mL  Total OUT: 4760 mL    Total NET: -3280 mL      04-16 @ 07:01 - 04-17 @ 00:14  --------------------------------------------------------  IN:    Enteral Tube Flush: 400 mL    IV PiggyBack: 550 mL    Peptamen A.F.: 720 mL  Total IN: 1670 mL    OUT:    Other (mL): 932 mL    Rectal Tube (mL): 1150 mL  Total OUT: 2082 mL    Total NET: -412 mL          04-16    137  |  105  |  8   ----------------------------<  171<H>  3.5   |  20<L>  |  0.77    Ca    8.0<L>      16 Apr 2023 18:08  Phos  3.3     04-16  Mg     2.40     04-16    TPro  7.0  /  Alb  2.6<L>  /  TBili  5.4<H>  /  DBili  x   /  AST  121<H>  /  ALT  118<H>  /  AlkPhos  240<H>  04-16    Meds: multivitamin/minerals/iron Oral Solution (CENTRUM) 15 milliLiter(s) Oral daily      HEMATOLOGIC  Meds: heparin   Injectable 5000 Unit(s) SubCutaneous every 8 hours                          7.7    11.13 )-----------( 131      ( 16 Apr 2023 18:08 )             23.8         INFECTIOUS DISEASES  T(C): 37.3 (04-16-23 @ 20:00), Max: 37.3 (04-16-23 @ 20:00)  Wt(kg): --  WBC Count: 11.13 K/uL (04-16 @ 18:08)  WBC Count: 11.24 K/uL (04-16 @ 11:43)  WBC Count: 10.77 K/uL (04-16 @ 06:26)  WBC Count: 10.66 K/uL (04-16 @ 00:54)    Recent Cultures:    Meds: meropenem  IVPB 1000 milliGRAM(s) IV Intermittent every 8 hours      ENDOCRINE  Capillary Blood Glucose    Meds: dextrose 50% Injectable 25 Gram(s) IV Push once  dextrose 50% Injectable 12.5 Gram(s) IV Push once  insulin glargine Injectable (LANTUS) 9 Unit(s) SubCutaneous at bedtime  insulin lispro (ADMELOG) corrective regimen sliding scale   SubCutaneous every 6 hours  insulin lispro Injectable (ADMELOG) 2 Unit(s) SubCutaneous every 6 hours  levothyroxine Injectable 20 MICROGram(s) IV Push at bedtime      TUBES / LINES / DRAINS  [x] Peripheral IV  [x] Central Venous Line     	[] R	[x] L	[x] IJ	[] Fem	[] SC	Date Placed:  [x] R Shiley   [x] Arterial Line		[] R	[] L	[] Fem	[] Rad	[] Ax	Date Placed:   [x] NGT  [] PICC		[] Midline		[] Mediport  [] Urinary Catheter		Date Placed:   [x] Necessity of urinary, arterial, and venous catheters discussed    OTHER MEDICATIONS:  chlorhexidine 2% Cloths 1 Application(s) Topical daily  CRRT Treatment    <Continuous>  Phoxillum Filtration BK 4 / 2.5 5000 milliLiter(s) CRRT <Continuous>  Phoxillum Filtration BK 4 / 2.5 5000 milliLiter(s) CRRT <Continuous>  Phoxillum Filtration BK 4 / 2.5 5000 milliLiter(s) CRRT <Continuous>      IMAGING: 24 HOUR EVENTS:  - NAEON  - DC NGT  - DC miralax in setting of high rectal tube output   - CRRT back to net even, hypotensive with systolics in 70s   - C. diff ordered for 1L rectal tube output   - POCUS performed, ventricles hyperdynamic  - 250cc albumin x2 given for hypotension, diarrhea losses  - C. diff resulted negative  - Xray with worsened pulmonary congestion, CRRT back to -50 and levo restarted    NEURO  RASS (if intubated): N/A 		CAM ICU (if concern for delirium):  Exam: A&Ox3  Meds: acetaminophen   Oral Liquid .. 975 milliGRAM(s) Oral every 6 hours  gabapentin Solution 100 milliGRAM(s) Oral three times a day  HYDROmorphone  Injectable 0.5 milliGRAM(s) IV Push every 4 hours PRN breakthrough pain  oxyCODONE    Solution 10 milliGRAM(s) Oral every 4 hours PRN Severe Pain (7 - 10)  oxyCODONE    Solution 5 milliGRAM(s) Oral every 4 hours PRN Moderate Pain (4 - 6)  QUEtiapine 25 milliGRAM(s) Oral at bedtime      RESPIRATORY  RR: 27 (04-16-23 @ 23:00) (19 - 38)  SpO2: 100% (04-16-23 @ 23:00) (91% - 100%)  Wt(kg): --  Exam: no increased WOB  Mechanical Ventilation:   ABG - ( 16 Apr 2023 13:38 )  pH: 7.41  /  pCO2: 33    /  pO2: 87    / HCO3: 21    / Base Excess: -3.3  /  SaO2: 99.1    Lactate: x                Meds:     CARDIOVASCULAR  HR: 108 (04-16-23 @ 23:00) (100 - 111)  BP: --  BP(mean): --  ABP: 104/55 (04-16-23 @ 23:00) (74/47 - 110/69)  ABP(mean): 76 (04-16-23 @ 23:00) (59 - 87)  Wt(kg): --  CVP(cm H2O): --      Exam: appears well perfused  Cardiac Rhythm: sinus  Perfusion     [ ]Adequate   [x ]Inadequate  Mentation   [X ]Normal       [ ]Reduced  Extremities  [ X]Warm         [ ]Cool  Volume Status [ ]Hypervolemic [ X]Euvolemic [ ]Hypovolemic  Meds: midodrine 30 milliGRAM(s) Oral every 8 hours      GI/NUTRITION  Exam: softly distended. ngt w/ tf.  Diet: tfS  Meds: pancrelipase  (CREON 12,000 Lipase Units) 1 Capsule(s) Oral <User Schedule>  pantoprazole   Suspension 40 milliGRAM(s) Oral daily  psyllium Powder 1 Packet(s) Oral daily      GENITOURINARY  I&O's Detail    04-15 @ 07:01 - 04-16 @ 07:00  --------------------------------------------------------  IN:    Enteral Tube Flush: 250 mL    IV PiggyBack: 150 mL    Peptamen A.F.: 1080 mL  Total IN: 1480 mL    OUT:    Nasogastric/Oral tube (mL): 50 mL    Other (mL): 3385 mL    Rectal Tube (mL): 1325 mL  Total OUT: 4760 mL    Total NET: -3280 mL      04-16 @ 07:01 - 04-17 @ 00:14  --------------------------------------------------------  IN:    Enteral Tube Flush: 400 mL    IV PiggyBack: 550 mL    Peptamen A.F.: 720 mL  Total IN: 1670 mL    OUT:    Other (mL): 932 mL    Rectal Tube (mL): 1150 mL  Total OUT: 2082 mL    Total NET: -412 mL          04-16    137  |  105  |  8   ----------------------------<  171<H>  3.5   |  20<L>  |  0.77    Ca    8.0<L>      16 Apr 2023 18:08  Phos  3.3     04-16  Mg     2.40     04-16    TPro  7.0  /  Alb  2.6<L>  /  TBili  5.4<H>  /  DBili  x   /  AST  121<H>  /  ALT  118<H>  /  AlkPhos  240<H>  04-16    Meds: multivitamin/minerals/iron Oral Solution (CENTRUM) 15 milliLiter(s) Oral daily      HEMATOLOGIC  Meds: heparin   Injectable 5000 Unit(s) SubCutaneous every 8 hours                          7.7    11.13 )-----------( 131      ( 16 Apr 2023 18:08 )             23.8         INFECTIOUS DISEASES  T(C): 37.3 (04-16-23 @ 20:00), Max: 37.3 (04-16-23 @ 20:00)  Wt(kg): --  WBC Count: 11.13 K/uL (04-16 @ 18:08)  WBC Count: 11.24 K/uL (04-16 @ 11:43)  WBC Count: 10.77 K/uL (04-16 @ 06:26)  WBC Count: 10.66 K/uL (04-16 @ 00:54)    Recent Cultures:    Meds: meropenem  IVPB 1000 milliGRAM(s) IV Intermittent every 8 hours      ENDOCRINE  Capillary Blood Glucose    Meds: dextrose 50% Injectable 25 Gram(s) IV Push once  dextrose 50% Injectable 12.5 Gram(s) IV Push once  insulin glargine Injectable (LANTUS) 9 Unit(s) SubCutaneous at bedtime  insulin lispro (ADMELOG) corrective regimen sliding scale   SubCutaneous every 6 hours  insulin lispro Injectable (ADMELOG) 2 Unit(s) SubCutaneous every 6 hours  levothyroxine Injectable 20 MICROGram(s) IV Push at bedtime      TUBES / LINES / DRAINS  [x] Peripheral IV  [x] Central Venous Line     	[] R	[x] L	[x] IJ	[] Fem	[] SC	Date Placed:  [x] R Shiley   [x] Arterial Line		[] R	[] L	[] Fem	[] Rad	[] Ax	Date Placed:   [x] NGT  [] PICC		[] Midline		[] Mediport  [] Urinary Catheter		Date Placed:   [x] Necessity of urinary, arterial, and venous catheters discussed    OTHER MEDICATIONS:  chlorhexidine 2% Cloths 1 Application(s) Topical daily  CRRT Treatment    <Continuous>  Phoxillum Filtration BK 4 / 2.5 5000 milliLiter(s) CRRT <Continuous>  Phoxillum Filtration BK 4 / 2.5 5000 milliLiter(s) CRRT <Continuous>  Phoxillum Filtration BK 4 / 2.5 5000 milliLiter(s) CRRT <Continuous>      IMAGING:

## 2023-04-17 NOTE — PROGRESS NOTE ADULT - ATTENDING COMMENTS
I agree with the detailed interval history, physical, and plan, which I have reviewed and edited where appropriate'; also agree with notes/assessment with my team on service.  I have personally examined the patient.  I was physically present for the key portions of the evaluation and management (E/M) service provided.  I reviewed all the pertinent data.  The patient is a critical care patient with life threatening hemodynamic and metabolic instability in SICU.  The SICU team has a constant risk benefit analyzes discussion and coordinating care with the primary team and all consultants.   The patient is in SICU with the chief complaint and diagnosis mentioned in the note.   The plan will be specified in the note.  48 year old female with necrotizing pancreatitis with hypotension on vasopressors in SICU for hemodynamic monitoring a  Awake and alert  HEART RR  LUNG coarse bs  ABD dist  PLAN  Neuro:  - protected sleep  - Seroquel   - tylenol and Dilaudid prn  Resp:  - IS  CV:  - Midodrine   -Wwean levo  GI:   - GI ppx w/ protonix   - Creon  Renal:  - On CRRT  Heme:   - SCDs and SQH,   ID:   meropenem  Endo:   - Lantus 9 units, admelog 2 units q6  Code Status: Full code  Disposition: SICU

## 2023-04-17 NOTE — PROGRESS NOTE ADULT - ATTENDING COMMENTS
Patient seen and evaluated CHATO complicated by anuria.  Reviewed labs meds vitals chart and discussed with RN at bedside.  Recommend hold CRRT today with plan to evaluate for transition to IHD tomorrow.  With diarrhea and furosemide patient will be at risk for hypokalemia.  Please monitor potassium.

## 2023-04-17 NOTE — PROGRESS NOTE ADULT - PROBLEM SELECTOR PLAN 1
Pt. with oliguric CHATO in the setting of necrotizing pancreatitis and shock. Pt. with most likely ATN. Scr was 1.24 on 3/23/23 and increased to 4.60 on 3/24/23. Pt. was initiated on CRRT/CVVHDF on 3/24/23 and discontinued overnight on 4/6/23 by SICU team. Pt. remained oliguric and was restarted on CVVHDH on 4/7/23. Labs reviewed. Pt. tolerating CRRT. Currently on IV vasopressors with SBP in the low 100s. HD catheter functioning well. Can discontinue CRRT later today and assess for iHD tomorrow.  Monitor labs and urine output. Avoid any potential nephrotoxins. Dose medications as per eGFR.    If you have any questions, please feel free to contact me  Nveille Schulz  Nephrology Fellow  [phone]; Prefer [employer] TEAMS  (After 5pm or on weekends please page the on-call fellow).

## 2023-04-18 NOTE — PROGRESS NOTE ADULT - SUBJECTIVE AND OBJECTIVE BOX
B TEAM SURGERY DAILY PROGRESS NOTE    SUBJECTIVE:   Overnight: no acute events   Patient seen and evaluated on AM rounds. ***.   Patient otherwise denies nausea, vomiting, chest pain, shortness of breath       OBJECTIVE:  Vital Signs Last 24 Hrs  T(C): 36 (18 Apr 2023 00:00), Max: 36 (17 Apr 2023 08:00)  T(F): 96.8 (18 Apr 2023 00:00), Max: 96.8 (17 Apr 2023 08:00)  HR: 111 (18 Apr 2023 03:00) (80 - 111)  RR: 27 (18 Apr 2023 03:00) (19 - 35)  SpO2: 99% (18 Apr 2023 03:00) (91% - 100%)    Parameters below as of 18 Apr 2023 00:00  Patient On (Oxygen Delivery Method): nasal cannula  O2 Flow (L/min): 2        Daily       04-16 - 04-17  --------------------------------------------------------  IN:    Enteral Tube Flush: 400 mL    IV PiggyBack: 550 mL    Norepinephrine: 53.2 mL    Peptamen A.F.: 855 mL  Total IN: 1858.2 mL    OUT:    Other (mL): 1800 mL    Rectal Tube (mL): 1650 mL  Total OUT: 3450 mL      04-17 - 04-18  --------------------------------------------------------  IN:    Enteral Tube Flush: 285 mL    IV PiggyBack: 100 mL    Peptamen A.F.: 765 mL  Total IN: 1150 mL    OUT:    Indwelling Catheter - Urethral (mL): 15 mL    Other (mL): 1110 mL    Rectal Tube (mL): 650 mL  Total OUT: 1775 mL          STANDING  acetaminophen   Oral Liquid .. 975 milliGRAM(s) Oral every 6 hours  chlorhexidine 2% Cloths 1 Application(s) Topical daily  dextrose 50% Injectable 12.5 Gram(s) IV Push once  dextrose 50% Injectable 25 Gram(s) IV Push once  furosemide   Injectable 80 milliGRAM(s) IV Push <User Schedule>  gabapentin Solution 100 milliGRAM(s) Oral three times a day  heparin   Injectable 5000 Unit(s) SubCutaneous every 8 hours  insulin glargine Injectable (LANTUS) 9 Unit(s) SubCutaneous at bedtime  insulin lispro (ADMELOG) corrective regimen sliding scale   SubCutaneous every 6 hours  insulin lispro Injectable (ADMELOG) 2 Unit(s) SubCutaneous every 6 hours  levothyroxine Injectable 20 MICROGram(s) IV Push at bedtime  meropenem  IVPB 1000 milliGRAM(s) IV Intermittent every 8 hours  midodrine 30 milliGRAM(s) Oral every 8 hours  multivitamin/minerals/iron Oral Solution (CENTRUM) 15 milliLiter(s) Oral daily  norepinephrine Infusion 0.05 MICROgram(s)/kG/Min (5.53 mL/Hr) IV Continuous <Continuous>  pancrelipase  (CREON 12,000 Lipase Units) 1 Capsule(s) Oral <User Schedule>  pantoprazole   Suspension 40 milliGRAM(s) Oral daily  psyllium Powder 1 Packet(s) Oral daily  QUEtiapine 25 milliGRAM(s) Oral at bedtime    PRN  HYDROmorphone  Injectable 0.5 milliGRAM(s) IV Push every 4 hours PRN breakthrough pain  oxyCODONE    Solution 10 milliGRAM(s) Oral every 4 hours PRN Severe Pain (7 - 10)  oxyCODONE    Solution 5 milliGRAM(s) Oral every 4 hours PRN Moderate Pain (4 - 6)      Labs:  135  |  103  |  12  ----------------------------<  211<H>    (04-18)  3.6   |  15<L>  |  1.05            Ca    8.0<L>      04-18  Mg    2.50  Phos  3.0              Physical Exam:  General Appearance: appears uncomfortable, improved overall  HEENT: Post pyloric feeding tube present  Chest: Equal expansion bilaterally  CV: Pulse regular presently  Abdomen: Soft, TTP, nondistended  Extremities: Grossly symmetric, SCD's in place      B TEAM SURGERY DAILY PROGRESS NOTE    SUBJECTIVE:   Overnight: slightly low bp, 250 albumin given, 1u prbc. cvvh ended.     Patient seen and examined on AM rounds. Patient reports that they're feeling well. +/+ bowel function, tolerating tf.     OBJECTIVE:  Vital Signs Last 24 Hrs  T(C): 36 (18 Apr 2023 00:00), Max: 36 (17 Apr 2023 08:00)  T(F): 96.8 (18 Apr 2023 00:00), Max: 96.8 (17 Apr 2023 08:00)  HR: 111 (18 Apr 2023 03:00) (80 - 111)  RR: 27 (18 Apr 2023 03:00) (19 - 35)  SpO2: 99% (18 Apr 2023 03:00) (91% - 100%)    Parameters below as of 18 Apr 2023 00:00  Patient On (Oxygen Delivery Method): nasal cannula  O2 Flow (L/min): 2        Daily       04-16 - 04-17  --------------------------------------------------------  IN:    Enteral Tube Flush: 400 mL    IV PiggyBack: 550 mL    Norepinephrine: 53.2 mL    Peptamen A.F.: 855 mL  Total IN: 1858.2 mL    OUT:    Other (mL): 1800 mL    Rectal Tube (mL): 1650 mL  Total OUT: 3450 mL      04-17 - 04-18  --------------------------------------------------------  IN:    Enteral Tube Flush: 285 mL    IV PiggyBack: 100 mL    Peptamen A.F.: 765 mL  Total IN: 1150 mL    OUT:    Indwelling Catheter - Urethral (mL): 15 mL    Other (mL): 1110 mL    Rectal Tube (mL): 650 mL  Total OUT: 1775 mL          STANDING  acetaminophen   Oral Liquid .. 975 milliGRAM(s) Oral every 6 hours  chlorhexidine 2% Cloths 1 Application(s) Topical daily  dextrose 50% Injectable 12.5 Gram(s) IV Push once  dextrose 50% Injectable 25 Gram(s) IV Push once  furosemide   Injectable 80 milliGRAM(s) IV Push <User Schedule>  gabapentin Solution 100 milliGRAM(s) Oral three times a day  heparin   Injectable 5000 Unit(s) SubCutaneous every 8 hours  insulin glargine Injectable (LANTUS) 9 Unit(s) SubCutaneous at bedtime  insulin lispro (ADMELOG) corrective regimen sliding scale   SubCutaneous every 6 hours  insulin lispro Injectable (ADMELOG) 2 Unit(s) SubCutaneous every 6 hours  levothyroxine Injectable 20 MICROGram(s) IV Push at bedtime  meropenem  IVPB 1000 milliGRAM(s) IV Intermittent every 8 hours  midodrine 30 milliGRAM(s) Oral every 8 hours  multivitamin/minerals/iron Oral Solution (CENTRUM) 15 milliLiter(s) Oral daily  norepinephrine Infusion 0.05 MICROgram(s)/kG/Min (5.53 mL/Hr) IV Continuous <Continuous>  pancrelipase  (CREON 12,000 Lipase Units) 1 Capsule(s) Oral <User Schedule>  pantoprazole   Suspension 40 milliGRAM(s) Oral daily  psyllium Powder 1 Packet(s) Oral daily  QUEtiapine 25 milliGRAM(s) Oral at bedtime    PRN  HYDROmorphone  Injectable 0.5 milliGRAM(s) IV Push every 4 hours PRN breakthrough pain  oxyCODONE    Solution 10 milliGRAM(s) Oral every 4 hours PRN Severe Pain (7 - 10)  oxyCODONE    Solution 5 milliGRAM(s) Oral every 4 hours PRN Moderate Pain (4 - 6)      Labs:  135  |  103  |  12  ----------------------------<  211<H>    (04-18)  3.6   |  15<L>  |  1.05            Ca    8.0<L>      04-18  Mg    2.50  Phos  3.0              Physical Exam:  General Appearance: appears uncomfortable, improved overall  HEENT: Post pyloric feeding tube present  Chest: Equal expansion bilaterally  CV: Pulse regular presently  Abdomen: Soft, TTP, nondistended  Extremities: Grossly symmetric, SCD's in place

## 2023-04-18 NOTE — PROGRESS NOTE ADULT - ATTENDING COMMENTS
Patient seen and evaluated at bedside this morning.  The patient's systolic blood pressure was in the 80's and tachypneic.  Recommend check blood gas for pH.  Unlikely to tolerate iHD if urgent HD indication arises will need to do CRRT.

## 2023-04-18 NOTE — PROGRESS NOTE ADULT - PROBLEM SELECTOR PLAN 1
Pt. with oliguric CHATO in the setting of necrotizing pancreatitis and shock. Pt. with most likely ATN. Scr was 1.24 on 3/23/23 and increased to 4.60 on 3/24/23. Pt. was initiated on CRRT/CVVHDF on 3/24/23 and discontinued overnight on 4/6/23 by SICU team. Pt. remained oliguric with hypotension so restarted on CVVHDH on 4/7/23. CRRT held on 4/17/23 for plan to switch to iHD today. Vitals and labs reviewed. Pt. off IV pressors but with low BPs. Remains on Midodrine 30 TID. Unable to attempt iHD today. Patient remains oliguric. Will hold off on dialysis today and monitor for dialysis need daily. Monitor labs and urine output. Avoid any potential nephrotoxins. Dose medications as per eGFR.    If you have any questions, please feel free to contact me  Neville Schulz  Nephrology Fellow  [phone]; Prefer [employer] TEAMS  (After 5pm or on weekends please page the on-call fellow). Pt. with oliguric CHATO in the setting of necrotizing pancreatitis and shock. Pt. with most likely ATN. Scr was 1.24 on 3/23/23 and increased to 4.60 on 3/24/23. Pt. was initiated on CRRT/CVVHDF on 3/24/23 and discontinued overnight on 4/6/23 by SICU team. Pt. remained oliguric with hypotension so restarted on CVVHDH on 4/7/23. CRRT held on 4/17/23 with tentative plan to switch to iHD today. Vitals and labs reviewed. Pt. off IV pressors but with low BPs. Remains on Midodrine 30 TID. Unlikely to attempt iHD today given vital signs. Patient remains oliguric. Will hold off on dialysis today and monitor for dialysis need daily. Monitor labs and urine output. Avoid any potential nephrotoxins. Dose medications as per eGFR.    If you have any questions, please feel free to contact me  Neville Schulz  Nephrology Fellow  [phone]; Prefer [employer] TEAMS  (After 5pm or on weekends please page the on-call fellow).

## 2023-04-18 NOTE — OCCUPATIONAL THERAPY INITIAL EVALUATION ADULT - PERTINENT HX OF CURRENT PROBLEM, REHAB EVAL
Patient is a 48 year old female admitted for necrotizing pancreatitis.  Transferred from [facility] ([place]) for hypotension and tachypnea requiring emergent intubation and multiple pressors.  SICU consulted for hemodynamic monitoring and respiratory management

## 2023-04-18 NOTE — PROGRESS NOTE ADULT - ATTENDING COMMENTS
I agree with the history, physical, and plan, which I have reviewed and edited where appropriate.  I agree with notes/assessment of health care providers on my service.  I have personally examined the patient.  I was physically present for the key portions of the evaluation and management (E/M) service provided.  I reviewed data and laboratory tests/x-rays and all pertinent electronic images.  The patient is a critical care patient with life threatening hemodynamic and metabolic instability in SICU.  Risk benefit analyses discussed.    The patient is in SICU with diagnosis mentioned in the note.    The plan is specified below.       Patient is a 48 year old female with PMHx significant for HTN, hypothyroidism,  30 years ago, breast cancer s/p left mastectomy and chemo in  presenting with epigastric pain and LUQ pain. Found to have necrotizing pancreatitis. Transferred from H ([place]) for hypotension and tachypnea requiring emergent intubation and multiple pressors. SICU consulted for hemodynamic monitoring and respiratory management. Pt now extubated with CHATO requiring HD.     PLAN  Neuro: delirium   - protected sleep  - Seroquel 25mg Qhs  - tylenol, gabapentin and Dilaudid prn    Resp: small L layering effusion  - 2L NC    CV:  Mixed shock state 2/2 septic shock w/ E. Coli in urine vs hypovolemic shock now s/p 11L resus    - Off pressors  - Midodrine 30 q 8 hours     GI: Pancreatitis  - post pyloric tube, elemental tube feeds   - GI ppx w/ protonix   - Creon  - metamucil   - multivitamin    Renal: ARF 2/2 shock state resulting in ATN requiring CRRT    - f/u nephro recs regarding CCRT continuation vs intermittent HD  - IV lock     Heme: Splenic vein thrombosis  - SCDs and SQH    ID: Intermittently febrile   - Restart meropenem ( )    Endo:   - Lantus 9 units, admelog 2 units q6  - c/w synthroid 20 IV (home med)   - ISS I agree with the history, physical, and plan, which I have reviewed and edited where appropriate.  I agree with notes/assessment of health care providers on my service.  I have personally examined the patient.  I was physically present for the key portions of the evaluation and management (E/M) service provided.  I reviewed data and laboratory tests/x-rays and all pertinent electronic images.  The patient is a critical care patient with life threatening hemodynamic and metabolic instability in SICU.  Risk benefit analyses discussed.    The patient is in SICU with diagnosis mentioned in the note.    The plan is specified below.       Patient is a 48 year old female with PMHx significant for HTN, hypothyroidism,  30 years ago, breast cancer s/p left mastectomy and chemo in  presenting with epigastric pain and LUQ pain. Found to have necrotizing pancreatitis. Transferred from H ([place]) for hypotension and tachypnea requiring emergent intubation and multiple pressors. SICU consulted for hemodynamic monitoring and respiratory management. Pt now extubated with CHATO requiring HD.     PLAN  Neuro: delirium   - protected sleep  - Seroquel 25mg Qhs  - tylenol, gabapentin and Dilaudid prn    Resp: small L layering effusion  - 2L NC    CV:  Mixed shock state 2/2 septic shock w/ E. Coli in urine vs hypovolemic shock now s/p 11L resus    - Off pressors  - Midodrine 30 q 8 hours     GI: Pancreatitis  - post pyloric tube, elemental tube feeds   - GI ppx w/ protonix   - Creon  - metamucil   - multivitamin    Renal: ARF 2/2 shock state resulting in ATN requiring CRRT    - f/u nephro recs regarding CCRT continuation vs intermittent HD  - IV lock   - viera    Heme: Splenic vein thrombosis  - SCDs and SQH    ID: Intermittently febrile   - Restart meropenem ( )    Endo:   - Lantus 9 units, admelog 2 units q6  - c/w synthroid 20 IV (home med)   - ISS

## 2023-04-18 NOTE — PROGRESS NOTE ADULT - SUBJECTIVE AND OBJECTIVE BOX
[facility] Division of Kidney Diseases & Hypertension  FOLLOW UP NOTE  [phone]--------------------------------------------------------------------------------    Chief Complaint: Oliguric CHATO, on RRT    24 hour events/subjective: Pt. seen and examined in the SICU today.  Per nursing, vasopressors since yesterday, but SBP in the 90s. CRRT was stopped on 7/17 to evaluate for iHD today.       PAST HISTORY  --------------------------------------------------------------------------------  No significant changes to PMH, PSH, FHx, SHx, unless otherwise noted    ALLERGIES & MEDICATIONS  --------------------------------------------------------------------------------  Allergies    No Known Allergies    Intolerances      Standing Inpatient Medications  acetaminophen   Oral Liquid .. 975 milliGRAM(s) Oral every 6 hours  chlorhexidine 2% Cloths 1 Application(s) Topical daily  dextrose 50% Injectable 25 Gram(s) IV Push once  dextrose 50% Injectable 12.5 Gram(s) IV Push once  gabapentin Solution 100 milliGRAM(s) Oral three times a day  heparin   Injectable 5000 Unit(s) SubCutaneous every 8 hours  insulin glargine Injectable (LANTUS) 9 Unit(s) SubCutaneous at bedtime  insulin lispro (ADMELOG) corrective regimen sliding scale   SubCutaneous every 6 hours  insulin lispro Injectable (ADMELOG) 2 Unit(s) SubCutaneous every 6 hours  levothyroxine Injectable 20 MICROGram(s) IV Push at bedtime  meropenem  IVPB 1000 milliGRAM(s) IV Intermittent every 8 hours  midodrine 30 milliGRAM(s) Oral every 8 hours  multivitamin/minerals/iron Oral Solution (CENTRUM) 15 milliLiter(s) Oral daily  pancrelipase  (CREON 12,000 Lipase Units) 1 Capsule(s) Oral <User Schedule>  pantoprazole   Suspension 40 milliGRAM(s) Oral daily  psyllium Powder 1 Packet(s) Oral daily  QUEtiapine 25 milliGRAM(s) Oral at bedtime    PRN Inpatient Medications  HYDROmorphone  Injectable 0.5 milliGRAM(s) IV Push every 4 hours PRN  oxyCODONE    Solution 10 milliGRAM(s) Oral every 4 hours PRN  oxyCODONE    Solution 5 milliGRAM(s) Oral every 4 hours PRN      REVIEW OF SYSTEMS  --------------------------------------------------------------------------------  Unable to obtain ROS    VITALS/PHYSICAL EXAM  --------------------------------------------------------------------------------  T(C): 38.3 (04-18-23 @ 04:00), Max: 38.3 (04-18-23 @ 04:00)  HR: 108 (04-18-23 @ 06:00) (80 - 111)  BP: --  RR: 25 (04-18-23 @ 06:00) (20 - 35)  SpO2: 96% (04-18-23 @ 06:00) (91% - 100%)  Wt(kg): --        04-17-23 @ 07:01  -  04-18-23 @ 07:00  --------------------------------------------------------  IN: 1450 mL / OUT: 2375 mL / NET: -925 mL      Physical Exam:  	Gen: Remains ill appearing  	HEENT: NGT+, anicteric  	Pulm: Fair entry B/L  	CV: S1S2+  	Abd: Obese, soft; nontender  	Ext: No pitting LE edema B/L  	Neuro: lethargic  	Skin: Warm and dry              Dialysis access: Right IJ non tunneled HD catheter    LABS/STUDIES  --------------------------------------------------------------------------------              7.0    9.65  >-----------<  116      [04-18-23 @ 01:50]              22.6     135  |  103  |  12  ----------------------------<  211      [04-18-23 @ 01:50]  3.6   |  15  |  1.05        Ca     8.0     [04-18-23 @ 01:50]      iCa    1.05     [04-17 @ 00:40]      Mg     2.50     [04-18-23 @ 01:50]      Phos  3.0     [04-18-23 @ 01:50]    TPro  6.7  /  Alb  2.6  /  TBili  4.9  /  DBili  x   /  AST  110  /  ALT  103  /  AlkPhos  225  [04-18-23 @ 01:50]          Creatinine Trend:  SCr 1.05 [04-18 @ 01:50]  SCr 0.62 [04-17 @ 17:35]  SCr 0.55 [04-17 @ 12:07]  SCr 0.63 [04-17 @ 00:40]  SCr 0.77 [04-16 @ 18:08]

## 2023-04-18 NOTE — PROGRESS NOTE ADULT - SUBJECTIVE AND OBJECTIVE BOX
Interval/Overnight Events:   - no acute events overnight    MEDICATIONS  (STANDING):  acetaminophen   Oral Liquid .. 975 milliGRAM(s) Oral every 6 hours  chlorhexidine 2% Cloths 1 Application(s) Topical daily  dextrose 50% Injectable 12.5 Gram(s) IV Push once  dextrose 50% Injectable 25 Gram(s) IV Push once  furosemide   Injectable 80 milliGRAM(s) IV Push <User Schedule>  gabapentin Solution 100 milliGRAM(s) Oral three times a day  heparin   Injectable 5000 Unit(s) SubCutaneous every 8 hours  insulin glargine Injectable (LANTUS) 9 Unit(s) SubCutaneous at bedtime  insulin lispro (ADMELOG) corrective regimen sliding scale   SubCutaneous every 6 hours  insulin lispro Injectable (ADMELOG) 2 Unit(s) SubCutaneous every 6 hours  levothyroxine Injectable 20 MICROGram(s) IV Push at bedtime  meropenem  IVPB 1000 milliGRAM(s) IV Intermittent every 8 hours  midodrine 30 milliGRAM(s) Oral every 8 hours  multivitamin/minerals/iron Oral Solution (CENTRUM) 15 milliLiter(s) Oral daily  norepinephrine Infusion 0.05 MICROgram(s)/kG/Min (5.53 mL/Hr) IV Continuous <Continuous>  pancrelipase  (CREON 12,000 Lipase Units) 1 Capsule(s) Oral <User Schedule>  pantoprazole   Suspension 40 milliGRAM(s) Oral daily  psyllium Powder 1 Packet(s) Oral daily  QUEtiapine 25 milliGRAM(s) Oral at bedtime    MEDICATIONS  (PRN):  HYDROmorphone  Injectable 0.5 milliGRAM(s) IV Push every 4 hours PRN breakthrough pain  oxyCODONE    Solution 10 milliGRAM(s) Oral every 4 hours PRN Severe Pain (7 - 10)  oxyCODONE    Solution 5 milliGRAM(s) Oral every 4 hours PRN Moderate Pain (4 - 6)      NEURO  RASS (if intubated): N/A 		CAM ICU (if concern for delirium):  Exam: A&Ox3      RESPIRATORY  Exam: CTA B/L,  no increased WOB        CARDIOVASCULAR  ICU Vital Signs Last 24 Hrs  T(C): 36 (18 Apr 2023 00:00), Max: 37.6 (17 Apr 2023 04:00)  T(F): 96.8 (18 Apr 2023 00:00), Max: 99.6 (17 Apr 2023 04:00)  HR: 103 (18 Apr 2023 00:00) (80 - 112)  BP: --  BP(mean): --  ABP: 85/851 (18 Apr 2023 00:00) (82/51 - 125/69)  ABP(mean): 66 (18 Apr 2023 00:00) (65 - 92)  RR: 26 (18 Apr 2023 00:00) (18 - 35)  SpO2: 99% (18 Apr 2023 00:00) (91% - 100%)    O2 Parameters below as of 18 Apr 2023 00:00  Patient On (Oxygen Delivery Method): nasal cannula  O2 Flow (L/min): 2    Exam: appears well perfused  Cardiac Rhythm: sinus  Perfusion     [ ]Adequate   [x ]Inadequate  Mentation   [X ]Normal       [ ]Reduced  Extremities  [ X]Warm         [ ]Cool  Volume Status [ ]Hypervolemic [ X]Euvolemic [ ]Hypovolemic  Meds: midodrine 30 milliGRAM(s) Oral every 8 hours      GI/NUTRITION  Exam: softly distended. ngt w/ tf.  Diet: TFs      GENITOURINARY  I&O's Detail    16 Apr 2023 07:01  -  17 Apr 2023 07:00  --------------------------------------------------------  IN:    Enteral Tube Flush: 400 mL    IV PiggyBack: 550 mL    Norepinephrine: 53.2 mL    Peptamen A.F.: 855 mL  Total IN: 1858.2 mL    OUT:    Other (mL): 1800 mL    Rectal Tube (mL): 1650 mL  Total OUT: 3450 mL    Total NET: -1591.8 mL      17 Apr 2023 07:01  -  18 Apr 2023 01:54  --------------------------------------------------------  IN:    Enteral Tube Flush: 285 mL    IV PiggyBack: 100 mL    Peptamen A.F.: 765 mL  Total IN: 1150 mL    OUT:    Indwelling Catheter - Urethral (mL): 15 mL    Other (mL): 1110 mL    Rectal Tube (mL): 650 mL  Total OUT: 1775 mL    Total NET: -625 mL    TUBES / LINES / DRAINS  [x] Peripheral IV  [x] Central Venous Line     	[] R	[x] L	[x] IJ	[] Fem	[] SC	Date Placed:  [x] R Shiley   [x] Arterial Line		[] R	[] L	[] Fem	[] Rad	[] Ax	Date Placed:   [x] NGT  [] PICC		[] Midline		[] Mediport  [] Urinary Catheter		Date Placed:   [x] Necessity of urinary, arterial, and venous catheters discussed    LABS:  CBC (04-17 @ 17:35)                              7.6<L>                         11.84<H>  )----------------(  123<L>     --    % Neutrophils, --    % Lymphocytes, ANC: --                                  24.1<L>  CBC (04-17 @ 12:07)                              7.6<L>                         11.12<H>  )----------------(  120<L>     --    % Neutrophils, --    % Lymphocytes, ANC: --                                  24.0<L>    BMP (04-17 @ 17:35)             135     |  103     |  6<L>  		Ca++ --      Ca 8.3<L>             ---------------------------------( 185<H>		Mg 2.50               3.9     |  18<L>   |  0.62  			Ph 3.2     BMP (04-17 @ 12:07)             135     |  101     |  6<L>  		Ca++ --      Ca 8.1<L>             ---------------------------------( 173<H>		Mg 2.50               4.0     |  21<L>   |  0.55  			Ph 2.9       LFTs (04-17 @ 17:35)      TPro 7.3 / Alb 3.0<L> / TBili 5.8<H> / DBili -- / <H> / <H> / AlkPhos 246<H>  LFTs (04-17 @ 12:07)      TPro 7.2 / Alb 3.1<L> / TBili 5.8<H> / DBili -- / <H> / <H> / AlkPhos 246<H>           24h events  - POCUS done, difficult to visualize cardiac windows, moderate respiratory variation in IVC  - 250cc albumin for hypotension  - Hb 7, 1u pRBC ordered      MEDICATIONS  (STANDING):  acetaminophen   Oral Liquid .. 975 milliGRAM(s) Oral every 6 hours  chlorhexidine 2% Cloths 1 Application(s) Topical daily  dextrose 50% Injectable 12.5 Gram(s) IV Push once  dextrose 50% Injectable 25 Gram(s) IV Push once  furosemide   Injectable 80 milliGRAM(s) IV Push <User Schedule>  gabapentin Solution 100 milliGRAM(s) Oral three times a day  heparin   Injectable 5000 Unit(s) SubCutaneous every 8 hours  insulin glargine Injectable (LANTUS) 9 Unit(s) SubCutaneous at bedtime  insulin lispro (ADMELOG) corrective regimen sliding scale   SubCutaneous every 6 hours  insulin lispro Injectable (ADMELOG) 2 Unit(s) SubCutaneous every 6 hours  levothyroxine Injectable 20 MICROGram(s) IV Push at bedtime  meropenem  IVPB 1000 milliGRAM(s) IV Intermittent every 8 hours  midodrine 30 milliGRAM(s) Oral every 8 hours  multivitamin/minerals/iron Oral Solution (CENTRUM) 15 milliLiter(s) Oral daily  norepinephrine Infusion 0.05 MICROgram(s)/kG/Min (5.53 mL/Hr) IV Continuous <Continuous>  pancrelipase  (CREON 12,000 Lipase Units) 1 Capsule(s) Oral <User Schedule>  pantoprazole   Suspension 40 milliGRAM(s) Oral daily  psyllium Powder 1 Packet(s) Oral daily  QUEtiapine 25 milliGRAM(s) Oral at bedtime    MEDICATIONS  (PRN):  HYDROmorphone  Injectable 0.5 milliGRAM(s) IV Push every 4 hours PRN breakthrough pain  oxyCODONE    Solution 10 milliGRAM(s) Oral every 4 hours PRN Severe Pain (7 - 10)  oxyCODONE    Solution 5 milliGRAM(s) Oral every 4 hours PRN Moderate Pain (4 - 6)      NEURO  RASS (if intubated): N/A 		CAM ICU (if concern for delirium):  Exam: A&Ox3      RESPIRATORY  Exam: CTA B/L,  no increased WOB        CARDIOVASCULAR  ICU Vital Signs Last 24 Hrs  T(C): 36 (18 Apr 2023 00:00), Max: 37.6 (17 Apr 2023 04:00)  T(F): 96.8 (18 Apr 2023 00:00), Max: 99.6 (17 Apr 2023 04:00)  HR: 103 (18 Apr 2023 00:00) (80 - 112)  BP: --  BP(mean): --  ABP: 85/851 (18 Apr 2023 00:00) (82/51 - 125/69)  ABP(mean): 66 (18 Apr 2023 00:00) (65 - 92)  RR: 26 (18 Apr 2023 00:00) (18 - 35)  SpO2: 99% (18 Apr 2023 00:00) (91% - 100%)    O2 Parameters below as of 18 Apr 2023 00:00  Patient On (Oxygen Delivery Method): nasal cannula  O2 Flow (L/min): 2    Exam: appears well perfused  Cardiac Rhythm: sinus  Perfusion     [ ]Adequate   [x ]Inadequate  Mentation   [X ]Normal       [ ]Reduced  Extremities  [ X]Warm         [ ]Cool  Volume Status [ ]Hypervolemic [ X]Euvolemic [ ]Hypovolemic  Meds: midodrine 30 milliGRAM(s) Oral every 8 hours      GI/NUTRITION  Exam: softly distended. ngt w/ tf.  Diet: TFs      GENITOURINARY  I&O's Detail    16 Apr 2023 07:01  -  17 Apr 2023 07:00  --------------------------------------------------------  IN:    Enteral Tube Flush: 400 mL    IV PiggyBack: 550 mL    Norepinephrine: 53.2 mL    Peptamen A.F.: 855 mL  Total IN: 1858.2 mL    OUT:    Other (mL): 1800 mL    Rectal Tube (mL): 1650 mL  Total OUT: 3450 mL    Total NET: -1591.8 mL      17 Apr 2023 07:01  -  18 Apr 2023 01:54  --------------------------------------------------------  IN:    Enteral Tube Flush: 285 mL    IV PiggyBack: 100 mL    Peptamen A.F.: 765 mL  Total IN: 1150 mL    OUT:    Indwelling Catheter - Urethral (mL): 15 mL    Other (mL): 1110 mL    Rectal Tube (mL): 650 mL  Total OUT: 1775 mL    Total NET: -625 mL    TUBES / LINES / DRAINS  [x] Peripheral IV  [x] Central Venous Line     	[] R	[x] L	[x] IJ	[] Fem	[] SC	Date Placed:  [x] R Shiley   [x] Arterial Line		[] R	[] L	[] Fem	[] Rad	[] Ax	Date Placed:   [x] NGT  [] PICC		[] Midline		[] Mediport  [] Urinary Catheter		Date Placed:   [x] Necessity of urinary, arterial, and venous catheters discussed    LABS:  CBC (04-17 @ 17:35)                              7.6<L>                         11.84<H>  )----------------(  123<L>     --    % Neutrophils, --    % Lymphocytes, ANC: --                                  24.1<L>  CBC (04-17 @ 12:07)                              7.6<L>                         11.12<H>  )----------------(  120<L>     --    % Neutrophils, --    % Lymphocytes, ANC: --                                  24.0<L>    BMP (04-17 @ 17:35)             135     |  103     |  6<L>  		Ca++ --      Ca 8.3<L>             ---------------------------------( 185<H>		Mg 2.50               3.9     |  18<L>   |  0.62  			Ph 3.2     BMP (04-17 @ 12:07)             135     |  101     |  6<L>  		Ca++ --      Ca 8.1<L>             ---------------------------------( 173<H>		Mg 2.50               4.0     |  21<L>   |  0.55  			Ph 2.9       LFTs (04-17 @ 17:35)      TPro 7.3 / Alb 3.0<L> / TBili 5.8<H> / DBili -- / <H> / <H> / AlkPhos 246<H>  LFTs (04-17 @ 12:07)      TPro 7.2 / Alb 3.1<L> / TBili 5.8<H> / DBili -- / <H> / <H> / AlkPhos 246<H>           Interval/Overnight Events:   - POCUS done, difficult to visualize cardiac windows, moderate respiratory variation in IVC  - 250cc albumin for hypotension  - tachypneic  - Hb 7->1u pRBC ->8.2      MEDICATIONS  (STANDING):  acetaminophen   Oral Liquid .. 975 milliGRAM(s) Oral every 6 hours  chlorhexidine 2% Cloths 1 Application(s) Topical daily  dextrose 50% Injectable 12.5 Gram(s) IV Push once  dextrose 50% Injectable 25 Gram(s) IV Push once  furosemide   Injectable 80 milliGRAM(s) IV Push <User Schedule>  gabapentin Solution 100 milliGRAM(s) Oral three times a day  heparin   Injectable 5000 Unit(s) SubCutaneous every 8 hours  insulin glargine Injectable (LANTUS) 9 Unit(s) SubCutaneous at bedtime  insulin lispro (ADMELOG) corrective regimen sliding scale   SubCutaneous every 6 hours  insulin lispro Injectable (ADMELOG) 2 Unit(s) SubCutaneous every 6 hours  levothyroxine Injectable 20 MICROGram(s) IV Push at bedtime  meropenem  IVPB 1000 milliGRAM(s) IV Intermittent every 8 hours  midodrine 30 milliGRAM(s) Oral every 8 hours  multivitamin/minerals/iron Oral Solution (CENTRUM) 15 milliLiter(s) Oral daily  norepinephrine Infusion 0.05 MICROgram(s)/kG/Min (5.53 mL/Hr) IV Continuous <Continuous>  pancrelipase  (CREON 12,000 Lipase Units) 1 Capsule(s) Oral <User Schedule>  pantoprazole   Suspension 40 milliGRAM(s) Oral daily  psyllium Powder 1 Packet(s) Oral daily  QUEtiapine 25 milliGRAM(s) Oral at bedtime    MEDICATIONS  (PRN):  HYDROmorphone  Injectable 0.5 milliGRAM(s) IV Push every 4 hours PRN breakthrough pain  oxyCODONE    Solution 10 milliGRAM(s) Oral every 4 hours PRN Severe Pain (7 - 10)  oxyCODONE    Solution 5 milliGRAM(s) Oral every 4 hours PRN Moderate Pain (4 - 6)      NEURO  RASS (if intubated): N/A 		CAM ICU (if concern for delirium):  Exam: A&Ox3      RESPIRATORY  Exam: CTA B/L,  no increased WOB        CARDIOVASCULAR  ICU Vital Signs Last 24 Hrs  T(C): 36 (18 Apr 2023 00:00), Max: 37.6 (17 Apr 2023 04:00)  T(F): 96.8 (18 Apr 2023 00:00), Max: 99.6 (17 Apr 2023 04:00)  HR: 103 (18 Apr 2023 00:00) (80 - 112)  BP: --  BP(mean): --  ABP: 85/851 (18 Apr 2023 00:00) (82/51 - 125/69)  ABP(mean): 66 (18 Apr 2023 00:00) (65 - 92)  RR: 26 (18 Apr 2023 00:00) (18 - 35)  SpO2: 99% (18 Apr 2023 00:00) (91% - 100%)    O2 Parameters below as of 18 Apr 2023 00:00  Patient On (Oxygen Delivery Method): nasal cannula  O2 Flow (L/min): 2    Exam: appears well perfused  Cardiac Rhythm: sinus  Perfusion     [ ]Adequate   [x ]Inadequate  Mentation   [X ]Normal       [ ]Reduced  Extremities  [ X]Warm         [ ]Cool  Volume Status [ ]Hypervolemic [ X]Euvolemic [ ]Hypovolemic  Meds: midodrine 30 milliGRAM(s) Oral every 8 hours      GI/NUTRITION  Exam: softly distended. ngt w/ tf.  Diet: TFs      GENITOURINARY  I&O's Detail    16 Apr 2023 07:01  -  17 Apr 2023 07:00  --------------------------------------------------------  IN:    Enteral Tube Flush: 400 mL    IV PiggyBack: 550 mL    Norepinephrine: 53.2 mL    Peptamen A.F.: 855 mL  Total IN: 1858.2 mL    OUT:    Other (mL): 1800 mL    Rectal Tube (mL): 1650 mL  Total OUT: 3450 mL    Total NET: -1591.8 mL      17 Apr 2023 07:01  -  18 Apr 2023 01:54  --------------------------------------------------------  IN:    Enteral Tube Flush: 285 mL    IV PiggyBack: 100 mL    Peptamen A.F.: 765 mL  Total IN: 1150 mL    OUT:    Indwelling Catheter - Urethral (mL): 15 mL    Other (mL): 1110 mL    Rectal Tube (mL): 650 mL  Total OUT: 1775 mL    Total NET: -625 mL    TUBES / LINES / DRAINS  [x] Peripheral IV  [x] Central Venous Line     	[] R	[x] L	[x] IJ	[] Fem	[] SC	Date Placed:  [x] R Shiley   [x] Arterial Line		[] R	[] L	[] Fem	[] Rad	[] Ax	Date Placed:   [x] NGT  [] PICC		[] Midline		[] Mediport  [] Urinary Catheter		Date Placed:   [x] Necessity of urinary, arterial, and venous catheters discussed    LABS:  CBC (04-17 @ 17:35)                              7.6<L>                         11.84<H>  )----------------(  123<L>     --    % Neutrophils, --    % Lymphocytes, ANC: --                                  24.1<L>  CBC (04-17 @ 12:07)                              7.6<L>                         11.12<H>  )----------------(  120<L>     --    % Neutrophils, --    % Lymphocytes, ANC: --                                  24.0<L>    BMP (04-17 @ 17:35)             135     |  103     |  6<L>  		Ca++ --      Ca 8.3<L>             ---------------------------------( 185<H>		Mg 2.50               3.9     |  18<L>   |  0.62  			Ph 3.2     BMP (04-17 @ 12:07)             135     |  101     |  6<L>  		Ca++ --      Ca 8.1<L>             ---------------------------------( 173<H>		Mg 2.50               4.0     |  21<L>   |  0.55  			Ph 2.9       LFTs (04-17 @ 17:35)      TPro 7.3 / Alb 3.0<L> / TBili 5.8<H> / DBili -- / <H> / <H> / AlkPhos 246<H>  LFTs (04-17 @ 12:07)      TPro 7.2 / Alb 3.1<L> / TBili 5.8<H> / DBili -- / <H> / <H> / AlkPhos 246<H>

## 2023-04-18 NOTE — PROGRESS NOTE ADULT - ASSESSMENT
Patient is a 48 year old female with PMHx significant for HTN, hypothyroidism,  30 years ago, breast cancer s/p left mastectomy and chemo in  presenting with epigastric pain and LUQ pain. Found to have necrotizing pancreatitis. Transferred from OSH ([place]) for hypotension and tachypnea requiring emergent intubation and multiple pressors. SICU consulted for hemodynamic monitoring and respiratory management.    PLAN  Neuro:  - Precautions for delirium - protected sleep  - Seroquel 25mg Qhs  - tylenol and Dilaudid prn    Resp:  - AHRF w/ b/l plef 2/2 necrotizing pancreatitis    CV:  Mixed shock state 2/2 septic shock w/ E. Coli in urine vs hypovolemic shock now s/p 11L resus    - Monitor Off pressors  - Midodrine 30 q 8 hours to bridge to intermittent HD    GI:   - Continue TF via post pyloric tube at goal  - GI ppx w/ protonix   - Creon  - No GI intervention at this time; no walled off collection on CT    Renal:  ARF 2/2 shock state resulting in ATN requiring CRRT    - On CRRT, no fluid being removed due to hypotension.  - Albumin followed by lasix 80mg iv x3      Heme:   - SCDs and SQH, no longer on fondaparinux  - HIT Ab positive; DOUGLAS Negative    #Splenic Vein Thrombosis  3/22 CT Abd w/ contrast: Focal filling defect in the splenic vein at the level of the body of the pancreas for which an underlying thrombus is suspected     ID:   Intermittently febrile w/ leukocytosis c/f septic shock 2/2 E coli UTI  Diagnostics:  - 3/22 Ucx: E coli  - 3/30 Bcx: staph hominis in aerobic bottle x 1 (likely contaminant)   - S/p Meropenem (3/26-)  - c/w meropenem ( )    Endo:   - Lantus 9 units, admelog 2 units q6  - c/w synthroid 20 IV (home med)   - ISS    Code Status: Full code  Lines: KELLY Mendoza, A line    Disposition: SICU   Patient is a 48 year old female with PMHx significant for HTN, hypothyroidism,  30 years ago, breast cancer s/p left mastectomy and chemo in  presenting with epigastric pain and LUQ pain. Found to have necrotizing pancreatitis. Transferred from OSH ([place]) for hypotension and tachypnea requiring emergent intubation and multiple pressors. SICU consulted for hemodynamic monitoring and respiratory management.    PLAN  Neuro:  - Precautions for delirium - protected sleep  - Seroquel 25mg Qhs  - tylenol, gabapentin and Dilaudid prn    Resp:  - tachypneic  - AHRF w/ b/l plef 2/2 necrotizing pancreatitis  - 2L NC  - CXR : small L layering effusion    CV:  Mixed shock state 2/2 septic shock w/ E. Coli in urine vs hypovolemic shock now s/p 11L resus    - Off pressors  - Midodrine 30 q 8 hours to bridge to intermittent HD    GI:   - TF via post pyloric tube at goal, switch to elemental feeds 2/2 diarrhea  - GI ppx w/ protonix   - Creon  - No GI intervention at this time; no walled off collection on CT    Renal:  ARF 2/2 shock state resulting in ATN requiring CRRT    - f/u nephro recs regarding CCRT continuation vs intermittent HD  - Lasix 80/80/80 yesterday with minimal UOP    Heme:   - SCDs and SQH, no longer on fondaparinux  - HIT Ab positive; DOUGLAS Negative    #Splenic Vein Thrombosis  3/22 CT Abd w/ contrast: Focal filling defect in the splenic vein at the level of the body of the pancreas for which an underlying thrombus is suspected     ID:   Intermittently febrile w/ leukocytosis c/f septic shock 2/2 E coli UTI  Diagnostics:  - 3/22 Ucx: E coli  - 3/30 Bcx: staph hominis in aerobic bottle x 1 (likely contaminant)   - S/p Meropenem (3/26-)  - Restart meropenem ( )    Endo:   - Lantus 9 units, admelog 2 units q6  - c/w synthroid 20 IV (home med)   - ISS    Code Status: Full code  Lines: KELLY Mendoza CVC, A line    Disposition: SICU Patient is a 48 year old female with PMHx significant for HTN, hypothyroidism,  30 years ago, breast cancer s/p left mastectomy and chemo in  presenting with epigastric pain and LUQ pain. Found to have necrotizing pancreatitis. Transferred from OSH ([place]) for hypotension and tachypnea requiring emergent intubation and multiple pressors. SICU consulted for hemodynamic monitoring and respiratory management.    PLAN  Neuro:  - Precautions for delirium - protected sleep  - Seroquel 25mg Qhs  - tylenol, gabapentin and Dilaudid prn    Resp:  - tachypneic  - AHRF w/ b/l plef 2/2 necrotizing pancreatitis  - 2L NC  - CXR : small L layering effusion    CV:  Mixed shock state 2/2 septic shock w/ E. Coli in urine vs hypovolemic shock now s/p 11L resus    - Off pressors  - Midodrine 30 q 8 hours to bridge to intermittent HD    GI:   - TF via post pyloric tube at goal, switch to peptamen feeds 2/2 diarrhea  - GI ppx w/ protonix   - Creon  - No GI intervention at this time; no walled off collection on CT    Renal:  ARF 2/2 shock state resulting in ATN requiring CRRT    - f/u nephro recs regarding CCRT continuation vs intermittent HD  - Lasix 80/80/80 yesterday with minimal UOP    Heme:   - SCDs and SQH, no longer on fondaparinux  - HIT Ab positive; DOUGLAS Negative    #Splenic Vein Thrombosis  3/22 CT Abd w/ contrast: Focal filling defect in the splenic vein at the level of the body of the pancreas for which an underlying thrombus is suspected     ID:   Intermittently febrile w/ leukocytosis c/f septic shock 2/2 E coli UTI  Diagnostics:  - 3/22 Ucx: E coli  - 3/30 Bcx: staph hominis in aerobic bottle x 1 (likely contaminant)   - S/p Meropenem (3/26-)  - Restart meropenem ( )    Endo:   - Lantus 9 units, admelog 2 units q6  - c/w synthroid 20 IV (home med)   - ISS    Code Status: Full code  Lines: KELLY Mendoza CVC, A line    Disposition: SICU ACP

## 2023-04-18 NOTE — PROGRESS NOTE ADULT - ASSESSMENT
48 year old female with a PMHx of HTN, hypothyroidism,  (30 years ago) and breast cancer (S/P left mastectomy and chemo in ) who presented with epigastric pain and left upper quadrant pain.  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  Transferred from OSH ([place]) for hypotension and tachypnea requiring emergent intubation and multiple pressors.  SICU consulted for hemodynamic monitoring and respiratory management.    Plan:   - Continue w/ IV Meropenem  - Continue CVVH, transition to intermittent HD when able  - Appreciate care per SICU      B Team Surgery  [id number] 48 year old female with a PMHx of HTN, hypothyroidism,  (30 years ago) and breast cancer (S/P left mastectomy and chemo in ) who presented with epigastric pain and left upper quadrant pain.  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  Transferred from OSH ([place]) for hypotension and tachypnea requiring emergent intubation and multiple pressors.  SICU consulted for hemodynamic monitoring and respiratory management.    Plan:   - Continue w/ IV Meropenem  - TF at goal  - transition to intermittent HD  - PT/OT consult  - OOBtochair  - Appreciate care per SICU      B Team Surgery  [id number]

## 2023-04-18 NOTE — OCCUPATIONAL THERAPY INITIAL EVALUATION ADULT - RANGE OF MOTION EXAMINATION, UPPER EXTREMITY
limited active movement throughout BUE less than 1/3 of full ROM./bilateral UE Active ROM was WFL  (within functional limits)

## 2023-04-19 NOTE — SWALLOW BEDSIDE ASSESSMENT ADULT - ASR SWALLOW RECOMMEND DIAG
Objective testing NOT warranted given clinical presentation/patient accepted limited PO trials during clinical assessment

## 2023-04-19 NOTE — CONSULT NOTE ADULT - SUBJECTIVE AND OBJECTIVE BOX
CC: Dysphagia and hoarseness    HPI: 48 year old female with PMHx significant for HTN, hypothyroidism,  30 years ago, breast cancer s/p left mastectomy and chemo in  presenting with epigastric pain and LUQ pain. Found to have necrotizing pancreatitis. Transferred from OSH ([place]) for hypotension and tachypnea requiring emergent intubation and multiple pressors. SICU consulted for hemodynamic monitoring and respiratory management.      Patient had SLP evaluation this morning was found to have hoarse voice. ENT called for further evaluation of dysphagia and hoarseness.    Patient seen and examined at bedside with sister who provides most of the information. Sister reports patient been having very "soft" voice after extubation. Patient was intubated for respiratory distress from 3/24/2023 till 2023. She is very tired and very limited in participation in answer any of the questions. Currently on nasal cannula, in no acute respiratory distress.       PAST MEDICAL & SURGICAL HISTORY:  Hypertension      Hypothyroidism      Breast cancer      S/P       H/O left mastectomy        Allergies    No Known Allergies    Intolerances      MEDICATIONS  (STANDING):  acetaminophen   Oral Liquid .. 975 milliGRAM(s) Oral every 6 hours  chlorhexidine 2% Cloths 1 Application(s) Topical daily  CRRT Treatment    <Continuous>  dextrose 50% Injectable 25 Gram(s) IV Push once  dextrose 50% Injectable 12.5 Gram(s) IV Push once  gabapentin Solution 100 milliGRAM(s) Oral three times a day  heparin   Injectable 5000 Unit(s) SubCutaneous every 8 hours  insulin glargine Injectable (LANTUS) 9 Unit(s) SubCutaneous at bedtime  insulin lispro (ADMELOG) corrective regimen sliding scale   SubCutaneous every 6 hours  insulin lispro Injectable (ADMELOG) 2 Unit(s) SubCutaneous every 6 hours  levothyroxine Injectable 20 MICROGram(s) IV Push at bedtime  midodrine 30 milliGRAM(s) Oral every 8 hours  multivitamin/minerals/iron Oral Solution (CENTRUM) 15 milliLiter(s) Oral daily  norepinephrine Infusion 0.05 MICROgram(s)/kG/Min (5.53 mL/Hr) IV Continuous <Continuous>  pancrelipase  (CREON 12,000 Lipase Units) 1 Capsule(s) Oral <User Schedule>  pantoprazole   Suspension 40 milliGRAM(s) Oral daily  Phoxillum Filtration BK 4 / 2.5 5000 milliLiter(s) (1200 mL/Hr) CRRT <Continuous>  Phoxillum Filtration BK 4 / 2.5 5000 milliLiter(s) (200 mL/Hr) CRRT <Continuous>  Phoxillum Filtration BK 4 / 2.5 5000 milliLiter(s) (2400 mL/Hr) CRRT <Continuous>  psyllium Powder 1 Packet(s) Oral daily  QUEtiapine 25 milliGRAM(s) Oral at bedtime    MEDICATIONS  (PRN):  HYDROmorphone  Injectable 0.5 milliGRAM(s) IV Push every 4 hours PRN breakthrough pain  oxyCODONE    Solution 5 milliGRAM(s) Oral every 4 hours PRN Moderate Pain (4 - 6)  oxyCODONE    Solution 10 milliGRAM(s) Oral every 4 hours PRN Severe Pain (7 - 10)      Social History: Marijuana use and social alcohol use    Family history: No pertinent family history in first degree relatives    ROS:   ENT: all negative except as noted in HPI   CV: denies palpitations  Pulm: denies SOB, cough, hemoptysis  GI: denies change in appetite, indigestion, n/v  : denies pertinent urinary symptoms, urgency  Neuro: denies numbness/tingling, loss of sensation  Psych: denies anxiety  MS: denies muscle weakness, instability  Heme: denies easy bruising or bleeding  Endo: denies heat/cold intolerance, excessive sweating  Vascular: denies LE edema    Vital Signs Last 24 Hrs  T(C): 37.6 (2023 12:00), Max: 37.6 (2023 12:00)  T(F): 99.7 (2023 12:00), Max: 99.7 (2023 12:00)  HR: 111 (2023 16:00) (84 - 116)  BP: --  BP(mean): --  RR: 24 (2023 16:00) (17 - 38)  SpO2: 98% (2023 16:00) (93% - 100%)    Parameters below as of 2023 16:00  Patient On (Oxygen Delivery Method): nasal cannula  O2 Flow (L/min): 2                            8.2    10.09 )-----------( 107      ( 2023 05:25 )             24.7    04-19    137  |  104  |  7   ----------------------------<  121<H>  3.9   |  21<L>  |  0.71    Ca    8.6      2023 11:39  Phos  2.9       Mg     2.50         TPro  7.4  /  Alb  3.5  /  TBili  5.6<H>  /  DBili  x   /  AST  91<H>  /  ALT  76<H>  /  AlkPhos  262<H>     PT/INR - ( 2023 05:25 )   PT: 21.6 sec;   INR: 1.85 ratio         PTT - ( 2023 05:25 )  PTT:41.6 sec    PHYSICAL EXAM:  Gen: NAD  Skin: No rashes, bruises, or lesions  Head: Normocephalic, Atraumatic  Face: no edema, erythema, or fluctuance. Parotid glands soft without mass  Eyes: no scleral injection  Ears: bilateral no mastoid tenderness, erythema, or ear bulging  Nose: Nares bilaterally patent, no discharge  Mouth: No stridor, no drooling, no trismus.  Mucosa moist, tongue/uvula midline, oropharynx clear  Neck: Flat, supple, no lymphadenopathy, trachea midline, no masses  Lymphatic: No lymphadenopathy  Resp: breathing easily on nasal cannula, no stridor  CV: no peripheral edema/cyanosis  GI: nondistended   Peripheral vascular: no JVD or edema  Neuro: facial nerve intact, no facial droop    Fiberoptic Indirect laryngoscopy:  (Scope #2 used)  Flexible laryngoscopy was performed and revealed the following:    -- Nasopharynx had no mass or exudate.    -- Posterior pharyngeal wall clear, no edema, and no erythema    -- Base of tongue was symmetric and not enlarged     -- Vallecula was clear    -- Epiglottis, both aryepiglottic folds and both false vocal folds were normal    -- Arytenoids both without edema and erythema     -- True vocal folds were fully mobile and without lesions. + 2mm glottic gap    -- Post cricoid area clear, no edema and no erythema    -- Interarytenoid edema was absent    -- Airway patent

## 2023-04-19 NOTE — PROGRESS NOTE ADULT - ASSESSMENT
Patient is a 48 year old female with PMHx significant for HTN, hypothyroidism,  30 years ago, breast cancer s/p left mastectomy and chemo in  presenting with epigastric pain and LUQ pain. Found to have necrotizing pancreatitis. Transferred from OSH ([place]) for hypotension and tachypnea requiring emergent intubation and multiple pressors. SICU consulted for hemodynamic monitoring and respiratory management.    PLAN  Neuro:  - Precautions for delirium - protected sleep  - Seroquel 25mg Qhs  - tylenol, gabapentin and Dilaudid prn    Resp:  - Tachypneic  - AHRF w/ b/l plef 2/2 necrotizing pancreatitis  - On RA  - CXR : small L layering effusion  - POCUS, night of : B/l sm effusions noted.     CV:  Mixed shock state 2/2 septic shock w/ E. Coli in urine vs hypovolemic shock now s/p 11L resus    - Back on minimal pressor, levo, for MAP in low 50s   - Midodrine 30 q 8 hours    GI:   - TF via post pyloric tube at goal, switch to elemental feeds 2/2 diarrhea  - GI ppx w/ protonix   - Creon  - No GI intervention at this time; no walled off collection on CT    Renal:  ARF 2/2 shock state resulting in ATN requiring CRRT    - f/u nephro recs regarding CCRT continuation vs intermittent HD    Heme:   - SCDs and SQH, no longer on fondaparinux  - HIT Ab positive; DOUGLAS Negative    #Splenic Vein Thrombosis  3/22 CT Abd w/ contrast: Focal filling defect in the splenic vein at the level of the body of the pancreas for which an underlying thrombus is suspected     ID:   Intermittently febrile w/ leukocytosis c/f septic shock 2/2 E coli UTI  Diagnostics:  - 3/22 Ucx: E coli  - 3/30 Bcx: staph hominis in aerobic bottle x 1 (likely contaminant)   - S/p Meropenem (3/26-)  - Restart meropenem ( )    Endo:   - Lantus 9 units, admelog 2 units q6  - c/w synthroid 20 IV (home med)   - ISS    Code Status: Full code  Lines: KELLY Mendoza CVC, A line    Disposition: SICU Patient is a 48 year old female with PMHx significant for HTN, hypothyroidism,  30 years ago, breast cancer s/p left mastectomy and chemo in  presenting with epigastric pain and LUQ pain. Found to have necrotizing pancreatitis. Transferred from OSH ([place]) for hypotension and tachypnea requiring emergent intubation and multiple pressors. SICU consulted for hemodynamic monitoring and respiratory management.    PLAN  Neuro:  - Precautions for delirium - protected sleep  - Seroquel 25mg Qhs  - tylenol, gabapentin and Dilaudid prn    Resp:  - Tachypneic  - AHRF w/ b/l plef 2/2 necrotizing pancreatitis  - On RA  - CXR : small L layering effusion  - POCUS, night of : B/l sm effusions noted.     CV:  Mixed shock state 2/2 septic shock w/ E. Coli in urine vs hypovolemic shock now s/p 11L resus    - Back on minimal pressor, levo, for MAP in low 50s. weaned off.   - Midodrine 30 q 8 hours    GI:   - TF via post pyloric tube at goal, switch to elemental feeds 2/2 diarrhea  - GI ppx w/ protonix   - Creon  - No GI intervention at this time; no walled off collection on CT    Renal:  ARF 2/2 shock state resulting in ATN requiring CRRT    - C/w crrt, keep net even    Heme:   - SCDs and SQH, no longer on fondaparinux  - HIT Ab positive; DOUGLAS Negative    #Splenic Vein Thrombosis  3/22 CT Abd w/ contrast: Focal filling defect in the splenic vein at the level of the body of the pancreas for which an underlying thrombus is suspected     ID:   Intermittently febrile w/ leukocytosis c/f septic shock 2/2 E coli UTI  Diagnostics:  - 3/22 Ucx: E coli  - 3/30 Bcx: staph hominis in aerobic bottle x 1 (likely contaminant)   - S/p Meropenem (3/26-)  - Restart meropenem (- )  - f/u procalcitonin    Endo:   - Lantus 9 units, admelog 2 units q6  - c/w synthroid 20 IV (home med)   - ISS    Code Status: Full code  Lines: KELLY Mendoza CVC, A line    Disposition: SICU Patient is a 48 year old female with PMHx significant for HTN, hypothyroidism,  30 years ago, breast cancer s/p left mastectomy and chemo in  presenting with epigastric pain and LUQ pain. Found to have necrotizing pancreatitis. Transferred from OSH ([place]) for hypotension and tachypnea requiring emergent intubation and multiple pressors. SICU consulted for hemodynamic monitoring and respiratory management.    PLAN  Neuro:  - Precautions for delirium - protected sleep  - Seroquel 25mg Qhs  - tylenol, gabapentin and Dilaudid prn    Resp:  - Tachypneic  - AHRF w/ b/l plef 2/2 necrotizing pancreatitis  - On RA  - CXR : small L layering effusion  - POCUS, night of : B/l sm effusions noted.     CV:  Mixed shock state 2/2 septic shock w/ E. Coli in urine vs hypovolemic shock now s/p 11L resus    - Back on minimal pressor, levo, for MAP in low 50s. weaned off.   - Midodrine 30 q 8 hours    GI:   - TF via post pyloric tube at goal, switch to peptamen feeds 2/2 diarrhea  - GI ppx w/ protonix   - Creon  - No GI intervention at this time; no walled off collection on CT    Renal:  ARF 2/2 shock state resulting in ATN requiring CRRT    - C/w crrt, keep net even    Heme:   - SCDs and SQH, no longer on fondaparinux  - HIT Ab positive; DOUGLAS Negative    #Splenic Vein Thrombosis  3/22 CT Abd w/ contrast: Focal filling defect in the splenic vein at the level of the body of the pancreas for which an underlying thrombus is suspected     ID:   Intermittently febrile w/ leukocytosis c/f septic shock 2/2 E coli UTI  Diagnostics:  - 3/22 Ucx: E coli  - 3/30 Bcx: staph hominis in aerobic bottle x 1 (likely contaminant)   - S/p Meropenem (3/26-)  - Restart meropenem (- )  - f/u procalcitonin    Endo:   - Lantus 9 units, admelog 2 units q6  - c/w synthroid 20 IV (home med)   - ISS    Code Status: Full code  Lines: KELLY Mendoza CVC, A line    Disposition: SICU

## 2023-04-19 NOTE — PROGRESS NOTE ADULT - SUBJECTIVE AND OBJECTIVE BOX
Interval/Overnight Events:   - 19th: POCUS done, IVC 1.4 w/ mod resp variability. B/l effusions noted, loculated fluid collections under diaphragm    - 2x 250cc albumin for hypotension, MAPs in 50s  - Minimal levo ggt restarted w/ adequate response    MEDICATIONS  (STANDING):  acetaminophen   Oral Liquid .. 975 milliGRAM(s) Oral every 6 hours  chlorhexidine 2% Cloths 1 Application(s) Topical daily  CRRT Treatment    <Continuous>  dextrose 50% Injectable 25 Gram(s) IV Push once  dextrose 50% Injectable 12.5 Gram(s) IV Push once  gabapentin Solution 100 milliGRAM(s) Oral three times a day  heparin   Injectable 5000 Unit(s) SubCutaneous every 8 hours  insulin glargine Injectable (LANTUS) 9 Unit(s) SubCutaneous at bedtime  insulin lispro (ADMELOG) corrective regimen sliding scale   SubCutaneous every 6 hours  insulin lispro Injectable (ADMELOG) 2 Unit(s) SubCutaneous every 6 hours  levothyroxine Injectable 20 MICROGram(s) IV Push at bedtime  meropenem  IVPB 1000 milliGRAM(s) IV Intermittent every 8 hours  midodrine 30 milliGRAM(s) Oral every 8 hours  multivitamin/minerals/iron Oral Solution (CENTRUM) 15 milliLiter(s) Oral daily  pancrelipase  (CREON 12,000 Lipase Units) 1 Capsule(s) Oral <User Schedule>  pantoprazole   Suspension 40 milliGRAM(s) Oral daily  Phoxillum Filtration BK 4 / 2.5 5000 milliLiter(s) (2400 mL/Hr) CRRT <Continuous>  Phoxillum Filtration BK 4 / 2.5 5000 milliLiter(s) (1200 mL/Hr) CRRT <Continuous>  Phoxillum Filtration BK 4 / 2.5 5000 milliLiter(s) (200 mL/Hr) CRRT <Continuous>  psyllium Powder 1 Packet(s) Oral daily  QUEtiapine 25 milliGRAM(s) Oral at bedtime    MEDICATIONS  (PRN):  HYDROmorphone  Injectable 0.5 milliGRAM(s) IV Push every 4 hours PRN breakthrough pain  oxyCODONE    Solution 5 milliGRAM(s) Oral every 4 hours PRN Moderate Pain (4 - 6)  oxyCODONE    Solution 10 milliGRAM(s) Oral every 4 hours PRN Severe Pain (7 - 10)    NEURO  RASS (if intubated): N/A 		CAM ICU (if concern for delirium):  Exam: A&Ox3    RESPIRATORY  Exam: no crackles B/L, POCUS showing b/l effusions. No increased WOB.    RR: 33 (18 Apr 2023 22:00) (23 - 37)  SpO2: 100% (18 Apr 2023 22:00) (95% - 100%)  O2 Parameters below as of 18 Apr 2023 16:00  Patient On (Oxygen Delivery Method): room air        CARDIOVASCULAR  ICU Vital Signs Last 24 Hrs  T(C): 35.9 (18 Apr 2023 20:00), Max: 39.3 (18 Apr 2023 16:00)  T(F): 96.6 (18 Apr 2023 20:00), Max: 102.7 (18 Apr 2023 16:00)  HR: 95 (18 Apr 2023 22:00) (84 - 111)  BP: --  BP(mean): --  ABP: 66/38 (18 Apr 2023 22:00) (66/38 - 103/59)  ABP(mean): 49 (18 Apr 2023 22:00) (49 - 88)    Exam: appears well perfused  Cardiac Rhythm: sinus  Perfusion     [ ]Adequate   [x ]Inadequate  Mentation   [X ]Normal       [ ]Reduced  Extremities  [ X]Warm         [ ]Cool  Volume Status [ ]Hypervolemic [ X]Euvolemic [ ]Hypovolemic  Meds: midodrine 30 milliGRAM(s) Oral every 8 hours, levophed ggt      GI/NUTRITION  Exam: Distended. No abd ttp. ngt w/ tf.  Diet: TFs    GENITOURINARY  I&O's Detail    17 Apr 2023 07:01  -  18 Apr 2023 07:00  --------------------------------------------------------  IN:    Enteral Tube Flush: 285 mL    IV PiggyBack: 100 mL    Peptamen A.F.: 810 mL    PRBCs (Packed Red Blood Cells): 300 mL  Total IN: 1495 mL    OUT:    Indwelling Catheter - Urethral (mL): 15 mL    Other (mL): 1110 mL    Rectal Tube (mL): 1250 mL  Total OUT: 2375 mL    Total NET: -880 mL      18 Apr 2023 07:01  -  19 Apr 2023 01:17  --------------------------------------------------------  IN:    Enteral Tube Flush: 400 mL    IV PiggyBack: 550 mL    Peptamen A.F.: 540 mL  Total IN: 1490 mL    OUT:    Indwelling Catheter - Urethral (mL): 0 mL    Other (mL): 28 mL    Rectal Tube (mL): 800 mL  Total OUT: 828 mL    Total NET: 662 mL    TUBES / LINES / DRAINS  [x] Peripheral IV  [x] Central Venous Line     	[] R	[x] L	[x] IJ	[] Fem	[] SC	Date Placed:  [x] R Shiley   [x] Arterial Line		[] R	[] L	[] Fem	[] Rad	[] Ax	Date Placed:   [x] NGT  [] PICC		[] Midline		[] Mediport  [] Urinary Catheter		Date Placed:   [x] Necessity of urinary, arterial, and venous catheters discussed    LABS:             7.8    10.77 )-----------( 102      ( 18 Apr 2023 23:49 )             24.3     04-18    138  |  104  |  11  ----------------------------<  158<H>  3.7   |  19<L>  |  0.99    Ca    7.9<L>      18 Apr 2023 23:49  Phos  3.2     04-18  Mg     2.40     04-18    TPro  6.7  /  Alb  3.3  /  TBili  4.8<H>  /  DBili  x   /  AST  84<H>  /  ALT  73<H>  /  AlkPhos  206<H>  04-18    PT/INR - ( 18 Apr 2023 23:49 )   PT: 21.4 sec;   INR: 1.83 ratio    PTT - ( 18 Apr 2023 23:49 )  PTT:40.3 sec             Interval/Overnight Events:   - 19th: POCUS done, IVC 1.4 w/ mod resp variability. B/l effusions noted, loculated fluid collections under diaphragm    - 2x 250cc albumin for hypotension, MAPs in 50s  - Minimal levo ggt restarted w/ adequate response for a few hours    MEDICATIONS  (STANDING):  acetaminophen   Oral Liquid .. 975 milliGRAM(s) Oral every 6 hours  chlorhexidine 2% Cloths 1 Application(s) Topical daily  CRRT Treatment    <Continuous>  dextrose 50% Injectable 25 Gram(s) IV Push once  dextrose 50% Injectable 12.5 Gram(s) IV Push once  gabapentin Solution 100 milliGRAM(s) Oral three times a day  heparin   Injectable 5000 Unit(s) SubCutaneous every 8 hours  insulin glargine Injectable (LANTUS) 9 Unit(s) SubCutaneous at bedtime  insulin lispro (ADMELOG) corrective regimen sliding scale   SubCutaneous every 6 hours  insulin lispro Injectable (ADMELOG) 2 Unit(s) SubCutaneous every 6 hours  levothyroxine Injectable 20 MICROGram(s) IV Push at bedtime  meropenem  IVPB 1000 milliGRAM(s) IV Intermittent every 8 hours  midodrine 30 milliGRAM(s) Oral every 8 hours  multivitamin/minerals/iron Oral Solution (CENTRUM) 15 milliLiter(s) Oral daily  pancrelipase  (CREON 12,000 Lipase Units) 1 Capsule(s) Oral <User Schedule>  pantoprazole   Suspension 40 milliGRAM(s) Oral daily  Phoxillum Filtration BK 4 / 2.5 5000 milliLiter(s) (2400 mL/Hr) CRRT <Continuous>  Phoxillum Filtration BK 4 / 2.5 5000 milliLiter(s) (1200 mL/Hr) CRRT <Continuous>  Phoxillum Filtration BK 4 / 2.5 5000 milliLiter(s) (200 mL/Hr) CRRT <Continuous>  psyllium Powder 1 Packet(s) Oral daily  QUEtiapine 25 milliGRAM(s) Oral at bedtime    MEDICATIONS  (PRN):  HYDROmorphone  Injectable 0.5 milliGRAM(s) IV Push every 4 hours PRN breakthrough pain  oxyCODONE    Solution 5 milliGRAM(s) Oral every 4 hours PRN Moderate Pain (4 - 6)  oxyCODONE    Solution 10 milliGRAM(s) Oral every 4 hours PRN Severe Pain (7 - 10)    NEURO  RASS (if intubated): N/A 		CAM ICU (if concern for delirium):  Exam: A&Ox3    RESPIRATORY  Exam: no crackles B/L, POCUS showing b/l effusions. No increased WOB.    RR: 33 (18 Apr 2023 22:00) (23 - 37)  SpO2: 100% (18 Apr 2023 22:00) (95% - 100%)  O2 Parameters below as of 18 Apr 2023 16:00  Patient On (Oxygen Delivery Method): room air        CARDIOVASCULAR  ICU Vital Signs Last 24 Hrs  T(C): 35.9 (18 Apr 2023 20:00), Max: 39.3 (18 Apr 2023 16:00)  T(F): 96.6 (18 Apr 2023 20:00), Max: 102.7 (18 Apr 2023 16:00)  HR: 95 (18 Apr 2023 22:00) (84 - 111)  BP: --  BP(mean): --  ABP: 66/38 (18 Apr 2023 22:00) (66/38 - 103/59)  ABP(mean): 49 (18 Apr 2023 22:00) (49 - 88)    Exam: appears well perfused  Cardiac Rhythm: sinus  Perfusion     [ ]Adequate   [x ]Inadequate  Mentation   [X ]Normal       [ ]Reduced  Extremities  [ X]Warm         [ ]Cool  Volume Status [ ]Hypervolemic [ X]Euvolemic [ ]Hypovolemic  Meds: midodrine 30 milliGRAM(s) Oral every 8 hours, levophed ggt      GI/NUTRITION  Exam: Distended. No abd ttp. ngt w/ tf.  Diet: TFs    GENITOURINARY  I&O's Detail    17 Apr 2023 07:01  -  18 Apr 2023 07:00  --------------------------------------------------------  IN:    Enteral Tube Flush: 285 mL    IV PiggyBack: 100 mL    Peptamen A.F.: 810 mL    PRBCs (Packed Red Blood Cells): 300 mL  Total IN: 1495 mL    OUT:    Indwelling Catheter - Urethral (mL): 15 mL    Other (mL): 1110 mL    Rectal Tube (mL): 1250 mL  Total OUT: 2375 mL    Total NET: -880 mL      18 Apr 2023 07:01  -  19 Apr 2023 01:17  --------------------------------------------------------  IN:    Enteral Tube Flush: 400 mL    IV PiggyBack: 550 mL    Peptamen A.F.: 540 mL  Total IN: 1490 mL    OUT:    Indwelling Catheter - Urethral (mL): 0 mL    Other (mL): 28 mL    Rectal Tube (mL): 800 mL  Total OUT: 828 mL    Total NET: 662 mL    TUBES / LINES / DRAINS  [x] Peripheral IV  [x] Central Venous Line     	[] R	[x] L	[x] IJ	[] Fem	[] SC	Date Placed:  [x] R Shiley   [x] Arterial Line		[] R	[] L	[] Fem	[] Rad	[] Ax	Date Placed:   [x] NGT  [] PICC		[] Midline		[] Mediport  [] Urinary Catheter		Date Placed:   [x] Necessity of urinary, arterial, and venous catheters discussed    LABS:             7.8    10.77 )-----------( 102      ( 18 Apr 2023 23:49 )             24.3     04-18    138  |  104  |  11  ----------------------------<  158<H>  3.7   |  19<L>  |  0.99    Ca    7.9<L>      18 Apr 2023 23:49  Phos  3.2     04-18  Mg     2.40     04-18    TPro  6.7  /  Alb  3.3  /  TBili  4.8<H>  /  DBili  x   /  AST  84<H>  /  ALT  73<H>  /  AlkPhos  206<H>  04-18    PT/INR - ( 18 Apr 2023 23:49 )   PT: 21.4 sec;   INR: 1.83 ratio    PTT - ( 18 Apr 2023 23:49 )  PTT:40.3 sec

## 2023-04-19 NOTE — PROGRESS NOTE ADULT - SUBJECTIVE AND OBJECTIVE BOX
[facility] Division of Kidney Diseases & Hypertension  FOLLOW UP NOTE  [phone]--------------------------------------------------------------------------------    Chief Complaint: Oliguric CHATO, on RRT    24 hour events/subjective: Pt. seen and examined in the SICU today, continues to have low BP. CRRT resumed yesterday, net negative ~300.      PAST HISTORY  --------------------------------------------------------------------------------  No significant changes to PMH, PSH, FHx, SHx, unless otherwise noted    ALLERGIES & MEDICATIONS  --------------------------------------------------------------------------------  Allergies    No Known Allergies    Intolerances      Standing Inpatient Medications  acetaminophen   Oral Liquid .. 975 milliGRAM(s) Oral every 6 hours  chlorhexidine 2% Cloths 1 Application(s) Topical daily  CRRT Treatment    <Continuous>  dextrose 50% Injectable 25 Gram(s) IV Push once  dextrose 50% Injectable 12.5 Gram(s) IV Push once  gabapentin Solution 100 milliGRAM(s) Oral three times a day  heparin   Injectable 5000 Unit(s) SubCutaneous every 8 hours  insulin glargine Injectable (LANTUS) 9 Unit(s) SubCutaneous at bedtime  insulin lispro (ADMELOG) corrective regimen sliding scale   SubCutaneous every 6 hours  insulin lispro Injectable (ADMELOG) 2 Unit(s) SubCutaneous every 6 hours  levothyroxine Injectable 20 MICROGram(s) IV Push at bedtime  midodrine 30 milliGRAM(s) Oral every 8 hours  multivitamin/minerals/iron Oral Solution (CENTRUM) 15 milliLiter(s) Oral daily  norepinephrine Infusion 0.05 MICROgram(s)/kG/Min IV Continuous <Continuous>  pancrelipase  (CREON 12,000 Lipase Units) 1 Capsule(s) Oral <User Schedule>  pantoprazole   Suspension 40 milliGRAM(s) Oral daily  Phoxillum Filtration BK 4 / 2.5 5000 milliLiter(s) CRRT <Continuous>  Phoxillum Filtration BK 4 / 2.5 5000 milliLiter(s) CRRT <Continuous>  Phoxillum Filtration BK 4 / 2.5 5000 milliLiter(s) CRRT <Continuous>  psyllium Powder 1 Packet(s) Oral daily  QUEtiapine 25 milliGRAM(s) Oral at bedtime    PRN Inpatient Medications  HYDROmorphone  Injectable 0.5 milliGRAM(s) IV Push every 4 hours PRN  oxyCODONE    Solution 5 milliGRAM(s) Oral every 4 hours PRN  oxyCODONE    Solution 10 milliGRAM(s) Oral every 4 hours PRN      REVIEW OF SYSTEMS  --------------------------------------------------------------------------------  Unable to obtain ROS    VITALS/PHYSICAL EXAM  --------------------------------------------------------------------------------  T(C): 35.8 (04-19-23 @ 08:00), Max: 39.3 (04-18-23 @ 16:00)  HR: 107 (04-19-23 @ 10:00) (84 - 110)  BP: --  RR: 29 (04-19-23 @ 10:00) (25 - 38)  SpO2: 98% (04-19-23 @ 10:00) (93% - 100%)  Wt(kg): --        04-18-23 @ 07:01  -  04-19-23 @ 07:00  --------------------------------------------------------  IN: 2037.7 mL / OUT: 2324 mL / NET: -286.3 mL    04-19-23 @ 07:01  -  04-19-23 @ 10:45  --------------------------------------------------------  IN: 281.1 mL / OUT: 168 mL / NET: 113.1 mL      Physical Exam:  	Gen: Remains ill appearing; attempts to talk but with soft voice  	HEENT: NGT+, anicteric  	Pulm: Fair entry B/L  	CV: S1S2+  	Abd: Obese, soft; nontender  	Ext: No pitting LE edema B/L  	Neuro: lethargic  	Skin: Warm and dry              Dialysis access: Right IJ non tunneled HD catheter      LABS/STUDIES  --------------------------------------------------------------------------------              8.2    10.09 >-----------<  107      [04-19-23 @ 05:25]              24.7     137  |  103  |  9   ----------------------------<  129      [04-19-23 @ 05:25]  3.7   |  19  |  0.80        Ca     8.0     [04-19-23 @ 05:25]      Mg     2.40     [04-19-23 @ 05:25]      Phos  3.2     [04-19-23 @ 05:25]    TPro  7.0  /  Alb  3.3  /  TBili  5.0  /  DBili  x   /  AST  89  /  ALT  76  /  AlkPhos  224  [04-19-23 @ 05:25]    PT/INR: PT 21.6 , INR 1.85       [04-19-23 @ 05:25]  PTT: 41.6       [04-19-23 @ 05:25]      Creatinine Trend:  SCr 0.80 [04-19 @ 05:25]  SCr 0.99 [04-18 @ 23:49]  SCr 1.55 [04-18 @ 17:00]  SCr 1.05 [04-18 @ 01:50]  SCr 0.62 [04-17 @ 17:35]             [facility] Division of Kidney Diseases & Hypertension  FOLLOW UP NOTE  [phone]--------------------------------------------------------------------------------    Chief Complaint: Oliguric CHATO, on RRT    24 hour events/subjective: Pt. seen and examined in the SICU today, continues to have low BP. CRRT resumed yesterday, net negative ~300.    PAST HISTORY  --------------------------------------------------------------------------------  No significant changes to PMH, PSH, FHx, SHx, unless otherwise noted    ALLERGIES & MEDICATIONS  --------------------------------------------------------------------------------  Allergies    No Known Allergies    Intolerances      Standing Inpatient Medications  acetaminophen   Oral Liquid .. 975 milliGRAM(s) Oral every 6 hours  chlorhexidine 2% Cloths 1 Application(s) Topical daily  CRRT Treatment    <Continuous>  dextrose 50% Injectable 25 Gram(s) IV Push once  dextrose 50% Injectable 12.5 Gram(s) IV Push once  gabapentin Solution 100 milliGRAM(s) Oral three times a day  heparin   Injectable 5000 Unit(s) SubCutaneous every 8 hours  insulin glargine Injectable (LANTUS) 9 Unit(s) SubCutaneous at bedtime  insulin lispro (ADMELOG) corrective regimen sliding scale   SubCutaneous every 6 hours  insulin lispro Injectable (ADMELOG) 2 Unit(s) SubCutaneous every 6 hours  levothyroxine Injectable 20 MICROGram(s) IV Push at bedtime  midodrine 30 milliGRAM(s) Oral every 8 hours  multivitamin/minerals/iron Oral Solution (CENTRUM) 15 milliLiter(s) Oral daily  norepinephrine Infusion 0.05 MICROgram(s)/kG/Min IV Continuous <Continuous>  pancrelipase  (CREON 12,000 Lipase Units) 1 Capsule(s) Oral <User Schedule>  pantoprazole   Suspension 40 milliGRAM(s) Oral daily  Phoxillum Filtration BK 4 / 2.5 5000 milliLiter(s) CRRT <Continuous>  Phoxillum Filtration BK 4 / 2.5 5000 milliLiter(s) CRRT <Continuous>  Phoxillum Filtration BK 4 / 2.5 5000 milliLiter(s) CRRT <Continuous>  psyllium Powder 1 Packet(s) Oral daily  QUEtiapine 25 milliGRAM(s) Oral at bedtime    PRN Inpatient Medications  HYDROmorphone  Injectable 0.5 milliGRAM(s) IV Push every 4 hours PRN  oxyCODONE    Solution 5 milliGRAM(s) Oral every 4 hours PRN  oxyCODONE    Solution 10 milliGRAM(s) Oral every 4 hours PRN      REVIEW OF SYSTEMS  --------------------------------------------------------------------------------  Unable to obtain ROS    VITALS/PHYSICAL EXAM  --------------------------------------------------------------------------------  T(C): 35.8 (04-19-23 @ 08:00), Max: 39.3 (04-18-23 @ 16:00)  HR: 107 (04-19-23 @ 10:00) (84 - 110)  BP: --  RR: 29 (04-19-23 @ 10:00) (25 - 38)  SpO2: 98% (04-19-23 @ 10:00) (93% - 100%)  Wt(kg): --        04-18-23 @ 07:01  -  04-19-23 @ 07:00  --------------------------------------------------------  IN: 2037.7 mL / OUT: 2324 mL / NET: -286.3 mL    04-19-23 @ 07:01  -  04-19-23 @ 10:45  --------------------------------------------------------  IN: 281.1 mL / OUT: 168 mL / NET: 113.1 mL      Physical Exam:  	Gen: Remains ill appearing; attempts to talk but with soft voice  	HEENT: NGT+, anicteric  	Pulm: Fair entry B/L  	CV: S1S2+  	Abd: Obese, soft; nontender  	Ext: No pitting LE edema B/L  	Neuro: lethargic  	Skin: Warm and dry              Dialysis access: Right IJ non tunneled HD catheter      LABS/STUDIES  --------------------------------------------------------------------------------              8.2    10.09 >-----------<  107      [04-19-23 @ 05:25]              24.7     137  |  103  |  9   ----------------------------<  129      [04-19-23 @ 05:25]  3.7   |  19  |  0.80        Ca     8.0     [04-19-23 @ 05:25]      Mg     2.40     [04-19-23 @ 05:25]      Phos  3.2     [04-19-23 @ 05:25]    TPro  7.0  /  Alb  3.3  /  TBili  5.0  /  DBili  x   /  AST  89  /  ALT  76  /  AlkPhos  224  [04-19-23 @ 05:25]    PT/INR: PT 21.6 , INR 1.85       [04-19-23 @ 05:25]  PTT: 41.6       [04-19-23 @ 05:25]      Creatinine Trend:  SCr 0.80 [04-19 @ 05:25]  SCr 0.99 [04-18 @ 23:49]  SCr 1.55 [04-18 @ 17:00]  SCr 1.05 [04-18 @ 01:50]  SCr 0.62 [04-17 @ 17:35]

## 2023-04-19 NOTE — CONSULT NOTE ADULT - ASSESSMENT
49 y/o F with PMHx significant for HTN, hypothyroidism,  30 years ago, breast cancer s/p left mastectomy and chemo in  presenting with epigastric pain and LUQ pain. Found to have necrotizing pancreatitis. Transferred from OSH ([place]) for hypotension and tachypnea requiring emergent intubation and multiple pressors. SICU consulted for hemodynamic monitoring and respiratory management. ENT called for further evaluation of dysphagia and hoarseness. Scope exam is very limited in evaluate the vocal cord due to poor participation. Bilateral vocal cord mobile, but with 2mm glottic gap, hard to tell due to poor patient participation vs glottic gap from long term intubation. The cause of hoarseness most likely due to glottic gap. Patient also has very dry mucosa in the nose and oropharynx

## 2023-04-19 NOTE — CONSULT NOTE ADULT - PROBLEM SELECTOR RECOMMENDATION 9
- Limited scope exam due to poor patient participation at this time, hard to determine the cause of glottic gap   - Outpatient stroboscopy is recommended to further evaluate  - No acute ENT intervention needed at this time  - Diet plan per SLP and primary team  - Please call [phone] for outpatient appointment within 1-2 weeks of discharge  - Case discussed with Dr. Agustin
Pt with oliguric CHATO in the setting of necrotizing pancreatitis leading to shock, requiring vasopressors support. Scr was 1.24 on 3/23/23 and increased to 4.27 on 3/24/23. This is likely ATN. UA as above. Check spot urine TP/CR. Babin catheter in place. Initiated on IV bicarbonate at [facility]. Discussed with her sister Flor Ramirez that the patient will require RRT/HD, risks and benefits associated with RRT/HD explained at length. HD consent obtained and kept in patients chart. Plan to initiate CRRT with net even balance, can adjust accordingly per SICU management needs as well. Monitor labs and urine output. Avoid NSAIDs, ACEI/ARBS, RCA and nephrotoxins. Dose medications as per eGFR.    If she does not improve, then consider sending serological work-up for secondary causes of renal failure including JT, P-ANCA, C-ANCA, Anti-GBM ab, HBsAg, Hep C antibody, HIV, Parvovirus, C3, C4, serum and urine immunofixation. However, as noted above, this is more likely to be ATN in setting of severe pancreatitis.

## 2023-04-19 NOTE — PROGRESS NOTE ADULT - ATTENDING COMMENTS
I agree with the history, physical, and plan, which I have reviewed and edited where appropriate.  I agree with notes/assessment of health care providers on my service.  I have personally examined the patient.  I was physically present for the key portions of the evaluation and management (E/M) service provided.  I reviewed data and laboratory tests/x-rays and all pertinent electronic images.  The patient is a critical care patient with life threatening hemodynamic and metabolic instability in SICU.  Risk benefit analyses discussed.    The patient is in SICU with diagnosis mentioned in the note.    The plan is specified below.       Patient is a 48 year old female with PMHx significant for HTN, hypothyroidism,  30 years ago, breast cancer s/p left mastectomy and chemo in  presenting with epigastric pain and LUQ pain. Found to have necrotizing pancreatitis. Transferred from [facility] ([place] for hypotension and tachypnea requiring emergent intubation and multiple pressors. SICU consulted for hemodynamic monitoring and respiratory management. Pt now extubated with CHATO requiring CVVHD.     PLAN  Neuro: delirium   - protected sleep  - Seroquel 25mg Qhs  - tylenol, gabapentin and Dilaudid prn    Resp: small L layering effusion  - 2L NC    CV:  Mixed shock state 2/2 septic shock w/ E. Coli in urine vs hypovolemic shock now s/p 11L resus    - low dose levo   - Midodrine 30 q 8 hours     GI: Pancreatitis  - post pyloric tube, elemental tube feeds   - GI ppx w/ protonix   - Creon  - metamucil   - multivitamin    Renal: ARF 2/2 shock state resulting in ATN requiring CRRT    - CRRT, net even  - IV lock   -d/c viera    Heme: Splenic vein thrombosis  - SCDs and SQH    ID: Intermittently febrile   - d/c meropenem ( )    Endo:   - Lantus 9 units, admelog 2 units q6  - c/w synthroid 20 IV (home med)   - ISS. I agree with the history, physical, and plan, which I have reviewed and edited where appropriate.  I agree with notes/assessment of health care providers on my service.  I have personally examined the patient.  I was physically present for the key portions of the evaluation and management (E/M) service provided.  I reviewed data and laboratory tests/x-rays and all pertinent electronic images.  The patient is a critical care patient with life threatening hemodynamic and metabolic instability in SICU.  Risk benefit analyses discussed.    The patient is in SICU with diagnosis mentioned in the note.    The plan is specified below.       Patient is a 48 year old female with PMHx significant for HTN, hypothyroidism,  30 years ago, breast cancer s/p left mastectomy and chemo in  presenting with epigastric pain and LUQ pain. Found to have necrotizing pancreatitis. Transferred from [facility] ([place]) for hypotension and tachypnea requiring emergent intubation and multiple pressors. SICU consulted for hemodynamic monitoring and respiratory management. Pt now extubated with CHATO requiring CVVHD.     PLAN  Neuro: delirium   - protected sleep  - Seroquel 25mg Qhs  - tylenol, gabapentin and Dilaudid prn    Resp: small L layering effusion  - 2L NC    CV:  Mixed shock state 2/2 septic shock w/ E. Coli in urine vs hypovolemic shock now s/p 11L resus    - low dose levo   - Midodrine 30 q 8 hours     GI: Pancreatitis  - post pyloric tube, elemental tube feeds   - GI ppx w/ protonix   - Creon  - metamucil   - multivitamin  - Speech and swallow, ? Oral diet     Renal: ARF 2/2 shock state resulting in ATN requiring CRRT    - CRRT, net even  - IV lock   -d/c viera    Heme: Splenic vein thrombosis  - SCDs and SQH    ID: Intermittently febrile   - d/c meropenem (-  )    Endo:   - Lantus 9 units, admelog 2 units q6  - c/w synthroid 20 IV (home med)   - ISS.

## 2023-04-19 NOTE — CONSULT NOTE ADULT - PROBLEM SELECTOR RECOMMENDATION 2
Pt with metabolic acidosis in setting of acute renal failure. SCO2 of 8. Initiate CRRT as above. Continue to monitor SCO2.
- Humidified face tent  - nasal saline spray 1-2 spray in each nose every 4 hours

## 2023-04-19 NOTE — SWALLOW BEDSIDE ASSESSMENT ADULT - COMMENTS
SICU note  "48 year old female with PMHx significant for HTN, hypothyroidism,  30 years ago, breast cancer s/p left mastectomy and chemo in  presenting with epigastric pain and LUQ pain. Found to have necrotizing pancreatitis. Transferred from OS ([place]) for hypotension and tachypnea requiring emergent intubation and multiple pressors. SICU consulted for hemodynamic monitoring and respiratory management."    CXR  "Increasing airspace opacity in the left lung with associated layering effusion. Small layering right effusion also present. No pneumothorax."    Patient seen at bedside in the SICU this afternoon for an initial assessment of the swallow function. Patient receiving supplemental O2 via nasal cannula and with NG tube in place upon arrival. patient also on continuous dialysis. Received clearance for PO trials from ICU RN, who was present at bedside. Patient reporting/presenting with a breathy/aphonic vocal quality. Patient further reporting severe odynophagia (patient did not state pain level scale), SICU team made aware. Patient further reporting severe pain in left leg (RN aware). Patient accepted limited PO trials exacerbated by odynophagia state

## 2023-04-19 NOTE — PROGRESS NOTE ADULT - ATTENDING COMMENTS
seen and evaluated.  discussed with SICU.  CHATO complicated by oliguria and volume overload.  BP low but tolerating 25 to 50 cc / hr of net negative balance at time of our examination this morning.  recommend continue crrt for now.

## 2023-04-19 NOTE — SWALLOW BEDSIDE ASSESSMENT ADULT - ADDITIONAL RECOMMENDATIONS
1. This service to follow up as schedule permits. 2. Medical team advised to reconsult when patient is medically optimized to reassess for candidacy of oral diet.

## 2023-04-19 NOTE — SWALLOW BEDSIDE ASSESSMENT ADULT - ASR SWALLOW REFERRAL
Consider ENT consult at MD's discretion given patient reporting severe odynophagia and given patient presenting with aphonic/breathy vocal quality.

## 2023-04-19 NOTE — PROGRESS NOTE ADULT - PROBLEM SELECTOR PLAN 1
Pt. with oliguric CHATO in the setting of necrotizing pancreatitis and shock. Pt. with most likely ATN. Scr was 1.24 on 3/23/23 and increased to 4.60 on 3/24/23. Pt. was initiated on CRRT/CVVHDF on 3/24/23 and discontinued overnight on 4/6/23 by SICU team. Pt. remained oliguric with hypotension so restarted on CVVHDH on 4/7/23. CRRT held on 4/17/23 but restarted on 4/18 as patient remained hypotensive and oliguric. Vitals and labs reviewed. Pt. off IV pressors but with low BPs. Remains on Midodrine 30 TID. Continue with CRRT. Monitor labs and urine output. Avoid any potential nephrotoxins. Dose medications as per eGFR.    If you have any questions, please feel free to contact me  Neville Schulz  Nephrology Fellow  [phone]; Prefer [employer] TEAMS  (After 5pm or on weekends please page the on-call fellow).

## 2023-04-19 NOTE — PROGRESS NOTE ADULT - ASSESSMENT
48 year old female with a PMHx of HTN, hypothyroidism,  (30 years ago) and breast cancer (S/P left mastectomy and chemo in ) who presented with epigastric pain and left upper quadrant pain.  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  Transferred from OSH ([place]) for hypotension and tachypnea requiring emergent intubation and multiple pressors.  SICU consulted for hemodynamic monitoring and respiratory management.    Plan:   - Continue w/ IV Meropenem  - TF at goal  - did not tolerate HD, continue CVVH  - PT/OT consult  - OOBtochair  - Appreciate care per SICU      B Team Surgery  [id number]     48 year old female with a PMHx of HTN, hypothyroidism,  (30 years ago) and breast cancer (S/P left mastectomy and chemo in ) who presented with epigastric pain and left upper quadrant pain.  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  Transferred from OSH ([place]) for hypotension and tachypnea requiring emergent intubation and multiple pressors.  SICU consulted for hemodynamic monitoring and respiratory management.    Plan:   - Continue w/ IV Meropenem  - TF at goal  - did not tolerate HD, continue CVVH  - PT/OT consult  - OOBtochair  - likely repeat scan next week  - Appreciate care per SICU      B Team Surgery  [id number]

## 2023-04-19 NOTE — PROGRESS NOTE ADULT - SUBJECTIVE AND OBJECTIVE BOX
TEAM [ B ] Surgery Daily Progress Note  =====================================================    SUBJECTIVE: Patient seen and examined at bedside on AM rounds. Tolerating diet, denies nausea, vomiting.  Denies fever, chills.    ALLERGIES:  No Known Allergies      --------------------------------------------------------------------------------------    MEDICATIONS:    Neurologic Medications  acetaminophen   Oral Liquid .. 975 milliGRAM(s) Oral every 6 hours  gabapentin Solution 100 milliGRAM(s) Oral three times a day  HYDROmorphone  Injectable 0.5 milliGRAM(s) IV Push every 4 hours PRN breakthrough pain  oxyCODONE    Solution 5 milliGRAM(s) Oral every 4 hours PRN Moderate Pain (4 - 6)  oxyCODONE    Solution 10 milliGRAM(s) Oral every 4 hours PRN Severe Pain (7 - 10)  QUEtiapine 25 milliGRAM(s) Oral at bedtime    Respiratory Medications    Cardiovascular Medications  midodrine 30 milliGRAM(s) Oral every 8 hours  norepinephrine Infusion 0.05 MICROgram(s)/kG/Min IV Continuous <Continuous>    Gastrointestinal Medications  multivitamin/minerals/iron Oral Solution (CENTRUM) 15 milliLiter(s) Oral daily  pancrelipase  (CREON 12,000 Lipase Units) 1 Capsule(s) Oral <User Schedule>  pantoprazole   Suspension 40 milliGRAM(s) Oral daily  psyllium Powder 1 Packet(s) Oral daily    Genitourinary Medications    Hematologic/Oncologic Medications  heparin   Injectable 5000 Unit(s) SubCutaneous every 8 hours    Antimicrobial/Immunologic Medications  meropenem  IVPB 1000 milliGRAM(s) IV Intermittent every 8 hours    Endocrine/Metabolic Medications  dextrose 50% Injectable 25 Gram(s) IV Push once  dextrose 50% Injectable 12.5 Gram(s) IV Push once  insulin glargine Injectable (LANTUS) 9 Unit(s) SubCutaneous at bedtime  insulin lispro (ADMELOG) corrective regimen sliding scale   SubCutaneous every 6 hours  insulin lispro Injectable (ADMELOG) 2 Unit(s) SubCutaneous every 6 hours  levothyroxine Injectable 20 MICROGram(s) IV Push at bedtime    Topical/Other Medications  chlorhexidine 2% Cloths 1 Application(s) Topical daily  CRRT Treatment    <Continuous>  Phoxillum Filtration BK 4 / 2.5 5000 milliLiter(s) CRRT <Continuous>  Phoxillum Filtration BK 4 / 2.5 5000 milliLiter(s) CRRT <Continuous>  Phoxillum Filtration BK 4 / 2.5 5000 milliLiter(s) CRRT <Continuous>    --------------------------------------------------------------------------------------    VITAL SIGNS:  T(C): 36.2 (04-19-23 @ 04:00), Max: 39.3 (04-18-23 @ 16:00)  HR: 102 (04-19-23 @ 06:00) (84 - 110)  BP: --  RR: 30 (04-19-23 @ 06:00) (23 - 38)  SpO2: 99% (04-19-23 @ 06:00) (93% - 100%)  --------------------------------------------------------------------------------------    EXAM    General: NAD, resting in bed comfortably.  Cardiac: regular rate, warm and well perfused  Respiratory: Nonlabored respirations, normal cw expansion.  Abdomen: soft, nontender, distended, ko feeding tube in place  Extremities: normal strength, FROM, no deformities    --------------------------------------------------------------------------------------      INS AND OUTS:    04-17-23 @ 07:01  -  04-18-23 @ 07:00  --------------------------------------------------------  IN: 1495 mL / OUT: 2375 mL / NET: -880 mL    04-18-23 @ 07:01  -  04-19-23 @ 06:35  --------------------------------------------------------  IN: 1991.6 mL / OUT: 1033 mL / NET: 958.6 mL      -------------------------------------------------------------------------------------- TEAM [ B ] Surgery Daily Progress Note  =====================================================    OVERNIGHT:   - 19th: POCUS done, IVC 1.4 w/ mod resp variability. B/l effusions noted, loculated fluid collections under diaphragm    - 2x 250cc albumin for hypotension, MAPs in 50s  - Minimal levo ggt restarted w/ adequate response    SUBJECTIVE: Patient seen and examined at bedside on AM rounds. Tolerating TF. Reports feeling better today, no pain.    ALLERGIES:  No Known Allergies      --------------------------------------------------------------------------------------    MEDICATIONS:    Neurologic Medications  acetaminophen   Oral Liquid .. 975 milliGRAM(s) Oral every 6 hours  gabapentin Solution 100 milliGRAM(s) Oral three times a day  HYDROmorphone  Injectable 0.5 milliGRAM(s) IV Push every 4 hours PRN breakthrough pain  oxyCODONE    Solution 5 milliGRAM(s) Oral every 4 hours PRN Moderate Pain (4 - 6)  oxyCODONE    Solution 10 milliGRAM(s) Oral every 4 hours PRN Severe Pain (7 - 10)  QUEtiapine 25 milliGRAM(s) Oral at bedtime    Respiratory Medications    Cardiovascular Medications  midodrine 30 milliGRAM(s) Oral every 8 hours  norepinephrine Infusion 0.05 MICROgram(s)/kG/Min IV Continuous <Continuous>    Gastrointestinal Medications  multivitamin/minerals/iron Oral Solution (CENTRUM) 15 milliLiter(s) Oral daily  pancrelipase  (CREON 12,000 Lipase Units) 1 Capsule(s) Oral <User Schedule>  pantoprazole   Suspension 40 milliGRAM(s) Oral daily  psyllium Powder 1 Packet(s) Oral daily    Genitourinary Medications    Hematologic/Oncologic Medications  heparin   Injectable 5000 Unit(s) SubCutaneous every 8 hours    Antimicrobial/Immunologic Medications  meropenem  IVPB 1000 milliGRAM(s) IV Intermittent every 8 hours    Endocrine/Metabolic Medications  dextrose 50% Injectable 25 Gram(s) IV Push once  dextrose 50% Injectable 12.5 Gram(s) IV Push once  insulin glargine Injectable (LANTUS) 9 Unit(s) SubCutaneous at bedtime  insulin lispro (ADMELOG) corrective regimen sliding scale   SubCutaneous every 6 hours  insulin lispro Injectable (ADMELOG) 2 Unit(s) SubCutaneous every 6 hours  levothyroxine Injectable 20 MICROGram(s) IV Push at bedtime    Topical/Other Medications  chlorhexidine 2% Cloths 1 Application(s) Topical daily  CRRT Treatment    <Continuous>  Phoxillum Filtration BK 4 / 2.5 5000 milliLiter(s) CRRT <Continuous>  Phoxillum Filtration BK 4 / 2.5 5000 milliLiter(s) CRRT <Continuous>  Phoxillum Filtration BK 4 / 2.5 5000 milliLiter(s) CRRT <Continuous>    --------------------------------------------------------------------------------------    VITAL SIGNS:  T(C): 36.2 (04-19-23 @ 04:00), Max: 39.3 (04-18-23 @ 16:00)  HR: 102 (04-19-23 @ 06:00) (84 - 110)  BP: --  RR: 30 (04-19-23 @ 06:00) (23 - 38)  SpO2: 99% (04-19-23 @ 06:00) (93% - 100%)  --------------------------------------------------------------------------------------    EXAM    General: NAD, resting in bed comfortably.  Cardiac: regular rate, warm and well perfused  Respiratory: Nonlabored respirations, normal cw expansion.  Abdomen: soft, nontender, less distended, ko feeding tube in place  Extremities: normal strength, FROM, no deformities    --------------------------------------------------------------------------------------      INS AND OUTS:    04-17-23 @ 07:01  -  04-18-23 @ 07:00  --------------------------------------------------------  IN: 1495 mL / OUT: 2375 mL / NET: -880 mL    04-18-23 @ 07:01  -  04-19-23 @ 06:35  --------------------------------------------------------  IN: 1991.6 mL / OUT: 1033 mL / NET: 958.6 mL      --------------------------------------------------------------------------------------

## 2023-04-20 NOTE — CHART NOTE - NSCHARTNOTEFT_GEN_A_CORE
Patient being seen for malnutrition follow up. Spoke with RN and obtained subjective information from extensive chart review.     Diet, NPO with Tube Feed:   Tube Feeding Modality: Nasogastric  Peptamen 1.5 (PEPTAMEN1.5RTH)  Total Volume for 24 Hours (mL): 1080  Continuous  Starting Tube Feed Rate {mL per Hour}: 45  Increase Tube Feed Rate by (mL): 0     Every 4 hours  Until Goal Tube Feed Rate (mL per Hour): 45  Tube Feed Duration (in Hours): 24  Tube Feed Start Time: 16:00  Free Water Flush Instructions:  MILDLY THICKENED APPLE JUICE TO BE GIVEN DAILY  No Carb Prosource (1pkg = 15gms Protein)     Qty per Day:  4 (04-16-23 @ 10:13)    TF Provides:  1620 kcals  73 gms protein (+60 gms Prosource = 133 gms protein)  831 mL free H2O  1080 mL total volume    Current Weight Trend: 100.4 kg (4/20), 98.2 kg (4/19), 107.2 kg (4/17), 105.7 kg (4/16), 106.9kg (4/14), 118 kg (4/10), 120.9 kg (4/8), 121.4 kg (4/6), 124.5 kg (4/4), 146.6 kg (4/2), 132.1 kg (3/31)  Height (cm): 162.6   Admit Weight (kg): 117.9   BMI (kg/m2): 44.6   IBW (kg): 56.8    Nutrition Interval Events: Spoke with RN - pt with tolerance towards TF at ordered rate, however rectal tube output is green/yellow with 350 mL over the past 24 hrs. Nephrology continues to follow for oliguric CHATO requiring CRRT which confers a higher nutrition need. Pt also with a suspected DTI of tip of nose which also elicits higher protein need. Enteral recommendation is giving pt 16 kcals/kg and 1.4 gms protein/kg of lowest recorded wt - 98.2 kg. Weight trend reflective of edema presently 2+ generalized but is concerning as current wt is below admit wt by 17.5 kg with fluid accumulation identified. If TF with improved tolerance, strongly suggest increasing rate of TF to slow the decline of weight loss. Would consider increasing rate to 50 mL/hr x 24 hrs which will offer 1800 kcals, 82 gms protein, 924 mL free H2) in 1200 mL total volume. Prosource x 4/day (60 gms protein) could remain if pt to continue with CRRT. FS over the past 24 hrs 97 - 140 mg/dl with Lantus and Admelog insulins ordered for coverage. RDN services to remain available as needed.     __________________ Pertinent Medications__________________   MEDICATIONS  (STANDING):  acetaminophen   Oral Liquid .. 975 milliGRAM(s) Oral every 6 hours  chlorhexidine 2% Cloths 1 Application(s) Topical daily  CRRT Treatment    <Continuous>  dextrose 50% Injectable 25 Gram(s) IV Push once  dextrose 50% Injectable 12.5 Gram(s) IV Push once  gabapentin Solution 100 milliGRAM(s) Oral three times a day  heparin   Injectable 5000 Unit(s) SubCutaneous every 8 hours  insulin glargine Injectable (LANTUS) 9 Unit(s) SubCutaneous at bedtime  insulin lispro (ADMELOG) corrective regimen sliding scale   SubCutaneous every 6 hours  insulin lispro Injectable (ADMELOG) 2 Unit(s) SubCutaneous every 6 hours  levothyroxine Injectable 20 MICROGram(s) IV Push at bedtime  midodrine 30 milliGRAM(s) Oral every 8 hours  multivitamin/minerals/iron Oral Solution (CENTRUM) 15 milliLiter(s) Oral daily  norepinephrine Infusion 0.05 MICROgram(s)/kG/Min (5.53 mL/Hr) IV Continuous <Continuous>  oxandrolone 5 milliGRAM(s) Oral three times a day  pancrelipase  (CREON 12,000 Lipase Units) 1 Capsule(s) Oral <User Schedule>  pantoprazole   Suspension 40 milliGRAM(s) Oral daily  Phoxillum Filtration BK 4 / 2.5 5000 milliLiter(s) (1000 mL/Hr) CRRT <Continuous>  Phoxillum Filtration BK 4 / 2.5 5000 milliLiter(s) (200 mL/Hr) CRRT <Continuous>  PrismaSATE Dialysate BK 0 / 3.5 5000 milliLiter(s) (1200 mL/Hr) CRRT <Continuous>  psyllium Powder 1 Packet(s) Oral daily  QUEtiapine 25 milliGRAM(s) Oral at bedtime      __________________ Pertinent Labs__________________   04-20 Na 136 mmol/L Glu 148 mg/dL<H> K+ 4.4 mmol/L Cr 0.60 mg/dL BUN 6 mg/dL<L> Phos 3.3 mg/dL  04-20 @ 11:20 POCT 160 mg/dL  04-20 @ 05:13 POCT 117 mg/dL  04-19 @ 22:58 POCT 97 mg/dL  04-19 @ 17:10 POCT 116 mg/dL      Skin: suspected DTI of tip of nose    Estimated Needs:     25-30 kcals/kg =6321-6013 kcals/d (of upper 10% of IBW)  1.8-2.0g protein/kg = 108-120g protein/d (of upper 10% of IBW)        Previous Nutrition Diagnoses:     Severe Malnutrition   Increased Nutrient Needs     Nutrition Diagnoses are [X] ongoing       Goal(s):  1. Patient to meet > 75% estimated energy needs    Recommendations:   1. Continue with nutrition plan of care as ordered.    Monitoring and Evaluation:   1. Monitor weights, labs, BMs, skin integrity, enteral tolerance and edema.  2. RD services to remain available.     Mar Fung, MS, RDN, CDN, CNSC on [facility] TEAMS or Pager #[phone].

## 2023-04-20 NOTE — PROGRESS NOTE ADULT - SUBJECTIVE AND OBJECTIVE BOX
24 HOUR EVENTS:  - CRRT net even  - Meropenem dc'd  - ENT consulted for swallow eval, rec outpt f/u    NEURO  RASS (if intubated): 		[facility] ICU (if concern for delirium):  Exam: A&Ox2-3  Meds: acetaminophen   Oral Liquid .. 975 milliGRAM(s) Oral every 6 hours  gabapentin Solution 100 milliGRAM(s) Oral three times a day  HYDROmorphone  Injectable 0.5 milliGRAM(s) IV Push every 4 hours PRN breakthrough pain  oxyCODONE    Solution 5 milliGRAM(s) Oral every 4 hours PRN Moderate Pain (4 - 6)  oxyCODONE    Solution 10 milliGRAM(s) Oral every 4 hours PRN Severe Pain (7 - 10)  QUEtiapine 25 milliGRAM(s) Oral at bedtime      RESPIRATORY  RR: 25 (04-20-23 @ 00:00) (17 - 34)  SpO2: 99% (04-20-23 @ 00:00) (96% - 100%)  Wt(kg): --  Exam: no increased WOB  Mechanical Ventilation:   ABG - ( 18 Apr 2023 17:00 )  pH: 7.36  /  pCO2: 31    /  pO2: 94    / HCO3: 18    / Base Excess: -7.1  /  SaO2: 98.6    Lactate: x                Meds:     CARDIOVASCULAR  HR: 102 (04-20-23 @ 00:00) (87 - 116)  BP: --  BP(mean): --  ABP: 99/63 (04-20-23 @ 00:00) (66/41 - 128/74)  ABP(mean): 79 (04-20-23 @ 00:00) (51 - 97)  Wt(kg): --  CVP(cm H2O): --      Exam: appears well perfused  Cardiac Rhythm: sinus  Perfusion     [ ]Adequate   [X ]Inadequate  Mentation   [X ]Normal       [ ]Reduced  Extremities  [ X]Warm         [ ]Cool  Volume Status [ ]Hypervolemic [ X]Euvolemic [ ]Hypovolemic  Meds: midodrine 30 milliGRAM(s) Oral every 8 hours  norepinephrine Infusion 0.05 MICROgram(s)/kG/Min IV Continuous <Continuous>      GI/NUTRITION  Exam: soft nondistended  Diet: TFs  Meds: pancrelipase  (CREON 12,000 Lipase Units) 1 Capsule(s) Oral <User Schedule>  pantoprazole   Suspension 40 milliGRAM(s) Oral daily  psyllium Powder 1 Packet(s) Oral daily      GENITOURINARY  I&O's Detail    04-18 @ 07:01 - 04-19 @ 07:00  --------------------------------------------------------  IN:    Enteral Tube Flush: 400 mL    IV PiggyBack: 550 mL    Norepinephrine: 7.7 mL    Peptamen A.F.: 1080 mL  Total IN: 2037.7 mL    OUT:    Indwelling Catheter - Urethral (mL): 5 mL    Other (mL): 919 mL    Rectal Tube (mL): 1400 mL  Total OUT: 2324 mL    Total NET: -286.3 mL      04-19 @ 07:01 - 04-20 @ 00:21  --------------------------------------------------------  IN:    Enteral Tube Flush: 400 mL    Norepinephrine: 19.8 mL    Peptamen A.F.: 765 mL  Total IN: 1184.8 mL    OUT:    Indwelling Catheter - Urethral (mL): 0 mL    Other (mL): 1264 mL    Rectal Tube (mL): 300 mL  Total OUT: 1564 mL    Total NET: -379.2 mL          04-19    137  |  104  |  7   ----------------------------<  121<H>  3.9   |  21<L>  |  0.71    Ca    8.6      19 Apr 2023 11:39  Phos  2.9     04-19  Mg     2.50     04-19    TPro  7.4  /  Alb  3.5  /  TBili  5.6<H>  /  DBili  x   /  AST  91<H>  /  ALT  76<H>  /  AlkPhos  262<H>  04-19    Meds: multivitamin/minerals/iron Oral Solution (CENTRUM) 15 milliLiter(s) Oral daily      HEMATOLOGIC  Meds: heparin   Injectable 5000 Unit(s) SubCutaneous every 8 hours                          8.2    10.09 )-----------( 107      ( 19 Apr 2023 05:25 )             24.7     PT/INR - ( 19 Apr 2023 05:25 )   PT: 21.6 sec;   INR: 1.85 ratio         PTT - ( 19 Apr 2023 05:25 )  PTT:41.6 sec    INFECTIOUS DISEASES  T(C): 34.8 (04-19-23 @ 20:00), Max: 37.8 (04-19-23 @ 16:00)  Wt(kg): --  WBC Count: 10.09 K/uL (04-19 @ 05:25)    Recent Cultures:    Meds:     ENDOCRINE  Capillary Blood Glucose    Meds: dextrose 50% Injectable 25 Gram(s) IV Push once  dextrose 50% Injectable 12.5 Gram(s) IV Push once  insulin glargine Injectable (LANTUS) 9 Unit(s) SubCutaneous at bedtime  insulin lispro (ADMELOG) corrective regimen sliding scale   SubCutaneous every 6 hours  insulin lispro Injectable (ADMELOG) 2 Unit(s) SubCutaneous every 6 hours  levothyroxine Injectable 20 MICROGram(s) IV Push at bedtime    TUBES / LINES / DRAINS  [x] Peripheral IV  [x] Central Venous Line     	[] R	[x] L	[x] IJ	[] Fem	[] SC	Date Placed:  [x] R Shiley   [x] Arterial Line		[] R	[] L	[] Fem	[] Rad	[] Ax	Date Placed:   [x] NGT  [] PICC		[] Midline		[] Mediport  [] Urinary Catheter		Date Placed:   [x] Necessity of urinary, arterial, and venous catheters discussed    OTHER MEDICATIONS:  chlorhexidine 2% Cloths 1 Application(s) Topical daily  CRRT Treatment    <Continuous>  fluticasone propionate 50 MICROgram(s)/spray Nasal Spray 1 Spray(s) Both Nostrils two times a day  Phoxillum Filtration BK 4 / 2.5 5000 milliLiter(s) CRRT <Continuous>  Phoxillum Filtration BK 4 / 2.5 5000 milliLiter(s) CRRT <Continuous>  Phoxillum Filtration BK 4 / 2.5 5000 milliLiter(s) CRRT <Continuous>      IMAGING: 24 HOUR EVENTS:  - CRRT net even  - Meropenem dc'd  - ENT consulted for swallow eval, rec outpt f/u, Continue NGT TF, defer PO diet  - 250cc albumin x2 given for hypotension  - POCUS performed, poor cardiac windows but IVC 1.4 without resp variation and significant pleural effusions and intraabdominal fluid seen  - Restarted levo      NEURO  RASS (if intubated): 		[facility] ICU (if concern for delirium):  Exam: A&Ox2-3  Meds: acetaminophen   Oral Liquid .. 975 milliGRAM(s) Oral every 6 hours  gabapentin Solution 100 milliGRAM(s) Oral three times a day  HYDROmorphone  Injectable 0.5 milliGRAM(s) IV Push every 4 hours PRN breakthrough pain  oxyCODONE    Solution 5 milliGRAM(s) Oral every 4 hours PRN Moderate Pain (4 - 6)  oxyCODONE    Solution 10 milliGRAM(s) Oral every 4 hours PRN Severe Pain (7 - 10)  QUEtiapine 25 milliGRAM(s) Oral at bedtime      RESPIRATORY  RR: 25 (04-20-23 @ 00:00) (17 - 34)  SpO2: 99% (04-20-23 @ 00:00) (96% - 100%)  Wt(kg): --  Exam: no increased WOB  Mechanical Ventilation:   ABG - ( 18 Apr 2023 17:00 )  pH: 7.36  /  pCO2: 31    /  pO2: 94    / HCO3: 18    / Base Excess: -7.1  /  SaO2: 98.6    Lactate: x                Meds:     CARDIOVASCULAR  HR: 102 (04-20-23 @ 00:00) (87 - 116)  BP: --  BP(mean): --  ABP: 99/63 (04-20-23 @ 00:00) (66/41 - 128/74)  ABP(mean): 79 (04-20-23 @ 00:00) (51 - 97)  Wt(kg): --  CVP(cm H2O): --      Exam: appears well perfused  Cardiac Rhythm: sinus  Perfusion     [ ]Adequate   [X ]Inadequate  Mentation   [X ]Normal       [ ]Reduced  Extremities  [ X]Warm         [ ]Cool  Volume Status [ ]Hypervolemic [ X]Euvolemic [ ]Hypovolemic  Meds: midodrine 30 milliGRAM(s) Oral every 8 hours  norepinephrine Infusion 0.05 MICROgram(s)/kG/Min IV Continuous <Continuous>      GI/NUTRITION  Exam: soft nondistended  Diet: TFs  Meds: pancrelipase  (CREON 12,000 Lipase Units) 1 Capsule(s) Oral <User Schedule>  pantoprazole   Suspension 40 milliGRAM(s) Oral daily  psyllium Powder 1 Packet(s) Oral daily      GENITOURINARY  I&O's Detail    04-18 @ 07:01 - 04-19 @ 07:00  --------------------------------------------------------  IN:    Enteral Tube Flush: 400 mL    IV PiggyBack: 550 mL    Norepinephrine: 7.7 mL    Peptamen A.F.: 1080 mL  Total IN: 2037.7 mL    OUT:    Indwelling Catheter - Urethral (mL): 5 mL    Other (mL): 919 mL    Rectal Tube (mL): 1400 mL  Total OUT: 2324 mL    Total NET: -286.3 mL      04-19 @ 07:01 - 04-20 @ 00:21  --------------------------------------------------------  IN:    Enteral Tube Flush: 400 mL    Norepinephrine: 19.8 mL    Peptamen A.F.: 765 mL  Total IN: 1184.8 mL    OUT:    Indwelling Catheter - Urethral (mL): 0 mL    Other (mL): 1264 mL    Rectal Tube (mL): 300 mL  Total OUT: 1564 mL    Total NET: -379.2 mL          04-19    137  |  104  |  7   ----------------------------<  121<H>  3.9   |  21<L>  |  0.71    Ca    8.6      19 Apr 2023 11:39  Phos  2.9     04-19  Mg     2.50     04-19    TPro  7.4  /  Alb  3.5  /  TBili  5.6<H>  /  DBili  x   /  AST  91<H>  /  ALT  76<H>  /  AlkPhos  262<H>  04-19    Meds: multivitamin/minerals/iron Oral Solution (CENTRUM) 15 milliLiter(s) Oral daily      HEMATOLOGIC  Meds: heparin   Injectable 5000 Unit(s) SubCutaneous every 8 hours                          8.2    10.09 )-----------( 107      ( 19 Apr 2023 05:25 )             24.7     PT/INR - ( 19 Apr 2023 05:25 )   PT: 21.6 sec;   INR: 1.85 ratio         PTT - ( 19 Apr 2023 05:25 )  PTT:41.6 sec    INFECTIOUS DISEASES  T(C): 34.8 (04-19-23 @ 20:00), Max: 37.8 (04-19-23 @ 16:00)  Wt(kg): --  WBC Count: 10.09 K/uL (04-19 @ 05:25)    Recent Cultures:    Meds:     ENDOCRINE  Capillary Blood Glucose    Meds: dextrose 50% Injectable 25 Gram(s) IV Push once  dextrose 50% Injectable 12.5 Gram(s) IV Push once  insulin glargine Injectable (LANTUS) 9 Unit(s) SubCutaneous at bedtime  insulin lispro (ADMELOG) corrective regimen sliding scale   SubCutaneous every 6 hours  insulin lispro Injectable (ADMELOG) 2 Unit(s) SubCutaneous every 6 hours  levothyroxine Injectable 20 MICROGram(s) IV Push at bedtime    TUBES / LINES / DRAINS  [x] Peripheral IV  [x] Central Venous Line     	[] R	[x] L	[x] IJ	[] Fem	[] SC	Date Placed:  [x] R Shiley   [x] Arterial Line		[] R	[] L	[] Fem	[] Rad	[] Ax	Date Placed:   [x] NGT  [] PICC		[] Midline		[] Mediport  [] Urinary Catheter		Date Placed:   [x] Necessity of urinary, arterial, and venous catheters discussed    OTHER MEDICATIONS:  chlorhexidine 2% Cloths 1 Application(s) Topical daily  CRRT Treatment    <Continuous>  fluticasone propionate 50 MICROgram(s)/spray Nasal Spray 1 Spray(s) Both Nostrils two times a day  Phoxillum Filtration BK 4 / 2.5 5000 milliLiter(s) CRRT <Continuous>  Phoxillum Filtration BK 4 / 2.5 5000 milliLiter(s) CRRT <Continuous>  Phoxillum Filtration BK 4 / 2.5 5000 milliLiter(s) CRRT <Continuous>      IMAGING:

## 2023-04-20 NOTE — PROGRESS NOTE ADULT - PROBLEM SELECTOR PLAN 1
Pt. with oliguric CHATO in the setting of necrotizing pancreatitis and shock. Pt. with most likely ATN. Scr was 1.24 on 3/23/23 and increased to 4.60 on 3/24/23. Pt. was initiated on CRRT/CVVHDF on 3/24/23 and discontinued overnight on 4/6/23 by SICU team. Pt. remained oliguric with hypotension so restarted on CVVHDH on 4/7/23. CRRT held on 4/17/23 but restarted on 4/18 as patient remained hypotensive and oliguric. Vitals and labs reviewed. Pt remains critically ill and continues to require intermittent vasopressors for low BPs. Remains on Midodrine 30 TID. Continue with CRRT. Monitor labs and urine output. Avoid any potential nephrotoxins. Dose medications as per eGFR.    If you have any questions, please feel free to contact me  Neville Schulz  Nephrology Fellow  [phone]; Prefer [employer] TEAMS  (After 5pm or on weekends please page the on-call fellow).

## 2023-04-20 NOTE — PROGRESS NOTE ADULT - ASSESSMENT
48 year old female with a PMHx of HTN, hypothyroidism,  (30 years ago) and breast cancer (S/P left mastectomy and chemo in ) who presented with epigastric pain and left upper quadrant pain.  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  Transferred from OSH ([facility]) for hypotension and tachypnea requiring emergent intubation and multiple pressors.  SICU consulted for hemodynamic monitoring and respiratory management.    Plan:   - TF at goal  - did not tolerate HD, continue CRRT  - PT/OT consult  - OOBtochair  - likely repeat scan next week  - Appreciate care per SICU      B Team Surgery  [id number]

## 2023-04-20 NOTE — PROGRESS NOTE ADULT - ASSESSMENT
48 year old female with PMHx significant for HTN, hypothyroidism,  30 years ago, breast cancer s/p left mastectomy and chemo in  presenting with epigastric pain and LUQ pain. Found to have necrotizing pancreatitis. Transferred from OSH ([place]) for hypotension and tachypnea requiring emergent intubation and multiple pressors. SICU consulted for hemodynamic monitoring and respiratory management.    PLAN  Neuro:  - Precautions for delirium - protected sleep  - Seroquel 25mg Qhs  - tylenol, gabapentin and Dilaudid prn    Resp:  - AHRF w/ b/l plef 2/2 necrotizing pancreatitis  - CXR : small L layering effusion    CV:  Mixed shock state 2/2 septic shock w/ E. Coli in urine vs hypovolemic shock now s/p 11L resus    - Back on minimal pressor, levo, for MAP in low 50s. weaned off.   - Midodrine 30 q 8 hours    GI:   - TF via post pyloric tube at goal, switch to elemental feeds 2/2 diarrhea  - GI ppx w/ protonix   - Creon  - No GI intervention at this time; no walled off collection on CT    Renal:  ARF 2/2 shock state resulting in ATN requiring CRRT    - C/w crrt, keep net even    Heme:   - SCDs and SQH, no longer on fondaparinux  - HIT Ab positive; DOUGLAS Negative    #Splenic Vein Thrombosis  3/22 CT Abd w/ contrast: Focal filling defect in the splenic vein at the level of the body of the pancreas for which an underlying thrombus is suspected     ID:   Intermittently febrile w/ leukocytosis c/f septic shock 2/2 E coli UTI  Diagnostics:  - 3/22 Ucx: E coli  - 3/30 Bcx: staph hominis in aerobic bottle x 1 (likely contaminant)   - S/p Meropenem (3/26-; -)  - Procal elevated 17.2, continue to trend    Endo:   - Lantus 9 units, admelog 2 units q6  - c/w synthroid 20 IV (home med)   - ISS    Code Status: Full code  Lines: KELLY Mendoza CVC, A line    Disposition: SICU   48 year old female with PMHx significant for HTN, hypothyroidism,  30 years ago, breast cancer s/p left mastectomy and chemo in  presenting with epigastric pain and LUQ pain. Found to have necrotizing pancreatitis. Transferred from OSH ([place]) for hypotension and tachypnea requiring emergent intubation and multiple pressors. SICU consulted for hemodynamic monitoring and respiratory management.    PLAN  Neuro:  - Precautions for delirium - protected sleep  - Seroquel 25mg Qhs  - tylenol, gabapentin and Dilaudid prn    Resp:  - Tachypneic  - AHRF w/ b/l plef 2/2 necrotizing pancreatitis  - On RA  - CXR : small L layering effusion  - POCUS, night of : B/l sm effusions noted.     CV:  Mixed shock state 2/2 septic shock w/ E. Coli in urine vs hypovolemic shock now s/p 11L resus    - Back on minimal pressor, levo, for MAP in low 50s   - Midodrine 30 q 8 hours    GI:   - TF via post pyloric tube at goal, switch to elemental feeds 2/2 diarrhea  - GI ppx w/ protonix   - Creon  - No GI intervention at this time; no walled off collection on CT    Renal:  - ARF 2/2 shock state resulting in ATN requiring CRRT    - nephro recs: continue CRRT    Heme:   - SCDs and SQH, no longer on fondaparinux  - HIT Ab positive; DOUGLAS Negative    #Splenic Vein Thrombosis  3/22 CT Abd w/ contrast: Focal filling defect in the splenic vein at the level of the body of the pancreas for which an underlying thrombus is suspected     ID:   Intermittently febrile w/ leukocytosis c/f septic shock 2/2 E coli UTI  Diagnostics:  - 3/22 Ucx: E coli  - 3/30 Bcx: staph hominis in aerobic bottle x 1 (likely contaminant)   - S/p Meropenem (3/26-, -)    Endo:   - Lantus 9 units, admelog 2 units q6  - c/w synthroid 20 IV (home med)   - ISS    Code Status: Full code  Lines: KELLY Mendoza CVC, A line    Disposition: SICU 48 year old female with PMHx significant for HTN, hypothyroidism,  30 years ago, breast cancer s/p left mastectomy and chemo in  presenting with epigastric pain and LUQ pain. Found to have necrotizing pancreatitis. Transferred from OSH ([place]) for hypotension and tachypnea requiring emergent intubation and multiple pressors. SICU consulted for hemodynamic monitoring and respiratory management.    PLAN  Neuro:  - Precautions for delirium - protected sleep  - Seroquel 25mg Qhs  - tylenol, gabapentin and Dilaudid prn    Resp:  - Tachypneic  - AHRF w/ b/l plef 2/2 necrotizing pancreatitis  - On RA  - CXR : small L layering effusion  - POCUS, night of : B/l sm effusions noted.     CV:  Mixed shock state 2/2 septic shock w/ E. Coli in urine vs hypovolemic shock now s/p 11L resus    - Back on minimal pressor, levo, for MAP in low 50s   - Midodrine 30 q 8 hours    GI:   - TF via post pyloric tube at goal, switch to peptamen feeds 2/2 diarrhea  - GI ppx w/ protonix   - Creon  - No GI intervention at this time; no walled off collection on CT    Renal:  - ARF 2/2 shock state resulting in ATN requiring CRRT    - nephro recs: continue CRRT    Heme:   - SCDs and SQH, no longer on fondaparinux  - HIT Ab positive; DOUGLAS Negative    #Splenic Vein Thrombosis  3/22 CT Abd w/ contrast: Focal filling defect in the splenic vein at the level of the body of the pancreas for which an underlying thrombus is suspected     ID:   Intermittently febrile w/ leukocytosis c/f septic shock 2/2 E coli UTI  Diagnostics:  - 3/22 Ucx: E coli  - 3/30 Bcx: staph hominis in aerobic bottle x 1 (likely contaminant)   - S/p Meropenem (3/26-, -)    Endo:   - Lantus 9 units, admelog 2 units q6  - c/w synthroid 20 IV (home med)   - ISS    Code Status: Full code  Lines: KELLY Mendoza CVC, A line    Disposition: SICU

## 2023-04-20 NOTE — PROGRESS NOTE ADULT - SUBJECTIVE AND OBJECTIVE BOX
[facility] Division of Kidney Diseases & Hypertension  FOLLOW UP NOTE  [phone]--------------------------------------------------------------------------------    Chief Complaint: Oliguric CHATO, on RRT    24 hour events/subjective: Pt. seen and examined in the SICU today, low BP requiring vasopressor support. Remains on CRRT, net even. Oliguric. Poor mental status today, unable to obtain ROS.     PAST HISTORY  --------------------------------------------------------------------------------  No significant changes to PMH, PSH, FHx, SHx, unless otherwise noted    ALLERGIES & MEDICATIONS  --------------------------------------------------------------------------------  Allergies    No Known Allergies    Intolerances      Standing Inpatient Medications  acetaminophen   Oral Liquid .. 975 milliGRAM(s) Oral every 6 hours  chlorhexidine 2% Cloths 1 Application(s) Topical daily  CRRT Treatment    <Continuous>  dextrose 50% Injectable 25 Gram(s) IV Push once  dextrose 50% Injectable 12.5 Gram(s) IV Push once  gabapentin Solution 100 milliGRAM(s) Oral three times a day  heparin   Injectable 5000 Unit(s) SubCutaneous every 8 hours  insulin glargine Injectable (LANTUS) 9 Unit(s) SubCutaneous at bedtime  insulin lispro (ADMELOG) corrective regimen sliding scale   SubCutaneous every 6 hours  insulin lispro Injectable (ADMELOG) 2 Unit(s) SubCutaneous every 6 hours  levothyroxine Injectable 20 MICROGram(s) IV Push at bedtime  midodrine 30 milliGRAM(s) Oral every 8 hours  multivitamin/minerals/iron Oral Solution (CENTRUM) 15 milliLiter(s) Oral daily  norepinephrine Infusion 0.05 MICROgram(s)/kG/Min IV Continuous <Continuous>  pancrelipase  (CREON 12,000 Lipase Units) 1 Capsule(s) Oral <User Schedule>  pantoprazole   Suspension 40 milliGRAM(s) Oral daily  Phoxillum Filtration BK 4 / 2.5 5000 milliLiter(s) CRRT <Continuous>  Phoxillum Filtration BK 4 / 2.5 5000 milliLiter(s) CRRT <Continuous>  PrismaSATE Dialysate BK 0 / 3.5 5000 milliLiter(s) CRRT <Continuous>  psyllium Powder 1 Packet(s) Oral daily  QUEtiapine 25 milliGRAM(s) Oral at bedtime    PRN Inpatient Medications  HYDROmorphone  Injectable 0.5 milliGRAM(s) IV Push every 4 hours PRN  oxyCODONE    Solution 5 milliGRAM(s) Oral every 4 hours PRN  oxyCODONE    Solution 10 milliGRAM(s) Oral every 4 hours PRN      REVIEW OF SYSTEMS  --------------------------------------------------------------------------------  Unable to be obtained.     VITALS/PHYSICAL EXAM  --------------------------------------------------------------------------------  T(C): 37.6 (04-20-23 @ 08:00), Max: 37.8 (04-19-23 @ 16:00)  HR: 107 (04-20-23 @ 08:00) (87 - 116)  BP: --  RR: 23 (04-20-23 @ 08:00) (17 - 34)  SpO2: 98% (04-20-23 @ 08:00) (96% - 100%)  Wt(kg): --        04-19-23 @ 07:01  -  04-20-23 @ 07:00  --------------------------------------------------------  IN: 1516.3 mL / OUT: 1959 mL / NET: -442.7 mL    04-20-23 @ 07:01  -  04-20-23 @ 10:14  --------------------------------------------------------  IN: 94.4 mL / OUT: 46 mL / NET: 48.4 mL      Physical Exam:  	Gen: lethargic, unable to coverse  	HEENT: NGT+, anicteric  	Pulm: Fair entry B/L  	CV: S1S2+  	Abd: Obese, soft; nontender  	Ext: No pitting LE edema B/L  	Neuro: lethargic  	Skin: Warm and dry              Dialysis access: Right IJ non tunneled HD catheter connected to CRRT machine      LABS/STUDIES  --------------------------------------------------------------------------------              8.7    12.74 >-----------<  108      [04-20-23 @ 00:30]              27.3     136  |  103  |  6   ----------------------------<  148      [04-20-23 @ 00:30]  4.4   |  18  |  0.60        Ca     8.1     [04-20-23 @ 00:30]      Mg     2.50     [04-20-23 @ 00:30]      Phos  3.3     [04-20-23 @ 00:30]    TPro  7.2  /  Alb  3.2  /  TBili  5.7  /  DBili  x   /  AST  94  /  ALT  73  /  AlkPhos  311  [04-20-23 @ 00:30]    PT/INR: PT 20.0 , INR 1.71       [04-20-23 @ 00:30]  PTT: 50.8       [04-20-23 @ 00:30]      Creatinine Trend:  SCr 0.60 [04-20 @ 00:30]  SCr 0.71 [04-19 @ 11:39]  SCr 0.80 [04-19 @ 05:25]  SCr 0.99 [04-18 @ 23:49]  SCr 1.55 [04-18 @ 17:00]             [facility] Division of Kidney Diseases & Hypertension  FOLLOW UP NOTE  [phone]--------------------------------------------------------------------------------    Chief Complaint: Oliguric CHATO, on RRT    24 hour events/subjective: Pt. seen and examined in the SICU today, low BP requiring vasopressor support. Remains on CRRT, net even. Oliguric. Poor mental status today, unable to obtain ROS.     PAST HISTORY  --------------------------------------------------------------------------------  No significant changes to PMH, PSH, FHx, SHx, unless otherwise noted    ALLERGIES & MEDICATIONS  --------------------------------------------------------------------------------  Allergies    No Known Allergies    Intolerances      Standing Inpatient Medications  acetaminophen   Oral Liquid .. 975 milliGRAM(s) Oral every 6 hours  chlorhexidine 2% Cloths 1 Application(s) Topical daily  CRRT Treatment    <Continuous>  dextrose 50% Injectable 25 Gram(s) IV Push once  dextrose 50% Injectable 12.5 Gram(s) IV Push once  gabapentin Solution 100 milliGRAM(s) Oral three times a day  heparin   Injectable 5000 Unit(s) SubCutaneous every 8 hours  insulin glargine Injectable (LANTUS) 9 Unit(s) SubCutaneous at bedtime  insulin lispro (ADMELOG) corrective regimen sliding scale   SubCutaneous every 6 hours  insulin lispro Injectable (ADMELOG) 2 Unit(s) SubCutaneous every 6 hours  levothyroxine Injectable 20 MICROGram(s) IV Push at bedtime  midodrine 30 milliGRAM(s) Oral every 8 hours  multivitamin/minerals/iron Oral Solution (CENTRUM) 15 milliLiter(s) Oral daily  norepinephrine Infusion 0.05 MICROgram(s)/kG/Min IV Continuous <Continuous>  pancrelipase  (CREON 12,000 Lipase Units) 1 Capsule(s) Oral <User Schedule>  pantoprazole   Suspension 40 milliGRAM(s) Oral daily  Phoxillum Filtration BK 4 / 2.5 5000 milliLiter(s) CRRT <Continuous>  Phoxillum Filtration BK 4 / 2.5 5000 milliLiter(s) CRRT <Continuous>  PrismaSATE Dialysate BK 0 / 3.5 5000 milliLiter(s) CRRT <Continuous>  psyllium Powder 1 Packet(s) Oral daily  QUEtiapine 25 milliGRAM(s) Oral at bedtime    PRN Inpatient Medications  HYDROmorphone  Injectable 0.5 milliGRAM(s) IV Push every 4 hours PRN  oxyCODONE    Solution 5 milliGRAM(s) Oral every 4 hours PRN  oxyCODONE    Solution 10 milliGRAM(s) Oral every 4 hours PRN      REVIEW OF SYSTEMS  --------------------------------------------------------------------------------  Unable to be obtained.     VITALS/PHYSICAL EXAM  --------------------------------------------------------------------------------  T(C): 37.6 (04-20-23 @ 08:00), Max: 37.8 (04-19-23 @ 16:00)  HR: 107 (04-20-23 @ 08:00) (87 - 116)  BP: --  RR: 23 (04-20-23 @ 08:00) (17 - 34)  SpO2: 98% (04-20-23 @ 08:00) (96% - 100%)  Wt(kg): --        04-19-23 @ 07:01  -  04-20-23 @ 07:00  --------------------------------------------------------  IN: 1516.3 mL / OUT: 1959 mL / NET: -442.7 mL    04-20-23 @ 07:01  -  04-20-23 @ 10:14  --------------------------------------------------------  IN: 94.4 mL / OUT: 46 mL / NET: 48.4 mL      Physical Exam:  	Gen: lethargic, unable to converse  	HEENT: NGT+, anicteric  	Pulm: Fair entry B/L  	CV: S1S2+  	Abd: Obese, soft; nontender  	Ext: No pitting LE edema B/L  	Neuro: lethargic  	Skin: Warm and dry              Dialysis access: Right IJ non tunneled HD catheter connected to CRRT machine      LABS/STUDIES  --------------------------------------------------------------------------------              8.7    12.74 >-----------<  108      [04-20-23 @ 00:30]              27.3     136  |  103  |  6   ----------------------------<  148      [04-20-23 @ 00:30]  4.4   |  18  |  0.60        Ca     8.1     [04-20-23 @ 00:30]      Mg     2.50     [04-20-23 @ 00:30]      Phos  3.3     [04-20-23 @ 00:30]    TPro  7.2  /  Alb  3.2  /  TBili  5.7  /  DBili  x   /  AST  94  /  ALT  73  /  AlkPhos  311  [04-20-23 @ 00:30]    PT/INR: PT 20.0 , INR 1.71       [04-20-23 @ 00:30]  PTT: 50.8       [04-20-23 @ 00:30]      Creatinine Trend:  SCr 0.60 [04-20 @ 00:30]  SCr 0.71 [04-19 @ 11:39]  SCr 0.80 [04-19 @ 05:25]  SCr 0.99 [04-18 @ 23:49]  SCr 1.55 [04-18 @ 17:00]

## 2023-04-20 NOTE — PROGRESS NOTE ADULT - ATTENDING COMMENTS
critically ill complicated by CHATO complicated by anuria hypervolemia worsening blood pressure.  Discussed with SICU team this morning.  Will continue CRRT.  patient has very good clearances.  Will decrease flow rates to decrease clearances.  keep patient net even at this point given pressor requirement can titrate up as needed.

## 2023-04-20 NOTE — PROGRESS NOTE ADULT - ATTENDING COMMENTS
I agree with the history, physical, and plan, which I have reviewed and edited where appropriate.  I agree with notes/assessment of health care providers on my service.  I have personally examined the patient.  I was physically present for the key portions of the evaluation and management (E/M) service provided.  I reviewed data and laboratory tests/x-rays and all pertinent electronic images.  The patient is a critical care patient with life threatening hemodynamic and metabolic instability in SICU.  Risk benefit analyses discussed.    The patient is in SICU with diagnosis mentioned in the note.    The plan is specified below.       Patient is a 48 year old female with PMHx significant for HTN, hypothyroidism,  30 years ago, breast cancer s/p left mastectomy and chemo in  presenting with epigastric pain and LUQ pain. Found to have necrotizing pancreatitis. Transferred from OSH ([place]) for hypotension and tachypnea requiring emergent intubation and multiple pressors. SICU consulted for hemodynamic monitoring and respiratory management. Pt now extubated with CHATO requiring CVVHD.     PLAN  Neuro: delirium   - protected sleep  - Seroquel 25mg Qhs  - tylenol, gabapentin and Dilaudid prn    Resp: small L layering effusion  - 2L NC    CV: Mixed shock state   - low dose levo   - Midodrine 30 q 8 hours     GI: Pancreatitis  - post pyloric tube, elemental tube feeds   - GI ppx w/ protonix   - Creon  - metamucil   - multivitamin  - add oxandralone     Renal: ARF 2/2 shock state resulting in ATN requiring CRRT    - CRRT, net even  - IV lock   - bladder scan q shift     Heme: Splenic vein thrombosis  - SCDs and SQH    ID: Intermittently febrile   - observe off abx (Meropenem stopped )     Endo:   - Lantus 9 units, admelog 2 units q6  - c/w synthroid 20 IV (home med)   - ISS.

## 2023-04-20 NOTE — PROGRESS NOTE ADULT - SUBJECTIVE AND OBJECTIVE BOX
B Team Surgery Daily Progress Note    SUBJECTIVE: Patient seen and examined by team on morning rounds. Patient on CRRT, requiring pressor support in SICU. Denies chest pain, SOB, dizziness, palpitations, fever, chills, N/V/D.    Vital Signs Last 24 Hrs  T(C): 37.6 (20 Apr 2023 08:00), Max: 37.8 (19 Apr 2023 16:00)  T(F): 99.6 (20 Apr 2023 08:00), Max: 100.1 (19 Apr 2023 16:00)  HR: 107 (20 Apr 2023 08:00) (87 - 116)  RR: 23 (20 Apr 2023 08:00) (17 - 34)  SpO2: 98% (20 Apr 2023 08:00) (96% - 100%)  Parameters below as of 20 Apr 2023 08:00  Patient On (Oxygen Delivery Method): nasal cannula  O2 Flow (L/min): 2      General Appearance: Appears well, NAD  Neck: Supple  Chest: Equal expansion bilaterally, equal breath sounds  CV: Pulse regular presently  Abdomen: soft, nontender, less distended, ko feeding tube in place  Extremities: normal strength, FROM, no deformities    I&O's Summary    19 Apr 2023 07:01  -  20 Apr 2023 07:00  --------------------------------------------------------  IN: 1516.3 mL / OUT: 1908 mL / NET: -391.7 mL    20 Apr 2023 07:01  -  20 Apr 2023 08:35  --------------------------------------------------------  IN: 47.2 mL / OUT: 0 mL / NET: 47.2 mL      I&O's Detail    19 Apr 2023 07:01  -  20 Apr 2023 07:00  --------------------------------------------------------  IN:    Enteral Tube Flush: 400 mL    Norepinephrine: 36.3 mL    Peptamen A.F.: 1080 mL  Total IN: 1516.3 mL    OUT:    Indwelling Catheter - Urethral (mL): 0 mL    Other (mL): 1558 mL    Rectal Tube (mL): 350 mL  Total OUT: 1908 mL    Total NET: -391.7 mL      20 Apr 2023 07:01  -  20 Apr 2023 08:35  --------------------------------------------------------  IN:    Norepinephrine: 2.2 mL    Peptamen A.F.: 45 mL  Total IN: 47.2 mL    OUT:  Total OUT: 0 mL    Total NET: 47.2 mL          MEDICATIONS  (STANDING):  acetaminophen   Oral Liquid .. 975 milliGRAM(s) Oral every 6 hours  chlorhexidine 2% Cloths 1 Application(s) Topical daily  CRRT Treatment    <Continuous>  dextrose 50% Injectable 25 Gram(s) IV Push once  dextrose 50% Injectable 12.5 Gram(s) IV Push once  gabapentin Solution 100 milliGRAM(s) Oral three times a day  heparin   Injectable 5000 Unit(s) SubCutaneous every 8 hours  insulin glargine Injectable (LANTUS) 9 Unit(s) SubCutaneous at bedtime  insulin lispro (ADMELOG) corrective regimen sliding scale   SubCutaneous every 6 hours  insulin lispro Injectable (ADMELOG) 2 Unit(s) SubCutaneous every 6 hours  levothyroxine Injectable 20 MICROGram(s) IV Push at bedtime  midodrine 30 milliGRAM(s) Oral every 8 hours  multivitamin/minerals/iron Oral Solution (CENTRUM) 15 milliLiter(s) Oral daily  norepinephrine Infusion 0.05 MICROgram(s)/kG/Min (5.53 mL/Hr) IV Continuous <Continuous>  pancrelipase  (CREON 12,000 Lipase Units) 1 Capsule(s) Oral <User Schedule>  pantoprazole   Suspension 40 milliGRAM(s) Oral daily  Phoxillum Filtration BK 4 / 2.5 5000 milliLiter(s) (1200 mL/Hr) CRRT <Continuous>  Phoxillum Filtration BK 4 / 2.5 5000 milliLiter(s) (200 mL/Hr) CRRT <Continuous>  PrismaSATE Dialysate BK 0 / 3.5 5000 milliLiter(s) (2400 mL/Hr) CRRT <Continuous>  psyllium Powder 1 Packet(s) Oral daily  QUEtiapine 25 milliGRAM(s) Oral at bedtime    MEDICATIONS  (PRN):  HYDROmorphone  Injectable 0.5 milliGRAM(s) IV Push every 4 hours PRN breakthrough pain  oxyCODONE    Solution 5 milliGRAM(s) Oral every 4 hours PRN Moderate Pain (4 - 6)  oxyCODONE    Solution 10 milliGRAM(s) Oral every 4 hours PRN Severe Pain (7 - 10)      LABS:                        8.7    12.74 )-----------( 108      ( 20 Apr 2023 00:30 )             27.3     04-20    136  |  103  |  6<L>  ----------------------------<  148<H>  4.4   |  18<L>  |  0.60    Ca    8.1<L>      20 Apr 2023 00:30  Phos  3.3     04-20  Mg     2.50     04-20    TPro  7.2  /  Alb  3.2<L>  /  TBili  5.7<H>  /  DBili  x   /  AST  94<H>  /  ALT  73<H>  /  AlkPhos  311<H>  04-20    PT/INR - ( 20 Apr 2023 00:30 )   PT: 20.0 sec;   INR: 1.71 ratio         PTT - ( 20 Apr 2023 00:30 )  PTT:50.8 sec      RADIOLOGY & ADDITIONAL STUDIES:

## 2023-04-21 NOTE — PROGRESS NOTE ADULT - ATTENDING COMMENTS
CHATO and critically ill complicated by anuria and need for ongoing RRT.  noted to be hypokalemic and dialysate bags adjusted accordingly.  please continue to monitor.

## 2023-04-21 NOTE — PROGRESS NOTE ADULT - SUBJECTIVE AND OBJECTIVE BOX
24 HOUR EVENTS:  - Oxandrilone started  - Decreased BM-> DC'd psyllium powder and started senna Miralax    NEURO  RASS (if intubated): 		[facility] ICU (if concern for delirium):  Exam: A&Ox3  Meds: acetaminophen   Oral Liquid .. 975 milliGRAM(s) Oral every 6 hours  gabapentin Solution 100 milliGRAM(s) Oral three times a day  HYDROmorphone  Injectable 0.5 milliGRAM(s) IV Push every 4 hours PRN breakthrough pain  oxyCODONE    Solution 5 milliGRAM(s) Oral every 4 hours PRN Moderate Pain (4 - 6)  oxyCODONE    Solution 10 milliGRAM(s) Oral every 4 hours PRN Severe Pain (7 - 10)  QUEtiapine 25 milliGRAM(s) Oral at bedtime      RESPIRATORY  RR: 35 (04-20-23 @ 23:54) (19 - 54)  SpO2: 100% (04-20-23 @ 22:00) (96% - 100%)  Wt(kg): --  Exam: no increased WOB  Mechanical Ventilation:     Meds:     CARDIOVASCULAR  HR: 110 (04-20-23 @ 23:54) (102 - 116)  BP: --  BP(mean): --  ABP: 85/54 (04-20-23 @ 23:54) (78/48 - 122/69)  ABP(mean): 66 (04-20-23 @ 23:54) (59 - 92)  Wt(kg): --  CVP(cm H2O): --      Exam: appears well perfused  Cardiac Rhythm: sinus  Perfusion     [ ]Adequate   [X ]Inadequate  Mentation   [X ]Normal       [ ]Reduced  Extremities  [ X]Warm         [ ]Cool  Volume Status [ ]Hypervolemic [ X]Euvolemic [ ]Hypovolemic  Meds: midodrine 30 milliGRAM(s) Oral every 8 hours  norepinephrine Infusion 0.05 MICROgram(s)/kG/Min IV Continuous <Continuous>      GI/NUTRITION  Exam: soft nondistended  Diet: TFs  Meds: pancrelipase  (CREON 12,000 Lipase Units) 1 Capsule(s) Oral <User Schedule>  pantoprazole   Suspension 40 milliGRAM(s) Oral daily  polyethylene glycol 3350 17 Gram(s) Oral daily      GENITOURINARY  I&O's Detail    04-19 @ 07:01  -  04-20 @ 07:00  --------------------------------------------------------  IN:    Enteral Tube Flush: 400 mL    Norepinephrine: 36.3 mL    Peptamen A.F.: 1080 mL  Total IN: 1516.3 mL    OUT:    Indwelling Catheter - Urethral (mL): 0 mL    Other (mL): 1609 mL    Rectal Tube (mL): 350 mL  Total OUT: 1959 mL    Total NET: -442.7 mL      04-20 @ 07:01  -  04-21 @ 00:38  --------------------------------------------------------  IN:    Enteral Tube Flush: 250 mL    Norepinephrine: 52.8 mL    Peptamen A.F.: 765 mL  Total IN: 1067.8 mL    OUT:    Other (mL): 1288 mL    Rectal Tube (mL): 0 mL  Total OUT: 1288 mL    Total NET: -220.2 mL          04-20    135  |  101  |  5<L>  ----------------------------<  174<H>  4.0   |  21<L>  |  0.60    Ca    8.7      20 Apr 2023 12:15  Phos  3.3     04-20  Mg     2.50     04-20    TPro  7.5  /  Alb  3.1<L>  /  TBili  5.6<H>  /  DBili  x   /  AST  88<H>  /  ALT  72<H>  /  AlkPhos  356<H>  04-20    Meds: multivitamin/minerals/iron Oral Solution (CENTRUM) 15 milliLiter(s) Oral daily      HEMATOLOGIC  Meds: heparin   Injectable 5000 Unit(s) SubCutaneous every 8 hours                          8.7    12.74 )-----------( 108      ( 20 Apr 2023 00:30 )             27.3     PT/INR - ( 20 Apr 2023 00:30 )   PT: 20.0 sec;   INR: 1.71 ratio         PTT - ( 20 Apr 2023 00:30 )  PTT:50.8 sec    INFECTIOUS DISEASES  T(C): 37.2 (04-20-23 @ 20:00), Max: 37.6 (04-20-23 @ 08:00)  Wt(kg): --    Recent Cultures:    Meds:     ENDOCRINE  Capillary Blood Glucose    Meds: dextrose 50% Injectable 25 Gram(s) IV Push once  dextrose 50% Injectable 12.5 Gram(s) IV Push once  insulin glargine Injectable (LANTUS) 9 Unit(s) SubCutaneous at bedtime  insulin lispro (ADMELOG) corrective regimen sliding scale   SubCutaneous every 6 hours  insulin lispro Injectable (ADMELOG) 2 Unit(s) SubCutaneous every 6 hours  levothyroxine Injectable 20 MICROGram(s) IV Push at bedtime  oxandrolone 5 milliGRAM(s) Oral three times a day      TUBES / LINES / DRAINS  [x] Peripheral IV  [x] Central Venous Line     	[] R	[x] L	[x] IJ	[] Fem	[] SC	Date Placed:  [x] R Shiley   [x] Arterial Line		[] R	[] L	[] Fem	[] Rad	[] Ax	Date Placed:   [x] NGT  [] PICC		[] Midline		[] Mediport  [] Urinary Catheter		Date Placed:   [x] Necessity of urinary, arterial, and venous catheters discussed    OTHER MEDICATIONS:  chlorhexidine 2% Cloths 1 Application(s) Topical daily  CRRT Treatment    <Continuous>  Phoxillum Filtration BK 4 / 2.5 5000 milliLiter(s) CRRT <Continuous>  Phoxillum Filtration BK 4 / 2.5 5000 milliLiter(s) CRRT <Continuous>  PrismaSATE Dialysate BK 0 / 3.5 5000 milliLiter(s) CRRT <Continuous>      IMAGING:

## 2023-04-21 NOTE — PROGRESS NOTE ADULT - SUBJECTIVE AND OBJECTIVE BOX
B Team Surgery Daily Progress Note    SUBJECTIVE: Patient seen and examined by team on morning rounds. No acute events overnight.     Vital Signs Last 24 Hrs  T(C): 36.8 (21 Apr 2023 04:00), Max: 37.9 (21 Apr 2023 00:00)  T(F): 98.2 (21 Apr 2023 04:00), Max: 100.3 (21 Apr 2023 00:00)  HR: 96 (21 Apr 2023 06:20) (92 - 112)  BP: --  BP(mean): --  RR: 21 (21 Apr 2023 06:20) (19 - 54)  SpO2: 98% (21 Apr 2023 06:20) (96% - 100%)    Parameters below as of 21 Apr 2023 00:00  Patient On (Oxygen Delivery Method): nasal cannula  O2 Flow (L/min): 2        General Appearance: Appears well, NAD  Neck: Supple  Chest: Equal expansion bilaterally, equal breath sounds  CV: Pulse regular presently  Abdomen: Soft, nontender, moderately distended  Extremities: Grossly symmetric, SCD's in place     I&O's Summary    20 Apr 2023 07:01  -  21 Apr 2023 07:00  --------------------------------------------------------  IN: 1729.9 mL / OUT: 2270 mL / NET: -540.1 mL      I&O's Detail    20 Apr 2023 07:01  -  21 Apr 2023 07:00  --------------------------------------------------------  IN:    Enteral Tube Flush: 630 mL    Norepinephrine: 60.5 mL    Norepinephrine: 4.4 mL    Peptamen A.F.: 1035 mL  Total IN: 1729.9 mL    OUT:    Other (mL): 1920 mL    Rectal Tube (mL): 350 mL  Total OUT: 2270 mL    Total NET: -540.1 mL          MEDICATIONS  (STANDING):  acetaminophen   Oral Liquid .. 975 milliGRAM(s) Oral every 6 hours  chlorhexidine 2% Cloths 1 Application(s) Topical daily  CRRT Treatment    <Continuous>  dextrose 50% Injectable 25 Gram(s) IV Push once  dextrose 50% Injectable 12.5 Gram(s) IV Push once  gabapentin Solution 100 milliGRAM(s) Oral three times a day  heparin   Injectable 5000 Unit(s) SubCutaneous every 8 hours  insulin glargine Injectable (LANTUS) 9 Unit(s) SubCutaneous at bedtime  insulin lispro (ADMELOG) corrective regimen sliding scale   SubCutaneous every 6 hours  insulin lispro Injectable (ADMELOG) 2 Unit(s) SubCutaneous every 6 hours  levothyroxine Injectable 20 MICROGram(s) IV Push at bedtime  midodrine 30 milliGRAM(s) Oral every 8 hours  multivitamin/minerals/iron Oral Solution (CENTRUM) 15 milliLiter(s) Oral daily  norepinephrine Infusion 0.05 MICROgram(s)/kG/Min (5.53 mL/Hr) IV Continuous <Continuous>  oxandrolone 5 milliGRAM(s) Oral three times a day  pancrelipase  (CREON 12,000 Lipase Units) 1 Capsule(s) Oral <User Schedule>  pantoprazole   Suspension 40 milliGRAM(s) Oral daily  Phoxillum Filtration BK 4 / 2.5 5000 milliLiter(s) (1000 mL/Hr) CRRT <Continuous>  Phoxillum Filtration BK 4 / 2.5 5000 milliLiter(s) (200 mL/Hr) CRRT <Continuous>  polyethylene glycol 3350 17 Gram(s) Oral daily  PrismaSATE Dialysate BK 0 / 3.5 5000 milliLiter(s) (1200 mL/Hr) CRRT <Continuous>  QUEtiapine 25 milliGRAM(s) Oral at bedtime    MEDICATIONS  (PRN):  HYDROmorphone  Injectable 0.5 milliGRAM(s) IV Push every 4 hours PRN breakthrough pain  oxyCODONE    Solution 5 milliGRAM(s) Oral every 4 hours PRN Moderate Pain (4 - 6)  oxyCODONE    Solution 10 milliGRAM(s) Oral every 4 hours PRN Severe Pain (7 - 10)      LABS:                        8.5    12.29 )-----------( 104      ( 21 Apr 2023 01:47 )             26.4     04-21    133<L>  |  99  |  8   ----------------------------<  177<H>  3.3<L>   |  20<L>  |  0.77    Ca    8.8      21 Apr 2023 01:47  Phos  2.2     04-21  Mg     2.30     04-21    TPro  7.3  /  Alb  3.1<L>  /  TBili  5.5<H>  /  DBili  x   /  AST  79<H>  /  ALT  67<H>  /  AlkPhos  363<H>  04-21    PT/INR - ( 21 Apr 2023 01:47 )   PT: 24.3 sec;   INR: 2.08 ratio         PTT - ( 21 Apr 2023 01:47 )  PTT:38.2 sec      RADIOLOGY & ADDITIONAL STUDIES:

## 2023-04-21 NOTE — PROGRESS NOTE ADULT - SUBJECTIVE AND OBJECTIVE BOX
[facility] Division of Kidney Diseases & Hypertension  FOLLOW UP NOTE  [phone]--------------------------------------------------------------------------------    Chief Complaint: Oliguric CHATO, on RRT    24 hour events/subjective: Pt. seen and examined in the SICU today, low BP requiring vasopressor support. Remains on CRRT and still oliguric. Poor mental status today, unable to obtain ROS.       PAST HISTORY  --------------------------------------------------------------------------------  No significant changes to PMH, PSH, FHx, SHx, unless otherwise noted    ALLERGIES & MEDICATIONS  --------------------------------------------------------------------------------  Allergies    No Known Allergies    Intolerances      Standing Inpatient Medications  acetaminophen   Oral Liquid .. 975 milliGRAM(s) Oral every 6 hours  chlorhexidine 2% Cloths 1 Application(s) Topical daily  CRRT Treatment    <Continuous>  dextrose 50% Injectable 25 Gram(s) IV Push once  dextrose 50% Injectable 12.5 Gram(s) IV Push once  gabapentin Solution 100 milliGRAM(s) Oral three times a day  heparin   Injectable 5000 Unit(s) SubCutaneous every 8 hours  insulin glargine Injectable (LANTUS) 9 Unit(s) SubCutaneous at bedtime  insulin lispro (ADMELOG) corrective regimen sliding scale   SubCutaneous every 6 hours  insulin lispro Injectable (ADMELOG) 2 Unit(s) SubCutaneous every 6 hours  levothyroxine Injectable 20 MICROGram(s) IV Push at bedtime  midodrine 40 milliGRAM(s) Oral every 8 hours  multivitamin/minerals/iron Oral Solution (CENTRUM) 15 milliLiter(s) Oral daily  norepinephrine Infusion 0.05 MICROgram(s)/kG/Min IV Continuous <Continuous>  oxandrolone 5 milliGRAM(s) Oral three times a day  pancrelipase  (CREON 12,000 Lipase Units) 1 Capsule(s) Oral <User Schedule>  pantoprazole   Suspension 40 milliGRAM(s) Oral daily  Phoxillum Filtration BK 4 / 2.5 5000 milliLiter(s) CRRT <Continuous>  Phoxillum Filtration BK 4 / 2.5 5000 milliLiter(s) CRRT <Continuous>  Phoxillum Filtration BK 4 / 2.5 5000 milliLiter(s) CRRT <Continuous>  polyethylene glycol 3350 17 Gram(s) Oral daily  QUEtiapine 25 milliGRAM(s) Oral at bedtime    PRN Inpatient Medications  HYDROmorphone  Injectable 0.5 milliGRAM(s) IV Push every 4 hours PRN  oxyCODONE    Solution 5 milliGRAM(s) Oral every 4 hours PRN  oxyCODONE    Solution 10 milliGRAM(s) Oral every 4 hours PRN      REVIEW OF SYSTEMS  --------------------------------------------------------------------------------  Unable to be obtained.     VITALS/PHYSICAL EXAM  --------------------------------------------------------------------------------  T(C): 36.9 (04-21-23 @ 08:00), Max: 37.9 (04-21-23 @ 00:00)  HR: 94 (04-21-23 @ 09:00) (92 - 112)  BP: --  RR: 27 (04-21-23 @ 09:00) (20 - 54)  SpO2: 100% (04-21-23 @ 09:00) (96% - 100%)  Wt(kg): --        04-20-23 @ 07:01  -  04-21-23 @ 07:00  --------------------------------------------------------  IN: 1729.9 mL / OUT: 2270 mL / NET: -540.1 mL    04-21-23 @ 07:01  -  04-21-23 @ 10:45  --------------------------------------------------------  IN: 94.4 mL / OUT: 0 mL / NET: 94.4 mL      Physical Exam:  	Gen: lethargic, unable to converse  	HEENT: NGT+, anicteric  	Pulm: Fair entry B/L  	CV: S1S2+  	Abd: Obese, soft; nontender  	Ext: No pitting LE edema B/L  	Neuro: lethargic  	Skin: Warm and dry              Dialysis access: Right IJ non tunneled HD catheter connected to CRRT machine    LABS/STUDIES  --------------------------------------------------------------------------------              8.5    12.29 >-----------<  104      [04-21-23 @ 01:47]              26.4     133  |  99  |  8   ----------------------------<  177      [04-21-23 @ 01:47]  3.3   |  20  |  0.77        Ca     8.8     [04-21-23 @ 01:47]      Mg     2.30     [04-21-23 @ 01:47]      Phos  2.2     [04-21-23 @ 01:47]    TPro  7.3  /  Alb  3.1  /  TBili  5.5  /  DBili  x   /  AST  79  /  ALT  67  /  AlkPhos  363  [04-21-23 @ 01:47]    PT/INR: PT 24.3 , INR 2.08       [04-21-23 @ 01:47]  PTT: 38.2       [04-21-23 @ 01:47]      Creatinine Trend:  SCr 0.77 [04-21 @ 01:47]  SCr 0.60 [04-20 @ 12:15]  SCr 0.60 [04-20 @ 00:30]  SCr 0.71 [04-19 @ 11:39]  SCr 0.80 [04-19 @ 05:25]

## 2023-04-21 NOTE — PROGRESS NOTE ADULT - ASSESSMENT
48 year old female with PMHx significant for HTN, hypothyroidism,  30 years ago, breast cancer s/p left mastectomy and chemo in  presenting with epigastric pain and LUQ pain. Found to have necrotizing pancreatitis. Transferred from OSH ([place]) for hypotension and tachypnea requiring emergent intubation and multiple pressors. SICU consulted for hemodynamic monitoring and respiratory management.    PLAN  Neuro:  - Precautions for delirium - protected sleep  - Seroquel 25mg Qhs  - tylenol, gabapentin and Dilaudid prn    Resp:  - Tachypneic  - AHRF w/ b/l plef 2/2 necrotizing pancreatitis  - On RA  - CXR : small L layering effusion  - POCUS, night of : B/l sm effusions noted.     CV:  Mixed shock state 2/2 septic shock w/ E. Coli in urine vs hypovolemic shock now s/p 11L resus    - Back on minimal pressor, levo, for MAP in low 50s   - Midodrine 30 q 8 hours    GI:   - TF via post pyloric tube at goal, switch to elemental feeds 2/2 diarrhea  - GI ppx w/ protonix   - Creon  - Senna miralax  - No GI intervention at this time; no walled off collection on CT    Renal:  - ARF 2/2 shock state resulting in ATN requiring CRRT    - nephro recs: continue CRRT    Heme:   - SCDs and SQH, no longer on fondaparinux  - HIT Ab positive; DOUGLAS Negative    #Splenic Vein Thrombosis  3/22 CT Abd w/ contrast: Focal filling defect in the splenic vein at the level of the body of the pancreas for which an underlying thrombus is suspected     ID:   Intermittently febrile w/ leukocytosis c/f septic shock 2/2 E coli UTI  Diagnostics:  - 3/22 Ucx: E coli  - 3/30 Bcx: staph hominis in aerobic bottle x 1 (likely contaminant)   - S/p Meropenem (3/26-, -)    Endo:   - Lantus 9 units, admelog 2 units q6  - c/w synthroid 20 IV (home med)   - ISS    Code Status: Full code  Lines: KELLY Mendoza CVC, A line    Disposition: SICU 48 year old female with PMHx significant for HTN, hypothyroidism,  30 years ago, breast cancer s/p left mastectomy and chemo in  presenting with epigastric pain and LUQ pain. Found to have necrotizing pancreatitis. Transferred from OSH ([place]) for hypotension and tachypnea requiring emergent intubation and multiple pressors. SICU consulted for hemodynamic monitoring and respiratory management.      PLAN  Neuro:  - Precautions for delirium - protected sleep  - Seroquel 25mg Qhs  - tylenol, gabapentin and Dilaudid prn    Resp:  - Tachypneic  - AHRF w/ b/l plef 2/2 necrotizing pancreatitis  - On RA  - CXR : small L layering effusion  - POCUS, night of : B/l sm effusions noted.     CV:  Mixed shock state 2/2 septic shock w/ E. Coli in urine vs hypovolemic shock now s/p 11L resus    - Back on minimal pressor, levo, for MAP in low 50s   - increase Midodrine to 40 q 8 hours    GI:   - TF via post pyloric tube at goal, switched to elemental feeds 2/2 diarrhea  - GI ppx w/ protonix   - Creon  - No GI intervention at this time; no walled off collection on CT  - Miralax    Renal:  - ARF 2/2 shock state resulting in ATN requiring CRRT    - nephro recs: continue CRRT     Heme:   - SCDs and SQH, no longer on fondaparinux  - HIT Ab positive; DOUGLAS Negative    #Splenic Vein Thrombosis  3/22 CT Abd w/ contrast: Focal filling defect in the splenic vein at the level of the body of the pancreas for which an underlying thrombus is suspected     ID:   Intermittently febrile w/ leukocytosis c/f septic shock 2/2 E coli UTI  Diagnostics:  - 3/22 Ucx: E coli  - 3/30 Bcx: staph hominis in aerobic bottle x 1 (likely contaminant)   - S/p Meropenem (3/26-, -)    Endo:   - Lantus 9 units, admelog 2 units q6  - c/w synthroid 20 IV (home med)   - ISS    Code Status: Full code  Lines: KELLY Mendoza CVC, A line    Disposition: SICU 48 year old female with PMHx significant for HTN, hypothyroidism,  30 years ago, breast cancer s/p left mastectomy and chemo in  presenting with epigastric pain and LUQ pain. Found to have necrotizing pancreatitis. Transferred from OSH ([place]) for hypotension and tachypnea requiring emergent intubation and multiple pressors. SICU consulted for hemodynamic monitoring and respiratory management.      PLAN  Neuro:  - Precautions for delirium - protected sleep  - Seroquel 25mg Qhs  - tylenol, gabapentin and Dilaudid prn    Resp:  - Tachypneic  - AHRF w/ b/l plef 2/2 necrotizing pancreatitis  - On RA  - CXR : small L layering effusion  - POCUS, night of : B/l sm effusions noted.     CV:  Mixed shock state 2/2 septic shock w/ E. Coli in urine vs hypovolemic shock now s/p 11L resus    - Back on minimal pressor, levo, for MAP in low 50s   - increase Midodrine to 40 q 8 hours    GI:   - TF via post pyloric tube   - GI ppx w/ protonix   - Creon  - No GI intervention at this time; no walled off collection on CT  - Miralax    Renal:  - ARF 2/2 shock state resulting in ATN requiring CRRT    - nephro recs: continue CRRT     Heme:   - SCDs and SQH, no longer on fondaparinux  - HIT Ab positive; DOUGLAS Negative    #Splenic Vein Thrombosis  3/22 CT Abd w/ contrast: Focal filling defect in the splenic vein at the level of the body of the pancreas for which an underlying thrombus is suspected     ID:   Intermittently febrile w/ leukocytosis c/f septic shock 2/2 E coli UTI  Diagnostics:  - 3/22 Ucx: E coli  - 3/30 Bcx: staph hominis in aerobic bottle x 1 (likely contaminant)   - S/p Meropenem (3/26-, -)    Endo:   - Lantus 9 units, admelog 2 units q6  - c/w synthroid 20 IV (home med)   - ISS    Code Status: Full code  Lines: KELLY Mendoza CVC, A line    Disposition: SICU

## 2023-04-21 NOTE — PROGRESS NOTE ADULT - ATTENDING COMMENTS
I agree with the detailed interval history, physical, and plan, which I have reviewed and edited where appropriate'; also agree with notes/assessment with my team on service.  I have personally examined the patient.  I was physically present for the key portions of the evaluation and management (E/M) service provided.  I reviewed all the pertinent data.  The patient is a critical care patient with life threatening hemodynamic and metabolic instability in SICU.  The SICU team has a constant risk benefit analyzes discussion and coordinating care with the primary team and all consultants.   The patient is in SICU with the chief complaint and diagnosis mentioned in the note.   The plan will be specified in the note.  48 year old female with PMHx significant for HTN, hypothyroidism,  30 years ago, breast cancer s/p left mastectomy and chemo in  presenting with epigastric pain and LUQ pain. Found to have necrotizing pancreatitis. Transferred from OS ([place]) for hypotension and tachypnea requiring emergent intubation and multiple pressors. SICU consulted for hemodynamic monitoring and respiratory management.  Awake and alert  LUNG coarse bs  Heart RR  ABd dist    PLAN  Neuro:  -protected sleep  - Seroquel   - tylenol  -gabapentin  -Dilaudid   Resp:  RA  - POCUS,   CV:  - increase Midodrine to 40 q 8 hours  GI:   - protonix   -Renal:  - CRRT    Heme:   - SCDs and SQH  ID:   Intermittently febrile w/ leukocytosis c/f septic shock 2/2 E coli UTI  Diagnostics:  -No ABx  Endo:   - Lantus 9 units, admelog 2 units q6  Code Status: Full code  Disposition: SICU

## 2023-04-21 NOTE — PROGRESS NOTE ADULT - PROBLEM SELECTOR PLAN 1
Pt. with oliguric CHATO in the setting of necrotizing pancreatitis and shock. Pt. with most likely ATN. Scr was 1.24 on 3/23/23 and increased to 4.60 on 3/24/23. Pt. was initiated on CRRT/CVVHDF on 3/24/23 and discontinued overnight on 4/6/23 by SICU team. Pt. remained oliguric with hypotension so restarted on CVVHDH on 4/7/23. CRRT held on 4/17/23 but restarted on 4/18 as patient remained hypotensive and oliguric. Vitals and labs reviewed. Pt remains critically ill and continues to require intermittent vasopressors for low BPs. Remains on Midodrine 40 TID. Continue with CRRT. Monitor labs and urine output. Avoid any potential nephrotoxins. Dose medications as per eGFR.    If you have any questions, please feel free to contact me  Neville Schulz  Nephrology Fellow  [phone]; Prefer [employer] TEAMS  (After 5pm or on weekends please page the on-call fellow).

## 2023-04-21 NOTE — PROGRESS NOTE ADULT - ASSESSMENT
48 year old female with a PMHx of HTN, hypothyroidism,  (30 years ago) and breast cancer (S/P left mastectomy and chemo in ) who presented with epigastric pain and left upper quadrant pain.  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  Transferred from OSH ([place]) for hypotension and tachypnea requiring emergent intubation and multiple pressors.  SICU consulted for hemodynamic monitoring and respiratory management.    Plan:   - TF at goal  - did not tolerate HD, continue CRRT  - PT/OT consult  - OOBtochair  - likely repeat scan next week  - Appreciate care per SICU      B Team Surgery  [id number]

## 2023-04-22 NOTE — PROGRESS NOTE ADULT - ATTENDING COMMENTS
I agree with the detailed interval history, physical, and plan, which I have reviewed and edited where appropriate'; also agree with notes/assessment with my team on service.  I have personally examined the patient.  I was physically present for the key portions of the evaluation and management (E/M) service provided.  I reviewed all the pertinent data.  The patient is a critical care patient with life threatening hemodynamic and metabolic instability in SICU.  The SICU team has a constant risk benefit analyzes discussion and coordinating care with the primary team and all consultants.   The patient is in SICU with the chief complaint and diagnosis mentioned in the note.   The plan will be specified in the note.  48 year old female with necrotizing pancreatitis, obtunded in SICU for hemodynamic monitoring and respiratory management.  Obtunded  LUNG coarse bs  HEART RR  ABD dist  PLAN  Neuro:  - d/c Seroquel   -CT head ordered   - Precedex   - Tylenol   Resp:  - Intubated  - POCUS  CV:  - Midodrine 40 q 8 hours  GI:   -Protonix   - Creon  Renal:  - CRRT    Heme:   - SCDs and SQH  ID:   Meropenem   Endo:   - stress dose Solu-cortef 150mg x1 then 50 mg q6   - Lantus 9 units at bedtime, ADMELOG 2 units q6  Code Status: Full code  Disposition: SICU

## 2023-04-22 NOTE — PROGRESS NOTE ADULT - ATTENDING COMMENTS
I agree with the detailed interval history, physical, and plan, which I have reviewed and edited where appropriate'; also agree with notes/assessment with my team on service.  I have personally examined the patient.  I was physically present for the key portions of the evaluation and management (E/M) service provided.  I reviewed all the pertinent data.  The patient is a critical care patient with life threatening hemodynamic and metabolic instability in SICU.  The SICU team has a constant risk benefit analyzes discussion and coordinating care with the primary team and all consultants.   The patient is in SICU with the chief complaint and diagnosis mentioned in the note.   The plan will be specified in the note.  48 year old female with necrotizing pancreatitis; sp emergent intubation and on vasopressors in SICU for hemodynamic monitoring and respiratory management.  PE   Sedated  LUNG coarse BS  Heart RR  ABD softly dist  PLAN  Neuro:  - Precedex   - Tylenol gabapentin and Dilaudid prn  Resp:  - POCUS,   CV:  -Wean venkata  - Midodrine 40 q 8 hours  GI:   - GI ppx w/ Protonix   - Creon  Renal:  - CRRT   - Lasix   Heme:   - SCDs and SQH  ID:   - S/p Meropenem (3/26-4/11, 4/13-4/19)  Endo:   - stress dose Solu-cortef 150mg x1 then 50 mg q6   - Lantus 9 units at bedtime, ADMELOG 2 units q6  Code Status: Full code    Disposition: SICU

## 2023-04-22 NOTE — PROGRESS NOTE ADULT - SUBJECTIVE AND OBJECTIVE BOX
[facility] Division of Kidney Diseases & Hypertension  FOLLOW UP NOTE  [phone]--------------------------------------------------------------------------------    Chief Complaint: Oliguric CHATO, on RRT    24 hour events/subjective: Pt. seen and examined in the SICU today, low BP requiring vasopressor support. CRRT stopped by SICU yesterday. Patient was lethargic on exam today. Intubated later in the morning by SICU.     PAST HISTORY  --------------------------------------------------------------------------------  No significant changes to PMH, PSH, FHx, SHx, unless otherwise noted    ALLERGIES & MEDICATIONS  --------------------------------------------------------------------------------  Allergies    No Known Allergies    Intolerances      Standing Inpatient Medications  acetaminophen   Oral Liquid .. 975 milliGRAM(s) Oral every 6 hours  albumin human  5% IVPB 250 milliLiter(s) IV Intermittent once  chlorhexidine 0.12% Liquid 15 milliLiter(s) Oral Mucosa every 12 hours  chlorhexidine 2% Cloths 1 Application(s) Topical daily  CRRT Treatment    <Continuous>  dexMEDEtomidine Infusion 0.2 MICROgram(s)/kG/Hr IV Continuous <Continuous>  dextrose 50% Injectable 25 Gram(s) IV Push once  dextrose 50% Injectable 12.5 Gram(s) IV Push once  etomidate Injectable 20 milliGRAM(s) IV Push once  gabapentin Solution 100 milliGRAM(s) Oral three times a day  heparin   Injectable 5000 Unit(s) SubCutaneous every 8 hours  hydrocortisone sodium succinate Injectable 150 milliGRAM(s) IV Push once  insulin glargine Injectable (LANTUS) 9 Unit(s) SubCutaneous at bedtime  insulin lispro (ADMELOG) corrective regimen sliding scale   SubCutaneous every 6 hours  insulin lispro Injectable (ADMELOG) 2 Unit(s) SubCutaneous every 6 hours  levothyroxine Injectable 20 MICROGram(s) IV Push at bedtime  midodrine 40 milliGRAM(s) Oral every 8 hours  multivitamin/minerals/iron Oral Solution (CENTRUM) 15 milliLiter(s) Oral daily  norepinephrine Infusion 0.05 MICROgram(s)/kG/Min IV Continuous <Continuous>  oxandrolone 5 milliGRAM(s) Oral three times a day  pancrelipase  (CREON 12,000 Lipase Units) 1 Capsule(s) Oral <User Schedule>  pantoprazole   Suspension 40 milliGRAM(s) Oral daily  Phoxillum Filtration BK 4 / 2.5 5000 milliLiter(s) CRRT <Continuous>  Phoxillum Filtration BK 4 / 2.5 5000 milliLiter(s) CRRT <Continuous>  polyethylene glycol 3350 17 Gram(s) Oral daily  PrismaSATE Dialysate BGK 4 / 2.5 5000 milliLiter(s) CRRT <Continuous>  rocuronium Injectable 5 milliGRAM(s) IV Push once    PRN Inpatient Medications  HYDROmorphone  Injectable 0.5 milliGRAM(s) IV Push every 4 hours PRN  oxyCODONE    Solution 5 milliGRAM(s) Oral every 4 hours PRN  oxyCODONE    Solution 10 milliGRAM(s) Oral every 4 hours PRN      REVIEW OF SYSTEMS  --------------------------------------------------------------------------------  Unable to be obtained.     VITALS/PHYSICAL EXAM  --------------------------------------------------------------------------------  T(C): 37.7 (04-22-23 @ 08:00), Max: 37.7 (04-22-23 @ 08:00)  HR: 103 (04-22-23 @ 10:00) (95 - 116)  BP: --  RR: 22 (04-22-23 @ 10:00) (14 - 53)  SpO2: 100% (04-22-23 @ 10:00) (95% - 100%)  Wt(kg): --        04-21-23 @ 07:01  -  04-22-23 @ 07:00  --------------------------------------------------------  IN: 1893.8 mL / OUT: 982 mL / NET: 911.8 mL    04-22-23 @ 07:01  -  04-22-23 @ 10:19  --------------------------------------------------------  IN: 400.9 mL / OUT: 0 mL / NET: 400.9 mL      Physical Exam:  	Gen: lethargic, unable to converse  	HEENT: NGT+, anicteric  	Pulm: Fair entry B/L  	CV: S1S2+  	Abd: Obese, soft; nontender  	Ext: trace edema B/L  	Neuro: lethargic  	Skin: Warm and dry              Dialysis access: Right IJ non tunneled HD catheter    LABS/STUDIES  --------------------------------------------------------------------------------              8.2    10.59 >-----------<  113      [04-22-23 @ 01:27]              26.8     134  |  100  |  19  ----------------------------<  175      [04-22-23 @ 01:27]  3.7   |  17  |  1.39        Ca     8.9     [04-22-23 @ 01:27]      Mg     2.40     [04-22-23 @ 01:27]      Phos  3.9     [04-22-23 @ 01:27]    TPro  7.2  /  Alb  3.0  /  TBili  5.0  /  DBili  x   /  AST  67  /  ALT  60  /  AlkPhos  289  [04-22-23 @ 01:27]    PT/INR: PT 24.2 , INR 2.07       [04-22-23 @ 01:27]  PTT: 41.0       [04-22-23 @ 01:27]      Creatinine Trend:  SCr 1.39 [04-22 @ 01:27]  SCr 0.84 [04-21 @ 11:30]  SCr 0.77 [04-21 @ 01:47]  SCr 0.60 [04-20 @ 12:15]  SCr 0.60 [04-20 @ 00:30]

## 2023-04-22 NOTE — PROGRESS NOTE ADULT - ASSESSMENT
48 year old female with a PMHx of HTN, hypothyroidism,  (30 years ago) and breast cancer (S/P left mastectomy and chemo in ) who presented with epigastric pain and left upper quadrant pain.  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  Transferred from OSH ([place]) for hypotension and tachypnea requiring emergent intubation and multiple pressors.  SICU consulted for hemodynamic monitoring and respiratory management.    Plan:   - TF at goal  - did not tolerate HD, continue CRRT  - PT/OT consult  - OOBtochair  - likely repeat scan next week  - Appreciate care per SICU      B Team Surgery  [id number]   48 year old female with a PMHx of HTN, hypothyroidism,  (30 years ago) and breast cancer (S/P left mastectomy and chemo in ) who presented with epigastric pain and left upper quadrant pain.  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  Transferred from OS ([place]) for hypotension and tachypnea requiring emergent intubation and multiple pressors.  SICU consulted for hemodynamic monitoring and respiratory management.    Plan:   - Increased respiratory distress, intubation per SICU   - Diet: TF at goal  - Unable to tolerate HD, continue CRRT  - Repeat scan next week  - Appreciate care per SICU      B Team Surgery  [id number]   48 year old female with a PMHx of HTN, hypothyroidism,  (30 years ago) and breast cancer (S/P left mastectomy and chemo in ) who presented with epigastric pain and left upper quadrant pain.  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  Transferred from OS ([place]) for hypotension and tachypnea requiring emergent intubation and multiple pressors.  SICU consulted for hemodynamic monitoring and respiratory management.    Plan:   - AMS, intubation per SICU for airway protection  - Diet: TF at goal  - Unable to tolerate HD, continue CRRT  - Repeat scan next week  - Appreciate care per SICU      B Team Surgery  [id number]

## 2023-04-22 NOTE — PROGRESS NOTE ADULT - SUBJECTIVE AND OBJECTIVE BOX
24 HOUR EVENTS:  - Lethargic and obtunded in AM, intubated for airway protection   - Likely aspiration, tube feeds visualized while intubating   - pH 7.06 and CO2 70 improved after 1 hour of mechanical ventilation   - increasing peak pressures, bladder pressure 11, changed to pressure support  - POCUS shows collapsed IVC with variations and no b lines b/l. 250 albumin given   - CTH and CT C/A/P with IV contrast obtained  - 2L NS and 250 5% albumin given prior to lasix challenge (240 mg in 3 hours)  - CVVH ordered to restart if lasix challenge fail     NEURO  RASS (if intubated): 	-4   Meds: acetaminophen   Oral Liquid .. 975 milliGRAM(s) Oral every 6 hours  gabapentin Solution 100 milliGRAM(s) Oral three times a day  HYDROmorphone  Injectable 0.5 milliGRAM(s) IV Push every 4 hours PRN breakthrough pain  oxyCODONE    Solution 5 milliGRAM(s) Oral every 4 hours PRN Moderate Pain (4 - 6)  oxyCODONE    Solution 10 milliGRAM(s) Oral every 4 hours PRN Severe Pain (7 - 10)  QUEtiapine 25 milliGRAM(s) Oral at bedtime    RESPIRATORY  - Mechanical ventilation: PS 22/25/8/40$     RR: 26 (21 Apr 2023 23:00) (14 - 53)  SpO2: 100% (21 Apr 2023 23:00) (95% - 100%)  O2 Parameters below as of 21 Apr 2023 20:00  Patient On (Oxygen Delivery Method): nasal cannula  O2 Flow (L/min): 2    CARDIOVASCULAR    Exam: appears well perfused  Cardiac Rhythm: sinus  Perfusion     [ ]Adequate   [X ]Inadequate  Mentation   [X ]Normal       [ ]Reduced  Extremities  [ X]Warm         [ ]Cool  Volume Status [ ]Hypervolemic [ ]Euvolemic [x]Hypovolemic    HR: 107 (21 Apr 2023 23:00) (91 - 110)  BP: --  BP(mean): --  ABP: 105/61 (21 Apr 2023 23:00) (63/40 - 129/68)  ABP(mean): 80 (21 Apr 2023 23:00) (49 - 93)    GI/NUTRITION  Exam: soft distended   Diet: TF held     GENITOURINARY    20 Apr 2023 07:01  -  21 Apr 2023 07:00  --------------------------------------------------------  IN:    Enteral Tube Flush: 630 mL    Norepinephrine: 60.5 mL    Norepinephrine: 4.4 mL    Peptamen A.F.: 1035 mL  Total IN: 1729.9 mL    OUT:    Other (mL): 2000 mL    Rectal Tube (mL): 350 mL  Total OUT: 2350 mL    Total NET: -620.1 mL    21 Apr 2023 07:01  -  22 Apr 2023 00:15  --------------------------------------------------------  IN:    Enteral Tube Flush: 100 mL    IV PiggyBack: 450 mL    Norepinephrine: 47.3 mL    Peptamen A.F.: 765 mL  Total IN: 1362.3 mL    OUT:    Other (mL): 482 mL    Rectal Tube (mL): 300 mL  Total OUT: 782 mL    Total NET: 580.3 mL    HEMATOLOGIC  Meds: heparin   Injectable 5000 Unit(s) SubCutaneous every 8 hours               8.5    12.29 )-----------( 104      ( 21 Apr 2023 01:47 )             26.4     136  |  101  |  10  ----------------------------<  156<H>  3.1<L>   |  21<L>  |  0.84    Ca    9.3      21 Apr 2023 11:30  Phos  3.3     04-21  Mg     2.40     04-21    TPro  7.9  /  Alb  3.2<L>  /  TBili  5.6<H>  /  DBili  x   /  AST  85<H>  /  ALT  69<H>  /  AlkPhos  360<H>  04-21  PT/INR - ( 21 Apr 2023 01:47 )   PT: 24.3 sec;   INR: 2.08 ratio    PTT - ( 21 Apr 2023 01:47 )  PTT:38.2 sec    INFECTIOUS DISEASES  T(C): 37.2 (21 Apr 2023 20:00), Max: 37.2 (21 Apr 2023 20:00)  T(F): 99 (21 Apr 2023 20:00), Max: 99 (21 Apr 2023 20:00)    ENDOCRINE    Meds: dextrose 50% Injectable 25 Gram(s) IV Push once  dextrose 50% Injectable 12.5 Gram(s) IV Push once  insulin glargine Injectable (LANTUS) 9 Unit(s) SubCutaneous at bedtime  insulin lispro (ADMELOG) corrective regimen sliding scale   SubCutaneous every 6 hours  insulin lispro Injectable (ADMELOG) 2 Unit(s) SubCutaneous every 6 hours  levothyroxine Injectable 20 MICROGram(s) IV Push at bedtime  oxandrolone 5 milliGRAM(s) Oral three times a day    TUBES / LINES / DRAINS  [x] Peripheral IV  [x] Central Venous Line     	[] R	[x] L	[x] IJ	[] Fem	[] SC	Date Placed:  [x] R Shiley   [x] Arterial Line		[] R	[] L	[] Fem	[] Rad	[] Ax	Date Placed:   [x] NGT  [] PICC		[] Midline		[] Mediport  [] Urinary Catheter		Date Placed:   [x] Necessity of urinary, arterial, and venous catheters discussed    OTHER MEDICATIONS:  chlorhexidine 2% Cloths 1 Application(s) Topical daily  CRRT Treatment    <Continuous>  Phoxillum Filtration BK 4 / 2.5 5000 milliLiter(s) CRRT <Continuous>  Phoxillum Filtration BK 4 / 2.5 5000 milliLiter(s) CRRT <Continuous>  PrismaSATE Dialysate BK 0 / 3.5 5000 milliLiter(s) CRRT <Continuous>

## 2023-04-22 NOTE — AIRWAY PLACEMENT NOTE ADULT - AIRWAY COMMENTS:
multiple secretions located in the back of the mouth during video laryngoscopy, suctioned but likely the patient had already aspirated prior to induction.

## 2023-04-22 NOTE — PROGRESS NOTE ADULT - PROBLEM SELECTOR PLAN 1
Pt. with oliguric CHATO in the setting of necrotizing pancreatitis and shock. Pt. with most likely ATN. Scr was 1.24 on 3/23/23 and increased to 4.60 on 3/24/23. Pt. was initiated on CRRT/CVVHDF on 3/24/23 and discontinued overnight on 4/6/23 by SICU team. Pt. remained oliguric with hypotension so restarted on CVVHDH on 4/7/23. CRRT held on 4/17/23 but restarted on 4/18 as patient remained hypotensive and oliguric. CRRT held by SICU on 4/21 but will resume today as patient is oliguric and intubated now. Vitals and labs reviewed. Pt remains critically ill and continues to require intermittent vasopressors for low BPs. Remains on Midodrine 40 TID. Monitor labs and urine output. Avoid any potential nephrotoxins. Dose medications as per eGFR.    If you have any questions, please feel free to contact me  Neville Schulz  Nephrology Fellow  [phone]; Prefer [employer] TEAMS  (After 5pm or on weekends please page the on-call fellow).

## 2023-04-22 NOTE — PROGRESS NOTE ADULT - ASSESSMENT
48 year old female with PMHx significant for HTN, hypothyroidism,  30 years ago, breast cancer s/p left mastectomy and chemo in  presenting with epigastric pain and LUQ pain. Found to have necrotizing pancreatitis. Transferred from OSH ([place]) for hypotension and tachypnea requiring emergent intubation and multiple pressors. SICU consulted for hemodynamic monitoring and respiratory management.    PLAN  Neuro:  - obtunded in AM; will d/c Seroquel 25mg Qhs  -CT head ordered   - Precedex for sedation  - Tylenol gabapentin and Dilaudid prn      Resp:  - Intubated on rounds for AMS   - AHRF w/ b/l plef 2/2 necrotizing pancreatitis  - CXR : small L layering effusion  - POCUS, night of : B/l small effusions noted.   - CT chest to r/o aspiration PNA     CV:  Mixed shock state 2/2 septic shock w/ E. Coli in urine vs hypovolemic shock now s/p 11L resuscitation   - Back on minimal pressor, levo 0.01, for MAP in low 50s   - Midodrine 40 q 8 hours    GI:   - TF held for possible aspiration event   - GI ppx w/ Protonix   - Creon  - no walled off collection on CT; Repeat CT A/P today   - bowel regimen: Miralax senna    Renal:  - ARF 2/2 shock state resulting in ATN requiring CRRT    - CRRT held, likely resume tonight if lasix challenge fail  - Lasix challenge today  - Babin reinserted on     Heme:   - SCDs and SQH, no longer on fondaparinux  - HIT Ab positive; DOUGLAS Negative    #Splenic Vein Thrombosis  3/22 CT Abd w/ contrast: Focal filling defect in the splenic vein at the level of the body of the pancreas for which an underlying thrombus is suspected     ID:   Intermittently febrile w/ leukocytosis c/f septic shock 2/2 E coli UTI  Diagnostics:  - 3/22 Ucx: E coli  - 3/30 Bcx: staph hominis in aerobic bottle x 1 (likely contaminant)   - S/p Meropenem (3/26-, -)    Endo:   - stress dose Solu-cortef 150mg x1 then 50 mg q6   - Lantus 9 units at bedtime, ADMELOG 2 units q6  - c/w synthroid 20 IV (home med)   - ISS    Code Status: Full code  Lines: KELLY Mendoza CVC, A line    Disposition: SICU

## 2023-04-22 NOTE — PROGRESS NOTE ADULT - SUBJECTIVE AND OBJECTIVE BOX
B TEAM SURGERY DAILY PROGRESS NOTE    SUBJECTIVE:   Overnight: no acute events   Patient seen and evaluated on AM rounds.       OBJECTIVE:  Vital Signs Last 24 Hrs  T(C): 37.6 (2023 04:00), Max: 37.6 (2023 04:00)  T(F): 99.7 (2023 04:00), Max: 99.7 (2023 04:00)  HR: 112 (2023 06:00) (91 - 114)  RR: 24 (2023 06:00) (14 - 53)  SpO2: 99% (2023 06:00) (95% - 100%)    Parameters below as of :00  Patient On (Oxygen Delivery Method): nasal cannula  O2 Flow (L/min): 2    Daily     Daily Weight in k.9 (2023 04:00)        --------------------------------------------------------  IN:    Enteral Tube Flush: 300 mL    IV PiggyBack: 450 mL    Norepinephrine: 63.8 mL    Peptamen A.F.: 1080 mL  Total IN: 1893.8 mL    OUT:    Other (mL): 482 mL    Rectal Tube (mL): 500 mL  Total OUT: 982 mL        STANDING  acetaminophen   Oral Liquid .. 975 milliGRAM(s) Oral every 6 hours  chlorhexidine 2% Cloths 1 Application(s) Topical daily  CRRT Treatment    <Continuous>  dextrose 50% Injectable 25 Gram(s) IV Push once  dextrose 50% Injectable 12.5 Gram(s) IV Push once  gabapentin Solution 100 milliGRAM(s) Oral three times a day  heparin   Injectable 5000 Unit(s) SubCutaneous every 8 hours  insulin glargine Injectable (LANTUS) 9 Unit(s) SubCutaneous at bedtime  insulin lispro (ADMELOG) corrective regimen sliding scale   SubCutaneous every 6 hours  insulin lispro Injectable (ADMELOG) 2 Unit(s) SubCutaneous every 6 hours  levothyroxine Injectable 20 MICROGram(s) IV Push at bedtime  midodrine 40 milliGRAM(s) Oral every 8 hours  multivitamin/minerals/iron Oral Solution (CENTRUM) 15 milliLiter(s) Oral daily  norepinephrine Infusion 0.05 MICROgram(s)/kG/Min (5.53 mL/Hr) IV Continuous <Continuous>  oxandrolone 5 milliGRAM(s) Oral three times a day  pancrelipase  (CREON 12,000 Lipase Units) 1 Capsule(s) Oral <User Schedule>  pantoprazole   Suspension 40 milliGRAM(s) Oral daily  Phoxillum Filtration BK 4 / 2.5 5000 milliLiter(s) (1000 mL/Hr) CRRT <Continuous>  Phoxillum Filtration BK 4 / 2.5 5000 milliLiter(s) (200 mL/Hr) CRRT <Continuous>  Phoxillum Filtration BK 4 / 2.5 5000 milliLiter(s) (1200 mL/Hr) CRRT <Continuous>  polyethylene glycol 3350 17 Gram(s) Oral daily  QUEtiapine 25 milliGRAM(s) Oral at bedtime    PRN  HYDROmorphone  Injectable 0.5 milliGRAM(s) IV Push every 4 hours PRN breakthrough pain  oxyCODONE    Solution 5 milliGRAM(s) Oral every 4 hours PRN Moderate Pain (4 - 6)  oxyCODONE    Solution 10 milliGRAM(s) Oral every 4 hours PRN Severe Pain (7 - 10)      Labs:  134<L>  |  100  |  19  ----------------------------<  175<H>    ()  3.7   |  17<L>  |  1.39<H>            Ca    8.9        Mg    2.40  Phos  3.9          PT: 24.2 sec       aPTT: 41.0 sec   INR: 2.07 ratio             Physical Exam:  General Appearance: Appears well, NAD  Neck: Supple  Chest: Equal expansion bilaterally, equal breath sounds  CV: Pulse regular presently  Abdomen: Soft, nontender, moderately distended  Extremities: Grossly symmetric, SCD's in place      B TEAM SURGERY DAILY PROGRESS NOTE    SUBJECTIVE:   Patient seen and evaluated on AM rounds.       OBJECTIVE:  Vital Signs Last 24 Hrs  T(C): 37.6 (2023 04:00), Max: 37.6 (2023 04:00)  T(F): 99.7 (2023 04:00), Max: 99.7 (2023 04:00)  HR: 112 (2023 06:00) (91 - 114)  RR: 24 (2023 06:00) (14 - 53)  SpO2: 99% (2023 06:00) (95% - 100%)    Parameters below as of :00  Patient On (Oxygen Delivery Method): nasal cannula  O2 Flow (L/min): 2    Daily     Daily Weight in k.9 (2023 04:00)        --------------------------------------------------------  IN:    Enteral Tube Flush: 300 mL    IV PiggyBack: 450 mL    Norepinephrine: 63.8 mL    Peptamen A.F.: 1080 mL  Total IN: 1893.8 mL    OUT:    Other (mL): 482 mL    Rectal Tube (mL): 500 mL  Total OUT: 982 mL        STANDING  acetaminophen   Oral Liquid .. 975 milliGRAM(s) Oral every 6 hours  chlorhexidine 2% Cloths 1 Application(s) Topical daily  CRRT Treatment    <Continuous>  dextrose 50% Injectable 25 Gram(s) IV Push once  dextrose 50% Injectable 12.5 Gram(s) IV Push once  gabapentin Solution 100 milliGRAM(s) Oral three times a day  heparin   Injectable 5000 Unit(s) SubCutaneous every 8 hours  insulin glargine Injectable (LANTUS) 9 Unit(s) SubCutaneous at bedtime  insulin lispro (ADMELOG) corrective regimen sliding scale   SubCutaneous every 6 hours  insulin lispro Injectable (ADMELOG) 2 Unit(s) SubCutaneous every 6 hours  levothyroxine Injectable 20 MICROGram(s) IV Push at bedtime  midodrine 40 milliGRAM(s) Oral every 8 hours  multivitamin/minerals/iron Oral Solution (CENTRUM) 15 milliLiter(s) Oral daily  norepinephrine Infusion 0.05 MICROgram(s)/kG/Min (5.53 mL/Hr) IV Continuous <Continuous>  oxandrolone 5 milliGRAM(s) Oral three times a day  pancrelipase  (CREON 12,000 Lipase Units) 1 Capsule(s) Oral <User Schedule>  pantoprazole   Suspension 40 milliGRAM(s) Oral daily  Phoxillum Filtration BK 4 / 2.5 5000 milliLiter(s) (1000 mL/Hr) CRRT <Continuous>  Phoxillum Filtration BK 4 / 2.5 5000 milliLiter(s) (200 mL/Hr) CRRT <Continuous>  Phoxillum Filtration BK 4 / 2.5 5000 milliLiter(s) (1200 mL/Hr) CRRT <Continuous>  polyethylene glycol 3350 17 Gram(s) Oral daily  QUEtiapine 25 milliGRAM(s) Oral at bedtime    PRN  HYDROmorphone  Injectable 0.5 milliGRAM(s) IV Push every 4 hours PRN breakthrough pain  oxyCODONE    Solution 5 milliGRAM(s) Oral every 4 hours PRN Moderate Pain (4 - 6)  oxyCODONE    Solution 10 milliGRAM(s) Oral every 4 hours PRN Severe Pain (7 - 10)      Labs:  134<L>  |  100  |  19  ----------------------------<  175<H>    ()  3.7   |  17<L>  |  1.39<H>            Ca    8.9        Mg    2.40  Phos  3.9          PT: 24.2 sec       aPTT: 41.0 sec   INR: 2.07 ratio             Physical Exam:  General Appearance: Appears well, NAD  Neck: Supple  Chest: Equal expansion bilaterally, equal breath sounds  CV: Pulse regular presently  Abdomen: Soft, nontender, moderately distended  Extremities: Grossly symmetric, SCD's in place

## 2023-04-22 NOTE — PROGRESS NOTE ADULT - ATTENDING COMMENTS
Patient seen this morning prior to intubation discussed with primary team.  Plan would have been to trial intermittent hemodialysis but patient worsened throughout the morning.  Given low blood pressure, hemodynamic instability, oliguria and worsening fluid balance will opt to renew CRRT rather than IHD.

## 2023-04-22 NOTE — PROGRESS NOTE ADULT - ASSESSMENT
48 year old female with PMHx significant for HTN, hypothyroidism,  30 years ago, breast cancer s/p left mastectomy and chemo in  presenting with epigastric pain and LUQ pain. Found to have necrotizing pancreatitis. Transferred from OSH ([place]) for hypotension and tachypnea requiring emergent intubation and multiple pressors. SICU consulted for hemodynamic monitoring and respiratory management.    PLAN  Neuro:  - Precautions for delirium - protected sleep  - Seroquel 25mg Qhs  - tylenol, gabapentin and Dilaudid prn    Resp:  - Tachypneic  - AHRF w/ b/l plef 2/2 necrotizing pancreatitis  - On RA  - CXR : small L layering effusion  - POCUS, night of : B/l sm effusions noted.     CV:  Mixed shock state 2/2 septic shock w/ E. Coli in urine vs hypovolemic shock now s/p 11L resus    - Back on minimal pressor, levo 0.01, for MAP in low 50s   - Midodrine 40 q 8 hours    GI:   - TF via post pyloric tube   - GI ppx w/ protonix   - Creon  - No GI intervention at this time; no walled off collection on CT  - Miralax  - senna  - Considering CT possibly Saturday     Renal:  - ARF 2/2 shock state resulting in ATN requiring CRRT    - nephro recs: continue CRRT, bridge to iHD when BP tolerates      Heme:   - SCDs and SQH, no longer on fondaparinux  - HIT Ab positive; DOUGLAS Negative    #Splenic Vein Thrombosis  3/22 CT Abd w/ contrast: Focal filling defect in the splenic vein at the level of the body of the pancreas for which an underlying thrombus is suspected     ID:   Intermittently febrile w/ leukocytosis c/f septic shock 2/2 E coli UTI  Diagnostics:  - 3/22 Ucx: E coli  - 3/30 Bcx: staph hominis in aerobic bottle x 1 (likely contaminant)   - S/p Meropenem (3/26-, -)    Endo:   - Lantus 9 units at bedtime, admelog 2 units q6  - c/w synthroid 20 IV (home med)   - ISS    Code Status: Full code  Lines: KELLY Mendoza CVC, A line    Disposition: SICU 48 year old female with PMHx significant for HTN, hypothyroidism,  30 years ago, breast cancer s/p left mastectomy and chemo in  presenting with epigastric pain and LUQ pain. Found to have necrotizing pancreatitis. Transferred from OSH ([place]) for hypotension and tachypnea requiring emergent intubation and multiple pressors. SICU consulted for hemodynamic monitoring and respiratory management.    PLAN  Neuro:  - obtunded in AM; will d/c Seroquel 25mg Qhs  -CT head ordered   - Precedex for sedation  - Tylenol gabapentin and Dilaudid prn      Resp:  - Intubated on rounds for AMS   - AHRF w/ b/l plef 2/2 necrotizing pancreatitis  - CXR : small L layering effusion  - POCUS, night of : B/l small effusions noted.   - CT chest to r/o aspiration PNA     CV:  Mixed shock state 2/2 septic shock w/ E. Coli in urine vs hypovolemic shock now s/p 11L resuscitation   - Back on minimal pressor, levo 0.01, for MAP in low 50s   - Midodrine 40 q 8 hours    GI:   - TF held for possible aspiration event   - GI ppx w/ Protonix   - Creon  - no walled off collection on CT; Repeat CT A/P today   - bowel regimen: Miralax senna    Renal:  - ARF 2/2 shock state resulting in ATN requiring CRRT    - nephro recs: continue CRRT, bridge to iHD when BP tolerates      Heme:   - SCDs and SQH, no longer on fondaparinux  - HIT Ab positive; DOUGLAS Negative    #Splenic Vein Thrombosis  3/22 CT Abd w/ contrast: Focal filling defect in the splenic vein at the level of the body of the pancreas for which an underlying thrombus is suspected     ID:   Intermittently febrile w/ leukocytosis c/f septic shock 2/2 E coli UTI  Diagnostics:  - 3/22 Ucx: E coli  - 3/30 Bcx: staph hominis in aerobic bottle x 1 (likely contaminant)   - S/p Meropenem (3/26-, -)    Endo:   - stress dose Solu-cortef 150mg x1 then 50 mg q6   - Lantus 9 units at bedtime, ADMELOG 2 units q6  - c/w synthroid 20 IV (home med)   - ISS    Code Status: Full code  Lines: KELLY Mendoza, A line    Disposition: SICU

## 2023-04-22 NOTE — PROGRESS NOTE ADULT - ASSESSMENT
49yo F with PMH significant for HTN, hypothyroidism, breast cancer (s/p left mastectomy and chemotherapy, 2008) who presented with epigastric pain, found to have severe necrotizing pancreatitis with hospital course ARF related to ATN requiring CRRT  in addition to mixed shock state 2/2 septic shock w/ E. Coli in urine GI consulted for possible drainage of pancreatic collections with pt currently on Meropenem.     # Severe necrotizing pancreatitis   #Splenic Vein Thrombosis  Presenting with multiorgan failure, requiring pressor support with overall etiology and acute trigger remains unknown. Pt with serial CT last done on 4/22/2023 with extensive necrotizing pancreatitis with acute necrotic collections, one of which is slightly increased in size (Lionel grade E with CT severity index of 10). Will review imaging with advanced endoscopy attending to asses if any collections are walled off and amenable to drainage.     Recommendations:  - Will review imaging with advanced endoscopy attending to asses if any collections are walled off and amenable to drainage.   - Repeat BCx, abx per SIC   - Trend CMP, CBC, coags    All recommendations are tentative until note is attested by attending.     Cholo Montemayor, PGY-4  Gastroenterology/Hepatology Fellow  Available on [employer] Teams   [phone] (Long Range Pager)  [phone] (Short Range Pager [facility])    After 5pm, please contact the on-call GI fellow. [phone]

## 2023-04-22 NOTE — PROGRESS NOTE ADULT - SUBJECTIVE AND OBJECTIVE BOX
Gastroenterology Progress Note    Interval Events:   GI reconsulted to asses if drainage of pancreatic collections possible based on repeat CT. Pt overall improving up untill this AM after which she had AMS prompting reintubation with team planning to restart CRRT.     Allergies:  No Known Allergies      Hospital Medications:  acetaminophen   IVPB .. 500 milliGRAM(s) IV Intermittent every 6 hours PRN  chlorhexidine 0.12% Liquid 15 milliLiter(s) Oral Mucosa every 12 hours  chlorhexidine 2% Cloths 1 Application(s) Topical daily  CRRT Treatment    <Continuous>  dexMEDEtomidine Infusion 0.2 MICROgram(s)/kG/Hr IV Continuous <Continuous>  dextrose 50% Injectable 25 Gram(s) IV Push once  dextrose 50% Injectable 12.5 Gram(s) IV Push once  furosemide   Injectable 80 milliGRAM(s) IV Push <User Schedule>  gabapentin Solution 100 milliGRAM(s) Oral three times a day  heparin   Injectable 5000 Unit(s) SubCutaneous every 8 hours  HYDROmorphone  Injectable 0.5 milliGRAM(s) IV Push every 4 hours PRN  insulin glargine Injectable (LANTUS) 9 Unit(s) SubCutaneous at bedtime  insulin lispro (ADMELOG) corrective regimen sliding scale   SubCutaneous every 6 hours  insulin lispro Injectable (ADMELOG) 2 Unit(s) SubCutaneous every 6 hours  levothyroxine Injectable 20 MICROGram(s) IV Push at bedtime  midodrine 40 milliGRAM(s) Oral every 8 hours  multivitamin/minerals/iron Oral Solution (CENTRUM) 15 milliLiter(s) Oral daily  norepinephrine Infusion 0.05 MICROgram(s)/kG/Min IV Continuous <Continuous>  oxandrolone 5 milliGRAM(s) Oral three times a day  oxyCODONE    Solution 5 milliGRAM(s) Oral every 4 hours PRN  oxyCODONE    Solution 10 milliGRAM(s) Oral every 4 hours PRN  pancrelipase  (CREON 12,000 Lipase Units) 1 Capsule(s) Oral <User Schedule>  pantoprazole   Suspension 40 milliGRAM(s) Oral daily  Phoxillum Filtration BK 4 / 2.5 5000 milliLiter(s) CRRT <Continuous>  Phoxillum Filtration BK 4 / 2.5 5000 milliLiter(s) CRRT <Continuous>  polyethylene glycol 3350 17 Gram(s) Oral daily  PrismaSATE Dialysate BGK 4 / 2.5 5000 milliLiter(s) CRRT <Continuous>      ROS: 14 point ROS negative unless otherwise state in subjective    PHYSICAL EXAM:   Vital Signs:  Vital Signs Last 24 Hrs  T(C): 37.4 (2023 16:00), Max: 37.7 (2023 08:00)  T(F): 99.4 (2023 16:00), Max: 99.9 (2023 08:00)  HR: 75 (2023 16:30) (74 - 116)  BP: --  BP(mean): --  RR: 20 (2023 16:30) (17 - 53)  SpO2: 100% (2023 16:30) (95% - 100%)    Parameters below as of 2023 08:50  Patient On (Oxygen Delivery Method): ventilator,Pressure control IP 25 Peep 8 Rate 22    O2 Concentration (%): 40  Daily     Daily Weight in k.9 (2023 04:00)    GENERAL: Intubated/Sedated   HEENT:  +scleral icterus  CHEST: no resp distress  HEART:  RRR  ABDOMEN:  Soft, non-tender, non-distended, normoactive bowel sounds,  EXTREMITIES:  1+ edema b/l  NEURO:  Intubated/Sedated     LABS:                        8.2    10.59 )-----------( 113      ( 2023 01:27 )             26.8     Mean Cell Volume: 92.4 fL (23 @ 01:27)        136  |  101  |  27<H>  ----------------------------<  199<H>  4.0   |  16<L>  |  1.89<H>    Ca    9.0      2023 10:20  Phos  4.9       Mg     2.70         TPro  7.5  /  Alb  3.2<L>  /  TBili  4.7<H>  /  DBili  x   /  AST  67<H>  /  ALT  58<H>  /  AlkPhos  279<H>      LIVER FUNCTIONS - ( 2023 10:20 )  Alb: 3.2 g/dL / Pro: 7.5 g/dL / ALK PHOS: 279 U/L / ALT: 58 U/L / AST: 67 U/L / GGT: x           PT/INR - ( 2023 01:27 )   PT: 24.2 sec;   INR: 2.07 ratio    PTT - ( 2023 01:27 )  PTT:41.0 sec      Imaging:    < from: CT Chest w/ IV Cont (23 @ 12:31) >  ABDOMEN AND PELVIS:  LIVER: Within normallimits.  BILE DUCTS: Normal caliber.  GALLBLADDER: Contracted  SPLEEN: Within normal limits.  PANCREAS: RedemonstraQtion of extensive necrotizing pancreatitis with   acute necrotic collection without a well-defined wall measuring 13.5 x   4.2 cm, previously 12.8 x 3.3 cm. This collection replaces the majority   of the pancreas except a small portion of the pancreatic head. Another   fluid collection without definable along the greater curvature of the   stomach is unchanged in size, measuring 10.3x 4.8 cm. Lionel grade E,   CT severity index 10. Peripancreatic necrosis is identified in the   mesentery.  ADRENALS: Within normal limits.  KIDNEYS/URETERS: Within normal limits.    BLADDER: Babin catheter.  REPRODUCTIVE ORGANS: Distention of endometrial cavity is again seen.    BOWEL: No bowel obstruction. Appendix is not visualized. Feeding tube   with tip terminating in the stomach. Rectal tube in place.  PERITONEUM: Moderate volume ascites again seen.  VESSELS: Splenic vein is again not visualized.  RETROPERITONEUM/LYMPH NODES: No lymphadenopathy.  ABDOMINAL WALL: Improvement of anasarca.  BONES: Within normal limits.    IMPRESSION:  Endotracheal tube terminating in at the origin of the right mainstem   bronchus. Recommend retraction.    Unchanged left pleural effusion with near complete atelectasis of the   left lower lobe and patchy right upper and lower lobe opacities.    Redemonstration of extensive necrotizing pancreatitis with acute necrotic   collections, one of which is slightly increased in size. Lionel grade   E with CT severity index of 10.    < end of copied text >

## 2023-04-22 NOTE — PROGRESS NOTE ADULT - SUBJECTIVE AND OBJECTIVE BOX
24 HOUR EVENTS:  - ARELY.     NEURO  RASS (if intubated): 		[facility] ICU (if concern for delirium):  Exam: A&Ox3, drowsy but easily roused from sleep   Meds: acetaminophen   Oral Liquid .. 975 milliGRAM(s) Oral every 6 hours  gabapentin Solution 100 milliGRAM(s) Oral three times a day  HYDROmorphone  Injectable 0.5 milliGRAM(s) IV Push every 4 hours PRN breakthrough pain  oxyCODONE    Solution 5 milliGRAM(s) Oral every 4 hours PRN Moderate Pain (4 - 6)  oxyCODONE    Solution 10 milliGRAM(s) Oral every 4 hours PRN Severe Pain (7 - 10)  QUEtiapine 25 milliGRAM(s) Oral at bedtime    RESPIRATORY  No inc WOB. Satting as below on nc for comfort. B/l chest rise.     RR: 26 (21 Apr 2023 23:00) (14 - 53)  SpO2: 100% (21 Apr 2023 23:00) (95% - 100%)  O2 Parameters below as of 21 Apr 2023 20:00  Patient On (Oxygen Delivery Method): nasal cannula  O2 Flow (L/min): 2    CARDIOVASCULAR    Exam: appears well perfused  Cardiac Rhythm: sinus  Perfusion     [ ]Adequate   [X ]Inadequate  Mentation   [X ]Normal       [ ]Reduced  Extremities  [ X]Warm         [ ]Cool  Volume Status [ ]Hypervolemic [ X]Euvolemic [ ]Hypovolemic    HR: 107 (21 Apr 2023 23:00) (91 - 110)  BP: --  BP(mean): --  ABP: 105/61 (21 Apr 2023 23:00) (63/40 - 129/68)  ABP(mean): 80 (21 Apr 2023 23:00) (49 - 93)    GI/NUTRITION  Exam: soft nondistended  Diet: TFs    GENITOURINARY    20 Apr 2023 07:01  -  21 Apr 2023 07:00  --------------------------------------------------------  IN:    Enteral Tube Flush: 630 mL    Norepinephrine: 60.5 mL    Norepinephrine: 4.4 mL    Peptamen A.F.: 1035 mL  Total IN: 1729.9 mL    OUT:    Other (mL): 2000 mL    Rectal Tube (mL): 350 mL  Total OUT: 2350 mL    Total NET: -620.1 mL    21 Apr 2023 07:01  -  22 Apr 2023 00:15  --------------------------------------------------------  IN:    Enteral Tube Flush: 100 mL    IV PiggyBack: 450 mL    Norepinephrine: 47.3 mL    Peptamen A.F.: 765 mL  Total IN: 1362.3 mL    OUT:    Other (mL): 482 mL    Rectal Tube (mL): 300 mL  Total OUT: 782 mL    Total NET: 580.3 mL    HEMATOLOGIC  Meds: heparin   Injectable 5000 Unit(s) SubCutaneous every 8 hours               8.5    12.29 )-----------( 104      ( 21 Apr 2023 01:47 )             26.4     136  |  101  |  10  ----------------------------<  156<H>  3.1<L>   |  21<L>  |  0.84    Ca    9.3      21 Apr 2023 11:30  Phos  3.3     04-21  Mg     2.40     04-21    TPro  7.9  /  Alb  3.2<L>  /  TBili  5.6<H>  /  DBili  x   /  AST  85<H>  /  ALT  69<H>  /  AlkPhos  360<H>  04-21  PT/INR - ( 21 Apr 2023 01:47 )   PT: 24.3 sec;   INR: 2.08 ratio    PTT - ( 21 Apr 2023 01:47 )  PTT:38.2 sec    INFECTIOUS DISEASES  T(C): 37.2 (21 Apr 2023 20:00), Max: 37.2 (21 Apr 2023 20:00)  T(F): 99 (21 Apr 2023 20:00), Max: 99 (21 Apr 2023 20:00)    ENDOCRINE    Meds: dextrose 50% Injectable 25 Gram(s) IV Push once  dextrose 50% Injectable 12.5 Gram(s) IV Push once  insulin glargine Injectable (LANTUS) 9 Unit(s) SubCutaneous at bedtime  insulin lispro (ADMELOG) corrective regimen sliding scale   SubCutaneous every 6 hours  insulin lispro Injectable (ADMELOG) 2 Unit(s) SubCutaneous every 6 hours  levothyroxine Injectable 20 MICROGram(s) IV Push at bedtime  oxandrolone 5 milliGRAM(s) Oral three times a day    TUBES / LINES / DRAINS  [x] Peripheral IV  [x] Central Venous Line     	[] R	[x] L	[x] IJ	[] Fem	[] SC	Date Placed:  [x] R Shiley   [x] Arterial Line		[] R	[] L	[] Fem	[] Rad	[] Ax	Date Placed:   [x] NGT  [] PICC		[] Midline		[] Mediport  [] Urinary Catheter		Date Placed:   [x] Necessity of urinary, arterial, and venous catheters discussed    OTHER MEDICATIONS:  chlorhexidine 2% Cloths 1 Application(s) Topical daily  CRRT Treatment    <Continuous>  Phoxillum Filtration BK 4 / 2.5 5000 milliLiter(s) CRRT <Continuous>  Phoxillum Filtration BK 4 / 2.5 5000 milliLiter(s) CRRT <Continuous>  PrismaSATE Dialysate BK 0 / 3.5 5000 milliLiter(s) CRRT <Continuous>

## 2023-04-22 NOTE — PROGRESS NOTE ADULT - ATTENDING COMMENTS
47yo F with PMH significant for HTN, hypothyroidism, breast cancer (s/p left mastectomy and chemotherapy, 2008) who presented with epigastric pain, found to have severe necrotizing pancreatitis with hospital course ARF related to ATN requiring CRRT  in addition to mixed shock state 2/2 septic shock w/ E. Coli in urine GI consulted for possible drainage of pancreatic collections with pt currently on Meropenem.     # Severe necrotizing pancreatitis   #Splenic Vein Thrombosis  Presenting with multiorgan failure, requiring pressor support with overall etiology and acute trigger remains unknown. Pt with serial CT last done on 4/22/2023 with extensive necrotizing pancreatitis with acute necrotic collections, one of which is slightly increased in size (Lionel grade E with CT severity index of 10). Will review imaging with advanced endoscopy attending to asses if any collections are walled off and amenable to drainage.     Recommendations:  - Will review imaging with advanced endoscopy attending to asses if any collections are walled off and amenable to drainage.   - Repeat BCx, abx per SIC   - Trend CMP, CBC, coags

## 2023-04-23 NOTE — PROGRESS NOTE ADULT - ASSESSMENT
48 year old female with PMHx significant for HTN, hypothyroidism,  30 years ago, breast cancer s/p left mastectomy and chemo in  presenting with epigastric pain and LUQ pain. Found to have necrotizing pancreatitis. Transferred from OSH ([place]) for hypotension and tachypnea requiring emergent intubation and multiple pressors. SICU consulted for hemodynamic monitoring and respiratory management.    PLAN  Neuro:  -  CTH negative for acute pathology   - Precedex for sedation  - Tylenol gabapentin and Dilaudid prn      Resp:  - intubated  for AMS 2/2 to airway protection  - PS RR 22/PIP 25/PEEP 8/FiO2 40%    CV:  Mixed shock state 2/2 septic shock w/ E. Coli in urine vs hypovolemic shock now s/p 11L resuscitation   - Midodrine 40 q 8 hours  - Stress steroids hydrocortisone 50q6  started      GI:   - Peptamen TF held for possible aspiration event, KO tube post pyloric   - GI ppx w/ Protonix   - Creon  - repeat CT A/P , f/u GI recs     Renal:  - ARF 2/2 shock state resulting in ATN requiring CRRT    - Failed lasix challenge    - Babin reinserted on     Heme:   - SCDs and SQH, no longer on fondaparinux  - HIT Ab positive; DOUGLAS Negative    #Splenic Vein Thrombosis  3/22 CT Abd w/ contrast: Focal filling defect in the splenic vein at the level of the body of the pancreas for which an underlying thrombus is suspected     ID:   Intermittently febrile w/ leukocytosis c/f septic shock 2/2 E coli UTI  Diagnostics:  - 3/22 Ucx: E coli  - 3/30 Bcx: staph hominis in aerobic bottle x 1 (likely contaminant)   - S/p Meropenem (3/26-, -)    Endo:   - stress dose Solu-cortef 150mg x1 then 50 mg q6   - Lantus 9 units at bedtime, ADMELOG 2 units q6  - c/w synthroid 20 IV (home med)   - ISS    Code Status: Full code  Lines: SIDDHARTHA Mendoza    Disposition: SICU

## 2023-04-23 NOTE — PROGRESS NOTE ADULT - SUBJECTIVE AND OBJECTIVE BOX
24 HOUR EVENTS:  - No response to lasix challenge  - CVVH restarted overnight  - [facility] CVC removed due to dislodgement, new RUE arrow placed      NEURO  RASS (if intubated): 	-4   Meds: acetaminophen   Oral Liquid .. 975 milliGRAM(s) Oral every 6 hours  gabapentin Solution 100 milliGRAM(s) Oral three times a day  HYDROmorphone  Injectable 0.5 milliGRAM(s) IV Push every 4 hours PRN breakthrough pain  oxyCODONE    Solution 5 milliGRAM(s) Oral every 4 hours PRN Moderate Pain (4 - 6)  oxyCODONE    Solution 10 milliGRAM(s) Oral every 4 hours PRN Severe Pain (7 - 10)  QUEtiapine 25 milliGRAM(s) Oral at bedtime    RESPIRATORY  - Mechanical ventilation: PS 22/25/8/40$     RR: 26 (21 Apr 2023 23:00) (14 - 53)  SpO2: 100% (21 Apr 2023 23:00) (95% - 100%)  O2 Parameters below as of 21 Apr 2023 20:00  Patient On (Oxygen Delivery Method): nasal cannula  O2 Flow (L/min): 2    CARDIOVASCULAR    Exam: appears well perfused  Cardiac Rhythm: sinus  Perfusion     [ ]Adequate   [X ]Inadequate  Mentation   [X ]Normal       [ ]Reduced  Extremities  [ X]Warm         [ ]Cool  Volume Status [ ]Hypervolemic [ ]Euvolemic [x]Hypovolemic    HR: 107 (21 Apr 2023 23:00) (91 - 110)  BP: --  BP(mean): --  ABP: 105/61 (21 Apr 2023 23:00) (63/40 - 129/68)  ABP(mean): 80 (21 Apr 2023 23:00) (49 - 93)    GI/NUTRITION  Exam: soft distended   Diet: TF held     GENITOURINARY    20 Apr 2023 07:01  -  21 Apr 2023 07:00  --------------------------------------------------------  IN:    Enteral Tube Flush: 630 mL    Norepinephrine: 60.5 mL    Norepinephrine: 4.4 mL    Peptamen A.F.: 1035 mL  Total IN: 1729.9 mL    OUT:    Other (mL): 2000 mL    Rectal Tube (mL): 350 mL  Total OUT: 2350 mL    Total NET: -620.1 mL    21 Apr 2023 07:01  -  22 Apr 2023 00:15  --------------------------------------------------------  IN:    Enteral Tube Flush: 100 mL    IV PiggyBack: 450 mL    Norepinephrine: 47.3 mL    Peptamen A.F.: 765 mL  Total IN: 1362.3 mL    OUT:    Other (mL): 482 mL    Rectal Tube (mL): 300 mL  Total OUT: 782 mL    Total NET: 580.3 mL    HEMATOLOGIC  Meds: heparin   Injectable 5000 Unit(s) SubCutaneous every 8 hours               8.5    12.29 )-----------( 104      ( 21 Apr 2023 01:47 )             26.4     136  |  101  |  10  ----------------------------<  156<H>  3.1<L>   |  21<L>  |  0.84    Ca    9.3      21 Apr 2023 11:30  Phos  3.3     04-21  Mg     2.40     04-21    TPro  7.9  /  Alb  3.2<L>  /  TBili  5.6<H>  /  DBili  x   /  AST  85<H>  /  ALT  69<H>  /  AlkPhos  360<H>  04-21  PT/INR - ( 21 Apr 2023 01:47 )   PT: 24.3 sec;   INR: 2.08 ratio    PTT - ( 21 Apr 2023 01:47 )  PTT:38.2 sec    INFECTIOUS DISEASES  T(C): 37.2 (21 Apr 2023 20:00), Max: 37.2 (21 Apr 2023 20:00)  T(F): 99 (21 Apr 2023 20:00), Max: 99 (21 Apr 2023 20:00)    ENDOCRINE    Meds: dextrose 50% Injectable 25 Gram(s) IV Push once  dextrose 50% Injectable 12.5 Gram(s) IV Push once  insulin glargine Injectable (LANTUS) 9 Unit(s) SubCutaneous at bedtime  insulin lispro (ADMELOG) corrective regimen sliding scale   SubCutaneous every 6 hours  insulin lispro Injectable (ADMELOG) 2 Unit(s) SubCutaneous every 6 hours  levothyroxine Injectable 20 MICROGram(s) IV Push at bedtime  oxandrolone 5 milliGRAM(s) Oral three times a day    TUBES / LINES / DRAINS  [x] Peripheral IV  [x] Central Venous Line     	[] R	[x] L	[x] IJ	[] Fem	[] SC	Date Placed:  [x] R Shiley   [x] Arterial Line		[] R	[] L	[] Fem	[] Rad	[] Ax	Date Placed:   [x] NGT  [] PICC		[] Midline		[] Mediport  [] Urinary Catheter		Date Placed:   [x] Necessity of urinary, arterial, and venous catheters discussed    OTHER MEDICATIONS:  chlorhexidine 2% Cloths 1 Application(s) Topical daily  CRRT Treatment    <Continuous>  Phoxillum Filtration BK 4 / 2.5 5000 milliLiter(s) CRRT <Continuous>  Phoxillum Filtration BK 4 / 2.5 5000 milliLiter(s) CRRT <Continuous>  PrismaSATE Dialysate BK 0 / 3.5 5000 milliLiter(s) CRRT <Continuous>

## 2023-04-23 NOTE — PROGRESS NOTE ADULT - SUBJECTIVE AND OBJECTIVE BOX
[facility] Division of Kidney Diseases & Hypertension  FOLLOW UP NOTE  [phone]--------------------------------------------------------------------------------    Chief Complaint: Oliguric CHATO, on RRT    24 hour events/subjective: Pt. seen and examined in the SICU today. Reintubated yesterday. Failed lasix challenge so CRRT also resumed yesterday    PAST HISTORY  --------------------------------------------------------------------------------  No significant changes to PMH, PSH, FHx, SHx, unless otherwise noted    ALLERGIES & MEDICATIONS  --------------------------------------------------------------------------------  Allergies    No Known Allergies    Intolerances      Standing Inpatient Medications  chlorhexidine 0.12% Liquid 15 milliLiter(s) Oral Mucosa every 12 hours  chlorhexidine 2% Cloths 1 Application(s) Topical daily  CRRT Treatment    <Continuous>  dexMEDEtomidine Infusion 0.2 MICROgram(s)/kG/Hr IV Continuous <Continuous>  gabapentin Solution 100 milliGRAM(s) Oral three times a day  heparin   Injectable 5000 Unit(s) SubCutaneous every 8 hours  hydrocortisone sodium succinate Injectable 50 milliGRAM(s) IV Push every 6 hours  insulin glargine Injectable (LANTUS) 9 Unit(s) SubCutaneous at bedtime  insulin lispro (ADMELOG) corrective regimen sliding scale   SubCutaneous every 6 hours  insulin lispro Injectable (ADMELOG) 2 Unit(s) SubCutaneous every 6 hours  levothyroxine Injectable 20 MICROGram(s) IV Push at bedtime  midodrine 40 milliGRAM(s) Oral every 8 hours  multivitamin/minerals/iron Oral Solution (CENTRUM) 15 milliLiter(s) Oral daily  norepinephrine Infusion 0.05 MICROgram(s)/kG/Min IV Continuous <Continuous>  pancrelipase  (CREON 12,000 Lipase Units) 1 Capsule(s) Oral <User Schedule>  pantoprazole   Suspension 40 milliGRAM(s) Oral daily  Phoxillum Filtration BK 4 / 2.5 5000 milliLiter(s) CRRT <Continuous>  Phoxillum Filtration BK 4 / 2.5 5000 milliLiter(s) CRRT <Continuous>  polyethylene glycol 3350 17 Gram(s) Oral daily  PrismaSATE Dialysate BGK 4 / 2.5 5000 milliLiter(s) CRRT <Continuous>    PRN Inpatient Medications  acetaminophen   IVPB .. 500 milliGRAM(s) IV Intermittent every 6 hours PRN      REVIEW OF SYSTEMS  --------------------------------------------------------------------------------  Unable to be obtained.     VITALS/PHYSICAL EXAM  --------------------------------------------------------------------------------  T(C): 36.1 (04-23-23 @ 08:00), Max: 37.9 (04-22-23 @ 20:00)  HR: 58 (04-23-23 @ 08:00) (45 - 103)  BP: --  RR: 22 (04-23-23 @ 08:00) (17 - 22)  SpO2: 100% (04-23-23 @ 08:00) (99% - 100%)  Wt(kg): --        04-22-23 @ 07:01  -  04-23-23 @ 07:00  --------------------------------------------------------  IN: 1659.5 mL / OUT: 246 mL / NET: 1413.5 mL      Physical Exam:  	Gen: Intubated; sedated  	HEENT: NGT+, anicteric  	Pulm: Fair entry B/L  	CV: S1S2+  	Abd: Obese, soft; nontender  	Ext: edematous b/l  	Neuro: Sedated  	Skin: Warm and dry              Dialysis access: Right IJ non tunneled HD catheter    LABS/STUDIES  --------------------------------------------------------------------------------              7.6    14.76 >-----------<  126      [04-23-23 @ 06:05]              23.6     135  |  101  |  34  ----------------------------<  121      [04-23-23 @ 01:10]  4.2   |  13  |  2.10        Ca     8.8     [04-23-23 @ 01:10]      Mg     2.50     [04-23-23 @ 01:10]      Phos  2.8     [04-23-23 @ 01:10]    TPro  7.1  /  Alb  3.0  /  TBili  5.0  /  DBili  x   /  AST  68  /  ALT  52  /  AlkPhos  275  [04-23-23 @ 01:10]    PT/INR: PT 34.8 , INR 2.97       [04-23-23 @ 01:10]  PTT: 41.0       [04-22-23 @ 01:27]      Creatinine Trend:  SCr 2.10 [04-23 @ 01:10]  SCr 1.89 [04-22 @ 10:20]  SCr 1.39 [04-22 @ 01:27]  SCr 0.84 [04-21 @ 11:30]  SCr 0.77 [04-21 @ 01:47]

## 2023-04-23 NOTE — PROGRESS NOTE ADULT - SUBJECTIVE AND OBJECTIVE BOX
Surgery Progress Note     24hour Events:   - Lethargic and obtunded yesterday AM, intubated for airway protection   - Likely aspiration, tube feeds visualized while intubating   - pH 7.06 and CO2 70 improved after 1 hour of mechanical ventilation   - increasing peak pressures, bladder pressure 11, changed to pressure support  - POCUS shows collapsed IVC with variations and no b lines b/l. 250 albumin given   - CTH and CT C/A/P with IV contrast obtained  - 2L NS and 250 5% albumin given prior to lasix challenge (240 mg in 3 hours)  - No response to lasix challenge  - CVVH restarted overnight  - LIJ CVC removed due to dislodgement, new RUE arrow placed      Subjective:  Patient seen and examined. Pt no longer on sedation, very lethargic and minimally responsive to voice/touch      Vital Signs:  Vital Signs Last 24 Hrs  T(C): 36.1 (23 Apr 2023 08:00), Max: 37.9 (22 Apr 2023 20:00)  T(F): 97 (23 Apr 2023 08:00), Max: 100.3 (22 Apr 2023 20:00)  HR: 66 (23 Apr 2023 11:13) (45 - 100)  BP: --  BP(mean): --  RR: 22 (23 Apr 2023 11:13) (17 - 22)  SpO2: 100% (23 Apr 2023 11:13) (99% - 100%)    Parameters below as of 23 Apr 2023 08:00  Patient On (Oxygen Delivery Method): ventilator        CAPILLARY BLOOD GLUCOSE      POCT Blood Glucose.: 113 mg/dL (23 Apr 2023 06:26)  POCT Blood Glucose.: 132 mg/dL (22 Apr 2023 23:26)  POCT Blood Glucose.: 150 mg/dL (22 Apr 2023 17:36)      I&O's Detail    22 Apr 2023 07:01  -  23 Apr 2023 07:00  --------------------------------------------------------  IN:    Dexmedetomidine: 5.9 mL    Dexmedetomidine: 35.4 mL    Dexmedetomidine: 26.7 mL    IV PiggyBack: 1500 mL    Norepinephrine: 37.7 mL    Norepinephrine: 8.8 mL    Peptamen A.F.: 45 mL  Total IN: 1659.5 mL    OUT:    Indwelling Catheter - Urethral (mL): 15 mL    Other (mL): 31 mL    Rectal Tube (mL): 200 mL  Total OUT: 246 mL    Total NET: 1413.5 mL      23 Apr 2023 07:01  -  23 Apr 2023 12:10  --------------------------------------------------------  IN:    Peptamen A.F.: 90 mL  Total IN: 90 mL    OUT:    Indwelling Catheter - Urethral (mL): 0 mL  Total OUT: 0 mL    Total NET: 90 mL            Physical Exam:  General Appearance: intubated and very lethargic  Chest: Equal expansion bilaterally  CV: Pulse regular presently  Abdomen: Soft, nontender, moderately distended but improved from yesterday  Extremities: Grossly symmetric, SCD's in place         Labs:    04-23    135  |  101  |  34<H>  ----------------------------<  121<H>  4.2   |  13<L>  |  2.10<H>    Ca    8.8      23 Apr 2023 01:10  Phos  2.8     04-23  Mg     2.50     04-23    TPro  7.1  /  Alb  3.0<L>  /  TBili  5.0<H>  /  DBili  x   /  AST  68<H>  /  ALT  52<H>  /  AlkPhos  275<H>  04-23    LIVER FUNCTIONS - ( 23 Apr 2023 01:10 )  Alb: 3.0 g/dL / Pro: 7.1 g/dL / ALK PHOS: 275 U/L / ALT: 52 U/L / AST: 68 U/L / GGT: x                                 7.6    14.76 )-----------( 126      ( 23 Apr 2023 06:05 )             23.6     PT/INR - ( 23 Apr 2023 01:10 )   PT: 34.8 sec;   INR: 2.97 ratio         PTT - ( 22 Apr 2023 01:27 )  PTT:41.0 sec     8

## 2023-04-23 NOTE — PROGRESS NOTE ADULT - ATTENDING COMMENTS
I agree with the detailed interval history, physical, and plan, which I have reviewed and edited where appropriate'; also agree with notes/assessment with my team on service.  I have personally examined the patient.  I was physically present for the key portions of the evaluation and management (E/M) service provided.  I reviewed all the pertinent data.  The patient is a critical care patient with life threatening hemodynamic and metabolic instability in SICU.  The SICU team has a constant risk benefit analyzes discussion and coordinating care with the primary team and all consultants.   The patient is in SICU with the chief complaint and diagnosis mentioned in the note.   The plan will be specified in the note.  48 year old female with necrotizing pancreatitis, CHATO, acute respiratory failure and hypotension in SICU.   PE  Sedated  LUNG coarse BS  Heart RR  ABD dist  PLAN  Neuro:  -Precedex   - Tylenol gabapentin and Dilaudid prn  Resp:  -PS RR 22/PIP 25/PEEP 8/FiO2 40%  CV:  - Midodrine   -hydrocortisone   GI:   - Peptamen   - GI ppx w/ Protonix   - Creon  Renal:  - CRRT    Heme:   - SCDs and SQH,   ID:   - S/p Meropenem   Endo:   - Solu-cortef   - Lantus 9 units at bedtime, ADMELOG 2 units q6  synthroid  Code Status: Full code  Disposition: SICU

## 2023-04-23 NOTE — PROGRESS NOTE ADULT - ATTENDING COMMENTS
Pt seen and examined.  Agree with resident eval and plan.  Imp: Acute necrotizing pancreatitis with respiratory failure.  Continue SICU management.

## 2023-04-23 NOTE — PROGRESS NOTE ADULT - PROBLEM SELECTOR PLAN 1
Pt. with oliguric CHATO in the setting of necrotizing pancreatitis and shock. Pt. with most likely ATN. Scr was 1.24 on 3/23/23 and increased to 4.60 on 3/24/23. Pt. was initiated on CRRT/CVVHDF on 3/24/23 and discontinued overnight on 4/6/23 by SICU team. Pt. remained oliguric with hypotension so restarted on CVVHDH on 4/7/23. CRRT held on 4/17/23 but restarted on 4/18 as patient remained hypotensive and oliguric. CRRT held by SICU on 4/21. Resumed on 4/22 after failed lasix challenge. Keeping net even. Vitals and labs reviewed. Pt remains critically ill and continues to require intermittent vasopressors for low BPs. Remains on Midodrine 40 TID. Monitor labs and urine output. Avoid any potential nephrotoxins. Dose medications as per eGFR.    If you have any questions, please feel free to contact me  Neville Schulz  Nephrology Fellow  [phone]; Prefer [employer] TEAMS  (After 5pm or on weekends please page the on-call fellow).

## 2023-04-23 NOTE — PROGRESS NOTE ADULT - ASSESSMENT
48 year old female with a PMHx of HTN, hypothyroidism,  (30 years ago) and breast cancer (S/P left mastectomy and chemo in ) who presented with epigastric pain and left upper quadrant pain.  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  Transferred from OS ([place]) for hypotension and tachypnea requiring emergent intubation and multiple pressors.  SICU consulted for hemodynamic monitoring and respiratory management.    Plan:   - Diet: TF at goal  - CRRT  - Repeat scan next week  - Appreciate care per SICU      B Team Surgery  [id number]

## 2023-04-23 NOTE — PROGRESS NOTE ADULT - SUBJECTIVE AND OBJECTIVE BOX
SICU     SUBJECTIVE: Pt was reintubated  for hypercapnic respiratory failure; Currently on Levophed and CRRT.     OBJECTIVE:  Vital Signs Last 24 Hrs  T(C): 36.1 (2023 08:00), Max: 37.9 (2023 20:00)  T(F): 97 (:00), Max: 100.3 (2023 20:00)  HR: 66 (2023 11:13) (45 - 92)  BP: --  BP(mean): --  RR: 22 (2023 11:13) (17 - 22)  SpO2: 100% (2023 11:13) (99% - 100%)    Parameters below as of :  Patient On (Oxygen Delivery Method): ventilator      I&O's Summary    2023 07:  -  2023 07:00  --------------------------------------------------------  IN: 1659.5 mL / OUT: 246 mL / NET: 1413.5 mL    2023 07:  -  2023 13:49  --------------------------------------------------------  IN: 90 mL / OUT: 0 mL / NET: 90 mL      I&O's Detail    2023 07:01  -  2023 07:00  --------------------------------------------------------  IN:    Dexmedetomidine: 5.9 mL    Dexmedetomidine: 35.4 mL    Dexmedetomidine: 26.7 mL    IV PiggyBack: 1500 mL    Norepinephrine: 37.7 mL    Norepinephrine: 8.8 mL    Peptamen A.F.: 45 mL  Total IN: 1659.5 mL    OUT:    Indwelling Catheter - Urethral (mL): 15 mL    Other (mL): 31 mL    Rectal Tube (mL): 200 mL  Total OUT: 246 mL    Total NET: 1413.5 mL      2023 07:01  -  2023 13:49  --------------------------------------------------------  IN:    Peptamen A.F.: 90 mL  Total IN: 90 mL    OUT:    Indwelling Catheter - Urethral (mL): 0 mL  Total OUT: 0 mL    Total NET: 90 mL    MEDICATIONS  (STANDING):  chlorhexidine 0.12% Liquid 15 milliLiter(s) Oral Mucosa every 12 hours  chlorhexidine 2% Cloths 1 Application(s) Topical daily  CRRT Treatment    <Continuous>  CRRT Treatment    <Continuous>  dexMEDEtomidine Infusion 0.2 MICROgram(s)/kG/Hr (5.9 mL/Hr) IV Continuous <Continuous>  gabapentin Solution 100 milliGRAM(s) Oral three times a day  heparin   Injectable 5000 Unit(s) SubCutaneous every 8 hours  hydrocortisone sodium succinate Injectable 50 milliGRAM(s) IV Push every 6 hours  insulin glargine Injectable (LANTUS) 9 Unit(s) SubCutaneous at bedtime  insulin lispro (ADMELOG) corrective regimen sliding scale   SubCutaneous every 6 hours  insulin lispro Injectable (ADMELOG) 2 Unit(s) SubCutaneous every 6 hours  levothyroxine Injectable 20 MICROGram(s) IV Push at bedtime  midodrine 40 milliGRAM(s) Oral every 8 hours  multivitamin/minerals/iron Oral Solution (CENTRUM) 15 milliLiter(s) Oral daily  norepinephrine Infusion 0.05 MICROgram(s)/kG/Min (11.1 mL/Hr) IV Continuous <Continuous>  pancrelipase  (CREON 12,000 Lipase Units) 1 Capsule(s) Oral <User Schedule>  pantoprazole   Suspension 40 milliGRAM(s) Oral daily  Phoxillum Filtration BK 4 / 2.5 5000 milliLiter(s) (1000 mL/Hr) CRRT <Continuous>  Phoxillum Filtration BK 4 / 2.5 5000 milliLiter(s) (200 mL/Hr) CRRT <Continuous>  Phoxillum Filtration BK 4 / 2.5 5000 milliLiter(s) (1000 mL/Hr) CRRT <Continuous>  Phoxillum Filtration BK 4 / 2.5 5000 milliLiter(s) (200 mL/Hr) CRRT <Continuous>  polyethylene glycol 3350 17 Gram(s) Oral daily  PrismaSATE Dialysate BGK 4 / 2.5 5000 milliLiter(s) (1200 mL/Hr) CRRT <Continuous>  PrismaSATE Dialysate BGK 4 / 2.5 5000 milliLiter(s) (1500 mL/Hr) CRRT <Continuous>    MEDICATIONS  (PRN):  acetaminophen   IVPB .. 500 milliGRAM(s) IV Intermittent every 6 hours PRN Temp greater or equal to 38C (100.4F), Mild Pain (1 - 3)      LABS:                        7.6    14.76 )-----------( 126      ( 2023 06:05 )             23.6         135  |  101  |  34<H>  ----------------------------<  121<H>  4.2   |  13<L>  |  2.10<H>    Ca    8.8      2023 01:10  Phos  2.8       Mg     2.50         TPro  7.1  /  Alb  3.0<L>  /  TBili  5.0<H>  /  DBili  x   /  AST  68<H>  /  ALT  52<H>  /  AlkPhos  275<H>      PT/INR - ( 2023 01:10 )   PT: 34.8 sec;   INR: 2.97 ratio    PTT - ( 2023 01:27 )  PTT:41.0 sec      Physical Exam:  General Appearance: intubated and very lethargic  Chest: Equal expansion bilaterally  CV: Pulse regular presently  Abdomen: Soft, nontender, moderately distended but improved from yesterday  Extremities: Grossly symmetric, SCD's in place     Assessment and Plan: 	  48 year old female with a PMHx of HTN, hypothyroidism,  (30 years ago) and breast cancer (S/P left mastectomy and chemo in ) who presented with epigastric pain and left upper quadrant pain.  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  Transferred from OSH ([place]) for hypotension and tachypnea requiring emergent intubation and multiple pressors.  SICU consulted for hemodynamic monitoring and respiratory management.  Neuro:  -  CTH negative for acute pathology   - Precedex for sedation  - Tylenol gabapentin     Resp:  - intubated  for AMS 2/2 to airway protection  - PS RR 22/PIP 25/PEEP 8/FiO2 40%    CV:  Mixed shock state 2/2 septic shock w/ E. Coli in urine vs hypovolemic shock now s/p 11L resuscitation   - Midodrine 40 q 8 hours  - Stress steroids hydrocortisone 50q6  started      GI:   - Peptamen TF held for possible aspiration event, KO tube post pyloric   - GI ppx w/ Protonix   - Creon  - repeat CT A/P , f/u GI recs     Renal:  - ARF 2/2 shock state resulting in ATN requiring CRRT    - Failed lasix challenge    - Babin reinserted on     Heme:   - SCDs and SQH, no longer on fondaparinux  - HIT Ab positive; DOUGLAS Negative    #Splenic Vein Thrombosis  3/22 CT Abd w/ contrast: Focal filling defect in the splenic vein at the level of the body of the pancreas for which an underlying thrombus is suspected     ID:   Intermittently febrile w/ leukocytosis c/f septic shock 2/2 E coli UTI  Diagnostics:  - 3/22 Ucx: E coli  - 3/30 Bcx: staph hominis in aerobic bottle x 1 (likely contaminant)   - S/p Meropenem (3/26-, -)    Endo:   - stress dose Solu-cortef 150mg x1 then 50 mg q6   - Lantus 9 units at bedtime, ADMELOG 2 units q6  - c/w synthroid 20 IV (home med)   - ISS      Disposition: SICU

## 2023-04-23 NOTE — PROGRESS NOTE ADULT - ATTENDING COMMENTS
I agree with the detailed interval history, physical, and plan, which I have reviewed and edited where appropriate'; also agree with notes/assessment with my team on service.  I have personally examined the patient.  I was physically present for the key portions of the evaluation and management (E/M) service provided.  I reviewed all the pertinent data.  The patient is a critical care patient with life threatening hemodynamic and metabolic instability in SICU.  The SICU team has a constant risk benefit analyzes discussion and coordinating care with the primary team and all consultants.   The patient is in SICU with the chief complaint and diagnosis mentioned in the note.   The plan will be specified in the note.  48 year old female with necrotizing pancreatitis in respiratory failure, CHATO, adrenal insufficiency in SICU for hemodynamic monitoring   Physical Exam:  General sedated  Chest: coarse bs  CV: Pulse regular   Abdomen: Soft, nontender, moderately distended but improved from yesterday  Extremities: edema +  PLAN  Neuro:  -Precedex  - Tylenol  -gabapentin   Resp:  -RR 22/PIP 25/PEEP 8/FiO2 40%  CV:  - Midodrine 40 q 8 hours  -hydrocortisone 50q6  started 4/22   GI:   - Peptamen    -Protonix   Renal:  -DC [place]  Heme:   - SCDs and SQH,   ID:   Intermittently febrile w/ leukocytosis c/f septic shock 2/2 E coli UTI  Diagnostics:  -Cont ABx  Endo:   - Solu-cortef 150mg x1 then 50 mg q6   - Lantus 9 units at bedtime, ADMELOG 2 units q6  -Disposition: SICU

## 2023-04-24 NOTE — CHART NOTE - NSCHARTNOTEFT_GEN_A_CORE
New CT A/P results reviewed with Dr. Recinos. Increasing size of peripancreatic collection noted however, collection does not contain walled off necrosis.  Thus, there are no areas amenable to endoscopic stenting or drainage.    Cholo Montemayor, PGY-4  Gastroenterology/Hepatology Fellow  Available on [employer] Teams   [phone] (Long Range Pager)  [phone] (Short Range Pager [facility])    After 5pm, please contact the on-call GI fellow. [phone]

## 2023-04-24 NOTE — PROGRESS NOTE ADULT - ATTENDING COMMENTS
critically ill complicated by emir complicated by anuria need for ongoing CRRT. phos trending low adjusted bags.  getting approx 20 cc / kg / hr clearance.  goal net even.

## 2023-04-24 NOTE — PROGRESS NOTE ADULT - ATTENDING COMMENTS
I spoke to Wally and she said when she was a child she took Ritalin and did not like the way she felt on it. She does not want to try it as an adult.   She asks that you send the Adderall 30 mg BID to [facility]. She will pay out of pocket/ and will sign up for [facility]   I agree with the detailed interval history, physical, and plan, which I have reviewed and edited where appropriate'; also agree with notes/assessment with my team on service.  I have personally examined the patient.  I was physically present for the key portions of the evaluation and management (E/M) service provided.  I reviewed all the pertinent data.  The patient is a critical care patient with life threatening hemodynamic and metabolic instability in SICU.  The SICU team has a constant risk benefit analyzes discussion and coordinating care with the primary team and all consultants.   The patient is in SICU with the chief complaint and diagnosis mentioned in the note.   The plan will be specified in the note.  48 year old female with necrotizing pancreatitis in respiratory failure, CHATO, adrenal insufficiency in SICU for hemodynamic monitoring   Physical Exam:  General sedated  Chest: coarse BS  CV: Pulse regular   Abdomen: Soft, nontender, moderately distended but improved from yesterday  Extremities: edema +  PLAN  Neuro:  -Precedex  - Tylenol  -gabapentin   Resp:  -RR 22/PIP 25/PEEP 8/FiO2 40%  CV:  - Midodrine 40 q 8 hours  -hydrocortisone 50q6  started 4/22   GI:   - Peptamen    -Protonix   Renal:  -DC Babin  Heme:   - SCDs and SQH,   ID:   Intermittently febrile w/ leukocytosis c/f septic shock 2/2 E coli UTI  Diagnostics:  -Ertapenem  Endo:   - Solu-cortef 150mg x1 then 50 mg q6   - Lantus 9 units at bedtime, ADMELOG 2 units q6  -Disposition: SICU

## 2023-04-24 NOTE — PROGRESS NOTE ADULT - ASSESSMENT
· Assessment	  Assessment and Plan: 	  48 year old female with a PMHx of HTN, hypothyroidism,  (30 years ago) and breast cancer (S/P left mastectomy and chemo in ) who presented with epigastric pain and left upper quadrant pain.  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  Transferred from OSH ([place]) for hypotension and tachypnea requiring emergent intubation and multiple pressors.  SICU consulted for hemodynamic monitoring and respiratory management.  Intubated, pressors, CRRT, pending axios stent/debridement when collection mature.     Neuro:  -  CTH negative for acute pathology   - precedex for sedation, wean as tolerated for RASS 0 to -1   - Tylenol, gabapentin     Resp:  - intubated  for AMS 2/2 to airway protection, hypercarbic resp failure  - //    CV:  Mixed shock state 2/2 septic shock w/ E. Coli in urine vs hypovolemic shock now s/p 11L resuscitation   - Midodrine 40 q 8 hours  - Stress steroids hydrocortisone 50 q 8   - off pressors     GI:   - TF   - Ulcer ppx w/ Protonix   - Creon  - repeat CT A/P , GI recs: collections not mature enough for axios stent     Renal:  - ARF 2/2 shock state resulting in ATN requiring CRRT (net even)    - Failed lasix challenge    - Babin reinserted on     Heme:   - SCDs and SQH  - HIT Ab positive; DOUGLAS Negative    ID:   Intermittently febrile w/ leukocytosis c/f septic shock 2/2 E coli UTI  Diagnostics:  - 3/22 Ucx: E coli  - 3/30 Bcx: staph hominis in aerobic bottle x 1 (likely contaminant)   - S/p Meropenem (3/26-, -)  - Ertapenem  -     Endo:   - stress steroids 50 q 8  - Lantus 9 units at bedtime, ADMELOG 2 units q6  - c/w synthroid 20 IV (home med)   - ISS    Disposition: SICU

## 2023-04-24 NOTE — PROGRESS NOTE ADULT - ATTENDING COMMENTS
I agree with the detailed interval history, physical, and plan, which I have reviewed and edited where appropriate'; also agree with notes/assessment with my team on service.  I have personally examined the patient.  I was physically present for the key portions of the evaluation and management (E/M) service provided.  I reviewed all the pertinent data.  The patient is a critical care patient with life threatening hemodynamic and metabolic instability in SICU.  The SICU team has a constant risk benefit analyzes discussion and coordinating care with the primary team and all consultants.   The patient is in SICU with the chief complaint and diagnosis mentioned in the note.   The plan will be specified in the note.  48 year old female with necrotizing pancreatitis in SICU for hemodynamic monitoring;  Intubated, pressors, CRRT.  Physical Exam:  intubated, RASS-1 to-2   Chest: coarse bs  CV: RR  Abdomen: Soft, nontender, distended   Extremities: peripheral edema  Neuro:  -- precedex  - Tylenol  -gabapentin   Resp:  - AC 18/500/5/30  CV:  - Midodrine 40 q 8 hours  -  hydrocortisone 50 q 8   GI:   - TF   -  Protonix   - Creon  Renal:  - CRRT   Heme:   - SCDs and SQH  ID:   - Ertapenem   Endo:   -- Lantus 9 units at bedtime, ADMELOG 2 units q6  -  synthroid     Disposition: SICU

## 2023-04-24 NOTE — PROGRESS NOTE ADULT - SUBJECTIVE AND OBJECTIVE BOX
SICU     Interval:  - Vent change overnight for alkalosis on ABG, decreased MV   - NPO at midnight tube feeds held for potential axios stent   - febrile 101, blood warmer turned off     OBJECTIVE:  ICU Vital Signs Last 24 Hrs  T(C): 37.9 (23 Apr 2023 20:00), Max: 37.9 (23 Apr 2023 20:00)  T(F): 100.2 (23 Apr 2023 20:00), Max: 100.2 (23 Apr 2023 20:00)  HR: 100 (23 Apr 2023 23:33) (45 - 100)  BP: --  BP(mean): --  ABP: 108/66 (23 Apr 2023 23:00) (79/42 - 148/69)  ABP(mean): 84 (23 Apr 2023 23:00) (57 - 100)  RR: 22 (23 Apr 2023 23:00) (15 - 22)  SpO2: 100% (23 Apr 2023 23:33) (100% - 100%)    O2 Parameters below as of 23 Apr 2023 20:00  Patient On (Oxygen Delivery Method): ventilator    O2 Concentration (%): 40    I&O's Detail    22 Apr 2023 07:01  -  23 Apr 2023 07:00  --------------------------------------------------------  IN:    Dexmedetomidine: 5.9 mL    Dexmedetomidine: 35.4 mL    Dexmedetomidine: 26.7 mL    IV PiggyBack: 1500 mL    Norepinephrine: 37.7 mL    Norepinephrine: 8.8 mL    Peptamen A.F.: 45 mL  Total IN: 1659.5 mL    OUT:    Indwelling Catheter - Urethral (mL): 15 mL    Other (mL): 31 mL    Rectal Tube (mL): 200 mL  Total OUT: 246 mL    Total NET: 1413.5 mL      23 Apr 2023 07:01  -  24 Apr 2023 00:19  --------------------------------------------------------  IN:    Enteral Tube Flush: 120 mL    Norepinephrine: 28.6 mL    Peptamen A.F.: 450 mL  Total IN: 598.6 mL    OUT:    Indwelling Catheter - Urethral (mL): 0 mL    Other (mL): 49 mL  Total OUT: 49 mL    Total NET: 549.6 mL    MEDICATIONS  (STANDING):  chlorhexidine 0.12% Liquid 15 milliLiter(s) Oral Mucosa every 12 hours  chlorhexidine 2% Cloths 1 Application(s) Topical daily  CRRT Treatment    <Continuous>  CRRT Treatment    <Continuous>  dexMEDEtomidine Infusion 0.2 MICROgram(s)/kG/Hr (5.9 mL/Hr) IV Continuous <Continuous>  gabapentin Solution 100 milliGRAM(s) Oral three times a day  heparin   Injectable 5000 Unit(s) SubCutaneous every 8 hours  hydrocortisone sodium succinate Injectable 50 milliGRAM(s) IV Push every 6 hours  insulin glargine Injectable (LANTUS) 9 Unit(s) SubCutaneous at bedtime  insulin lispro (ADMELOG) corrective regimen sliding scale   SubCutaneous every 6 hours  insulin lispro Injectable (ADMELOG) 2 Unit(s) SubCutaneous every 6 hours  levothyroxine Injectable 20 MICROGram(s) IV Push at bedtime  midodrine 40 milliGRAM(s) Oral every 8 hours  multivitamin/minerals/iron Oral Solution (CENTRUM) 15 milliLiter(s) Oral daily  norepinephrine Infusion 0.05 MICROgram(s)/kG/Min (11.1 mL/Hr) IV Continuous <Continuous>  pancrelipase  (CREON 12,000 Lipase Units) 1 Capsule(s) Oral <User Schedule>  pantoprazole   Suspension 40 milliGRAM(s) Oral daily  Phoxillum Filtration BK 4 / 2.5 5000 milliLiter(s) (1000 mL/Hr) CRRT <Continuous>  Phoxillum Filtration BK 4 / 2.5 5000 milliLiter(s) (200 mL/Hr) CRRT <Continuous>  Phoxillum Filtration BK 4 / 2.5 5000 milliLiter(s) (1000 mL/Hr) CRRT <Continuous>  Phoxillum Filtration BK 4 / 2.5 5000 milliLiter(s) (200 mL/Hr) CRRT <Continuous>  polyethylene glycol 3350 17 Gram(s) Oral daily  PrismaSATE Dialysate BGK 4 / 2.5 5000 milliLiter(s) (1200 mL/Hr) CRRT <Continuous>  PrismaSATE Dialysate BGK 4 / 2.5 5000 milliLiter(s) (1500 mL/Hr) CRRT <Continuous>    MEDICATIONS  (PRN):  acetaminophen   IVPB .. 500 milliGRAM(s) IV Intermittent every 6 hours PRN Temp greater or equal to 38C (100.4F), Mild Pain (1 - 3)      LABS:                                   7.6    14.76 )-----------( 126      ( 23 Apr 2023 06:05 )             23.6   04-23    135  |  101  |  34<H>  ----------------------------<  121<H>  4.2   |  13<L>  |  2.10<H>    Ca    8.8      23 Apr 2023 01:10  Phos  2.8     04-23  Mg     2.50     04-23    TPro  7.1  /  Alb  3.0<L>  /  TBili  5.0<H>  /  DBili  x   /  AST  68<H>  /  ALT  52<H>  /  AlkPhos  275<H>  04-23      Physical Exam:  General Appearance: intubated, lethargic  Chest: Equal expansion, breath sounds bilaterally  CV: Pulse regular presently, minimal levophed requirements, NSR on monitor  Abdomen: Soft, nontender, distended   Extremities: peripheral edema, weak but with motor function                                                                                                 SICU     Interval:  - Vent change overnight for alkalosis on ABG, decreased MV   - NPO at midnight tube feeds held for potential axios stent   - Per GI, collection not mature enough for axios stent   - Tube feeds restarted  - Ertapenem started for empiric abx coverage     OBJECTIVE:  ICU Vital Signs Last 24 Hrs  T(C): 37.9 (23 Apr 2023 20:00), Max: 37.9 (23 Apr 2023 20:00)  T(F): 100.2 (23 Apr 2023 20:00), Max: 100.2 (23 Apr 2023 20:00)  HR: 100 (23 Apr 2023 23:33) (45 - 100)  BP: --  BP(mean): --  ABP: 108/66 (23 Apr 2023 23:00) (79/42 - 148/69)  ABP(mean): 84 (23 Apr 2023 23:00) (57 - 100)  RR: 22 (23 Apr 2023 23:00) (15 - 22)  SpO2: 100% (23 Apr 2023 23:33) (100% - 100%)    O2 Parameters below as of 23 Apr 2023 20:00  Patient On (Oxygen Delivery Method): ventilator    O2 Concentration (%): 40    I&O's Detail    22 Apr 2023 07:01  -  23 Apr 2023 07:00  --------------------------------------------------------  IN:    Dexmedetomidine: 5.9 mL    Dexmedetomidine: 35.4 mL    Dexmedetomidine: 26.7 mL    IV PiggyBack: 1500 mL    Norepinephrine: 37.7 mL    Norepinephrine: 8.8 mL    Peptamen A.F.: 45 mL  Total IN: 1659.5 mL    OUT:    Indwelling Catheter - Urethral (mL): 15 mL    Other (mL): 31 mL    Rectal Tube (mL): 200 mL  Total OUT: 246 mL    Total NET: 1413.5 mL      23 Apr 2023 07:01  -  24 Apr 2023 00:19  --------------------------------------------------------  IN:    Enteral Tube Flush: 120 mL    Norepinephrine: 28.6 mL    Peptamen A.F.: 450 mL  Total IN: 598.6 mL    OUT:    Indwelling Catheter - Urethral (mL): 0 mL    Other (mL): 49 mL  Total OUT: 49 mL    Total NET: 549.6 mL    MEDICATIONS  (STANDING):  chlorhexidine 0.12% Liquid 15 milliLiter(s) Oral Mucosa every 12 hours  chlorhexidine 2% Cloths 1 Application(s) Topical daily  CRRT Treatment    <Continuous>  CRRT Treatment    <Continuous>  dexMEDEtomidine Infusion 0.2 MICROgram(s)/kG/Hr (5.9 mL/Hr) IV Continuous <Continuous>  gabapentin Solution 100 milliGRAM(s) Oral three times a day  heparin   Injectable 5000 Unit(s) SubCutaneous every 8 hours  hydrocortisone sodium succinate Injectable 50 milliGRAM(s) IV Push every 6 hours  insulin glargine Injectable (LANTUS) 9 Unit(s) SubCutaneous at bedtime  insulin lispro (ADMELOG) corrective regimen sliding scale   SubCutaneous every 6 hours  insulin lispro Injectable (ADMELOG) 2 Unit(s) SubCutaneous every 6 hours  levothyroxine Injectable 20 MICROGram(s) IV Push at bedtime  midodrine 40 milliGRAM(s) Oral every 8 hours  multivitamin/minerals/iron Oral Solution (CENTRUM) 15 milliLiter(s) Oral daily  norepinephrine Infusion 0.05 MICROgram(s)/kG/Min (11.1 mL/Hr) IV Continuous <Continuous>  pancrelipase  (CREON 12,000 Lipase Units) 1 Capsule(s) Oral <User Schedule>  pantoprazole   Suspension 40 milliGRAM(s) Oral daily  Phoxillum Filtration BK 4 / 2.5 5000 milliLiter(s) (1000 mL/Hr) CRRT <Continuous>  Phoxillum Filtration BK 4 / 2.5 5000 milliLiter(s) (200 mL/Hr) CRRT <Continuous>  Phoxillum Filtration BK 4 / 2.5 5000 milliLiter(s) (1000 mL/Hr) CRRT <Continuous>  Phoxillum Filtration BK 4 / 2.5 5000 milliLiter(s) (200 mL/Hr) CRRT <Continuous>  polyethylene glycol 3350 17 Gram(s) Oral daily  PrismaSATE Dialysate BGK 4 / 2.5 5000 milliLiter(s) (1200 mL/Hr) CRRT <Continuous>  PrismaSATE Dialysate BGK 4 / 2.5 5000 milliLiter(s) (1500 mL/Hr) CRRT <Continuous>    MEDICATIONS  (PRN):  acetaminophen   IVPB .. 500 milliGRAM(s) IV Intermittent every 6 hours PRN Temp greater or equal to 38C (100.4F), Mild Pain (1 - 3)      LABS:                                   7.6    14.76 )-----------( 126      ( 23 Apr 2023 06:05 )             23.6   04-23    135  |  101  |  34<H>  ----------------------------<  121<H>  4.2   |  13<L>  |  2.10<H>    Ca    8.8      23 Apr 2023 01:10  Phos  2.8     04-23  Mg     2.50     04-23    TPro  7.1  /  Alb  3.0<L>  /  TBili  5.0<H>  /  DBili  x   /  AST  68<H>  /  ALT  52<H>  /  AlkPhos  275<H>  04-23      Physical Exam:  General Appearance: intubated, RASS-1 to-2   Chest: Equal expansion, breath sounds bilaterally  CV: Pulse regular presently, minimal levophed requirements, NSR on monitor  Abdomen: Soft, nontender, distended   Extremities: peripheral edema, weak but with motor function

## 2023-04-24 NOTE — PROGRESS NOTE ADULT - SUBJECTIVE AND OBJECTIVE BOX
SICU Daily Progress Note  =====================================================  Interval/Overnight Events:  - remains off pressors.  - crrt net even  - pancreas collection not yet walled off, will hold off on axios stent  - restarted tf.       Allergies: No Known Allergies      MEDICATIONS:   --------------------------------------------------------------------------------------  Neurologic Medications  acetaminophen   IVPB .. 500 milliGRAM(s) IV Intermittent every 6 hours PRN Temp greater or equal to 38C (100.4F), Mild Pain (1 - 3)  dexMEDEtomidine Infusion 0.5 MICROgram(s)/kG/Hr IV Continuous <Continuous>  gabapentin Solution 100 milliGRAM(s) Oral three times a day    Respiratory Medications    Cardiovascular Medications  midodrine 40 milliGRAM(s) Oral every 8 hours  norepinephrine Infusion 0.05 MICROgram(s)/kG/Min IV Continuous <Continuous>    Gastrointestinal Medications  multivitamin/minerals/iron Oral Solution (CENTRUM) 15 milliLiter(s) Oral daily  pancrelipase  (CREON 12,000 Lipase Units) 1 Capsule(s) Oral <User Schedule>  pantoprazole   Suspension 40 milliGRAM(s) Oral daily  phytonadione  IVPB 10 milliGRAM(s) IV Intermittent every 24 hours  polyethylene glycol 3350 17 Gram(s) Oral daily    Genitourinary Medications    Hematologic/Oncologic Medications  heparin   Injectable 5000 Unit(s) SubCutaneous every 8 hours    Antimicrobial/Immunologic Medications  ertapenem  IVPB 1000 milliGRAM(s) IV Intermittent every 24 hours    Endocrine/Metabolic Medications  hydrocortisone sodium succinate Injectable 50 milliGRAM(s) IV Push every 8 hours  insulin glargine Injectable (LANTUS) 9 Unit(s) SubCutaneous at bedtime  insulin lispro (ADMELOG) corrective regimen sliding scale   SubCutaneous every 6 hours  insulin lispro Injectable (ADMELOG) 2 Unit(s) SubCutaneous every 6 hours  levothyroxine Injectable 20 MICROGram(s) IV Push at bedtime    Topical/Other Medications  chlorhexidine 0.12% Liquid 15 milliLiter(s) Oral Mucosa every 12 hours  chlorhexidine 2% Cloths 1 Application(s) Topical daily  CRRT Treatment    <Continuous>  Phoxillum Filtration BK 4 / 2.5 5000 milliLiter(s) CRRT <Continuous>  Phoxillum Filtration BK 4 / 2.5 5000 milliLiter(s) CRRT <Continuous>  Phoxillum Filtration BK 4 / 2.5 5000 milliLiter(s) CRRT <Continuous>    --------------------------------------------------------------------------------------    VITAL SIGNS, INS/OUTS (last 24 hours):  --------------------------------------------------------------------------------------  I&O's Detail    23 Apr 2023 07:01  -  24 Apr 2023 07:00  --------------------------------------------------------  IN:    Dexmedetomidine: 17.9 mL    Enteral Tube Flush: 120 mL    Norepinephrine: 49.5 mL    Peptamen A.F.: 630 mL  Total IN: 817.4 mL    OUT:    Indwelling Catheter - Urethral (mL): 0 mL    Other (mL): 267 mL    Rectal Tube (mL): 300 mL  Total OUT: 567 mL    Total NET: 250.4 mL      24 Apr 2023 07:01  -  24 Apr 2023 14:41  --------------------------------------------------------  IN:    Dexmedetomidine: 35.4 mL    IV PiggyBack: 110 mL  Total IN: 145.4 mL    OUT:    Norepinephrine: 0 mL  Total OUT: 0 mL    Total NET: 145.4 mL        --------------------------------------------------------------------------------------    EXAM  NEUROLOGY  RASS:   	GCS:    Exam: sedated    HEENT  Exam: Normocephalic, atraumatic, EOMI.  Feeding tube in place.    RESPIRATORY  Exam: Intubated ett.    CARDIOVASCULAR  Exam: S1, S2.  Regular rate and rhythm.        GI/NUTRITION  Exam: Abdomen soft, Non-tender, distended.     VASCULAR  Exam: Extremities warm, pink, well-perfused.     MUSCULOSKELETAL  Exam: sedated    SKIN  Exam: Good skin turgor, no skin breakdown.     METABOLIC/FLUIDS/ELECTROLYTES  multivitamin/minerals/iron Oral Solution (CENTRUM) 15 milliLiter(s) Oral daily  phytonadione  IVPB 10 milliGRAM(s) IV Intermittent every 24 hours      HEMATOLOGIC  [x] VTE Prophylaxis: heparin   Injectable 5000 Unit(s) SubCutaneous every 8 hours    Transfusions:	[] PRBC	[] Platelets		[] FFP	[] Cryoprecipitate    INFECTIOUS DISEASE  Antimicrobials/Immunologic Medications:  ertapenem  IVPB 1000 milliGRAM(s) IV Intermittent every 24 hours      Tubes/Lines/Drains   [x] Peripheral IV  [x] Central Venous Line     	[] R	[] L	[] IJ	[] Fem	[] SC	Date Placed:   [x] Arterial Line		[] R	[] L	[] Fem	[] Rad	[] Ax	Date Placed:   [] PICC		[] Midline		[] Mediport  [x] Urinary Catheter		Date Placed:   [x] Necessity of urinary, arterial, and venous catheters discussed    LABS  --------------------------------------------------------------------------------------  CBC (04-24 @ 02:55)                          7.8<L>                   13.57<H>  )--------------(  142<L>     --    % Neuts, --    % Lymphs, ANC: --                              23.4<L>  CBC (04-23 @ 06:05)                          7.6<L>                   14.76<H>  )--------------(  126<L>     --    % Neuts, --    % Lymphs, ANC: --                              23.6<L>    BMP (04-24 @ 02:55)       133<L>  |  99      |  15    			Ca++ --      Ca 8.3<L>       ---------------------------------( 148<H>		Mg 2.50         4.0     |  18<L>   |  1.07  			Ph 1.9<L>    LFTs (04-24 @ 02:55)      TPro 7.4 / Alb 3.0<L> / TBili 5.4<H> / DBili -- / AST 81<H> / ALT 56<H> / AlkPhos 341<H>    Coags (04-24 @ 02:55)  aPTT 47.5<H> / INR 3.87<H> / PT 45.5<H>      ABG (04-24 @ 05:40)     7.44 / 28<L> / 200<H> / 19<L> / -4.5<L> / 100.0<H>%     Lactate:    ABG (04-24 @ 02:55)     7.42 / 27<L> / 187<H> / 18<L> / -6.0<L> / 99.4<H>%     Lactate:

## 2023-04-24 NOTE — PROGRESS NOTE ADULT - SUBJECTIVE AND OBJECTIVE BOX
Surgery Progress Note    Overnight Events: No acute events overnight.  SUBJECTIVE: Patient seen and examined at bedside with surgical team, patient intubated and sedated.      OBJECTIVE:    Vital Signs Last 24 Hrs  T(C): 37 (24 Apr 2023 04:00), Max: 38.4 (24 Apr 2023 00:00)  T(F): 98.6 (24 Apr 2023 04:00), Max: 101.1 (24 Apr 2023 00:00)  HR: 71 (24 Apr 2023 08:00) (62 - 103)  BP: --  BP(mean): --  RR: 18 (24 Apr 2023 08:00) (13 - 27)  SpO2: 100% (24 Apr 2023 08:00) (100% - 100%)    Parameters below as of 24 Apr 2023 07:00  Patient On (Oxygen Delivery Method): ventilator    I&O's Detail    23 Apr 2023 07:01  -  24 Apr 2023 07:00  --------------------------------------------------------  IN:    Dexmedetomidine: 12 mL    Enteral Tube Flush: 120 mL    Norepinephrine: 49.5 mL    Peptamen A.F.: 630 mL  Total IN: 811.5 mL    OUT:    Indwelling Catheter - Urethral (mL): 0 mL    Other (mL): 267 mL    Rectal Tube (mL): 300 mL  Total OUT: 567 mL    Total NET: 244.5 mL      MEDICATIONS  (STANDING):  chlorhexidine 0.12% Liquid 15 milliLiter(s) Oral Mucosa every 12 hours  chlorhexidine 2% Cloths 1 Application(s) Topical daily  CRRT Treatment    <Continuous>  dexMEDEtomidine Infusion 0.5 MICROgram(s)/kG/Hr (14.7 mL/Hr) IV Continuous <Continuous>  ertapenem  IVPB 1000 milliGRAM(s) IV Intermittent every 24 hours  gabapentin Solution 100 milliGRAM(s) Oral three times a day  heparin   Injectable 5000 Unit(s) SubCutaneous every 8 hours  hydrocortisone sodium succinate Injectable 50 milliGRAM(s) IV Push every 8 hours  insulin glargine Injectable (LANTUS) 9 Unit(s) SubCutaneous at bedtime  insulin lispro (ADMELOG) corrective regimen sliding scale   SubCutaneous every 6 hours  insulin lispro Injectable (ADMELOG) 2 Unit(s) SubCutaneous every 6 hours  levothyroxine Injectable 20 MICROGram(s) IV Push at bedtime  midodrine 40 milliGRAM(s) Oral every 8 hours  multivitamin/minerals/iron Oral Solution (CENTRUM) 15 milliLiter(s) Oral daily  norepinephrine Infusion 0.05 MICROgram(s)/kG/Min (11.1 mL/Hr) IV Continuous <Continuous>  pancrelipase  (CREON 12,000 Lipase Units) 1 Capsule(s) Oral <User Schedule>  pantoprazole   Suspension 40 milliGRAM(s) Oral daily  Phoxillum Filtration BK 4 / 2.5 5000 milliLiter(s) (1000 mL/Hr) CRRT <Continuous>  Phoxillum Filtration BK 4 / 2.5 5000 milliLiter(s) (200 mL/Hr) CRRT <Continuous>  Phoxillum Filtration BK 4 / 2.5 5000 milliLiter(s) (1500 mL/Hr) CRRT <Continuous>  phytonadione  IVPB 10 milliGRAM(s) IV Intermittent every 24 hours  polyethylene glycol 3350 17 Gram(s) Oral daily    MEDICATIONS  (PRN):  acetaminophen   IVPB .. 500 milliGRAM(s) IV Intermittent every 6 hours PRN Temp greater or equal to 38C (100.4F), Mild Pain (1 - 3)      PHYSICAL EXAM:  Constitutional: Sedated  Respiratory: Mechanical Ventilation  Abdomen: distended, unable to evaluate pain response  Extremities: WWP    LABS:                        7.8    13.57 )-----------( 142      ( 24 Apr 2023 02:55 )             23.4     04-24    133<L>  |  99  |  15  ----------------------------<  148<H>  4.0   |  18<L>  |  1.07    Ca    8.3<L>      24 Apr 2023 02:55  Phos  1.9     04-24  Mg     2.50     04-24    TPro  7.4  /  Alb  3.0<L>  /  TBili  5.4<H>  /  DBili  x   /  AST  81<H>  /  ALT  56<H>  /  AlkPhos  341<H>  04-24    PT/INR - ( 24 Apr 2023 02:55 )   PT: 45.5 sec;   INR: 3.87 ratio         PTT - ( 24 Apr 2023 02:55 )  PTT:47.5 sec  LIVER FUNCTIONS - ( 24 Apr 2023 02:55 )  Alb: 3.0 g/dL / Pro: 7.4 g/dL / ALK PHOS: 341 U/L / ALT: 56 U/L / AST: 81 U/L / GGT: x               IMAGING:

## 2023-04-24 NOTE — PROGRESS NOTE ADULT - SUBJECTIVE AND OBJECTIVE BOX
[facility] Division of Kidney Diseases & Hypertension  FOLLOW UP NOTE  [phone]--------------------------------------------------------------------------------    Chief Complaint: Oliguric CHATO, on RRT    24 hour events/subjective: Pt. seen and examined in the SICU today. Remains net even on CRRT. Still on precedex and vasopressor    PAST HISTORY  --------------------------------------------------------------------------------  No significant changes to PMH, PSH, FHx, SHx, unless otherwise noted    ALLERGIES & MEDICATIONS  --------------------------------------------------------------------------------  Allergies    No Known Allergies    Intolerances      Standing Inpatient Medications  chlorhexidine 0.12% Liquid 15 milliLiter(s) Oral Mucosa every 12 hours  chlorhexidine 2% Cloths 1 Application(s) Topical daily  CRRT Treatment    <Continuous>  dexMEDEtomidine Infusion 0.5 MICROgram(s)/kG/Hr IV Continuous <Continuous>  ertapenem  IVPB 1000 milliGRAM(s) IV Intermittent every 24 hours  gabapentin Solution 100 milliGRAM(s) Oral three times a day  heparin   Injectable 5000 Unit(s) SubCutaneous every 8 hours  hydrocortisone sodium succinate Injectable 50 milliGRAM(s) IV Push every 8 hours  insulin glargine Injectable (LANTUS) 9 Unit(s) SubCutaneous at bedtime  insulin lispro (ADMELOG) corrective regimen sliding scale   SubCutaneous every 6 hours  insulin lispro Injectable (ADMELOG) 2 Unit(s) SubCutaneous every 6 hours  levothyroxine Injectable 20 MICROGram(s) IV Push at bedtime  midodrine 40 milliGRAM(s) Oral every 8 hours  multivitamin/minerals/iron Oral Solution (CENTRUM) 15 milliLiter(s) Oral daily  norepinephrine Infusion 0.05 MICROgram(s)/kG/Min IV Continuous <Continuous>  pancrelipase  (CREON 12,000 Lipase Units) 1 Capsule(s) Oral <User Schedule>  pantoprazole   Suspension 40 milliGRAM(s) Oral daily  Phoxillum Filtration BK 4 / 2.5 5000 milliLiter(s) CRRT <Continuous>  Phoxillum Filtration BK 4 / 2.5 5000 milliLiter(s) CRRT <Continuous>  Phoxillum Filtration BK 4 / 2.5 5000 milliLiter(s) CRRT <Continuous>  phytonadione  IVPB 10 milliGRAM(s) IV Intermittent every 24 hours  polyethylene glycol 3350 17 Gram(s) Oral daily    PRN Inpatient Medications  acetaminophen   IVPB .. 500 milliGRAM(s) IV Intermittent every 6 hours PRN      REVIEW OF SYSTEMS  --------------------------------------------------------------------------------  Unable to be obtained.       VITALS/PHYSICAL EXAM  --------------------------------------------------------------------------------  T(C): 37 (04-24-23 @ 04:00), Max: 38.4 (04-24-23 @ 00:00)  HR: 69 (04-24-23 @ 09:00) (64 - 103)  BP: --  RR: 18 (04-24-23 @ 09:00) (13 - 27)  SpO2: 100% (04-24-23 @ 09:00) (100% - 100%)  Wt(kg): --        04-23-23 @ 07:01  -  04-24-23 @ 07:00  --------------------------------------------------------  IN: 817.4 mL / OUT: 567 mL / NET: 250.4 mL    04-24-23 @ 07:01  -  04-24-23 @ 10:28  --------------------------------------------------------  IN: 17.7 mL / OUT: 0 mL / NET: 17.7 mL      Physical Exam:  	Gen: Intubated; sedated  	HEENT: NGT+, anicteric  	Pulm: Fair entry B/L  	CV: S1S2+  	Abd: Obese, soft; nontender  	Ext: edematous b/l  	Neuro: Sedated  	Skin: Warm and dry              Dialysis access: Right IJ non tunneled HD catheter      LABS/STUDIES  --------------------------------------------------------------------------------              7.8    13.57 >-----------<  142      [04-24-23 @ 02:55]              23.4     133  |  99  |  15  ----------------------------<  148      [04-24-23 @ 02:55]  4.0   |  18  |  1.07        Ca     8.3     [04-24-23 @ 02:55]      Mg     2.50     [04-24-23 @ 02:55]      Phos  1.9     [04-24-23 @ 02:55]    TPro  7.4  /  Alb  3.0  /  TBili  5.4  /  DBili  x   /  AST  81  /  ALT  56  /  AlkPhos  341  [04-24-23 @ 02:55]    PT/INR: PT 45.5 , INR 3.87       [04-24-23 @ 02:55]  PTT: 47.5       [04-24-23 @ 02:55]      Creatinine Trend:  SCr 1.07 [04-24 @ 02:55]  SCr 2.10 [04-23 @ 01:10]  SCr 1.89 [04-22 @ 10:20]  SCr 1.39 [04-22 @ 01:27]  SCr 0.84 [04-21 @ 11:30]

## 2023-04-24 NOTE — PROGRESS NOTE ADULT - PROBLEM SELECTOR PLAN 1
Pt. with oliguric CHATO in the setting of necrotizing pancreatitis and shock. Pt. with most likely ATN. Scr was 1.24 on 3/23/23 and increased to 4.60 on 3/24/23. Pt. was initiated on CRRT/CVVHDF on 3/24/23 and discontinued overnight on 4/6/23 by SICU team. Pt. remained oliguric with hypotension so restarted on CVVHDH on 4/7/23. CRRT held on 4/17/23 but restarted on 4/18 as patient remained hypotensive and oliguric. CRRT held by SICU on 4/21. Resumed on 4/22 after failed lasix challenge. Keeping net even. Vitals and labs reviewed. Pt remains critically ill and continues to require intermittent vasopressors for low BPs. Remains on Midodrine 40 TID. Monitor labs and urine output. Avoid any potential nephrotoxins. Dose medications as per eGFR.    If you have any questions, please feel free to contact me  Neville Schulz  Nephrology Fellow  [phone]; Prefer [employer] TEAMS  (After 5pm or on weekends please page the on-call fellow). Pt. with oliguric CHATO in the setting of necrotizing pancreatitis and shock. Pt. with most likely ATN. Scr was 1.24 on 3/23/23 and increased to 4.60 on 3/24/23. Pt. was initiated on CRRT/CVVHDF on 3/24/23 and discontinued overnight on 4/6/23 by SICU team. Pt. remained oliguric with hypotension so restarted on CVVHDH on 4/7/23. CRRT held on 4/17/23 but restarted on 4/18 as patient remained hypotensive and oliguric. CRRT held by SICU on 4/21. Resumed on 4/22 after failed lasix challenge. Keeping net even. CRRT orders adjusted for low phos. Vitals and labs reviewed. Pt remains critically ill and continues to require intermittent vasopressors for low BPs. Remains on Midodrine 40 TID. Monitor labs and urine output. Avoid any potential nephrotoxins. Dose medications as per eGFR.    If you have any questions, please feel free to contact me  Neville Schulz  Nephrology Fellow  [phone]; Prefer [employer] TEAMS  (After 5pm or on weekends please page the on-call fellow).

## 2023-04-24 NOTE — PROGRESS NOTE ADULT - ASSESSMENT
48 year old female with a PMHx of HTN, hypothyroidism,  (30 years ago) and breast cancer (S/P left mastectomy and chemo in ) who presented with epigastric pain and left upper quadrant pain.  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  Transferred from OS ([place]) for hypotension and tachypnea requiring emergent intubation and multiple pressors.  SICU consulted for hemodynamic monitoring and respiratory management.    Plan:   - Diet: TF at goal  - CRRT  - f/u possible GI intervention today  - restart meropenem  - Appreciate care per SICU      B Team Surgery  [id number]

## 2023-04-24 NOTE — PROGRESS NOTE ADULT - ASSESSMENT
Assessment and Plan: 	  48 year old female with a PMHx of HTN, hypothyroidism,  (30 years ago) and breast cancer (S/P left mastectomy and chemo in ) who presented with epigastric pain and left upper quadrant pain.  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  Transferred from OSH ([place]) for hypotension and tachypnea requiring emergent intubation and multiple pressors.  SICU consulted for hemodynamic monitoring and respiratory management.  Intubated, pressors, CRRT, pending axios stent/debridement when collection mature.     Neuro:  -  CTH negative for acute pathology   - off sedation   - Tylenol, gabapentin     Resp:  - intubated  for AMS 2/2 to airway protection, hypercarbic resp failure  - 18/500/    CV:  Mixed shock state 2/2 septic shock w/ E. Coli in urine vs hypovolemic shock now s/p 11L resuscitation   - Midodrine 40 q 8 hours  - Stress steroids hydrocortisone 50q6  started      GI:   - TF, held at [facility] for potential axios stent   - Ulcer ppx w/ Protonix   - Creon  - repeat CT A/P , f/u GI recs     Renal:  - ARF 2/2 shock state resulting in ATN requiring CRRT (net even)    - Failed lasix challenge    - Babin reinserted on     Heme:   - SCDs and SQH  - HIT Ab positive; DOUGLAS Negative    ID:   Intermittently febrile w/ leukocytosis c/f septic shock 2/2 E coli UTI  Diagnostics:  - 3/22 Ucx: E coli  - 3/30 Bcx: staph hominis in aerobic bottle x 1 (likely contaminant)   - S/p Meropenem (3/26-, -)  - No abx at this time     Endo:   - stress dose Solu-cortef 150mg x1 then 50 mg q6   - Lantus 9 units at bedtime, ADMELOG 2 units q6  - c/w synthroid 20 IV (home med)   - ISS      Disposition: SICU   Assessment and Plan: 	  48 year old female with a PMHx of HTN, hypothyroidism,  (30 years ago) and breast cancer (S/P left mastectomy and chemo in ) who presented with epigastric pain and left upper quadrant pain.  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  Transferred from OSH ([place]) for hypotension and tachypnea requiring emergent intubation and multiple pressors.  SICU consulted for hemodynamic monitoring and respiratory management.  Intubated, pressors, CRRT, pending axios stent/debridement when collection mature.     Neuro:  -  CTH negative for acute pathology   - precedex for sedation, wean as tolerated for RASS 0 to -1   - Tylenol, gabapentin     Resp:  - intubated  for AMS 2/2 to airway protection, hypercarbic resp failure  - //    CV:  Mixed shock state 2/2 septic shock w/ E. Coli in urine vs hypovolemic shock now s/p 11L resuscitation   - Midodrine 40 q 8 hours  - Stress steroids hydrocortisone 50 q 8   - off pressors     GI:   - TF   - Ulcer ppx w/ Protonix   - Creon  - repeat CT A/P , GI recs: collections not mature enough for axios stent     Renal:  - ARF 2/2 shock state resulting in ATN requiring CRRT (net even)    - Failed lasix challenge    - Babin reinserted on     Heme:   - SCDs and SQH  - HIT Ab positive; DOUGLAS Negative    ID:   Intermittently febrile w/ leukocytosis c/f septic shock 2/2 E coli UTI  Diagnostics:  - 3/22 Ucx: E coli  - 3/30 Bcx: staph hominis in aerobic bottle x 1 (likely contaminant)   - S/p Meropenem (3/26-, -)  - Ertapenem  -     Endo:   - stress steroids 50 q 8  - Lantus 9 units at bedtime, ADMELOG 2 units q6  - c/w synthroid 20 IV (home med)   - ISS    Disposition: SICU

## 2023-04-24 NOTE — PROGRESS NOTE ADULT - ATTENDING COMMENTS
Severe sepsis secondary to infected APN  a.  Patient remains afebrile, WBC stable at 13K  b.  On midodrine, weaned off IV vasopressor support this am  c.  CT reviewed with note of increasing immature collections  d.  On Ertapenem, hydrocortisone course  e.  No surgical, GI, or IR intervention warranted at this time    Respiratory failure  a.  Emergently re-intubated this past Saturday fro hypoxemic-hypercarbic respiratory embarrassment  b.  Continue MV support  c.  Repeat ABG    CHATO  a.  Remains on CVVH  b.  Remains anuric, lasix non-responsive  c.   Trend creatinine    Malnutrition  a.  NPO at this time  b.  Tolerating TF, advance to goal

## 2023-04-25 NOTE — PROGRESS NOTE ADULT - SUBJECTIVE AND OBJECTIVE BOX
[facility] Division of Kidney Diseases & Hypertension  FOLLOW UP NOTE  [phone]--------------------------------------------------------------------------------    Chief Complaint: Oliguric CHATO, on RRT    24 hour events/subjective: Pt. seen and examined in the SICU today. Patient remains on CRRT; had to stop overnight due to catheter issue. New catheter placed overnight.     PAST HISTORY  --------------------------------------------------------------------------------  No significant changes to PMH, PSH, FHx, SHx, unless otherwise noted    ALLERGIES & MEDICATIONS  --------------------------------------------------------------------------------  Allergies    No Known Allergies    Intolerances      Standing Inpatient Medications  chlorhexidine 0.12% Liquid 15 milliLiter(s) Oral Mucosa every 12 hours  chlorhexidine 2% Cloths 1 Application(s) Topical daily  CRRT Treatment    <Continuous>  dexMEDEtomidine Infusion 0.5 MICROgram(s)/kG/Hr IV Continuous <Continuous>  ertapenem  IVPB 1000 milliGRAM(s) IV Intermittent every 24 hours  gabapentin Solution 100 milliGRAM(s) Oral three times a day  heparin   Injectable 5000 Unit(s) SubCutaneous every 8 hours  hydrocortisone sodium succinate Injectable 50 milliGRAM(s) IV Push every 8 hours  insulin glargine Injectable (LANTUS) 9 Unit(s) SubCutaneous at bedtime  insulin lispro (ADMELOG) corrective regimen sliding scale   SubCutaneous every 6 hours  levothyroxine Injectable 20 MICROGram(s) IV Push at bedtime  midodrine 40 milliGRAM(s) Oral every 8 hours  multivitamin/minerals/iron Oral Solution (CENTRUM) 15 milliLiter(s) Oral daily  norepinephrine Infusion 0.05 MICROgram(s)/kG/Min IV Continuous <Continuous>  pancrelipase  (CREON 12,000 Lipase Units) 1 Capsule(s) Oral <User Schedule>  pantoprazole   Suspension 40 milliGRAM(s) Oral daily  Phoxillum Filtration BK 4 / 2.5 5000 milliLiter(s) CRRT <Continuous>  Phoxillum Filtration BK 4 / 2.5 5000 milliLiter(s) CRRT <Continuous>  Phoxillum Filtration BK 4 / 2.5 5000 milliLiter(s) CRRT <Continuous>  phytonadione  IVPB 10 milliGRAM(s) IV Intermittent every 24 hours  polyethylene glycol 3350 17 Gram(s) Oral daily    PRN Inpatient Medications  acetaminophen   IVPB .. 500 milliGRAM(s) IV Intermittent every 6 hours PRN      REVIEW OF SYSTEMS  --------------------------------------------------------------------------------  Unable to be obtained.     VITALS/PHYSICAL EXAM  --------------------------------------------------------------------------------  T(C): 37.9 (04-25-23 @ 08:00), Max: 38.7 (04-25-23 @ 04:00)  HR: 74 (04-25-23 @ 09:00) (45 - 96)  BP: --  RR: 18 (04-25-23 @ 09:00) (15 - 21)  SpO2: 100% (04-25-23 @ 09:00) (98% - 100%)  Wt(kg): --        04-24-23 @ 07:01  -  04-25-23 @ 07:00  --------------------------------------------------------  IN: 822 mL / OUT: 66 mL / NET: 756 mL      Physical Exam:  	Gen: Intubated; sedated  	HEENT: NGT+, anicteric  	Pulm: Fair entry B/L  	CV: S1S2+  	Abd: Obese, soft; nontender  	Ext: edematous b/l  	Neuro: Sedated  	Skin: Warm and dry              Dialysis access: Right IJ non tunneled HD catheter    LABS/STUDIES  --------------------------------------------------------------------------------              7.3    10.97 >-----------<  155      [04-25-23 @ 02:04]              22.7     134  |  99  |  12  ----------------------------<  124      [04-25-23 @ 02:04]  4.1   |  16  |  0.93        Ca     8.3     [04-25-23 @ 02:04]      Mg     2.60     [04-25-23 @ 02:04]      Phos  2.9     [04-25-23 @ 02:04]    TPro  6.9  /  Alb  3.0  /  TBili  5.9  /  DBili  x   /  AST  79  /  ALT  54  /  AlkPhos  411  [04-25-23 @ 02:04]    PT/INR: PT 14.8 , INR 1.27       [04-25-23 @ 02:04]  PTT: 28.3       [04-25-23 @ 02:04]      Creatinine Trend:  SCr 0.93 [04-25 @ 02:04]  SCr 1.07 [04-24 @ 02:55]  SCr 2.10 [04-23 @ 01:10]  SCr 1.89 [04-22 @ 10:20]  SCr 1.39 [04-22 @ 01:27]

## 2023-04-25 NOTE — PROGRESS NOTE ADULT - SUBJECTIVE AND OBJECTIVE BOX
SICU Daily Progress Note  =====================================================  Interval/Overnight Events:  - TF restarted  - NAEO       Allergies: No Known Allergies      MEDICATIONS:   --------------------------------------------------------------------------------------  Neurologic Medications  acetaminophen   IVPB .. 500 milliGRAM(s) IV Intermittent every 6 hours PRN Temp greater or equal to 38C (100.4F), Mild Pain (1 - 3)  dexMEDEtomidine Infusion 0.5 MICROgram(s)/kG/Hr IV Continuous <Continuous>  gabapentin Solution 100 milliGRAM(s) Oral three times a day    Respiratory Medications    Cardiovascular Medications  midodrine 40 milliGRAM(s) Oral every 8 hours  norepinephrine Infusion 0.05 MICROgram(s)/kG/Min IV Continuous <Continuous>    Gastrointestinal Medications  multivitamin/minerals/iron Oral Solution (CENTRUM) 15 milliLiter(s) Oral daily  pancrelipase  (CREON 12,000 Lipase Units) 1 Capsule(s) Oral <User Schedule>  pantoprazole   Suspension 40 milliGRAM(s) Oral daily  phytonadione  IVPB 10 milliGRAM(s) IV Intermittent every 24 hours  polyethylene glycol 3350 17 Gram(s) Oral daily    Genitourinary Medications    Hematologic/Oncologic Medications  heparin   Injectable 5000 Unit(s) SubCutaneous every 8 hours    Antimicrobial/Immunologic Medications  ertapenem  IVPB 1000 milliGRAM(s) IV Intermittent every 24 hours    Endocrine/Metabolic Medications  hydrocortisone sodium succinate Injectable 50 milliGRAM(s) IV Push every 8 hours  insulin glargine Injectable (LANTUS) 9 Unit(s) SubCutaneous at bedtime  insulin lispro (ADMELOG) corrective regimen sliding scale   SubCutaneous every 6 hours  insulin lispro Injectable (ADMELOG) 2 Unit(s) SubCutaneous every 6 hours  levothyroxine Injectable 20 MICROGram(s) IV Push at bedtime    Topical/Other Medications  chlorhexidine 0.12% Liquid 15 milliLiter(s) Oral Mucosa every 12 hours  chlorhexidine 2% Cloths 1 Application(s) Topical daily  CRRT Treatment    <Continuous>  Phoxillum Filtration BK 4 / 2.5 5000 milliLiter(s) CRRT <Continuous>  Phoxillum Filtration BK 4 / 2.5 5000 milliLiter(s) CRRT <Continuous>  Phoxillum Filtration BK 4 / 2.5 5000 milliLiter(s) CRRT <Continuous>    --------------------------------------------------------------------------------------    VITAL SIGNS, INS/OUTS (last 24 hours):  --------------------------------------------------------------------------------------  ICU Vital Signs Last 24 Hrs  T(C): 36.6 (24 Apr 2023 20:00), Max: 37.2 (24 Apr 2023 08:00)  T(F): 97.9 (24 Apr 2023 20:00), Max: 98.9 (24 Apr 2023 08:00)  HR: 72 (25 Apr 2023 00:00) (45 - 102)  BP: --  BP(mean): --  ABP: 137/71 (25 Apr 2023 00:00) (78/47 - 171/76)  ABP(mean): 96 (25 Apr 2023 00:00) (57 - 115)  RR: 18 (25 Apr 2023 00:00) (13 - 27)  SpO2: 100% (25 Apr 2023 00:00) (98% - 100%)    O2 Parameters below as of 24 Apr 2023 07:00  Patient On (Oxygen Delivery Method): ventilator    I&O's Detail    23 Apr 2023 07:01  -  24 Apr 2023 07:00  --------------------------------------------------------  IN:    Dexmedetomidine: 17.9 mL    Enteral Tube Flush: 120 mL    Norepinephrine: 49.5 mL    Peptamen A.F.: 630 mL  Total IN: 817.4 mL    OUT:    Indwelling Catheter - Urethral (mL): 0 mL    Other (mL): 267 mL    Rectal Tube (mL): 300 mL  Total OUT: 567 mL    Total NET: 250.4 mL      24 Apr 2023 07:01  -  25 Apr 2023 00:12  --------------------------------------------------------  IN:    Dexmedetomidine: 73.7 mL    IV PiggyBack: 110 mL    Norepinephrine: 17.7 mL  Total IN: 201.4 mL    OUT:    Indwelling Catheter - Urethral (mL): 3 mL    Other (mL): 31 mL  Total OUT: 34 mL    Total NET: 167.4 mL    --------------------------------------------------------------------------------------    EXAM  NEUROLOGY  RASS:   	GCS:    Exam: sedated, hypophonic and confused when sedation weaned, moving to command     HEENT  Exam: Normocephalic, ulcer on tip of nose, EOMI.  Feeding tube in place.    RESPIRATORY  Exam: Intubated ett, synchronous with vent     CARDIOVASCULAR  Exam: S1, S2.  Regular rate and rhythm, peripheral levo running.        GI/NUTRITION  Exam: Abdomen soft, Non-tender, distended.     VASCULAR  Exam: Extremities warm, pink, well-perfused.     MUSCULOSKELETAL  Exam: sedated    SKIN  Exam: peripheral edema     METABOLIC/FLUIDS/ELECTROLYTES  multivitamin/minerals/iron Oral Solution (CENTRUM) 15 milliLiter(s) Oral daily  phytonadione  IVPB 10 milliGRAM(s) IV Intermittent every 24 hours      HEMATOLOGIC  [x] VTE Prophylaxis: heparin   Injectable 5000 Unit(s) SubCutaneous every 8 hours    Transfusions:	[] PRBC	[] Platelets		[] FFP	[] Cryoprecipitate    INFECTIOUS DISEASE  Antimicrobials/Immunologic Medications:  ertapenem  IVPB 1000 milliGRAM(s) IV Intermittent every 24 hours      Tubes/Lines/Drains   [x] Peripheral IV  [x] Central Venous Line     	[] R	[] L	[] IJ	[] Fem	[] SC	Date Placed:   [x] Arterial Line		[] R	[] L	[] Fem	[] Rad	[] Ax	Date Placed:   [] PICC		[] Midline		[] Mediport  [x] Urinary Catheter		Date Placed:   [x] Necessity of urinary, arterial, and venous catheters discussed    LABS  --------------------------------------------------------------------------------------  CBC (04-24 @ 02:55)                          7.8<L>                   13.57<H>  )--------------(  142<L>     --    % Neuts, --    % Lymphs, ANC: --                              23.4<L>  CBC (04-23 @ 06:05)                          7.6<L>                   14.76<H>  )--------------(  126<L>     --    % Neuts, --    % Lymphs, ANC: --                              23.6<L>    BMP (04-24 @ 02:55)       133<L>  |  99      |  15    			Ca++ --      Ca 8.3<L>       ---------------------------------( 148<H>		Mg 2.50         4.0     |  18<L>   |  1.07  			Ph 1.9<L>    LFTs (04-24 @ 02:55)      TPro 7.4 / Alb 3.0<L> / TBili 5.4<H> / DBili -- / AST 81<H> / ALT 56<H> / AlkPhos 341<H>    Coags (04-24 @ 02:55)  aPTT 47.5<H> / INR 3.87<H> / PT 45.5<H>      ABG (04-24 @ 05:40)     7.44 / 28<L> / 200<H> / 19<L> / -4.5<L> / 100.0<H>%     Lactate:    ABG (04-24 @ 02:55)     7.42 / 27<L> / 187<H> / 18<L> / -6.0<L> / 99.4<H>%     Lactate:

## 2023-04-25 NOTE — CHART NOTE - NSCHARTNOTEFT_GEN_A_CORE
Per chart------48 year old female with a PMHx of HTN, hypothyroidism,  (30 years ago) and breast cancer (S/P left mastectomy and chemo in ) who presented with epigastric pain and left upper quadrant pain.  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  Transferred from OSH ([place]) for hypotension and tachypnea requiring emergent intubation and multiple pressors.  SICU consulted for hemodynamic monitoring and respiratory management.    Nutrition follow up for length of stay, severe malnutrition. Pt remains on CRRT. TF at goal rate and tolerated well per Nursing. Continues with rectal tube output, 300 mL last daily output. Receiving Creon. Weight trends reflect fluctuations. Current EN is providing 27 kcal/kg/UIBW, 2.2 gm/pro.     DIET : Diet, NPO with Tube Feed:   Tube Feeding Modality: Nasogastric  Peptamen 1.5 (PEPTAMEN1.5RTH)  Total Volume for 24 Hours (mL): 1080  Continuous  Starting Tube Feed Rate {mL per Hour}: 45  Increase Tube Feed Rate by (mL): 0     Every 4 hours  Until Goal Tube Feed Rate (mL per Hour): 45  Tube Feed Duration (in Hours): 24  Tube Feed Start Time: 16:00  Free Water Flush Instructions:  MILDLY THICKENED APPLE JUICE TO BE GIVEN DAILY  No Carb Prosource (1pkg = 15gms Protein)     Qty per Day:  4 (23 @ 10:13)    EN provides: 1,080 mL total daily volume  1,620 kcal, 73 gm pro + ADDITIONAL 60 gm from Prosource = TOTAL 133 gm/day.    WEIGHT: kg  () 98.6    () 96.5   () 93.9   () 94.1    ()100.4   () 98.2         PERTINENT MEDICATIONS: MEDICATIONS  (STANDING):  chlorhexidine 0.12% Liquid 15 milliLiter(s) Oral Mucosa every 12 hours  chlorhexidine 2% Cloths 1 Application(s) Topical daily  CRRT Treatment    <Continuous>  dexMEDEtomidine Infusion 0.5 MICROgram(s)/kG/Hr (14.7 mL/Hr) IV Continuous <Continuous>  ertapenem  IVPB 1000 milliGRAM(s) IV Intermittent every 24 hours  gabapentin Solution 100 milliGRAM(s) Oral three times a day  heparin   Injectable 5000 Unit(s) SubCutaneous every 8 hours  hydrocortisone sodium succinate Injectable 50 milliGRAM(s) IV Push every 12 hours  insulin glargine Injectable (LANTUS) 9 Unit(s) SubCutaneous at bedtime  insulin lispro (ADMELOG) corrective regimen sliding scale   SubCutaneous every 6 hours  levothyroxine Injectable 20 MICROGram(s) IV Push at bedtime  midodrine 40 milliGRAM(s) Oral every 8 hours  multivitamin/minerals/iron Oral Solution (CENTRUM) 15 milliLiter(s) Oral daily  pancrelipase  (CREON 12,000 Lipase Units) 1 Capsule(s) Oral <User Schedule>  pantoprazole   Suspension 40 milliGRAM(s) Oral daily  Phoxillum Filtration BK 4 / 2.5 5000 milliLiter(s) (200 mL/Hr) CRRT <Continuous>  Phoxillum Filtration BK 4 / 2.5 5000 milliLiter(s) (1000 mL/Hr) CRRT <Continuous>  Phoxillum Filtration BK 4 / 2.5 5000 milliLiter(s) (1500 mL/Hr) CRRT <Continuous>  phytonadione  IVPB 10 milliGRAM(s) IV Intermittent every 24 hours  polyethylene glycol 3350 17 Gram(s) Oral daily    LABS:   Na134 mmol/L<L> Glu 124 mg/dL<H> K+ 4.1 mmol/L Cr  0.93 mg/dL BUN 12 mg/dL  Phos 2.9 mg/dL  CAPILLARY BLOOD GLUCOSE  POCT Blood Glucose.: 222 mg/dL (2023 11:30)  POCT Blood Glucose.: 186 mg/dL (2023 05:31)  POCT Blood Glucose.: 96 mg/dL (2023 22:57)  POCT Blood Glucose.: 123 mg/dL (2023 18:24)    SKIN: Tip of nose S DTI.    EDEMA: 2+ generalized edema     Nutrient Needs:   UIBW 60 kg  [X] No Change from previous Assessment.   1,500-1,800 kcal (25-30 kcal/kg)  108-120 gm (1.8-2.0 gm/kg)      Previous Nutrition Diagnosis:   Severe Malnutrition.  Nutrition Dx is ongoing.                                         ~~~~~RECOMMEND~~~~~  1.  Increase EN Peptamen   2.  Monitor EN tolerance, skin integrity, pertinent labs, stool output.         SUN Calderon RD, CDN, [facility]ES Per chart------48 year old female with a PMHx of HTN, hypothyroidism,  (30 years ago) and breast cancer (S/P left mastectomy and chemo in ) who presented with epigastric pain and left upper quadrant pain.  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  Transferred from OSH ([place]) for hypotension and tachypnea requiring emergent intubation and multiple pressors.  SICU consulted for hemodynamic monitoring and respiratory management.    Nutrition follow up for length of stay, severe malnutrition. Pt remains on CRRT. TF at goal rate and tolerated well per Nursing. Continues with rectal tube output, 300 mL last daily output. Receiving Creon. Weight trends reflect fluctuations. Current EN is providing 27 kcal/kg/UIBW, 2.2 gm/pro. May benefit from EN increase. Case discussed with SICU, suggested possible increasing Pepatmen 1.5 to 50 mL/hr. PA stated would like to keep current rate as there was ? spit up vs aspiration. Tube feeds held portion of ,  and held on . PA suggests can attempt to increase in a few days.     DIET : Diet, NPO with Tube Feed:   Tube Feeding Modality: Nasogastric  Peptamen 1.5 (PEPTAMEN1.5RTH)  Total Volume for 24 Hours (mL): 1080  Continuous  Starting Tube Feed Rate {mL per Hour}: 45  Increase Tube Feed Rate by (mL): 0     Every 4 hours  Until Goal Tube Feed Rate (mL per Hour): 45  Tube Feed Duration (in Hours): 24  Tube Feed Start Time: 16:00  Free Water Flush Instructions:  MILDLY THICKENED APPLE JUICE TO BE GIVEN DAILY  No Carb Prosource (1pkg = 15gms Protein)     Qty per Day:  4 (23 @ 10:13)    EN provides: 1,080 mL total daily volume  1,620 kcal, 73 gm pro + ADDITIONAL 60 gm from Prosource = TOTAL 133 gm/day.    WEIGHT: kg  () 98.6    () 96.5   () 93.9   () 94.1    ()100.4   () 98.2         PERTINENT MEDICATIONS: MEDICATIONS  (STANDING):  chlorhexidine 0.12% Liquid 15 milliLiter(s) Oral Mucosa every 12 hours  chlorhexidine 2% Cloths 1 Application(s) Topical daily  CRRT Treatment    <Continuous>  dexMEDEtomidine Infusion 0.5 MICROgram(s)/kG/Hr (14.7 mL/Hr) IV Continuous <Continuous>  ertapenem  IVPB 1000 milliGRAM(s) IV Intermittent every 24 hours  gabapentin Solution 100 milliGRAM(s) Oral three times a day  heparin   Injectable 5000 Unit(s) SubCutaneous every 8 hours  hydrocortisone sodium succinate Injectable 50 milliGRAM(s) IV Push every 12 hours  insulin glargine Injectable (LANTUS) 9 Unit(s) SubCutaneous at bedtime  insulin lispro (ADMELOG) corrective regimen sliding scale   SubCutaneous every 6 hours  levothyroxine Injectable 20 MICROGram(s) IV Push at bedtime  midodrine 40 milliGRAM(s) Oral every 8 hours  multivitamin/minerals/iron Oral Solution (CENTRUM) 15 milliLiter(s) Oral daily  pancrelipase  (CREON 12,000 Lipase Units) 1 Capsule(s) Oral <User Schedule>  pantoprazole   Suspension 40 milliGRAM(s) Oral daily  Phoxillum Filtration BK 4 / 2.5 5000 milliLiter(s) (200 mL/Hr) CRRT <Continuous>  Phoxillum Filtration BK 4 / 2.5 5000 milliLiter(s) (1000 mL/Hr) CRRT <Continuous>  Phoxillum Filtration BK 4 / 2.5 5000 milliLiter(s) (1500 mL/Hr) CRRT <Continuous>  phytonadione  IVPB 10 milliGRAM(s) IV Intermittent every 24 hours  polyethylene glycol 3350 17 Gram(s) Oral daily    LABS:   Na134 mmol/L<L> Glu 124 mg/dL<H> K+ 4.1 mmol/L Cr  0.93 mg/dL BUN 12 mg/dL  Phos 2.9 mg/dL  CAPILLARY BLOOD GLUCOSE  POCT Blood Glucose.: 222 mg/dL (2023 11:30)  POCT Blood Glucose.: 186 mg/dL (2023 05:31)  POCT Blood Glucose.: 96 mg/dL (2023 22:57)  POCT Blood Glucose.: 123 mg/dL (2023 18:24)    SKIN: Tip of nose S DTI.    EDEMA: 2+ generalized edema     Nutrient Needs:   UIBW 60 kg  [X] No Change from previous Assessment.   1,500-1,800 kcal (25-30 kcal/kg)  108-120 gm (1.8-2.0 gm/kg)      Previous Nutrition Diagnosis:   Severe Malnutrition.  Nutrition Dx is ongoing.                                     ~~~~~RECOMMEND~~~~~  1.  Increase Peptamen 1.5 to 50 mL/hr as tolerated.   2.  Monitor EN tolerance, skin integrity, pertinent labs, stool output.    3.  Reconsult nutrition as needed.     SUN Calderon RD, CDN, [facility]

## 2023-04-25 NOTE — PROGRESS NOTE ADULT - ASSESSMENT
· Assessment	  Assessment and Plan: 	  48 year old female with a PMHx of HTN, hypothyroidism,  (30 years ago) and breast cancer (S/P left mastectomy and chemo in ) who presented with epigastric pain and left upper quadrant pain.  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  Transferred from OSH ([place]) for hypotension and tachypnea requiring emergent intubation and multiple pressors.  SICU consulted for hemodynamic monitoring and respiratory management.  Intubated, pressors, CRRT, pending axios stent/debridement when collection mature.     Neuro:  -  CTH negative for acute pathology   - precedex for sedation, wean as tolerated for RASS 0 to -1   - Tylenol, gabapentin     Resp:  - intubated  for AMS 2/2 to airway protection, hypercarbic resp failure  - 18//    CV:  Mixed shock state 2/2 septic shock w/ E. Coli in urine vs hypovolemic shock now s/p 11L resuscitation   - Midodrine 40 q 8 hours  - Stress steroids hydrocortisone 50 q 8   - levo for MAP < 65     GI:   - TF   - Ulcer ppx w/ Protonix   - Creon  - repeat CT A/P , GI recs: collections not mature enough for axios stent     Renal:  - ARF 2/2 shock state resulting in ATN requiring CRRT (net even)    - Failed lasix challenge    - Babin reinserted on     Heme:   - SCDs and SQH  - HIT Ab positive; DOUGLAS Negative    ID:   Intermittently febrile w/ leukocytosis c/f septic shock 2/2 E coli UTI  Diagnostics:  - 3/22 Ucx: E coli  - 3/30 Bcx: staph hominis in aerobic bottle x 1 (likely contaminant)   - S/p Meropenem (3/26-, -)  - Ertapenem  -     Endo:   - stress steroids 50 q 8  - Lantus 9 units at bedtime, ADMELOG 2 units q6  - c/w synthroid 20 IV (home med)   - ISS    Disposition: SICU   · Assessment	  Assessment and Plan: 	  48 year old female with a PMHx of HTN, hypothyroidism,  (30 years ago) and breast cancer (S/P left mastectomy and chemo in ) who presented with epigastric pain and left upper quadrant pain.  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  Transferred from OSH ([place]) for hypotension and tachypnea requiring emergent intubation and multiple pressors.  SICU consulted for hemodynamic monitoring and respiratory management.  Intubated, pressors, CRRT, pending axios stent/debridement when collection mature.     PLAN:  Neuro:  -  CTH negative for acute pathology   - precedex for sedation, wean as tolerated for RASS 0 to -1   - Tylenol, gabapentin     Resp:  - intubated  for AMS 2/2 to airway protection, hypercarbic resp failure  - 18//    CV:  Mixed shock state 2/2 septic shock w/ E. Coli in urine vs hypovolemic shock now s/p 11L resuscitation   - Midodrine 40 q 8 hours  - Stress steroids hydrocortisone 50 q 12   - levo d/c'd    GI:   - TF   - Ulcer ppx w/ Protonix   - Creon  - repeat CT A/P , GI recs: collections not mature enough for axios stent     Renal:  - ARF 2/2 shock state resulting in ATN requiring CRRT (net even)    - Failed lasix challenge    - Babin reinserted on     Heme:   - SCDs and SQH  - HIT Ab positive; DOUGLAS Negative    ID:   Intermittently febrile w/ leukocytosis c/f septic shock 2/2 E coli UTI  Diagnostics:  - 3/22 Ucx: E coli  - 3/30 Bcx: staph hominis in aerobic bottle x 1 (likely contaminant)   - S/p Meropenem (3/26-, -)  - Ertapenem  -     Endo:   - stress steroids 50 q 8  - Lantus 9 units at bedtime, ADMELOG 2 units q6  - c/w synthroid 20 IV (home med)   - ISS    Disposition: SICU Assessment and Plan: 	  48 year old female with a PMHx of HTN, hypothyroidism,  (30 years ago) and breast cancer (S/P left mastectomy and chemo in ) who presented with epigastric pain and left upper quadrant pain.  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  Transferred from OSH ([place]) for hypotension and tachypnea requiring emergent intubation and multiple pressors.  SICU consulted for hemodynamic monitoring and respiratory management.  Intubated, pressors, CRRT, pending axios stent/debridement when collection mature.     Neuro:  -  CTH negative for acute pathology   - precedex for sedation, wean as tolerated for RASS 0 to -1   - Tylenol, gabapentin     Resp:  - intubated  for AMS 2/2 to airway protection, hypercarbic resp failure  - 18//    CV:  Mixed shock state 2/2 septic shock w/ E. Coli in urine vs hypovolemic shock now s/p 11L resuscitation   - Midodrine 40 q 8 hours  - levo d/c'd    GI:   - TF   - Ulcer ppx w/ Protonix   - Creon  - repeat CT A/P , GI recs: collections not mature enough for axios stent     Renal:  - ARF 2/2 shock state resulting in ATN requiring CRRT (net even)    - Failed lasix challenge    - d/c viera  - daily bladder scans    Heme:   - SCDs and SQH  - HIT Ab positive; DOUGLAS Negative    ID:   Intermittently febrile w/ leukocytosis c/f septic shock 2/2 E coli UTI  Diagnostics:  - 3/22 Ucx: E coli  - 3/30 Bcx: staph hominis in aerobic bottle x 1 (likely contaminant)   - S/p Meropenem (3/26-, -)  - Ertapenem  -     Endo:   - stress steroids 50 q 12  - Lantus 9 units at bedtime, ADMELOG 2 units q6  - c/w synthroid 20 IV (home med)   - ISS    Disposition: SICU

## 2023-04-25 NOTE — PROGRESS NOTE ADULT - ATTENDING COMMENTS
Severe sepsis secondary to infected APN  a.  Remains afebrile  b.  WBC stable at 11  c.  Intermittent IV levo requirement, on midodrine  d.  On ertapenem  e.  No acute intervention at this time  f.  Plan for surveillance CT in 1 week    Malnutrition  a.  NPO  b.  Continue TF at goal  c.  Contin ue Creon    CHATO  a  Remains ANURIC  b.  On CVVH with -50/100 pull as tolerated  c.  No response to Laisx (Loss of function)    Respiratory failure  a,  Remains MV dependent  b.  With adequate gas exchange  c.  May need tracheostomy given prolonged CC and deconditioning

## 2023-04-25 NOTE — PROGRESS NOTE ADULT - SUBJECTIVE AND OBJECTIVE BOX
Surgery Progress Note     24hour Events:   - TF restarted  - Pressors restarted  - R IJ central line placed for CRRT  - NAEO     Subjective:  Patient seen and examined.       Vital Signs:  Vital Signs Last 24 Hrs  T(C): 37.9 (25 Apr 2023 00:00), Max: 37.9 (25 Apr 2023 00:00)  T(F): 100.3 (25 Apr 2023 00:00), Max: 100.3 (25 Apr 2023 00:00)  HR: 59 (25 Apr 2023 05:30) (45 - 101)  BP: --  BP(mean): --  RR: 21 (25 Apr 2023 05:00) (15 - 27)  SpO2: 100% (25 Apr 2023 05:30) (98% - 100%)    Parameters below as of 24 Apr 2023 20:00  Patient On (Oxygen Delivery Method): ventilator    O2 Concentration (%): 30    CAPILLARY BLOOD GLUCOSE      POCT Blood Glucose.: 186 mg/dL (25 Apr 2023 05:31)  POCT Blood Glucose.: 96 mg/dL (24 Apr 2023 22:57)  POCT Blood Glucose.: 123 mg/dL (24 Apr 2023 18:24)  POCT Blood Glucose.: 122 mg/dL (24 Apr 2023 11:46)      I&O's Detail    23 Apr 2023 07:01  -  24 Apr 2023 07:00  --------------------------------------------------------  IN:    Dexmedetomidine: 17.9 mL    Enteral Tube Flush: 120 mL    Norepinephrine: 49.5 mL    Peptamen A.F.: 630 mL  Total IN: 817.4 mL    OUT:    Indwelling Catheter - Urethral (mL): 0 mL    Other (mL): 267 mL    Rectal Tube (mL): 300 mL  Total OUT: 567 mL    Total NET: 250.4 mL      24 Apr 2023 07:01  -  25 Apr 2023 05:46  --------------------------------------------------------  IN:    Dexmedetomidine: 95.7 mL    Enteral Tube Flush: 60 mL    Enteral Tube Flush: 50 mL    IV PiggyBack: 110 mL    Norepinephrine: 28.7 mL    Peptamen A.F.: 180 mL  Total IN: 524.4 mL    OUT:    Indwelling Catheter - Urethral (mL): 3 mL    Other (mL): 61 mL  Total OUT: 64 mL    Total NET: 460.4 mL            Physical Exam:  General Appearance: intubated and very lethargic  Chest: Equal expansion bilaterally  CV: Pulse regular presently  Abdomen: Soft, nontender, moderately distended but improved from yesterday  Extremities: Grossly symmetric, SCD's in place     Labs:    04-25    134<L>  |  99  |  12  ----------------------------<  124<H>  4.1   |  16<L>  |  0.93    Ca    8.3<L>      25 Apr 2023 02:04  Phos  2.9     04-25  Mg     2.60     04-25    TPro  6.9  /  Alb  3.0<L>  /  TBili  5.9<H>  /  DBili  x   /  AST  79<H>  /  ALT  54<H>  /  AlkPhos  411<H>  04-25    LIVER FUNCTIONS - ( 25 Apr 2023 02:04 )  Alb: 3.0 g/dL / Pro: 6.9 g/dL / ALK PHOS: 411 U/L / ALT: 54 U/L / AST: 79 U/L / GGT: x                                 7.3    10.97 )-----------( 155      ( 25 Apr 2023 02:04 )             22.7     PT/INR - ( 25 Apr 2023 02:04 )   PT: 14.8 sec;   INR: 1.27 ratio         PTT - ( 25 Apr 2023 02:04 )  PTT:28.3 sec

## 2023-04-25 NOTE — PROCEDURE NOTE - NSUS ED ADDITIONAL DETAIL1 FT
Ultrasound performed at patient's bedside for educational purposes. The study will have a follow up study performed or was performed in the direct supervision of an ultrasound trained attending. Ultrasound performed at patient's bedside. The study will have a follow up study performed or was performed in the direct supervision of an ultrasound trained attending.

## 2023-04-25 NOTE — PROGRESS NOTE ADULT - ATTENDING COMMENTS
I agree with the detailed interval history, physical, and plan, which I have reviewed and edited where appropriate'; also agree with notes/assessment with my team on service.  I have personally examined the patient.  I was physically present for the key portions of the evaluation and management (E/M) service provided.  I reviewed all the pertinent data.  The patient is a critical care patient with life threatening hemodynamic and metabolic instability in SICU.  The SICU team has a constant risk benefit analyzes discussion and coordinating care with the primary team and all consultants.   The patient is in SICU with the chief complaint and diagnosis mentioned in the note.   The plan will be specified in the note.  48 year old female with necrotizing pancreatitis with CHATO, respiratory failure, hypotension in SICU.  Sedated  LUNG coarse BS  HEART iRRG  ABD dist  PLAN:  Neuro:  - precedex   - Tylenol  -gabapentin   Resp:  - AC 18/500/5/30  CV:  - Midodrine 40 q 8 hours  - hydrocortisone 50 q 12   GI:   -Protonix   - Creon  Renal:  - CRRT (net even)    Heme:   - SCDs and SQH  ID:   - Ertapenem    Endo:   - stress steroids 50 q 8  - Lantus 9 units at bedtime, ADMELOG 2 units q6  - Disposition: SICU

## 2023-04-25 NOTE — PROGRESS NOTE ADULT - PROBLEM SELECTOR PLAN 1
Pt. with oliguric CHATO in the setting of necrotizing pancreatitis and shock. Pt. with most likely ATN. Scr was 1.24 on 3/23/23 and increased to 4.60 on 3/24/23. Pt. was initiated on CRRT/CVVHDF on 3/24/23 and discontinued overnight on 4/6/23 by SICU team. Pt. remained oliguric with hypotension so restarted on CVVHDH on 4/7/23. CRRT held on 4/17/23 but restarted on 4/18 as patient remained hypotensive and oliguric. CRRT held by SICU on 4/21. Resumed on 4/22 after failed lasix challenge. HD catheter replaced last night. Keeping net even. CRRT orders reviewed. Vitals and labs reviewed. Pt remains critically ill and continues to require intermittent vasopressors for low BPs. Remains on Midodrine 40 TID. Monitor labs and urine output. Avoid any potential nephrotoxins. Dose medications as per eGFR.    If you have any questions, please feel free to contact me  Neville Schulz  Nephrology Fellow  [phone]; Prefer [employer] TEAMS  (After 5pm or on weekends please page the on-call fellow).

## 2023-04-25 NOTE — PROCEDURE NOTE - NSUSCPTCODES_ED_ALL
47536 Abdominal Ultrasound (GB/FAST)
51196 Echocardiography Transthoracic with Image 2D (Echo/FAST)
84822 US Chest (PTX, Pleural Effussion/CHF vs COPD)

## 2023-04-25 NOTE — PROGRESS NOTE ADULT - ASSESSMENT
48 year old female with a PMHx of HTN, hypothyroidism,  (30 years ago) and breast cancer (S/P left mastectomy and chemo in ) who presented with epigastric pain and left upper quadrant pain.  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  Transferred from OS ([place]) for hypotension and tachypnea requiring emergent intubation and multiple pressors.  SICU consulted for hemodynamic monitoring and respiratory management.    Plan:   - Diet: TF at goal  - CRRT  - on ertapenem  - Appreciate care per SICU      B Team Surgery  [id number]

## 2023-04-26 NOTE — PROGRESS NOTE ADULT - ASSESSMENT
Assessment and Plan: 	  48 year old female with a PMHx of HTN, hypothyroidism,  (30 years ago) and breast cancer (S/P left mastectomy and chemo in ) who presented with epigastric pain and left upper quadrant pain.  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  Transferred from OSH ([place]) for hypotension and tachypnea requiring emergent intubation and multiple pressors.  SICU consulted for hemodynamic monitoring and respiratory management.  Intubated, pressors, CRRT, pending axios stent/debridement when collection mature.     Neuro:  -  CTH negative for acute pathology   - precedex for sedation, wean as tolerated for RASS 0 to -3  - Tylenol, gabapentin     Resp:  - intubated  for AMS 2/2 to airway protection, hypercarbic resp failure  - /    CV:  Mixed shock state 2/2 septic shock w/ E. Coli in urine vs hypovolemic shock now s/p 11L resuscitation   - Midodrine 40 q 8 hours    GI:   - TF   - Ulcer ppx w/ Protonix   - Creon  - repeat CT A/P , GI recs: collections not mature enough for axios stent     Renal:  - ARF 2/2 shock state resulting in ATN requiring CRRT, circuit clotted, blood returned, held for potential iHD tomorrow  - Failed lasix challenge    - d/c viera  - daily bladder scans    Heme:   - SCDs and SQH  - HIT Ab positive; DOUGLAS Negative    ID:   Intermittently febrile w/ leukocytosis c/f septic shock 2/2 E coli UTI  Diagnostics:  - 3/22 Ucx: E coli  - 3/30 Bcx: staph hominis in aerobic bottle x 1 (likely contaminant)   - S/p Meropenem (3/26-, -)  - Ertapenem  -     Endo:   - stress steroids 50 q 12  - Lantus 9 units at bedtime, ADMELOG 2 units q6  - c/w synthroid 20 IV (home med)   - ISS    Disposition: SICU Assessment and Plan: 	  48 year old female with a PMHx of HTN, hypothyroidism,  (30 years ago) and breast cancer (S/P left mastectomy and chemo in ) who presented with epigastric pain and left upper quadrant pain.  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  Transferred from OSH ([place]) for hypotension and tachypnea requiring emergent intubation and multiple pressors.  SICU consulted for hemodynamic monitoring and respiratory management.  Intubated, pressors, CRRT, pending axios stent/debridement when collection mature.     Neuro:  -  CTH negative for acute pathology   - no sedation   - Tylenol, gabapentin     Resp:  - intubated  for AMS 2/2 to airway protection, hypercarbic resp failure  - 18//  - CPAP today for exercise     CV:  Mixed shock state 2/2 septic shock w/ E. Coli in urine vs hypovolemic shock now s/p 11L resuscitation   - Midodrine 40 q 8 hours    GI:   - TF   - Ulcer ppx w/ Protonix   - Creon  - repeat CT A/P , GI recs: collections not mature enough for axios stent     Renal:  - ARF 2/2 shock state resulting in ATN requiring CRRT, circuit clotted, blood returned, held for potential iHD tomorrow  - Failed lasix challenge    - d/c viera  - daily bladder scans    Heme:   - SCDs and SQH  - HIT Ab positive; DOUGLAS Negative    ID:   Intermittently febrile w/ leukocytosis c/f septic shock 2/2 E coli UTI  Diagnostics:  - 3/22 Ucx: E coli  - 3/30 Bcx: staph hominis in aerobic bottle x 1 (likely contaminant)   - S/p Meropenem (3/26-, -)  - Ertapenem  -     Endo:   - stress steroids 50 q 12  - Lantus 9 units at bedtime, ADMELOG 2 units q6  - c/w synthroid 20 IV (home med)   - ISS    Disposition: SICU

## 2023-04-26 NOTE — PROGRESS NOTE ADULT - SUBJECTIVE AND OBJECTIVE BOX
Surgery Progress Note    Overnight Events: CRRT access clotted, held until new access can be placed.    SUBJECTIVE: Patient seen and examined at bedside with surgical team, patient is intubated and sedated. Denies fever, chills, CP, SOB nausea, vomiting, dizziness.      OBJECTIVE:    Vital Signs Last 24 Hrs  T(C): 38.2 (26 Apr 2023 08:00), Max: 38.2 (26 Apr 2023 08:00)  T(F): 100.7 (26 Apr 2023 08:00), Max: 100.7 (26 Apr 2023 08:00)  HR: 72 (26 Apr 2023 09:00) (54 - 92)  BP: --  BP(mean): --  RR: 18 (26 Apr 2023 09:00) (18 - 20)  SpO2: 100% (26 Apr 2023 09:00) (100% - 100%)    Parameters below as of 26 Apr 2023 08:00  Patient On (Oxygen Delivery Method): ventilator    O2 Concentration (%): 30I&O's Detail    25 Apr 2023 07:01  -  26 Apr 2023 07:00  --------------------------------------------------------  IN:    Dexmedetomidine: 59.3 mL    Dexmedetomidine: 114.1 mL    Dexmedetomidine: 7.4 mL    Enteral Tube Flush: 150 mL    Enteral Tube Flush: 270 mL    IV PiggyBack: 100 mL    Peptamen A.F.: 1035 mL  Total IN: 1735.8 mL    OUT:    Indwelling Catheter - Urethral (mL): 0 mL    Other (mL): 410 mL    Rectal Tube (mL): 650 mL  Total OUT: 1060 mL    Total NET: 675.8 mL      26 Apr 2023 07:01  -  26 Apr 2023 09:39  --------------------------------------------------------  IN:    Dexmedetomidine: 8.8 mL    Enteral Tube Flush: 30 mL    Enteral Tube Flush: 30 mL    IV PiggyBack: 100 mL    Peptamen A.F.: 90 mL  Total IN: 258.8 mL    OUT:  Total OUT: 0 mL    Total NET: 258.8 mL      MEDICATIONS  (STANDING):  chlorhexidine 0.12% Liquid 15 milliLiter(s) Oral Mucosa every 12 hours  chlorhexidine 2% Cloths 1 Application(s) Topical daily  CRRT Treatment    <Continuous>  ertapenem  IVPB 1000 milliGRAM(s) IV Intermittent every 24 hours  gabapentin Solution 100 milliGRAM(s) Oral three times a day  heparin   Injectable 5000 Unit(s) SubCutaneous every 8 hours  hydrocortisone sodium succinate Injectable 50 milliGRAM(s) IV Push every 12 hours  insulin glargine Injectable (LANTUS) 14 Unit(s) SubCutaneous at bedtime  insulin lispro (ADMELOG) corrective regimen sliding scale   SubCutaneous every 6 hours  insulin lispro Injectable (ADMELOG) 3 Unit(s) SubCutaneous every 6 hours  levothyroxine Injectable 20 MICROGram(s) IV Push at bedtime  midodrine 40 milliGRAM(s) Oral every 8 hours  multivitamin/minerals/iron Oral Solution (CENTRUM) 15 milliLiter(s) Oral daily  pancrelipase  (CREON 12,000 Lipase Units) 1 Capsule(s) Oral <User Schedule>  pantoprazole   Suspension 40 milliGRAM(s) Oral daily  Phoxillum Filtration BK 4 / 2.5 5000 milliLiter(s) (200 mL/Hr) CRRT <Continuous>  Phoxillum Filtration BK 4 / 2.5 5000 milliLiter(s) (1000 mL/Hr) CRRT <Continuous>  Phoxillum Filtration BK 4 / 2.5 5000 milliLiter(s) (1500 mL/Hr) CRRT <Continuous>  polyethylene glycol 3350 17 Gram(s) Oral daily    MEDICATIONS  (PRN):  acetaminophen   IVPB .. 500 milliGRAM(s) IV Intermittent every 6 hours PRN Temp greater or equal to 38C (100.4F), Mild Pain (1 - 3)      PHYSICAL EXAM:  Constitutional: A&Ox3, NAD  Respiratory: Unlabored breathing  Abdomen: Soft, nondistended, NTTP. No rebound or guarding. ****incisions c/d/i  Extremities: WWP, LEE spontaneously    LABS:                        7.4    11.69 )-----------( 165      ( 26 Apr 2023 00:57 )             23.8     04-26    136  |  101  |  21  ----------------------------<  235<H>  3.8   |  17<L>  |  1.42<H>    Ca    8.3<L>      26 Apr 2023 00:57  Phos  3.3     04-26  Mg     2.60     04-26    TPro  7.0  /  Alb  2.9<L>  /  TBili  5.7<H>  /  DBili  x   /  AST  95<H>  /  ALT  72<H>  /  AlkPhos  404<H>  04-26    PT/INR - ( 26 Apr 2023 00:57 )   PT: 13.0 sec;   INR: 1.12 ratio         PTT - ( 26 Apr 2023 00:57 )  PTT:28.3 sec  LIVER FUNCTIONS - ( 26 Apr 2023 00:57 )  Alb: 2.9 g/dL / Pro: 7.0 g/dL / ALK PHOS: 404 U/L / ALT: 72 U/L / AST: 95 U/L / GGT: x               IMAGING:

## 2023-04-26 NOTE — PROGRESS NOTE ADULT - SUBJECTIVE AND OBJECTIVE BOX
[facility] Division of Kidney Diseases & Hypertension  FOLLOW UP NOTE  [phone]--------------------------------------------------------------------------------    Chief Complaint: Oliguric CHATO, on RRT    24 hour events/subjective: Pt. seen and examined in the SICU today. CRRT clotted overnight and held. Has been off of vasopressors for 2 days. Will trial iHD today.      PAST HISTORY  --------------------------------------------------------------------------------  No significant changes to PMH, PSH, FHx, SHx, unless otherwise noted    ALLERGIES & MEDICATIONS  --------------------------------------------------------------------------------  Allergies    No Known Allergies    Intolerances      Standing Inpatient Medications  chlorhexidine 0.12% Liquid 15 milliLiter(s) Oral Mucosa every 12 hours  chlorhexidine 2% Cloths 1 Application(s) Topical daily  CRRT Treatment    <Continuous>  ertapenem  IVPB 1000 milliGRAM(s) IV Intermittent every 24 hours  gabapentin Solution 100 milliGRAM(s) Oral three times a day  heparin   Injectable 5000 Unit(s) SubCutaneous every 8 hours  hydrocortisone sodium succinate Injectable 50 milliGRAM(s) IV Push every 12 hours  insulin glargine Injectable (LANTUS) 14 Unit(s) SubCutaneous at bedtime  insulin lispro (ADMELOG) corrective regimen sliding scale   SubCutaneous every 6 hours  insulin lispro Injectable (ADMELOG) 3 Unit(s) SubCutaneous every 6 hours  levothyroxine Injectable 20 MICROGram(s) IV Push at bedtime  midodrine 40 milliGRAM(s) Oral every 8 hours  multivitamin/minerals/iron Oral Solution (CENTRUM) 15 milliLiter(s) Oral daily  pancrelipase  (CREON 12,000 Lipase Units) 1 Capsule(s) Oral <User Schedule>  pantoprazole   Suspension 40 milliGRAM(s) Oral daily  Phoxillum Filtration BK 4 / 2.5 5000 milliLiter(s) CRRT <Continuous>  Phoxillum Filtration BK 4 / 2.5 5000 milliLiter(s) CRRT <Continuous>  Phoxillum Filtration BK 4 / 2.5 5000 milliLiter(s) CRRT <Continuous>  polyethylene glycol 3350 17 Gram(s) Oral daily    PRN Inpatient Medications  acetaminophen   IVPB .. 500 milliGRAM(s) IV Intermittent every 6 hours PRN      REVIEW OF SYSTEMS  --------------------------------------------------------------------------------  Unable to be obtained     VITALS/PHYSICAL EXAM  --------------------------------------------------------------------------------  T(C): 38.2 (04-26-23 @ 08:00), Max: 38.2 (04-26-23 @ 08:00)  HR: 72 (04-26-23 @ 09:00) (54 - 92)  BP: --  RR: 18 (04-26-23 @ 09:00) (18 - 20)  SpO2: 100% (04-26-23 @ 09:00) (100% - 100%)  Wt(kg): --        04-25-23 @ 07:01  -  04-26-23 @ 07:00  --------------------------------------------------------  IN: 1735.8 mL / OUT: 1060 mL / NET: 675.8 mL    04-26-23 @ 07:01  -  04-26-23 @ 09:45  --------------------------------------------------------  IN: 258.8 mL / OUT: 0 mL / NET: 258.8 mL      Physical Exam:  	Gen: Intubated; sedated  	HEENT: NGT+, anicteric  	Pulm: Fair entry B/L  	CV: S1S2+  	Abd: Obese, soft; nontender  	Ext: edematous b/l but not pitting  	Neuro: Sedated  	Skin: Warm and dry              Dialysis access: Right IJ non tunneled HD catheter    LABS/STUDIES  --------------------------------------------------------------------------------              7.4    11.69 >-----------<  165      [04-26-23 @ 00:57]              23.8     136  |  101  |  21  ----------------------------<  235      [04-26-23 @ 00:57]  3.8   |  17  |  1.42        Ca     8.3     [04-26-23 @ 00:57]      Mg     2.60     [04-26-23 @ 00:57]      Phos  3.3     [04-26-23 @ 00:57]    TPro  7.0  /  Alb  2.9  /  TBili  5.7  /  DBili  x   /  AST  95  /  ALT  72  /  AlkPhos  404  [04-26-23 @ 00:57]    PT/INR: PT 13.0 , INR 1.12       [04-26-23 @ 00:57]  PTT: 28.3       [04-26-23 @ 00:57]      Creatinine Trend:  SCr 1.42 [04-26 @ 00:57]  SCr 0.93 [04-25 @ 02:04]  SCr 1.07 [04-24 @ 02:55]  SCr 2.10 [04-23 @ 01:10]  SCr 1.89 [04-22 @ 10:20]

## 2023-04-26 NOTE — PROGRESS NOTE ADULT - ATTENDING COMMENTS
I have examined this patient, reviewed pertinent labs and imaging on SICU rounds.    60 minutes in total were spent in providing direct critical care for the diagnoses, assessment and plan outlined below.  This patient suffers from a critical illness that acutely impairs one or more vital organ systems such that there is a high probability of life threatening or imminent deterioration of the patient's condition.    Additionally, time spent in teaching or the performance of separately billable procedures was not counted toward my critical care time.  There is no overlap.  Time included review of vitals, labs, imaging, discussion with surgery/renal consultants/surrogate decision-makers.    CC: necrotizing pancreatitis sequelae-delirium, resp failure, septic shock, anuric renal failure, anemia    48F necrotizing pancreatitis due to gallstones.  Transferred from OSH ([facility]) for hypotension and tachypnea requiring emergent intubation and multiple pressors.  SICU consulted for hemodynamic monitoring and respiratory management.  Intubated, pressors, CRRT, pending axios stent/debridement when collection mature.

## 2023-04-26 NOTE — PROGRESS NOTE ADULT - ASSESSMENT
48 year old female with a PMHx of HTN, hypothyroidism,  (30 years ago) and breast cancer (S/P left mastectomy and chemo in ) who presented with epigastric pain and left upper quadrant pain.  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  Transferred from OSH ([facility]) for hypotension and tachypnea requiring emergent intubation and multiple pressors.  SICU consulted for hemodynamic monitoring and respiratory management.    Plan:   - Diet: TF at goal  - CRRT  - on ertapenem  - SBT trials, if cannot pass should consider tracheostomy  - Appreciate care per SICU      B Team Surgery  [id number]

## 2023-04-26 NOTE — PROGRESS NOTE ADULT - ATTENDING COMMENTS
Severe sepsis secondary infected APN  a.  WBC remains stable at 11K, patient remains afebrile  b.  On Ertapenem  c  Trend LFTs remains elevated at 4  but decrease from a high of 9  d.  Plan for surveillance CT  e,  No plans for necrosectomy    Pancreatic insufficiency  a.  On Creon  b.  Increase Lantus. ISS +/vs NPH    CHATO  a.  Remains anuric in fluid overload state  b.  Continue CVVH   c.  Require replacement of recently clotted HD catheter    Respiratory failure  a.  Remains MV dependent  b.  SBT trials as tolerated  c.  Discuss possible tracheostomy

## 2023-04-26 NOTE — PROGRESS NOTE ADULT - ATTENDING COMMENTS
seen and evaluated this morning.  remains critically ill.  discussed with SICU.  volume overload CHATO anuric needs RRT will attempt transition to IHD today.

## 2023-04-26 NOTE — PROGRESS NOTE ADULT - SUBJECTIVE AND OBJECTIVE BOX
SICU Daily Progress Note  =====================================================  Interval/Overnight Events:  - CRRT clotted, held       Allergies: No Known Allergies      MEDICATIONS:   --------------------------------------------------------------------------------------  Neurologic Medications  acetaminophen   IVPB .. 500 milliGRAM(s) IV Intermittent every 6 hours PRN Temp greater or equal to 38C (100.4F), Mild Pain (1 - 3)  dexMEDEtomidine Infusion 0.5 MICROgram(s)/kG/Hr IV Continuous <Continuous>  gabapentin Solution 100 milliGRAM(s) Oral three times a day    Respiratory Medications    Cardiovascular Medications  midodrine 40 milliGRAM(s) Oral every 8 hours  norepinephrine Infusion 0.05 MICROgram(s)/kG/Min IV Continuous <Continuous>    Gastrointestinal Medications  multivitamin/minerals/iron Oral Solution (CENTRUM) 15 milliLiter(s) Oral daily  pancrelipase  (CREON 12,000 Lipase Units) 1 Capsule(s) Oral <User Schedule>  pantoprazole   Suspension 40 milliGRAM(s) Oral daily  phytonadione  IVPB 10 milliGRAM(s) IV Intermittent every 24 hours  polyethylene glycol 3350 17 Gram(s) Oral daily    Genitourinary Medications    Hematologic/Oncologic Medications  heparin   Injectable 5000 Unit(s) SubCutaneous every 8 hours    Antimicrobial/Immunologic Medications  ertapenem  IVPB 1000 milliGRAM(s) IV Intermittent every 24 hours    Endocrine/Metabolic Medications  hydrocortisone sodium succinate Injectable 50 milliGRAM(s) IV Push every 8 hours  insulin glargine Injectable (LANTUS) 9 Unit(s) SubCutaneous at bedtime  insulin lispro (ADMELOG) corrective regimen sliding scale   SubCutaneous every 6 hours  insulin lispro Injectable (ADMELOG) 2 Unit(s) SubCutaneous every 6 hours  levothyroxine Injectable 20 MICROGram(s) IV Push at bedtime    Topical/Other Medications  chlorhexidine 0.12% Liquid 15 milliLiter(s) Oral Mucosa every 12 hours  chlorhexidine 2% Cloths 1 Application(s) Topical daily  CRRT Treatment    <Continuous>  Phoxillum Filtration BK 4 / 2.5 5000 milliLiter(s) CRRT <Continuous>  Phoxillum Filtration BK 4 / 2.5 5000 milliLiter(s) CRRT <Continuous>  Phoxillum Filtration BK 4 / 2.5 5000 milliLiter(s) CRRT <Continuous>    --------------------------------------------------------------------------------------    VITAL SIGNS, INS/OUTS (last 24 hours):  --------------------------------------------------------------------------------------  ICU Vital Signs Last 24 Hrs  T(C): 36.9 (25 Apr 2023 20:00), Max: 38.7 (25 Apr 2023 04:00)  T(F): 98.4 (25 Apr 2023 20:00), Max: 101.6 (25 Apr 2023 04:00)  HR: 60 (25 Apr 2023 23:34) (54 - 96)  BP: --  BP(mean): --  ABP: 114/59 (25 Apr 2023 23:00) (84/55 - 138/65)  ABP(mean): 80 (25 Apr 2023 23:00) (62 - 93)  RR: 18 (25 Apr 2023 23:00) (18 - 21)  SpO2: 100% (25 Apr 2023 23:34) (100% - 100%)    O2 Parameters below as of 25 Apr 2023 23:00  Patient On (Oxygen Delivery Method): ventilator    O2 Concentration (%): 30    I&O's Detail    24 Apr 2023 07:01  -  25 Apr 2023 07:00  --------------------------------------------------------  IN:    Dexmedetomidine: 113.3 mL    Enteral Tube Flush: 60 mL    Enteral Tube Flush: 100 mL    IV PiggyBack: 160 mL    Norepinephrine: 28.7 mL    Peptamen A.F.: 360 mL  Total IN: 822 mL    OUT:    Indwelling Catheter - Urethral (mL): 5 mL    Other (mL): 61 mL  Total OUT: 66 mL    Total NET: 756 mL      25 Apr 2023 07:01  -  26 Apr 2023 00:09  --------------------------------------------------------  IN:    Dexmedetomidine: 59.3 mL    Dexmedetomidine: 59.3 mL    Enteral Tube Flush: 90 mL    Enteral Tube Flush: 150 mL    IV PiggyBack: 100 mL    Peptamen A.F.: 675 mL  Total IN: 1133.6 mL    OUT:    Indwelling Catheter - Urethral (mL): 0 mL    Other (mL): 410 mL    Rectal Tube (mL): 200 mL  Total OUT: 610 mL    Total NET: 523.6 mL    --------------------------------------------------------------------------------------    EXAM  NEUROLOGY  RASS:   	GCS:    Exam: sedated, moving to command when sedation weaned     HEENT  Exam: Normocephalic, ulcer on tip of nose, EOMI.  Feeding tube in place.    RESPIRATORY  Exam: Intubated ett, synchronous with vent     CARDIOVASCULAR  Exam: S1, S2.  Regular rate and rhythm, extremities warm    GI/NUTRITION  Exam: Abdomen soft, Non-tender, distended.     VASCULAR  Exam: Extremities warm, well-perfused.     MUSCULOSKELETAL  Exam: sedated    SKIN  Exam: dependent peripheral edema     METABOLIC/FLUIDS/ELECTROLYTES  multivitamin/minerals/iron Oral Solution (CENTRUM) 15 milliLiter(s) Oral daily  phytonadione  IVPB 10 milliGRAM(s) IV Intermittent every 24 hours      HEMATOLOGIC  [x] VTE Prophylaxis: heparin   Injectable 5000 Unit(s) SubCutaneous every 8 hours    Transfusions:	[] PRBC	[] Platelets		[] FFP	[] Cryoprecipitate    INFECTIOUS DISEASE  Antimicrobials/Immunologic Medications:  ertapenem  IVPB 1000 milliGRAM(s) IV Intermittent every 24 hours      Tubes/Lines/Drains   [x] Peripheral IV  [x] Central Venous Line     	[x] R	[] L	[x] IJ	[] Fem	[] SC	Date Placed: 4/25/23  [x] Arterial Line		[] R	[] L	[] Fem	[] Rad	[] Ax	Date Placed:   [] PICC		[] Midline		[] Mediport  [x] Urinary Catheter		Date Placed:   [x] Necessity of urinary, arterial, and venous catheters discussed    LABS  --------------------------------------------------------------------------------------                        7.3    10.97 )-----------( 155      ( 25 Apr 2023 02:04 )             22.7   04-25    134<L>  |  99  |  12  ----------------------------<  124<H>  4.1   |  16<L>  |  0.93    Ca    8.3<L>      25 Apr 2023 02:04  Phos  2.9     04-25  Mg     2.60     04-25    TPro  6.9  /  Alb  3.0<L>  /  TBili  5.9<H>  /  DBili  x   /  AST  79<H>  /  ALT  54<H>  /  AlkPhos  411<H>  04-25  PT/INR - ( 25 Apr 2023 02:04 )   PT: 14.8 sec;   INR: 1.27 ratio         PTT - ( 25 Apr 2023 02:04 )  PTT:28.3 sec         SICU Daily Progress Note  =====================================================  Interval/Overnight Events:  - CRRT clotted, held   - precedex discontinue   - HD today        Allergies: No Known Allergies      MEDICATIONS:   --------------------------------------------------------------------------------------  Neurologic Medications  acetaminophen   IVPB .. 500 milliGRAM(s) IV Intermittent every 6 hours PRN Temp greater or equal to 38C (100.4F), Mild Pain (1 - 3)  dexMEDEtomidine Infusion 0.5 MICROgram(s)/kG/Hr IV Continuous <Continuous>  gabapentin Solution 100 milliGRAM(s) Oral three times a day    Respiratory Medications    Cardiovascular Medications  midodrine 40 milliGRAM(s) Oral every 8 hours  norepinephrine Infusion 0.05 MICROgram(s)/kG/Min IV Continuous <Continuous>    Gastrointestinal Medications  multivitamin/minerals/iron Oral Solution (CENTRUM) 15 milliLiter(s) Oral daily  pancrelipase  (CREON 12,000 Lipase Units) 1 Capsule(s) Oral <User Schedule>  pantoprazole   Suspension 40 milliGRAM(s) Oral daily  phytonadione  IVPB 10 milliGRAM(s) IV Intermittent every 24 hours  polyethylene glycol 3350 17 Gram(s) Oral daily    Genitourinary Medications    Hematologic/Oncologic Medications  heparin   Injectable 5000 Unit(s) SubCutaneous every 8 hours    Antimicrobial/Immunologic Medications  ertapenem  IVPB 1000 milliGRAM(s) IV Intermittent every 24 hours    Endocrine/Metabolic Medications  hydrocortisone sodium succinate Injectable 50 milliGRAM(s) IV Push every 8 hours  insulin glargine Injectable (LANTUS) 9 Unit(s) SubCutaneous at bedtime  insulin lispro (ADMELOG) corrective regimen sliding scale   SubCutaneous every 6 hours  insulin lispro Injectable (ADMELOG) 2 Unit(s) SubCutaneous every 6 hours  levothyroxine Injectable 20 MICROGram(s) IV Push at bedtime    Topical/Other Medications  chlorhexidine 0.12% Liquid 15 milliLiter(s) Oral Mucosa every 12 hours  chlorhexidine 2% Cloths 1 Application(s) Topical daily  CRRT Treatment    <Continuous>  Phoxillum Filtration BK 4 / 2.5 5000 milliLiter(s) CRRT <Continuous>  Phoxillum Filtration BK 4 / 2.5 5000 milliLiter(s) CRRT <Continuous>  Phoxillum Filtration BK 4 / 2.5 5000 milliLiter(s) CRRT <Continuous>    --------------------------------------------------------------------------------------    VITAL SIGNS, INS/OUTS (last 24 hours):  --------------------------------------------------------------------------------------  ICU Vital Signs Last 24 Hrs  T(C): 36.9 (25 Apr 2023 20:00), Max: 38.7 (25 Apr 2023 04:00)  T(F): 98.4 (25 Apr 2023 20:00), Max: 101.6 (25 Apr 2023 04:00)  HR: 60 (25 Apr 2023 23:34) (54 - 96)  BP: --  BP(mean): --  ABP: 114/59 (25 Apr 2023 23:00) (84/55 - 138/65)  ABP(mean): 80 (25 Apr 2023 23:00) (62 - 93)  RR: 18 (25 Apr 2023 23:00) (18 - 21)  SpO2: 100% (25 Apr 2023 23:34) (100% - 100%)    O2 Parameters below as of 25 Apr 2023 23:00  Patient On (Oxygen Delivery Method): ventilator    O2 Concentration (%): 30    I&O's Detail    24 Apr 2023 07:01 - 25 Apr 2023 07:00  --------------------------------------------------------  IN:    Dexmedetomidine: 113.3 mL    Enteral Tube Flush: 60 mL    Enteral Tube Flush: 100 mL    IV PiggyBack: 160 mL    Norepinephrine: 28.7 mL    Peptamen A.F.: 360 mL  Total IN: 822 mL    OUT:    Indwelling Catheter - Urethral (mL): 5 mL    Other (mL): 61 mL  Total OUT: 66 mL    Total NET: 756 mL      25 Apr 2023 07:01  -  26 Apr 2023 00:09  --------------------------------------------------------  IN:    Dexmedetomidine: 59.3 mL    Dexmedetomidine: 59.3 mL    Enteral Tube Flush: 90 mL    Enteral Tube Flush: 150 mL    IV PiggyBack: 100 mL    Peptamen A.F.: 675 mL  Total IN: 1133.6 mL    OUT:    Indwelling Catheter - Urethral (mL): 0 mL    Other (mL): 410 mL    Rectal Tube (mL): 200 mL  Total OUT: 610 mL    Total NET: 523.6 mL    --------------------------------------------------------------------------------------    EXAM  NEUROLOGY  RASS:   	-2 GCS:    Exam: sedated, moving to command when sedation weaned     HEENT  Exam: Normocephalic, ulcer on tip of nose, EOMI.  Feeding tube in place.    RESPIRATORY  Exam: Intubated ett, synchronous with vent     CARDIOVASCULAR  Exam: S1, S2.  Regular rate and rhythm, extremities warm    GI/NUTRITION  Exam: Abdomen soft, Non-tender, distended.     VASCULAR  Exam: Extremities warm, well-perfused.     MUSCULOSKELETAL  Exam: sedated    SKIN  Exam: dependent peripheral edema     METABOLIC/FLUIDS/ELECTROLYTES  multivitamin/minerals/iron Oral Solution (CENTRUM) 15 milliLiter(s) Oral daily  phytonadione  IVPB 10 milliGRAM(s) IV Intermittent every 24 hours      HEMATOLOGIC  [x] VTE Prophylaxis: heparin   Injectable 5000 Unit(s) SubCutaneous every 8 hours    Transfusions:	[] PRBC	[] Platelets		[] FFP	[] Cryoprecipitate    INFECTIOUS DISEASE  Antimicrobials/Immunologic Medications:  ertapenem  IVPB 1000 milliGRAM(s) IV Intermittent every 24 hours      Tubes/Lines/Drains   [x] Peripheral IV  [x] Central Venous Line     	[x] R	[] L	[x] IJ	[] Fem	[] SC	Date Placed: 4/25/23  [x] Arterial Line		[] R	[] L	[] Fem	[] Rad	[] Ax	Date Placed:   [] PICC		[] Midline		[] Mediport  [x] Urinary Catheter		Date Placed:   [x] Necessity of urinary, arterial, and venous catheters discussed    LABS  --------------------------------------------------------------------------------------                        7.3    10.97 )-----------( 155      ( 25 Apr 2023 02:04 )             22.7   04-25    134<L>  |  99  |  12  ----------------------------<  124<H>  4.1   |  16<L>  |  0.93    Ca    8.3<L>      25 Apr 2023 02:04  Phos  2.9     04-25  Mg     2.60     04-25    TPro  6.9  /  Alb  3.0<L>  /  TBili  5.9<H>  /  DBili  x   /  AST  79<H>  /  ALT  54<H>  /  AlkPhos  411<H>  04-25  PT/INR - ( 25 Apr 2023 02:04 )   PT: 14.8 sec;   INR: 1.27 ratio         PTT - ( 25 Apr 2023 02:04 )  PTT:28.3 sec

## 2023-04-26 NOTE — PROGRESS NOTE ADULT - PROBLEM SELECTOR PLAN 1
Pt. with oliguric CHATO in the setting of necrotizing pancreatitis and shock. Pt. with most likely ATN. Scr was 1.24 on 3/23/23 and increased to 4.60 on 3/24/23. Pt. was initiated on CRRT/CVVHDF on 3/24/23-4/6/23.  Pt. remained oliguric with hypotension so restarted on CVVHDH on 4/7/23-4/17/23 and resumed 4/18 as patient remained hypotensive and oliguric. CRRT held by SICU on 4/21 but resumed on 4/22 after failed lasix challenge. CRRT clotted on 4/25 and kept off. Will trial for iHD today as patient has not required vasopressor support during past 24 hours.  Vitals and labs reviewed. Pt remains critically ill. Remains on Midodrine 40 TID. Monitor labs and urine output. Avoid any potential nephrotoxins. Dose medications as per eGFR.    If you have any questions, please feel free to contact me  Neville Schulz  Nephrology Fellow  [phone]; Prefer [employer] TEAMS  (After 5pm or on weekends please page the on-call fellow).

## 2023-04-27 NOTE — PROGRESS NOTE ADULT - SUBJECTIVE AND OBJECTIVE BOX
[facility] Division of Kidney Diseases & Hypertension  FOLLOW UP NOTE  [phone]--------------------------------------------------------------------------------    Chief Complaint: Oliguric CHATO, on RRT    24 hour events/subjective: Pt. seen and examined in the SICU today. CRRT clotted overnight and held. Has been off of vasopressors for 2 days. Will trial iHD today.    PAST HISTORY  --------------------------------------------------------------------------------  No significant changes to PMH, PSH, FHx, SHx, unless otherwise noted    ALLERGIES & MEDICATIONS  --------------------------------------------------------------------------------  Allergies    No Known Allergies    Intolerances      Standing Inpatient Medications  chlorhexidine 0.12% Liquid 15 milliLiter(s) Oral Mucosa every 12 hours  chlorhexidine 2% Cloths 1 Application(s) Topical daily  ertapenem  IVPB 1000 milliGRAM(s) IV Intermittent every 24 hours  gabapentin Solution 100 milliGRAM(s) Oral three times a day  heparin   Injectable 5000 Unit(s) SubCutaneous every 8 hours  hydrocortisone sodium succinate Injectable 50 milliGRAM(s) IV Push every 24 hours  insulin glargine Injectable (LANTUS) 14 Unit(s) SubCutaneous at bedtime  insulin lispro (ADMELOG) corrective regimen sliding scale   SubCutaneous every 6 hours  insulin lispro Injectable (ADMELOG) 3 Unit(s) SubCutaneous every 6 hours  levothyroxine Injectable 20 MICROGram(s) IV Push at bedtime  midodrine 40 milliGRAM(s) Oral every 8 hours  multivitamin/minerals/iron Oral Solution (CENTRUM) 15 milliLiter(s) Oral daily  pancrelipase  (CREON 12,000 Lipase Units) 1 Capsule(s) Oral <User Schedule>  pantoprazole   Suspension 40 milliGRAM(s) Oral daily  polyethylene glycol 3350 17 Gram(s) Oral daily    PRN Inpatient Medications      REVIEW OF SYSTEMS  --------------------------------------------------------------------------------  Gen: No  fevers/chills  Skin: No rashes  Head/Eyes/Ears/Mouth: No headache; Normal hearing; Normal vision w/o blurriness  Respiratory: No dyspnea, cough, wheezing, hemoptysis  CV: No chest pain, PND, orthopnea  GI: No abdominal pain, diarrhea, constipation, nausea, vomiting  : No increased frequency, dysuria, hematuria, nocturia  MSK: No joint pain/swelling; no back pain; no edema  Neuro: No dizziness/lightheadedness, weakness, seizures, numbness, tingling      All other systems were reviewed and are negative, except as noted.    VITALS/PHYSICAL EXAM  --------------------------------------------------------------------------------  T(C): 38.3 (04-27-23 @ 08:00), Max: 38.4 (04-26-23 @ 14:00)  HR: 112 (04-27-23 @ 09:00) (70 - 119)  BP: --  RR: 19 (04-27-23 @ 09:00) (17 - 34)  SpO2: 100% (04-27-23 @ 09:00) (99% - 100%)  Wt(kg): --        04-26-23 @ 07:01 - 04-27-23 @ 07:00  --------------------------------------------------------  IN: 1963.8 mL / OUT: 2750 mL / NET: -786.2 mL    04-27-23 @ 07:01  -  04-27-23 @ 09:45  --------------------------------------------------------  IN: 90 mL / OUT: 0 mL / NET: 90 mL      Physical Exam:  	Gen: NAD, well-appearing  	HEENT: PERRL, supple neck, clear oropharynx  	Pulm: CTA B/L  	CV: RRR, S1S2;  	Back: No spinal or CVA tenderness  	Abd: +BS, soft, nontender/nondistended  	: No suprapubic tenderness                      Extremities: no bilateral LE edema noted.                       Neuro: No focal deficits, intact gait  	Skin: Warm, without rashes  	Vascular access:    LABS/STUDIES  --------------------------------------------------------------------------------              7.2    12.08 >-----------<  226      [04-27-23 @ 00:51]              23.2     141  |  103  |  25  ----------------------------<  182      [04-27-23 @ 00:51]  3.1   |  18  |  1.79        Ca     8.3     [04-27-23 @ 00:51]      Mg     2.50     [04-27-23 @ 00:51]      Phos  3.1     [04-27-23 @ 00:51]    TPro  6.9  /  Alb  2.8  /  TBili  4.3  /  DBili  x   /  AST  75  /  ALT  71  /  AlkPhos  368  [04-27-23 @ 00:51]    PT/INR: PT 12.9 , INR 1.11       [04-27-23 @ 00:51]  PTT: 31.5       [04-27-23 @ 00:51]      Creatinine Trend:  SCr 1.79 [04-27 @ 00:51]  SCr 1.42 [04-26 @ 00:57]  SCr 0.93 [04-25 @ 02:04]  SCr 1.07 [04-24 @ 02:55]  SCr 2.10 [04-23 @ 01:10]          HBsAb <3.0      [04-26-23 @ 16:30]  HBcAb Nonreact      [04-26-23 @ 16:30]  HCV 0.22, Nonreact      [04-26-23 @ 16:30]     [facility] Division of Kidney Diseases & Hypertension  FOLLOW UP NOTE  [phone]--------------------------------------------------------------------------------    Chief Complaint: Oliguric CHATO, on RRT    24 hour events/subjective: Pt. seen and examined in the SICU today. Tolerated iHD well yesterday. Will try to do second session of iHD today.     PAST HISTORY  --------------------------------------------------------------------------------  No significant changes to PMH, PSH, FHx, SHx, unless otherwise noted    ALLERGIES & MEDICATIONS  --------------------------------------------------------------------------------  Allergies    No Known Allergies    Intolerances      Standing Inpatient Medications  chlorhexidine 0.12% Liquid 15 milliLiter(s) Oral Mucosa every 12 hours  chlorhexidine 2% Cloths 1 Application(s) Topical daily  ertapenem  IVPB 1000 milliGRAM(s) IV Intermittent every 24 hours  gabapentin Solution 100 milliGRAM(s) Oral three times a day  heparin   Injectable 5000 Unit(s) SubCutaneous every 8 hours  hydrocortisone sodium succinate Injectable 50 milliGRAM(s) IV Push every 24 hours  insulin glargine Injectable (LANTUS) 14 Unit(s) SubCutaneous at bedtime  insulin lispro (ADMELOG) corrective regimen sliding scale   SubCutaneous every 6 hours  insulin lispro Injectable (ADMELOG) 3 Unit(s) SubCutaneous every 6 hours  levothyroxine Injectable 20 MICROGram(s) IV Push at bedtime  midodrine 40 milliGRAM(s) Oral every 8 hours  multivitamin/minerals/iron Oral Solution (CENTRUM) 15 milliLiter(s) Oral daily  pancrelipase  (CREON 12,000 Lipase Units) 1 Capsule(s) Oral <User Schedule>  pantoprazole   Suspension 40 milliGRAM(s) Oral daily  polyethylene glycol 3350 17 Gram(s) Oral daily    PRN Inpatient Medications      REVIEW OF SYSTEMS  --------------------------------------------------------------------------------  Unable to be obtained     VITALS/PHYSICAL EXAM  --------------------------------------------------------------------------------  T(C): 38.3 (04-27-23 @ 08:00), Max: 38.4 (04-26-23 @ 14:00)  HR: 112 (04-27-23 @ 09:00) (70 - 119)  BP: --  RR: 19 (04-27-23 @ 09:00) (17 - 34)  SpO2: 100% (04-27-23 @ 09:00) (99% - 100%)  Wt(kg): --        04-26-23 @ 07:01  -  04-27-23 @ 07:00  --------------------------------------------------------  IN: 1963.8 mL / OUT: 2750 mL / NET: -786.2 mL    04-27-23 @ 07:01  -  04-27-23 @ 09:45  --------------------------------------------------------  IN: 90 mL / OUT: 0 mL / NET: 90 mL      Physical Exam:  	Gen: Intubated; arousable  	HEENT: NGT+, anicteric  	Pulm: Fair entry B/L  	CV: S1S2+  	Abd: Obese, soft; nontender  	Ext: edematous b/l but not pitting  	Neuro: arousable  	Skin: Warm and dry              Dialysis access: Right IJ non tunneled HD catheter    LABS/STUDIES  --------------------------------------------------------------------------------              7.2    12.08 >-----------<  226      [04-27-23 @ 00:51]              23.2     141  |  103  |  25  ----------------------------<  182      [04-27-23 @ 00:51]  3.1   |  18  |  1.79        Ca     8.3     [04-27-23 @ 00:51]      Mg     2.50     [04-27-23 @ 00:51]      Phos  3.1     [04-27-23 @ 00:51]    TPro  6.9  /  Alb  2.8  /  TBili  4.3  /  DBili  x   /  AST  75  /  ALT  71  /  AlkPhos  368  [04-27-23 @ 00:51]    PT/INR: PT 12.9 , INR 1.11       [04-27-23 @ 00:51]  PTT: 31.5       [04-27-23 @ 00:51]      Creatinine Trend:  SCr 1.79 [04-27 @ 00:51]  SCr 1.42 [04-26 @ 00:57]  SCr 0.93 [04-25 @ 02:04]  SCr 1.07 [04-24 @ 02:55]  SCr 2.10 [04-23 @ 01:10]          HBsAb <3.0      [04-26-23 @ 16:30]  HBcAb Nonreact      [04-26-23 @ 16:30]  HCV 0.22, Nonreact      [04-26-23 @ 16:30]

## 2023-04-27 NOTE — PROGRESS NOTE ADULT - ATTENDING COMMENTS
CHATO  Hypotension    Will see if patient is able to tolerate HD today. Will attempt with 1L of UF.  Monitor Is/Os and daily weights

## 2023-04-27 NOTE — PROGRESS NOTE ADULT - ATTENDING COMMENTS
Severe sepsis secondary to infected WON/pseudocyst  a.  Febrile overnight  b.  WBC increased to 16.8  c.  Repeat CT of abdomen and pelvis    Respiratory insufficiency  a.  Patient remains MV dependent  b.  With adequate gas exchange  c.  Failed daily SBT  d.  Defer Plan for tracheostomy    CHATO  a.  Remains anuric  b.  Requiring RRT  c.  Trend CMP, replete K    Malnutrition  a.  Remains NPO  b.  On TF at goal via LEONIDAS   c.   On Creon Respiratory failure  a.  With adequate gas exchange  b.  Failed SBT, remains MV dependent  c.  Plan for tracheostomy [facility]att    Severe sepsis secondary to infected WON  a,  Febrile overnight  b,  WBC stable at 12  c.  on ertapenem  d.  On midodrine, intermittent IV vasopressor support    CHATO  a,  Tolerated HD  b.  Remains anuric    Malnutrition  a.  Tolerating TF at goal  b.  Continue Creon

## 2023-04-27 NOTE — PROGRESS NOTE ADULT - PROBLEM SELECTOR PLAN 1
Pt. with oliguric CHATO in the setting of necrotizing pancreatitis and shock. Pt. with most likely ATN. Scr was 1.24 on 3/23/23 and increased to 4.60 on 3/24/23. Pt. was initiated on CRRT/CVVHDF on 3/24/23-4/6/23.  Pt. remained oliguric with hypotension so restarted on CVVHDH on 4/7/23-4/17/23 and resumed 4/18 as patient remained hypotensive and oliguric. CRRT held by SICU on 4/21 but resumed on 4/22 after failed lasix challenge. CRRT clotted on 4/25 and kept off. HD tolerated yesterday without pressors. Will plan for second session today.  Vitals and labs reviewed. Pt remains critically ill. Remains on Midodrine 40 TID. Monitor labs and urine output. Avoid any potential nephrotoxins. Dose medications as per eGFR.    If you have any questions, please feel free to contact me  Neville Schulz  Nephrology Fellow  [phone]; Prefer [employer] TEAMS  (After 5pm or on weekends please page the on-call fellow).

## 2023-04-27 NOTE — PROGRESS NOTE ADULT - ASSESSMENT
Assessment and Plan: 	  48 year old female with a PMHx of HTN, hypothyroidism,  (30 years ago) and breast cancer (S/P left mastectomy and chemo in ) who presented with epigastric pain and left upper quadrant pain.  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  Transferred from OSH ([place]) for hypotension and tachypnea requiring emergent intubation and multiple pressors.  SICU consulted for hemodynamic monitoring and respiratory management.  Intubated, pressors, CRRT, pending axios stent/debridement when collection mature.     Neuro:  - off sedation  - Tylenol, gabapentin     Resp:  - intubated  for AMS 2/2 to airway protection, hypercarbic resp failure  - /  - daily cpap trials   - Trach plan for Friday in OR     CV:  Mixed shock state 2/2 septic shock w/ E. Coli in urine vs hypovolemic shock now s/p 11L resuscitation   - Midodrine 40 q 8 hours    GI:   - TF   - Ulcer ppx w/ Protonix   - Creon  - repeat CT A/P , GI recs: collections not mature enough for axios stent     Renal:  - iHD per renal  - SEBASTIAN velazco   - Daily bladder scanners     Heme:   - SCDs and SQH  - HIT Ab positive; DOUGLAS Negative    ID:   Intermittently febrile w/ leukocytosis c/f septic shock 2/2 E coli UTI  Diagnostics:  - 3/22 Ucx: E coli  - 3/30 Bcx: staph hominis in aerobic bottle x 1 (likely contaminant)   - S/p Meropenem (3/26-, -)  - Ertapenem  -     Endo:   - stress steroids 50 qd  - Lantus 14 units at bedtime, ADMELOG 3 units q6  - c/w synthroid 20 IV (home med)   - ISS    Disposition: SICU Assessment and Plan: 	  48 year old female with a PMHx of HTN, hypothyroidism,  (30 years ago) and breast cancer (S/P left mastectomy and chemo in ) who presented with epigastric pain and left upper quadrant pain.  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  Transferred from OSH ([place]) for hypotension and tachypnea requiring emergent intubation and multiple pressors.  SICU consulted for hemodynamic monitoring and respiratory management.  Intubated, pressors, CRRT, pending axios stent/debridement when collection mature.       PLAN  Neuro:  -  CTH negative for acute pathology   - precedex for sedation, wean as tolerated for RASS 0 to -3  - Tylenol, gabapentin     Resp:  - intubated  for AMS 2/2 to airway protection, hypercarbic resp failure  - /  - plan for trach tomorrow    CV:  Mixed shock state 2/2 septic shock w/ E. Coli in urine vs hypovolemic shock now s/p 11L resuscitation   - Midodrine 40 q 8 hours    GI:   - TF   - Ulcer ppx w/ Protonix   - Creon  - repeat CT A/P , GI recs: collections not mature enough for axios stent     Renal:  - ARF 2/2 shock state resulting in ATN requiring CRRT, started on iHD   - Failed lasix challenge    - daily bladder scans    Heme:   - SCDs and SQH  - HIT Ab positive; DOUGLAS Negative    ID:   Intermittently febrile w/ leukocytosis c/f septic shock 2/2 E coli UTI  Diagnostics:  - 3/22 Ucx: E coli  - 3/30 Bcx: staph hominis in aerobic bottle x 1 (likely contaminant)   - S/p Meropenem (3/26-, -)  - Ertapenem ( -     Endo:   - stress steroids 50 q 12  - Lantus 9 units at bedtime, ADMELOG 2 units q6  - c/w synthroid 20 IV (home med)   - ISS    Disposition: SICU

## 2023-04-27 NOTE — PROGRESS NOTE ADULT - SUBJECTIVE AND OBJECTIVE BOX
SICU Daily Progress Note  =====================================================  Interval/Overnight Events:  - tolerated iHD     --------------------------------------------------------------------------------------    VITAL SIGNS, INS/OUTS (last 24 hours):  --------------------------------------------------------------------------------------  ICU Vital Signs Last 24 Hrs  T(C): 37.8 (27 Apr 2023 00:00), Max: 38.4 (26 Apr 2023 14:00)  T(F): 100 (27 Apr 2023 00:00), Max: 101.2 (26 Apr 2023 14:00)  HR: 104 (27 Apr 2023 00:00) (62 - 104)  BP: --  BP(mean): --  ABP: 108/66 (27 Apr 2023 00:00) (87/53 - 126/74)  ABP(mean): 84 (27 Apr 2023 00:00) (68 - 95)  RR: 22 (27 Apr 2023 00:00) (17 - 28)  SpO2: 100% (27 Apr 2023 00:00) (100% - 100%)    O2 Parameters below as of 27 Apr 2023 00:00  Patient On (Oxygen Delivery Method): ventilator,CPAP 12/5    O2 Concentration (%): 30    I&O's Detail    25 Apr 2023 07:01  -  26 Apr 2023 07:00  --------------------------------------------------------  IN:    Dexmedetomidine: 59.3 mL    Dexmedetomidine: 114.1 mL    Dexmedetomidine: 7.4 mL    Enteral Tube Flush: 150 mL    Enteral Tube Flush: 270 mL    IV PiggyBack: 100 mL    Peptamen A.F.: 1035 mL  Total IN: 1735.8 mL    OUT:    Indwelling Catheter - Urethral (mL): 0 mL    Other (mL): 410 mL    Rectal Tube (mL): 650 mL  Total OUT: 1060 mL    Total NET: 675.8 mL      26 Apr 2023 07:01  -  27 Apr 2023 00:05  --------------------------------------------------------  IN:    Dexmedetomidine: 8.8 mL    Enteral Tube Flush: 180 mL    Enteral Tube Flush: 60 mL    IV PiggyBack: 200 mL    Other (mL): 400 mL    Peptamen A.F.: 810 mL  Total IN: 1658.8 mL    OUT:    Other (mL): 900 mL    Rectal Tube (mL): 1550 mL  Total OUT: 2450 mL    Total NET: -791.2 mL    --------------------------------------------------------------------------------------    EXAM  NEUROLOGY  RASS:   	-2 GCS:    Exam: sedated, moving to command when sedation weaned     HEENT  Exam: Normocephalic, ulcer on tip of nose, EOMI.  Feeding tube in place.    RESPIRATORY  Exam: Intubated ett, synchronous with vent     CARDIOVASCULAR  Exam: S1, S2.  Regular rate and rhythm, extremities warm    GI/NUTRITION  Exam: Abdomen soft, Non-tender, distended.     VASCULAR  Exam: Extremities warm, well-perfused.     MUSCULOSKELETAL  Exam: sedated    SKIN  Exam: dependent peripheral edema     METABOLIC/FLUIDS/ELECTROLYTES  multivitamin/minerals/iron Oral Solution (CENTRUM) 15 milliLiter(s) Oral daily  phytonadione  IVPB 10 milliGRAM(s) IV Intermittent every 24 hours    HEMATOLOGIC  [x] VTE Prophylaxis: heparin   Injectable 5000 Unit(s) SubCutaneous every 8 hours    Transfusions:	[] PRBC	[] Platelets		[] FFP	[] Cryoprecipitate    INFECTIOUS DISEASE  Antimicrobials/Immunologic Medications:  ertapenem  IVPB 1000 milliGRAM(s) IV Intermittent every 24 hours      Tubes/Lines/Drains   [x] Peripheral IV  [x] Central Venous Line     	[x] R	[] L	[x] IJ	[] Fem	[] SC	Date Placed: 4/25/23  [x] Arterial Line		[] R	[] L	[] Fem	[] Rad	[] Ax	Date Placed:   [] PICC		[] Midline		[] Mediport  [x] Urinary Catheter		Date Placed:   [x] Necessity of urinary, arterial, and venous catheters discussed    LABS  --------------------------------------------------------------------------------------                                    7.4    11.69 )-----------( 165      ( 26 Apr 2023 00:57 )             23.8   04-26    136  |  101  |  21  ----------------------------<  235<H>  3.8   |  17<L>  |  1.42<H>    Ca    8.3<L>      26 Apr 2023 00:57  Phos  3.3     04-26  Mg     2.60     04-26    TPro  7.0  /  Alb  2.9<L>  /  TBili  5.7<H>  /  DBili  x   /  AST  95<H>  /  ALT  72<H>  /  AlkPhos  404<H>  04-26         SICU Daily Progress Note  =====================================================  Interval/Overnight Events:  - tolerated iHD   - Increased Lantus to 14 and Admelog to 3  - Hold Precedex  --------------------------------------------------------------------------------------    VITAL SIGNS, INS/OUTS (last 24 hours):  --------------------------------------------------------------------------------------  ICU Vital Signs Last 24 Hrs  T(C): 37.8 (27 Apr 2023 00:00), Max: 38.4 (26 Apr 2023 14:00)  T(F): 100 (27 Apr 2023 00:00), Max: 101.2 (26 Apr 2023 14:00)  HR: 104 (27 Apr 2023 00:00) (62 - 104)  BP: --  BP(mean): --  ABP: 108/66 (27 Apr 2023 00:00) (87/53 - 126/74)  ABP(mean): 84 (27 Apr 2023 00:00) (68 - 95)  RR: 22 (27 Apr 2023 00:00) (17 - 28)  SpO2: 100% (27 Apr 2023 00:00) (100% - 100%)    O2 Parameters below as of 27 Apr 2023 00:00  Patient On (Oxygen Delivery Method): ventilator,CPAP 12/5    O2 Concentration (%): 30    I&O's Detail    25 Apr 2023 07:01  -  26 Apr 2023 07:00  --------------------------------------------------------  IN:    Dexmedetomidine: 59.3 mL    Dexmedetomidine: 114.1 mL    Dexmedetomidine: 7.4 mL    Enteral Tube Flush: 150 mL    Enteral Tube Flush: 270 mL    IV PiggyBack: 100 mL    Peptamen A.F.: 1035 mL  Total IN: 1735.8 mL    OUT:    Indwelling Catheter - Urethral (mL): 0 mL    Other (mL): 410 mL    Rectal Tube (mL): 650 mL  Total OUT: 1060 mL    Total NET: 675.8 mL      26 Apr 2023 07:01  -  27 Apr 2023 00:05  --------------------------------------------------------  IN:    Dexmedetomidine: 8.8 mL    Enteral Tube Flush: 180 mL    Enteral Tube Flush: 60 mL    IV PiggyBack: 200 mL    Other (mL): 400 mL    Peptamen A.F.: 810 mL  Total IN: 1658.8 mL    OUT:    Other (mL): 900 mL    Rectal Tube (mL): 1550 mL  Total OUT: 2450 mL    Total NET: -791.2 mL    --------------------------------------------------------------------------------------    EXAM  NEUROLOGY  RASS:   	-2 GCS:    Exam: sedated, moving to command when sedation weaned     HEENT  Exam: Normocephalic, ulcer on tip of nose, EOMI.  Feeding tube in place.    RESPIRATORY  Exam: Intubated ett, synchronous with vent     CARDIOVASCULAR  Exam: S1, S2.  Regular rate and rhythm, extremities warm    GI/NUTRITION  Exam: Abdomen soft, Non-tender, distended.     VASCULAR  Exam: Extremities warm, well-perfused.     MUSCULOSKELETAL  Exam: sedated    SKIN  Exam: dependent peripheral edema     METABOLIC/FLUIDS/ELECTROLYTES  multivitamin/minerals/iron Oral Solution (CENTRUM) 15 milliLiter(s) Oral daily  phytonadione  IVPB 10 milliGRAM(s) IV Intermittent every 24 hours    HEMATOLOGIC  [x] VTE Prophylaxis: heparin   Injectable 5000 Unit(s) SubCutaneous every 8 hours    Transfusions:	[] PRBC	[] Platelets		[] FFP	[] Cryoprecipitate    INFECTIOUS DISEASE  Antimicrobials/Immunologic Medications:  ertapenem  IVPB 1000 milliGRAM(s) IV Intermittent every 24 hours      Tubes/Lines/Drains   [x] Peripheral IV  [x] Central Venous Line     	[x] R	[] L	[x] IJ	[] Fem	[] SC	Date Placed: 4/25/23  [x] Arterial Line		[] R	[] L	[] Fem	[] Rad	[] Ax	Date Placed:   [] PICC		[] Midline		[] Mediport  [x] Urinary Catheter		Date Placed:   [x] Necessity of urinary, arterial, and venous catheters discussed    LABS  --------------------------------------------------------------------------------------                                    7.4    11.69 )-----------( 165      ( 26 Apr 2023 00:57 )             23.8   04-26    136  |  101  |  21  ----------------------------<  235<H>  3.8   |  17<L>  |  1.42<H>    Ca    8.3<L>      26 Apr 2023 00:57  Phos  3.3     04-26  Mg     2.60     04-26    TPro  7.0  /  Alb  2.9<L>  /  TBili  5.7<H>  /  DBili  x   /  AST  95<H>  /  ALT  72<H>  /  AlkPhos  404<H>  04-26

## 2023-04-27 NOTE — PROGRESS NOTE ADULT - SUBJECTIVE AND OBJECTIVE BOX
Surgery Progress Note     24hour Events:   - tolerated HD    Subjective:  Patient seen and examined. Pt more alert this morning.      Vital Signs:  Vital Signs Last 24 Hrs  T(C): 38.3 (27 Apr 2023 08:00), Max: 38.4 (26 Apr 2023 14:00)  T(F): 100.9 (27 Apr 2023 08:00), Max: 101.2 (26 Apr 2023 14:00)  HR: 113 (27 Apr 2023 10:00) (70 - 119)  BP: --  BP(mean): --  RR: 24 (27 Apr 2023 10:00) (18 - 34)  SpO2: 100% (27 Apr 2023 10:00) (99% - 100%)    Parameters below as of 27 Apr 2023 08:00  Patient On (Oxygen Delivery Method): ventilator,AC    O2 Concentration (%): 30    CAPILLARY BLOOD GLUCOSE      POCT Blood Glucose.: 155 mg/dL (27 Apr 2023 05:46)  POCT Blood Glucose.: 214 mg/dL (26 Apr 2023 23:53)  POCT Blood Glucose.: 203 mg/dL (26 Apr 2023 17:41)  POCT Blood Glucose.: 275 mg/dL (26 Apr 2023 11:01)      I&O's Detail    26 Apr 2023 07:01  -  27 Apr 2023 07:00  --------------------------------------------------------  IN:    Dexmedetomidine: 8.8 mL    Enteral Tube Flush: 180 mL    Enteral Tube Flush: 60 mL    IV PiggyBack: 250 mL    Other (mL): 400 mL    Peptamen A.F.: 1065 mL  Total IN: 1963.8 mL    OUT:    Other (mL): 900 mL    Rectal Tube (mL): 1850 mL  Total OUT: 2750 mL    Total NET: -786.2 mL      27 Apr 2023 07:01  -  27 Apr 2023 10:37  --------------------------------------------------------  IN:    Enteral Tube Flush: 30 mL    Enteral Tube Flush: 60 mL    Peptamen A.F.: 135 mL  Total IN: 225 mL    OUT:  Total OUT: 0 mL    Total NET: 225 mL            Physical Exam:  General Appearance: intubated and more alert than previous days, awakens to voice  Chest: Equal expansion bilaterally  CV: Pulse regular presently  Abdomen: Soft, nontender, moderately distended  Extremities: Grossly symmetric, SCD's in place     Labs:    04-27    141  |  103  |  25<H>  ----------------------------<  182<H>  3.1<L>   |  18<L>  |  1.79<H>    Ca    8.3<L>      27 Apr 2023 00:51  Phos  3.1     04-27  Mg     2.50     04-27    TPro  6.9  /  Alb  2.8<L>  /  TBili  4.3<H>  /  DBili  x   /  AST  75<H>  /  ALT  71<H>  /  AlkPhos  368<H>  04-27    LIVER FUNCTIONS - ( 27 Apr 2023 00:51 )  Alb: 2.8 g/dL / Pro: 6.9 g/dL / ALK PHOS: 368 U/L / ALT: 71 U/L / AST: 75 U/L / GGT: x                                 7.2    12.08 )-----------( 226      ( 27 Apr 2023 00:51 )             23.2     PT/INR - ( 27 Apr 2023 00:51 )   PT: 12.9 sec;   INR: 1.11 ratio         PTT - ( 27 Apr 2023 00:51 )  PTT:31.5 sec

## 2023-04-27 NOTE — PROGRESS NOTE ADULT - CRITICAL CARE ATTENDING COMMENT
Neuro  Intubated and sedated for clinical course; No acute neurological issues   - CPAP trial  - Wean precedex  - Dc fentanyl  - Dilaudid prn    Resp:  AHRF w/ b/l pleff 2/2 necrotizing pancreatitis   -Volume control 20/350/5/40    CV  Mixed shock state 2/2 septic shock w/ E. Coli in urine vs hypovolemic shock now s/p 11L resus    - c/w levo, wean as tolerated as below   - Treat ID as below     GI:   #Necrotizing pancreatitis 2/2 gallstones  Diagnostics  CT 3/27: >50%of the pancreatic head  extends into the mesentery and inferiorly along the retroperitoneum into the lower abdomen  RUQ US: Chololithiasis (2cm stone) w/ no acute frannie     Therapeutics:   - Plan for interval CT 3/28 or 3/29   - Continue TF (Nepro) at rate of 30cc/hr  - GI ppx w/ protonix   - Reglan  - Docolax    #Shocked Liver + Hyperbilirubinemia   Likely 2/2 to shocked state w/ LFTs now trending down w/ bili following protracted course as expected but yet to peak. Possible concern for acute obstructive biliary process.   - Pending MRCP  - Monitor LFTs     Renal:  ARF 2/2 shock state resulting in ATN requiring CRRT    - Continue CRRT net negative  as tolerated   - Babin catheter in place  -lasix 60mg ivp challenge tody    Heme:   Anemia likely 2/2 disease course vs suppression; No active bleeding reported   Diagnostics:   - CBC q8     Therapeutics:   - received 1U PRBC 3/27   - SCDs and argatroban for DVT ppx    #Thromboctopenia  Likely suppression 2/2 infection vs shock state  - HIT Ab positive    #Splenic Vein Thrombosis  Diagnostics:  3/22 CT Abd w/ contrast: Focal filling defect in the splenic vein at the level of the body of   the pancreas for which an underlying thrombus is suspected     ID:   Intermittently febrile w/ leukocytosis c/f septic shock 2/2 E coli UTI  Diagnostics:  - 3/22 Ucx: E coli  - 3/24 Bcx: negative     Therapeutics:   - Meropenem (3/26-)    Endo:   - Continue Solu-medrol 10 q6h until 4/1; will taper  - c/w synthroid 20 IV (home med)   - ISS  - Monitor glucose with metabolic panel    Code Status: Full code    Disposition: SICU
Neuro  Intubated and sedated for clinical course; No acute neurological issues   - Wean precedex  - Dilaudid prn    Resp:  AHRF w/ b/l pleff 2/2 necrotizing pancreatitis   -Volume control 20/350/5/40  -CPAP as tolerated     CV  Mixed shock state 2/2 septic shock w/ E. Coli in urine vs hypovolemic shock now s/p 11L resus    - c/w levo, wean as tolerated as below   - Treat ID as below     GI:   #Necrotizing pancreatitis 2/2 gallstones  Diagnostics  CT 3/27: >50%of the pancreatic head  extends into the mesentery and inferiorly along the retroperitoneum into the lower abdomen  RUQ US: Chololithiasis (2cm stone) w/ no acute frannie     Therapeutics:   - Plan for interval CT   - Continue TF (Nepro) at rate of 30cc/hr  - GI ppx w/ protonix   - Reglan  - Not tolerating tube feeds but grossly abnormal LFTs preclude safe administration of TPN    #Shocked Liver + Hyperbilirubinemia   Likely 2/2 to shocked state w/ LFTs now trending down w/ bili following protracted course as expected but yet to peak. Possible concern for acute obstructive biliary process.   - Pending MRCP for concern of obstructive jaundice/ascending cholangitis  - Monitor LFTs     Renal: unsuccessful diuretic challenge yesterday  ARF 2/2 shock state resulting in ATN requiring CRRT    - Continue CRRT net negative  as tolerated   - Babin catheter in place    Heme:   Anemia likely 2/2 disease course vs suppression; No active bleeding reported   Diagnostics:   - CBC q8     Therapeutics:   - received 1U PRBC 3/27   - SCDs and argatroban for DVT ppx    #Thrombocyopenia  - HIT Ab positive  -f/u DOUGLAS     Therapeutics:  -CRRT dosed Fondaparinux 1.5 qd for DVT ppx     #Splenic Vein Thrombosis  Diagnostics:  3/22 CT Abd w/ contrast: Focal filling defect in the splenic vein at the level of the body of   the pancreas for which an underlying thrombus is suspected     ID:   Intermittently febrile w/ leukocytosis c/f septic shock 2/2 E coli UTI  Diagnostics:  - 3/22 Ucx: E coli  - 3/30 Bcx: staph hominis in aerobic bottle x 1 (likely contaminant)     Therapeutics:   - Meropenem (3/26-)    Endo:   - Continue Solu-medrol 10 q6h until 4/1; will begin taper  - c/w synthroid 20 IV (home med)   - ISS    Code Status: Full code    Disposition: SICU
Neuro  Intubated and sedated for clinical course; No acute neurological issues   - CPAP trial  - Wean precedex  - Dc fentanyl  - Dilaudid prn    Resp:  AHRF w/ b/l pleff 2/2 necrotizing pancreatitis   -Volume control 20/350/5/40    CV  Mixed shock state 2/2 septic shock w/ E. Coli in urine vs hypovolemic shock now s/p 11L resus    - c/w levo, wean as tolerated as below   - Treat ID as below     GI:   #Necrotizing pancreatitis 2/2 gallstones  Diagnostics  CT 3/27: >50%of the pancreatic head  extends into the mesentery and inferiorly along the retroperitoneum into the lower abdomen  RUQ US: Chololithiasis (2cm stone) w/ no acute frannie     Therapeutics:   - Plan for interval CT 3/28 or 3/29   - Continue TF (Nepro) at rate of 30cc/hr  - GI ppx w/ protonix   - Reglan  - Docolax    #Shocked Liver + Hyperbilirubinemia   Likely 2/2 to shocked state w/ LFTs now trending down w/ bili following protracted course as expected but yet to peak. Possible concern for acute obstructive biliary process.   - consider MRCP pending improvement in clinical course  - Monitor LFTs     Renal:  ARF 2/2 shock state resulting in ATN requiring CRRT    - Continue CRRT net negative  as tolerated   - Babin catheter in place    Heme:   Anemia likely 2/2 disease course vs suppression; No active bleeding reported   Diagnostics:   - CBC q8     Therapeutics:   - received 1U PRBC 3/27   - SCDs and argatroban for DVT ppx    #Thromboctopenia  Likely suppression 2/2 infection vs shock state  - HIT Ab positive    #Splenic Vein Thrombosis  Diagnostics:  3/22 CT Abd w/ contrast: Focal filling defect in the splenic vein at the level of the body of   the pancreas for which an underlying thrombus is suspected     ID:   Intermittently febrile w/ leukocytosis c/f septic shock 2/2 E coli UTI  Diagnostics:  - 3/22 Ucx: E coli  - 3/24 Bcx: negative     Therapeutics:   - Meropenem (3/26-)    Endo:   - Continue Solu-medrol 10 q6h until 4/1; will taper  - c/w synthroid 20 IV (home med)   - ISS  - Monitor glucose with metabolic panel    Code Status: Full code    Disposition: SICU
Neuro: toxic-metabolic encephalopathy  - Tylenol, gabapentin     Resp:  - intubated 4/22 for AMS 2/2 to airway protection, hypercarbic resp failure  - 18/500/5/30  - CPAP today for exercise   - coordinate tracheotomy with B team/family    CV:  Mixed shock state 2/2 septic shock w/ E. Coli in urine vs hypovolemic shock now s/p 11L resuscitation   - Midodrine 40 q 8 hours  -stress steroids    GI:   - TF   - Ulcer ppx w/ Protonix   - Creon  - repeat CT A/P 4/22, GI recs: collections not mature enough for axios stent     Renal:  - ARF 2/2 shock state resulting in ATN requiring CRRT, circuit clotted, blood returned, HD trial today with phenylephrine support  - Failed lasix challenge 4/22   - d/c viera  - daily bladder scans    Heme:   - SCDs and SQH  - HIT Ab positive; DOUGLAS Negative    ID:   Intermittently febrile w/ leukocytosis c/f septic shock 2/2 E coli UTI  Diagnostics:  - 3/22 Ucx: E coli  - 3/30 Bcx: staph hominis in aerobic bottle x 1 (likely contaminant)   - S/p Meropenem (3/26-4/11, 4/13-4/19)  - Ertapenem 4/24 -     Endo:   - stress steroids 50 q 12  - Lantus 9 units at bedtime, ADMELOG 2 units q6  - c/w synthroid 20 IV (home med)   - ISS    Disposition: SICU
Neuro: improved level of consciousness off of precedex but still very deconditioned (functional quadraplegia) and unable to protect airway  - Tylenol, gabapentin     Resp:  - intubated 4/22 for AMS 2/2 to airway protection, hypercarbic resp failure  - 18/500/5/30  - plan for trach tomorrow    CV:  Mixed shock state 2/2 septic shock w/ E. Coli in urine vs hypovolemic shock now s/p 11L resuscitation   - Midodrine 40 q 8 hours    GI:   - TF  (hold after midnight tonight for trach tomorrow)  - Ulcer ppx w/ Protonix   - Creon  - repeat CT A/P 4/22, GI recs: collections not mature enough for axios stent     Renal:  - ARF on iHD 4/26, another session planned for today  - daily bladder scans    Heme:   - SCDs and SQH  - HIT Ab positive; DOUGLAS Negative    ID:   Intermittently febrile w/ leukocytosis c/f septic shock 2/2 presumed infected pancreatic necrosum  - Ertapenem (4/24 - until can undergo necrosectomy)    Endo:   - stress steroids 50 q 24  - Lantus 9 units at bedtime, ADMELOG 2 units q6  - c/w synthroid 20 IV (home med)   - ISS    Disposition: SICU
PM SICU ATTENDING NOTE    Respiratory insufficiency  a,.  Currently endotracheal intubated  b.  Remains mechanically vent dependent at this time  c.  With adequate gas exchange  d.  SBT as tolerated    CHATO  a.  Remains anuric despite forced diuresis  b.  Continue CVVH - 50 to 100ml/hr  c.  May transition to IHD    Thrombocytopenia  a.  HIT +  b.  Await DOUGLAS  c.  Stable count at this time  d.  On fondaparinux    Hyperbilirubinemia  a.  MRCP reviewed, no choledocholithiasis noted  b.  TB elevated slowly coming down    Infected APN with severe sepsis  a.  Continue meropenem  b.  WBC remain elevated  c.  MRI reviewed in lei of recent CT  d.  Off vasopressor gtt at this time    I have spent additional 85 mins in critical cafe

## 2023-04-27 NOTE — PROGRESS NOTE ADULT - ASSESSMENT
48 year old female with a PMHx of HTN, hypothyroidism,  (30 years ago) and breast cancer (S/P left mastectomy and chemo in ) who presented with epigastric pain and left upper quadrant pain.  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  Transferred from OSH ([facility]) for hypotension and tachypnea requiring emergent intubation and multiple pressors.  SICU consulted for hemodynamic monitoring and respiratory management.    Plan:   - Diet: TF at goal  - tolerating HD  - on ertapenem  - SBT trials not being tolerated, planning for trach tomorrow  - Appreciate care per SICU      B Team Surgery  [id number]

## 2023-04-27 NOTE — PROGRESS NOTE ADULT - ATTENDING COMMENTS
I have personally interviewed (limited by intubation) and examined this patient, reviewed pertinent labs and imaging on SICU rounds.    55   minutes in total were spent in providing direct critical care for the diagnoses, assessment and plan outlined below.  This patient suffers from a critical illness that acutely impairs one or more vital organ systems such that there is a high probability of life threatening or imminent deterioration of the patient's condition.  These diagnoses are unrelated to the surgical procedure.    Additionally, time spent in teaching or the performance of separately billable procedures was not counted toward my critical care time.  There is no overlap.  Time included review of vitals, labs, imaging, discussion with consultants (renal, surgery)/surrogate decision-makers (family at bedside).    48F necrotizing pancreatitis due to gallstones.  Transferred from OSH ([facility]) for hypotension and tachypnea requiring emergent intubation and multiple pressors.  SICU consulted for hemodynamic monitoring and respiratory management.  Intubated, pressors, iHD, pending axios stent/debridement when collection mature.    CC: continued hypotension although she has been transitioned to oral midodrine, multifactorial (sepsis, nec panc, disordered neurorenal axis, adrenal insuff

## 2023-04-28 NOTE — PROGRESS NOTE ADULT - SUBJECTIVE AND OBJECTIVE BOX
Surgery Progress Note     24hour Events:   - iHD for second time  - on venkata during and after HD   - run of brief (seconds) SVT, resolved without intervention     Subjective:  Patient seen and examined. Pt febrile to 103F yesterday. More lethargic today.       Vital Signs:  Vital Signs Last 24 Hrs  T(C): 38.5 (28 Apr 2023 05:30), Max: 39.6 (27 Apr 2023 12:00)  T(F): 101.3 (28 Apr 2023 05:30), Max: 103.2 (27 Apr 2023 12:00)  HR: 93 (28 Apr 2023 06:20) (93 - 121)  BP: --  BP(mean): --  RR: 18 (28 Apr 2023 06:20) (16 - 34)  SpO2: 100% (28 Apr 2023 05:00) (95% - 100%)    Parameters below as of 28 Apr 2023 00:00  Patient On (Oxygen Delivery Method): ventilator    O2 Concentration (%): 30    CAPILLARY BLOOD GLUCOSE      POCT Blood Glucose.: 152 mg/dL (28 Apr 2023 05:31)  POCT Blood Glucose.: 189 mg/dL (28 Apr 2023 01:11)  POCT Blood Glucose.: 160 mg/dL (27 Apr 2023 17:16)  POCT Blood Glucose.: 151 mg/dL (27 Apr 2023 11:10)      I&O's Detail    26 Apr 2023 07:01  -  27 Apr 2023 07:00  --------------------------------------------------------  IN:    Dexmedetomidine: 8.8 mL    Enteral Tube Flush: 180 mL    Enteral Tube Flush: 60 mL    IV PiggyBack: 250 mL    Other (mL): 400 mL    Peptamen A.F.: 1065 mL  Total IN: 1963.8 mL    OUT:    Other (mL): 900 mL    Rectal Tube (mL): 1850 mL  Total OUT: 2750 mL    Total NET: -786.2 mL      27 Apr 2023 07:01  -  28 Apr 2023 06:57  --------------------------------------------------------  IN:    Enteral Tube Flush: 30 mL    Enteral Tube Flush: 260 mL    Other (mL): 400 mL    Peptamen A.F.: 720 mL    Phenylephrine: 44 mL    Phenylephrine: 178 mL    Phenylephrine: 79.2 mL  Total IN: 1711.2 mL    OUT:    Other (mL): 1400 mL    Rectal Tube (mL): 1700 mL  Total OUT: 3100 mL    Total NET: -1388.8 mL            Physical Exam:  General Appearance: intubated and more lethargic today  Pulm: on ventilator  Chest: Equal expansion bilaterally  CV: Pulse regular presently  Abdomen: Soft, nontender, moderately distended  Extremities: Grossly symmetric      Labs:    04-28    138  |  102  |  26<H>  ----------------------------<  184<H>  3.6   |  19<L>  |  2.05<H>    Ca    8.5      28 Apr 2023 01:25  Phos  2.7     04-28  Mg     2.30     04-28    TPro  7.1  /  Alb  2.8<L>  /  TBili  4.2<H>  /  DBili  x   /  AST  63<H>  /  ALT  64<H>  /  AlkPhos  273<H>  04-28    LIVER FUNCTIONS - ( 28 Apr 2023 01:25 )  Alb: 2.8 g/dL / Pro: 7.1 g/dL / ALK PHOS: 273 U/L / ALT: 64 U/L / AST: 63 U/L / GGT: x                                 7.1    16.80 )-----------( 243      ( 28 Apr 2023 01:25 )             22.7     PT/INR - ( 28 Apr 2023 01:25 )   PT: 14.8 sec;   INR: 1.27 ratio         PTT - ( 28 Apr 2023 01:25 )  PTT:32.3 sec

## 2023-04-28 NOTE — PROGRESS NOTE ADULT - SUBJECTIVE AND OBJECTIVE BOX
SICU Daily Progress Note  =====================================================  Interval/Overnight Events:  - iHD for second time  - on venkata during and after HD   - run of brief (seconds) SVT, resolved without intervention   --------------------------------------------------------------------------------------    VITAL SIGNS, INS/OUTS (last 24 hours):  --------------------------------------------------------------------------------------  ICU Vital Signs Last 24 Hrs  T(C): 38.2 (27 Apr 2023 20:50), Max: 39.6 (27 Apr 2023 12:00)  T(F): 100.8 (27 Apr 2023 20:50), Max: 103.2 (27 Apr 2023 12:00)  HR: 98 (27 Apr 2023 23:03) (98 - 121)  BP: --  BP(mean): --  ABP: 91/55 (27 Apr 2023 23:00) (79/46 - 135/78)  ABP(mean): 70 (27 Apr 2023 23:00) (59 - 101)  RR: 18 (27 Apr 2023 23:00) (16 - 34)  SpO2: 100% (27 Apr 2023 23:03) (95% - 100%)    O2 Parameters below as of 27 Apr 2023 20:00  Patient On (Oxygen Delivery Method): ventilator    O2 Concentration (%): 30    I&O's Detail    26 Apr 2023 07:01  -  27 Apr 2023 07:00  --------------------------------------------------------  IN:    Dexmedetomidine: 8.8 mL    Enteral Tube Flush: 180 mL    Enteral Tube Flush: 60 mL    IV PiggyBack: 250 mL    Other (mL): 400 mL    Peptamen A.F.: 1065 mL  Total IN: 1963.8 mL    OUT:    Other (mL): 900 mL    Rectal Tube (mL): 1850 mL  Total OUT: 2750 mL    Total NET: -786.2 mL      27 Apr 2023 07:01  -  28 Apr 2023 01:17  --------------------------------------------------------  IN:    Enteral Tube Flush: 30 mL    Enteral Tube Flush: 260 mL    Other (mL): 400 mL    Peptamen A.F.: 630 mL    Phenylephrine: 44 mL    Phenylephrine: 127 mL  Total IN: 1491 mL    OUT:    Other (mL): 1400 mL    Rectal Tube (mL): 1100 mL  Total OUT: 2500 mL    Total NET: -1009 mL    --------------------------------------------------------------------------------------    EXAM  NEUROLOGY  RASS:   	-2 GCS:    Exam: sedated, moving to command when sedation weaned     HEENT  Exam: Normocephalic, ulcer on tip of nose, EOMI.  Feeding tube in place.    RESPIRATORY  Exam: Intubated ett, synchronous with vent     CARDIOVASCULAR  Exam: S1, S2.  Regular rate and rhythm, extremities warm    GI/NUTRITION  Exam: Abdomen soft, Non-tender, distended.     VASCULAR  Exam: Extremities warm, well-perfused.     MUSCULOSKELETAL  Exam: sedated    SKIN  Exam: dependent peripheral edema     METABOLIC/FLUIDS/ELECTROLYTES  multivitamin/minerals/iron Oral Solution (CENTRUM) 15 milliLiter(s) Oral daily  phytonadione  IVPB 10 milliGRAM(s) IV Intermittent every 24 hours    HEMATOLOGIC  [x] VTE Prophylaxis: heparin   Injectable 5000 Unit(s) SubCutaneous every 8 hours    Transfusions:	[] PRBC	[] Platelets		[] FFP	[] Cryoprecipitate    INFECTIOUS DISEASE  Antimicrobials/Immunologic Medications:  ertapenem  IVPB 1000 milliGRAM(s) IV Intermittent every 24 hours      Tubes/Lines/Drains   [x] Peripheral IV  [x] Central Venous Line     	[x] R	[] L	[x] IJ	[] Fem	[] SC	Date Placed: 4/25/23  [x] Arterial Line		[] R	[] L	[] Fem	[] Rad	[] Ax	Date Placed:   [] PICC		[] Midline		[] Mediport  [x] Urinary Catheter		Date Placed:   [x] Necessity of urinary, arterial, and venous catheters discussed    LABS  --------------------------------------------------------------------------------------                                   7.2    12.08 )-----------( 226      ( 27 Apr 2023 00:51 )             23.2   04-27    141  |  103  |  25<H>  ----------------------------<  182<H>  3.1<L>   |  18<L>  |  1.79<H>    Ca    8.3<L>      27 Apr 2023 00:51  Phos  3.1     04-27  Mg     2.50     04-27    TPro  6.9  /  Alb  2.8<L>  /  TBili  4.3<H>  /  DBili  x   /  AST  75<H>  /  ALT  71<H>  /  AlkPhos  368<H>  04-27

## 2023-04-28 NOTE — PROGRESS NOTE ADULT - ATTENDING COMMENTS
Severe sepsis secondary to infected WON/pseudocyst  a.  Febrile overnight  b.  On Ertapenem  c.  Consult IR for possible drainage  d.  Remains on midodrine and intermittent IV neosynephrin    Respiratory insufficiency  a.  Patient remains MV dependent  b.  With adequate gas exchange  c.  Failed daily SBT  d.  Plan for tracheostomy    Anemia secondary to chronic illness  a.  Transfuse for Hgb <7    CHATO  a.  Remains anuric  b.  Requiring ihd  c.  Trend CMP    Malnutrition  a.  Remains NPO  b.  On TF at goal via LEONIDAS   c.   NGT for decompression Severe sepsis secondary to infected WON/pseudocyst  a.  Febrile overnight  b.  WBC increased to 16.8  c.  Repeat CT of abdomen and pelvis    Respiratory insufficiency  a.  Patient remains MV dependent  b.  With adequate gas exchange  c.  Failed daily SBT  d.  Defer Plan for tracheostomy    CHATO  a.  Remains anuric  b.  Requiring RRT  c.  Trend CMP, replete K    Malnutrition  a.  Remains NPO  b.  On TF at goal via LEONIDAS   c.   On Creon

## 2023-04-28 NOTE — PROGRESS NOTE ADULT - ASSESSMENT
Assessment and Plan: 	  48 year old female with a PMHx of HTN, hypothyroidism,  (30 years ago) and breast cancer (S/P left mastectomy and chemo in ) who presented with epigastric pain and left upper quadrant pain.  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  Transferred from OSH ([facility]) for hypotension and tachypnea requiring emergent intubation and multiple pressors.  SICU consulted for hemodynamic monitoring and respiratory management.  Intubated, pressors, CRRT, pending axios stent/debridement when collection mature.     PLAN  Neuro:   - no sedation  - Tylenol     Resp:  - intubated  for AMS 2/2 to airway protection, hypercarbic resp failure  - 18/500/  - OR for trach today     CV:  Mixed shock state 2/2 septic shock w/ E. Coli in urine vs hypovolemic shock now s/p 11L resuscitation   - Midodrine 40 q 8 hours  - phenylephrine titrate as tolerated    GI:   - TF held for OR today   - Ulcer ppx w/ Protonix   - Creon  - repeat CT A/P , GI recs: collections not mature enough for axios stent     Renal:  - ARF 2/2 shock state resulting in ATN requiring CRRT, started on iHD   - Failed lasix challenge    - daily bladder scans    Heme:   - SCDs and SQH  - HIT Ab positive; DOUGLAS Negative    ID:   Intermittently febrile w/ leukocytosis c/f septic shock 2/2 E coli UTI  Diagnostics:  - 3/22 Ucx: E coli  - 3/30 Bcx: staph hominis in aerobic bottle x 1 (likely contaminant)   - S/p Meropenem (3/26-, -)  - Ertapenem ( -     Endo:   - stress steroids 50 qd  - Lantus 14, admelog held while npo for OR   - c/w synthroid 20 IV (home med)   - ISS    Disposition: SICU Assessment and Plan: 	  48 year old female with a PMHx of HTN, hypothyroidism,  (30 years ago) and breast cancer (S/P left mastectomy and chemo in ) who presented with epigastric pain and left upper quadrant pain.  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  Transferred from OSH ([facility]) for hypotension and tachypnea requiring emergent intubation and multiple pressors.  SICU consulted for hemodynamic monitoring and respiratory management.  Intubated, pressors, CRRT, pending axios stent/debridement when collection mature.       PLAN  Neuro:   - no sedation  - Tylenol     Resp:  - intubated  for AMS 2/2 to airway protection, hypercarbic resp failure  - 18//  - SBT today  - no trach today given need for pressers    CV:  Mixed shock state 2/2 septic shock w/ E. Coli in urine vs hypovolemic shock now s/p 11L resuscitation   - Midodrine 40 q 8 hours  - phenylephrine titrate as tolerated    GI:   - TF   - Ulcer ppx w/ Protonix   - Creon  - repeat CT A/P , GI recs: collections not mature enough for axios stent   - repeat CT A/P today    Renal:  - ARF 2/2 shock state resulting in ATN requiring CRRT, started on iHD   - Failed lasix challenge    - daily bladder scans  - unable to tolerate iHD overnight, f/u recs re restarting CRRT    Heme:   - SCDs and SQH  - HIT Ab positive; DOUGLAS Negative    ID:   Intermittently febrile w/ leukocytosis c/f septic shock 2/2 E coli UTI  Diagnostics:  - 3/22 Ucx: E coli  - 3/30 Bcx: staph hominis in aerobic bottle x 1 (likely contaminant)   - S/p Meropenem (3/26-, -)  - Ertapenem ( -     Endo:   - taper stress steroids 25 qd  - Lantus 14, admelog held while npo for OR   - c/w synthroid 20 IV (home med)   - ISS    Disposition: SICU

## 2023-04-28 NOTE — PROGRESS NOTE ADULT - PROBLEM SELECTOR PLAN 1
Pt. with oliguric CHATO in the setting of necrotizing pancreatitis and shock. Pt. with most likely ATN. Scr was 1.24 on 3/23/23 and increased to 4.60 on 3/24/23. Pt. was initiated on CRRT/CVVHDF on 3/24/23-4/6/23.  Pt. remained oliguric with hypotension so restarted on CVVHDH on 4/7/23-4/17/23 and resumed 4/18 as patient remained hypotensive and oliguric. CRRT held by SICU on 4/21 but resumed on 4/22 after failed lasix challenge. CRRT clotted on 4/25 and kept off. HD tolerated 4/26, on 4/27 iHD done with vasopressor support. No plans for iHD today. Will plan for iHD tomorrow. Vitals, I/Os and labs reviewed. Pt remains critically ill. Remains on Midodrine 40 TID. Monitor labs and urine output. Avoid any potential nephrotoxins. Dose medications as per eGFR.    If you have any questions, please feel free to contact me  Neville Schulz  Nephrology Fellow  [phone]; Prefer [employer] TEAMS  (After 5pm or on weekends please page the on-call fellow).

## 2023-04-28 NOTE — PROGRESS NOTE ADULT - ATTENDING COMMENTS
CHATO  Oliguria    Pt on midodrine to help with BP - s/p HD yesterday with 1L UF    Plan for HD tomorrow, cont to monitor labs, Is/Os and daily weights

## 2023-04-28 NOTE — PROGRESS NOTE ADULT - SUBJECTIVE AND OBJECTIVE BOX
[facility] Division of Kidney Diseases & Hypertension  FOLLOW UP NOTE  [phone]--------------------------------------------------------------------------------    Chief Complaint: Oliguric CHATO, on RRT    24 hour events/subjective: Pt. seen and examined in the SICU today. iHD yesterday with vasopressor support. Planned for tracheostomy today. Will hold iHD today.     PAST HISTORY  --------------------------------------------------------------------------------  No significant changes to PMH, PSH, FHx, SHx, unless otherwise noted    ALLERGIES & MEDICATIONS  --------------------------------------------------------------------------------  Allergies    No Known Allergies    Intolerances      Standing Inpatient Medications  chlorhexidine 0.12% Liquid 15 milliLiter(s) Oral Mucosa every 12 hours  chlorhexidine 2% Cloths 1 Application(s) Topical daily  ertapenem  IVPB 1000 milliGRAM(s) IV Intermittent every 24 hours  gabapentin Solution 100 milliGRAM(s) Oral three times a day  heparin   Injectable 5000 Unit(s) SubCutaneous every 8 hours  hydrocortisone sodium succinate Injectable 25 milliGRAM(s) IV Push every 24 hours  insulin glargine Injectable (LANTUS) 14 Unit(s) SubCutaneous at bedtime  insulin lispro (ADMELOG) corrective regimen sliding scale   SubCutaneous every 6 hours  insulin lispro Injectable (ADMELOG) 3 Unit(s) SubCutaneous every 6 hours  levothyroxine Injectable 20 MICROGram(s) IV Push at bedtime  midodrine 40 milliGRAM(s) Oral every 8 hours  multivitamin/minerals/iron Oral Solution (CENTRUM) 15 milliLiter(s) Oral daily  pancrelipase  (CREON 12,000 Lipase Units) 1 Capsule(s) Oral <User Schedule>  pantoprazole   Suspension 40 milliGRAM(s) Oral daily  phenylephrine    Infusion 0.4 MICROgram(s)/kG/Min IV Continuous <Continuous>  polyethylene glycol 3350 17 Gram(s) Oral daily    PRN Inpatient Medications  acetaminophen   Oral Liquid .. 500 milliGRAM(s) Oral every 6 hours PRN      REVIEW OF SYSTEMS  --------------------------------------------------------------------------------  Unable to be obtained     VITALS/PHYSICAL EXAM  --------------------------------------------------------------------------------  T(C): 37.8 (04-28-23 @ 08:00), Max: 39.6 (04-27-23 @ 12:00)  HR: 93 (04-28-23 @ 09:00) (91 - 121)  BP: --  RR: 18 (04-28-23 @ 09:00) (16 - 24)  SpO2: 100% (04-28-23 @ 09:00) (95% - 100%)  Wt(kg): --        04-27-23 @ 07:01  -  04-28-23 @ 07:00  --------------------------------------------------------  IN: 1720 mL / OUT: 3100 mL / NET: -1380 mL    04-28-23 @ 07:01  -  04-28-23 @ 09:48  --------------------------------------------------------  IN: 34.4 mL / OUT: 0 mL / NET: 34.4 mL      Physical Exam:  	Gen: Intubated; arousable  	HEENT: NGT+, anicteric  	Pulm: Fair entry B/L  	CV: S1S2+  	Abd: Obese, soft; nontender  	Ext: edematous b/l but not pitting  	Neuro: arousable  	Skin: Warm and dry              Dialysis access: Right IJ non tunneled HD catheter      LABS/STUDIES  --------------------------------------------------------------------------------              7.1    16.80 >-----------<  243      [04-28-23 @ 01:25]              22.7     138  |  102  |  26  ----------------------------<  184      [04-28-23 @ 01:25]  3.6   |  19  |  2.05        Ca     8.5     [04-28-23 @ 01:25]      Mg     2.30     [04-28-23 @ 01:25]      Phos  2.7     [04-28-23 @ 01:25]    TPro  7.1  /  Alb  2.8  /  TBili  4.2  /  DBili  x   /  AST  63  /  ALT  64  /  AlkPhos  273  [04-28-23 @ 01:25]    PT/INR: PT 14.8 , INR 1.27       [04-28-23 @ 01:25]  PTT: 32.3       [04-28-23 @ 01:25]      Creatinine Trend:  SCr 2.05 [04-28 @ 01:25]  SCr 1.79 [04-27 @ 00:51]  SCr 1.42 [04-26 @ 00:57]  SCr 0.93 [04-25 @ 02:04]  SCr 1.07 [04-24 @ 02:55]          HBsAb <3.0      [04-26-23 @ 16:30]  HBcAb Nonreact      [04-26-23 @ 16:30]  HCV 0.22, Nonreact      [04-26-23 @ 16:30]

## 2023-04-28 NOTE — PROGRESS NOTE ADULT - ATTENDING COMMENTS
Patient overnight received intermittent HD, started on pressors after 1L taken off. WBC jumped to 16, trach cancelled, and planned for CT scan.  N mentating at baseline  resp remains intubated, minimal vent settings, adequate gas exchange, sbt  cv hemodynamcially unstable, septic shock, on small dose phenylephrine 0.3mcg, likely hypovolemic, on stress dose steroids will start taper, on midodrine  gi npo, ivf, will resume tube feeds after ct scan  gu/renal anuric renal failure  heme vte ppx  id on ertapenem for nec panc  endo steroid taper    The patient is a critical care patient with life threatening hemodynamic and metabolic instability in SICU.  I have personally interviewed when possible and examined the patient, reviewed data and laboratory tests/x-rays and all pertinent electronic images.  I was physically present for the key portions of the evaluation and management (E/M) service provided.   The SICU team has a constant risk benefit analyzes discussion with the primary team, all consultants, House Staff and PA's on all decisions.  These diagnoses are unrelated to the surgical procedure noted above.  I meet with family if needed to get further history, discuss the case and make care decisions for this patient who might not be able to participate.  Time involved in performance of separately billable procedures was not counted toward my critical care time. There is no overlap.  I spent 55-90 minutes ( 0800Hrs-0915Hrs in AM/ 1600Hrs-1715Hrs in PM, or other time indicated) of critical care time for the diagnoses, assessment, plan and interventions.  This time excludes time spent on separate procedures and teaching.

## 2023-04-28 NOTE — PROGRESS NOTE ADULT - ASSESSMENT
48 year old female with a PMHx of HTN, hypothyroidism,  (30 years ago) and breast cancer (S/P left mastectomy and chemo in ) who presented with epigastric pain and left upper quadrant pain.  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  Transferred from OSH ([place]) for hypotension and tachypnea requiring emergent intubation and multiple pressors.  SICU consulted for hemodynamic monitoring and respiratory management.    Plan:   - Diet: TF at goal  - tolerating HD  - on ertapenem  - trach canceled 2/2 fever and concern for infection  - please rescan to evaluate pancreatic collection  - [facility]  - f/u with GI after CT for possible drainage  - Appreciate care per SICU      B Team Surgery  [id number]   48 year old female with a PMHx of HTN, hypothyroidism,  (30 years ago) and breast cancer (S/P left mastectomy and chemo in ) who presented with epigastric pain and left upper quadrant pain.  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  Transferred from OSH ([place]) for hypotension and tachypnea requiring emergent intubation and multiple pressors.  SICU consulted for hemodynamic monitoring and respiratory management.    Plan:   - Diet: TF at goal  - tolerating HD  - on ertapenem  - trach canceled 2/2 fever and concern for infection  - please rescan to evaluate pancreatic collection  - consider blood cultures  - f/u with GI after CT for possible drainage  - Appreciate care per SICU      B Team Surgery  [id number]

## 2023-04-29 NOTE — PROGRESS NOTE ADULT - ASSESSMENT
Assessment and Plan: 	  48 year old female with a PMHx of HTN, hypothyroidism,  (30 years ago) and breast cancer (S/P left mastectomy and chemo in ) who presented with epigastric pain and left upper quadrant pain.  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  Transferred from OSH ([place]) for hypotension and tachypnea requiring emergent intubation and multiple pressors.  SICU consulted for hemodynamic monitoring and respiratory management.  Intubated, pressors, CRRT, pending axios stent/debridement when collection mature.       PLAN  Neuro:   - no sedation  - Tylenol     Resp:  - intubated  for AMS 2/2 to airway protection, hypercarbic resp failure  - 68/500/  - SBT today  - f/u plan for trach placement, cancelled yesterday do to pressors    CV:  Mixed shock state 2/2 septic shock w/ E. Coli in urine vs hypovolemic shock now s/p 11L resuscitation   - Midodrine 40 q 8 hours  - phenylephrine titrate as tolerated    GI:   - TF   - Ulcer ppx w/ Protonix   - Creon  - repeat CT A/P , GI recs: collections not mature enough for axios stent   - repeat CT A/P : incomplete wall off collection     Renal:  - ARF 2/2 shock state resulting in ATN requiring CRRT, started on iHD   - Failed lasix challenge    - daily bladder scans  - unable to tolerate iHD overnight, f/u recs re restarting CRRT    Heme:   - SCDs and SQH  - HIT Ab positive; DOUGLAS Negative    ID:   Intermittently febrile w/ leukocytosis c/f septic shock 2/2 E coli UTI  Diagnostics:  - 3/22 Ucx: E coli  - 3/30 Bcx: staph hominis in aerobic bottle x 1 (likely contaminant)   - S/p Meropenem (3/26-, -)  - Ertapenem ( -     Endo:   - taper stress steroids 25 qd  - Lantus 14, admelog held while npo for OR   - c/w synthroid 20 IV (home med)   - ISS    Disposition: SICU   48 year old female with a PMHx of HTN, hypothyroidism,  (30 years ago) and breast cancer (S/P left mastectomy and chemo in ) who presented with epigastric pain and left upper quadrant pain.  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  Transferred from OSH ([place]) for hypotension and tachypnea requiring emergent intubation and multiple pressors.  SICU consulted for hemodynamic monitoring and respiratory management.  Intubated, pressors, CRRT, now with loculated abdominal collections    PLAN  Neuro:   - no sedation  - Tylenol     Resp:  - intubated  for AMS 2/2 to airway protection, hypercarbic resp failure  - 68/500/  - SBT daily  - Trach Monday    CV:  Mixed shock state 2/2 septic shock w/ E. Coli in urine vs hypovolemic shock now s/p 11L resuscitation   - Midodrine 40 q 8 hours  - phenylephrine titrate as tolerated    GI:   - TF   - Ulcer ppx w/ Protonix   - Creon  - repeat CT A/P , GI recs: collections not mature enough for axios stent   - repeat CT A/P : incomplete wall off collection     Renal:  - ARF 2/2 shock state resulting in ATN requiring CRRT, started on iHD   - Failed lasix challenge    - daily bladder scans  - HD today    Heme:   - SCDs and SQH  - HIT Ab positive; DOUGLAS Negative    ID:   Intermittently febrile w/ leukocytosis c/f septic shock 2/2 E coli UTI  Diagnostics:  - 3/22 Ucx: E coli  - 3/30 Bcx: staph hominis in aerobic bottle x 1 (likely contaminant)   - S/p Meropenem (3/26-, -)  - Ertapenem ( -     Endo:   - taper stress steroids 25 qd  - Lantus 14, admelog held while npo for OR   - c/w synthroid 20 IV (home med)   - ISS    Disposition: SICU

## 2023-04-29 NOTE — PROGRESS NOTE ADULT - PROBLEM SELECTOR PLAN 1
Pt. with oliguric CHATO in the setting of necrotizing pancreatitis and shock. Pt. with most likely ATN. Scr was 1.24 on 3/23/23 and increased to 4.60 on 3/24/23. Pt. was initiated on CRRT/CVVHDF on 3/24/23-4/6/23.  Pt. remained oliguric with hypotension so restarted on CVVHDH on 4/7/23-4/17/23 and resumed 4/18 as patient remained hypotensive and oliguric. CRRT held by SICU on 4/21 but resumed on 4/22 after failed lasix challenge. CRRT clotted on 4/25 and kept off. Pt was transitioned to iHD on 4/26, Last HD done on 4/27. Labs reviewed. Plan for iHD today. Pt remains critically ill. Remains on Midodrine 40 TID. Blood transfusion per primary team. Monitor labs and urine output. Avoid any potential nephrotoxins. Dose medications as per eGFR.    If you have any questions, please feel free to contact me  Howard De La Fuente  Nephrology Fellow  [phone]/ [employer] Teams(Preferred)  (After 5pm or on weekends please page the on-call fellow).

## 2023-04-29 NOTE — PROGRESS NOTE ADULT - ASSESSMENT
48 year old female with a PMHx of HTN, hypothyroidism,  (30 years ago) and breast cancer (S/P left mastectomy and chemo in ) who presented with epigastric pain and left upper quadrant pain.  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  Transferred from OSH ([place]) for hypotension and tachypnea requiring emergent intubation and multiple pressors.  SICU consulted for hemodynamic monitoring and respiratory management.    Plan:   - Diet: TF at goal  - NGT to LCWS  - tolerating HD  - on ertapenem  - SBT trials not being tolerated, poss trach once patient more stable  - CT abd pelvis show some walled off areas of collection but not fully incased  - GI team not able to offer axios stent until collection more walled off  - Recommend IR consult for possible drainage monday  - Appreciate care per SICU      B Team Surgery  [id number] 48 year old female with a PMHx of HTN, hypothyroidism,  (30 years ago) and breast cancer (S/P left mastectomy and chemo in ) who presented with epigastric pain and left upper quadrant pain.  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  Transferred from OSH ([place]) for hypotension and tachypnea requiring emergent intubation and multiple pressors.  SICU consulted for hemodynamic monitoring and respiratory management.    Plan:   - Diet: TF at goal  - NGT to LCWS  - tolerating HD  - on ertapenem  - SBT trials not being tolerated, poss trach once patient more stable  - CT abd pelvis show some walled off areas of collection but not fully incased  - GI team not able to offer axios stent until collection more walled off  - Recommend IR consult for possible drainage monday  - Appreciate care per SICU      B Team Surgery  [id number]

## 2023-04-29 NOTE — CONSULT NOTE ADULT - ASSESSMENT
Assessment: 48y Female with acute necrotizing pancreatitis with unchanged peritoneal fluid.     Plan: IR to defer peritoneal fluid drainage at this time given no discrete walled off fluid collection  - if sampling of the fluid is needed, can consider paracentesis by procedure team/ICU team  - c/w abx and clinical management per SICU  - if a discrete walled off collection is found on f/u imaging, please re-consult IR for evaluation of drainage catheter placement  - case reviewed with IR attending Dr. Garcia  - discussed with primary team    Marc Blake MD  PGY-V, Interventional Radiology    -Available on [employer] TEAMS for all non-urgent questions  -Emergent issues: [facility]-p.[phone]; [facility]p.[phone] ([phone])  -Non-emergent consults: Please place a [place] order "IR Consult" with an appropriate callback number  -Scheduling questions: [facility]: [phone]; [facility] [phone]  -Clinic/Outpatient booking: [facility]: [phone]; [facility] [phone] Assessment: 48y Female with acute necrotizing pancreatitis with unchanged peritoneal fluid.     Plan: IR to defer peritoneal fluid drainage at this time given no discrete walled off fluid collection, which demonstrates stability.   - if sampling of the fluid is needed, can consider paracentesis by procedure team/ICU team  - c/w abx and clinical management per SICU  - if a discrete walled off collection is found on f/u imaging, please re-consult IR for evaluation of drainage catheter placement  - case reviewed with IR attending Dr. Garcia  - discussed with primary team    Marc Blake MD  PGY-V, Interventional Radiology    -Available on [employer] TEAMS for all non-urgent questions  -Emergent issues: [facility]-p.[phone]; [facility]-p.[phone] ([phone])  -Non-emergent consults: Please place a [place] order "IR Consult" with an appropriate callback number  -Scheduling questions: [facility]: [phone]; [facility]: [phone]  -Clinic/Outpatient booking: [facility]: [phone]; [facility]: [phone]

## 2023-04-29 NOTE — PROGRESS NOTE ADULT - ATTENDING COMMENTS
Patient overnight weaned off pressors, minimal vent settings, started on tube feeds postpyloric and ngt to suction  N mentating at baseline  resp on cpap/ps, doing well, plan for trach monday  cv hemodynamically stable, off pressors  gi npo, ivf on postpyloric tube feeds, ir vs gi evaluation for drainage of fluid collection  gu/renal anuric renal failure, intermittent HD, no volume off  heme vte ppx  id on ertapenem for necrotizing pancreatitis  endo no changes, d/c'ed steroids    The patient is a critical care patient with life threatening hemodynamic and metabolic instability in SICU.  I have personally interviewed when possible and examined the patient, reviewed data and laboratory tests/x-rays and all pertinent electronic images.  I was physically present for the key portions of the evaluation and management (E/M) service provided.   The SICU team has a constant risk benefit analyzes discussion with the primary team, all consultants, House Staff and PA's on all decisions.  These diagnoses are unrelated to the surgical procedure noted above.  I meet with family if needed to get further history, discuss the case and make care decisions for this patient who might not be able to participate.  Time involved in performance of separately billable procedures was not counted toward my critical care time. There is no overlap.  I spent 55-75 minutes ( 0800Hrs-0915Hrs in AM/ 1600Hrs-1715Hrs in PM, or other time indicated) of critical care time for the diagnoses, assessment, plan and interventions.  This time excludes time spent on separate procedures and teaching.

## 2023-04-29 NOTE — PROGRESS NOTE ADULT - SUBJECTIVE AND OBJECTIVE BOX
Surgery Progress Note    Overnight Events: No acute events overnight.  SUBJECTIVE: Patient seen and examined at bedside with surgical team, patient without complaints. Denies fever, chills, CP, SOB nausea, vomiting, dizziness.      OBJECTIVE:    Vital Signs Last 24 Hrs  T(C): 38 (29 Apr 2023 04:00), Max: 38 (29 Apr 2023 04:00)  T(F): 100.4 (29 Apr 2023 04:00), Max: 100.4 (29 Apr 2023 04:00)  HR: 102 (29 Apr 2023 08:00) (82 - 104)  BP: --  BP(mean): --  RR: 21 (29 Apr 2023 08:00) (14 - 27)  SpO2: 100% (29 Apr 2023 08:00) (85% - 100%)    Parameters below as of 29 Apr 2023 05:00  Patient On (Oxygen Delivery Method): ventilator    O2 Concentration (%): 40I&O's Detail    28 Apr 2023 07:01  -  29 Apr 2023 07:00  --------------------------------------------------------  IN:    Enteral Tube Flush: 270 mL    IV PiggyBack: 50 mL    Peptamen A.F.: 335 mL    Phenylephrine: 6.6 mL    PRBCs (Packed Red Blood Cells): 300 mL  Total IN: 961.6 mL    OUT:    Nasogastric/Oral tube (mL): 300 mL    Rectal Tube (mL): 500 mL  Total OUT: 800 mL    Total NET: 161.6 mL      MEDICATIONS  (STANDING):  chlorhexidine 0.12% Liquid 15 milliLiter(s) Oral Mucosa every 12 hours  chlorhexidine 2% Cloths 1 Application(s) Topical daily  ertapenem  IVPB 1000 milliGRAM(s) IV Intermittent every 24 hours  gabapentin Solution 100 milliGRAM(s) Oral three times a day  heparin   Injectable 5000 Unit(s) SubCutaneous every 8 hours  insulin glargine Injectable (LANTUS) 14 Unit(s) SubCutaneous at bedtime  insulin lispro (ADMELOG) corrective regimen sliding scale   SubCutaneous every 6 hours  insulin lispro Injectable (ADMELOG) 3 Unit(s) SubCutaneous every 6 hours  levothyroxine Injectable 20 MICROGram(s) IV Push at bedtime  midodrine 40 milliGRAM(s) Oral every 8 hours  multivitamin/minerals/iron Oral Solution (CENTRUM) 15 milliLiter(s) Oral daily  pancrelipase  (CREON 12,000 Lipase Units) 1 Capsule(s) Oral <User Schedule>  pantoprazole   Suspension 40 milliGRAM(s) Oral daily  polyethylene glycol 3350 17 Gram(s) Oral daily    MEDICATIONS  (PRN):  acetaminophen   Oral Liquid .. 500 milliGRAM(s) Oral every 6 hours PRN Temp greater or equal to 38.5C (101.3F)      PHYSICAL EXAM:  Constitutional: NAD,   Respiratory: Unlabored breathing  Abdomen: Soft, moderate distention , NTTP. No rebound or guarding.  Extremities: WWP, LEE spontaneously    LABS:                        6.2    14.76 )-----------( 261      ( 29 Apr 2023 02:50 )             19.4     04-29    137  |  100  |  50<H>  ----------------------------<  128<H>  3.4<L>   |  18<L>  |  3.18<H>    Ca    8.4      29 Apr 2023 02:50  Phos  5.0     04-29  Mg     2.40     04-29    TPro  6.9  /  Alb  2.6<L>  /  TBili  3.5<H>  /  DBili  x   /  AST  49<H>  /  ALT  49<H>  /  AlkPhos  211<H>  04-29    PT/INR - ( 29 Apr 2023 02:50 )   PT: 14.1 sec;   INR: 1.21 ratio         PTT - ( 29 Apr 2023 02:50 )  PTT:28.4 sec  LIVER FUNCTIONS - ( 29 Apr 2023 02:50 )  Alb: 2.6 g/dL / Pro: 6.9 g/dL / ALK PHOS: 211 U/L / ALT: 49 U/L / AST: 49 U/L / GGT: x               IMAGING: Surgery Progress Note    Overnight Events: No acute events overnight.  SUBJECTIVE: Patient seen and examined at bedside with surgical team, intubated, off sedation, not following commands.        OBJECTIVE:    Vital Signs Last 24 Hrs  T(C): 38 (29 Apr 2023 04:00), Max: 38 (29 Apr 2023 04:00)  T(F): 100.4 (29 Apr 2023 04:00), Max: 100.4 (29 Apr 2023 04:00)  HR: 102 (29 Apr 2023 08:00) (82 - 104)  BP: --  BP(mean): --  RR: 21 (29 Apr 2023 08:00) (14 - 27)  SpO2: 100% (29 Apr 2023 08:00) (85% - 100%)    Parameters below as of 29 Apr 2023 05:00  Patient On (Oxygen Delivery Method): ventilator    O2 Concentration (%): 40I&O's Detail    28 Apr 2023 07:01  -  29 Apr 2023 07:00  --------------------------------------------------------  IN:    Enteral Tube Flush: 270 mL    IV PiggyBack: 50 mL    Peptamen A.F.: 335 mL    Phenylephrine: 6.6 mL    PRBCs (Packed Red Blood Cells): 300 mL  Total IN: 961.6 mL    OUT:    Nasogastric/Oral tube (mL): 300 mL    Rectal Tube (mL): 500 mL  Total OUT: 800 mL    Total NET: 161.6 mL      MEDICATIONS  (STANDING):  chlorhexidine 0.12% Liquid 15 milliLiter(s) Oral Mucosa every 12 hours  chlorhexidine 2% Cloths 1 Application(s) Topical daily  ertapenem  IVPB 1000 milliGRAM(s) IV Intermittent every 24 hours  gabapentin Solution 100 milliGRAM(s) Oral three times a day  heparin   Injectable 5000 Unit(s) SubCutaneous every 8 hours  insulin glargine Injectable (LANTUS) 14 Unit(s) SubCutaneous at bedtime  insulin lispro (ADMELOG) corrective regimen sliding scale   SubCutaneous every 6 hours  insulin lispro Injectable (ADMELOG) 3 Unit(s) SubCutaneous every 6 hours  levothyroxine Injectable 20 MICROGram(s) IV Push at bedtime  midodrine 40 milliGRAM(s) Oral every 8 hours  multivitamin/minerals/iron Oral Solution (CENTRUM) 15 milliLiter(s) Oral daily  pancrelipase  (CREON 12,000 Lipase Units) 1 Capsule(s) Oral <User Schedule>  pantoprazole   Suspension 40 milliGRAM(s) Oral daily  polyethylene glycol 3350 17 Gram(s) Oral daily    MEDICATIONS  (PRN):  acetaminophen   Oral Liquid .. 500 milliGRAM(s) Oral every 6 hours PRN Temp greater or equal to 38.5C (101.3F)      PHYSICAL EXAM:  Constitutional: NAD, intubated, off sedation but lethargic   Respiratory: mechanically ventilated  Abdomen: Soft, moderate distention, unable to assess pain response d/t poor mental status NTTP. No rebound or guarding.  Extremities: WWP, LEE spontaneously    LABS:                        6.2    14.76 )-----------( 261      ( 29 Apr 2023 02:50 )             19.4     04-29    137  |  100  |  50<H>  ----------------------------<  128<H>  3.4<L>   |  18<L>  |  3.18<H>    Ca    8.4      29 Apr 2023 02:50  Phos  5.0     04-29  Mg     2.40     04-29    TPro  6.9  /  Alb  2.6<L>  /  TBili  3.5<H>  /  DBili  x   /  AST  49<H>  /  ALT  49<H>  /  AlkPhos  211<H>  04-29    PT/INR - ( 29 Apr 2023 02:50 )   PT: 14.1 sec;   INR: 1.21 ratio         PTT - ( 29 Apr 2023 02:50 )  PTT:28.4 sec  LIVER FUNCTIONS - ( 29 Apr 2023 02:50 )  Alb: 2.6 g/dL / Pro: 6.9 g/dL / ALK PHOS: 211 U/L / ALT: 49 U/L / AST: 49 U/L / GGT: x               IMAGING:

## 2023-04-29 NOTE — CONSULT NOTE ADULT - SUBJECTIVE AND OBJECTIVE BOX
Vascular & Interventional Radiology Consult Note    Evaluate for Procedure: peritoneal fluid drainage    HPI: 48y Female with acute necrotizing pancreatitis with peritoneal fluid. Patient has unchanged peritoneal fluid from CT scan 4/22 now with minimal peritoneal enhancement. patient is HD stable with slightly downtrending H/H.     Allergies: No Known Allergies    Medications (Abx/Cardiac/Anticoagulation/Blood Products)    ertapenem  IVPB: 120 mL/Hr IV Intermittent (04-28 @ 12:10)  heparin   Injectable: 5000 Unit(s) SubCutaneous (04-29 @ 05:31)  midodrine: 40 milliGRAM(s) Oral (04-29 @ 05:32)  phenylephrine    Infusion: 11.1 mL/Hr IV Continuous (04-27 @ 18:21)    Data:    T(C): 37.6  HR: 81  BP: --  RR: 25  SpO2: 100%    -WBC 14.76 / HgB 6.2 / Hct 19.4 / Plt 261  -Na 137 / Cl 100 / BUN 50 / Glucose 128  -K 3.4 / CO2 18 / Cr 3.18  -ALT 49 / Alk Phos 211 / T.Bili 3.5  -INR 1.21 / PTT 28.4      Imaging: CT A/P reviewed with unchanged peritoneal fluid. no discrete walled off collection is identified.  Vascular & Interventional Radiology Consult Note    Evaluate for Procedure: peritoneal fluid drainage    HPI: 48y Female with acute necrotizing pancreatitis with peritoneal fluid. Patient has unchanged peritoneal fluid from CT scan 4/22 now with minimal peritoneal enhancement. Patient is HD stable with slightly downtrending H/H.     Allergies: No Known Allergies    Medications (Abx/Cardiac/Anticoagulation/Blood Products)    ertapenem  IVPB: 120 mL/Hr IV Intermittent (04-28 @ 12:10)  heparin   Injectable: 5000 Unit(s) SubCutaneous (04-29 @ 05:31)  midodrine: 40 milliGRAM(s) Oral (04-29 @ 05:32)  phenylephrine    Infusion: 11.1 mL/Hr IV Continuous (04-27 @ 18:21)    Data:    T(C): 37.6  HR: 81  BP: --  RR: 25  SpO2: 100%    -WBC 14.76 / HgB 6.2 / Hct 19.4 / Plt 261  -Na 137 / Cl 100 / BUN 50 / Glucose 128  -K 3.4 / CO2 18 / Cr 3.18  -ALT 49 / Alk Phos 211 / T.Bili 3.5  -INR 1.21 / PTT 28.4      Imaging: CT A/P reviewed with unchanged peritoneal fluid. No discrete walled off collection is identified.

## 2023-04-29 NOTE — PROGRESS NOTE ADULT - SUBJECTIVE AND OBJECTIVE BOX
SICU Daily Progress Note  =====================================================  Interval/Overnight Events:  - emesis, NGT placed  - repeat CT showed incomplete wall around collection    --------------------------------------------------------------------------------------  MEDICATIONS  (STANDING):  chlorhexidine 0.12% Liquid 15 milliLiter(s) Oral Mucosa every 12 hours  chlorhexidine 2% Cloths 1 Application(s) Topical daily  ertapenem  IVPB 1000 milliGRAM(s) IV Intermittent every 24 hours  gabapentin Solution 100 milliGRAM(s) Oral three times a day  heparin   Injectable 5000 Unit(s) SubCutaneous every 8 hours  insulin glargine Injectable (LANTUS) 14 Unit(s) SubCutaneous at bedtime  insulin lispro (ADMELOG) corrective regimen sliding scale   SubCutaneous every 6 hours  insulin lispro Injectable (ADMELOG) 3 Unit(s) SubCutaneous every 6 hours  levothyroxine Injectable 20 MICROGram(s) IV Push at bedtime  midodrine 40 milliGRAM(s) Oral every 8 hours  multivitamin/minerals/iron Oral Solution (CENTRUM) 15 milliLiter(s) Oral daily  pancrelipase  (CREON 12,000 Lipase Units) 1 Capsule(s) Oral <User Schedule>  pantoprazole   Suspension 40 milliGRAM(s) Oral daily  polyethylene glycol 3350 17 Gram(s) Oral daily    MEDICATIONS  (PRN):  acetaminophen   Oral Liquid .. 500 milliGRAM(s) Oral every 6 hours PRN Temp greater or equal to 38.5C (101.3F)    I&O's Detail    27 Apr 2023 07:01  -  28 Apr 2023 07:00  --------------------------------------------------------  IN:    Enteral Tube Flush: 30 mL    Enteral Tube Flush: 260 mL    Other (mL): 400 mL    Peptamen A.F.: 720 mL    Phenylephrine: 44 mL    Phenylephrine: 178 mL    Phenylephrine: 88 mL  Total IN: 1720 mL    OUT:    Other (mL): 1400 mL    Rectal Tube (mL): 1700 mL  Total OUT: 3100 mL    Total NET: -1380 mL      28 Apr 2023 07:01  -  29 Apr 2023 04:44  --------------------------------------------------------  IN:    Enteral Tube Flush: 270 mL    IV PiggyBack: 50 mL    Peptamen A.F.: 200 mL    Phenylephrine: 6.6 mL  Total IN: 526.6 mL    OUT:    Nasogastric/Oral tube (mL): 200 mL    Rectal Tube (mL): 400 mL  Total OUT: 600 mL    Total NET: -73.4 mL      --------------------------------------------------------------------------------------    EXAM  NEUROLOGY  RASS:  -2 GCS:    Exam: off sedation     HEENT  Exam: Normocephalic, ulcer on tip of nose, EOMI.  Feeding tube in place.    RESPIRATORY  Exam: Mode: AC/ CMV (Assist Control/ Continuous Mandatory Ventilation)  RR (machine): 16 TV (machine): 500 FiO2: 40 PEEP: 5  CTA B/L    CARDIOVASCULAR  Exam: S1, S2.  Regular rate and rhythm, extremities warm    GI/NUTRITION  Exam: Abdomen soft, Non-tender, distended.     VASCULAR  Exam: Extremities warm, well-perfused.     MUSCULOSKELETAL  Exam: sedated    SKIN  Exam: dependent peripheral edema     METABOLIC/FLUIDS/ELECTROLYTES  multivitamin/minerals/iron Oral Solution (CENTRUM) 15 milliLiter(s) Oral daily  phytonadione  IVPB 10 milliGRAM(s) IV Intermittent every 24 hours    HEMATOLOGIC  [x] VTE Prophylaxis: heparin   Injectable 5000 Unit(s) SubCutaneous every 8 hours    Transfusions:	[] PRBC	[] Platelets		[] FFP	[] Cryoprecipitate    INFECTIOUS DISEASE  Antimicrobials/Immunologic Medications:  ertapenem  IVPB 1000 milliGRAM(s) IV Intermittent every 24 hours      Tubes/Lines/Drains   [x] Peripheral IV  [x] Central Venous Line     	[x] R	[] L	[x] IJ	[] Fem	[] SC	Date Placed: 4/25/23  [x] Arterial Line		[] R	[] L	[] Fem	[] Rad	[] Ax	Date Placed:   [] PICC		[] Midline		[] Mediport  [x] Urinary Catheter		Date Placed:   [x] Necessity of urinary, arterial, and venous catheters discussed    LABS  --------------------------------------------------------------------------------------  CBC (04-29 @ 02:50)                              6.2<LL>                         14.76<H>  )----------------(  261        --    % Neutrophils, --    % Lymphocytes, ANC: --                                  19.4<LL>  CBC (04-28 @ 01:25)                              7.1<L>                         16.80<H>  )----------------(  243        38.5<L>% Neutrophils, 28.4  % Lymphocytes, ANC: 10.63<H>                              22.7<L>    BMP (04-29 @ 02:50)             137     |  100     |  50<H> 		Ca++ --      Ca 8.4                ---------------------------------( 128<H>		Mg 2.40               3.4<L>  |  18<L>   |  3.18<H>			Ph 5.0<H>  BMP (04-28 @ 01:25)             138     |  102     |  26<H> 		Ca++ --      Ca 8.5                ---------------------------------( 184<H>		Mg 2.30               3.6     |  19<L>   |  2.05<H>			Ph 2.7       LFTs (04-29 @ 02:50)      TPro 6.9 / Alb 2.6<L> / TBili 3.5<H> / DBili -- / AST 49<H> / ALT 49<H> / AlkPhos 211<H>  LFTs (04-28 @ 01:25)      TPro 7.1 / Alb 2.8<L> / TBili 4.2<H> / DBili -- / AST 63<H> / ALT 64<H> / AlkPhos 273<H>    Coags (04-29 @ 02:50)  aPTT 28.4 / INR 1.21<H> / PT 14.1<H>  Coags (04-28 @ 01:25)  aPTT 32.3 / INR 1.27<H> / PT 14.8<H>      ABG (04-29 @ 02:50)     7.45 / 30<L> / 137<H> / 21 / -2.8<L> / 98.3<H>%     Lactate:    ABG (04-28 @ 11:45)     7.46<H> / 30<L> / 277<H> / 21 / -2.2<L> / 99.4<H>%     Lactate:

## 2023-04-29 NOTE — PROGRESS NOTE ADULT - ATTENDING COMMENTS
Severe sepsis secondary to infected WON/pseudocyst  a.  Febrile overnight  b.  On Ertapenem  c.  IR input appreciated  d.  WBC improved to 11    Respiratory insufficiency  a.  Patient remains MV dependent  b.  With adequate gas exchange  c.  Failed daily SBT  d.  Plan for tracheostomy    Anemia secondary to chronic illness  a.  S/P 1 unit pRBC  b.  With nadequate reponse    CHATO  a.  Remains anuric  b.  Requiring RRT  c.  Trend CMP    Malnutrition  a.  Remains NPO  b.  On TF at goal via LEONIDAS   c.   NGT for decompression Severe sepsis secondary to infected WON/pseudocyst  a.  Febrile overnight  b.  On Ertapenem  c.  Consult IR  d.  WBC improved to 15    Respiratory insufficiency  a.  Patient remains MV dependent  b.  With adequate gas exchange  c.  Failed daily SBT  d.  Plan for tracheostomy NET WEEK    Anemia secondary to chronic illness  a.  Transfuse for Hgb < 7    CHATO  a.  Remains anuric  b.  Requiring RRT  c.  Trend CMP, replet K     Malnutrition  a.  Remains NPO  b.  On TF at goal via LEONIDAS   c.   NGT for decompression

## 2023-04-29 NOTE — PROGRESS NOTE ADULT - SUBJECTIVE AND OBJECTIVE BOX
[facility] DIVISION OF KIDNEY DISEASES AND HYPERTENSION -- FOLLOW UP NOTE  --------------------------------------------------------------------------------  Chief Complaint: Oliguric CHATO, on RRT    24 hour events/subjective: Pt. seen and examined in the SICU today. Pt currently intubated and on [facility] vent. Unable to obtain ROS. Plan for HD today.      PAST HISTORY  --------------------------------------------------------------------------------  No significant changes to PMH, PSH, FHx, SHx, unless otherwise noted    ALLERGIES & MEDICATIONS  --------------------------------------------------------------------------------  Allergies    No Known Allergies    Intolerances    Standing Inpatient Medications  chlorhexidine 0.12% Liquid 15 milliLiter(s) Oral Mucosa every 12 hours  chlorhexidine 2% Cloths 1 Application(s) Topical daily  ertapenem  IVPB 1000 milliGRAM(s) IV Intermittent every 24 hours  gabapentin Solution 100 milliGRAM(s) Oral three times a day  heparin   Injectable 5000 Unit(s) SubCutaneous every 8 hours  insulin glargine Injectable (LANTUS) 14 Unit(s) SubCutaneous at bedtime  insulin lispro (ADMELOG) corrective regimen sliding scale   SubCutaneous every 6 hours  insulin lispro Injectable (ADMELOG) 3 Unit(s) SubCutaneous every 6 hours  levothyroxine Injectable 20 MICROGram(s) IV Push at bedtime  midodrine 40 milliGRAM(s) Oral every 8 hours  multivitamin/minerals/iron Oral Solution (CENTRUM) 15 milliLiter(s) Oral daily  pancrelipase  (CREON 12,000 Lipase Units) 1 Capsule(s) Oral <User Schedule>  pantoprazole   Suspension 40 milliGRAM(s) Oral daily  polyethylene glycol 3350 17 Gram(s) Oral daily    PRN Inpatient Medications  acetaminophen   Oral Liquid .. 500 milliGRAM(s) Oral every 6 hours PRN    REVIEW OF SYSTEMS  --------------------------------------------------------------------------------  Unable to obtain ROS     VITALS/PHYSICAL EXAM  --------------------------------------------------------------------------------  T(C): 37.6 (04-29-23 @ 08:00), Max: 38 (04-29-23 @ 04:00)  HR: 81 (04-29-23 @ 10:42) (81 - 104)  BP: --  RR: 25 (04-29-23 @ 09:00) (14 - 27)  SpO2: 100% (04-29-23 @ 10:42) (85% - 100%)  Wt(kg): --    04-28-23 @ 07:01  -  04-29-23 @ 07:00  --------------------------------------------------------  IN: 1051.6 mL / OUT: 800 mL / NET: 251.6 mL    04-29-23 @ 07:01  -  04-29-23 @ 11:07  --------------------------------------------------------  IN: 75 mL / OUT: 0 mL / NET: 75 mL    Physical Exam:  	Gen: Appears critically ill  	HEENT: NGT+, ETT+  	Pulm: Fair entry B/L  	CV: S1S2+  	Abd: Obese, soft; nontender  	Ext: edematous b/l but not pitting  	Neuro: arousable  	Skin: Warm and dry              Dialysis access: Right IJ non tunneled HD catheter    LABS/STUDIES  --------------------------------------------------------------------------------              6.2    14.76 >-----------<  261      [04-29-23 @ 02:50]              19.4     137  |  100  |  50  ----------------------------<  128      [04-29-23 @ 02:50]  3.4   |  18  |  3.18        Ca     8.4     [04-29-23 @ 02:50]      Mg     2.40     [04-29-23 @ 02:50]      Phos  5.0     [04-29-23 @ 02:50]    TPro  6.9  /  Alb  2.6  /  TBili  3.5  /  DBili  x   /  AST  49  /  ALT  49  /  AlkPhos  211  [04-29-23 @ 02:50]    PT/INR: PT 14.1 , INR 1.21       [04-29-23 @ 02:50]  PTT: 28.4       [04-29-23 @ 02:50]    Creatinine Trend:  SCr 3.18 [04-29 @ 02:50]  SCr 2.05 [04-28 @ 01:25]  SCr 1.79 [04-27 @ 00:51]  SCr 1.42 [04-26 @ 00:57]  SCr 0.93 [04-25 @ 02:04]    Lipid: chol --, TG 74, HDL --, LDL --      [03-24-23 @ 08:47]    HBsAb <3.0      [04-26-23 @ 16:30]  HBcAb Nonreact      [04-26-23 @ 16:30]  HCV 0.22, Nonreact      [04-26-23 @ 16:30]  HIV Nonreact      [03-30-23 @ 20:10]

## 2023-04-30 NOTE — PROGRESS NOTE ADULT - ATTENDING COMMENTS
Patient maintained on tube feeds, tolerated sbt.  N mentating at baseline, pain controlled  resp on mechanical ventilation, adequate gas exchange, sbt  cv hemodynamically stable, on midodrine, off pressors  gi npo, ivf, ngt to suction, postpyloric tube feed at goal, hold npo amn for trach  gu/renal anuric renal failure, plan for hd evaluation with renal, to consider tunneled catheter when afebrile  heme vte ppx  id on ertapenem  endo no changes  plan for trach  ir declined draining at this time    The patient is a critical care patient with life threatening hemodynamic and metabolic instability in SICU.  I have personally interviewed when possible and examined the patient, reviewed data and laboratory tests/x-rays and all pertinent electronic images.  I was physically present for the key portions of the evaluation and management (E/M) service provided.   The SICU team has a constant risk benefit analyzes discussion with the primary team, all consultants, House Staff and PA's on all decisions.  These diagnoses are unrelated to the surgical procedure noted above.  I meet with family if needed to get further history, discuss the case and make care decisions for this patient who might not be able to participate.  Time involved in performance of separately billable procedures was not counted toward my critical care time. There is no overlap.  I spent 55-75 minutes ( 0800Hrs-0915Hrs in AM/ 1600Hrs-1715Hrs in PM, or other time indicated) of critical care time for the diagnoses, assessment, plan and interventions.  This time excludes time spent on separate procedures and teaching.

## 2023-04-30 NOTE — PROGRESS NOTE ADULT - SUBJECTIVE AND OBJECTIVE BOX
[facility] DIVISION OF KIDNEY DISEASES AND HYPERTENSION -- FOLLOW UP NOTE  --------------------------------------------------------------------------------  Chief Complaint: Oliguric CHATO, on RRT    24 hour events/subjective: Pt. seen and examined in the SICU today. Pt currently intubated and on [facility] vent. Unable to obtain ROS. Last HD done on 4/29.     PAST HISTORY  --------------------------------------------------------------------------------  No significant changes to PMH, PSH, FHx, SHx, unless otherwise noted    ALLERGIES & MEDICATIONS  --------------------------------------------------------------------------------  Allergies    No Known Allergies    Intolerances    Standing Inpatient Medications  chlorhexidine 0.12% Liquid 15 milliLiter(s) Oral Mucosa every 12 hours  chlorhexidine 2% Cloths 1 Application(s) Topical daily  ertapenem  IVPB 1000 milliGRAM(s) IV Intermittent every 24 hours  gabapentin Solution 100 milliGRAM(s) Oral three times a day  heparin   Injectable 5000 Unit(s) SubCutaneous every 8 hours  insulin glargine Injectable (LANTUS) 14 Unit(s) SubCutaneous at bedtime  insulin lispro (ADMELOG) corrective regimen sliding scale   SubCutaneous every 6 hours  insulin lispro Injectable (ADMELOG) 3 Unit(s) SubCutaneous every 6 hours  levothyroxine Injectable 20 MICROGram(s) IV Push at bedtime  midodrine 40 milliGRAM(s) Oral every 8 hours  multivitamin/minerals/iron Oral Solution (CENTRUM) 15 milliLiter(s) Oral daily  pancrelipase  (CREON 12,000 Lipase Units) 1 Capsule(s) Oral <User Schedule>  pantoprazole   Suspension 40 milliGRAM(s) Oral daily  polyethylene glycol 3350 17 Gram(s) Oral daily    PRN Inpatient Medications  acetaminophen   Oral Liquid .. 500 milliGRAM(s) Oral every 6 hours PRN    REVIEW OF SYSTEMS  --------------------------------------------------------------------------------  Unable to obtain ROS     VITALS/PHYSICAL EXAM  --------------------------------------------------------------------------------  T(C): 37.6 (04-30-23 @ 04:00), Max: 38.6 (04-29-23 @ 18:00)  HR: 92 (04-30-23 @ 08:00) (81 - 108)  BP: --  RR: 17 (04-30-23 @ 08:00) (13 - 25)  SpO2: 100% (04-30-23 @ 08:00) (99% - 100%)  Wt(kg): --    04-29-23 @ 07:01  -  04-30-23 @ 07:00  --------------------------------------------------------  IN: 1865 mL / OUT: 2400 mL / NET: -535 mL    04-30-23 @ 07:01  -  04-30-23 @ 08:42  --------------------------------------------------------  IN: 45 mL / OUT: 0 mL / NET: 45 mL    Physical Exam:  	Gen: Appears critically ill  	HEENT: NGT+, ETT+  	Pulm: Fair entry B/L  	CV: S1S2+  	Abd: Obese, soft; nontender  	Ext: edematous b/l but not pitting  	Neuro: arousable  	Skin: Warm and dry              Dialysis access: Right IJ non tunneled HD catheter    LABS/STUDIES  --------------------------------------------------------------------------------              8.0    11.37 >-----------<  259      [04-30-23 @ 01:00]              23.9     141  |  103  |  43  ----------------------------<  125      [04-30-23 @ 01:00]  3.3   |  18  |  3.00        Ca     8.4     [04-30-23 @ 01:00]      Mg     2.30     [04-30-23 @ 01:00]      Phos  3.8     [04-30-23 @ 01:00]    TPro  7.1  /  Alb  2.6  /  TBili  3.0  /  DBili  x   /  AST  39  /  ALT  40  /  AlkPhos  234  [04-30-23 @ 01:00]    PT/INR: PT 13.1 , INR 1.13       [04-30-23 @ 01:00]  PTT: 26.0       [04-30-23 @ 01:00]    Creatinine Trend:  SCr 3.00 [04-30 @ 01:00]  SCr 2.54 [04-29 @ 17:35]  SCr 3.18 [04-29 @ 02:50]  SCr 2.05 [04-28 @ 01:25]  SCr 1.79 [04-27 @ 00:51]    Lipid: chol --, TG 74, HDL --, LDL --      [03-24-23 @ 08:47]    HBsAb <3.0      [04-26-23 @ 16:30]  HBcAb Nonreact      [04-26-23 @ 16:30]  HCV 0.22, Nonreact      [04-26-23 @ 16:30]

## 2023-04-30 NOTE — PROGRESS NOTE ADULT - ASSESSMENT
48 year old female with a PMHx of HTN, hypothyroidism,  (30 years ago) and breast cancer (S/P left mastectomy and chemo in ) who presented with epigastric pain and left upper quadrant pain.  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  Transferred from OSH ([facility]) for hypotension and tachypnea requiring emergent intubation and multiple pressors.  SICU consulted for hemodynamic monitoring and respiratory management.    Plan:   - Diet: TF at goal  - NGT to LCWS  - tolerating HD  - on ertapenem  - SBT trials not being tolerated, poss trach once patient more stable  - CT abd pelvis show some walled off areas of collection but not fully incased  - GI team not able to offer axios stent until collection more walled off  - IR team not offering drainage at this time until more walled off collection  - Pre-op for tracheostomy tomorrow, consent in chart  - Appreciate care per SICU      B Team Surgery  [id number]

## 2023-04-30 NOTE — PROGRESS NOTE ADULT - SUBJECTIVE AND OBJECTIVE BOX
Surgery Progress Note    Overnight Events: No acute events overnight.  SUBJECTIVE: Patient seen and examined at bedside with surgical team, patient without complaints. Denies fever, chills, CP, SOB nausea, vomiting, dizziness.      OBJECTIVE:    Vital Signs Last 24 Hrs  T(C): 37.4 (30 Apr 2023 08:00), Max: 38.6 (29 Apr 2023 18:00)  T(F): 99.4 (30 Apr 2023 08:00), Max: 101.5 (29 Apr 2023 18:00)  HR: 94 (30 Apr 2023 09:00) (81 - 108)  BP: --  BP(mean): --  RR: 17 (30 Apr 2023 09:00) (13 - 23)  SpO2: 100% (30 Apr 2023 09:00) (99% - 100%)    Parameters below as of 30 Apr 2023 08:00  Patient On (Oxygen Delivery Method): ventilator    O2 Concentration (%): 40I&O's Detail    29 Apr 2023 07:01  -  30 Apr 2023 07:00  --------------------------------------------------------  IN:    Enteral Tube Flush: 270 mL    IV PiggyBack: 50 mL    Other (mL): 600 mL    Peptamen A.F.: 945 mL  Total IN: 1865 mL    OUT:    Nasogastric/Oral tube (mL): 400 mL    Other (mL): 1100 mL    Rectal Tube (mL): 900 mL  Total OUT: 2400 mL    Total NET: -535 mL      30 Apr 2023 07:01  -  30 Apr 2023 09:22  --------------------------------------------------------  IN:    Peptamen A.F.: 90 mL  Total IN: 90 mL    OUT:  Total OUT: 0 mL    Total NET: 90 mL      MEDICATIONS  (STANDING):  chlorhexidine 0.12% Liquid 15 milliLiter(s) Oral Mucosa every 12 hours  chlorhexidine 2% Cloths 1 Application(s) Topical daily  ertapenem  IVPB 1000 milliGRAM(s) IV Intermittent every 24 hours  gabapentin Solution 100 milliGRAM(s) Oral three times a day  heparin   Injectable 5000 Unit(s) SubCutaneous every 8 hours  insulin glargine Injectable (LANTUS) 14 Unit(s) SubCutaneous at bedtime  insulin lispro (ADMELOG) corrective regimen sliding scale   SubCutaneous every 6 hours  insulin lispro Injectable (ADMELOG) 3 Unit(s) SubCutaneous every 6 hours  levothyroxine Injectable 20 MICROGram(s) IV Push at bedtime  midodrine 40 milliGRAM(s) Oral every 8 hours  multivitamin/minerals/iron Oral Solution (CENTRUM) 15 milliLiter(s) Oral daily  pancrelipase  (CREON 12,000 Lipase Units) 1 Capsule(s) Oral <User Schedule>  pantoprazole   Suspension 40 milliGRAM(s) Oral daily  polyethylene glycol 3350 17 Gram(s) Oral daily    MEDICATIONS  (PRN):  acetaminophen   Oral Liquid .. 500 milliGRAM(s) Oral every 6 hours PRN Temp greater or equal to 38.5C (101.3F)      PHYSICAL EXAM:  Constitutional: NAD, intubated, off sedation but lethargic   Respiratory: mechanically ventilated  Abdomen: Soft, moderate distention, unable to assess pain response d/t poor mental status NTTP. No rebound or guarding.  Extremities: WWP, LEE spontaneously    LABS:                        8.0    11.37 )-----------( 259      ( 30 Apr 2023 01:00 )             23.9     04-30    141  |  103  |  43<H>  ----------------------------<  125<H>  3.3<L>   |  18<L>  |  3.00<H>    Ca    8.4      30 Apr 2023 01:00  Phos  3.8     04-30  Mg     2.30     04-30    TPro  7.1  /  Alb  2.6<L>  /  TBili  3.0<H>  /  DBili  x   /  AST  39<H>  /  ALT  40<H>  /  AlkPhos  234<H>  04-30    PT/INR - ( 30 Apr 2023 01:00 )   PT: 13.1 sec;   INR: 1.13 ratio         PTT - ( 30 Apr 2023 01:00 )  PTT:26.0 sec  LIVER FUNCTIONS - ( 30 Apr 2023 01:00 )  Alb: 2.6 g/dL / Pro: 7.1 g/dL / ALK PHOS: 234 U/L / ALT: 40 U/L / AST: 39 U/L / GGT: x               IMAGING: Surgery Progress Note    Overnight Events: No acute events overnight.  SUBJECTIVE: Patient seen and examined at bedside with surgical team, intubated, off sedation. Unable to respond to questions.       OBJECTIVE:    Vital Signs Last 24 Hrs  T(C): 37.4 (30 Apr 2023 08:00), Max: 38.6 (29 Apr 2023 18:00)  T(F): 99.4 (30 Apr 2023 08:00), Max: 101.5 (29 Apr 2023 18:00)  HR: 94 (30 Apr 2023 09:00) (81 - 108)  BP: --  BP(mean): --  RR: 17 (30 Apr 2023 09:00) (13 - 23)  SpO2: 100% (30 Apr 2023 09:00) (99% - 100%)    Parameters below as of 30 Apr 2023 08:00  Patient On (Oxygen Delivery Method): ventilator    O2 Concentration (%): 40I&O's Detail    29 Apr 2023 07:01  -  30 Apr 2023 07:00  --------------------------------------------------------  IN:    Enteral Tube Flush: 270 mL    IV PiggyBack: 50 mL    Other (mL): 600 mL    Peptamen A.F.: 945 mL  Total IN: 1865 mL    OUT:    Nasogastric/Oral tube (mL): 400 mL    Other (mL): 1100 mL    Rectal Tube (mL): 900 mL  Total OUT: 2400 mL    Total NET: -535 mL      30 Apr 2023 07:01  -  30 Apr 2023 09:22  --------------------------------------------------------  IN:    Peptamen A.F.: 90 mL  Total IN: 90 mL    OUT:  Total OUT: 0 mL    Total NET: 90 mL      MEDICATIONS  (STANDING):  chlorhexidine 0.12% Liquid 15 milliLiter(s) Oral Mucosa every 12 hours  chlorhexidine 2% Cloths 1 Application(s) Topical daily  ertapenem  IVPB 1000 milliGRAM(s) IV Intermittent every 24 hours  gabapentin Solution 100 milliGRAM(s) Oral three times a day  heparin   Injectable 5000 Unit(s) SubCutaneous every 8 hours  insulin glargine Injectable (LANTUS) 14 Unit(s) SubCutaneous at bedtime  insulin lispro (ADMELOG) corrective regimen sliding scale   SubCutaneous every 6 hours  insulin lispro Injectable (ADMELOG) 3 Unit(s) SubCutaneous every 6 hours  levothyroxine Injectable 20 MICROGram(s) IV Push at bedtime  midodrine 40 milliGRAM(s) Oral every 8 hours  multivitamin/minerals/iron Oral Solution (CENTRUM) 15 milliLiter(s) Oral daily  pancrelipase  (CREON 12,000 Lipase Units) 1 Capsule(s) Oral <User Schedule>  pantoprazole   Suspension 40 milliGRAM(s) Oral daily  polyethylene glycol 3350 17 Gram(s) Oral daily    MEDICATIONS  (PRN):  acetaminophen   Oral Liquid .. 500 milliGRAM(s) Oral every 6 hours PRN Temp greater or equal to 38.5C (101.3F)      PHYSICAL EXAM:  Constitutional: NAD, intubated, off sedation  Respiratory: mechanically ventilated  Abdomen: Soft, moderate distention, unable to assess pain response d/t poor mental status NTTP. No rebound or guarding.  Extremities: WWP, LEE spontaneously  Neuro: Able to follow commands (squeeze fingers) on left side but unable on right side     LABS:                        8.0    11.37 )-----------( 259      ( 30 Apr 2023 01:00 )             23.9     04-30    141  |  103  |  43<H>  ----------------------------<  125<H>  3.3<L>   |  18<L>  |  3.00<H>    Ca    8.4      30 Apr 2023 01:00  Phos  3.8     04-30  Mg     2.30     04-30    TPro  7.1  /  Alb  2.6<L>  /  TBili  3.0<H>  /  DBili  x   /  AST  39<H>  /  ALT  40<H>  /  AlkPhos  234<H>  04-30    PT/INR - ( 30 Apr 2023 01:00 )   PT: 13.1 sec;   INR: 1.13 ratio         PTT - ( 30 Apr 2023 01:00 )  PTT:26.0 sec  LIVER FUNCTIONS - ( 30 Apr 2023 01:00 )  Alb: 2.6 g/dL / Pro: 7.1 g/dL / ALK PHOS: 234 U/L / ALT: 40 U/L / AST: 39 U/L / GGT: x               IMAGING:

## 2023-04-30 NOTE — PROGRESS NOTE ADULT - ASSESSMENT
48 year old female with a PMHx of HTN, hypothyroidism,  (30 years ago) and breast cancer (S/P left mastectomy and chemo in ) who presented with epigastric pain and left upper quadrant pain.  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  Transferred from OSH ([place]) for hypotension and tachypnea requiring emergent intubation and multiple pressors.  SICU consulted for hemodynamic monitoring and respiratory management.  Intubated, pressors, CRRT, now with loculated abdominal collections    PLAN  Neuro:   - no sedation  - Tylenol     Resp:  - intubated  for AMS 2/2 to airway protection, hypercarbic resp failure  - 68/500/  - SBT daily  - Trach Monday    CV:  Mixed shock state 2/2 septic shock w/ E. Coli in urine vs hypovolemic shock now s/p 11L resuscitation   - Midodrine 40 q 8 hours  - phenylephrine titrate as tolerated    GI:   - TF   - Ulcer ppx w/ Protonix   - Creon  - repeat CT A/P , GI recs: collections not mature enough for axios stent   - repeat CT A/P : incomplete wall off collection     Renal:  - ARF 2/2 shock state resulting in ATN requiring CRRT, started on iHD   - Failed lasix challenge    - daily bladder scans  - HD today    Heme:   - SCDs and SQH  - HIT Ab positive; DOUGLAS Negative    ID:   Intermittently febrile w/ leukocytosis c/f septic shock 2/2 E coli UTI  Diagnostics:  - 3/22 Ucx: E coli  - 3/30 Bcx: staph hominis in aerobic bottle x 1 (likely contaminant)   - S/p Meropenem (3/26-, -)  - Ertapenem ( -     Endo:   - taper stress steroids 25 qd  - Lantus 14, admelog held while npo for OR   - c/w synthroid 20 IV (home med)   - ISS    Disposition: SICU 48 year old female with a PMHx of HTN, hypothyroidism,  (30 years ago) and breast cancer (S/P left mastectomy and chemo in ) who presented with epigastric pain and left upper quadrant pain.  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  Transferred from OSH ([place]) for hypotension and tachypnea requiring emergent intubation and multiple pressors.  SICU consulted for hemodynamic monitoring and respiratory management.  Intubated, pressors, CRRT, now with loculated abdominal collections    PLAN  Neuro:   - no sedation  - Tylenol for pain control     Resp:  - intubated  for AMS 2/2 to airway protection, hypercarbic resp failure  - /  - SBT daily  - B Team to Trach Monday, will add on     CV:  Mixed shock state 2/2 septic shock w/ E. Coli in urine vs hypovolemic shock now s/p 11L resuscitation   - Midodrine 40 q 8 hours    GI:   - TF, npo at [facility] for OR tomorrow   - Ulcer ppx w/ Protonix qd  - Creon  - repeat CT A/P , GI recs: collections not mature enough for axios stent   - repeat CT A/P : incomplete wall off collection, IR unable to drain at this time, GI unable to place stent     Renal:  - ARF 2/2 shock state resulting in ATN requiring CRRT, started on iHD   - Failed lasix challenge    - daily bladder scans  - HD per renal     Heme:   - SCDs and SQH  - HIT Ab positive; DOUGLAS Negative    ID:   Intermittently febrile w/ leukocytosis c/f septic shock 2/2 E coli UTI  Diagnostics:  - 3/22 Ucx: E coli  - 3/30 Bcx: staph hominis in aerobic bottle x 1 (likely contaminant)   - S/p Meropenem (3/26-, -)  - Ertapenem ( -     Endo:   - Lantus 14, admelog to be held while npo for OR   - c/w synthroid 20 IV (home med)   - ISS    Disposition: SICU

## 2023-04-30 NOTE — PROGRESS NOTE ADULT - SUBJECTIVE AND OBJECTIVE BOX
SICU Daily Progress Note  =====================================================  Interval/Overnight Events:     - NAEON    ALLERGIES:  No Known Allergies    --------------------------------------------------------------------------------------    MEDICATIONS:    Neurologic Medications  acetaminophen   Oral Liquid .. 500 milliGRAM(s) Oral every 6 hours PRN Temp greater or equal to 38.5C (101.3F)  gabapentin Solution 100 milliGRAM(s) Oral three times a day    Cardiovascular Medications  midodrine 40 milliGRAM(s) Oral every 8 hours    Gastrointestinal Medications  multivitamin/minerals/iron Oral Solution (CENTRUM) 15 milliLiter(s) Oral daily  pancrelipase  (CREON 12,000 Lipase Units) 1 Capsule(s) Oral <User Schedule>  pantoprazole   Suspension 40 milliGRAM(s) Oral daily  polyethylene glycol 3350 17 Gram(s) Oral daily    Hematologic/Oncologic Medications  heparin   Injectable 5000 Unit(s) SubCutaneous every 8 hours    Antimicrobial/Immunologic Medications  ertapenem  IVPB 1000 milliGRAM(s) IV Intermittent every 24 hours    Endocrine/Metabolic Medications  insulin glargine Injectable (LANTUS) 14 Unit(s) SubCutaneous at bedtime  insulin lispro (ADMELOG) corrective regimen sliding scale   SubCutaneous every 6 hours  insulin lispro Injectable (ADMELOG) 3 Unit(s) SubCutaneous every 6 hours  levothyroxine Injectable 20 MICROGram(s) IV Push at bedtime    Topical/Other Medications  chlorhexidine 0.12% Liquid 15 milliLiter(s) Oral Mucosa every 12 hours  chlorhexidine 2% Cloths 1 Application(s) Topical daily    --------------------------------------------------------------------------------------    VITAL SIGNS:  T(C): 38.4 (04-30-23 @ 00:00), Max: 38.6 (04-29-23 @ 18:00)  HR: 88 (04-30-23 @ 00:00) (81 - 108)  BP: --  RR: 16 (04-30-23 @ 00:00) (13 - 25)  SpO2: 100% (04-30-23 @ 00:00) (100% - 100%)  --------------------------------------------------------------------------------------    INS AND OUTS:    04-28-23 @ 07:01  -  04-29-23 @ 07:00  --------------------------------------------------------  IN: 1051.6 mL / OUT: 800 mL / NET: 251.6 mL    04-29-23 @ 07:01  -  04-30-23 @ 02:11  --------------------------------------------------------  IN: 1550 mL / OUT: 1800 mL / NET: -250 mL      --------------------------------------------------------------------------------------    EXAM    NEURO: NAD, resting comfortably, intubated  HEENT: NC/AT  RESPIRATORY: nonlabored respirations, normal CW expansion, intubated, mechanically ventilated.  CARDIO: RRR, S1S2  ABDOMEN: soft, nontender, distended, +/NGT with bilious output, tolerating post pyloric tube feeds  EXTREMITIES: normal strength, no deformities    --------------------------------------------------------------------------------------

## 2023-04-30 NOTE — PROGRESS NOTE ADULT - ATTENDING COMMENTS
The patient was seen and examined, chart and notes reviewed.  The current diagnosis, plan of care and alternatives have been discussed with the patient.  All questions have been answered and updates have been discussed.  The case was discussed with B/SICU  team residents/PA's and medical students at morning B/SICU surgical rounds and throughout the course of the day.    Severe sepsis secondary to infected WON/pseudocyst  a.  Febrile overnight  b.  On Ertapenem  c.  IR input appreciated  d.  WBC improved to 11    Respiratory insufficiency  a.  Patient remains MV dependent  b.  With adequate gas exchange  c.  Failed daily SBT  d.  Plan for tracheostomy    Anemia secondary to chronic illness  a.  S/P 1 unit pRBC  b.  With adequate response    CHATO  a.  Remains anuric  b.  Requiring RRT  c.  Trend CMP    Malnutrition  a.  Remains NPO  b.  On TF at goal via LEONIDAS   c.   NGT for decompression

## 2023-04-30 NOTE — PROGRESS NOTE ADULT - PROBLEM SELECTOR PLAN 1
Pt. with oliguric CHATO in the setting of necrotizing pancreatitis and shock. Pt. with most likely ATN. Scr was 1.24 on 3/23/23 and increased to 4.60 on 3/24/23. Pt. was initiated on CRRT/CVVHDF on 3/24/23-4/6/23.  Pt. remained oliguric with hypotension so restarted on CVVHDH on 4/7/23-4/17/23 and resumed 4/18 as patient remained hypotensive and oliguric. CRRT held by SICU on 4/21 but resumed on 4/22 after failed lasix challenge. CRRT clotted on 4/25 and kept off. Pt was transitioned to iHD on 4/26, Last HD done on 4/29. Labs reviewed. No urgent indication for HD today. Will assess need for HD daily. Pt remains critically ill. Remains on Midodrine 40 TID. Blood transfusion per primary team. Replete serum potassium today (3.3). Monitor labs and urine output. Avoid any potential nephrotoxins. Dose medications as per eGFR.    If you have any questions, please feel free to contact me  Howard De La Fuente  Nephrology Fellow  [phone]/ [employer] Teams(Preferred)  (After 5pm or on weekends please page the on-call fellow).

## 2023-05-01 NOTE — CHART NOTE - NSCHARTNOTEFT_GEN_A_CORE
Post Operative Note  Patient: DILLON MORALES 48y (1975) Female   MRN: [id number]  Location: [facility]   Visit: 23 Inpatient  Date: 23 @ 14:13    Procedure: S/P perc trach w/ bronch    Subjective: Patient seen and examined post operatively. Reports pain as controlled. Denies nausea, vomiting, fever, chills, chest pain, SOB, cough.      Objective:  Vitals: T(F): 98.8 (23 @ 12:00), Max: 100 (23 @ 04:00)  HR: 91 (23 @ 13:00)  BP: --  RR: 18 (23 @ 13:00)  SpO2: 100% (23:00)  Vent Settings: Mode: AC/ CMV (Assist Control/ Continuous Mandatory Ventilation), RR (machine): 18, TV (machine): 460, FiO2: 30, PEEP: 5, MAP: 12, PIP: 29    In:   23 @ 07:  -  23 @ 07:00  --------------------------------------------------------  IN: 1070 mL    23 @ 07:  -  23 @ 14:13  --------------------------------------------------------  IN: 190 mL      IV Fluids: dextrose 5%. 1000 milliLiter(s) (100 mL/Hr) IV Continuous <Continuous>  multivitamin/minerals/iron Oral Solution (CENTRUM) 15 milliLiter(s) Oral daily      Out:   23 @ 07:01  -  23 @ 07:00  --------------------------------------------------------  OUT: 650 mL    23 @ 07:  -  23 @ 14:13  --------------------------------------------------------  OUT: 0 mL      EBL:     Voided Urine:   23 @ 07:  -  23 @ 07:00  --------------------------------------------------------  OUT: 650 mL    23 @ :  -  23 @ 14:13  --------------------------------------------------------  OUT: 0 mL   ,   Chest Tube:      NG Tube:   23 @ 07:  -  23 @ 07:00  --------------------------------------------------------  OUT: 100 mL      Physical Examination:  General: NAD, resting comfortably in bed, nodding yes/no to questioning  HEENT: Normocephalic atraumatic  Neck: s/p trach  Respiratory: Nonlabored respirations, normal CW expansion, mechanical ventilation AC  Cardio: S1S2, regular rate and rhythm.  Abdomen: softly distended, nontender, rectal tube with liquid stool, NGT with bilious output, post pyloric NJ tube w/ feeds  Vascular: extremities are warm and well perfused.     Imaging:  No post-op imaging studies    Assessment:  48 year old female with a PMHx of HTN, hypothyroidism,  (30 years ago) and breast cancer (S/P left mastectomy and chemo in ) who presented with epigastric pain and left upper quadrant pain.  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  Transferred from OSH ([place]) for hypotension and tachypnea requiring emergent intubation and multiple pressors.  SICU consulted for hemodynamic monitoring and respiratory management.  Intubated, pressors, CRRT, now with loculated abdominal collections. Now s/p perc trach and bronch    Plan:  - Pain control PRN  - SBT as tolerated  - Care per SICU    B Team Surgery  Date/Time: 23 @ 14:13 Post Operative Note  Patient: DILLON MORALES 48y (1975) Female   MRN: [id number]  Location: [facility]   Visit: 23 Inpatient  Date: 23 @ 14:13    Procedure: S/P perc trach w/ bronch    Subjective: Patient seen and examined post operatively. Limited secondary to patient clinical condition.      Objective:  Vitals: T(F): 98.8 (23 @ 12:00), Max: 100 (23 @ 04:00)  HR: 91 (23 @ 13:00)  BP: --  RR: 18 (23 @ 13:00)  SpO2: 100% (23:00)  Vent Settings: Mode: AC/ CMV (Assist Control/ Continuous Mandatory Ventilation), RR (machine): 18, TV (machine): 460, FiO2: 30, PEEP: 5, MAP: 12, PIP: 29    In:   23 @ 07:  -  23 @ 07:00  --------------------------------------------------------  IN: 1070 mL    23 @ 07:  -  23 @ 14:13  --------------------------------------------------------  IN: 190 mL      IV Fluids: dextrose 5%. 1000 milliLiter(s) (100 mL/Hr) IV Continuous <Continuous>  multivitamin/minerals/iron Oral Solution (CENTRUM) 15 milliLiter(s) Oral daily      Out:   23 @ 07:  -  23 @ 07:00  --------------------------------------------------------  OUT: 650 mL    23 @ 07:  -  23 @ 14:13  --------------------------------------------------------  OUT: 0 mL      EBL:     Voided Urine:   23 @ 07:  -  23 @ 07:00  --------------------------------------------------------  OUT: 650 mL    23 @   -  23 @ 14:13  --------------------------------------------------------  OUT: 0 mL   ,   Chest Tube:      NG Tube:   23 @ 07:  -  23 @ 07:00  --------------------------------------------------------  OUT: 100 mL      Physical Examination:  General: NAD, resting comfortably in bed, nodding yes/no to questioning  HEENT: Normocephalic atraumatic  Neck: s/p trach  Respiratory: Nonlabored respirations, normal CW expansion, mechanical ventilation AC  Cardio: S1S2, regular rate and rhythm.  Abdomen: softly distended, nontender, rectal tube with liquid stool, NGT with bilious output, post pyloric NJ tube w/ feeds  Vascular: extremities are warm and well perfused.     Imaging:  No post-op imaging studies    Assessment:  48 year old female with a PMHx of HTN, hypothyroidism,  (30 years ago) and breast cancer (S/P left mastectomy and chemo in ) who presented with epigastric pain and left upper quadrant pain.  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  Transferred from OSH ([place]) for hypotension and tachypnea requiring emergent intubation and multiple pressors.  SICU consulted for hemodynamic monitoring and respiratory management.  Intubated, pressors, CRRT, now with loculated abdominal collections. Now s/p perc trach and bronch    Plan:  - Pain control PRN  - SBT as tolerated  - Care per SICU    B Team Surgery  Date/Time: 23 @ 14:13 Post Operative Note  Patient: DILLON MORALES 48y (1975) Female   MRN: [id number]  Location: [facility]   Visit: 23 Inpatient  Date: 23 @ 14:13    Procedure: S/P perc trach w/ bronch    Subjective: Patient seen and examined post operatively. Limited secondary to patient clinical condition.      Objective:  Vitals: T(F): 98.8 (23 @ 12:00), Max: 100 (23 @ 04:00)  HR: 91 (23 @ 13:00)  BP: --  RR: 18 (23 @ 13:00)  SpO2: 100% (23:00)  Vent Settings: Mode: AC/ CMV (Assist Control/ Continuous Mandatory Ventilation), RR (machine): 18, TV (machine): 460, FiO2: 30, PEEP: 5, MAP: 12, PIP: 29    In:   23 @ 07:  -  23 @ 07:00  --------------------------------------------------------  IN: 1070 mL    23 @ 07:  -  23 @ 14:13  --------------------------------------------------------  IN: 190 mL      IV Fluids: dextrose 5%. 1000 milliLiter(s) (100 mL/Hr) IV Continuous <Continuous>  multivitamin/minerals/iron Oral Solution (CENTRUM) 15 milliLiter(s) Oral daily      Out:   23 @ 07:  -  23 @ 07:00  --------------------------------------------------------  OUT: 650 mL    23 @ 07:  -  23 @ 14:13  --------------------------------------------------------  OUT: 0 mL      EBL:     Voided Urine:   23 @ 07:  -  23 @ 07:00  --------------------------------------------------------  OUT: 650 mL    23 @   -  23 @ 14:13  --------------------------------------------------------  OUT: 0 mL   ,   Chest Tube:      NG Tube:   23 @ 07:  -  23 @ 07:00  --------------------------------------------------------  OUT: 100 mL      Physical Examination:  General: NAD, resting comfortably in bed, nodding yes/no to questioning  HEENT: Normocephalic atraumatic  Neck: s/p trach  Respiratory: Nonlabored respirations, normal CW expansion, mechanical ventilation AC  Cardio: S1S2, regular rate and rhythm.  Abdomen: softly distended, nontender, rectal tube with liquid stool, NGT with bilious output, post pyloric NJ tube w/ feeds  Vascular: extremities are warm and well perfused.     Imaging:  No post-op imaging studies    Assessment:  48 year old female with a PMHx of HTN, hypothyroidism,  (30 years ago) and breast cancer (S/P left mastectomy and chemo in ) who presented with epigastric pain and left upper quadrant pain.  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  Transferred from OSH ([place]) for hypotension and tachypnea requiring emergent intubation and multiple pressors.  SICU consulted for hemodynamic monitoring and respiratory management.  Intubated, pressors, CRRT --> HD, now with loculated abdominal collections. Now s/p perc trach and bronch    Plan:  - Pain control PRN  - SBT as tolerated  - Care per SICU    B Team Surgery  Date/Time: 23 @ 14:13

## 2023-05-01 NOTE — PROCEDURE NOTE - ADDITIONAL PROCEDURE DETAILS
Right abdominal paracentesis performed with sterile ultrasound guidance.  Drainage of malodorous, thick murky fluid, 50cc aspirated for culture, 1500cc collected with interval decrease in size of collection seen on ultrasound.  Catheter removed and site c/d/i.  Tolerated well.
Existing RIJ dual lumen shiley not working, attempted flushing, red port not drawing back.  Asceptic prep of existing catheter in RIJ, full sterile drape, wire exchange for new RIJ dual lumen shiley.  Wire confirmed in RIJ on sterile ultrasound. New catheter both ports flushed, aspirated venous blood easily.  Secured with suture, sterile dressing applied, tolerated well.

## 2023-05-01 NOTE — PROGRESS NOTE ADULT - ASSESSMENT
49yo F with PMH significant for HTN, hypothyroidism, breast cancer (s/p left mastectomy and chemotherapy, 2008) who presented with epigastric pain, found to have severe necrotizing pancreatitis with hospital course ARF related to ATN requiring CRRT  in addition to mixed shock state 2/2 septic shock w/ E. Coli in urine GI consulted for possible drainage of pancreatic collections with pt currently on Meropenem.     # Severe necrotizing pancreatitis   #Splenic Vein Thrombosis  Presenting with multiorgan failure, requiring pressor support with overall etiology and acute trigger remains unknown. Pt with serial CT last done on 4/22/2023 with extensive necrotizing pancreatitis with acute necrotic collections, one of which is slightly increased in size (Lionel grade E with CT severity index of 10). Will review imaging with advanced endoscopy attending to asses if any collections are walled off and amenable to drainage.     Recommendations:  - Necrosis is semi-walled off, not completely, thus not amenable to drainage at this time  - No plans for endoscopic intervention at this time; reasonable to consider when the necrosis is walled off  - Trend CMP, CBC, coags      Preliminary note until signed by Attending.    Thank you for involving us in this patient's care. Please reach out if any further questions or concerns. Signing off.    Nasreen Cortez MD  Gastroenterology/Hepatology Fellow, PGY-VI    NON-URGENT CONSULTS:  Please email [email or web address]  [email or web address]  AT NIGHT AND ON WEEKENDS:  Contact on-call GI fellow via answering service ([phone]) from 5pm-8am and on weekends/holidays  MONDAY-FRIDAY 8AM-5PM:  Pager# [phone] ([facility])

## 2023-05-01 NOTE — PROGRESS NOTE ADULT - SUBJECTIVE AND OBJECTIVE BOX
SICU DAILY PROGRESS NOTE    24 HOUR EVENTS:  - perc trach  - bedside pancreatic collection drainage    NEURO  Meds:    RESPIRATORY  Mechanical Ventilation: HR: 89 (05-01-23 @ 18:00) (78 - 104)  BP: --  BP(mean): --  ABP: 107/61 (05-01-23 @ 18:00) (101/62 - 137/87)  ABP(mean): 81 (05-01-23 @ 18:00) (79 - 108)  Wt(kg): --  CVP(cm H2O): --  ABG - ( 01 May 2023 01:30 )  pH: 7.43  /  pCO2: 30    /  pO2: 156   / HCO3: 20    / Base Excess: -3.8  /  SaO2: 98.8    Lactate: x                Meds:    CARDIOVASCULAR    Cardiac Rhythm:  Meds:midodrine 40 milliGRAM(s) Oral every 8 hours      GI/NUTRITION  Diet:  Meds:pancrelipase  (CREON 12,000 Lipase Units) 1 Capsule(s) Oral <User Schedule>  pantoprazole   Suspension 40 milliGRAM(s) Oral daily  polyethylene glycol 3350 17 Gram(s) Oral daily      GENITOURINARY/RENAL  Meds:  04-30 @ 07:01  -  05-01 @ 07:00  --------------------------------------------------------  IN:    IV PiggyBack: 350 mL    Peptamen A.F.: 720 mL  Total IN: 1070 mL    OUT:    Nasogastric/Oral tube (mL): 100 mL    Rectal Tube (mL): 550 mL  Total OUT: 650 mL    Total NET: 420 mL      05-01 @ 07:01  -  05-01 @ 17:58  --------------------------------------------------------  IN:    Enteral Tube Flush: 50 mL    IV PiggyBack: 50 mL    Lactated Ringers Bolus: 1000 mL    Peptamen A.F.: 315 mL  Total IN: 1415 mL    OUT:    Drain (mL): 1500 mL  Total OUT: 1500 mL    Total NET: -85 mL          dextrose 5%. 1000 milliLiter(s) IV Continuous <Continuous>  multivitamin/minerals/iron Oral Solution (CENTRUM) 15 milliLiter(s) Oral daily    05-01    140  |  103  |  56<H>  ----------------------------<  66<L>  3.5   |  18<L>  |  3.93<H>    Ca    8.7      01 May 2023 06:00  Phos  4.2     05-01  Mg     2.50     05-01    TPro  7.1  /  Alb  2.6<L>  /  TBili  3.0<H>  /  DBili  x   /  AST  39<H>  /  ALT  40<H>  /  AlkPhos  234<H>  04-30    [ ] Babin catheter, indication: urine output monitoring in critically ill patient    HEMATOLOGIC  Meds: heparin   Injectable 5000 Unit(s) SubCutaneous every 8 hours    [x] DVT Prophylaxis                        8.2    12.12 )-----------( 270      ( 01 May 2023 01:30 )             23.7     PT/INR - ( 01 May 2023 01:30 )   PT: 14.0 sec;   INR: 1.20 ratio         PTT - ( 01 May 2023 01:30 )  PTT:26.1 sec  Transfusion     [ ] PRBC   [ ] Platelets   [ ] FFP   [ ] Cryoprecipitate    INFECTIOUS DISEASES  T(C): 37.2 (05-01-23 @ 16:00), Max: 37.8 (05-01-23 @ 04:00)  Wt(kg): --  WBC Count: 12.12 K/uL (05-01 @ 01:30)    Recent Cultures:  Specimen Source: .Blood Blood-Peripheral, 04-27 @ 12:35; Results   No growth to date.; Gram Stain: --; Organism: --  Specimen Source: .Blood Blood-Peripheral, 04-27 @ 12:00; Results   No growth to date.; Gram Stain:   No polymorphonuclear leukocytes per low power field  No Squamous epithelial cells per low power field  Few Yeast per oil power field; Organism: --    Meds: ertapenem  IVPB 500 milliGRAM(s) IV Intermittent every 24 hours      ENDOCRINE  Capillary Blood Glucose    Meds: glucagon  Injectable 1 milliGRAM(s) IntraMuscular once  insulin lispro (ADMELOG) corrective regimen sliding scale   SubCutaneous every 6 hours  insulin lispro Injectable (ADMELOG) 3 Unit(s) SubCutaneous every 6 hours  levothyroxine Injectable 20 MICROGram(s) IV Push at bedtime      OTHER MEDICATIONS:  chlorhexidine 0.12% Liquid 15 milliLiter(s) Oral Mucosa every 12 hours  chlorhexidine 2% Cloths 1 Application(s) Topical daily      PHYSICAL EXAM:  NEURO: NAD, resting comfortably, intubated  HEENT: NC/AT  RESPIRATORY: nonlabored respirations, normal CW expansion, intubated, mechanically ventilated.  CARDIO: RRR, S1S2  ABDOMEN: soft, nontender, distended, +/NGT with bilious output, tolerating post pyloric tube feeds  EXTREMITIES: normal strength, no deformities    ACCESS DEVICES:  [x] Peripheral IV  [x] Central Venous Line - juan a	[ ] R	[x] L	[x] IJ	[ ] Fem	[ ] SC	Placed:   [x] Arterial Line		[x] R	[ ] L	[ ] Fem	[ ] Rad	[x] Ax	Placed:   [ ] PICC:					[ ] Mediport  [ ] Urinary Catheter, Date Placed:   [ ] Necessity of urinary, arterial, and venous catheters discussed

## 2023-05-01 NOTE — PHARMACOTHERAPY INTERVENTION NOTE - COMMENTS
48 F with necrotizing pnacreatitis on ertapenem.  Order dropped off over the weekend, but re-ordered this morning as 1 gram IV q24h.  Pt no longer receiving CRRT.  Pt is on HD  (last session on 4/29).    Recommendation(s):  1) Suggest decreasing ertapenem to 500 mg IV q24h.    Sophia Heath, PharmD, BCIDP  Clinical Pharmacy Specialist, Infectious Diseases  Spectra extension [phone]  .

## 2023-05-01 NOTE — PROGRESS NOTE ADULT - ASSESSMENT
48 year old female with a PMHx of HTN, hypothyroidism,  (30 years ago) and breast cancer (S/P left mastectomy and chemo in ) who presented with epigastric pain and left upper quadrant pain.  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  Transferred from OSH ([place]) for hypotension and tachypnea requiring emergent intubation and multiple pressors.  SICU consulted for hemodynamic monitoring and respiratory management.  Intubated, pressors, CRRT, now with loculated abdominal collections    PLAN  Neuro:   - no sedation  - Tylenol for pain control     Resp:  - intubated  for AMS 2/2 to airway protection, hypercarbic resp failure  - /  - SBT daily  - added on for OR trach today     CV:  Mixed shock state 2/2 septic shock w/ E. Coli in urine vs hypovolemic shock now s/p 11L resuscitation   - Midodrine 40 q 8 hours    GI:   - TF, npo at [facility] for OR tomorrow   - Ulcer ppx w/ Protonix qd  - Creon  - repeat CT A/P , GI recs: collections not mature enough for axios stent   - repeat CT A/P : incomplete wall off collection, IR unable to drain at this time, GI unable to place stent     Renal:  - ARF 2/2 shock state resulting in ATN requiring CRRT, started on iHD   - Failed lasix challenge    - daily bladder scans  - HD per renal, last HD ; -500     Heme:   - SCDs and SQH  - HIT Ab positive; DOUGLAS Negative    ID:   Intermittently febrile w/ leukocytosis c/f septic shock 2/2 E coli UTI  Diagnostics:  - 3/22 Ucx: E coli  - 3/30 Bcx: staph hominis in aerobic bottle x 1 (likely contaminant)   - S/p Meropenem (3/26-, -)  - Ertapenem ( -     Endo:   - Lantus 14, admelog to be held while npo for OR   - c/w synthroid 20 IV (home med)   - ISS    Disposition: SICU 48 year old female with a PMHx of HTN, hypothyroidism,  (30 years ago) and breast cancer (S/P left mastectomy and chemo in ) who presented with epigastric pain and left upper quadrant pain.  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  Transferred from OSH ([place]) for hypotension and tachypnea requiring emergent intubation and multiple pressors.  SICU consulted for hemodynamic monitoring and respiratory management.  Intubated, pressors, CRRT, now with loculated abdominal collections going for Trach today    PLAN  Neuro:   - no sedation  - Tylenol for pain control     Resp:  - intubated  for AMS 2/2 to airway protection, hypercarbic resp failure  - /  - SBT daily  - added on for OR trach today     CV:  Mixed shock state 2/2 septic shock w/ E. Coli in urine vs hypovolemic shock now s/p 11L resuscitation   - Midodrine 40 q 8 hours    GI:   - TF, NPO for OR today  - Ulcer ppx w/ Protonix qd  - Creon  - repeat CT A/P , GI recs: collections not mature enough for axios stent   - repeat CT A/P : incomplete wall off collection, IR unable to drain at this time, GI unable to place stent     Renal:  - ARF 2/2 shock state resulting in ATN requiring CRRT, started on iHD   - Failed lasix challenge    - daily bladder scans  - HD per renal, last HD ; -500     Heme:   - SCDs and SQH  - HIT Ab positive; DOUGLAS Negative    ID:   Intermittently febrile w/ leukocytosis c/f septic shock 2/2 E coli UTI  Diagnostics:  - 3/22 Ucx: E coli  - 3/30 Bcx: staph hominis in aerobic bottle x 1 (likely contaminant)   - S/p Meropenem (3/26-, -)  - Ertapenem ( -    Endo:   - Lantus 14, admelog to be held while npo for OR   - c/w synthroid 20 IV (home med)   - ISS    Disposition: SICU

## 2023-05-01 NOTE — PROGRESS NOTE ADULT - ASSESSMENT
48 year old female with a PMHx of HTN, hypothyroidism,  (30 years ago) and breast cancer (S/P left mastectomy and chemo in ) who presented with epigastric pain and left upper quadrant pain.  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  Transferred from OSH ([place]) for hypotension and tachypnea requiring emergent intubation and multiple pressors.  SICU consulted for hemodynamic monitoring and respiratory management.    Plan:   - Diet: TF at goal, held for OR today  - NGT to LCWS  - tolerating HD  - on ertapenem  - trach today  - GI team not able to offer axios stent until collection more walled off  - IR team not offering drainage at this time until more walled off collection  - Appreciate care per SICU      B Team Surgery  [id number]

## 2023-05-01 NOTE — PROGRESS NOTE ADULT - PROBLEM SELECTOR PLAN 1
Pt. with oliguric CHATO in the setting of necrotizing pancreatitis and shock. Pt. likely with ATN. Scr was 1.24 on 3/23/23 and increased to 4.60 on 3/24/23. Pt initially required CRRT/CVVHDF, now on intermittent HD. Pt remains oliguric and HD dependant. Last HD treatment done on 4/29. Pt is clinically stable. Labs reviewed. Plan for HD today. Dose meds as per HD. Monitor labs and BP. Pt. with oliguric CHATO in the setting of necrotizing pancreatitis and shock. Pt. with most likely ATN. Pt. received CRRT earlier during current hospital/SICU stay. Pt. currently on intermittent HD. Pt. oliguric, remains dialysis dependant. Last HD treatment done on 4/29/23. Pt. scheduled for tracheostomy in OR today. Labs reviewed. Plan for HD (after tracheostomy) today. Dose meds as per HD. Monitor labs and BP.

## 2023-05-01 NOTE — PROVIDER CONTACT NOTE (HYPOGLYCEMIA EVENT) - NS PROVIDER CONTACT BACKGROUND-HYPO
Age: 48y    Gender: Female    POCT Blood Glucose:  153 mg/dL (05-01-23 @ 12:23)  130 mg/dL (05-01-23 @ 12:09)  55 mg/dL (05-01-23 @ 11:48)  79 mg/dL (05-01-23 @ 05:40)  110 mg/dL (04-30-23 @ 23:44)  116 mg/dL (04-30-23 @ 17:33)      eMAR:dextrose 50% Injectable   50 milliLiter(s) IV Push (05-01-23 @ 12:00)    insulin glargine Injectable (LANTUS)   8 Unit(s) SubCutaneous (04-30-23 @ 23:46)    insulin lispro Injectable (ADMELOG)   3 Unit(s) SubCutaneous (04-30-23 @ 18:00)    levothyroxine Injectable   20 MICROGram(s) IV Push (04-30-23 @ 21:49)

## 2023-05-01 NOTE — PROGRESS NOTE ADULT - SUBJECTIVE AND OBJECTIVE BOX
SICU Daily Progress Note  =====================================================  Interval/Overnight Events:     - NPO@midnight for trach on 5.1  - 8 units of lantus given due to NPO     ALLERGIES:  No Known Allergies    --------------------------------------------------------------------------------------    MEDICATIONS:    Neurologic Medications  acetaminophen   Oral Liquid .. 500 milliGRAM(s) Oral every 6 hours PRN Temp greater or equal to 38.5C (101.3F)  gabapentin Solution 100 milliGRAM(s) Oral three times a day    Cardiovascular Medications  midodrine 40 milliGRAM(s) Oral every 8 hours    Gastrointestinal Medications  multivitamin/minerals/iron Oral Solution (CENTRUM) 15 milliLiter(s) Oral daily  pancrelipase  (CREON 12,000 Lipase Units) 1 Capsule(s) Oral <User Schedule>  pantoprazole   Suspension 40 milliGRAM(s) Oral daily  polyethylene glycol 3350 17 Gram(s) Oral daily    Hematologic/Oncologic Medications  heparin   Injectable 5000 Unit(s) SubCutaneous every 8 hours    Antimicrobial/Immunologic Medications  ertapenem  IVPB 1000 milliGRAM(s) IV Intermittent every 24 hours    Endocrine/Metabolic Medications  insulin glargine Injectable (LANTUS) 14 Unit(s) SubCutaneous at bedtime  insulin lispro (ADMELOG) corrective regimen sliding scale   SubCutaneous every 6 hours  insulin lispro Injectable (ADMELOG) 3 Unit(s) SubCutaneous every 6 hours  levothyroxine Injectable 20 MICROGram(s) IV Push at bedtime    Topical/Other Medications  chlorhexidine 0.12% Liquid 15 milliLiter(s) Oral Mucosa every 12 hours  chlorhexidine 2% Cloths 1 Application(s) Topical daily    --------------------------------------------------------------------------------------    VITAL SIGNS:  T(C): 38.4 (04-30-23 @ 00:00), Max: 38.6 (04-29-23 @ 18:00)  HR: 88 (04-30-23 @ 00:00) (81 - 108)  BP: --  RR: 16 (04-30-23 @ 00:00) (13 - 25)  SpO2: 100% (04-30-23 @ 00:00) (100% - 100%)  --------------------------------------------------------------------------------------    INS AND OUTS:    04-28-23 @ 07:01  -  04-29-23 @ 07:00  --------------------------------------------------------  IN: 1051.6 mL / OUT: 800 mL / NET: 251.6 mL    04-29-23 @ 07:01  -  04-30-23 @ 02:11  --------------------------------------------------------  IN: 1550 mL / OUT: 1800 mL / NET: -250 mL      --------------------------------------------------------------------------------------    EXAM    NEURO: NAD, resting comfortably, intubated  HEENT: NC/AT  RESPIRATORY: nonlabored respirations, normal CW expansion, intubated, mechanically ventilated.  CARDIO: RRR, S1S2  ABDOMEN: soft, nontender, distended, +/NGT with bilious output, tolerating post pyloric tube feeds  EXTREMITIES: normal strength, no deformities    -------------------------------------------------------------------------------------- SICU Daily Progress Note  =====================================================  Interval/Overnight Events:     - NPO@midnight for trach on 5.1  - 8 units of lantus given due to NPO     ALLERGIES:  No Known Allergies    --------------------------------------------------------------------------------------    MEDICATIONS:    Neurologic Medications  acetaminophen   Oral Liquid .. 500 milliGRAM(s) Oral every 6 hours PRN Temp greater or equal to 38.5C (101.3F)  gabapentin Solution 100 milliGRAM(s) Oral three times a day  Cardiovascular Medications  midodrine 40 milliGRAM(s) Oral every 8 hours  Gastrointestinal Medications  multivitamin/minerals/iron Oral Solution (CENTRUM) 15 milliLiter(s) Oral daily  pancrelipase  (CREON 12,000 Lipase Units) 1 Capsule(s) Oral <User Schedule>  pantoprazole   Suspension 40 milliGRAM(s) Oral daily  polyethylene glycol 3350 17 Gram(s) Oral daily  Hematologic/Oncologic Medications  heparin   Injectable 5000 Unit(s) SubCutaneous every 8 hours  Antimicrobial/Immunologic Medications  ertapenem  IVPB 1000 milliGRAM(s) IV Intermittent every 24 hours  Endocrine/Metabolic Medications  insulin glargine Injectable (LANTUS) 14 Unit(s) SubCutaneous at bedtime  insulin lispro (ADMELOG) corrective regimen sliding scale   SubCutaneous every 6 hours  insulin lispro Injectable (ADMELOG) 3 Unit(s) SubCutaneous every 6 hours  levothyroxine Injectable 20 MICROGram(s) IV Push at bedtime  Topical/Other Medications  chlorhexidine 0.12% Liquid 15 milliLiter(s) Oral Mucosa every 12 hours  chlorhexidine 2% Cloths 1 Application(s) Topical daily    --------------------------------------------------------------------------------------    VITAL SIGNS:  T(C): 38.4 (04-30-23 @ 00:00), Max: 38.6 (04-29-23 @ 18:00)  HR: 88 (04-30-23 @ 00:00) (81 - 108)  BP: --  RR: 16 (04-30-23 @ 00:00) (13 - 25)  SpO2: 100% (04-30-23 @ 00:00) (100% - 100%)  --------------------------------------------------------------------------------------  INS AND OUTS:  04-28-23 @ 07:01  -  04-29-23 @ 07:00  --------------------------------------------------------  IN: 1051.6 mL / OUT: 800 mL / NET: 251.6 mL    04-29-23 @ 07:01  -  04-30-23 @ 02:11  --------------------------------------------------------  IN: 1550 mL / OUT: 1800 mL / NET: -250 mL    --------------------------------------------------------------------------------------    EXAM  NEURO: NAD, resting comfortably, intubated  HEENT: NC/AT  RESPIRATORY: nonlabored respirations, normal CW expansion, intubated, mechanically ventilated.  CARDIO: RRR, S1S2  ABDOMEN: soft, nontender, distended, +/NGT with bilious output, tolerating post pyloric tube feeds  EXTREMITIES: normal strength, no deformities  --------------------------------------------------------------------------------------

## 2023-05-01 NOTE — BRIEF OPERATIVE NOTE - NSICDXBRIEFPROCEDURE_GEN_ALL_CORE_FT
PROCEDURES:  Bronchoscopy, with percutaneous tracheostomy creation 01-May-2023 11:24:19  Sonya Castro

## 2023-05-01 NOTE — PROGRESS NOTE ADULT - ATTENDING COMMENTS
Pt. with oliguric CHATO, currently dialysis dependent. Assessment and plan for CHATO/HD as outlined above. Plan for HD treatment in SICU today. Monitor labs and urine output. Avoid any potential nephrotoxins. Dose medications as per eGFR.

## 2023-05-01 NOTE — PROGRESS NOTE ADULT - ASSESSMENT
48 year old female with a PMHx of HTN, hypothyroidism,  (30 years ago) and breast cancer (S/P left mastectomy and chemo in ) who presented with epigastric pain and left upper quadrant pain.  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  Transferred from OSH ([place]) for hypotension and tachypnea requiring emergent intubation and multiple pressors.  SICU consulted for hemodynamic monitoring and respiratory management.  Intubated, pressors, CRRT, now with loculated abdominal collections going for Trach today    PLAN  Neuro:   - no sedation  - Tylenol for pain control     Resp:  - trach: /  - SBT daily    CV:  Mixed shock state 2/2 septic shock w/ E. Coli in urine vs hypovolemic shock now s/p 11L resuscitation   - Midodrine 40 Q8 hours    GI:   - TF restarted  - Ulcer ppx w/ Protonix qd  - Creon  - repeat CT A/P , GI recs: collections not mature enough for axios stent   - repeat CT A/P : incomplete wall off collection, IR unable to drain at this time, GI unable to place stent     Renal:  - ARF 2/2 shock state resulting in ATN requiring CRRT, started on iHD   - Failed lasix challenge    - daily bladder scans  - HD per renal, last HD ; -500     Heme:   - SCDs and SQH  - HIT Ab positive; DOUGLAS Negative    ID:   Intermittently febrile w/ leukocytosis c/f septic shock 2/2 E coli UTI  Diagnostics:  - 3/22 Ucx: E coli  - 3/30 Bcx: staph hominis in aerobic bottle x 1 (likely contaminant)   - S/p Meropenem (3/26-, -)  - Ertapenem ( -    Endo:   - Lantus 14, admelog  - c/w synthroid 20 IV (home med)   - ISS    Disposition: SICU

## 2023-05-01 NOTE — PROGRESS NOTE ADULT - ATTENDING COMMENTS
I have personally interviewed and examined this patient, reviewed pertinent labs and imaging, and discussed the case with colleagues, residents, and physician assistants on B Team rounds.  More than 50% of this 30 minute encounter including face to face with the patient was spent counseling and/or coordination of care on pancreatitis.  Time included review of vitals, labs, imaging, discussion with consultants and coordination with the operating room/emergency department.    49yo F admitted 3/24 with pancreatitis complicated by ARDS. Pt extubated for a time but reintubated last week. Remains on ertapenem for infected pancreatic fluid collections. Minimal vent settings, ready for tracheostomy. Pt would likely benefit from bedside aspiration of infected pancreatic fluid collections to improve source control and obtain sample for antibiotic targeting.     - NPO  - Tracheostomy in OR today   - possible bedside paracentesis     The active care issues are:  1. pancreatitis   2. respiratory failure     The Acute Care Surgery (B Team) Attending Group Practice:  Dr. Jacklyn Tovar    urgent issues - spectra [phone]  nonurgent issues - [phone]  patient appointments or afterhours - [phone]

## 2023-05-01 NOTE — PROGRESS NOTE ADULT - ATTENDING COMMENTS
I agree with the detailed interval history, physical, and plan, which I have reviewed and edited where appropriate'; also agree with notes/assessment with my team on service.  I have personally examined the patient.  I was physically present for the key portions of the evaluation and management (E/M) service provided.  I reviewed all the pertinent data.  The patient is a critical care patient with life threatening hemodynamic and metabolic instability in SICU.  The SICU team has a constant risk benefit analyzes discussion and coordinating care with the primary team and all consultants.   The patient is in SICU with the chief complaint and diagnosis mentioned in the note.   The plan will be specified in the note.  48 year old female with necrotizing pancreatitis in SICU for hemodynamic monitoring and respiratory management.    PHYSICAL EXAM:  NEURO: Arousable  HEENT: trach  RESPIRATORY: coarse bs  CARDIO: RR  ABDOMEN: soft, nontender, distended,   EXTREMITIES: edema+  PLAN  Neuro:   - Tylenol   Resp:  - trach: 18/460/5/30  - SBT   CV:  - Midodrine 40 Q8 hours  GI:   - TF restarted  -  Protonix   Renal:  - HD   Heme:   - SCDs and SQH  ID:   - Ertapenem   Endo:   - Lantus  -admelog  - synthroid    - ISS  Disposition: SICU

## 2023-05-01 NOTE — BRIEF OPERATIVE NOTE - NSICDXBRIEFPREOP_GEN_ALL_CORE_FT
PRE-OP DIAGNOSIS:  Acute respiratory failure requiring reintubation 01-May-2023 11:25:12  Sonya Castro

## 2023-05-01 NOTE — PROGRESS NOTE ADULT - SUBJECTIVE AND OBJECTIVE BOX
Chief Complaint:  Patient is a 48y old  Female who presents with a chief complaint of Nec pancreatitis (01 May 2023 08:33)       Interval Events:   Called back by primary to reassess pancreatic necrosis  Semi-walled off on repeat CT    Hospital Medications:  acetaminophen   Oral Liquid .. 650 milliGRAM(s) Oral every 8 hours PRN  chlorhexidine 0.12% Liquid 15 milliLiter(s) Oral Mucosa every 12 hours  chlorhexidine 2% Cloths 1 Application(s) Topical daily  dextrose 5%. 1000 milliLiter(s) IV Continuous <Continuous>  gabapentin Solution 100 milliGRAM(s) Oral three times a day  glucagon  Injectable 1 milliGRAM(s) IntraMuscular once  heparin   Injectable 5000 Unit(s) SubCutaneous every 8 hours  insulin lispro (ADMELOG) corrective regimen sliding scale   SubCutaneous every 6 hours  insulin lispro Injectable (ADMELOG) 3 Unit(s) SubCutaneous every 6 hours  levothyroxine Injectable 20 MICROGram(s) IV Push at bedtime  midodrine 40 milliGRAM(s) Oral every 8 hours  multivitamin/minerals/iron Oral Solution (CENTRUM) 15 milliLiter(s) Oral daily  pancrelipase  (CREON 12,000 Lipase Units) 1 Capsule(s) Oral <User Schedule>  pantoprazole   Suspension 40 milliGRAM(s) Oral daily  polyethylene glycol 3350 17 Gram(s) Oral daily      ROS:   Limited ROS due to patient's condition.     PHYSICAL EXAM:   Vital Signs:  Vital Signs Last 24 Hrs  T(C): 37.7 (01 May 2023 08:00), Max: 37.9 (2023 12:00)  T(F): 99.8 (01 May 2023 08:00), Max: 100.2 (2023 12:00)  HR: 98 (01 May 2023 08:02) (78 - 107)  BP: --  BP(mean): --  RR: 18 (01 May 2023 08:00) (15 - 24)  SpO2: 100% (01 May 2023 08:02) (100% - 100%)    Parameters below as of 01 May 2023 08:00  Patient On (Oxygen Delivery Method): ventilator    O2 Concentration (%): 30  Daily     Daily Weight in k.8 (01 May 2023 06:00)    Constitutional: NAD, intubated, off sedation  HEENT: no scleral icterus  Respiratory: mechanically ventilated  Abdomen: Soft, moderate distention, denying abdominal tenderness; no rebound or guarding.  Extremities: No edema  Neuro: awake and alert, following commands  Skin: no jaundice      LABS: reviewed                        8.2    12.12 )-----------( 270      ( 01 May 2023 01:30 )             23.7     05-    140  |  103  |  56<H>  ----------------------------<  66<L>  3.5   |  18<L>  |  3.93<H>    Ca    8.7      01 May 2023 06:00  Phos  4.2       Mg     2.50         TPro  7.1  /  Alb  2.6<L>  /  TBili  3.0<H>  /  DBili  x   /  AST  39<H>  /  ALT  40<H>  /  AlkPhos  234<H>  0430    LIVER FUNCTIONS - ( 2023 01:00 )  Alb: 2.6 g/dL / Pro: 7.1 g/dL / ALK PHOS: 234 U/L / ALT: 40 U/L / AST: 39 U/L / GGT: x             Interval Diagnostic Studies: see sunrise for full report

## 2023-05-01 NOTE — PROGRESS NOTE ADULT - SUBJECTIVE AND OBJECTIVE BOX
[facility] DIVISION OF KIDNEY DISEASES AND HYPERTENSION   FOLLOW UP NOTE    --------------------------------------------------------------------------------  Chief Complaint: Oliguric CHATO, on HD    24 hour events/subjective: Pt. seen and examined in the SICU today. Pt currently intubated and on [facility] vent. Unable to obtain ROS. Last HD done on 4/29.     PAST HISTORY  --------------------------------------------------------------------------------  No significant changes to PMH, PSH, FHx, SHx, unless otherwise noted    ALLERGIES & MEDICATIONS  --------------------------------------------------------------------------------  Allergies    No Known Allergies    Intolerances    Standing Inpatient Medications  chlorhexidine 0.12% Liquid 15 milliLiter(s) Oral Mucosa every 12 hours  chlorhexidine 2% Cloths 1 Application(s) Topical daily  dextrose 5%. 1000 milliLiter(s) IV Continuous <Continuous>  ertapenem  IVPB 1000 milliGRAM(s) IV Intermittent every 24 hours  gabapentin Solution 100 milliGRAM(s) Oral three times a day  glucagon  Injectable 1 milliGRAM(s) IntraMuscular once  heparin   Injectable 5000 Unit(s) SubCutaneous every 8 hours  insulin lispro (ADMELOG) corrective regimen sliding scale   SubCutaneous every 6 hours  insulin lispro Injectable (ADMELOG) 3 Unit(s) SubCutaneous every 6 hours  levothyroxine Injectable 20 MICROGram(s) IV Push at bedtime  midodrine 40 milliGRAM(s) Oral every 8 hours  multivitamin/minerals/iron Oral Solution (CENTRUM) 15 milliLiter(s) Oral daily  pancrelipase  (CREON 12,000 Lipase Units) 1 Capsule(s) Oral <User Schedule>  pantoprazole   Suspension 40 milliGRAM(s) Oral daily  polyethylene glycol 3350 17 Gram(s) Oral daily    PRN Inpatient Medications  acetaminophen   Oral Liquid .. 650 milliGRAM(s) Oral every 8 hours PRN      REVIEW OF SYSTEMS  --------------------------------------------------------------------------------  Unable to provide ROS    VITALS/PHYSICAL EXAM  --------------------------------------------------------------------------------  T(C): 37.7 (05-01-23 @ 08:00), Max: 37.9 (04-30-23 @ 12:00)  HR: 98 (05-01-23 @ 08:02) (78 - 107)  BP: --  RR: 18 (05-01-23 @ 08:00) (15 - 24)  SpO2: 100% (05-01-23 @ 08:02) (100% - 100%)  Wt(kg): --      04-30-23 @ 07:01  -  05-01-23 @ 07:00  --------------------------------------------------------  IN: 1070 mL / OUT: 650 mL / NET: 420 mL    Physical Exam:  	Gen: Appears critically ill  	HEENT: NGT+, ETT+  	Pulm: Fair entry B/L on CMV via ETT+  	CV: S1S2+  	Abd: Obese, soft; nontender  	Ext: non pitting edema   	Neuro: opens eyes, unable to provide ROS  	Skin: Warm and dry              Dialysis access: Right IJ non tunneled HD catheter    LABS/STUDIES  --------------------------------------------------------------------------------              8.2    12.12 >-----------<  270      [05-01-23 @ 01:30]              23.7     140  |  103  |  56  ----------------------------<  66      [05-01-23 @ 06:00]  3.5   |  18  |  3.93        Ca     8.7     [05-01-23 @ 06:00]      Mg     2.50     [05-01-23 @ 06:00]      Phos  4.2     [05-01-23 @ 06:00]    Creatinine Trend:  SCr 3.93 [05-01 @ 06:00]  SCr 3.84 [05-01 @ 01:41]  SCr 3.00 [04-30 @ 01:00]  SCr 2.54 [04-29 @ 17:35]  SCr 3.18 [04-29 @ 02:50]    HBsAb <3.0      [04-26-23 @ 16:30]  HBcAb Nonreact      [04-26-23 @ 16:30]  HCV 0.22, Nonreact      [04-26-23 @ 16:30] [facility] DIVISION OF KIDNEY DISEASES AND HYPERTENSION   FOLLOW UP NOTE    --------------------------------------------------------------------------------  Chief Complaint: Oliguric CHATO, on HD    24 hour events/subjective: Pt. seen and examined in the SICU today. Pt. currently intubated and on MV. Unable to obtain ROS. Last HD was done on 4/29/23.     PAST HISTORY  --------------------------------------------------------------------------------  No significant changes to PMH, PSH, FHx, SHx, unless otherwise noted    ALLERGIES & MEDICATIONS  --------------------------------------------------------------------------------  Allergies    No Known Allergies    Intolerances    Standing Inpatient Medications  chlorhexidine 0.12% Liquid 15 milliLiter(s) Oral Mucosa every 12 hours  chlorhexidine 2% Cloths 1 Application(s) Topical daily  dextrose 5%. 1000 milliLiter(s) IV Continuous <Continuous>  ertapenem  IVPB 1000 milliGRAM(s) IV Intermittent every 24 hours  gabapentin Solution 100 milliGRAM(s) Oral three times a day  glucagon  Injectable 1 milliGRAM(s) IntraMuscular once  heparin   Injectable 5000 Unit(s) SubCutaneous every 8 hours  insulin lispro (ADMELOG) corrective regimen sliding scale   SubCutaneous every 6 hours  insulin lispro Injectable (ADMELOG) 3 Unit(s) SubCutaneous every 6 hours  levothyroxine Injectable 20 MICROGram(s) IV Push at bedtime  midodrine 40 milliGRAM(s) Oral every 8 hours  multivitamin/minerals/iron Oral Solution (CENTRUM) 15 milliLiter(s) Oral daily  pancrelipase  (CREON 12,000 Lipase Units) 1 Capsule(s) Oral <User Schedule>  pantoprazole   Suspension 40 milliGRAM(s) Oral daily  polyethylene glycol 3350 17 Gram(s) Oral daily    PRN Inpatient Medications  acetaminophen   Oral Liquid .. 650 milliGRAM(s) Oral every 8 hours PRN    REVIEW OF SYSTEMS  --------------------------------------------------------------------------------  Unable to provide ROS    VITALS/PHYSICAL EXAM  --------------------------------------------------------------------------------  T(C): 37.7 (05-01-23 @ 08:00), Max: 37.9 (04-30-23 @ 12:00)  HR: 98 (05-01-23 @ 08:02) (78 - 107)  BP: --  RR: 18 (05-01-23 @ 08:00) (15 - 24)  SpO2: 100% (05-01-23 @ 08:02) (100% - 100%)  Wt(kg): --    04-30-23 @ 07:01  -  05-01-23 @ 07:00  --------------------------------------------------------  IN: 1070 mL / OUT: 650 mL / NET: 420 mL    Physical Exam:  	Gen: Appears critically ill  	HEENT: ETT+  	Pulm: Fair entry B/L on CMV via ETT+  	CV: S1S2+  	Abd: Obese, soft  	Ext: B/L LE edema   	Neuro: opens eyes, unable to provide ROS  	Skin: Warm and dry              Dialysis access: Right IJ non tunneled HD catheter+    LABS/STUDIES  --------------------------------------------------------------------------------              8.2    12.12 >-----------<  270      [05-01-23 @ 01:30]              23.7     140  |  103  |  56  ----------------------------<  66      [05-01-23 @ 06:00]  3.5   |  18  |  3.93        Ca     8.7     [05-01-23 @ 06:00]      Mg     2.50     [05-01-23 @ 06:00]      Phos  4.2     [05-01-23 @ 06:00]    Creatinine Trend:  SCr 3.93 [05-01 @ 06:00]  SCr 3.84 [05-01 @ 01:41]  SCr 3.00 [04-30 @ 01:00]  SCr 2.54 [04-29 @ 17:35]  SCr 3.18 [04-29 @ 02:50]    HBsAb <3.0      [04-26-23 @ 16:30]  HBcAb Nonreact      [04-26-23 @ 16:30]  HCV 0.22, Nonreact      [04-26-23 @ 16:30]

## 2023-05-01 NOTE — PROGRESS NOTE ADULT - ATTENDING COMMENTS
I agree with the detailed interval history, physical, and plan, which I have reviewed and edited where appropriate'; also agree with notes/assessment with my team on service.  I have personally examined the patient.  I was physically present for the key portions of the evaluation and management (E/M) service provided.  I reviewed all the pertinent data.  The patient is a critical care patient with life threatening hemodynamic and metabolic instability in SICU.  The SICU team has a constant risk benefit analyzes discussion and coordinating care with the primary team and all consultants.   The patient is in SICU with the chief complaint and diagnosis mentioned in the note.   The plan will be specified in the note.  48 year old female with necrotizing pancreatitis with CHATO, hypotension and acute respiratory failure in SICU.  EXAM  NEURO: Sedated  RESPIRATORY: coarse BS  CARDIO: RR  ABDOMEN: soft, nontender, distended  EXTREMITIES: edema+  PLAN  Neuro:   - Tylenol   Resp:  - intubated   - 18/460/5/30  CV:  -Midrodine  GI:   - TF, NPO for OR today  - Protonix   Renal:  - ARF   - HD    Heme:   - SCDs and SQH  ID:   - Ertapenem   Endo:   - Lantus 14, admelog to be held while npo for OR   - ISS    Disposition: SICU-OR

## 2023-05-01 NOTE — PROGRESS NOTE ADULT - SUBJECTIVE AND OBJECTIVE BOX
Surgery Progress Note     24hour Events:   - NPO@midnight for trach on 5.1  - 8 units of lantus given due to NPO     Subjective:  Patient seen and examined. Planning for trach today. Still tolerating iHD      Vital Signs:  Vital Signs Last 24 Hrs  T(C): 37.7 (01 May 2023 08:00), Max: 37.9 (30 Apr 2023 12:00)  T(F): 99.8 (01 May 2023 08:00), Max: 100.2 (30 Apr 2023 12:00)  HR: 98 (01 May 2023 08:02) (78 - 107)  BP: --  BP(mean): --  RR: 18 (01 May 2023 08:00) (15 - 24)  SpO2: 100% (01 May 2023 08:02) (100% - 100%)    Parameters below as of 01 May 2023 08:00  Patient On (Oxygen Delivery Method): ventilator    O2 Concentration (%): 30    CAPILLARY BLOOD GLUCOSE      POCT Blood Glucose.: 79 mg/dL (01 May 2023 05:40)  POCT Blood Glucose.: 110 mg/dL (30 Apr 2023 23:44)  POCT Blood Glucose.: 116 mg/dL (30 Apr 2023 17:33)  POCT Blood Glucose.: 137 mg/dL (30 Apr 2023 12:03)      I&O's Detail    30 Apr 2023 07:01  -  01 May 2023 07:00  --------------------------------------------------------  IN:    IV PiggyBack: 350 mL    Peptamen A.F.: 720 mL  Total IN: 1070 mL    OUT:    Nasogastric/Oral tube (mL): 100 mL    Rectal Tube (mL): 550 mL  Total OUT: 650 mL    Total NET: 420 mL            Physical Exam:  Constitutional: NAD, intubated, off sedation  Respiratory: mechanically ventilated  Abdomen: Soft, moderate distention, unable to assess pain response d/t poor mental status, reports diffuse pain. No rebound or guarding.  Extremities: WWP, LEE spontaneously  Neuro: Able to follow commands (squeeze fingers) weakly bilaterally    Labs:    05-01    140  |  103  |  56<H>  ----------------------------<  66<L>  3.5   |  18<L>  |  3.93<H>    Ca    8.7      01 May 2023 06:00  Phos  4.2     05-01  Mg     2.50     05-01    TPro  7.1  /  Alb  2.6<L>  /  TBili  3.0<H>  /  DBili  x   /  AST  39<H>  /  ALT  40<H>  /  AlkPhos  234<H>  04-30    LIVER FUNCTIONS - ( 30 Apr 2023 01:00 )  Alb: 2.6 g/dL / Pro: 7.1 g/dL / ALK PHOS: 234 U/L / ALT: 40 U/L / AST: 39 U/L / GGT: x                                 8.2    12.12 )-----------( 270      ( 01 May 2023 01:30 )             23.7     PT/INR - ( 01 May 2023 01:30 )   PT: 14.0 sec;   INR: 1.20 ratio         PTT - ( 01 May 2023 01:30 )  PTT:26.1 sec

## 2023-05-01 NOTE — BRIEF OPERATIVE NOTE - NSICDXBRIEFPOSTOP_GEN_ALL_CORE_FT
POST-OP DIAGNOSIS:  Acute respiratory failure requiring reintubation 01-May-2023 11:25:26  Sonya Castro

## 2023-05-01 NOTE — PROCEDURE NOTE - SUPERVISORY STATEMENT
Agree
Agree
Left IJ shiley was not functional in am thus halting CVVH.  After sterile preparation and preprocedure checklist completed, The left IJ Shiley was cannulated with guide wire and a left CVC  triple lumen was placed.  a CXR obtained and placement confirmed.  The right triple lumen CVC was prepped and cannulated and a Right IJ Shiley was exchanged.  Note of SVT upon completion.  Requring adneosine x 1.  CXR obtaiuned.  SICU team instructed to pull back on Right shiley CVC, and advance l IJ CVC
Agree
Agree

## 2023-05-01 NOTE — BRIEF OPERATIVE NOTE - ESTIMATED BLOOD LOSS
----- Message from Kasey Reyes sent at 2/21/2017  4:43 PM CST -----  Contact: Patient  X _1st Request  _  2nd Request  _  3rd Request    Who:MARLIN BARRIENTOS [[id number]]    Why:Patients states she has a UTI and needs to see if she get a appointment because the medication she was given did not work    What Number to Call Back:Jorge can be reached at [phone]    When to Expect a call back: (Before the end of the day)   -- if call after 3:00 call back will be tomorrow.      MY CHART MESSAGE:  I put an order in for you to collect a urine specimen at the second floor lab either at [facility] or [facility] [place] tomorrow. I will call you with results on Thursday. If you need to be seen urgently, I suggest an Urgent Care appointment as I am out of the office until Thursday. Kira Guerra NP     2

## 2023-05-02 NOTE — CONSULT NOTE ADULT - CONSULT REASON
Pancreatitis
jaundice
Hemodynamic monitoring and respiratory management
Acidosis
Necrotizing pancreatitis
peritoneal fluid drainage
Dysphagia and hoarseness

## 2023-05-02 NOTE — PROGRESS NOTE ADULT - ASSESSMENT
48 year old female with a PMHx of HTN, hypothyroidism,  (30 years ago) and breast cancer (S/P left mastectomy and chemo in ) who presented with epigastric pain and left upper quadrant pain.  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  Transferred from OS ([place]) for hypotension and tachypnea requiring emergent intubation and multiple pressors.  SICU consulted for hemodynamic monitoring and respiratory management. s/p trach 23 s/p bedside paracentesis     Plan:   - fu abdomina fluid cultures  - Diet: TF at goal  - NGT to LCWS  - tolerating HD  - on ertapenem  - GI team not able to offer axios stent until collection more walled off  - Appreciate care per SICU      B Team Surgery  [id number] 48 year old female with a PMHx of HTN, hypothyroidism,  (30 years ago) and breast cancer (S/P left mastectomy and chemo in ) who presented with epigastric pain and left upper quadrant pain.  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  Transferred from OSH ([place]) for hypotension and tachypnea requiring emergent intubation and multiple pressors.  SICU consulted for hemodynamic monitoring and respiratory management. s/p trach 23 s/p bedside paracentesis     Plan:   - SBT as tolerated  - fu abdominal fluid cultures; consider adding antifungal coverage (yeast like)  - Diet: TF at goal  - NGT to LCWS  - tolerating HD  - on ertapenem  - GI team not able to offer axios stent until collection more walled off  - Appreciate care per SICU      B Team Surgery  [id number] 48 year old female with a PMHx of HTN, hypothyroidism,  (30 years ago) and breast cancer (S/P left mastectomy and chemo in ) who presented with epigastric pain and left upper quadrant pain.  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  Transferred from OSH ([place]) for hypotension and tachypnea requiring emergent intubation and multiple pressors.  SICU consulted for hemodynamic monitoring and respiratory management. s/p trach 23 s/p bedside paracentesis     Plan:   - repeat ct to evaluate abdominal collection  - SBT as tolerated  - fu abdominal fluid cultures; consider adding antifungal coverage (yeast like)  - Diet: TF at goal  - NGT to LCWS  - tolerating HD  - on ertapenem  - GI team not able to offer axios stent until collection more walled off  - Appreciate care per SICU      B Team Surgery  [id number]

## 2023-05-02 NOTE — PROGRESS NOTE ADULT - ASSESSMENT
48 year old female with a PMHx of HTN, hypothyroidism,  (30 years ago) and breast cancer (S/P left mastectomy and chemo in ) who presented with epigastric pain and left upper quadrant pain.  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  Transferred from OSH ([place]) for hypotension and tachypnea requiring emergent intubation and multiple pressors.  SICU consulted for hemodynamic monitoring and respiratory management.  Intubated, pressors, CRRT, now with loculated abdominal collections going for Trach today    PLAN  Neuro:   - no sedation  - Tylenol for pain control     Resp:  - trach: /  - SBT daily    CV:  Mixed shock state 2/2 septic shock w/ E. Coli in urine vs hypovolemic shock now s/p 11L resuscitation   - Midodrine 40 Q8 hours    GI:   - TF restarted  - Ulcer ppx w/ Protonix qd  - Creon  - repeat CT A/P , GI recs: collections not mature enough for axios stent   - repeat CT A/P : incomplete wall off collection, IR unable to drain at this time, GI unable to place stent     Renal:  - ARF 2/2 shock state resulting in ATN requiring CRRT, started on iHD   - Failed lasix challenge    - daily bladder scans  - HD per renal, last HD ; -500     Heme:   - SCDs and SQH  - HIT Ab positive; DOUGLAS Negative    ID:   Intermittently febrile w/ leukocytosis c/f septic shock 2/2 E coli UTI  Diagnostics:  - 3/22 Ucx: E coli  - 3/30 Bcx: staph hominis in aerobic bottle x 1 (likely contaminant)   - S/p Meropenem (3/26-, -)  - Ertapenem ( -    Endo:   - Lantus 14, admelog  - c/w synthroid 20 IV (home med)   - ISS    Disposition: SICU     48 year old female with a PMHx of HTN, hypothyroidism,  (30 years ago) and breast cancer (S/P left mastectomy and chemo in ) who presented with epigastric pain and left upper quadrant pain.  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  Transferred from OSH ([place]) for hypotension and tachypnea requiring emergent intubation and multiple pressors.  SICU consulted for hemodynamic monitoring and respiratory management.  Intubated, pressors, CRRT, now with loculated abdominal collections going for Trach today    PLAN  Neuro:   - no sedation  - Tylenol for pain control     Resp:  - trach: /  - SBT daily    CV:  Mixed shock state 2/2 septic shock w/ E. Coli in urine vs hypovolemic shock now s/p 11L resuscitation   - Midodrine 40 Q8 hours    GI:   - TF restarted  - Ulcer ppx w/ Protonix qd  - Creon  - repeat CT A/P , GI recs: collections not mature enough for axios stent   - repeat CT A/P : incomplete wall off collection, IR unable to drain at this time, GI unable to place stent     Renal:  - ARF 2/2 shock state resulting in ATN requiring CRRT, started on iHD   - Failed lasix challenge    - daily bladder scans  - HD per renal, last HD ; -500     Heme:   - SCDs and SQH  - HIT Ab positive; DOUGLAS Negative    ID:   Intermittently febrile w/ leukocytosis c/f septic shock 2/2 E coli UTI  Diagnostics:  - 3/22 Ucx: E coli  - 3/30 Bcx: staph hominis in aerobic bottle x 1 (likely contaminant)   - S/p Meropenem (3/26-, -)  - Ertapenem ( -  - pancreatic fluid cultures, prelim positive for gram + cocci and yeast  - will start Caspo    Endo:   - Lantus 14, admelog  - c/w synthroid 20 IV (home med)   - ISS    Disposition: SICU

## 2023-05-02 NOTE — PROGRESS NOTE ADULT - SUBJECTIVE AND OBJECTIVE BOX
TEAM [ ** ] Surgery Daily Progress Note  =====================================================    SUBJECTIVE: Patient seen and examined at bedside on AM rounds. Patient reports that they're feeling well. NPO/Tolerating diet, denies nausea, vomiting.    Flatus/    BM. OOB/Amublating as tolerated. Denies fever, chills.    PMH:   ***    ALLERGIES:  No Known Allergies      --------------------------------------------------------------------------------------    MEDICATIONS:    Neurologic Medications  acetaminophen   Oral Liquid .. 650 milliGRAM(s) Oral every 8 hours PRN Mild Pain (1 - 3)  gabapentin Solution 100 milliGRAM(s) Oral three times a day  metoclopramide Injectable 10 milliGRAM(s) IV Push every 8 hours    Respiratory Medications    Cardiovascular Medications  midodrine 40 milliGRAM(s) Oral every 8 hours  phenylephrine    Infusion 0.3 MICROgram(s)/kG/Min IV Continuous <Continuous>    Gastrointestinal Medications  dextrose 5%. 1000 milliLiter(s) IV Continuous <Continuous>  multivitamin/minerals/iron Oral Solution (CENTRUM) 15 milliLiter(s) Oral daily  pancrelipase  (CREON 12,000 Lipase Units) 1 Capsule(s) Oral <User Schedule>  pantoprazole   Suspension 40 milliGRAM(s) Oral daily  polyethylene glycol 3350 17 Gram(s) Oral daily    Genitourinary Medications    Hematologic/Oncologic Medications  heparin   Injectable 5000 Unit(s) SubCutaneous every 8 hours    Antimicrobial/Immunologic Medications  ertapenem  IVPB 500 milliGRAM(s) IV Intermittent every 24 hours    Endocrine/Metabolic Medications  glucagon  Injectable 1 milliGRAM(s) IntraMuscular once  insulin lispro (ADMELOG) corrective regimen sliding scale   SubCutaneous every 6 hours  insulin lispro Injectable (ADMELOG) 3 Unit(s) SubCutaneous every 6 hours  levothyroxine Injectable 20 MICROGram(s) IV Push at bedtime    Topical/Other Medications  chlorhexidine 0.12% Liquid 15 milliLiter(s) Oral Mucosa every 12 hours  chlorhexidine 2% Cloths 1 Application(s) Topical daily    --------------------------------------------------------------------------------------    VITAL SIGNS:  T(C): 37.8 (05-02-23 @ 04:00), Max: 37.8 (05-01-23 @ 20:00)  HR: 97 (05-02-23 @ 07:00) (81 - 111)  BP: --  RR: 17 (05-02-23 @ 07:00) (17 - 21)  SpO2: 100% (05-02-23 @ 07:00) (99% - 100%)  --------------------------------------------------------------------------------------    EXAM    General: NAD, resting in bed comfortably.  Cardiac: regular rate, warm and well perfused  Respiratory: Nonlabored respirations, normal cw expansion.  Abdomen: soft, nontender, nondistended. NGT with bilious output. NG with tube feeds  Extremities: normal strength, FROM, no deformities    --------------------------------------------------------------------------------------    INS AND OUTS:    05-01-23 @ 07:01  -  05-02-23 @ 07:00  --------------------------------------------------------  IN: 2232 mL / OUT: 3400 mL / NET: -1168 mL      -------------------------------------------------------------------------------------- TEAM [ B ] Surgery Daily Progress Note  =====================================================    SUBJECTIVE: Patient seen and examined at bedside on AM rounds. limited secondary to patient clinical conditions    ALLERGIES:  No Known Allergies      --------------------------------------------------------------------------------------    MEDICATIONS:    Neurologic Medications  acetaminophen   Oral Liquid .. 650 milliGRAM(s) Oral every 8 hours PRN Mild Pain (1 - 3)  gabapentin Solution 100 milliGRAM(s) Oral three times a day  metoclopramide Injectable 10 milliGRAM(s) IV Push every 8 hours    Respiratory Medications    Cardiovascular Medications  midodrine 40 milliGRAM(s) Oral every 8 hours  phenylephrine    Infusion 0.3 MICROgram(s)/kG/Min IV Continuous <Continuous>    Gastrointestinal Medications  dextrose 5%. 1000 milliLiter(s) IV Continuous <Continuous>  multivitamin/minerals/iron Oral Solution (CENTRUM) 15 milliLiter(s) Oral daily  pancrelipase  (CREON 12,000 Lipase Units) 1 Capsule(s) Oral <User Schedule>  pantoprazole   Suspension 40 milliGRAM(s) Oral daily  polyethylene glycol 3350 17 Gram(s) Oral daily    Genitourinary Medications    Hematologic/Oncologic Medications  heparin   Injectable 5000 Unit(s) SubCutaneous every 8 hours    Antimicrobial/Immunologic Medications  ertapenem  IVPB 500 milliGRAM(s) IV Intermittent every 24 hours    Endocrine/Metabolic Medications  glucagon  Injectable 1 milliGRAM(s) IntraMuscular once  insulin lispro (ADMELOG) corrective regimen sliding scale   SubCutaneous every 6 hours  insulin lispro Injectable (ADMELOG) 3 Unit(s) SubCutaneous every 6 hours  levothyroxine Injectable 20 MICROGram(s) IV Push at bedtime    Topical/Other Medications  chlorhexidine 0.12% Liquid 15 milliLiter(s) Oral Mucosa every 12 hours  chlorhexidine 2% Cloths 1 Application(s) Topical daily    --------------------------------------------------------------------------------------    VITAL SIGNS:  T(C): 37.8 (05-02-23 @ 04:00), Max: 37.8 (05-01-23 @ 20:00)  HR: 97 (05-02-23 @ 07:00) (81 - 111)  BP: --  RR: 17 (05-02-23 @ 07:00) (17 - 21)  SpO2: 100% (05-02-23 @ 07:00) (99% - 100%)  --------------------------------------------------------------------------------------    EXAM  Physical Examination:  General: NAD, resting comfortably in bed, nodding yes/no to questioning  HEENT: Normocephalic atraumatic  Neck: s/p trach  Respiratory: Nonlabored respirations, normal CW expansion, mechanical ventilation AC  Mode: AC/ CMV (Assist Control/ Continuous Mandatory Ventilation)  RR (machine): 18  TV (machine): 460  FiO2: 30  PEEP: 5  ITime: 0.74  MAP: 11  PIP: 31  Cardio: S1S2, regular rate and rhythm.  Abdomen: softly distended, nontender, rectal tube with liquid stool, NGT with bilious output, post pyloric NJ tube w/ feeds  Vascular: extremities are warm and well perfused.     --------------------------------------------------------------------------------------    INS AND OUTS:    05-01-23 @ 07:01  -  05-02-23 @ 07:00  --------------------------------------------------------  IN: 2232 mL / OUT: 3400 mL / NET: -1168 mL      --------------------------------------------------------------------------------------

## 2023-05-02 NOTE — PROGRESS NOTE ADULT - SUBJECTIVE AND OBJECTIVE BOX
SICU DAILY PROGRESS NOTE    24 HOUR EVENTS:  - s/p perc trach  - bedside pancreatic collection drainage yesterday    MEDICATIONS  (STANDING):  chlorhexidine 0.12% Liquid 15 milliLiter(s) Oral Mucosa every 12 hours  chlorhexidine 2% Cloths 1 Application(s) Topical daily  dextrose 5%. 1000 milliLiter(s) (100 mL/Hr) IV Continuous <Continuous>  ertapenem  IVPB 500 milliGRAM(s) IV Intermittent every 24 hours  gabapentin Solution 100 milliGRAM(s) Oral three times a day  glucagon  Injectable 1 milliGRAM(s) IntraMuscular once  heparin   Injectable 5000 Unit(s) SubCutaneous every 8 hours  insulin lispro (ADMELOG) corrective regimen sliding scale   SubCutaneous every 6 hours  insulin lispro Injectable (ADMELOG) 3 Unit(s) SubCutaneous every 6 hours  levothyroxine Injectable 20 MICROGram(s) IV Push at bedtime  metoclopramide Injectable 10 milliGRAM(s) IV Push every 8 hours  midodrine 40 milliGRAM(s) Oral every 8 hours  multivitamin/minerals/iron Oral Solution (CENTRUM) 15 milliLiter(s) Oral daily  pancrelipase  (CREON 12,000 Lipase Units) 1 Capsule(s) Oral <User Schedule>  pantoprazole   Suspension 40 milliGRAM(s) Oral daily  phenylephrine    Infusion 0.3 MICROgram(s)/kG/Min (13.3 mL/Hr) IV Continuous <Continuous>  polyethylene glycol 3350 17 Gram(s) Oral daily    MEDICATIONS  (PRN):  acetaminophen   Oral Liquid .. 650 milliGRAM(s) Oral every 8 hours PRN Mild Pain (1 - 3)    ICU Vital Signs Last 24 Hrs  T(C): 37.6 (02 May 2023 00:00), Max: 37.8 (01 May 2023 04:00)  T(F): 99.6 (02 May 2023 00:00), Max: 100 (01 May 2023 04:00)  HR: 81 (02 May 2023 00:00) (81 - 111)  BP: --  BP(mean): --  ABP: 97/60 (02 May 2023 00:00) (86/53 - 137/87)  ABP(mean): 77 (02 May 2023 00:00) (68 - 108)  RR: 18 (02 May 2023 00:00) (15 - 24)  SpO2: 100% (02 May 2023 00:00) (99% - 100%)    O2 Parameters below as of 02 May 2023 00:00  Patient On (Oxygen Delivery Method): ventilator  O2 Flow (L/min): 30    I&O's Detail    30 Apr 2023 07:01  -  01 May 2023 07:00  --------------------------------------------------------  IN:    IV PiggyBack: 350 mL    Peptamen A.F.: 720 mL  Total IN: 1070 mL    OUT:    Nasogastric/Oral tube (mL): 100 mL    Rectal Tube (mL): 550 mL  Total OUT: 650 mL    Total NET: 420 mL      01 May 2023 07:01  -  02 May 2023 00:18  --------------------------------------------------------  IN:    Enteral Tube Flush: 50 mL    IV PiggyBack: 50 mL    Lactated Ringers Bolus: 1000 mL    Peptamen A.F.: 450 mL  Total IN: 1550 mL    OUT:    Drain (mL): 1500 mL    Nasogastric/Oral tube (mL): 200 mL    Rectal Tube (mL): 200 mL  Total OUT: 1900 mL    Total NET: -350 mL      NEURO  awake, tries to trach, nods head yes and no    RESPIRATORY  Mode: AC/ CMVRR (machine): 18 TV (machine): 460 FiO2: 30 PEEP: 5  CTA B/L    CARDIOVASCULAR  S1, S2,     GI/NUTRITION  Diet: NPO with TFs  ABDOMEN: soft, nontender, distended, +/NGT with bilious output, tolerating post pyloric tube feeds      ACCESS DEVICES:  [x] Peripheral IV  [x] Central Venous Line - juan a	[ ] R	[x] L	[x] IJ	[ ] Fem	[ ] SC	Placed:   [x] Arterial Line		[x] R	[ ] L	[ ] Fem	[ ] Rad	[x] Ax	Placed:   [ ] PICC:					[ ] Mediport  [ ] Urinary Catheter, Date Placed:   [ ] Necessity of urinary, arterial, and venous catheters discussed    Labs:  CBC (05-01 @ 01:30)                              8.2<L>                         12.12<H>  )----------------(  270        --    % Neutrophils, --    % Lymphocytes, ANC: --                                  23.7<L>    BMP (05-01 @ 06:00)             140     |  103     |  56<H> 		Ca++ --      Ca 8.7                ---------------------------------( 66<L> 		Mg 2.50               3.5     |  18<L>   |  3.93<H>			Ph 4.2     BMP (05-01 @ 01:41)             142     |  103     |  55<H> 		Ca++ --      Ca 8.8                ---------------------------------( 104<H>		Mg 2.50               2.7<LL>  |  18<L>   |  3.84<H>			Ph 4.3         Coags (05-01 @ 01:30)  aPTT 26.1<L> / INR 1.20<H> / PT 14.0<H>      ABG (05-01 @ 01:30)     7.43 / 30<L> / 156<H> / 20<L> / -3.8<L> / 98.8<H>%     Lactate:

## 2023-05-02 NOTE — PROGRESS NOTE ADULT - ASSESSMENT
48 year old female with a PMHx of HTN, hypothyroidism,  (30 years ago) and breast cancer (S/P left mastectomy and chemo in ) who presented with epigastric pain and left upper quadrant pain.  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  Transferred from OSH ([place]) for hypotension and tachypnea requiring emergent intubation and multiple pressors.  SICU consulted for hemodynamic monitoring and respiratory management.  Intubated, pressors, CRRT, now with loculated abdominal collections going for Trach today    PLAN  Neuro:   - no sedation  - Tylenol for pain control     Resp:  - trach: /  - SBT daily    CV:  Mixed shock state 2/2 septic shock w/ E. Coli in urine vs hypovolemic shock now s/p 11L resuscitation   - Midodrine 40 Q8 hours  -restarted on Gordon; wean as tolerated     GI:   - TF restarted  - Ulcer ppx w/ Protonix qd  - Creon  - repeat CT A/P , GI recs: collections not mature enough for axios stent   - repeat CT A/P : incomplete wall off collection, IR unable to drain at this time, GI unable to place stent   -Repeat CT A/P  prelim read pending     Renal:  - ARF 2/2 shock state resulting in ATN requiring CRRT, started on iHD   - Failed lasix challenge    - daily bladder scans  - HD per renal, last HD  -900     Heme:   - SCDs and SQH  - HIT Ab positive; DOUGLAS Negative    ID:   Intermittently febrile w/ leukocytosis c/f septic shock 2/2 E coli UTI  Diagnostics:  - 3/22 Ucx: E coli  - 3/30 Bcx: staph hominis in aerobic bottle x 1 (likely contaminant)   - S/p Meropenem (3/26-, -)  - Ertapenem ( -  - pancreatic fluid cultures, prelim positive for gram + cocci and yeast  - will start Caspo    Endo:   - Lantus 14, admelog  - c/w synthroid 20 IV (home med)   - ISS    Disposition: SICU     48 year old female with a PMHx of HTN, hypothyroidism,  (30 years ago) and breast cancer (S/P left mastectomy and chemo in ) who presented with epigastric pain and left upper quadrant pain.  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  Transferred from OSH ([place]) for hypotension and tachypnea requiring emergent intubation and multiple pressors.  SICU consulted for hemodynamic monitoring and respiratory management.  Intubated, pressors, CRRT, now with loculated abdominal collections s/p trache     PLAN  Neuro:   - no sedation  - Tylenol for pain control     Resp:  - trach: /  - SBT daily    CV:  Mixed shock state 2/2 septic shock w/ E. Coli in urine vs hypovolemic shock now s/p 11L resuscitation   - Midodrine 40 Q8 hours  -restarted on Gordon; wean as tolerated     GI:   - TF restarted  - Ulcer ppx w/ Protonix qd  - Creon  - repeat CT A/P , GI recs: collections not mature enough for axios stent   - repeat CT A/P : incomplete wall off collection, IR unable to drain at this time, GI unable to place stent   -Repeat CT A/P  prelim read pending     Renal:  - ARF 2/2 shock state resulting in ATN requiring CRRT, started on iHD   - Failed lasix challenge    - daily bladder scans  - HD per renal, last HD  -900     Heme:   - SCDs and SQH  - HIT Ab positive; DOUGLAS Negative    ID:   Intermittently febrile w/ leukocytosis c/f septic shock 2/2 E coli UTI  Diagnostics:  - 3/22 Ucx: E coli  - 3/30 Bcx: staph hominis in aerobic bottle x 1 (likely contaminant)   - S/p Meropenem (3/26-, -)  - Ertapenem ( -  - pancreatic fluid cultures, prelim positive for gram + cocci and yeast  - will start Caspo    Endo:   - Lantus 14, admelog  - c/w synthroid 20 IV (home med)   - ISS    Disposition: SICU

## 2023-05-02 NOTE — CONSULT NOTE ADULT - CONSULT REQUESTED DATE/TIME
06-Apr-2023 06:47
24-Mar-2023 15:50
19-Apr-2023 16:15
24-Mar-2023 16:34
29-Apr-2023 11:55
31-Mar-2023 11:59
02-May-2023 18:51

## 2023-05-02 NOTE — PROGRESS NOTE ADULT - ATTENDING COMMENTS
I agree with the detailed interval history, physical, and plan, which I have reviewed and edited where appropriate'; also agree with notes/assessment with my team on service.  I have personally examined the patient.  I was physically present for the key portions of the evaluation and management (E/M) service provided.  I reviewed all the pertinent data.  The patient is a critical care patient with life threatening hemodynamic and metabolic instability in SICU.  The SICU team has a constant risk benefit analyzes discussion and coordinating care with the primary team and all consultants.   The patient is in SICU with the chief complaint and diagnosis mentioned in the note.   The plan will be specified in the note.  48 year old female with necrotizing pancreatitis; sp trach in SICU.  NEURO  arousable  LUNG coarse Bs  CARDIOVASCULAR  RR  GI/NUTRITION  ABDOMEN: soft, nontender, distended,  PLAN  Neuro:   - Tylenol   Resp:  - trach: 18/460/5/30  - SBT daily  CV:  - Midodrine 40 Q8 hours  GI:    Protonix   Renal:  - HD   Heme:   - SCDs and SQH  ID:   Intermittently febrile w/ leukocytosis c/f septic shock 2/2 E coli UTI  Diagnostics:  -Ertapenem  -Caspo  Endo:   - Lantus 14, admelog  - synthroid  - ISS    Disposition: SICU

## 2023-05-02 NOTE — PROGRESS NOTE ADULT - SUBJECTIVE AND OBJECTIVE BOX
POST ANESTHESIA EVALUATION    48y Female POSTOP DAY 1 S/P Tracheostomy    MENTAL STATUS: Patient participation [  ] Awake     [x ] Arousable     [  ] Sedated    AIRWAY PATENCY: [  ] Satisfactory  [  ] Other: tracheostomy    Vital Signs Last 24 Hrs  T(C): 37.9 (02 May 2023 08:00), Max: 37.9 (02 May 2023 08:00)  T(F): 100.3 (02 May 2023 08:00), Max: 100.3 (02 May 2023 08:00)  HR: 100 (02 May 2023 11:00) (81 - 121)  BP: --  BP(mean): --  RR: 25 (02 May 2023 11:00) (17 - 26)  SpO2: 100% (02 May 2023 11:00) (95% - 100%)    Parameters below as of 02 May 2023 08:00  Patient On (Oxygen Delivery Method): ventilator    O2 Concentration (%): 30  I&O's Summary    01 May 2023 07:01  -  02 May 2023 07:00  --------------------------------------------------------  IN: 2232 mL / OUT: 3400 mL / NET: -1168 mL    02 May 2023 07:01  -  02 May 2023 12:13  --------------------------------------------------------  IN: 430 mL / OUT: 0 mL / NET: 430 mL          NAUSEA/ VOMITTING:  [x  ] NONE  [  ] CONTROLLED [  ] OTHER     PAIN: [x  ] CONTROLLED WITH CURRENT REGIMEN  [  ] OTHER    [ x ] NO APPARENT ANESTHESIA COMPLICATIONS      Comments:

## 2023-05-02 NOTE — CONSULT NOTE ADULT - REASON FOR ADMISSION
Nec pancreatitis
Necrotizing pancreatitis
Nec pancreatitis

## 2023-05-02 NOTE — PROGRESS NOTE ADULT - SUBJECTIVE AND OBJECTIVE BOX
SICU DAILY PROGRESS NOTE    24 HOUR EVENTS:  -CT A/P to reassess collection  -became hypotensive while in CT scan; restarted on Levo while in scanner and then transitioned to Gordon when back in SICU   -IR consulted for possible drainage of collection     MEDICATIONS  (STANDING):  chlorhexidine 0.12% Liquid 15 milliLiter(s) Oral Mucosa every 12 hours  chlorhexidine 2% Cloths 1 Application(s) Topical daily  dextrose 5%. 1000 milliLiter(s) (100 mL/Hr) IV Continuous <Continuous>  ertapenem  IVPB 500 milliGRAM(s) IV Intermittent every 24 hours  gabapentin Solution 100 milliGRAM(s) Oral three times a day  glucagon  Injectable 1 milliGRAM(s) IntraMuscular once  heparin   Injectable 5000 Unit(s) SubCutaneous every 8 hours  insulin lispro (ADMELOG) corrective regimen sliding scale   SubCutaneous every 6 hours  insulin lispro Injectable (ADMELOG) 3 Unit(s) SubCutaneous every 6 hours  levothyroxine Injectable 20 MICROGram(s) IV Push at bedtime  metoclopramide Injectable 10 milliGRAM(s) IV Push every 8 hours  midodrine 40 milliGRAM(s) Oral every 8 hours  multivitamin/minerals/iron Oral Solution (CENTRUM) 15 milliLiter(s) Oral daily  pancrelipase  (CREON 12,000 Lipase Units) 1 Capsule(s) Oral <User Schedule>  pantoprazole   Suspension 40 milliGRAM(s) Oral daily  phenylephrine    Infusion 0.3 MICROgram(s)/kG/Min (13.3 mL/Hr) IV Continuous <Continuous>  polyethylene glycol 3350 17 Gram(s) Oral daily    MEDICATIONS  (PRN):  acetaminophen   Oral Liquid .. 650 milliGRAM(s) Oral every 8 hours PRN Mild Pain (1 - 3)    ICU Vital Signs Last 24 Hrs  T(C): 38.3 (05-02-23 @ 12:00), Max: 38.3 (05-02-23 @ 12:00)  HR: 94 (05-02-23 @ 15:10) (81 - 121)  BP: --  ABP: 103/68 (05-02-23 @ 14:00) (86/53 - 134/71)  ABP(mean): 83 (05-02-23 @ 14:00) (68 - 98)  RR: 17 (05-02-23 @ 14:00) (17 - 26)  SpO2: 100% (05-02-23 @ 15:10) (95% - 100%)  Wt(kg): --  CVP(mm Hg): --      05-01 @ 07:01  -  05-02 @ 07:00  --------------------------------------------------------  IN:    Enteral Tube Flush: 50 mL    IV PiggyBack: 50 mL    Lactated Ringers Bolus: 1000 mL    Other (mL): 400 mL    Peptamen A.F.: 675 mL    Phenylephrine: 57 mL  Total IN: 2232 mL    OUT:    Drain (mL): 1500 mL    Nasogastric/Oral tube (mL): 200 mL    Other (mL): 1300 mL    Rectal Tube (mL): 400 mL  Total OUT: 3400 mL    Total NET: -1168 mL      05-02 @ 07:01  -  05-02 @ 15:18  --------------------------------------------------------  IN:    IV PiggyBack: 400 mL    Peptamen A.F.: 315 mL  Total IN: 715 mL    OUT:  Total OUT: 0 mL    Total NET: 715 mL      NEURO  awake, trach, nods head yes and no    RESPIRATORY  Mode: AC/ CMVRR (machine): 18 TV (machine): 460 FiO2: 30 PEEP: 5  CTA B/L    CARDIOVASCULAR  S1, S2,     GI/NUTRITION  Diet: NPO with TFs  ABDOMEN: soft, nontender, distended, +/NGT with bilious output, tolerating post pyloric tube feeds      ACCESS DEVICES:  [x] Peripheral IV  [x] Central Venous Line - percyley	[ ] R	[x] L	[x] IJ	[ ] Fem	[ ] SC	Placed:   [x] Arterial Line		[x] R	[ ] L	[ ] Fem	[ ] Rad	[x] Ax	Placed:   [ ] PICC:					[ ] Mediport  [ ] Urinary Catheter, Date Placed:   [ ] Necessity of urinary, arterial, and venous catheters discussed    Labs:    CBC (05-02 @ 02:55)                              7.9<L>                         14.61<H>  )----------------(  251        --    % Neutrophils, --    % Lymphocytes, ANC: --                                  23.0<L>    BMP (05-02 @ 02:55)             139     |  102     |  26<H> 		Ca++ --      Ca 8.3<L>             ---------------------------------( 84    		Mg 1.90               3.4<L>  |  20<L>   |  2.06<H>			Ph 2.4<L>  BMP (05-01 @ 06:00)             140     |  103     |  56<H> 		Ca++ --      Ca 8.7                ---------------------------------( 66<L> 		Mg 2.50               3.5     |  18<L>   |  3.93<H>			Ph 4.2       LFTs (05-02 @ 02:55)      TPro 7.0 / Alb 2.4<L> / TBili 3.3<H> / DBili -- / AST 36<H> / ALT 23 / AlkPhos 201<H>    Coags (05-02 @ 02:55)  aPTT 29.1 / INR 1.26<H> / PT 14.7<H>      ABG (05-02 @ 09:30)     7.43 / 36 / 154<H> / 24 / -0.3 / 99.8<H>%     Lactate:    ABG (05-02 @ 02:55)     7.50<H> / 32 / 159<H> / 25 / 1.9 / 99.9<H>%     Lactate:         SICU PM PROGRESS NOTE    EVENTS:  -CT A/P to reassess collection  -became hypotensive while in CT scan; restarted on Levo while in scanner and then transitioned to Gordon when back in SICU   -IR consulted for possible drainage of collection     MEDICATIONS  (STANDING):  chlorhexidine 0.12% Liquid 15 milliLiter(s) Oral Mucosa every 12 hours  chlorhexidine 2% Cloths 1 Application(s) Topical daily  dextrose 5%. 1000 milliLiter(s) (100 mL/Hr) IV Continuous <Continuous>  ertapenem  IVPB 500 milliGRAM(s) IV Intermittent every 24 hours  gabapentin Solution 100 milliGRAM(s) Oral three times a day  glucagon  Injectable 1 milliGRAM(s) IntraMuscular once  heparin   Injectable 5000 Unit(s) SubCutaneous every 8 hours  insulin lispro (ADMELOG) corrective regimen sliding scale   SubCutaneous every 6 hours  insulin lispro Injectable (ADMELOG) 3 Unit(s) SubCutaneous every 6 hours  levothyroxine Injectable 20 MICROGram(s) IV Push at bedtime  metoclopramide Injectable 10 milliGRAM(s) IV Push every 8 hours  midodrine 40 milliGRAM(s) Oral every 8 hours  multivitamin/minerals/iron Oral Solution (CENTRUM) 15 milliLiter(s) Oral daily  pancrelipase  (CREON 12,000 Lipase Units) 1 Capsule(s) Oral <User Schedule>  pantoprazole   Suspension 40 milliGRAM(s) Oral daily  phenylephrine    Infusion 0.3 MICROgram(s)/kG/Min (13.3 mL/Hr) IV Continuous <Continuous>  polyethylene glycol 3350 17 Gram(s) Oral daily    MEDICATIONS  (PRN):  acetaminophen   Oral Liquid .. 650 milliGRAM(s) Oral every 8 hours PRN Mild Pain (1 - 3)    ICU Vital Signs Last 24 Hrs  T(C): 38.3 (05-02-23 @ 12:00), Max: 38.3 (05-02-23 @ 12:00)  HR: 94 (05-02-23 @ 15:10) (81 - 121)  BP: --  ABP: 103/68 (05-02-23 @ 14:00) (86/53 - 134/71)  ABP(mean): 83 (05-02-23 @ 14:00) (68 - 98)  RR: 17 (05-02-23 @ 14:00) (17 - 26)  SpO2: 100% (05-02-23 @ 15:10) (95% - 100%)  Wt(kg): --  CVP(mm Hg): --      05-01 @ 07:01  -  05-02 @ 07:00  --------------------------------------------------------  IN:    Enteral Tube Flush: 50 mL    IV PiggyBack: 50 mL    Lactated Ringers Bolus: 1000 mL    Other (mL): 400 mL    Peptamen A.F.: 675 mL    Phenylephrine: 57 mL  Total IN: 2232 mL    OUT:    Drain (mL): 1500 mL    Nasogastric/Oral tube (mL): 200 mL    Other (mL): 1300 mL    Rectal Tube (mL): 400 mL  Total OUT: 3400 mL    Total NET: -1168 mL      05-02 @ 07:01  -  05-02 @ 15:18  --------------------------------------------------------  IN:    IV PiggyBack: 400 mL    Peptamen A.F.: 315 mL  Total IN: 715 mL    OUT:  Total OUT: 0 mL    Total NET: 715 mL      NEURO  awake, trach, nods head yes and no    RESPIRATORY  Mode: AC/ CMVRR (machine): 18 TV (machine): 460 FiO2: 30 PEEP: 5  CTA B/L    CARDIOVASCULAR  S1, S2,     GI/NUTRITION  Diet: NPO with TFs  ABDOMEN: soft, nontender, distended, +/NGT with bilious output, tolerating post pyloric tube feeds      ACCESS DEVICES:  [x] Peripheral IV  [x] Central Venous Line - juan a	[ ] R	[x] L	[x] IJ	[ ] Fem	[ ] SC	Placed:   [x] Arterial Line		[x] R	[ ] L	[ ] Fem	[ ] Rad	[x] Ax	Placed:   [ ] PICC:					[ ] Mediport  [ ] Urinary Catheter, Date Placed:   [ ] Necessity of urinary, arterial, and venous catheters discussed    Labs:    CBC (05-02 @ 02:55)                              7.9<L>                         14.61<H>  )----------------(  251        --    % Neutrophils, --    % Lymphocytes, ANC: --                                  23.0<L>    BMP (05-02 @ 02:55)             139     |  102     |  26<H> 		Ca++ --      Ca 8.3<L>             ---------------------------------( 84    		Mg 1.90               3.4<L>  |  20<L>   |  2.06<H>			Ph 2.4<L>  BMP (05-01 @ 06:00)             140     |  103     |  56<H> 		Ca++ --      Ca 8.7                ---------------------------------( 66<L> 		Mg 2.50               3.5     |  18<L>   |  3.93<H>			Ph 4.2       LFTs (05-02 @ 02:55)      TPro 7.0 / Alb 2.4<L> / TBili 3.3<H> / DBili -- / AST 36<H> / ALT 23 / AlkPhos 201<H>    Coags (05-02 @ 02:55)  aPTT 29.1 / INR 1.26<H> / PT 14.7<H>      ABG (05-02 @ 09:30)     7.43 / 36 / 154<H> / 24 / -0.3 / 99.8<H>%     Lactate:    ABG (05-02 @ 02:55)     7.50<H> / 32 / 159<H> / 25 / 1.9 / 99.9<H>%     Lactate:

## 2023-05-02 NOTE — PROGRESS NOTE ADULT - CRITICAL CARE SERVICES PROVIDED
Patient is critically ill, requiring critical care services.

## 2023-05-02 NOTE — PROGRESS NOTE ADULT - ATTENDING COMMENTS
I agree with the detailed interval history, physical, and plan, which I have reviewed and edited where appropriate'; also agree with notes/assessment with my team on service.  I have personally examined the patient.  I was physically present for the key portions of the evaluation and management (E/M) service provided.  I reviewed all the pertinent data.  The patient is a critical care patient with life threatening hemodynamic and metabolic instability in SICU.  The SICU team has a constant risk benefit analyzes discussion and coordinating care with the primary team and all consultants.   The patient is in SICU with the chief complaint and diagnosis mentioned in the note.   The plan will be specified in the note.  48 year old female with necrotizing pancreatitis; s/p trach in SICU in respiratory failure  NEURO  awake,  RESPIRATORY  Mode: AC  Coarse BS  CARDIOVASCULAR  RR  GI/NUTRITION  ABDOMEN: soft, nontender, distended,   PLAN  Neuro:   - Tylenol   Resp:  - AC 18/460/5/30  - SBT daily  CV:  - Midodrine   GI:   -Protonix  Renal:  - HD   Heme:   - SCDs and SQH  ID:   - Ertapenem   -Caspo  Endo:   - Lantus 14, admelog  - ISS    Disposition: SICU

## 2023-05-02 NOTE — PROGRESS NOTE ADULT - CRITICAL CARE SERVICES
80
105
67
33
45
69
75
120
67
69
75
105
105
135
55
67
69
105
50
67
67
69
77
105
69
77
50
68
69
70
75
60
69
69
77

## 2023-05-02 NOTE — CONSULT NOTE ADULT - SUBJECTIVE AND OBJECTIVE BOX
Vascular & Interventional Radiology Brief Consult Note    Evaluate for Procedure: Drainage of pancreatic fluid collection    HPI: 48y Female with necrotizing pancreatitis and persistent intra-abdominal fluid.     Allergies: No Known Allergies    Medications (Abx/Cardiac/Anticoagulation/Blood Products)    caspofungin IVPB: 70 milliGRAM(s) IV Intermittent (05-02 @ 10:02)  ertapenem  IVPB: 100 mL/Hr IV Intermittent (05-02 @ 12:27)  heparin   Injectable: 5000 Unit(s) SubCutaneous (05-02 @ 14:12)  midodrine: 40 milliGRAM(s) Oral (05-02 @ 14:13)  phenylephrine    Infusion: 13.3 mL/Hr IV Continuous (05-01 @ 22:38)    Data:    T(C): 37.7  HR: 93  BP: 92/61  RR: 18  SpO2: 100%    -WBC 14.61 / HgB 7.9 / Hct 23.0 / Plt 251  -Na 139 / Cl 102 / BUN 26 / Glucose 84  -K 3.4 / CO2 20 / Cr 2.06  -ALT 23 / Alk Phos 201 / T.Bili 3.3  -INR1.26    Imaging: Relevant imaging reviewed.    Plan:  - Will tentatively plan to drain pancreatic fluid collection tomorrow provided no contraindication arise.  - Please place order for IR Procedure, approving attending Dr. Hernandez  - NPO past midnight  - hold therapeutic and prophylactic anticoagulants  - maintain active type and screen x 2  - maintain active covid screening  - Please draw AM labs - CBC/INR/BMP    Jordon Degroot MD  Interventional Radiology PGY6    -EMERGENT: [facility] IR Pager: [phone].  [facility] IR Pager: [phone] [id number]  -Nonemergent consults:  place sunrise order "Consult- Interventional Radiology"  -Available on [employer] TEAMS for questions

## 2023-05-03 NOTE — CHART NOTE - NSCHARTNOTEFT_GEN_A_CORE
Patient being seen for malnutrition follow up. Spoke with RN and obtained subjective information from extensive chart review.     Diet, NPO (05-03-23 @ 05:28)    Current Weight Trend: 96 kg (5/3), 97.8 kg (5/1), 97.4 kg (4/29), 91.6 kg (4/27), 98.6 kg (4/24) 93.9 kg (4/22), 100.4 kg (4/20), 98.2 kg (4/19), 107.2 kg (4/17), 105.7 kg (4/16), 106.9kg (4/14), 118 kg (4/10), 120.9 kg (4/8), 121.4 kg (4/6), 124.5 kg (4/4), 146.6 kg (4/2), 132.1 kg (3/31)  Height (cm): 162.6   Admit Weight (kg): 117.9   BMI (kg/m2): 44.6   IBW (kg): 56.8    Nutrition Interval Events: Pt s/p Trach placement 5/1. Spoke with RN - TF being held and pt NPO for pending IR procedure. TF to be restarted after procedure. Rectal tube output is green/yellow with 400 mL over the past 24 hrs. Nephrology continues to follow for oliguric CHATO with CRRT transitioned to intermittent HD. Suspected DTI of tip of nose noted to now be healing. Weight trend reflective of edema presently 2+ generalized but is concerning as current wt is below admit wt by 21.9 kg with fluid accumulation identified. Strongly suggest increasing rate of TF to slow the decline of weight loss. Once medically appropriate, recommend continuing TF and advancing to new goal rate of 50 mL/hr x 24 hrs which will offer 1800 kcals, 82 gms protein, 924 mL free H2O in 1200 mL total volume. Prosource x 3/day (45 gms protein) can continue to help pt to meet her estimated protein need. Enteral recommendation will be giving pt 19 kcals/kg and 1.3 gms protein/kg of lowest recorded wt - 96 kg. FS over the past 24 hrs 117 - 151 mg/dl with Lantus and Admelog insulins ordered for coverage. RDN services to remain available as needed.     __________________ Pertinent Medications__________________   MEDICATIONS  (STANDING):  acetaminophen   IVPB .. 1000 milliGRAM(s) IV Intermittent every 6 hours  caspofungin IVPB 50 milliGRAM(s) IV Intermittent every 24 hours  chlorhexidine 0.12% Liquid 15 milliLiter(s) Oral Mucosa every 12 hours  chlorhexidine 2% Cloths 1 Application(s) Topical daily  dextrose 5% + lactated ringers. 1000 milliLiter(s) (50 mL/Hr) IV Continuous <Continuous>  dextrose 5%. 1000 milliLiter(s) (100 mL/Hr) IV Continuous <Continuous>  ertapenem  IVPB 500 milliGRAM(s) IV Intermittent every 24 hours  gabapentin Solution 100 milliGRAM(s) Oral three times a day  glucagon  Injectable 1 milliGRAM(s) IntraMuscular once  insulin glargine Injectable (LANTUS) 14 Unit(s) SubCutaneous at bedtime  insulin lispro (ADMELOG) corrective regimen sliding scale   SubCutaneous every 6 hours  insulin lispro Injectable (ADMELOG) 3 Unit(s) SubCutaneous every 6 hours  levothyroxine Injectable 20 MICROGram(s) IV Push at bedtime  midodrine 40 milliGRAM(s) Oral every 8 hours  multivitamin/minerals/iron Oral Solution (CENTRUM) 15 milliLiter(s) Oral daily  pancrelipase  (CREON 12,000 Lipase Units) 1 Capsule(s) Oral <User Schedule>  pantoprazole   Suspension 40 milliGRAM(s) Oral daily    __________________ Pertinent Labs__________________   05-03 Na 139 mmol/L Glu 118 mg/dL<H> K+ 3.3 mmol/L<L> Cr 3.20 mg/dL<H> BUN 40 mg/dL<H> Phos 2.8 mg/dL  05-03 @ 05:10 POCT 122 mg/dL  05-02 @ 22:53 POCT 143 mg/dL  05-02 @ 17:40 POCT 151 mg/dL  05-02 @ 12:15 POCT 190 mg/dL      Skin: healing DTI of nose    Estimated Needs:     25-30 kcals/kg =6255-2814 kcals/d (of upper 10% of IBW - 62.3 kg)  2.0g protein/kg = -125 protein/d (of upper 10% of IBW - 62.3 kg)        Previous Nutrition Diagnoses:     Severe Malnutrition   Increased Nutrient Needs     Nutrition Diagnoses are [X] ongoing       Goal(s):  1. Patient to meet > 75% estimated energy needs    Recommendations:   1. Please resume TF when medically appropriate and advance to new goal rate of 50 mL/hr x 24 hrs with No Carb Prosource x 3/day.    Monitoring and Evaluation:   1. Monitor weights, labs, BMs, skin integrity, enteral tolerance and edema.  2. RD services to remain available.     Mar Fung, MS, RDN, CDN, CNSC on [employer] TEAMS or Pager #[phone].

## 2023-05-03 NOTE — PROCEDURE NOTE - PROCEDURE FINDINGS AND DETAILS
- Successful drainage of abdominal fluid collection with 12fr drain yielding 1L of cloudy brown fluid.  - Full report to follow

## 2023-05-03 NOTE — PROGRESS NOTE ADULT - ASSESSMENT
48 year old female with a PMHx of HTN, hypothyroidism,  (30 years ago) and breast cancer (S/P left mastectomy and chemo in ) who presented with epigastric pain and left upper quadrant pain.  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  Transferred from OSH ([place]) for hypotension and tachypnea requiring emergent intubation and multiple pressors.  SICU consulted for hemodynamic monitoring and respiratory management.  Intubated, pressors, CRRT, now with loculated abdominal collections s/p trache     PLAN  Neuro:   - no sedation  - Tylenol for pain control     Resp:  - trach: /  - SBT daily    CV:  Mixed shock state 2/2 septic shock w/ E. Coli in urine vs hypovolemic shock now s/p 11L resuscitation   - Midodrine 40 Q8 hours  -restarted on Gordon; wean as tolerated     GI:   - TF restarted  - Ulcer ppx w/ Protonix qd  - Creon  - repeat CT A/P , GI recs: collections not mature enough for axios stent   - repeat CT A/P : incomplete wall off collection, IR unable to drain at this time, GI unable to place stent   -Repeat CT A/P  prelim read pending     Renal:  - ARF 2/2 shock state resulting in ATN requiring CRRT, started on iHD   - Failed lasix challenge    - daily bladder scans  - HD per renal, last HD  -900     Heme:   - SCDs and SQH  - HIT Ab positive; DOUGLAS Negative    ID:   Intermittently febrile w/ leukocytosis c/f septic shock 2/2 E coli UTI  Diagnostics:  - 3/22 Ucx: E coli  - 3/30 Bcx: staph hominis in aerobic bottle x 1 (likely contaminant)   - S/p Meropenem (3/26-, -)  - Ertapenem ( -  - pancreatic fluid cultures, prelim positive for gram + cocci and yeast  - will start Caspo    Endo:   - Lantus 14, admelog  - c/w synthroid 20 IV (home med)   - ISS    Disposition: SICU

## 2023-05-03 NOTE — PROGRESS NOTE ADULT - SUBJECTIVE AND OBJECTIVE BOX
SICU PM PROGRESS NOTE    EVENTS:  -ARELY    MEDICATIONS  (STANDING):  chlorhexidine 0.12% Liquid 15 milliLiter(s) Oral Mucosa every 12 hours  chlorhexidine 2% Cloths 1 Application(s) Topical daily  dextrose 5%. 1000 milliLiter(s) (100 mL/Hr) IV Continuous <Continuous>  ertapenem  IVPB 500 milliGRAM(s) IV Intermittent every 24 hours  gabapentin Solution 100 milliGRAM(s) Oral three times a day  glucagon  Injectable 1 milliGRAM(s) IntraMuscular once  heparin   Injectable 5000 Unit(s) SubCutaneous every 8 hours  insulin lispro (ADMELOG) corrective regimen sliding scale   SubCutaneous every 6 hours  insulin lispro Injectable (ADMELOG) 3 Unit(s) SubCutaneous every 6 hours  levothyroxine Injectable 20 MICROGram(s) IV Push at bedtime  metoclopramide Injectable 10 milliGRAM(s) IV Push every 8 hours  midodrine 40 milliGRAM(s) Oral every 8 hours  multivitamin/minerals/iron Oral Solution (CENTRUM) 15 milliLiter(s) Oral daily  pancrelipase  (CREON 12,000 Lipase Units) 1 Capsule(s) Oral <User Schedule>  pantoprazole   Suspension 40 milliGRAM(s) Oral daily  phenylephrine    Infusion 0.3 MICROgram(s)/kG/Min (13.3 mL/Hr) IV Continuous <Continuous>  polyethylene glycol 3350 17 Gram(s) Oral daily    MEDICATIONS  (PRN):  acetaminophen   Oral Liquid .. 650 milliGRAM(s) Oral every 8 hours PRN Mild Pain (1 - 3)    ICU Vital Signs Last 24 Hrs  T(C): 38.3 (05-02-23 @ 12:00), Max: 38.3 (05-02-23 @ 12:00)  HR: 94 (05-02-23 @ 15:10) (81 - 121)  BP: --  ABP: 103/68 (05-02-23 @ 14:00) (86/53 - 134/71)  ABP(mean): 83 (05-02-23 @ 14:00) (68 - 98)  RR: 17 (05-02-23 @ 14:00) (17 - 26)  SpO2: 100% (05-02-23 @ 15:10) (95% - 100%)  Wt(kg): --  CVP(mm Hg): --      05-01 @ 07:01 - 05-02 @ 07:00  --------------------------------------------------------  IN:    Enteral Tube Flush: 50 mL    IV PiggyBack: 50 mL    Lactated Ringers Bolus: 1000 mL    Other (mL): 400 mL    Peptamen A.F.: 675 mL    Phenylephrine: 57 mL  Total IN: 2232 mL    OUT:    Drain (mL): 1500 mL    Nasogastric/Oral tube (mL): 200 mL    Other (mL): 1300 mL    Rectal Tube (mL): 400 mL  Total OUT: 3400 mL    Total NET: -1168 mL      05-02 @ 07:01 - 05-02 @ 15:18  --------------------------------------------------------  IN:    IV PiggyBack: 400 mL    Peptamen A.F.: 315 mL  Total IN: 715 mL    OUT:  Total OUT: 0 mL    Total NET: 715 mL      NEURO  awake, trach, nods head yes and no    RESPIRATORY  Mode: AC/ CMVRR (machine): 18 TV (machine): 460 FiO2: 30 PEEP: 5  CTA B/L    CARDIOVASCULAR  S1, S2,     GI/NUTRITION  Diet: NPO with TFs  ABDOMEN: soft, nontender, distended, +/NGT with bilious output, tolerating post pyloric tube feeds      ACCESS DEVICES:  [x] Peripheral IV  [x] Central Venous Line - juan a	[ ] R	[x] L	[x] IJ	[ ] Fem	[ ] SC	Placed:   [x] Arterial Line		[x] R	[ ] L	[ ] Fem	[ ] Rad	[x] Ax	Placed:   [ ] PICC:					[ ] Mediport  [ ] Urinary Catheter, Date Placed:   [ ] Necessity of urinary, arterial, and venous catheters discussed    Labs:    CBC (05-02 @ 02:55)                              7.9<L>                         14.61<H>  )----------------(  251        --    % Neutrophils, --    % Lymphocytes, ANC: --                                  23.0<L>    BMP (05-02 @ 02:55)             139     |  102     |  26<H> 		Ca++ --      Ca 8.3<L>             ---------------------------------( 84    		Mg 1.90               3.4<L>  |  20<L>   |  2.06<H>			Ph 2.4<L>  BMP (05-01 @ 06:00)             140     |  103     |  56<H> 		Ca++ --      Ca 8.7                ---------------------------------( 66<L> 		Mg 2.50               3.5     |  18<L>   |  3.93<H>			Ph 4.2       LFTs (05-02 @ 02:55)      TPro 7.0 / Alb 2.4<L> / TBili 3.3<H> / DBili -- / AST 36<H> / ALT 23 / AlkPhos 201<H>    Coags (05-02 @ 02:55)  aPTT 29.1 / INR 1.26<H> / PT 14.7<H>      ABG (05-02 @ 09:30)     7.43 / 36 / 154<H> / 24 / -0.3 / 99.8<H>%     Lactate:    ABG (05-02 @ 02:55)     7.50<H> / 32 / 159<H> / 25 / 1.9 / 99.9<H>%     Lactate:           EVENTS:  -ARELY  - IR procedure on hold (GI versus IR intervention)  - HD today    MEDICATIONS  (STANDING):  chlorhexidine 0.12% Liquid 15 milliLiter(s) Oral Mucosa every 12 hours  chlorhexidine 2% Cloths 1 Application(s) Topical daily  dextrose 5%. 1000 milliLiter(s) (100 mL/Hr) IV Continuous <Continuous>  ertapenem  IVPB 500 milliGRAM(s) IV Intermittent every 24 hours  gabapentin Solution 100 milliGRAM(s) Oral three times a day  glucagon  Injectable 1 milliGRAM(s) IntraMuscular once  heparin   Injectable 5000 Unit(s) SubCutaneous every 8 hours  insulin lispro (ADMELOG) corrective regimen sliding scale   SubCutaneous every 6 hours  insulin lispro Injectable (ADMELOG) 3 Unit(s) SubCutaneous every 6 hours  levothyroxine Injectable 20 MICROGram(s) IV Push at bedtime  metoclopramide Injectable 10 milliGRAM(s) IV Push every 8 hours  midodrine 40 milliGRAM(s) Oral every 8 hours  multivitamin/minerals/iron Oral Solution (CENTRUM) 15 milliLiter(s) Oral daily  pancrelipase  (CREON 12,000 Lipase Units) 1 Capsule(s) Oral <User Schedule>  pantoprazole   Suspension 40 milliGRAM(s) Oral daily  phenylephrine    Infusion 0.3 MICROgram(s)/kG/Min (13.3 mL/Hr) IV Continuous <Continuous>  polyethylene glycol 3350 17 Gram(s) Oral daily    MEDICATIONS  (PRN):  acetaminophen   Oral Liquid .. 650 milliGRAM(s) Oral every 8 hours PRN Mild Pain (1 - 3)    ICU Vital Signs Last 24 Hrs  T(C): 38.3 (05-02-23 @ 12:00), Max: 38.3 (05-02-23 @ 12:00)  HR: 94 (05-02-23 @ 15:10) (81 - 121)  BP: --  ABP: 103/68 (05-02-23 @ 14:00) (86/53 - 134/71)  ABP(mean): 83 (05-02-23 @ 14:00) (68 - 98)  RR: 17 (05-02-23 @ 14:00) (17 - 26)  SpO2: 100% (05-02-23 @ 15:10) (95% - 100%)  Wt(kg): --  CVP(mm Hg): --      05-01 @ 07:01  -  05-02 @ 07:00  --------------------------------------------------------  IN:    Enteral Tube Flush: 50 mL    IV PiggyBack: 50 mL    Lactated Ringers Bolus: 1000 mL    Other (mL): 400 mL    Peptamen A.F.: 675 mL    Phenylephrine: 57 mL  Total IN: 2232 mL    OUT:    Drain (mL): 1500 mL    Nasogastric/Oral tube (mL): 200 mL    Other (mL): 1300 mL    Rectal Tube (mL): 400 mL  Total OUT: 3400 mL    Total NET: -1168 mL      05-02 @ 07:01  -  05-02 @ 15:18  --------------------------------------------------------  IN:    IV PiggyBack: 400 mL    Peptamen A.F.: 315 mL  Total IN: 715 mL    OUT:  Total OUT: 0 mL    Total NET: 715 mL      NEURO  awake, trach, nods head yes and no    RESPIRATORY  Mode: AC/ CMVRR (machine): 18 TV (machine): 460 FiO2: 30 PEEP: 5  CTA B/L    CARDIOVASCULAR  S1, S2,     GI/NUTRITION  Diet: NPO with TFs  ABDOMEN: soft, nontender, distended, +/NGT with bilious output, tolerating post pyloric tube feeds      ACCESS DEVICES:  [x] Peripheral IV  [x] Central Venous Line - juan a	[ ] R	[x] L	[x] IJ	[ ] Fem	[ ] SC	Placed:   [x] Arterial Line		[x] R	[ ] L	[ ] Fem	[ ] Rad	[x] Ax	Placed:   [ ] PICC:					[ ] Mediport  [ ] Urinary Catheter, Date Placed:   [ ] Necessity of urinary, arterial, and venous catheters discussed    Labs:    CBC (05-02 @ 02:55)                              7.9<L>                         14.61<H>  )----------------(  251        --    % Neutrophils, --    % Lymphocytes, ANC: --                                  23.0<L>    BMP (05-02 @ 02:55)             139     |  102     |  26<H> 		Ca++ --      Ca 8.3<L>             ---------------------------------( 84    		Mg 1.90               3.4<L>  |  20<L>   |  2.06<H>			Ph 2.4<L>  BMP (05-01 @ 06:00)             140     |  103     |  56<H> 		Ca++ --      Ca 8.7                ---------------------------------( 66<L> 		Mg 2.50               3.5     |  18<L>   |  3.93<H>			Ph 4.2       LFTs (05-02 @ 02:55)      TPro 7.0 / Alb 2.4<L> / TBili 3.3<H> / DBili -- / AST 36<H> / ALT 23 / AlkPhos 201<H>    Coags (05-02 @ 02:55)  aPTT 29.1 / INR 1.26<H> / PT 14.7<H>      ABG (05-02 @ 09:30)     7.43 / 36 / 154<H> / 24 / -0.3 / 99.8<H>%     Lactate:    ABG (05-02 @ 02:55)     7.50<H> / 32 / 159<H> / 25 / 1.9 / 99.9<H>%     Lactate:

## 2023-05-03 NOTE — PROGRESS NOTE ADULT - SUBJECTIVE AND OBJECTIVE BOX
Surgery Progress Note     24hour Events:   - ARELY    Subjective:  Patient seen and examined.       Vital Signs:  Vital Signs Last 24 Hrs  T(C): 38.5 (03 May 2023 04:00), Max: 38.5 (03 May 2023 04:00)  T(F): 101.3 (03 May 2023 04:00), Max: 101.3 (03 May 2023 04:00)  HR: 100 (03 May 2023 06:51) (86 - 121)  BP: 100/66 (03 May 2023 04:00) (92/61 - 100/66)  BP(mean): 77 (03 May 2023 04:00) (71 - 77)  RR: 18 (03 May 2023 06:00) (17 - 26)  SpO2: 100% (03 May 2023 06:51) (95% - 100%)    Parameters below as of 03 May 2023 06:00  Patient On (Oxygen Delivery Method): ventilator    O2 Concentration (%): 30    CAPILLARY BLOOD GLUCOSE      POCT Blood Glucose.: 122 mg/dL (03 May 2023 05:10)  POCT Blood Glucose.: 143 mg/dL (02 May 2023 22:53)  POCT Blood Glucose.: 151 mg/dL (02 May 2023 17:40)  POCT Blood Glucose.: 190 mg/dL (02 May 2023 12:15)      I&O's Detail    02 May 2023 07:01  -  03 May 2023 07:00  --------------------------------------------------------  IN:    dextrose 5% + lactated ringers: 50 mL    IV PiggyBack: 500 mL    Peptamen A.F.: 990 mL  Total IN: 1540 mL    OUT:    Nasogastric/Oral tube (mL): 800 mL    Rectal Tube (mL): 400 mL  Total OUT: 1200 mL    Total NET: 340 mL            Physical Exam:  General: NAD, resting comfortably in bed, nodding yes/no to questioning  HEENT: Normocephalic atraumatic  Neck: s/p trach  Respiratory: Nonlabored respirations, normal CW expansion, mechanical ventilation AC  Mode: AC/ CMV (Assist Control/ Continuous Mandatory Ventilation)  RR (machine): 18  TV (machine): 460  FiO2: 30  PEEP: 5  ITime: 0.88  MAP: 10  PIP: 27  Abdomen: softly distended, nontender, rectal tube with liquid stool, NGT with blood tinged, bilious output, post pyloric NJ tube w/ feeds  Vascular: extremities are warm and well perfused.       Labs:    05-03    139  |  101  |  40<H>  ----------------------------<  118<H>  3.3<L>   |  21<L>  |  3.20<H>    Ca    8.4      03 May 2023 02:00  Phos  2.8     05-03  Mg     2.20     05-03    TPro  6.6  /  Alb  2.2<L>  /  TBili  2.8<H>  /  DBili  x   /  AST  34<H>  /  ALT  19  /  AlkPhos  184<H>  05-03    LIVER FUNCTIONS - ( 03 May 2023 02:00 )  Alb: 2.2 g/dL / Pro: 6.6 g/dL / ALK PHOS: 184 U/L / ALT: 19 U/L / AST: 34 U/L / GGT: x                                 7.1    13.45 )-----------( 251      ( 03 May 2023 02:00 )             20.9     PT/INR - ( 03 May 2023 02:00 )   PT: 14.8 sec;   INR: 1.27 ratio         PTT - ( 03 May 2023 02:00 )  PTT:29.3 sec

## 2023-05-03 NOTE — PROGRESS NOTE ADULT - SUBJECTIVE AND OBJECTIVE BOX
[facility] DIVISION OF KIDNEY DISEASES AND HYPERTENSION   FOLLOW UP NOTE    --------------------------------------------------------------------------------  Chief Complaint: Oliguric CHATO, on HD    24 hour events/subjective: Pt. seen and examined in the SICU today. Pt. currently intubated via tracheostomy and on MV. Unable to obtain ROS. Last HD was done on 5/1/23.     PAST HISTORY  --------------------------------------------------------------------------------  No significant changes to PMH, PSH, FHx, SHx, unless otherwise noted    ALLERGIES & MEDICATIONS  --------------------------------------------------------------------------------  Allergies  No Known Allergies    Intolerances    Standing Inpatient Medications  acetaminophen   IVPB .. 1000 milliGRAM(s) IV Intermittent every 6 hours  caspofungin IVPB 50 milliGRAM(s) IV Intermittent every 24 hours  chlorhexidine 0.12% Liquid 15 milliLiter(s) Oral Mucosa every 12 hours  chlorhexidine 2% Cloths 1 Application(s) Topical daily  dextrose 5% + lactated ringers. 1000 milliLiter(s) IV Continuous <Continuous>  dextrose 5%. 1000 milliLiter(s) IV Continuous <Continuous>  ertapenem  IVPB 500 milliGRAM(s) IV Intermittent every 24 hours  gabapentin Solution 100 milliGRAM(s) Oral three times a day  glucagon  Injectable 1 milliGRAM(s) IntraMuscular once  insulin glargine Injectable (LANTUS) 14 Unit(s) SubCutaneous at bedtime  insulin lispro (ADMELOG) corrective regimen sliding scale   SubCutaneous every 6 hours  insulin lispro Injectable (ADMELOG) 3 Unit(s) SubCutaneous every 6 hours  levothyroxine Injectable 20 MICROGram(s) IV Push at bedtime  midodrine 40 milliGRAM(s) Oral every 8 hours  multivitamin/minerals/iron Oral Solution (CENTRUM) 15 milliLiter(s) Oral daily  pancrelipase  (CREON 12,000 Lipase Units) 1 Capsule(s) Oral <User Schedule>  pantoprazole   Suspension 40 milliGRAM(s) Oral daily    PRN Inpatient Medications    REVIEW OF SYSTEMS  --------------------------------------------------------------------------------  Unable to obtain ROS    VITALS/PHYSICAL EXAM  --------------------------------------------------------------------------------  T(C): 38.9 (05-03-23 @ 08:00), Max: 38.9 (05-03-23 @ 08:00)  HR: 97 (05-03-23 @ 10:20) (86 - 108)  BP: 97/74 (05-03-23 @ 08:00) (92/61 - 100/66)  RR: 18 (05-03-23 @ 10:00) (16 - 25)  SpO2: 100% (05-03-23 @ 10:20) (98% - 100%)  Wt(kg): --    05-02-23 @ 07:01  -  05-03-23 @ 07:00  --------------------------------------------------------  IN: 1540 mL / OUT: 1200 mL / NET: 340 mL    Physical Exam:  	Gen: Appears critically ill  	HEENT: Anicteric, trach+  	Pulm: Fair entry B/L on CMV via ETT+  	CV: S1S2+  	Abd: Obese, soft  	Ext: B/L LE edema   	Neuro: opens eyes, unable to provide ROS  	Skin: Warm and dry              Dialysis access: Right IJ non tunneled HD catheter+    LABS/STUDIES  --------------------------------------------------------------------------------              7.1    13.45 >-----------<  251      [05-03-23 @ 02:00]              20.9     139  |  101  |  40  ----------------------------<  118      [05-03-23 @ 02:00]  3.3   |  21  |  3.20        Ca     8.4     [05-03-23 @ 02:00]      Mg     2.20     [05-03-23 @ 02:00]      Phos  2.8     [05-03-23 @ 02:00]    Creatinine Trend:  SCr 3.20 [05-03 @ 02:00]  SCr 2.06 [05-02 @ 02:55]  SCr 3.93 [05-01 @ 06:00]  SCr 3.84 [05-01 @ 01:41]  SCr 3.00 [04-30 @ 01:00]    HBsAb <3.0      [04-26-23 @ 16:30]  HBcAb Nonreact      [04-26-23 @ 16:30]  HCV 0.22, Nonreact      [04-26-23 @ 16:30] [facility] DIVISION OF KIDNEY DISEASES AND HYPERTENSION   FOLLOW UP NOTE    --------------------------------------------------------------------------------  Chief Complaint: Oliguric CHATO, on HD    24 hour events/subjective: Pt. seen and examined in the SICU today. Pt. currently on CMV via tracheostomy. Unable to obtain ROS. Last HD was done on 5/1/23.     PAST HISTORY  --------------------------------------------------------------------------------  No significant changes to PMH, PSH, FHx, SHx, unless otherwise noted    ALLERGIES & MEDICATIONS  --------------------------------------------------------------------------------  Allergies  No Known Allergies    Intolerances    Standing Inpatient Medications  acetaminophen   IVPB .. 1000 milliGRAM(s) IV Intermittent every 6 hours  caspofungin IVPB 50 milliGRAM(s) IV Intermittent every 24 hours  chlorhexidine 0.12% Liquid 15 milliLiter(s) Oral Mucosa every 12 hours  chlorhexidine 2% Cloths 1 Application(s) Topical daily  dextrose 5% + lactated ringers. 1000 milliLiter(s) IV Continuous <Continuous>  dextrose 5%. 1000 milliLiter(s) IV Continuous <Continuous>  ertapenem  IVPB 500 milliGRAM(s) IV Intermittent every 24 hours  gabapentin Solution 100 milliGRAM(s) Oral three times a day  glucagon  Injectable 1 milliGRAM(s) IntraMuscular once  insulin glargine Injectable (LANTUS) 14 Unit(s) SubCutaneous at bedtime  insulin lispro (ADMELOG) corrective regimen sliding scale   SubCutaneous every 6 hours  insulin lispro Injectable (ADMELOG) 3 Unit(s) SubCutaneous every 6 hours  levothyroxine Injectable 20 MICROGram(s) IV Push at bedtime  midodrine 40 milliGRAM(s) Oral every 8 hours  multivitamin/minerals/iron Oral Solution (CENTRUM) 15 milliLiter(s) Oral daily  pancrelipase  (CREON 12,000 Lipase Units) 1 Capsule(s) Oral <User Schedule>  pantoprazole   Suspension 40 milliGRAM(s) Oral daily    PRN Inpatient Medications    REVIEW OF SYSTEMS  --------------------------------------------------------------------------------  Unable to obtain ROS    VITALS/PHYSICAL EXAM  --------------------------------------------------------------------------------  T(C): 38.9 (05-03-23 @ 08:00), Max: 38.9 (05-03-23 @ 08:00)  HR: 97 (05-03-23 @ 10:20) (86 - 108)  BP: 97/74 (05-03-23 @ 08:00) (92/61 - 100/66)  RR: 18 (05-03-23 @ 10:00) (16 - 25)  SpO2: 100% (05-03-23 @ 10:20) (98% - 100%)  Wt(kg): --    05-02-23 @ 07:01  -  05-03-23 @ 07:00  --------------------------------------------------------  IN: 1540 mL / OUT: 1200 mL / NET: 340 mL    Physical Exam:  	Gen: Ill appearing  	HEENT: Trach+  	Pulm: Fair entry B/L  	CV: S1S2+  	Abd: Obese, soft  	Ext: B/L LE edema   	Neuro: Opens eyes, unable to provide ROS  	Skin: Warm and dry              Dialysis access: Right IJ non tunneled HD catheter+    LABS/STUDIES  --------------------------------------------------------------------------------              7.1    13.45 >-----------<  251      [05-03-23 @ 02:00]              20.9     139  |  101  |  40  ----------------------------<  118      [05-03-23 @ 02:00]  3.3   |  21  |  3.20        Ca     8.4     [05-03-23 @ 02:00]      Mg     2.20     [05-03-23 @ 02:00]      Phos  2.8     [05-03-23 @ 02:00]    Creatinine Trend:  SCr 3.20 [05-03 @ 02:00]  SCr 2.06 [05-02 @ 02:55]  SCr 3.93 [05-01 @ 06:00]  SCr 3.84 [05-01 @ 01:41]  SCr 3.00 [04-30 @ 01:00]    HBsAb <3.0      [04-26-23 @ 16:30]  HBcAb Nonreact      [04-26-23 @ 16:30]  HCV 0.22, Nonreact      [04-26-23 @ 16:30]

## 2023-05-03 NOTE — PROVIDER CONTACT NOTE (HYPOGLYCEMIA EVENT) - NS PROVIDER CONTACT CONTRIBUTING FACTORS OF EPISODE
Tube feeding
Patient NPO greater than 8 hours/Tube feeding/Previous finger stick less than 100 mg/dL

## 2023-05-03 NOTE — PROGRESS NOTE ADULT - ASSESSMENT
Pt. with oliguric CHATO, currently dialysis dependent.  Pt. with oliguric CHATO, currently dialysis dependent.

## 2023-05-03 NOTE — PRE PROCEDURE NOTE - PRE PROCEDURE EVALUATION
------------------------------------------------------------  Interventional Radiology Pre-Procedure Note  ------------------------------------------------------------  Procedure:     Indication: 48y Female with gallstone necrotizing pancreatitis and symptomatic infected abdominal fluid collections    Past Medical History:  Hypertension    Hypothyroidism    Breast cancer        Allergies: No Known Allergies      Medications:    caspofungin IVPB: 260 mL/Hr IV Intermittent (05-03-23 @ 09:56)  caspofungin IVPB: 70 milliGRAM(s) IV Intermittent (05-02-23 @ 10:02)  ertapenem  IVPB: 100 mL/Hr IV Intermittent (05-03-23 @ 12:10)  heparin   Injectable: 5000 Unit(s) SubCutaneous (05-03-23 @ 05:16)  midodrine: 40 milliGRAM(s) Oral (05-03-23 @ 14:43)  phenylephrine    Infusion: 13.3 mL/Hr IV Continuous (05-01-23 @ 22:38)      Vital Signs:   T(F): 101.3 (12:00), Max: 102 (08:00)  HR: 99 (12:00)  BP: 98/65 (12:00)  RR: 18 (12:00)  SpO2: 100% (11:00)    Labs:           7.1  13.45)-----(251     (05-03-23 @ 02:00)         20.9     139 | 101 | 40  --------------------< 118     (05-03-23 @ 02:00)  3.3 | 21 | 3.20       PT: 14.8<H> 05-03-23 @ 02:00  aPTT: 29.3 05-03-23 @ 02:00   INR: 1.27<H> 05-03-23 @ 02:00    Imaging:  < from: CT Abdomen and Pelvis w/ IV Cont (05.02.23 @ 13:43) >  ACC: [id number] EXAM:  CT ABDOMEN AND PELVIS IC   ORDERED BY: MAHESH DEMI     PROCEDURE DATE:  05/02/2023          INTERPRETATION:  CLINICAL INFORMATION: Necrotizing pancreatitis.   Follow-up fluid collection.    COMPARISON: CT abdomen pelvis dated3/22/2023 and 4/28/2023.    CONTRAST/COMPLICATIONS:  IV Contrast: Omnipaque 350  90 cc administered   10 cc discarded  Oral Contrast: NONE  Complications: None reported at time of study completion    PROCEDURE:  CT of the Abdomen and Pelvis was performed.  Sagittal and coronal reformats were performed.    FINDINGS:  LOWER CHEST: Bibasilar dependent atelectasis. Moderate left pleural   effusion.    LIVER: Within normal limits.  BILE DUCTS: Normal caliber.  GALLBLADDER: Within normal limits.  SPLEEN: Within normal limits.  PANCREAS: Small remnant of enhancing parenchyma in the posterior   pancreatic head, the remainder is necrotic/liquefied. Associated fluid   collection extends to the splenic hilum, left subphrenic space, lesser   sac, and epigastrium, unchanged in extent since recent CT 4 days ago. No   air is present within the fluid collections.  ADRENALS: Within normal limits.  KIDNEYS/URETERS: Within normal limits.    BLADDER: Within normal limits.  REPRODUCTIVE ORGANS: Uterus and adnexa within normal limits.    BOWEL: Nasogastric tube tip in the body the stomach. Enteric feeding tube   tip in the fourth duodenum. Rectal tube. No bowel obstruction. Appendix   is normal.  PERITONEUM: Partially loculated ascites in both flanks and in the pelvis   with peritoneal enhancement. Edema of the central small bowel mesentery.  VESSELS: Splenic vein appears obliterated. Portal vein and SMV appear   patent. No pseudoaneurysm is identified.  RETROPERITONEUM/LYMPH NODES: No lymphadenopathy.  ABDOMINAL WALL: Within normal limits.  BONES: Within normal limits.    IMPRESSION:  Necrotizing pancreatitis with extensive fluid collections, no substantial   change since CT of 4/28/2023.        --- End of Report ---            KEENAN LEON MD; Attending Radiologist  This document has been electronically signed. May  2 2023  3:29PM    < end of copied text >      Consent: Risks, benefits, and alternatives were discussed and informed consent obtained.    Procedure Plan:   Drainage of abdominal pancreatic fluid  The patient is a candidate for the procedure.      Hira Corbin MD  Interventional Radiology    -Available on [employer] TEAMS for all non-urgent questions  -Emergent issues: [facility]-p.[phone]; [facility]-[phone] ([phone])  -Non-emergent consults: Please place a [place] order "IR Consult" with an appropriate callback number  -Scheduling questions: [facility]: [phone]; [facility]: [phone]  -Clinic/Outpatient booking: [facility]: [phone]; [facility]: [phone]

## 2023-05-03 NOTE — PROGRESS NOTE ADULT - ASSESSMENT
48 year old female with a PMHx of HTN, hypothyroidism,  (30 years ago) and breast cancer (S/P left mastectomy and chemo in ) who presented with epigastric pain and left upper quadrant pain.  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  Transferred from [facility] ([place]) for hypotension and tachypnea requiring emergent intubation and multiple pressors.  SICU consulted for hemodynamic monitoring and respiratory management. s/p trach 23 s/p bedside paracentesis     Plan:   - possible GI intervention possible  - SBT as tolerated  - Diet: TF at goal  - NGT to LCWS  - tolerating HD  - on ertapenem  - Appreciate care per SICU      B Team Surgery  [id number] 48 year old female with a PMHx of HTN, hypothyroidism,  (30 years ago) and breast cancer (S/P left mastectomy and chemo in ) who presented with epigastric pain and left upper quadrant pain.  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  Transferred from [facility] ([place]) for hypotension and tachypnea requiring emergent intubation and multiple pressors.  SICU consulted for hemodynamic monitoring and respiratory management. s/p trach 23 s/p bedside paracentesis     Plan:   - f/u Dr. Benton's input, possibly reach out to GI for intervention  - No IR intervention at this time  - SBT as tolerated  - Diet: TF at goal  - NGT to LCWS  - tolerating HD  - on ertapenem  - Appreciate care per SICU      B Team Surgery  [id number]

## 2023-05-03 NOTE — PROGRESS NOTE ADULT - PROBLEM SELECTOR PLAN 1
Pt. with oliguric CHATO in the setting of necrotizing pancreatitis and shock. Pt. with most likely ATN. Pt. received CRRT earlier during current hospital/SICU stay. Pt. currently on intermittent HD. Pt. oliguric, remains dialysis dependant. Last HD treatment done on 5/1/23. Pt. scheduled for IR procedure today as per RN. Labs reviewed. Plan for HD (after IR procedure) today. Dose meds as per HD. Monitor labs and BP.

## 2023-05-03 NOTE — PROCEDURE NOTE - NSTIMEOUT_GEN_A_CORE
Patient's first and last name, , procedure, and correct site confirmed prior to the start of procedure.
No

## 2023-05-03 NOTE — PROGRESS NOTE ADULT - ATTENDING COMMENTS
Patient overnight remains intubated, s/p bedside drainage, plan for IR drainage  plan  ir drainage  discuss with GI regarding internal drainage  iv abx  care per sicu team    I have personally interviewed and examined this patient, reviewed pertinent labs and imaging, and discussed the case with colleagues, residents, and physician assistants on B Team rounds.    The active care issues are:  1. nec panc    The Acute Care Surgery (B Team) Attending Group Practice:  Dr. Ayden Yip, Dr. Adonis Botello, Dr. Jam Nj, Dr. Sathya Andrade,     urgent issues - spectra [phone]  nonurgent issues - [phone]  patient appointments or afterhours - [phone]

## 2023-05-03 NOTE — PROGRESS NOTE ADULT - ATTENDING COMMENTS
I agree with the detailed interval history, physical, and plan, which I have reviewed and edited where appropriate'; also agree with notes/assessment with my team on service.  I have personally examined the patient.  I was physically present for the key portions of the evaluation and management (E/M) service provided.  I reviewed all the pertinent data.  The patient is a critical care patient with life threatening hemodynamic and metabolic instability in SICU.  The SICU team has a constant risk benefit analyzes discussion and coordinating care with the primary team and all consultants.   The patient is in SICU with the chief complaint and diagnosis mentioned in the note.   The plan will be specified in the note.  48 year old female with necrotizing pancreatitis; s/p trach in SICU.  NEURO  awake  RESPIRATORY  Mode: AC  Coarse BS  CARDIOVASCULAR  RR  ABDOMEN: soft, nontender,   PLAN  Neuro:   - Tylenol   Resp:  - trach: 18/460/5/30  - SBT   CV:  - Midodrine    GI:   -Protonix   Renal:  - HD   Heme:   - SCDs and SQH  ID:   - Ertapenem   - Caspo  Endo:   - Lantus 14  - ISS    Disposition: SICU

## 2023-05-03 NOTE — CHART NOTE - NSCHARTNOTEFT_GEN_A_CORE
Pre-Interventional Radiology Procedure Note    Procedure: Drainage of necrotizing pancreatitis collection    Interventional Radiology Attending Physician:  48 year old female with a PMHx of HTN, hypothyroidism,  (30 years ago) and breast cancer (S/P left mastectomy and chemo in ) who presented with epigastric pain and left upper quadrant pain.  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  Transferred from [facility] ([place] for hypotension and tachypnea requiring emergent intubation and multiple pressors.  SICU consulted for hemodynamic monitoring and respiratory management. s/p 23 trach and bedside paracentesis.    Ordering Attending Physician:    PAST MEDICAL & SURGICAL HISTORY:  Hypertension      Hypothyroidism      Breast cancer      S/P       H/O left mastectomy           CBC Full  -  ( 03 May 2023 02:00 )  WBC Count : 13.45 K/uL  RBC Count : 2.09 M/uL  Hemoglobin : 7.1 g/dL  Hematocrit : 20.9 %  Platelet Count - Automated : 251 K/uL  Mean Cell Volume : 100.0 fL  Mean Cell Hemoglobin : 34.0 pg  Mean Cell Hemoglobin Concentration : 34.0 gm/dL  Auto Neutrophil # : x  Auto Lymphocyte # : x  Auto Monocyte # : x  Auto Eosinophil # : x  Auto Basophil # : x  Auto Neutrophil % : x  Auto Lymphocyte % : x  Auto Monocyte % : x  Auto Eosinophil % : x  Auto Basophil % : x    05-03    139  |  101  |  40<H>  ----------------------------<  118<H>  3.3<L>   |  21<L>  |  3.20<H>    Ca    8.4      03 May 2023 02:00  Phos  2.8     05-  Mg     2.20     05-03    TPro  6.6  /  Alb  2.2<L>  /  TBili  2.8<H>  /  DBili  x   /  AST  34<H>  /  ALT  19  /  AlkPhos  184<H>  05-03    PT/INR - ( 03 May 2023 02:00 )   PT: 14.8 sec;   INR: 1.27 ratio         PTT - ( 03 May 2023 02:00 )  PTT:29.3 sec

## 2023-05-03 NOTE — PROVIDER CONTACT NOTE (HYPOGLYCEMIA EVENT) - NS PROVIDER CONTACT BACKGROUND-HYPO
Age: 48y    Gender: Female    POCT Blood Glucose:  189 mg/dL (05-03-23 @ 12:22)  66 mg/dL (05-03-23 @ 12:03)  64 mg/dL (05-03-23 @ 12:01)  122 mg/dL (05-03-23 @ 05:10)  143 mg/dL (05-02-23 @ 22:53)  151 mg/dL (05-02-23 @ 17:40)      eMAR:  insulin glargine Injectable (LANTUS)   14 Unit(s) SubCutaneous (05-02-23 @ 22:58)    insulin lispro (ADMELOG) corrective regimen sliding scale   2 Unit(s) SubCutaneous (05-02-23 @ 17:44)    insulin lispro Injectable (ADMELOG)   3 Unit(s) SubCutaneous (05-03-23 @ 05:16)   3 Unit(s) SubCutaneous (05-02-23 @ 23:08)   3 Unit(s) SubCutaneous (05-02-23 @ 17:44)    levothyroxine Injectable   20 MICROGram(s) IV Push (05-02-23 @ 22:57)

## 2023-05-04 NOTE — PROCEDURE NOTE - NSPOSTCAREGUIDE_GEN_A_CORE
Verbal/written post procedure instructions were given to patient/caregiver/Instructed patient/caregiver to follow-up with primary care physician/Instructed patient/caregiver regarding signs and symptoms of infection/Keep the cast/splint/dressing clean and dry/Care for catheter as per unit/ICU protocols
Care for catheter as per unit/ICU protocols
Care for catheter as per unit/ICU protocols
Verbal/written post procedure instructions were given to patient/caregiver
Verbal/written post procedure instructions were given to patient/caregiver

## 2023-05-04 NOTE — PROGRESS NOTE ADULT - ASSESSMENT
48 year old female with a PMHx of HTN, hypothyroidism,  (30 years ago) and breast cancer (S/P left mastectomy and chemo in ) who presented with epigastric pain and left upper quadrant pain.  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  Transferred from [facility] ([place]) for hypotension and tachypnea requiring emergent intubation and multiple pressors.  SICU consulted for hemodynamic monitoring and respiratory management.  Intubated, pressors, CRRT, now with loculated abdominal collections s/p trache     PLAN  Neuro:   - no sedation  - Tylenol for pain control     Resp:  - trach: /  - SBT daily    CV:  Mixed shock state 2/2 septic shock w/ E. Coli in urine vs hypovolemic shock now s/p 11L resuscitation   - Midodrine 40 Q8 hours  -restarted on Gordon; wean as tolerated     GI:   - TF restarted  - Ulcer ppx w/ Protonix qd  - Creon  - repeat CT A/P , GI recs: collections not mature enough for axios stent   - repeat CT A/P : incomplete wall off collection, IR unable to drain at this time, GI unable to place stent   -Repeat CT A/P  prelim read pending     Renal:  - ARF 2/2 shock state resulting in ATN requiring CRRT, started on iHD   - Failed lasix challenge    - daily bladder scans  - HD per renal, last HD  -900     Heme:   - SCDs and SQH  - HIT Ab positive; DOUGLAS Negative    ID:   Intermittently febrile w/ leukocytosis c/f septic shock 2/2 E coli UTI  Diagnostics:  - 3/22 Ucx: E coli  - 3/30 Bcx: staph hominis in aerobic bottle x 1 (likely contaminant)   - S/p Meropenem (3/26-, -)  - Ertapenem ( -  - pancreatic fluid cultures, prelim positive for gram + cocci and yeast  - will start Caspo    Endo:   - Lantus 14, admelog  - c/w synthroid 20 IV (home med)   - ISS    Disposition: SICU

## 2023-05-04 NOTE — PROCEDURE NOTE - NSPROCNAME_GEN_A_CORE
Point of Care Ultrasound Cardiac
Point of Care Ultrasound Other
Point of Care Ultrasound Lung
Central Line Insertion
Interventional Radiology
Central Line Insertion
Paracentesis

## 2023-05-04 NOTE — PROGRESS NOTE ADULT - REASON FOR ADMISSION
Nec pancreatitis
Necrotizing pancreatitis
Necrotizing pancreatitis
Nec pancreatitis
Necrotizing pancreatitis
Nec pancreatitis
Necrotizing pancreatitis
Nec pancreatitis
Nec pancreatitis

## 2023-05-04 NOTE — PROGRESS NOTE ADULT - SUBJECTIVE AND OBJECTIVE BOX
EVENTS:  -ARELY      MEDICATIONS  (STANDING):  chlorhexidine 0.12% Liquid 15 milliLiter(s) Oral Mucosa every 12 hours  chlorhexidine 2% Cloths 1 Application(s) Topical daily  dextrose 5%. 1000 milliLiter(s) (100 mL/Hr) IV Continuous <Continuous>  ertapenem  IVPB 500 milliGRAM(s) IV Intermittent every 24 hours  gabapentin Solution 100 milliGRAM(s) Oral three times a day  glucagon  Injectable 1 milliGRAM(s) IntraMuscular once  heparin   Injectable 5000 Unit(s) SubCutaneous every 8 hours  insulin lispro (ADMELOG) corrective regimen sliding scale   SubCutaneous every 6 hours  insulin lispro Injectable (ADMELOG) 3 Unit(s) SubCutaneous every 6 hours  levothyroxine Injectable 20 MICROGram(s) IV Push at bedtime  metoclopramide Injectable 10 milliGRAM(s) IV Push every 8 hours  midodrine 40 milliGRAM(s) Oral every 8 hours  multivitamin/minerals/iron Oral Solution (CENTRUM) 15 milliLiter(s) Oral daily  pancrelipase  (CREON 12,000 Lipase Units) 1 Capsule(s) Oral <User Schedule>  pantoprazole   Suspension 40 milliGRAM(s) Oral daily  phenylephrine    Infusion 0.3 MICROgram(s)/kG/Min (13.3 mL/Hr) IV Continuous <Continuous>  polyethylene glycol 3350 17 Gram(s) Oral daily    MEDICATIONS  (PRN):  acetaminophen   Oral Liquid .. 650 milliGRAM(s) Oral every 8 hours PRN Mild Pain (1 - 3)    ICU Vital Signs Last 24 Hrs  T(C): 38.3 (05-02-23 @ 12:00), Max: 38.3 (05-02-23 @ 12:00)  HR: 94 (05-02-23 @ 15:10) (81 - 121)  BP: --  ABP: 103/68 (05-02-23 @ 14:00) (86/53 - 134/71)  ABP(mean): 83 (05-02-23 @ 14:00) (68 - 98)  RR: 17 (05-02-23 @ 14:00) (17 - 26)  SpO2: 100% (05-02-23 @ 15:10) (95% - 100%)  Wt(kg): --  CVP(mm Hg): --      05-01 @ 07:01  -  05-02 @ 07:00  --------------------------------------------------------  IN:    Enteral Tube Flush: 50 mL    IV PiggyBack: 50 mL    Lactated Ringers Bolus: 1000 mL    Other (mL): 400 mL    Peptamen A.F.: 675 mL    Phenylephrine: 57 mL  Total IN: 2232 mL    OUT:    Drain (mL): 1500 mL    Nasogastric/Oral tube (mL): 200 mL    Other (mL): 1300 mL    Rectal Tube (mL): 400 mL  Total OUT: 3400 mL    Total NET: -1168 mL      05-02 @ 07:01  -  05-02 @ 15:18  --------------------------------------------------------  IN:    IV PiggyBack: 400 mL    Peptamen A.F.: 315 mL  Total IN: 715 mL    OUT:  Total OUT: 0 mL    Total NET: 715 mL      NEURO  awake, trach, nods head yes and no    RESPIRATORY  Mode: AC/ CMVRR (machine): 18 TV (machine): 460 FiO2: 30 PEEP: 5  CTA B/L    CARDIOVASCULAR  S1, S2,     GI/NUTRITION  Diet: NPO with TFs  ABDOMEN: soft, nontender, distended, +/NGT with bilious output, tolerating post pyloric tube feeds      ACCESS DEVICES:  [x] Peripheral IV  [x] Central Venous Line - juan a	[ ] R	[x] L	[x] IJ	[ ] Fem	[ ] SC	Placed:   [x] Arterial Line		[x] R	[ ] L	[ ] Fem	[ ] Rad	[x] Ax	Placed:   [ ] PICC:					[ ] Mediport  [ ] Urinary Catheter, Date Placed:   [ ] Necessity of urinary, arterial, and venous catheters discussed    Labs:    CBC (05-02 @ 02:55)                              7.9<L>                         14.61<H>  )----------------(  251        --    % Neutrophils, --    % Lymphocytes, ANC: --                                  23.0<L>    BMP (05-02 @ 02:55)             139     |  102     |  26<H> 		Ca++ --      Ca 8.3<L>             ---------------------------------( 84    		Mg 1.90               3.4<L>  |  20<L>   |  2.06<H>			Ph 2.4<L>  BMP (05-01 @ 06:00)             140     |  103     |  56<H> 		Ca++ --      Ca 8.7                ---------------------------------( 66<L> 		Mg 2.50               3.5     |  18<L>   |  3.93<H>			Ph 4.2       LFTs (05-02 @ 02:55)      TPro 7.0 / Alb 2.4<L> / TBili 3.3<H> / DBili -- / AST 36<H> / ALT 23 / AlkPhos 201<H>    Coags (05-02 @ 02:55)  aPTT 29.1 / INR 1.26<H> / PT 14.7<H>      ABG (05-02 @ 09:30)     7.43 / 36 / 154<H> / 24 / -0.3 / 99.8<H>%     Lactate:    ABG (05-02 @ 02:55)     7.50<H> / 32 / 159<H> / 25 / 1.9 / 99.9<H>%     Lactate:

## 2023-05-04 NOTE — PROGRESS NOTE ADULT - PROVIDER SPECIALTY LIST ADULT
Nephrology
Nephrology
SICU
SICU
Surgery
Nephrology
SICU
Surgery
Anesthesia
Gastroenterology
Gastroenterology
Nephrology
Nephrology
SICU
Surgery
Nephrology
SICU
Surgery
Nephrology
SICU
Surgery
Nephrology
Surgery
Nephrology

## 2023-05-04 NOTE — PROCEDURE NOTE - NSPROCDETAILS_GEN_ALL_CORE
guidewire recovered
guidewire recovered/lumen(s) aspirated and flushed/sterile dressing applied/sterile technique, catheter placed/ultrasound guidance with use of sterile gel and probe cove
location identified, sterile technique used, catheter introduced, fluid drained
guidewire recovered/lumen(s) aspirated and flushed/sterile dressing applied/sterile technique, catheter placed/ultrasound guidance with use of sterile gel and probe cove

## 2023-05-04 NOTE — PROGRESS NOTE ADULT - ATTENDING COMMENTS
Patient s/p ir drainage of intraabdominal fluid collection, this am with bleeding from ngt, profusely. Started on pressors and lost pulses. GI called for endoscopy, and MTP called, central line placed. 20 minutes of ACLS performed, with ongoing resuscitation in 1:1:1 ratio. Likely hemorrhagic shock from ruptured pseudoaneursym that eroded into the stomach. Patient , family called and notified.    The patient is a critical care patient with life threatening hemodynamic and metabolic instability in SICU.  I have personally interviewed when possible and examined the patient, reviewed data and laboratory tests/x-rays and all pertinent electronic images.  I was physically present for the key portions of the evaluation and management (E/M) service provided.   The SICU team has a constant risk benefit analyzes discussion with the primary team, all consultants, House Staff and PA's on all decisions.  These diagnoses are unrelated to the surgical procedure noted above.  I meet with family if needed to get further history, discuss the case and make care decisions for this patient who might not be able to participate.  Time involved in performance of separately billable procedures was not counted toward my critical care time. There is no overlap.  I spent 55-75 minutes ( 0800Hrs-0915Hrs in AM/ 1600Hrs-1715Hrs in PM, or other time indicated) of critical care time for the diagnoses, assessment, plan and interventions.  This time excludes time spent on separate procedures and teaching.

## 2023-05-04 NOTE — PROCEDURE NOTE - NSINFORMCONSENT_GEN_A_CORE
This was an emergent procedure.
Benefits, risks, and possible complications of procedure explained to patient/caregiver who verbalized understanding and gave written consent.
This was an emergent procedure.
This was an emergent procedure.
Benefits, risks, and possible complications of procedure explained to patient/caregiver who verbalized understanding and gave written consent.
This was an emergent procedure.

## 2023-05-04 NOTE — CHART NOTE - NSCHARTNOTEFT_GEN_A_CORE
GI called regarding bright red blood from NGT     Patient known to GI service, has known necrotizing pancreatitis incompletely walled off on most recent imaging, complicated by splenic vein thrombus. At ~4:30am after turning patient, had significant bloody output from NGT w/ associated hypotension. Started on venkata. Hg at the time 9.2->9.6 (had been 7.1 on day prior). Since then patient w/ worsening bloody output from NGT, mouth. Called MTP, received 6u pRBC, 3u FFP, 1 plt so far; worsening hypotension (now on venkata and levo)    UGIB - DDx for UGIB in this patient includes gastric ulcer (RF includes prolonged NGT to continuous suction; vs prolonged intubation and stress ulcer), also possible gastric varix due to known splenic vein thrombus in setting of pancreatitis, vs other etiologies (AVM,, dieulafoy etc). Currently patient not stable for endoscopy and unlikely to be able to visualize anything given significant blood    Recommendations:  - continue resuscitation efforts w/ blood, pressors  - trend CBC  - pantoprazole drip, octreotide drip  - patient already on reglan standing  - consult IR  - pending above will determine need/benefit of EGD, will be difficult to visualize and/or treat any potential source given significant amount of blood, as well as if gastric varix would likely not be amenable to endoscopic therapy    D/W Dr. Jacob Petty, PGY6  GI Fellow

## 2023-05-04 NOTE — CHART NOTE - NSCHARTNOTESELECT_GEN_ALL_CORE
Event Note
Nutrition Services
POCUS
Event Note
Follow Up/Nutrition Services
GI Fellow/Off Service Note
GI/Event Note
Nutrition Follow Up/Nutrition Services
Nutrition Services
Post Op Check
Pre-procedure IR note
gi
gi

## 2023-05-04 NOTE — PROGRESS NOTE ADULT - NUTRITIONAL ASSESSMENT
This patient has been assessed with a concern for Malnutrition and has been determined to have a diagnosis/diagnoses of Morbid obesity (BMI > 40).    This patient is being managed with:   Diet NPO with Tube Feed-  Tube Feeding Modality: Nasogastric  Nepro with Carb Steady (NEPRORTH)  Total Volume for 24 Hours (mL): 600  Continuous  Starting Tube Feed Rate {mL per Hour}: 20  Increase Tube Feed Rate by (mL): 15     Every 4 hours  Until Goal Tube Feed Rate (mL per Hour): 25  Tube Feed Duration (in Hours): 24  Tube Feed Start Time: 11:00  Entered: Mar 28 2023 10:59AM  
This patient has been assessed with a concern for Malnutrition and has been determined to have a diagnosis/diagnoses of Severe protein-calorie malnutrition and Morbid obesity (BMI > 40).    This patient is being managed with:   Diet NPO with Tube Feed-  Tube Feeding Modality: Nasogastric  Nepro with Carb Steady (NEPRORTH)  Total Volume for 24 Hours (mL): 600  Continuous  Starting Tube Feed Rate {mL per Hour}: 10  Increase Tube Feed Rate by (mL): 15     Every 4 hours  Until Goal Tube Feed Rate (mL per Hour): 25  Tube Feed Duration (in Hours): 24  Tube Feed Start Time: 04:00  No Carb Prosource (1pkg = 15gms Protein)     Qty per Day:  4  Entered: Apr  3 2023  3:54AM  
This patient has been assessed with a concern for Malnutrition and has been determined to have a diagnosis/diagnoses of Severe protein-calorie malnutrition and Morbid obesity (BMI > 40).    This patient is being managed with:   Diet NPO with Tube Feed-  Tube Feeding Modality: Nasogastric  Nepro with Carb Steady (NEPRORTH)  Total Volume for 24 Hours (mL): 600  Continuous  Starting Tube Feed Rate {mL per Hour}: 10  Increase Tube Feed Rate by (mL): 15     Every 4 hours  Until Goal Tube Feed Rate (mL per Hour): 25  Tube Feed Duration (in Hours): 24  Tube Feed Start Time: 16:00  No Carb Prosource (1pkg = 15gms Protein)     Qty per Day:  4  Entered: Apr 9 2023  3:48PM  
This patient has been assessed with a concern for Malnutrition and has been determined to have a diagnosis/diagnoses of Severe protein-calorie malnutrition and Morbid obesity (BMI > 40).    This patient is being managed with:   Diet NPO with Tube Feed-  Tube Feeding Modality: Nasogastric  Peptamen 1.5 (PEPTAMEN1.5RTH)  Total Volume for 24 Hours (mL): 1080  Continuous  Starting Tube Feed Rate {mL per Hour}: 22  Increase Tube Feed Rate by (mL): 0     Every 4 hours  Until Goal Tube Feed Rate (mL per Hour): 45  Tube Feed Duration (in Hours): 24  Tube Feed Start Time: 22:15  Free Water Flush Instructions:  MILDLY THICKENED APPLE JUICE TO BE GIVEN DAILY  No Carb Prosource (1pkg = 15gms Protein)     Qty per Day:  4  Entered: Apr 30 2023  9:19AM  
This patient has been assessed with a concern for Malnutrition and has been determined to have a diagnosis/diagnoses of Severe protein-calorie malnutrition and Morbid obesity (BMI > 40).    This patient is being managed with:   Diet NPO with Tube Feed-  Tube Feeding Modality: Nasogastric  Peptamen 1.5 (PEPTAMEN1.5RTH)  Total Volume for 24 Hours (mL): 1080  Continuous  Starting Tube Feed Rate {mL per Hour}: 22  Increase Tube Feed Rate by (mL): 0     Every 4 hours  Until Goal Tube Feed Rate (mL per Hour): 45  Tube Feed Duration (in Hours): 24  Tube Feed Start Time: 22:15  Free Water Flush Instructions:  MILDLY THICKENED APPLE JUICE TO BE GIVEN DAILY  No Carb Prosource (1pkg = 15gms Protein)     Qty per Day:  4  Entered: Apr 30 2023  9:19AM  
This patient has been assessed with a concern for Malnutrition and has been determined to have a diagnosis/diagnoses of Severe protein-calorie malnutrition and Morbid obesity (BMI > 40).    This patient is being managed with:   Diet NPO with Tube Feed-  Tube Feeding Modality: Nasogastric  Nepro with Carb Steady (NEPRORTH)  Total Volume for 24 Hours (mL): 600  Continuous  Starting Tube Feed Rate {mL per Hour}: 10  Increase Tube Feed Rate by (mL): 15     Every 4 hours  Until Goal Tube Feed Rate (mL per Hour): 25  Tube Feed Duration (in Hours): 24  Tube Feed Start Time: 04:00  No Carb Prosource (1pkg = 15gms Protein)     Qty per Day:  4  Entered: Apr  3 2023  3:54AM  
This patient has been assessed with a concern for Malnutrition and has been determined to have a diagnosis/diagnoses of Severe protein-calorie malnutrition and Morbid obesity (BMI > 40).    This patient is being managed with:   Diet NPO with Tube Feed-  Tube Feeding Modality: Nasogastric  Nepro with Carb Steady (NEPRORTH)  Total Volume for 24 Hours (mL): 600  Continuous  Starting Tube Feed Rate {mL per Hour}: 10  Increase Tube Feed Rate by (mL): 15     Every 4 hours  Until Goal Tube Feed Rate (mL per Hour): 25  Tube Feed Duration (in Hours): 24  Tube Feed Start Time: 04:00  No Carb Prosource (1pkg = 15gms Protein)     Qty per Day:  4  Entered: Apr  3 2023  3:54AM  
This patient has been assessed with a concern for Malnutrition and has been determined to have a diagnosis/diagnoses of Severe protein-calorie malnutrition and Morbid obesity (BMI > 40).    This patient is being managed with:   Diet NPO with Tube Feed-  Tube Feeding Modality: Nasogastric  Nepro with Carb Steady (NEPRORTH)  Total Volume for 24 Hours (mL): 960  Continuous  Starting Tube Feed Rate {mL per Hour}: 10  Increase Tube Feed Rate by (mL): 15     Every 4 hours  Until Goal Tube Feed Rate (mL per Hour): 40  Tube Feed Duration (in Hours): 24  Tube Feed Start Time: 16:00  No Carb Prosource (1pkg = 15gms Protein)     Qty per Day:  4  Entered: Apr 10 2023 10:26AM  
This patient has been assessed with a concern for Malnutrition and has been determined to have a diagnosis/diagnoses of Severe protein-calorie malnutrition and Morbid obesity (BMI > 40).    This patient is being managed with:   Diet NPO with Tube Feed-  Tube Feeding Modality: Nasogastric  Peptamen 1.5 (PEPTAMEN1.5RTH)  Total Volume for 24 Hours (mL): 1080  Continuous  Starting Tube Feed Rate {mL per Hour}: 45  Increase Tube Feed Rate by (mL): 0     Every 4 hours  Until Goal Tube Feed Rate (mL per Hour): 45  Tube Feed Duration (in Hours): 24  Tube Feed Start Time: 16:00  Free Water Flush Instructions:  MILDLY THICKENED APPLE JUICE TO BE GIVEN DAILY  No Carb Prosource (1pkg = 15gms Protein)     Qty per Day:  4  Entered: Apr 16 2023 10:12AM  
This patient has been assessed with a concern for Malnutrition and has been determined to have a diagnosis/diagnoses of Severe protein-calorie malnutrition and Morbid obesity (BMI > 40).    This patient is being managed with:   Diet NPO with Tube Feed-  Tube Feeding Modality: Nasogastric  Peptamen 1.5 (PEPTAMEN1.5RTH)  Total Volume for 24 Hours (mL): 1080  Continuous  Starting Tube Feed Rate {mL per Hour}: 45  Increase Tube Feed Rate by (mL): 0     Every 4 hours  Until Goal Tube Feed Rate (mL per Hour): 45  Tube Feed Duration (in Hours): 24  Tube Feed Start Time: 16:00  No Carb Prosource (1pkg = 15gms Protein)     Qty per Day:  4  Entered: Apr 14 2023 12:51PM  
This patient has been assessed with a concern for Malnutrition and has been determined to have a diagnosis/diagnoses of Severe protein-calorie malnutrition and Morbid obesity (BMI > 40).    This patient is being managed with:   Diet NPO with Tube Feed-  Tube Feeding Modality: Nasogastric  Peptamen 1.5 (PEPTAMEN1.5RTH)  Total Volume for 24 Hours (mL): 528  Continuous  Starting Tube Feed Rate {mL per Hour}: 22  Increase Tube Feed Rate by (mL): 0     Every 4 hours  Until Goal Tube Feed Rate (mL per Hour): 22  Tube Feed Duration (in Hours): 24  Tube Feed Start Time: 22:15  Free Water Flush Instructions:  MILDLY THICKENED APPLE JUICE TO BE GIVEN DAILY  No Carb Prosource (1pkg = 15gms Protein)     Qty per Day:  4  Entered: Apr 28 2023 10:12PM  
This patient has been assessed with a concern for Malnutrition and has been determined to have a diagnosis/diagnoses of Severe protein-calorie malnutrition and Morbid obesity (BMI > 40).    This patient is being managed with:   Diet NPO-  Entered: Apr 2 2023  5:18AM  
This patient has been assessed with a concern for Malnutrition and has been determined to have a diagnosis/diagnoses of Morbid obesity (BMI > 40).    This patient is being managed with:   Diet NPO-  Except Medications  Entered: Mar 30 2023 11:33PM  
This patient has been assessed with a concern for Malnutrition and has been determined to have a diagnosis/diagnoses of Severe protein-calorie malnutrition and Morbid obesity (BMI > 40).    This patient is being managed with:   Diet NPO with Tube Feed-  Tube Feeding Modality: Nasogastric  Nepro with Carb Steady (NEPRORTH)  Total Volume for 24 Hours (mL): 600  Continuous  Starting Tube Feed Rate {mL per Hour}: 10  Increase Tube Feed Rate by (mL): 15     Every 4 hours  Until Goal Tube Feed Rate (mL per Hour): 25  Tube Feed Duration (in Hours): 24  Tube Feed Start Time: 04:00  No Carb Prosource (1pkg = 15gms Protein)     Qty per Day:  4  Entered: Apr  3 2023  3:54AM  
This patient has been assessed with a concern for Malnutrition and has been determined to have a diagnosis/diagnoses of Severe protein-calorie malnutrition and Morbid obesity (BMI > 40).    This patient is being managed with:   Diet NPO with Tube Feed-  Tube Feeding Modality: Nasogastric  Nepro with Carb Steady (NEPRORTH)  Total Volume for 24 Hours (mL): 600  Continuous  Starting Tube Feed Rate {mL per Hour}: 10  Increase Tube Feed Rate by (mL): 15     Every 4 hours  Until Goal Tube Feed Rate (mL per Hour): 25  Tube Feed Duration (in Hours): 24  Tube Feed Start Time: 04:00  No Carb Prosource (1pkg = 15gms Protein)     Qty per Day:  4  Entered: Apr  3 2023  3:54AM  
This patient has been assessed with a concern for Malnutrition and has been determined to have a diagnosis/diagnoses of Severe protein-calorie malnutrition and Morbid obesity (BMI > 40).    This patient is being managed with:   Diet NPO with Tube Feed-  Tube Feeding Modality: Nasogastric  Nepro with Carb Steady (NEPRORTH)  Total Volume for 24 Hours (mL): 960  Continuous  Starting Tube Feed Rate {mL per Hour}: 10  Increase Tube Feed Rate by (mL): 15     Every 4 hours  Until Goal Tube Feed Rate (mL per Hour): 40  Tube Feed Duration (in Hours): 24  Tube Feed Start Time: 16:00  No Carb Prosource (1pkg = 15gms Protein)     Qty per Day:  4  Entered: Apr 10 2023 10:26AM  
This patient has been assessed with a concern for Malnutrition and has been determined to have a diagnosis/diagnoses of Severe protein-calorie malnutrition and Morbid obesity (BMI > 40).    This patient is being managed with:   Diet NPO with Tube Feed-  Tube Feeding Modality: Nasogastric  Peptamen 1.5 (PEPTAMEN1.5RTH)  Total Volume for 24 Hours (mL): 1080  Continuous  Starting Tube Feed Rate {mL per Hour}: 22  Increase Tube Feed Rate by (mL): 0     Every 4 hours  Until Goal Tube Feed Rate (mL per Hour): 45  Tube Feed Duration (in Hours): 24  Tube Feed Start Time: 22:15  Free Water Flush Instructions:  MILDLY THICKENED APPLE JUICE TO BE GIVEN DAILY  No Carb Prosource (1pkg = 15gms Protein)     Qty per Day:  4  Entered: May  3 2023  5:31AM  
This patient has been assessed with a concern for Malnutrition and has been determined to have a diagnosis/diagnoses of Severe protein-calorie malnutrition and Morbid obesity (BMI > 40).    This patient is being managed with:   Diet NPO with Tube Feed-  Tube Feeding Modality: Nasogastric  Peptamen 1.5 (PEPTAMEN1.5RTH)  Total Volume for 24 Hours (mL): 1080  Continuous  Starting Tube Feed Rate {mL per Hour}: 45  Increase Tube Feed Rate by (mL): 0     Every 4 hours  Until Goal Tube Feed Rate (mL per Hour): 45  Tube Feed Duration (in Hours): 24  Tube Feed Start Time: 16:00  Free Water Flush Instructions:  MILDLY THICKENED APPLE JUICE TO BE GIVEN DAILY  No Carb Prosource (1pkg = 15gms Protein)     Qty per Day:  4  Entered: Apr 16 2023 10:12AM  
This patient has been assessed with a concern for Malnutrition and has been determined to have a diagnosis/diagnoses of Severe protein-calorie malnutrition and Morbid obesity (BMI > 40).    This patient is being managed with:   Diet NPO with Tube Feed-  Tube Feeding Modality: Nasogastric  Peptamen 1.5 (PEPTAMEN1.5RTH)  Total Volume for 24 Hours (mL): 1080  Continuous  Starting Tube Feed Rate {mL per Hour}: 45  Increase Tube Feed Rate by (mL): 0     Every 4 hours  Until Goal Tube Feed Rate (mL per Hour): 45  Tube Feed Duration (in Hours): 24  Tube Feed Start Time: 16:00  Free Water Flush Instructions:  MILDLY THICKENED APPLE JUICE TO BE GIVEN DAILY  No Carb Prosource (1pkg = 15gms Protein)     Qty per Day:  4  Entered: Apr 16 2023 10:12AM    This patient has been assessed with a concern for Malnutrition and has been determined to have a diagnosis/diagnoses of Severe protein-calorie malnutrition and Morbid obesity (BMI > 40).    This patient is being managed with:   Diet NPO with Tube Feed-  Tube Feeding Modality: Nasogastric  Peptamen 1.5 (PEPTAMEN1.5RTH)  Total Volume for 24 Hours (mL): 1080  Continuous  Starting Tube Feed Rate {mL per Hour}: 45  Increase Tube Feed Rate by (mL): 0     Every 4 hours  Until Goal Tube Feed Rate (mL per Hour): 45  Tube Feed Duration (in Hours): 24  Tube Feed Start Time: 16:00  Free Water Flush Instructions:  MILDLY THICKENED APPLE JUICE TO BE GIVEN DAILY  No Carb Prosource (1pkg = 15gms Protein)     Qty per Day:  4  Entered: Apr 16 2023 10:12AM  
This patient has been assessed with a concern for Malnutrition and has been determined to have a diagnosis/diagnoses of Severe protein-calorie malnutrition and Morbid obesity (BMI > 40).    This patient is being managed with:   Diet NPO with Tube Feed-  Tube Feeding Modality: Nasogastric  Peptamen 1.5 (PEPTAMEN1.5RTH)  Total Volume for 24 Hours (mL): 1080  Continuous  Starting Tube Feed Rate {mL per Hour}: 45  Increase Tube Feed Rate by (mL): 0     Every 4 hours  Until Goal Tube Feed Rate (mL per Hour): 45  Tube Feed Duration (in Hours): 24  Tube Feed Start Time: 16:00  No Carb Prosource (1pkg = 15gms Protein)     Qty per Day:  4  Entered: Apr 14 2023 12:51PM  
This patient has been assessed with a concern for Malnutrition and has been determined to have a diagnosis/diagnoses of Severe protein-calorie malnutrition and Morbid obesity (BMI > 40).    This patient is being managed with:   Diet NPO with Tube Feed-  Tube Feeding Modality: Nasogastric  Peptamen 1.5 (PEPTAMEN1.5RTH)  Total Volume for 24 Hours (mL): 1080  Continuous  Starting Tube Feed Rate {mL per Hour}: 45  Increase Tube Feed Rate by (mL): 0     Every 4 hours  Until Goal Tube Feed Rate (mL per Hour): 45  Tube Feed Duration (in Hours): 24  Tube Feed Start Time: 16:00  No Carb Prosource (1pkg = 15gms Protein)     Qty per Day:  4  Entered: Apr 14 2023 12:51PM  
This patient has been assessed with a concern for Malnutrition and has been determined to have a diagnosis/diagnoses of Severe protein-calorie malnutrition and Morbid obesity (BMI > 40).    This patient is being managed with:   Diet NPO with Tube Feed-  Tube Feeding Modality: Nasogastric  Nepro with Carb Steady (NEPRORTH)  Total Volume for 24 Hours (mL): 600  Continuous  Starting Tube Feed Rate {mL per Hour}: 10  Increase Tube Feed Rate by (mL): 15     Every 4 hours  Until Goal Tube Feed Rate (mL per Hour): 25  Tube Feed Duration (in Hours): 24  Tube Feed Start Time: 04:00  No Carb Prosource (1pkg = 15gms Protein)     Qty per Day:  4  Entered: Apr  3 2023  3:54AM  
This patient has been assessed with a concern for Malnutrition and has been determined to have a diagnosis/diagnoses of Severe protein-calorie malnutrition and Morbid obesity (BMI > 40).    This patient is being managed with:   Diet NPO with Tube Feed-  Tube Feeding Modality: Nasogastric  Nepro with Carb Steady (NEPRORTH)  Total Volume for 24 Hours (mL): 600  Continuous  Starting Tube Feed Rate {mL per Hour}: 10  Increase Tube Feed Rate by (mL): 15     Every 4 hours  Until Goal Tube Feed Rate (mL per Hour): 25  Tube Feed Duration (in Hours): 24  Tube Feed Start Time: 04:00  No Carb Prosource (1pkg = 15gms Protein)     Qty per Day:  4  Entered: Apr  3 2023  3:54AM  
This patient has been assessed with a concern for Malnutrition and has been determined to have a diagnosis/diagnoses of Severe protein-calorie malnutrition and Morbid obesity (BMI > 40).    This patient is being managed with:   Diet NPO with Tube Feed-  Tube Feeding Modality: Nasogastric  Nepro with Carb Steady (NEPRORTH)  Total Volume for 24 Hours (mL): 960  Continuous  Starting Tube Feed Rate {mL per Hour}: 25  Increase Tube Feed Rate by (mL): 15     Every 4 hours  Until Goal Tube Feed Rate (mL per Hour): 40  Tube Feed Duration (in Hours): 24  Tube Feed Start Time: 16:00  No Carb Prosource (1pkg = 15gms Protein)     Qty per Day:  4  Entered: Mar 31 2023  3:53PM  
This patient has been assessed with a concern for Malnutrition and has been determined to have a diagnosis/diagnoses of Severe protein-calorie malnutrition and Morbid obesity (BMI > 40).    This patient is being managed with:   Diet NPO with Tube Feed-  Tube Feeding Modality: Nasogastric  Peptamen 1.5 (PEPTAMEN1.5RTH)  Total Volume for 24 Hours (mL): 1080  Continuous  Starting Tube Feed Rate {mL per Hour}: 22  Increase Tube Feed Rate by (mL): 0     Every 4 hours  Until Goal Tube Feed Rate (mL per Hour): 45  Tube Feed Duration (in Hours): 24  Tube Feed Start Time: 22:15  Free Water Flush Instructions:  MILDLY THICKENED APPLE JUICE TO BE GIVEN DAILY  No Carb Prosource (1pkg = 15gms Protein)     Qty per Day:  4  Entered: Apr 30 2023  9:19AM  
This patient has been assessed with a concern for Malnutrition and has been determined to have a diagnosis/diagnoses of Severe protein-calorie malnutrition and Morbid obesity (BMI > 40).    This patient is being managed with:   Diet NPO with Tube Feed-  Tube Feeding Modality: Nasogastric  Peptamen 1.5 (PEPTAMEN1.5RTH)  Total Volume for 24 Hours (mL): 1080  Continuous  Starting Tube Feed Rate {mL per Hour}: 45  Increase Tube Feed Rate by (mL): 0     Every 4 hours  Until Goal Tube Feed Rate (mL per Hour): 45  Tube Feed Duration (in Hours): 24  Tube Feed Start Time: 16:00  Free Water Flush Instructions:  MILDLY THICKENED APPLE JUICE TO BE GIVEN DAILY  No Carb Prosource (1pkg = 15gms Protein)     Qty per Day:  4  Entered: Apr 16 2023 10:12AM  
This patient has been assessed with a concern for Malnutrition and has been determined to have a diagnosis/diagnoses of Morbid obesity (BMI > 40).    This patient is being managed with:   Diet NPO with Tube Feed-  Tube Feeding Modality: Nasogastric  Nepro with Carb Steady (NEPRORTH)  Total Volume for 24 Hours (mL): 600  Continuous  Starting Tube Feed Rate {mL per Hour}: 20  Increase Tube Feed Rate by (mL): 15     Every 4 hours  Until Goal Tube Feed Rate (mL per Hour): 25  Tube Feed Duration (in Hours): 24  Tube Feed Start Time: 11:00  Entered: Mar 28 2023 10:59AM  
This patient has been assessed with a concern for Malnutrition and has been determined to have a diagnosis/diagnoses of Morbid obesity (BMI > 40).    This patient is being managed with:   Diet NPO with Tube Feed-  Tube Feeding Modality: Nasogastric  Nepro with Carb Steady (NEPRORTH)  Total Volume for 24 Hours (mL): 600  Continuous  Starting Tube Feed Rate {mL per Hour}: 20  Increase Tube Feed Rate by (mL): 15     Every 4 hours  Until Goal Tube Feed Rate (mL per Hour): 25  Tube Feed Duration (in Hours): 24  Tube Feed Start Time: 11:00  Entered: Mar 28 2023 10:59AM  
This patient has been assessed with a concern for Malnutrition and has been determined to have a diagnosis/diagnoses of Morbid obesity (BMI > 40).    This patient is being managed with:   Diet NPO-  Except Medications  Entered: Mar 28 2023  1:10AM  
This patient has been assessed with a concern for Malnutrition and has been determined to have a diagnosis/diagnoses of Severe protein-calorie malnutrition and Morbid obesity (BMI > 40).    This patient is being managed with:   Diet NPO with Tube Feed-  Tube Feeding Modality: Nasogastric  Nepro with Carb Steady (NEPRORTH)  Total Volume for 24 Hours (mL): 600  Continuous  Starting Tube Feed Rate {mL per Hour}: 10  Increase Tube Feed Rate by (mL): 15     Every 4 hours  Until Goal Tube Feed Rate (mL per Hour): 25  Tube Feed Duration (in Hours): 24  Tube Feed Start Time: 04:00  No Carb Prosource (1pkg = 15gms Protein)     Qty per Day:  4  Entered: Apr  3 2023  3:54AM  
This patient has been assessed with a concern for Malnutrition and has been determined to have a diagnosis/diagnoses of Severe protein-calorie malnutrition and Morbid obesity (BMI > 40).    This patient is being managed with:   Diet NPO with Tube Feed-  Tube Feeding Modality: Nasogastric  Nepro with Carb Steady (NEPRORTH)  Total Volume for 24 Hours (mL): 600  Continuous  Starting Tube Feed Rate {mL per Hour}: 10  Increase Tube Feed Rate by (mL): 15     Every 4 hours  Until Goal Tube Feed Rate (mL per Hour): 25  Tube Feed Duration (in Hours): 24  Tube Feed Start Time: 04:00  No Carb Prosource (1pkg = 15gms Protein)     Qty per Day:  4  Entered: Apr  3 2023  3:54AM  
This patient has been assessed with a concern for Malnutrition and has been determined to have a diagnosis/diagnoses of Severe protein-calorie malnutrition and Morbid obesity (BMI > 40).    This patient is being managed with:   Diet NPO with Tube Feed-  Tube Feeding Modality: Nasogastric  Nepro with Carb Steady (NEPRORTH)  Total Volume for 24 Hours (mL): 960  Continuous  Starting Tube Feed Rate {mL per Hour}: 25  Increase Tube Feed Rate by (mL): 15     Every 4 hours  Until Goal Tube Feed Rate (mL per Hour): 40  Tube Feed Duration (in Hours): 24  Tube Feed Start Time: 16:00  No Carb Prosource (1pkg = 15gms Protein)     Qty per Day:  4  Entered: Mar 31 2023  3:53PM  
This patient has been assessed with a concern for Malnutrition and has been determined to have a diagnosis/diagnoses of Severe protein-calorie malnutrition and Morbid obesity (BMI > 40).    This patient is being managed with:   Diet NPO with Tube Feed-  Tube Feeding Modality: Nasogastric  Peptamen 1.5 (PEPTAMEN1.5RTH)  Total Volume for 24 Hours (mL): 1080  Continuous  Starting Tube Feed Rate {mL per Hour}: 22  Increase Tube Feed Rate by (mL): 0     Every 4 hours  Until Goal Tube Feed Rate (mL per Hour): 45  Tube Feed Duration (in Hours): 24  Tube Feed Start Time: 22:15  Free Water Flush Instructions:  MILDLY THICKENED APPLE JUICE TO BE GIVEN DAILY  No Carb Prosource (1pkg = 15gms Protein)     Qty per Day:  4  Entered: Apr 30 2023  9:19AM    Diet NPO after Midnight-     NPO Start Date: 30-Apr-2023   NPO Start Time: 23:59  Entered: Apr 30 2023  9:12AM  
This patient has been assessed with a concern for Malnutrition and has been determined to have a diagnosis/diagnoses of Severe protein-calorie malnutrition and Morbid obesity (BMI > 40).    This patient is being managed with:   Diet NPO with Tube Feed-  Tube Feeding Modality: Nasogastric  Peptamen 1.5 (PEPTAMEN1.5RTH)  Total Volume for 24 Hours (mL): 1080  Continuous  Starting Tube Feed Rate {mL per Hour}: 45  Increase Tube Feed Rate by (mL): 0     Every 4 hours  Until Goal Tube Feed Rate (mL per Hour): 45  Tube Feed Duration (in Hours): 24  Tube Feed Start Time: 16:00  Free Water Flush Instructions:  MILDLY THICKENED APPLE JUICE TO BE GIVEN DAILY  No Carb Prosource (1pkg = 15gms Protein)     Qty per Day:  4  Entered: Apr 16 2023 10:12AM  
This patient has been assessed with a concern for Malnutrition and has been determined to have a diagnosis/diagnoses of Severe protein-calorie malnutrition and Morbid obesity (BMI > 40).    This patient is being managed with:   Diet NPO with Tube Feed-  Tube Feeding Modality: Nasogastric  Peptamen 1.5 (PEPTAMEN1.5RTH)  Total Volume for 24 Hours (mL): 1080  Continuous  Starting Tube Feed Rate {mL per Hour}: 45  Increase Tube Feed Rate by (mL): 0     Every 4 hours  Until Goal Tube Feed Rate (mL per Hour): 45  Tube Feed Duration (in Hours): 24  Tube Feed Start Time: 16:00  Free Water Flush Instructions:  MILDLY THICKENED APPLE JUICE TO BE GIVEN DAILY  No Carb Prosource (1pkg = 15gms Protein)     Qty per Day:  4  Entered: Apr 16 2023 10:12AM  
This patient has been assessed with a concern for Malnutrition and has been determined to have a diagnosis/diagnoses of Severe protein-calorie malnutrition and Morbid obesity (BMI > 40).    This patient is being managed with:   Diet NPO with Tube Feed-  Tube Feeding Modality: Nasogastric  Peptamen 1.5 (PEPTAMEN1.5RTH)  Total Volume for 24 Hours (mL): 1080  Continuous  Starting Tube Feed Rate {mL per Hour}: 45  Increase Tube Feed Rate by (mL): 0     Every 4 hours  Until Goal Tube Feed Rate (mL per Hour): 45  Tube Feed Duration (in Hours): 24  Tube Feed Start Time: 16:00  Free Water Flush Instructions:  MILDLY THICKENED APPLE JUICE TO BE GIVEN DAILY  No Carb Prosource (1pkg = 15gms Protein)     Qty per Day:  4  Entered: Apr 16 2023 10:12AM    This patient has been assessed with a concern for Malnutrition and has been determined to have a diagnosis/diagnoses of Severe protein-calorie malnutrition and Morbid obesity (BMI > 40).    This patient is being managed with:   Diet NPO with Tube Feed-  Tube Feeding Modality: Nasogastric  Peptamen 1.5 (PEPTAMEN1.5RTH)  Total Volume for 24 Hours (mL): 1080  Continuous  Starting Tube Feed Rate {mL per Hour}: 45  Increase Tube Feed Rate by (mL): 0     Every 4 hours  Until Goal Tube Feed Rate (mL per Hour): 45  Tube Feed Duration (in Hours): 24  Tube Feed Start Time: 16:00  Free Water Flush Instructions:  MILDLY THICKENED APPLE JUICE TO BE GIVEN DAILY  No Carb Prosource (1pkg = 15gms Protein)     Qty per Day:  4  Entered: Apr 16 2023 10:12AM  
This patient has been assessed with a concern for Malnutrition and has been determined to have a diagnosis/diagnoses of Severe protein-calorie malnutrition and Morbid obesity (BMI > 40).    This patient is being managed with:   Diet NPO with Tube Feed-  Tube Feeding Modality: Nasogastric  Peptamen 1.5 (PEPTAMEN1.5RTH)  Total Volume for 24 Hours (mL): 1080  Continuous  Starting Tube Feed Rate {mL per Hour}: 45  Increase Tube Feed Rate by (mL): 0     Every 4 hours  Until Goal Tube Feed Rate (mL per Hour): 45  Tube Feed Duration (in Hours): 24  Tube Feed Start Time: 16:00  No Carb Prosource (1pkg = 15gms Protein)     Qty per Day:  4  Entered: Apr 14 2023 12:51PM  
This patient has been assessed with a concern for Malnutrition and has been determined to have a diagnosis/diagnoses of Severe protein-calorie malnutrition and Morbid obesity (BMI > 40).    This patient is being managed with:   Diet NPO with Tube Feed-  Tube Feeding Modality: Nasogastric  Peptamen 1.5 (PEPTAMEN1.5RTH)  Total Volume for 24 Hours (mL): 528  Continuous  Starting Tube Feed Rate {mL per Hour}: 22  Increase Tube Feed Rate by (mL): 0     Every 4 hours  Until Goal Tube Feed Rate (mL per Hour): 22  Tube Feed Duration (in Hours): 24  Tube Feed Start Time: 22:15  Free Water Flush Instructions:  MILDLY THICKENED APPLE JUICE TO BE GIVEN DAILY  No Carb Prosource (1pkg = 15gms Protein)     Qty per Day:  4  Entered: Apr 28 2023 10:12PM

## 2023-05-04 NOTE — DISCHARGE NOTE FOR THE EXPIRED PATIENT - HOSPITAL COURSE
48 year old female with a PMHx of HTN, hypothyroidism,  (30 years ago) and breast cancer (S/P left mastectomy and chemo in ) who presented with epigastric pain and left upper quadrant pain.  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  Transferred from [facility] ([place]) for hypotension and tachypnea requiring emergent intubation and multiple pressors.  SICU consulted for hemodynamic monitoring and respiratory management. Her course was complicated by reintubation, requiring pressors and steroids on and off, and on CRRT. She was found to have loculated abdominal collections. She is s/p trach and bedside para on . S/p IR drainage on 5/3. Patient was stable overnight and tolerated hemodialysis well. However, when patient was turned she started to bleed from the NG tube profusely around 4:30AM. An MTP was called and she started to receive blood through her right IJ shiley and a central line was inserted into her right femoral at 5AM to start pressors. She started to code at 6:03 and the code ended at 6:21AM.     Family wants an autopsy  ME case number- [id number]

## 2023-05-04 NOTE — PROCEDURE NOTE - NSPOSTPRCRAD_GEN_A_CORE
post-procedure radiography performed
central line located in the superior vena cava
post procedure radiography not performed
central line located in the superior vena cava

## 2023-05-04 NOTE — PROGRESS NOTE ADULT - ATTENDING SUPERVISION STATEMENT
Resident
Fellow
Fellow
Resident
Fellow
Resident
Fellow
Resident
Fellow
Resident
Fellow
Resident
Fellow

## 2023-05-05 ENCOUNTER — RESULT REVIEW (OUTPATIENT)
Age: 48
End: 2023-05-05

## 2023-05-05 LAB
-  AMPICILLIN: SIGNIFICANT CHANGE UP
-  FLUCONAZOLE: SIGNIFICANT CHANGE UP
-  TETRACYCLINE: SIGNIFICANT CHANGE UP
-  VANCOMYCIN: SIGNIFICANT CHANGE UP
METHOD TYPE: SIGNIFICANT CHANGE UP
METHOD TYPE: SIGNIFICANT CHANGE UP

## 2023-05-06 LAB
-  AMPICILLIN/SULBACTAM: SIGNIFICANT CHANGE UP
-  CEFAZOLIN: SIGNIFICANT CHANGE UP
-  CLINDAMYCIN: SIGNIFICANT CHANGE UP
-  ERYTHROMYCIN: SIGNIFICANT CHANGE UP
-  GENTAMICIN: SIGNIFICANT CHANGE UP
-  OXACILLIN: SIGNIFICANT CHANGE UP
-  PENICILLIN: SIGNIFICANT CHANGE UP
-  RIFAMPIN: SIGNIFICANT CHANGE UP
-  TETRACYCLINE: SIGNIFICANT CHANGE UP
-  TRIMETHOPRIM/SULFAMETHOXAZOLE: SIGNIFICANT CHANGE UP
-  VANCOMYCIN: SIGNIFICANT CHANGE UP
CULTURE RESULTS: SIGNIFICANT CHANGE UP
METHOD TYPE: SIGNIFICANT CHANGE UP
ORGANISM # SPEC MICROSCOPIC CNT: SIGNIFICANT CHANGE UP
SPECIMEN SOURCE: SIGNIFICANT CHANGE UP

## 2023-05-08 LAB
CULTURE RESULTS: SIGNIFICANT CHANGE UP
ORGANISM # SPEC MICROSCOPIC CNT: SIGNIFICANT CHANGE UP
SPECIMEN SOURCE: SIGNIFICANT CHANGE UP

## 2023-05-31 LAB
CULTURE RESULTS: SIGNIFICANT CHANGE UP
SPECIMEN SOURCE: SIGNIFICANT CHANGE UP

## 2025-01-28 NOTE — PATIENT PROFILE ADULT - NSTOBACCONEVERSMOKERY/N_GEN_A
Requested Prescriptions     Pending Prescriptions Disp Refills    lidocaine-prilocaine (EMLA) 2.5-2.5 % cream 5 g 2     Sig: Apply topically as needed.    dexAMETHasone (DECADRON) 4 MG tablet 32 tablet 0     Sig: Take 2 tablets by mouth daily (with breakfast) For 2 days after each chemotherapy    Sent to pharmacy  Amanda Ch PharmD [facility]  For Pharmacy Admin Tracking Only    Program: Medical [facility]  CPA in place:  Yes  Recommendation Provided To: Patient/Caregiver: 1 via In person  Intervention Detail: Refill(s) Provided  Intervention Accepted By: Patient/Caregiver: 2     Time Spent (min): 10   
No

## 2025-04-02 NOTE — BRIEF OPERATIVE NOTE - OPERATION/FINDINGS
Incision made below thyroid cartilage and above sternal notch. Blunt dissection down to level of trachea. At this time, bronchoscope was introduced through the ETT and percutaneous needle was introduced under bronchoscope visualization. Once needle position was verified, the wire was introduced, followed by the dilator, and ultimately the cuffed trach size 6. Bronch introduced through trach, position confirmed, and secretions suctioned. 2-0 nylon sutures used for stay sutures to be removed in 7 days. No

## (undated) DEVICE — GLV 7.5 PROTEXIS (WHITE)

## (undated) DEVICE — SUT VICRYL 0 18" TIES UNDYED

## (undated) DEVICE — SUT VICRYL 2-0 27" SH UNDYED

## (undated) DEVICE — SUT SILK 3-0 18" SH (POP-OFF)

## (undated) DEVICE — POSITIONER STRAP ARMBOARD VELCRO TS-30

## (undated) DEVICE — SUT VICRYL 2-0 18" TIES UNDYED

## (undated) DEVICE — SUT VICRYL 3-0 27" SH UNDYED

## (undated) DEVICE — SOL IRR POUR H2O 1500ML

## (undated) DEVICE — PACK MAJOR ABDOMINAL WITH LAP

## (undated) DEVICE — VENODYNE/SCD SLEEVE CALF MEDIUM

## (undated) DEVICE — POOLE SUCTION TIP

## (undated) DEVICE — PROTECTOR HEEL / ELBOW FLUFFY

## (undated) DEVICE — WARMING BLANKET FULL UNDERBODY

## (undated) DEVICE — DRAPE FLUID WARMER 44 X 44"

## (undated) DEVICE — CANISTER DISPOSABLE THIN WALL 3000CC

## (undated) DEVICE — SUT MAXON 1 36" GS-24

## (undated) DEVICE — ELCTR BOVIE BLADE 3/4" EXTENDED LENGTH 6"

## (undated) DEVICE — FOLEY TRAY 16FR 5CC LF UMETER CLOSED

## (undated) DEVICE — ELCTR GROUNDING PAD ADULT COVIDIEN

## (undated) DEVICE — STAPLER SKIN VISI-STAT 35 WIDE

## (undated) DEVICE — SOL IRR POUR NS 0.9% 1500ML

## (undated) DEVICE — ELCTR BOVIE PENCIL SMOKE EVACUATION

## (undated) DEVICE — LIGASURE IMPACT